# Patient Record
Sex: FEMALE | Race: BLACK OR AFRICAN AMERICAN | NOT HISPANIC OR LATINO | Employment: OTHER | URBAN - METROPOLITAN AREA
[De-identification: names, ages, dates, MRNs, and addresses within clinical notes are randomized per-mention and may not be internally consistent; named-entity substitution may affect disease eponyms.]

---

## 2017-01-01 ENCOUNTER — COMMUNICATION - HEALTHEAST (OUTPATIENT)
Dept: FAMILY MEDICINE | Facility: CLINIC | Age: 62
End: 2017-01-01

## 2017-01-03 ENCOUNTER — AMBULATORY - HEALTHEAST (OUTPATIENT)
Dept: FAMILY MEDICINE | Facility: CLINIC | Age: 62
End: 2017-01-03

## 2017-01-03 DIAGNOSIS — M25.569 KNEE PAIN: ICD-10-CM

## 2017-01-11 ENCOUNTER — COMMUNICATION - HEALTHEAST (OUTPATIENT)
Dept: FAMILY MEDICINE | Facility: CLINIC | Age: 62
End: 2017-01-11

## 2017-01-11 DIAGNOSIS — E78.5 HYPERLIPIDEMIA: ICD-10-CM

## 2017-01-22 ENCOUNTER — COMMUNICATION - HEALTHEAST (OUTPATIENT)
Dept: FAMILY MEDICINE | Facility: CLINIC | Age: 62
End: 2017-01-22

## 2017-01-22 DIAGNOSIS — E11.9 TYPE II DIABETES MELLITUS (H): ICD-10-CM

## 2017-01-22 DIAGNOSIS — E11.9 DIABETES (H): ICD-10-CM

## 2017-01-27 ENCOUNTER — AMBULATORY - HEALTHEAST (OUTPATIENT)
Dept: FAMILY MEDICINE | Facility: CLINIC | Age: 62
End: 2017-01-27

## 2017-01-27 ENCOUNTER — COMMUNICATION - HEALTHEAST (OUTPATIENT)
Dept: FAMILY MEDICINE | Facility: CLINIC | Age: 62
End: 2017-01-27

## 2017-01-27 DIAGNOSIS — G89.29 CHRONIC PAIN: ICD-10-CM

## 2017-02-02 ENCOUNTER — COMMUNICATION - HEALTHEAST (OUTPATIENT)
Dept: FAMILY MEDICINE | Facility: CLINIC | Age: 62
End: 2017-02-02

## 2017-02-02 DIAGNOSIS — M10.9 GOUT: ICD-10-CM

## 2017-02-27 ENCOUNTER — COMMUNICATION - HEALTHEAST (OUTPATIENT)
Dept: FAMILY MEDICINE | Facility: CLINIC | Age: 62
End: 2017-02-27

## 2017-02-27 DIAGNOSIS — G89.29 CHRONIC PAIN: ICD-10-CM

## 2017-03-01 ENCOUNTER — COMMUNICATION - HEALTHEAST (OUTPATIENT)
Dept: NURSING | Facility: CLINIC | Age: 62
End: 2017-03-01

## 2017-03-02 ENCOUNTER — COMMUNICATION - HEALTHEAST (OUTPATIENT)
Dept: FAMILY MEDICINE | Facility: CLINIC | Age: 62
End: 2017-03-02

## 2017-03-26 ENCOUNTER — COMMUNICATION - HEALTHEAST (OUTPATIENT)
Dept: FAMILY MEDICINE | Facility: CLINIC | Age: 62
End: 2017-03-26

## 2017-03-28 ENCOUNTER — COMMUNICATION - HEALTHEAST (OUTPATIENT)
Dept: SCHEDULING | Facility: CLINIC | Age: 62
End: 2017-03-28

## 2017-04-03 ENCOUNTER — COMMUNICATION - HEALTHEAST (OUTPATIENT)
Dept: NURSING | Facility: CLINIC | Age: 62
End: 2017-04-03

## 2017-04-10 ENCOUNTER — COMMUNICATION - HEALTHEAST (OUTPATIENT)
Dept: FAMILY MEDICINE | Facility: CLINIC | Age: 62
End: 2017-04-10

## 2017-04-10 DIAGNOSIS — G89.29 CHRONIC PAIN: ICD-10-CM

## 2017-04-14 ENCOUNTER — COMMUNICATION - HEALTHEAST (OUTPATIENT)
Dept: FAMILY MEDICINE | Facility: CLINIC | Age: 62
End: 2017-04-14

## 2017-04-14 DIAGNOSIS — E11.9 DM (DIABETES MELLITUS) (H): ICD-10-CM

## 2017-04-14 DIAGNOSIS — E11.9 DIABETES (H): ICD-10-CM

## 2017-04-25 ENCOUNTER — COMMUNICATION - HEALTHEAST (OUTPATIENT)
Dept: FAMILY MEDICINE | Facility: CLINIC | Age: 62
End: 2017-04-25

## 2017-04-26 ENCOUNTER — COMMUNICATION - HEALTHEAST (OUTPATIENT)
Dept: SCHEDULING | Facility: CLINIC | Age: 62
End: 2017-04-26

## 2017-04-28 ENCOUNTER — COMMUNICATION - HEALTHEAST (OUTPATIENT)
Dept: FAMILY MEDICINE | Facility: CLINIC | Age: 62
End: 2017-04-28

## 2017-04-28 DIAGNOSIS — M10.9 GOUT: ICD-10-CM

## 2017-05-01 ENCOUNTER — COMMUNICATION - HEALTHEAST (OUTPATIENT)
Dept: FAMILY MEDICINE | Facility: CLINIC | Age: 62
End: 2017-05-01

## 2017-05-02 ENCOUNTER — COMMUNICATION - HEALTHEAST (OUTPATIENT)
Dept: FAMILY MEDICINE | Facility: CLINIC | Age: 62
End: 2017-05-02

## 2017-05-02 DIAGNOSIS — M10.9 GOUT: ICD-10-CM

## 2017-05-04 ENCOUNTER — COMMUNICATION - HEALTHEAST (OUTPATIENT)
Dept: FAMILY MEDICINE | Facility: CLINIC | Age: 62
End: 2017-05-04

## 2017-05-04 DIAGNOSIS — M10.9 GOUT: ICD-10-CM

## 2017-05-05 ENCOUNTER — COMMUNICATION - HEALTHEAST (OUTPATIENT)
Dept: FAMILY MEDICINE | Facility: CLINIC | Age: 62
End: 2017-05-05

## 2017-05-05 DIAGNOSIS — M10.9 GOUT: ICD-10-CM

## 2017-05-10 ENCOUNTER — RECORDS - HEALTHEAST (OUTPATIENT)
Dept: ADMINISTRATIVE | Facility: OTHER | Age: 62
End: 2017-05-10

## 2017-05-10 ENCOUNTER — OFFICE VISIT - HEALTHEAST (OUTPATIENT)
Dept: FAMILY MEDICINE | Facility: CLINIC | Age: 62
End: 2017-05-10

## 2017-05-10 DIAGNOSIS — N18.4 CHRONIC KIDNEY DISEASE, STAGE IV (SEVERE) (H): ICD-10-CM

## 2017-05-10 DIAGNOSIS — E11.9 TYPE 2 DIABETES MELLITUS (H): ICD-10-CM

## 2017-05-10 DIAGNOSIS — E78.00 PURE HYPERCHOLESTEROLEMIA: ICD-10-CM

## 2017-05-10 DIAGNOSIS — G89.29 CHRONIC PAIN: ICD-10-CM

## 2017-05-10 DIAGNOSIS — E11.49 OTHER DIABETIC NEUROLOGICAL COMPLICATION ASSOCIATED WITH TYPE 2 DIABETES MELLITUS (H): ICD-10-CM

## 2017-05-10 DIAGNOSIS — Z12.31 VISIT FOR SCREENING MAMMOGRAM: ICD-10-CM

## 2017-05-10 DIAGNOSIS — I10 ESSENTIAL HYPERTENSION, BENIGN: ICD-10-CM

## 2017-05-10 DIAGNOSIS — D64.9 ANEMIA, UNSPECIFIED: ICD-10-CM

## 2017-05-10 DIAGNOSIS — R79.9 ABNORMAL BLOOD CHEMISTRY: ICD-10-CM

## 2017-05-10 LAB
HBA1C MFR BLD: 6 % (ref 3.5–6)
LDLC SERPL CALC-MCNC: 53 MG/DL

## 2017-05-11 ENCOUNTER — COMMUNICATION - HEALTHEAST (OUTPATIENT)
Dept: FAMILY MEDICINE | Facility: CLINIC | Age: 62
End: 2017-05-11

## 2017-05-12 ENCOUNTER — AMBULATORY - HEALTHEAST (OUTPATIENT)
Dept: FAMILY MEDICINE | Facility: CLINIC | Age: 62
End: 2017-05-12

## 2017-05-15 ENCOUNTER — COMMUNICATION - HEALTHEAST (OUTPATIENT)
Dept: FAMILY MEDICINE | Facility: CLINIC | Age: 62
End: 2017-05-15

## 2017-05-17 ENCOUNTER — AMBULATORY - HEALTHEAST (OUTPATIENT)
Dept: FAMILY MEDICINE | Facility: CLINIC | Age: 62
End: 2017-05-17

## 2017-05-17 DIAGNOSIS — N18.9 CKD (CHRONIC KIDNEY DISEASE): ICD-10-CM

## 2017-05-17 DIAGNOSIS — R79.9 ABNORMAL BLOOD CHEMISTRY: ICD-10-CM

## 2017-05-17 DIAGNOSIS — D64.9 ANEMIA: ICD-10-CM

## 2017-05-18 ENCOUNTER — COMMUNICATION - HEALTHEAST (OUTPATIENT)
Dept: FAMILY MEDICINE | Facility: CLINIC | Age: 62
End: 2017-05-18

## 2017-05-19 ENCOUNTER — AMBULATORY - HEALTHEAST (OUTPATIENT)
Dept: FAMILY MEDICINE | Facility: CLINIC | Age: 62
End: 2017-05-19

## 2017-05-19 ENCOUNTER — AMBULATORY - HEALTHEAST (OUTPATIENT)
Dept: SURGERY | Facility: CLINIC | Age: 62
End: 2017-05-19

## 2017-05-19 ENCOUNTER — COMMUNICATION - HEALTHEAST (OUTPATIENT)
Dept: SCHEDULING | Facility: CLINIC | Age: 62
End: 2017-05-19

## 2017-05-19 DIAGNOSIS — G62.9 NEUROPATHY: ICD-10-CM

## 2017-05-19 DIAGNOSIS — G89.4 CHRONIC PAIN SYNDROME: ICD-10-CM

## 2017-05-20 ENCOUNTER — RECORDS - HEALTHEAST (OUTPATIENT)
Dept: ADMINISTRATIVE | Facility: OTHER | Age: 62
End: 2017-05-20

## 2017-05-30 ENCOUNTER — COMMUNICATION - HEALTHEAST (OUTPATIENT)
Dept: FAMILY MEDICINE | Facility: CLINIC | Age: 62
End: 2017-05-30

## 2017-05-31 ENCOUNTER — COMMUNICATION - HEALTHEAST (OUTPATIENT)
Dept: FAMILY MEDICINE | Facility: CLINIC | Age: 62
End: 2017-05-31

## 2017-05-31 DIAGNOSIS — M10.9 GOUT: ICD-10-CM

## 2017-06-02 ENCOUNTER — COMMUNICATION - HEALTHEAST (OUTPATIENT)
Dept: FAMILY MEDICINE | Facility: CLINIC | Age: 62
End: 2017-06-02

## 2017-06-02 DIAGNOSIS — L30.4 INTERTRIGO: ICD-10-CM

## 2017-06-02 DIAGNOSIS — G89.29 CHRONIC PAIN: ICD-10-CM

## 2017-06-05 ENCOUNTER — COMMUNICATION - HEALTHEAST (OUTPATIENT)
Dept: FAMILY MEDICINE | Facility: CLINIC | Age: 62
End: 2017-06-05

## 2017-06-05 DIAGNOSIS — E11.9 DM (DIABETES MELLITUS) (H): ICD-10-CM

## 2017-06-05 DIAGNOSIS — G89.29 CHRONIC PAIN: ICD-10-CM

## 2017-06-07 ENCOUNTER — COMMUNICATION - HEALTHEAST (OUTPATIENT)
Dept: SCHEDULING | Facility: CLINIC | Age: 62
End: 2017-06-07

## 2017-06-12 ENCOUNTER — COMMUNICATION - HEALTHEAST (OUTPATIENT)
Dept: FAMILY MEDICINE | Facility: CLINIC | Age: 62
End: 2017-06-12

## 2017-06-12 ENCOUNTER — OFFICE VISIT - HEALTHEAST (OUTPATIENT)
Dept: GERIATRICS | Facility: CLINIC | Age: 62
End: 2017-06-12

## 2017-06-12 DIAGNOSIS — D50.9 MICROCYTIC ANEMIA: ICD-10-CM

## 2017-06-12 DIAGNOSIS — M79.3 CANDIDA-INDUCED PANNICULITIS: ICD-10-CM

## 2017-06-12 DIAGNOSIS — N17.9 ACUTE KIDNEY INJURY SUPERIMPOSED ON CKD (H): ICD-10-CM

## 2017-06-12 DIAGNOSIS — E11.42 DIABETIC POLYNEUROPATHY ASSOCIATED WITH TYPE 2 DIABETES MELLITUS (H): ICD-10-CM

## 2017-06-12 DIAGNOSIS — Z79.4 LONG-TERM INSULIN USE IN TYPE 2 DIABETES (H): ICD-10-CM

## 2017-06-12 DIAGNOSIS — B37.2 CANDIDIASIS, INTERTRIGO: ICD-10-CM

## 2017-06-12 DIAGNOSIS — B37.9 CANDIDA-INDUCED PANNICULITIS: ICD-10-CM

## 2017-06-12 DIAGNOSIS — M10.9 GOUT, UNSPECIFIED: ICD-10-CM

## 2017-06-12 DIAGNOSIS — N18.9 ACUTE KIDNEY INJURY SUPERIMPOSED ON CKD (H): ICD-10-CM

## 2017-06-12 DIAGNOSIS — I50.32 CHRONIC DIASTOLIC CHF (CONGESTIVE HEART FAILURE) (H): ICD-10-CM

## 2017-06-12 DIAGNOSIS — G47.33 OBSTRUCTIVE SLEEP APNEA (ADULT) (PEDIATRIC): ICD-10-CM

## 2017-06-12 DIAGNOSIS — M17.10 LOCALIZED OSTEOARTHROSIS, LOWER LEG: ICD-10-CM

## 2017-06-12 DIAGNOSIS — E11.9 LONG-TERM INSULIN USE IN TYPE 2 DIABETES (H): ICD-10-CM

## 2017-06-12 DIAGNOSIS — I27.9 CHRONIC PULMONARY HEART DISEASE (H): ICD-10-CM

## 2017-06-12 DIAGNOSIS — E66.2 MORBID OBESITY WITH ALVEOLAR HYPOVENTILATION (H): ICD-10-CM

## 2017-06-14 ENCOUNTER — OFFICE VISIT - HEALTHEAST (OUTPATIENT)
Dept: GERIATRICS | Facility: CLINIC | Age: 62
End: 2017-06-14

## 2017-06-14 ENCOUNTER — COMMUNICATION - HEALTHEAST (OUTPATIENT)
Dept: FAMILY MEDICINE | Facility: CLINIC | Age: 62
End: 2017-06-14

## 2017-06-14 DIAGNOSIS — I50.9 CONGESTIVE HEART FAILURE, UNSPECIFIED CONGESTIVE HEART FAILURE CHRONICITY, UNSPECIFIED CONGESTIVE HEART FAILURE TYPE: ICD-10-CM

## 2017-06-14 DIAGNOSIS — E66.2 MORBID OBESITY WITH ALVEOLAR HYPOVENTILATION (H): ICD-10-CM

## 2017-06-14 DIAGNOSIS — I27.9 CHRONIC PULMONARY HEART DISEASE (H): ICD-10-CM

## 2017-06-14 DIAGNOSIS — N17.9 ACUTE KIDNEY INJURY SUPERIMPOSED ON CKD (H): ICD-10-CM

## 2017-06-14 DIAGNOSIS — L29.9 PRURITIC DISORDER: ICD-10-CM

## 2017-06-14 DIAGNOSIS — I50.32 CHRONIC DIASTOLIC CHF (CONGESTIVE HEART FAILURE) (H): ICD-10-CM

## 2017-06-14 DIAGNOSIS — J45.909 ASTHMA: ICD-10-CM

## 2017-06-14 DIAGNOSIS — N18.9 ACUTE KIDNEY INJURY SUPERIMPOSED ON CKD (H): ICD-10-CM

## 2017-06-14 DIAGNOSIS — I10 ESSENTIAL HYPERTENSION, BENIGN: ICD-10-CM

## 2017-06-14 DIAGNOSIS — G47.33 OBSTRUCTIVE SLEEP APNEA (ADULT) (PEDIATRIC): ICD-10-CM

## 2017-06-14 DIAGNOSIS — M79.3 CANDIDA-INDUCED PANNICULITIS: ICD-10-CM

## 2017-06-14 DIAGNOSIS — E78.5 HYPERLIPIDEMIA: ICD-10-CM

## 2017-06-14 DIAGNOSIS — B37.9 CANDIDA-INDUCED PANNICULITIS: ICD-10-CM

## 2017-06-19 ENCOUNTER — OFFICE VISIT - HEALTHEAST (OUTPATIENT)
Dept: GERIATRICS | Facility: CLINIC | Age: 62
End: 2017-06-19

## 2017-06-19 DIAGNOSIS — Z79.4 LONG-TERM INSULIN USE IN TYPE 2 DIABETES (H): ICD-10-CM

## 2017-06-19 DIAGNOSIS — E66.2 MORBID OBESITY WITH ALVEOLAR HYPOVENTILATION (H): ICD-10-CM

## 2017-06-19 DIAGNOSIS — I50.32 CHRONIC DIASTOLIC CHF (CONGESTIVE HEART FAILURE) (H): ICD-10-CM

## 2017-06-19 DIAGNOSIS — N17.9 ACUTE KIDNEY INJURY SUPERIMPOSED ON CKD (H): ICD-10-CM

## 2017-06-19 DIAGNOSIS — E11.9 LONG-TERM INSULIN USE IN TYPE 2 DIABETES (H): ICD-10-CM

## 2017-06-19 DIAGNOSIS — G47.33 OBSTRUCTIVE SLEEP APNEA (ADULT) (PEDIATRIC): ICD-10-CM

## 2017-06-19 DIAGNOSIS — N18.9 ACUTE KIDNEY INJURY SUPERIMPOSED ON CKD (H): ICD-10-CM

## 2017-06-19 DIAGNOSIS — I27.9 CHRONIC PULMONARY HEART DISEASE (H): ICD-10-CM

## 2017-06-22 ENCOUNTER — RECORDS - HEALTHEAST (OUTPATIENT)
Dept: ADMINISTRATIVE | Facility: OTHER | Age: 62
End: 2017-06-22

## 2017-06-26 ENCOUNTER — OFFICE VISIT - HEALTHEAST (OUTPATIENT)
Dept: GERIATRICS | Facility: CLINIC | Age: 62
End: 2017-06-26

## 2017-06-26 DIAGNOSIS — N17.9 ACUTE KIDNEY INJURY SUPERIMPOSED ON CKD (H): ICD-10-CM

## 2017-06-26 DIAGNOSIS — I50.32 CHRONIC DIASTOLIC CHF (CONGESTIVE HEART FAILURE) (H): ICD-10-CM

## 2017-06-26 DIAGNOSIS — E66.2 MORBID OBESITY WITH ALVEOLAR HYPOVENTILATION (H): ICD-10-CM

## 2017-06-26 DIAGNOSIS — N18.9 ACUTE KIDNEY INJURY SUPERIMPOSED ON CKD (H): ICD-10-CM

## 2017-06-26 DIAGNOSIS — I27.9 CHRONIC PULMONARY HEART DISEASE (H): ICD-10-CM

## 2017-06-26 DIAGNOSIS — E11.42 DIABETIC POLYNEUROPATHY ASSOCIATED WITH TYPE 2 DIABETES MELLITUS (H): ICD-10-CM

## 2017-06-26 DIAGNOSIS — G47.33 OBSTRUCTIVE SLEEP APNEA (ADULT) (PEDIATRIC): ICD-10-CM

## 2017-06-26 DIAGNOSIS — M17.10 LOCALIZED OSTEOARTHROSIS, LOWER LEG: ICD-10-CM

## 2017-06-27 ENCOUNTER — AMBULATORY - HEALTHEAST (OUTPATIENT)
Dept: GERIATRICS | Facility: CLINIC | Age: 62
End: 2017-06-27

## 2017-06-28 ENCOUNTER — COMMUNICATION - HEALTHEAST (OUTPATIENT)
Dept: FAMILY MEDICINE | Facility: CLINIC | Age: 62
End: 2017-06-28

## 2017-06-28 DIAGNOSIS — M10.9 GOUT, UNSPECIFIED: ICD-10-CM

## 2017-07-03 ENCOUNTER — AMBULATORY - HEALTHEAST (OUTPATIENT)
Dept: CARDIOLOGY | Facility: CLINIC | Age: 62
End: 2017-07-03

## 2017-07-05 ENCOUNTER — COMMUNICATION - HEALTHEAST (OUTPATIENT)
Dept: NURSING | Facility: CLINIC | Age: 62
End: 2017-07-05

## 2017-07-07 ENCOUNTER — COMMUNICATION - HEALTHEAST (OUTPATIENT)
Dept: NURSING | Facility: CLINIC | Age: 62
End: 2017-07-07

## 2017-07-09 ENCOUNTER — COMMUNICATION - HEALTHEAST (OUTPATIENT)
Dept: FAMILY MEDICINE | Facility: CLINIC | Age: 62
End: 2017-07-09

## 2017-07-09 DIAGNOSIS — I10 BENIGN ESSENTIAL HYPERTENSION: ICD-10-CM

## 2017-07-10 ENCOUNTER — AMBULATORY - HEALTHEAST (OUTPATIENT)
Dept: FAMILY MEDICINE | Facility: CLINIC | Age: 62
End: 2017-07-10

## 2017-07-10 DIAGNOSIS — G89.29 CHRONIC BACK PAIN: ICD-10-CM

## 2017-07-10 DIAGNOSIS — M54.9 CHRONIC BACK PAIN: ICD-10-CM

## 2017-07-12 ENCOUNTER — COMMUNICATION - HEALTHEAST (OUTPATIENT)
Dept: FAMILY MEDICINE | Facility: CLINIC | Age: 62
End: 2017-07-12

## 2017-07-12 ENCOUNTER — OFFICE VISIT - HEALTHEAST (OUTPATIENT)
Dept: FAMILY MEDICINE | Facility: CLINIC | Age: 62
End: 2017-07-12

## 2017-07-12 DIAGNOSIS — I10 ESSENTIAL HYPERTENSION, BENIGN: ICD-10-CM

## 2017-07-12 DIAGNOSIS — I50.9 CONGESTIVE HEART FAILURE, UNSPECIFIED CONGESTIVE HEART FAILURE CHRONICITY, UNSPECIFIED CONGESTIVE HEART FAILURE TYPE: ICD-10-CM

## 2017-07-12 DIAGNOSIS — M10.9 GOUT, UNSPECIFIED: ICD-10-CM

## 2017-07-12 DIAGNOSIS — E78.5 HYPERLIPIDEMIA: ICD-10-CM

## 2017-07-12 DIAGNOSIS — E11.9 DM (DIABETES MELLITUS) (H): ICD-10-CM

## 2017-07-12 DIAGNOSIS — L29.9 PRURITIC DISORDER: ICD-10-CM

## 2017-07-12 DIAGNOSIS — J45.909 ASTHMA: ICD-10-CM

## 2017-07-12 DIAGNOSIS — N18.9 CKD (CHRONIC KIDNEY DISEASE): ICD-10-CM

## 2017-07-12 DIAGNOSIS — R79.9 ABNORMAL BLOOD CHEMISTRY: ICD-10-CM

## 2017-07-12 DIAGNOSIS — I50.31 ACUTE DIASTOLIC HEART FAILURE (H): ICD-10-CM

## 2017-07-13 ENCOUNTER — COMMUNICATION - HEALTHEAST (OUTPATIENT)
Dept: FAMILY MEDICINE | Facility: CLINIC | Age: 62
End: 2017-07-13

## 2017-07-13 DIAGNOSIS — M10.9 GOUT, UNSPECIFIED: ICD-10-CM

## 2017-07-13 DIAGNOSIS — E87.6 HYPOKALEMIA: ICD-10-CM

## 2017-07-13 DIAGNOSIS — E11.9 DM (DIABETES MELLITUS) (H): ICD-10-CM

## 2017-07-14 ENCOUNTER — COMMUNICATION - HEALTHEAST (OUTPATIENT)
Dept: FAMILY MEDICINE | Facility: CLINIC | Age: 62
End: 2017-07-14

## 2017-07-14 DIAGNOSIS — G89.4 CHRONIC PAIN SYNDROME: ICD-10-CM

## 2017-07-18 ENCOUNTER — COMMUNICATION - HEALTHEAST (OUTPATIENT)
Dept: FAMILY MEDICINE | Facility: CLINIC | Age: 62
End: 2017-07-18

## 2017-07-20 ENCOUNTER — COMMUNICATION - HEALTHEAST (OUTPATIENT)
Dept: FAMILY MEDICINE | Facility: CLINIC | Age: 62
End: 2017-07-20

## 2017-07-20 DIAGNOSIS — I50.9 CONGESTIVE HEART FAILURE, UNSPECIFIED CONGESTIVE HEART FAILURE CHRONICITY, UNSPECIFIED CONGESTIVE HEART FAILURE TYPE: ICD-10-CM

## 2017-07-21 ENCOUNTER — OFFICE VISIT - HEALTHEAST (OUTPATIENT)
Dept: FAMILY MEDICINE | Facility: CLINIC | Age: 62
End: 2017-07-21

## 2017-07-21 DIAGNOSIS — I50.32 CHRONIC DIASTOLIC CHF (CONGESTIVE HEART FAILURE) (H): ICD-10-CM

## 2017-07-21 DIAGNOSIS — N17.9 ACUTE KIDNEY INJURY SUPERIMPOSED ON CKD (H): ICD-10-CM

## 2017-07-21 DIAGNOSIS — H40.9 UNSPECIFIED GLAUCOMA(365.9): ICD-10-CM

## 2017-07-21 DIAGNOSIS — E11.42 DIABETIC POLYNEUROPATHY ASSOCIATED WITH TYPE 2 DIABETES MELLITUS (H): ICD-10-CM

## 2017-07-21 DIAGNOSIS — M70.70 HIP BURSITIS, UNSPECIFIED LATERALITY: ICD-10-CM

## 2017-07-21 DIAGNOSIS — N18.9 ACUTE KIDNEY INJURY SUPERIMPOSED ON CKD (H): ICD-10-CM

## 2017-07-24 ENCOUNTER — OFFICE VISIT - HEALTHEAST (OUTPATIENT)
Dept: FAMILY MEDICINE | Facility: CLINIC | Age: 62
End: 2017-07-24

## 2017-07-24 DIAGNOSIS — E11.42 DIABETIC POLYNEUROPATHY ASSOCIATED WITH TYPE 2 DIABETES MELLITUS (H): ICD-10-CM

## 2017-07-24 DIAGNOSIS — N18.9 CKD (CHRONIC KIDNEY DISEASE): ICD-10-CM

## 2017-07-24 DIAGNOSIS — I50.9 CHF (CONGESTIVE HEART FAILURE) (H): ICD-10-CM

## 2017-07-24 DIAGNOSIS — E83.42 HYPOMAGNESEMIA: ICD-10-CM

## 2017-07-26 ENCOUNTER — AMBULATORY - HEALTHEAST (OUTPATIENT)
Dept: FAMILY MEDICINE | Facility: CLINIC | Age: 62
End: 2017-07-26

## 2017-07-27 ENCOUNTER — COMMUNICATION - HEALTHEAST (OUTPATIENT)
Dept: FAMILY MEDICINE | Facility: CLINIC | Age: 62
End: 2017-07-27

## 2017-07-27 ENCOUNTER — AMBULATORY - HEALTHEAST (OUTPATIENT)
Dept: FAMILY MEDICINE | Facility: CLINIC | Age: 62
End: 2017-07-27

## 2017-07-27 DIAGNOSIS — I25.10 CAD (CORONARY ARTERY DISEASE): ICD-10-CM

## 2017-07-27 DIAGNOSIS — N18.9 CKD (CHRONIC KIDNEY DISEASE): ICD-10-CM

## 2017-07-27 DIAGNOSIS — I10 ESSENTIAL HYPERTENSION, BENIGN: ICD-10-CM

## 2017-07-28 ENCOUNTER — AMBULATORY - HEALTHEAST (OUTPATIENT)
Dept: LAB | Facility: CLINIC | Age: 62
End: 2017-07-28

## 2017-07-28 ENCOUNTER — RECORDS - HEALTHEAST (OUTPATIENT)
Dept: ADMINISTRATIVE | Facility: OTHER | Age: 62
End: 2017-07-28

## 2017-07-28 ENCOUNTER — COMMUNICATION - HEALTHEAST (OUTPATIENT)
Dept: LAB | Facility: CLINIC | Age: 62
End: 2017-07-28

## 2017-07-28 ENCOUNTER — AMBULATORY - HEALTHEAST (OUTPATIENT)
Dept: FAMILY MEDICINE | Facility: CLINIC | Age: 62
End: 2017-07-28

## 2017-07-28 DIAGNOSIS — N18.9 CKD (CHRONIC KIDNEY DISEASE): ICD-10-CM

## 2017-07-28 DIAGNOSIS — R79.9 ABNORMAL BLOOD CHEMISTRY: ICD-10-CM

## 2017-07-28 DIAGNOSIS — D64.9 ANEMIA: ICD-10-CM

## 2017-08-01 ENCOUNTER — COMMUNICATION - HEALTHEAST (OUTPATIENT)
Dept: FAMILY MEDICINE | Facility: CLINIC | Age: 62
End: 2017-08-01

## 2017-08-01 ENCOUNTER — RECORDS - HEALTHEAST (OUTPATIENT)
Dept: ADMINISTRATIVE | Facility: OTHER | Age: 62
End: 2017-08-01

## 2017-08-02 ENCOUNTER — AMBULATORY - HEALTHEAST (OUTPATIENT)
Dept: FAMILY MEDICINE | Facility: CLINIC | Age: 62
End: 2017-08-02

## 2017-08-02 ENCOUNTER — COMMUNICATION - HEALTHEAST (OUTPATIENT)
Dept: SCHEDULING | Facility: CLINIC | Age: 62
End: 2017-08-02

## 2017-08-02 DIAGNOSIS — N18.9 CKD (CHRONIC KIDNEY DISEASE): ICD-10-CM

## 2017-08-04 ENCOUNTER — COMMUNICATION - HEALTHEAST (OUTPATIENT)
Dept: FAMILY MEDICINE | Facility: CLINIC | Age: 62
End: 2017-08-04

## 2017-08-04 ENCOUNTER — AMBULATORY - HEALTHEAST (OUTPATIENT)
Dept: LAB | Facility: CLINIC | Age: 62
End: 2017-08-04

## 2017-08-04 DIAGNOSIS — N18.9 CKD (CHRONIC KIDNEY DISEASE): ICD-10-CM

## 2017-08-04 DIAGNOSIS — M10.9 GOUT, UNSPECIFIED: ICD-10-CM

## 2017-08-04 DIAGNOSIS — I10 BENIGN ESSENTIAL HYPERTENSION: ICD-10-CM

## 2017-08-07 ENCOUNTER — COMMUNICATION - HEALTHEAST (OUTPATIENT)
Dept: NURSING | Facility: CLINIC | Age: 62
End: 2017-08-07

## 2017-08-12 ENCOUNTER — COMMUNICATION - HEALTHEAST (OUTPATIENT)
Dept: FAMILY MEDICINE | Facility: CLINIC | Age: 62
End: 2017-08-12

## 2017-08-12 DIAGNOSIS — I10 BENIGN ESSENTIAL HYPERTENSION: ICD-10-CM

## 2017-08-14 ENCOUNTER — COMMUNICATION - HEALTHEAST (OUTPATIENT)
Dept: FAMILY MEDICINE | Facility: CLINIC | Age: 62
End: 2017-08-14

## 2017-08-14 DIAGNOSIS — L29.9 PRURITIC DISORDER: ICD-10-CM

## 2017-08-15 ENCOUNTER — COMMUNICATION - HEALTHEAST (OUTPATIENT)
Dept: FAMILY MEDICINE | Facility: CLINIC | Age: 62
End: 2017-08-15

## 2017-08-15 DIAGNOSIS — R19.7 DIARRHEA: ICD-10-CM

## 2017-08-22 ENCOUNTER — COMMUNICATION - HEALTHEAST (OUTPATIENT)
Dept: FAMILY MEDICINE | Facility: CLINIC | Age: 62
End: 2017-08-22

## 2017-08-23 ENCOUNTER — COMMUNICATION - HEALTHEAST (OUTPATIENT)
Dept: NURSING | Facility: CLINIC | Age: 62
End: 2017-08-23

## 2017-08-31 ENCOUNTER — COMMUNICATION - HEALTHEAST (OUTPATIENT)
Dept: FAMILY MEDICINE | Facility: CLINIC | Age: 62
End: 2017-08-31

## 2017-08-31 DIAGNOSIS — E11.9 DIABETES (H): ICD-10-CM

## 2017-09-05 ENCOUNTER — COMMUNICATION - HEALTHEAST (OUTPATIENT)
Dept: FAMILY MEDICINE | Facility: CLINIC | Age: 62
End: 2017-09-05

## 2017-09-05 DIAGNOSIS — E11.9 DIABETES (H): ICD-10-CM

## 2017-09-06 ENCOUNTER — COMMUNICATION - HEALTHEAST (OUTPATIENT)
Dept: FAMILY MEDICINE | Facility: CLINIC | Age: 62
End: 2017-09-06

## 2017-09-06 ENCOUNTER — COMMUNICATION - HEALTHEAST (OUTPATIENT)
Dept: NURSING | Facility: CLINIC | Age: 62
End: 2017-09-06

## 2017-09-06 DIAGNOSIS — M10.9 GOUT, UNSPECIFIED: ICD-10-CM

## 2017-09-06 DIAGNOSIS — I10 BENIGN ESSENTIAL HYPERTENSION: ICD-10-CM

## 2017-09-09 ENCOUNTER — COMMUNICATION - HEALTHEAST (OUTPATIENT)
Dept: FAMILY MEDICINE | Facility: CLINIC | Age: 62
End: 2017-09-09

## 2017-09-09 DIAGNOSIS — E11.9 DIABETES (H): ICD-10-CM

## 2017-09-14 ENCOUNTER — COMMUNICATION - HEALTHEAST (OUTPATIENT)
Dept: FAMILY MEDICINE | Facility: CLINIC | Age: 62
End: 2017-09-14

## 2017-09-14 DIAGNOSIS — I10 BENIGN ESSENTIAL HYPERTENSION: ICD-10-CM

## 2017-09-14 DIAGNOSIS — M10.9 GOUT, UNSPECIFIED: ICD-10-CM

## 2017-10-06 ENCOUNTER — COMMUNICATION - HEALTHEAST (OUTPATIENT)
Dept: FAMILY MEDICINE | Facility: CLINIC | Age: 62
End: 2017-10-06

## 2017-10-06 DIAGNOSIS — R52 PAIN: ICD-10-CM

## 2017-10-09 ENCOUNTER — COMMUNICATION - HEALTHEAST (OUTPATIENT)
Dept: NURSING | Facility: CLINIC | Age: 62
End: 2017-10-09

## 2017-10-12 ENCOUNTER — COMMUNICATION - HEALTHEAST (OUTPATIENT)
Dept: NURSING | Facility: CLINIC | Age: 62
End: 2017-10-12

## 2017-10-18 ENCOUNTER — AMBULATORY - HEALTHEAST (OUTPATIENT)
Dept: CARE COORDINATION | Facility: CLINIC | Age: 62
End: 2017-10-18

## 2017-10-20 ENCOUNTER — RECORDS - HEALTHEAST (OUTPATIENT)
Dept: ADMINISTRATIVE | Facility: OTHER | Age: 62
End: 2017-10-20

## 2017-10-25 ENCOUNTER — COMMUNICATION - HEALTHEAST (OUTPATIENT)
Dept: FAMILY MEDICINE | Facility: CLINIC | Age: 62
End: 2017-10-25

## 2017-10-25 DIAGNOSIS — H40.9 UNSPECIFIED GLAUCOMA: ICD-10-CM

## 2017-10-25 DIAGNOSIS — I10 BENIGN ESSENTIAL HYPERTENSION: ICD-10-CM

## 2017-10-25 DIAGNOSIS — M10.9 GOUT: ICD-10-CM

## 2017-10-26 ENCOUNTER — COMMUNICATION - HEALTHEAST (OUTPATIENT)
Dept: FAMILY MEDICINE | Facility: CLINIC | Age: 62
End: 2017-10-26

## 2017-10-26 ENCOUNTER — RECORDS - HEALTHEAST (OUTPATIENT)
Dept: ADMINISTRATIVE | Facility: OTHER | Age: 62
End: 2017-10-26

## 2017-10-26 DIAGNOSIS — M10.9 GOUT: ICD-10-CM

## 2017-10-26 DIAGNOSIS — H40.9 GLAUCOMA: ICD-10-CM

## 2017-10-30 ENCOUNTER — COMMUNICATION - HEALTHEAST (OUTPATIENT)
Dept: FAMILY MEDICINE | Facility: CLINIC | Age: 62
End: 2017-10-30

## 2017-10-30 DIAGNOSIS — L29.9 PRURITIC DISORDER: ICD-10-CM

## 2017-11-01 ENCOUNTER — COMMUNICATION - HEALTHEAST (OUTPATIENT)
Dept: NURSING | Facility: CLINIC | Age: 62
End: 2017-11-01

## 2017-11-01 ENCOUNTER — OFFICE VISIT - HEALTHEAST (OUTPATIENT)
Dept: FAMILY MEDICINE | Facility: CLINIC | Age: 62
End: 2017-11-01

## 2017-11-01 DIAGNOSIS — N17.9 ACUTE KIDNEY INJURY SUPERIMPOSED ON CKD (H): ICD-10-CM

## 2017-11-01 DIAGNOSIS — R09.81 SINUS CONGESTION: ICD-10-CM

## 2017-11-01 DIAGNOSIS — E11.42 DIABETIC POLYNEUROPATHY ASSOCIATED WITH TYPE 2 DIABETES MELLITUS (H): ICD-10-CM

## 2017-11-01 DIAGNOSIS — E11.9 DM (DIABETES MELLITUS) (H): ICD-10-CM

## 2017-11-01 DIAGNOSIS — I10 ESSENTIAL HYPERTENSION, BENIGN: ICD-10-CM

## 2017-11-01 DIAGNOSIS — G89.4 CHRONIC PAIN SYNDROME: ICD-10-CM

## 2017-11-01 DIAGNOSIS — N18.9 ACUTE KIDNEY INJURY SUPERIMPOSED ON CKD (H): ICD-10-CM

## 2017-11-01 LAB
HBA1C MFR BLD: 7 % (ref 3.5–6)
LDLC SERPL CALC-MCNC: 94 MG/DL

## 2017-11-01 ASSESSMENT — MIFFLIN-ST. JEOR: SCORE: 1805.95

## 2017-11-02 ENCOUNTER — RECORDS - HEALTHEAST (OUTPATIENT)
Dept: ADMINISTRATIVE | Facility: OTHER | Age: 62
End: 2017-11-02

## 2017-11-03 ENCOUNTER — RECORDS - HEALTHEAST (OUTPATIENT)
Dept: ADMINISTRATIVE | Facility: OTHER | Age: 62
End: 2017-11-03

## 2017-11-06 ENCOUNTER — COMMUNICATION - HEALTHEAST (OUTPATIENT)
Dept: FAMILY MEDICINE | Facility: CLINIC | Age: 62
End: 2017-11-06

## 2017-11-06 ENCOUNTER — AMBULATORY - HEALTHEAST (OUTPATIENT)
Dept: CARE COORDINATION | Facility: CLINIC | Age: 62
End: 2017-11-06

## 2017-11-06 DIAGNOSIS — R52 PAIN: ICD-10-CM

## 2017-11-06 DIAGNOSIS — I10 BENIGN ESSENTIAL HYPERTENSION: ICD-10-CM

## 2017-11-08 ENCOUNTER — RECORDS - HEALTHEAST (OUTPATIENT)
Dept: ADMINISTRATIVE | Facility: OTHER | Age: 62
End: 2017-11-08

## 2017-11-20 ENCOUNTER — COMMUNICATION - HEALTHEAST (OUTPATIENT)
Dept: FAMILY MEDICINE | Facility: CLINIC | Age: 62
End: 2017-11-20

## 2017-11-20 ENCOUNTER — COMMUNICATION - HEALTHEAST (OUTPATIENT)
Dept: SCHEDULING | Facility: CLINIC | Age: 62
End: 2017-11-20

## 2017-11-21 ENCOUNTER — COMMUNICATION - HEALTHEAST (OUTPATIENT)
Dept: FAMILY MEDICINE | Facility: CLINIC | Age: 62
End: 2017-11-21

## 2017-11-24 ENCOUNTER — COMMUNICATION - HEALTHEAST (OUTPATIENT)
Dept: FAMILY MEDICINE | Facility: CLINIC | Age: 62
End: 2017-11-24

## 2017-11-24 DIAGNOSIS — J45.909 ASTHMA: ICD-10-CM

## 2017-11-24 DIAGNOSIS — I10 ESSENTIAL HYPERTENSION, BENIGN: ICD-10-CM

## 2017-11-28 ENCOUNTER — COMMUNICATION - HEALTHEAST (OUTPATIENT)
Dept: FAMILY MEDICINE | Facility: CLINIC | Age: 62
End: 2017-11-28

## 2017-11-28 DIAGNOSIS — M10.9 GOUT: ICD-10-CM

## 2017-11-29 ENCOUNTER — COMMUNICATION - HEALTHEAST (OUTPATIENT)
Dept: FAMILY MEDICINE | Facility: CLINIC | Age: 62
End: 2017-11-29

## 2017-11-29 DIAGNOSIS — E87.6 HYPOKALEMIA: ICD-10-CM

## 2017-12-03 ENCOUNTER — COMMUNICATION - HEALTHEAST (OUTPATIENT)
Dept: FAMILY MEDICINE | Facility: CLINIC | Age: 62
End: 2017-12-03

## 2017-12-03 DIAGNOSIS — I10 BENIGN ESSENTIAL HYPERTENSION: ICD-10-CM

## 2017-12-10 ENCOUNTER — COMMUNICATION - HEALTHEAST (OUTPATIENT)
Dept: FAMILY MEDICINE | Facility: CLINIC | Age: 62
End: 2017-12-10

## 2017-12-10 DIAGNOSIS — H40.9 GLAUCOMA: ICD-10-CM

## 2017-12-11 ENCOUNTER — COMMUNICATION - HEALTHEAST (OUTPATIENT)
Dept: FAMILY MEDICINE | Facility: CLINIC | Age: 62
End: 2017-12-11

## 2017-12-11 DIAGNOSIS — E78.5 HYPERLIPIDEMIA: ICD-10-CM

## 2017-12-11 DIAGNOSIS — L30.4 INTERTRIGO: ICD-10-CM

## 2017-12-12 ENCOUNTER — COMMUNICATION - HEALTHEAST (OUTPATIENT)
Dept: SCHEDULING | Facility: CLINIC | Age: 62
End: 2017-12-12

## 2017-12-13 ENCOUNTER — AMBULATORY - HEALTHEAST (OUTPATIENT)
Dept: FAMILY MEDICINE | Facility: CLINIC | Age: 62
End: 2017-12-13

## 2017-12-13 ENCOUNTER — HOSPITAL ENCOUNTER (OUTPATIENT)
Dept: ULTRASOUND IMAGING | Facility: CLINIC | Age: 62
Discharge: HOME | End: 2017-12-13
Attending: INTERNAL MEDICINE

## 2017-12-13 DIAGNOSIS — N18.4 STAGE 4 CHRONIC KIDNEY DISEASE (H): ICD-10-CM

## 2017-12-18 ENCOUNTER — COMMUNICATION - HEALTHEAST (OUTPATIENT)
Dept: FAMILY MEDICINE | Facility: CLINIC | Age: 62
End: 2017-12-18

## 2017-12-19 ENCOUNTER — AMBULATORY - HEALTHEAST (OUTPATIENT)
Dept: FAMILY MEDICINE | Facility: CLINIC | Age: 62
End: 2017-12-19

## 2017-12-19 DIAGNOSIS — R52 PAIN: ICD-10-CM

## 2017-12-21 ENCOUNTER — COMMUNICATION - HEALTHEAST (OUTPATIENT)
Dept: FAMILY MEDICINE | Facility: CLINIC | Age: 62
End: 2017-12-21

## 2017-12-21 DIAGNOSIS — M10.9 GOUT: ICD-10-CM

## 2018-01-09 ENCOUNTER — COMMUNICATION - HEALTHEAST (OUTPATIENT)
Dept: FAMILY MEDICINE | Facility: CLINIC | Age: 63
End: 2018-01-09

## 2018-01-09 DIAGNOSIS — L29.9 ITCHING: ICD-10-CM

## 2018-01-09 DIAGNOSIS — I10 ESSENTIAL HYPERTENSION, BENIGN: ICD-10-CM

## 2018-01-15 ENCOUNTER — HOSPITAL ENCOUNTER (OUTPATIENT)
Dept: PALLIATIVE MEDICINE | Facility: OTHER | Age: 63
Discharge: HOME | End: 2018-01-15
Attending: FAMILY MEDICINE

## 2018-01-15 DIAGNOSIS — M47.816 LUMBAR FACET ARTHROPATHY: ICD-10-CM

## 2018-01-15 ASSESSMENT — MIFFLIN-ST. JEOR: SCORE: 1821.82

## 2018-01-16 ENCOUNTER — COMMUNICATION - HEALTHEAST (OUTPATIENT)
Dept: FAMILY MEDICINE | Facility: CLINIC | Age: 63
End: 2018-01-16

## 2018-01-17 ENCOUNTER — AMBULATORY - HEALTHEAST (OUTPATIENT)
Dept: ONCOLOGY | Facility: HOSPITAL | Age: 63
End: 2018-01-17

## 2018-01-17 ENCOUNTER — AMBULATORY - HEALTHEAST (OUTPATIENT)
Dept: FAMILY MEDICINE | Facility: CLINIC | Age: 63
End: 2018-01-17

## 2018-01-21 ENCOUNTER — COMMUNICATION - HEALTHEAST (OUTPATIENT)
Dept: FAMILY MEDICINE | Facility: CLINIC | Age: 63
End: 2018-01-21

## 2018-01-21 DIAGNOSIS — M10.9 GOUT: ICD-10-CM

## 2018-01-26 ENCOUNTER — COMMUNICATION - HEALTHEAST (OUTPATIENT)
Dept: FAMILY MEDICINE | Facility: CLINIC | Age: 63
End: 2018-01-26

## 2018-01-26 DIAGNOSIS — E11.9 DIABETES (H): ICD-10-CM

## 2018-01-29 ENCOUNTER — COMMUNICATION - HEALTHEAST (OUTPATIENT)
Dept: FAMILY MEDICINE | Facility: CLINIC | Age: 63
End: 2018-01-29

## 2018-02-01 ENCOUNTER — COMMUNICATION - HEALTHEAST (OUTPATIENT)
Dept: PALLIATIVE MEDICINE | Facility: OTHER | Age: 63
End: 2018-02-01

## 2018-02-01 ENCOUNTER — COMMUNICATION - HEALTHEAST (OUTPATIENT)
Dept: ADMINISTRATIVE | Facility: HOSPITAL | Age: 63
End: 2018-02-01

## 2018-02-02 ENCOUNTER — COMMUNICATION - HEALTHEAST (OUTPATIENT)
Dept: NURSING | Facility: CLINIC | Age: 63
End: 2018-02-02

## 2018-02-06 ENCOUNTER — COMMUNICATION - HEALTHEAST (OUTPATIENT)
Dept: NURSING | Facility: CLINIC | Age: 63
End: 2018-02-06

## 2018-02-15 ENCOUNTER — COMMUNICATION - HEALTHEAST (OUTPATIENT)
Dept: FAMILY MEDICINE | Facility: CLINIC | Age: 63
End: 2018-02-15

## 2018-02-15 DIAGNOSIS — E11.9 TYPE II DIABETES MELLITUS (H): ICD-10-CM

## 2018-02-19 ENCOUNTER — COMMUNICATION - HEALTHEAST (OUTPATIENT)
Dept: FAMILY MEDICINE | Facility: CLINIC | Age: 63
End: 2018-02-19

## 2018-02-19 DIAGNOSIS — M10.9 GOUT: ICD-10-CM

## 2018-02-21 ENCOUNTER — COMMUNICATION - HEALTHEAST (OUTPATIENT)
Dept: NURSING | Facility: CLINIC | Age: 63
End: 2018-02-21

## 2018-02-21 ENCOUNTER — AMBULATORY - HEALTHEAST (OUTPATIENT)
Dept: CARE COORDINATION | Facility: CLINIC | Age: 63
End: 2018-02-21

## 2018-03-09 ENCOUNTER — COMMUNICATION - HEALTHEAST (OUTPATIENT)
Dept: SCHEDULING | Facility: CLINIC | Age: 63
End: 2018-03-09

## 2018-03-13 ENCOUNTER — COMMUNICATION - HEALTHEAST (OUTPATIENT)
Dept: FAMILY MEDICINE | Facility: CLINIC | Age: 63
End: 2018-03-13

## 2018-03-13 DIAGNOSIS — M10.9 GOUT: ICD-10-CM

## 2018-03-19 ENCOUNTER — COMMUNICATION - HEALTHEAST (OUTPATIENT)
Dept: FAMILY MEDICINE | Facility: CLINIC | Age: 63
End: 2018-03-19

## 2018-04-03 ENCOUNTER — COMMUNICATION - HEALTHEAST (OUTPATIENT)
Dept: FAMILY MEDICINE | Facility: CLINIC | Age: 63
End: 2018-04-03

## 2018-04-03 DIAGNOSIS — I10 BENIGN ESSENTIAL HYPERTENSION: ICD-10-CM

## 2018-04-05 ENCOUNTER — COMMUNICATION - HEALTHEAST (OUTPATIENT)
Dept: FAMILY MEDICINE | Facility: CLINIC | Age: 63
End: 2018-04-05

## 2018-04-05 DIAGNOSIS — L30.9 ECZEMA: ICD-10-CM

## 2018-04-12 ENCOUNTER — COMMUNICATION - HEALTHEAST (OUTPATIENT)
Dept: FAMILY MEDICINE | Facility: CLINIC | Age: 63
End: 2018-04-12

## 2018-04-12 DIAGNOSIS — M10.9 GOUT: ICD-10-CM

## 2018-04-18 ENCOUNTER — COMMUNICATION - HEALTHEAST (OUTPATIENT)
Dept: FAMILY MEDICINE | Facility: CLINIC | Age: 63
End: 2018-04-18

## 2018-05-09 ENCOUNTER — COMMUNICATION - HEALTHEAST (OUTPATIENT)
Dept: FAMILY MEDICINE | Facility: CLINIC | Age: 63
End: 2018-05-09

## 2018-05-09 DIAGNOSIS — M10.9 GOUT: ICD-10-CM

## 2018-05-17 ENCOUNTER — COMMUNICATION - HEALTHEAST (OUTPATIENT)
Dept: FAMILY MEDICINE | Facility: CLINIC | Age: 63
End: 2018-05-17

## 2018-05-18 ENCOUNTER — COMMUNICATION - HEALTHEAST (OUTPATIENT)
Dept: FAMILY MEDICINE | Facility: CLINIC | Age: 63
End: 2018-05-18

## 2018-05-18 DIAGNOSIS — E11.9 DM (DIABETES MELLITUS) (H): ICD-10-CM

## 2018-05-18 DIAGNOSIS — I10 ESSENTIAL HYPERTENSION, BENIGN: ICD-10-CM

## 2018-05-23 ENCOUNTER — AMBULATORY - HEALTHEAST (OUTPATIENT)
Dept: PHARMACY | Facility: CLINIC | Age: 63
End: 2018-05-23

## 2018-05-23 ENCOUNTER — AMBULATORY - HEALTHEAST (OUTPATIENT)
Dept: FAMILY MEDICINE | Facility: CLINIC | Age: 63
End: 2018-05-23

## 2018-05-23 ENCOUNTER — OFFICE VISIT - HEALTHEAST (OUTPATIENT)
Dept: FAMILY MEDICINE | Facility: CLINIC | Age: 63
End: 2018-05-23

## 2018-05-23 DIAGNOSIS — I50.32 CHRONIC DIASTOLIC CHF (CONGESTIVE HEART FAILURE) (H): ICD-10-CM

## 2018-05-23 DIAGNOSIS — N18.4 CKD STAGE 4 DUE TO TYPE 2 DIABETES MELLITUS (H): ICD-10-CM

## 2018-05-23 DIAGNOSIS — E78.5 HYPERLIPIDEMIA: ICD-10-CM

## 2018-05-23 DIAGNOSIS — D63.1 ANEMIA IN CKD (CHRONIC KIDNEY DISEASE): ICD-10-CM

## 2018-05-23 DIAGNOSIS — E78.00 PURE HYPERCHOLESTEROLEMIA: ICD-10-CM

## 2018-05-23 DIAGNOSIS — Z12.31 VISIT FOR SCREENING MAMMOGRAM: ICD-10-CM

## 2018-05-23 DIAGNOSIS — N18.9 ANEMIA IN CKD (CHRONIC KIDNEY DISEASE): ICD-10-CM

## 2018-05-23 DIAGNOSIS — E66.9 OBESITY: ICD-10-CM

## 2018-05-23 DIAGNOSIS — I10 ESSENTIAL HYPERTENSION, BENIGN: ICD-10-CM

## 2018-05-23 DIAGNOSIS — E11.42 DIABETIC POLYNEUROPATHY ASSOCIATED WITH TYPE 2 DIABETES MELLITUS (H): ICD-10-CM

## 2018-05-23 DIAGNOSIS — E11.9 LONG-TERM INSULIN USE IN TYPE 2 DIABETES (H): ICD-10-CM

## 2018-05-23 DIAGNOSIS — E11.22 CKD STAGE 4 DUE TO TYPE 2 DIABETES MELLITUS (H): ICD-10-CM

## 2018-05-23 DIAGNOSIS — I27.20 PULMONARY HTN (H): ICD-10-CM

## 2018-05-23 DIAGNOSIS — Z79.4 LONG-TERM INSULIN USE IN TYPE 2 DIABETES (H): ICD-10-CM

## 2018-05-23 LAB
ALBUMIN SERPL-MCNC: 3.1 G/DL (ref 3.5–5)
ALBUMIN SERPL-MCNC: 3.1 G/DL (ref 3.5–5)
ALP SERPL-CCNC: 143 U/L (ref 45–120)
ALT SERPL W P-5'-P-CCNC: <9 U/L (ref 0–45)
ANION GAP SERPL CALCULATED.3IONS-SCNC: 10 MMOL/L (ref 5–18)
ANION GAP SERPL CALCULATED.3IONS-SCNC: 10 MMOL/L (ref 5–18)
AST SERPL W P-5'-P-CCNC: 11 U/L (ref 0–40)
BASOPHILS # BLD AUTO: 0 THOU/UL (ref 0–0.2)
BASOPHILS NFR BLD AUTO: 0 % (ref 0–2)
BILIRUB SERPL-MCNC: 0.5 MG/DL (ref 0–1)
BNP SERPL-MCNC: 330 PG/ML (ref 0–103)
BUN SERPL-MCNC: 46 MG/DL (ref 8–22)
BUN SERPL-MCNC: 46 MG/DL (ref 8–22)
CALCIUM SERPL-MCNC: 9 MG/DL (ref 8.5–10.5)
CALCIUM SERPL-MCNC: 9 MG/DL (ref 8.5–10.5)
CHLORIDE BLD-SCNC: 107 MMOL/L (ref 98–107)
CHLORIDE BLD-SCNC: 107 MMOL/L (ref 98–107)
CHOLEST SERPL-MCNC: 157 MG/DL
CO2 SERPL-SCNC: 22 MMOL/L (ref 22–31)
CO2 SERPL-SCNC: 22 MMOL/L (ref 22–31)
CREAT SERPL-MCNC: 1.72 MG/DL (ref 0.6–1.1)
CREAT SERPL-MCNC: 1.72 MG/DL (ref 0.6–1.1)
CRP SERPL HS-MCNC: 10.6 MG/L (ref 0–3)
EOSINOPHIL # BLD AUTO: 0.2 THOU/UL (ref 0–0.4)
EOSINOPHIL NFR BLD AUTO: 3 % (ref 0–6)
ERYTHROCYTE [DISTWIDTH] IN BLOOD BY AUTOMATED COUNT: 17.3 % (ref 11–14.5)
FASTING STATUS PATIENT QL REPORTED: ABNORMAL
FERRITIN SERPL-MCNC: 100 NG/ML (ref 10–130)
GFR SERPL CREATININE-BSD FRML MDRD: 30 ML/MIN/1.73M2
GFR SERPL CREATININE-BSD FRML MDRD: 30 ML/MIN/1.73M2
GLUCOSE BLD-MCNC: 247 MG/DL (ref 70–125)
GLUCOSE BLD-MCNC: 247 MG/DL (ref 70–125)
HBA1C MFR BLD: 8 % (ref 3.5–6)
HCT VFR BLD AUTO: 31.6 % (ref 35–47)
HDLC SERPL-MCNC: 41 MG/DL
HGB BLD-MCNC: 10.3 G/DL (ref 12–16)
IRON SATN MFR SERPL: 25 % (ref 20–50)
IRON SERPL-MCNC: 57 UG/DL (ref 42–175)
LDLC SERPL CALC-MCNC: 98 MG/DL
LYMPHOCYTES # BLD AUTO: 1.3 THOU/UL (ref 0.8–4.4)
LYMPHOCYTES NFR BLD AUTO: 18 % (ref 20–40)
MAGNESIUM SERPL-MCNC: 1.7 MG/DL (ref 1.8–2.6)
MCH RBC QN AUTO: 25.8 PG (ref 27–34)
MCHC RBC AUTO-ENTMCNC: 32.5 G/DL (ref 32–36)
MCV RBC AUTO: 79 FL (ref 80–100)
MONOCYTES # BLD AUTO: 0.6 THOU/UL (ref 0–0.9)
MONOCYTES NFR BLD AUTO: 8 % (ref 2–10)
NEUTROPHILS # BLD AUTO: 5.2 THOU/UL (ref 2–7.7)
NEUTROPHILS NFR BLD AUTO: 71 % (ref 50–70)
PHOSPHATE SERPL-MCNC: 3.1 MG/DL (ref 2.5–4.5)
PLATELET # BLD AUTO: 282 THOU/UL (ref 140–440)
PMV BLD AUTO: 7.7 FL (ref 7–10)
POTASSIUM BLD-SCNC: 4.7 MMOL/L (ref 3.5–5)
POTASSIUM BLD-SCNC: 4.7 MMOL/L (ref 3.5–5)
PROT SERPL-MCNC: 7.2 G/DL (ref 6–8)
RBC # BLD AUTO: 3.98 MILL/UL (ref 3.8–5.4)
SODIUM SERPL-SCNC: 139 MMOL/L (ref 136–145)
SODIUM SERPL-SCNC: 139 MMOL/L (ref 136–145)
TIBC SERPL-MCNC: 231 UG/DL (ref 313–563)
TRANSFERRIN SERPL-MCNC: 185 MG/DL (ref 212–360)
TRIGL SERPL-MCNC: 92 MG/DL
TSH SERPL DL<=0.005 MIU/L-ACNC: 0.87 UIU/ML (ref 0.3–5)
URATE SERPL-MCNC: 6 MG/DL (ref 2–7.5)
VIT B12 SERPL-MCNC: 457 PG/ML (ref 213–816)
WBC: 7.4 THOU/UL (ref 4–11)

## 2018-05-23 ASSESSMENT — MIFFLIN-ST. JEOR: SCORE: 1858.11

## 2018-05-24 ENCOUNTER — COMMUNICATION - HEALTHEAST (OUTPATIENT)
Dept: FAMILY MEDICINE | Facility: CLINIC | Age: 63
End: 2018-05-24

## 2018-05-24 LAB
25(OH)D3 SERPL-MCNC: 14.3 NG/ML (ref 30–80)
25(OH)D3 SERPL-MCNC: 14.3 NG/ML (ref 30–80)

## 2018-06-05 ENCOUNTER — COMMUNICATION - HEALTHEAST (OUTPATIENT)
Dept: FAMILY MEDICINE | Facility: CLINIC | Age: 63
End: 2018-06-05

## 2018-06-05 DIAGNOSIS — M10.9 GOUT: ICD-10-CM

## 2018-06-11 ENCOUNTER — COMMUNICATION - HEALTHEAST (OUTPATIENT)
Dept: FAMILY MEDICINE | Facility: CLINIC | Age: 63
End: 2018-06-11

## 2018-06-11 ENCOUNTER — COMMUNICATION - HEALTHEAST (OUTPATIENT)
Dept: SCHEDULING | Facility: CLINIC | Age: 63
End: 2018-06-11

## 2018-06-15 ENCOUNTER — COMMUNICATION - HEALTHEAST (OUTPATIENT)
Dept: SCHEDULING | Facility: CLINIC | Age: 63
End: 2018-06-15

## 2018-06-16 ENCOUNTER — COMMUNICATION - HEALTHEAST (OUTPATIENT)
Dept: FAMILY MEDICINE | Facility: CLINIC | Age: 63
End: 2018-06-16

## 2018-06-16 DIAGNOSIS — K21.9 GERD (GASTROESOPHAGEAL REFLUX DISEASE): ICD-10-CM

## 2018-06-18 ENCOUNTER — COMMUNICATION - HEALTHEAST (OUTPATIENT)
Dept: FAMILY MEDICINE | Facility: CLINIC | Age: 63
End: 2018-06-18

## 2018-06-29 ENCOUNTER — COMMUNICATION - HEALTHEAST (OUTPATIENT)
Dept: FAMILY MEDICINE | Facility: CLINIC | Age: 63
End: 2018-06-29

## 2018-06-29 DIAGNOSIS — I10 ESSENTIAL HYPERTENSION, BENIGN: ICD-10-CM

## 2018-07-03 ENCOUNTER — COMMUNICATION - HEALTHEAST (OUTPATIENT)
Dept: FAMILY MEDICINE | Facility: CLINIC | Age: 63
End: 2018-07-03

## 2018-07-03 DIAGNOSIS — M10.9 GOUT: ICD-10-CM

## 2018-07-13 ENCOUNTER — COMMUNICATION - HEALTHEAST (OUTPATIENT)
Dept: PHARMACY | Facility: CLINIC | Age: 63
End: 2018-07-13

## 2018-07-13 ENCOUNTER — COMMUNICATION - HEALTHEAST (OUTPATIENT)
Dept: FAMILY MEDICINE | Facility: CLINIC | Age: 63
End: 2018-07-13

## 2018-07-16 ENCOUNTER — OFFICE VISIT - HEALTHEAST (OUTPATIENT)
Dept: FAMILY MEDICINE | Facility: CLINIC | Age: 63
End: 2018-07-16

## 2018-07-16 DIAGNOSIS — I89.0 LYMPHEDEMA OF LOWER EXTREMITY: ICD-10-CM

## 2018-07-16 DIAGNOSIS — N18.9 ACUTE KIDNEY INJURY SUPERIMPOSED ON CKD (H): ICD-10-CM

## 2018-07-16 DIAGNOSIS — M54.50 LUMBAGO: ICD-10-CM

## 2018-07-16 DIAGNOSIS — I25.10 CAD (CORONARY ARTERY DISEASE): ICD-10-CM

## 2018-07-16 DIAGNOSIS — N17.9 ACUTE KIDNEY INJURY SUPERIMPOSED ON CKD (H): ICD-10-CM

## 2018-07-16 DIAGNOSIS — I50.32 CHRONIC DIASTOLIC CHF (CONGESTIVE HEART FAILURE) (H): ICD-10-CM

## 2018-07-16 DIAGNOSIS — B37.2 SKIN YEAST INFECTION: ICD-10-CM

## 2018-07-16 LAB
ALBUMIN SERPL-MCNC: 3.1 G/DL (ref 3.5–5)
ALP SERPL-CCNC: 113 U/L (ref 45–120)
ALT SERPL W P-5'-P-CCNC: <9 U/L (ref 0–45)
ANION GAP SERPL CALCULATED.3IONS-SCNC: 12 MMOL/L (ref 5–18)
AST SERPL W P-5'-P-CCNC: 12 U/L (ref 0–40)
BILIRUB SERPL-MCNC: 0.3 MG/DL (ref 0–1)
BNP SERPL-MCNC: 189 PG/ML (ref 0–103)
BUN SERPL-MCNC: 78 MG/DL (ref 8–22)
CALCIUM SERPL-MCNC: 9.5 MG/DL (ref 8.5–10.5)
CHLORIDE BLD-SCNC: 98 MMOL/L (ref 98–107)
CO2 SERPL-SCNC: 26 MMOL/L (ref 22–31)
CREAT SERPL-MCNC: 2.74 MG/DL (ref 0.6–1.1)
GFR SERPL CREATININE-BSD FRML MDRD: 17 ML/MIN/1.73M2
GLUCOSE BLD-MCNC: 323 MG/DL (ref 70–125)
POTASSIUM BLD-SCNC: 5 MMOL/L (ref 3.5–5)
PROT SERPL-MCNC: 7.4 G/DL (ref 6–8)
SODIUM SERPL-SCNC: 136 MMOL/L (ref 136–145)

## 2018-07-16 ASSESSMENT — MIFFLIN-ST. JEOR: SCORE: 1781

## 2018-07-19 ENCOUNTER — COMMUNICATION - HEALTHEAST (OUTPATIENT)
Dept: FAMILY MEDICINE | Facility: CLINIC | Age: 63
End: 2018-07-19

## 2018-07-19 DIAGNOSIS — I50.9 CONGESTIVE HEART FAILURE, UNSPECIFIED CONGESTIVE HEART FAILURE CHRONICITY, UNSPECIFIED CONGESTIVE HEART FAILURE TYPE: ICD-10-CM

## 2018-07-19 DIAGNOSIS — E87.6 HYPOKALEMIA: ICD-10-CM

## 2018-07-19 DIAGNOSIS — E11.42 DIABETIC POLYNEUROPATHY ASSOCIATED WITH TYPE 2 DIABETES MELLITUS (H): ICD-10-CM

## 2018-07-20 ENCOUNTER — COMMUNICATION - HEALTHEAST (OUTPATIENT)
Dept: FAMILY MEDICINE | Facility: CLINIC | Age: 63
End: 2018-07-20

## 2018-07-23 ENCOUNTER — COMMUNICATION - HEALTHEAST (OUTPATIENT)
Dept: CARE COORDINATION | Facility: CLINIC | Age: 63
End: 2018-07-23

## 2018-07-30 ENCOUNTER — COMMUNICATION - HEALTHEAST (OUTPATIENT)
Dept: FAMILY MEDICINE | Facility: CLINIC | Age: 63
End: 2018-07-30

## 2018-07-30 DIAGNOSIS — M10.9 GOUT: ICD-10-CM

## 2018-08-02 ENCOUNTER — COMMUNICATION - HEALTHEAST (OUTPATIENT)
Dept: FAMILY MEDICINE | Facility: CLINIC | Age: 63
End: 2018-08-02

## 2018-08-02 DIAGNOSIS — I10 ESSENTIAL HYPERTENSION, BENIGN: ICD-10-CM

## 2018-08-10 ENCOUNTER — COMMUNICATION - HEALTHEAST (OUTPATIENT)
Dept: FAMILY MEDICINE | Facility: CLINIC | Age: 63
End: 2018-08-10

## 2018-08-10 DIAGNOSIS — R06.02 SHORTNESS OF BREATH: ICD-10-CM

## 2018-08-10 DIAGNOSIS — E11.9 TYPE II DIABETES MELLITUS (H): ICD-10-CM

## 2018-08-14 ENCOUNTER — COMMUNICATION - HEALTHEAST (OUTPATIENT)
Dept: FAMILY MEDICINE | Facility: CLINIC | Age: 63
End: 2018-08-14

## 2018-08-14 DIAGNOSIS — I50.9 CONGESTIVE HEART FAILURE, UNSPECIFIED CONGESTIVE HEART FAILURE CHRONICITY, UNSPECIFIED CONGESTIVE HEART FAILURE TYPE: ICD-10-CM

## 2018-08-16 ENCOUNTER — COMMUNICATION - HEALTHEAST (OUTPATIENT)
Dept: FAMILY MEDICINE | Facility: CLINIC | Age: 63
End: 2018-08-16

## 2018-08-16 DIAGNOSIS — L30.4 INTERTRIGO: ICD-10-CM

## 2018-08-16 DIAGNOSIS — H40.9 GLAUCOMA: ICD-10-CM

## 2018-08-17 ENCOUNTER — COMMUNICATION - HEALTHEAST (OUTPATIENT)
Dept: FAMILY MEDICINE | Facility: CLINIC | Age: 63
End: 2018-08-17

## 2018-08-17 DIAGNOSIS — J45.909 ASTHMA: ICD-10-CM

## 2018-08-27 ENCOUNTER — COMMUNICATION - HEALTHEAST (OUTPATIENT)
Dept: FAMILY MEDICINE | Facility: CLINIC | Age: 63
End: 2018-08-27

## 2018-08-27 DIAGNOSIS — M10.9 GOUT: ICD-10-CM

## 2018-08-31 ENCOUNTER — OFFICE VISIT - HEALTHEAST (OUTPATIENT)
Dept: CARDIOLOGY | Facility: CLINIC | Age: 63
End: 2018-08-31

## 2018-08-31 DIAGNOSIS — I50.32 CHRONIC DIASTOLIC HEART FAILURE (H): ICD-10-CM

## 2018-08-31 DIAGNOSIS — I10 ESSENTIAL HYPERTENSION: ICD-10-CM

## 2018-08-31 DIAGNOSIS — G47.33 OSA (OBSTRUCTIVE SLEEP APNEA): ICD-10-CM

## 2018-08-31 DIAGNOSIS — R60.0 BILATERAL EDEMA OF LOWER EXTREMITY: ICD-10-CM

## 2018-08-31 ASSESSMENT — MIFFLIN-ST. JEOR: SCORE: 1821.82

## 2018-09-02 ENCOUNTER — COMMUNICATION - HEALTHEAST (OUTPATIENT)
Dept: FAMILY MEDICINE | Facility: CLINIC | Age: 63
End: 2018-09-02

## 2018-09-02 DIAGNOSIS — L29.9 ITCHING: ICD-10-CM

## 2018-09-06 ENCOUNTER — AMBULATORY - HEALTHEAST (OUTPATIENT)
Dept: FAMILY MEDICINE | Facility: CLINIC | Age: 63
End: 2018-09-06

## 2018-09-06 ENCOUNTER — COMMUNICATION - HEALTHEAST (OUTPATIENT)
Dept: FAMILY MEDICINE | Facility: CLINIC | Age: 63
End: 2018-09-06

## 2018-09-06 DIAGNOSIS — Z79.4 LONG-TERM INSULIN USE IN TYPE 2 DIABETES (H): ICD-10-CM

## 2018-09-06 DIAGNOSIS — E11.9 LONG-TERM INSULIN USE IN TYPE 2 DIABETES (H): ICD-10-CM

## 2018-09-07 ENCOUNTER — COMMUNICATION - HEALTHEAST (OUTPATIENT)
Dept: FAMILY MEDICINE | Facility: CLINIC | Age: 63
End: 2018-09-07

## 2018-09-14 ENCOUNTER — COMMUNICATION - HEALTHEAST (OUTPATIENT)
Dept: VASCULAR SURGERY | Facility: CLINIC | Age: 63
End: 2018-09-14

## 2018-09-14 ENCOUNTER — OFFICE VISIT - HEALTHEAST (OUTPATIENT)
Dept: VASCULAR SURGERY | Facility: CLINIC | Age: 63
End: 2018-09-14

## 2018-09-14 DIAGNOSIS — I50.32 CHRONIC DIASTOLIC CHF (CONGESTIVE HEART FAILURE) (H): ICD-10-CM

## 2018-09-14 DIAGNOSIS — E66.01 MORBID OBESITY WITH BMI OF 45.0-49.9, ADULT (H): ICD-10-CM

## 2018-09-14 DIAGNOSIS — I89.0 SECONDARY LYMPHEDEMA: ICD-10-CM

## 2018-09-14 DIAGNOSIS — R68.89 ACTIVITY INTOLERANCE: ICD-10-CM

## 2018-09-14 DIAGNOSIS — N18.4 CKD STAGE 4 DUE TO TYPE 2 DIABETES MELLITUS (H): ICD-10-CM

## 2018-09-14 DIAGNOSIS — E11.22 CKD STAGE 4 DUE TO TYPE 2 DIABETES MELLITUS (H): ICD-10-CM

## 2018-09-14 DIAGNOSIS — E11.9 DIABETES MELLITUS, TYPE 2 (H): ICD-10-CM

## 2018-09-14 DIAGNOSIS — I87.303 VENOUS HYPERTENSION OF BOTH LOWER EXTREMITIES: ICD-10-CM

## 2018-09-14 DIAGNOSIS — R60.9 DEPENDENT EDEMA: ICD-10-CM

## 2018-09-14 ASSESSMENT — MIFFLIN-ST. JEOR: SCORE: 1788.49

## 2018-09-17 ENCOUNTER — COMMUNICATION - HEALTHEAST (OUTPATIENT)
Dept: FAMILY MEDICINE | Facility: CLINIC | Age: 63
End: 2018-09-17

## 2018-09-18 ENCOUNTER — COMMUNICATION - HEALTHEAST (OUTPATIENT)
Dept: VASCULAR SURGERY | Facility: CLINIC | Age: 63
End: 2018-09-18

## 2018-09-24 ENCOUNTER — COMMUNICATION - HEALTHEAST (OUTPATIENT)
Dept: FAMILY MEDICINE | Facility: CLINIC | Age: 63
End: 2018-09-24

## 2018-09-24 DIAGNOSIS — M10.9 GOUT: ICD-10-CM

## 2018-09-26 ENCOUNTER — COMMUNICATION - HEALTHEAST (OUTPATIENT)
Dept: FAMILY MEDICINE | Facility: CLINIC | Age: 63
End: 2018-09-26

## 2018-09-27 ENCOUNTER — COMMUNICATION - HEALTHEAST (OUTPATIENT)
Dept: FAMILY MEDICINE | Facility: CLINIC | Age: 63
End: 2018-09-27

## 2018-10-01 ENCOUNTER — COMMUNICATION - HEALTHEAST (OUTPATIENT)
Dept: FAMILY MEDICINE | Facility: CLINIC | Age: 63
End: 2018-10-01

## 2018-10-01 DIAGNOSIS — H40.9 GLAUCOMA: ICD-10-CM

## 2018-10-01 DIAGNOSIS — E11.9 DIABETES (H): ICD-10-CM

## 2018-10-03 ENCOUNTER — COMMUNICATION - HEALTHEAST (OUTPATIENT)
Dept: FAMILY MEDICINE | Facility: CLINIC | Age: 63
End: 2018-10-03

## 2018-10-03 DIAGNOSIS — G47.33 OSA (OBSTRUCTIVE SLEEP APNEA): ICD-10-CM

## 2018-10-11 ENCOUNTER — COMMUNICATION - HEALTHEAST (OUTPATIENT)
Dept: FAMILY MEDICINE | Facility: CLINIC | Age: 63
End: 2018-10-11

## 2018-10-11 DIAGNOSIS — J45.909 ASTHMA: ICD-10-CM

## 2018-10-11 DIAGNOSIS — K21.9 GERD (GASTROESOPHAGEAL REFLUX DISEASE): ICD-10-CM

## 2018-10-22 ENCOUNTER — COMMUNICATION - HEALTHEAST (OUTPATIENT)
Dept: SCHEDULING | Facility: CLINIC | Age: 63
End: 2018-10-22

## 2018-10-22 ENCOUNTER — COMMUNICATION - HEALTHEAST (OUTPATIENT)
Dept: FAMILY MEDICINE | Facility: CLINIC | Age: 63
End: 2018-10-22

## 2018-10-23 ENCOUNTER — COMMUNICATION - HEALTHEAST (OUTPATIENT)
Dept: FAMILY MEDICINE | Facility: CLINIC | Age: 63
End: 2018-10-23

## 2018-10-23 ENCOUNTER — OFFICE VISIT - HEALTHEAST (OUTPATIENT)
Dept: FAMILY MEDICINE | Facility: CLINIC | Age: 63
End: 2018-10-23

## 2018-10-23 DIAGNOSIS — E11.42 DIABETIC POLYNEUROPATHY ASSOCIATED WITH TYPE 2 DIABETES MELLITUS (H): ICD-10-CM

## 2018-10-23 DIAGNOSIS — L30.4 INTERTRIGO: ICD-10-CM

## 2018-10-23 DIAGNOSIS — G47.33 OBSTRUCTIVE SLEEP APNEA (ADULT) (PEDIATRIC): ICD-10-CM

## 2018-10-23 DIAGNOSIS — M79.3 CANDIDA-INDUCED PANNICULITIS: ICD-10-CM

## 2018-10-23 DIAGNOSIS — M21.619 BUNION: ICD-10-CM

## 2018-10-23 DIAGNOSIS — I50.32 CHRONIC DIASTOLIC CHF (CONGESTIVE HEART FAILURE) (H): ICD-10-CM

## 2018-10-23 DIAGNOSIS — M10.9 GOUT: ICD-10-CM

## 2018-10-23 DIAGNOSIS — D50.9 MICROCYTIC ANEMIA: ICD-10-CM

## 2018-10-23 DIAGNOSIS — B37.9 CANDIDA-INDUCED PANNICULITIS: ICD-10-CM

## 2018-10-23 LAB
ALBUMIN SERPL-MCNC: 3 G/DL (ref 3.5–5)
ALBUMIN SERPL-MCNC: 3.1 G/DL (ref 3.5–5)
ALP SERPL-CCNC: 137 U/L (ref 45–120)
ALT SERPL W P-5'-P-CCNC: <9 U/L (ref 0–45)
ANION GAP SERPL CALCULATED.3IONS-SCNC: 10 MMOL/L (ref 5–18)
AST SERPL W P-5'-P-CCNC: 11 U/L (ref 0–40)
BASOPHILS # BLD AUTO: 0.1 THOU/UL (ref 0–0.2)
BASOPHILS NFR BLD AUTO: 1 % (ref 0–2)
BILIRUB DIRECT SERPL-MCNC: 0.1 MG/DL
BILIRUB SERPL-MCNC: 0.3 MG/DL (ref 0–1)
BNP SERPL-MCNC: 268 PG/ML (ref 0–103)
BUN SERPL-MCNC: 66 MG/DL (ref 8–22)
CALCIUM SERPL-MCNC: 9.5 MG/DL (ref 8.5–10.5)
CHLORIDE BLD-SCNC: 106 MMOL/L (ref 98–107)
CO2 SERPL-SCNC: 22 MMOL/L (ref 22–31)
CREAT SERPL-MCNC: 2.43 MG/DL (ref 0.6–1.1)
EOSINOPHIL # BLD AUTO: 0.2 THOU/UL (ref 0–0.4)
EOSINOPHIL NFR BLD AUTO: 2 % (ref 0–6)
ERYTHROCYTE [DISTWIDTH] IN BLOOD BY AUTOMATED COUNT: 17.1 % (ref 11–14.5)
GFR SERPL CREATININE-BSD FRML MDRD: 20 ML/MIN/1.73M2
GLUCOSE BLD-MCNC: 244 MG/DL (ref 70–125)
HBA1C MFR BLD: 9.7 % (ref 3.5–6)
HCT VFR BLD AUTO: 35.5 % (ref 35–47)
HGB BLD-MCNC: 11.3 G/DL (ref 12–16)
LYMPHOCYTES # BLD AUTO: 1.5 THOU/UL (ref 0.8–4.4)
LYMPHOCYTES NFR BLD AUTO: 15 % (ref 20–40)
MCH RBC QN AUTO: 25.3 PG (ref 27–34)
MCHC RBC AUTO-ENTMCNC: 31.8 G/DL (ref 32–36)
MCV RBC AUTO: 80 FL (ref 80–100)
MONOCYTES # BLD AUTO: 0.7 THOU/UL (ref 0–0.9)
MONOCYTES NFR BLD AUTO: 7 % (ref 2–10)
NEUTROPHILS # BLD AUTO: 7.6 THOU/UL (ref 2–7.7)
NEUTROPHILS NFR BLD AUTO: 75 % (ref 50–70)
PHOSPHATE SERPL-MCNC: 3.2 MG/DL (ref 2.5–4.5)
PLATELET # BLD AUTO: 334 THOU/UL (ref 140–440)
PMV BLD AUTO: 7.6 FL (ref 7–10)
POTASSIUM BLD-SCNC: 5.3 MMOL/L (ref 3.5–5)
PROT SERPL-MCNC: 7.5 G/DL (ref 6–8)
RBC # BLD AUTO: 4.45 MILL/UL (ref 3.8–5.4)
SODIUM SERPL-SCNC: 138 MMOL/L (ref 136–145)
URATE SERPL-MCNC: 6 MG/DL (ref 2–7.5)
WBC: 10 THOU/UL (ref 4–11)

## 2018-10-23 ASSESSMENT — MIFFLIN-ST. JEOR: SCORE: 1847.91

## 2018-10-26 ENCOUNTER — COMMUNICATION - HEALTHEAST (OUTPATIENT)
Dept: FAMILY MEDICINE | Facility: CLINIC | Age: 63
End: 2018-10-26

## 2018-10-26 ENCOUNTER — COMMUNICATION - HEALTHEAST (OUTPATIENT)
Dept: SCHEDULING | Facility: CLINIC | Age: 63
End: 2018-10-26

## 2018-10-26 DIAGNOSIS — L29.9 PRURITIC DISORDER: ICD-10-CM

## 2018-10-29 ENCOUNTER — COMMUNICATION - HEALTHEAST (OUTPATIENT)
Dept: FAMILY MEDICINE | Facility: CLINIC | Age: 63
End: 2018-10-29

## 2018-10-29 DIAGNOSIS — I10 BENIGN ESSENTIAL HYPERTENSION: ICD-10-CM

## 2018-10-30 ENCOUNTER — COMMUNICATION - HEALTHEAST (OUTPATIENT)
Dept: FAMILY MEDICINE | Facility: CLINIC | Age: 63
End: 2018-10-30

## 2018-10-30 DIAGNOSIS — I10 BENIGN ESSENTIAL HYPERTENSION: ICD-10-CM

## 2018-11-01 ENCOUNTER — COMMUNICATION - HEALTHEAST (OUTPATIENT)
Dept: FAMILY MEDICINE | Facility: CLINIC | Age: 63
End: 2018-11-01

## 2018-11-14 ENCOUNTER — COMMUNICATION - HEALTHEAST (OUTPATIENT)
Dept: FAMILY MEDICINE | Facility: CLINIC | Age: 63
End: 2018-11-14

## 2018-11-14 DIAGNOSIS — E78.5 HYPERLIPIDEMIA: ICD-10-CM

## 2018-11-20 ENCOUNTER — COMMUNICATION - HEALTHEAST (OUTPATIENT)
Dept: FAMILY MEDICINE | Facility: CLINIC | Age: 63
End: 2018-11-20

## 2018-11-20 DIAGNOSIS — I10 BENIGN ESSENTIAL HYPERTENSION: ICD-10-CM

## 2018-11-20 DIAGNOSIS — M10.9 GOUT: ICD-10-CM

## 2018-11-30 ENCOUNTER — COMMUNICATION - HEALTHEAST (OUTPATIENT)
Dept: FAMILY MEDICINE | Facility: CLINIC | Age: 63
End: 2018-11-30

## 2018-12-01 ENCOUNTER — COMMUNICATION - HEALTHEAST (OUTPATIENT)
Dept: FAMILY MEDICINE | Facility: CLINIC | Age: 63
End: 2018-12-01

## 2018-12-01 DIAGNOSIS — E11.42 DIABETIC POLYNEUROPATHY ASSOCIATED WITH TYPE 2 DIABETES MELLITUS (H): ICD-10-CM

## 2018-12-11 ENCOUNTER — COMMUNICATION - HEALTHEAST (OUTPATIENT)
Dept: FAMILY MEDICINE | Facility: CLINIC | Age: 63
End: 2018-12-11

## 2018-12-17 ENCOUNTER — RECORDS - HEALTHEAST (OUTPATIENT)
Dept: ADMINISTRATIVE | Facility: OTHER | Age: 63
End: 2018-12-17

## 2018-12-17 ENCOUNTER — COMMUNICATION - HEALTHEAST (OUTPATIENT)
Dept: FAMILY MEDICINE | Facility: CLINIC | Age: 63
End: 2018-12-17

## 2018-12-28 ENCOUNTER — HOME CARE/HOSPICE - HEALTHEAST (OUTPATIENT)
Dept: HOME HEALTH SERVICES | Facility: HOME HEALTH | Age: 63
End: 2018-12-28

## 2018-12-30 ENCOUNTER — COMMUNICATION - HEALTHEAST (OUTPATIENT)
Dept: FAMILY MEDICINE | Facility: CLINIC | Age: 63
End: 2018-12-30

## 2019-01-02 ENCOUNTER — COMMUNICATION - HEALTHEAST (OUTPATIENT)
Dept: FAMILY MEDICINE | Facility: CLINIC | Age: 64
End: 2019-01-02

## 2019-01-02 DIAGNOSIS — M10.9 GOUT: ICD-10-CM

## 2019-01-03 ENCOUNTER — COMMUNICATION - HEALTHEAST (OUTPATIENT)
Dept: PHARMACY | Facility: CLINIC | Age: 64
End: 2019-01-03

## 2019-01-03 DIAGNOSIS — J45.909 UNCOMPLICATED ASTHMA, UNSPECIFIED ASTHMA SEVERITY, UNSPECIFIED WHETHER PERSISTENT: ICD-10-CM

## 2019-01-03 DIAGNOSIS — I10 ESSENTIAL HYPERTENSION, BENIGN: ICD-10-CM

## 2019-01-03 DIAGNOSIS — E83.42 HYPOMAGNESEMIA: ICD-10-CM

## 2019-01-03 DIAGNOSIS — M25.562 ARTHRALGIA OF BOTH KNEES: ICD-10-CM

## 2019-01-03 DIAGNOSIS — I50.31 ACUTE DIASTOLIC HEART FAILURE (H): ICD-10-CM

## 2019-01-03 DIAGNOSIS — M25.561 ARTHRALGIA OF BOTH KNEES: ICD-10-CM

## 2019-01-03 DIAGNOSIS — Z79.4 TYPE 2 DIABETES MELLITUS WITH COMPLICATION, WITH LONG-TERM CURRENT USE OF INSULIN (H): ICD-10-CM

## 2019-01-03 DIAGNOSIS — E55.9 VITAMIN D DEFICIENCY: ICD-10-CM

## 2019-01-03 DIAGNOSIS — M10.9 GOUT, UNSPECIFIED CAUSE, UNSPECIFIED CHRONICITY, UNSPECIFIED SITE: ICD-10-CM

## 2019-01-03 DIAGNOSIS — E11.8 TYPE 2 DIABETES MELLITUS WITH COMPLICATION, WITH LONG-TERM CURRENT USE OF INSULIN (H): ICD-10-CM

## 2019-01-03 DIAGNOSIS — N12 PYELONEPHRITIS: ICD-10-CM

## 2019-01-15 ENCOUNTER — AMBULATORY - HEALTHEAST (OUTPATIENT)
Dept: FAMILY MEDICINE | Facility: CLINIC | Age: 64
End: 2019-01-15

## 2019-01-15 ENCOUNTER — AMBULATORY - HEALTHEAST (OUTPATIENT)
Dept: LAB | Facility: CLINIC | Age: 64
End: 2019-01-15

## 2019-01-15 ENCOUNTER — COMMUNICATION - HEALTHEAST (OUTPATIENT)
Dept: FAMILY MEDICINE | Facility: CLINIC | Age: 64
End: 2019-01-15

## 2019-01-15 DIAGNOSIS — Z79.4 TYPE 2 DIABETES MELLITUS WITH COMPLICATION, WITH LONG-TERM CURRENT USE OF INSULIN (H): ICD-10-CM

## 2019-01-15 DIAGNOSIS — M25.562 ARTHRALGIA OF BOTH KNEES: ICD-10-CM

## 2019-01-15 DIAGNOSIS — M25.561 ARTHRALGIA OF BOTH KNEES: ICD-10-CM

## 2019-01-15 DIAGNOSIS — I10 BENIGN ESSENTIAL HYPERTENSION: ICD-10-CM

## 2019-01-15 DIAGNOSIS — M47.816 SPONDYLOSIS OF LUMBAR REGION WITHOUT MYELOPATHY OR RADICULOPATHY: ICD-10-CM

## 2019-01-15 DIAGNOSIS — N12 PYELONEPHRITIS: ICD-10-CM

## 2019-01-15 DIAGNOSIS — E11.8 TYPE 2 DIABETES MELLITUS WITH COMPLICATION, WITH LONG-TERM CURRENT USE OF INSULIN (H): ICD-10-CM

## 2019-01-15 DIAGNOSIS — M17.10 LOCALIZED OSTEOARTHROSIS, LOWER LEG: ICD-10-CM

## 2019-01-15 LAB
ANION GAP SERPL CALCULATED.3IONS-SCNC: 11 MMOL/L (ref 5–18)
BUN SERPL-MCNC: 79 MG/DL (ref 8–22)
CALCIUM SERPL-MCNC: 9.2 MG/DL (ref 8.5–10.5)
CHLORIDE BLD-SCNC: 101 MMOL/L (ref 98–107)
CO2 SERPL-SCNC: 25 MMOL/L (ref 22–31)
CREAT SERPL-MCNC: 2.78 MG/DL (ref 0.6–1.1)
GFR SERPL CREATININE-BSD FRML MDRD: 17 ML/MIN/1.73M2
GLUCOSE BLD-MCNC: 141 MG/DL (ref 70–125)
MAGNESIUM SERPL-MCNC: 2 MG/DL (ref 1.8–2.6)
POTASSIUM BLD-SCNC: 4.7 MMOL/L (ref 3.5–5)
SODIUM SERPL-SCNC: 137 MMOL/L (ref 136–145)

## 2019-01-19 LAB
6-MONOACETYLMORPHINE: NOT DETECTED NG/ML
AMPHETAMINES: NEGATIVE NG/ML
BARBITURATES: NEGATIVE NG/ML
BENZODIAZEPINES: NEGATIVE NG/ML
BUPRENORPHINE: NOT DETECTED NG/ML
COCAINE: NEGATIVE NG/ML
CODEINE-6-BETA-GLUCURONIDE: NOT DETECTED NG/ML
CODEINE: NOT DETECTED NG/ML
COMMENT:: NORMAL
CREATININE URINE: 164.4 MG/DL
DIHYDROCODEINE: NOT DETECTED NG/ML
EDDP: NOT DETECTED NG/ML
FENTANYL: NOT DETECTED NG/ML
HYDROCODONE: NOT DETECTED NG/ML
HYDROMORPHONE-3-BETA-GLUCURONIDE: NOT DETECTED NG/ML
HYDROMORPHONE: NOT DETECTED NG/ML
MEPERIDINE: NOT DETECTED NG/ML
METHADONE: NOT DETECTED NG/ML
MORPHINE-6-BETA-GLUCURONIDE: NOT DETECTED NG/ML
MORPHINE: NOT DETECTED NG/ML
N-DESMETHYLTAPENTADOL: NOT DETECTED NG/ML
NALOXONE-3-BETA-GLUCURONIDE: NOT DETECTED NG/ML
NALOXONE: NOT DETECTED NG/ML
NORBUPRENORPHINE GLUCURONIDE: NOT DETECTED NG/ML
NORBUPRENORPHINE: NOT DETECTED NG/ML
NORFENTANYL: NOT DETECTED NG/ML
NORHYDROCODONE: NOT DETECTED NG/ML
NORMEPERIDINE: NOT DETECTED NG/ML
NOROXYCODONE: PRESENT NG/ML
NOROXYMORPHONE: NOT DETECTED NG/ML
NORPROPOXYPHENE: NOT DETECTED NG/ML
O-DESMETHYLTRAMADOL: PRESENT NG/ML
OPIOID INTERPRETATION: ABNORMAL
OXIDANTS URINE: NEGATIVE
OXYCODONE: PRESENT NG/ML
OXYMORPHONE-3-BETA-GLUCURONIDE: NOT DETECTED NG/ML
OXYMORPHONE: NOT DETECTED NG/ML
PH UR STRIP: 5.1 [PH] (ref 5–7)
PHENCYCLIDINE: NEGATIVE NG/ML
PROPOXYPHENE: NOT DETECTED NG/ML
SP GR UR STRIP: 1.01
TAPENTADOL-BETA-GLUCURONIDE: NOT DETECTED NG/ML
TAPENTADOL: NOT DETECTED NG/ML
THC METABOLITE, UR.: NEGATIVE NG/ML
TRAMADOL: PRESENT NG/ML

## 2019-01-23 ENCOUNTER — COMMUNICATION - HEALTHEAST (OUTPATIENT)
Dept: SCHEDULING | Facility: CLINIC | Age: 64
End: 2019-01-23

## 2019-01-23 DIAGNOSIS — M47.816 SPONDYLOSIS OF LUMBAR REGION WITHOUT MYELOPATHY OR RADICULOPATHY: ICD-10-CM

## 2019-01-23 DIAGNOSIS — M15.0 PRIMARY OSTEOARTHRITIS INVOLVING MULTIPLE JOINTS: ICD-10-CM

## 2019-01-23 DIAGNOSIS — G89.4 CHRONIC PAIN SYNDROME: ICD-10-CM

## 2019-01-31 ENCOUNTER — COMMUNICATION - HEALTHEAST (OUTPATIENT)
Dept: FAMILY MEDICINE | Facility: CLINIC | Age: 64
End: 2019-01-31

## 2019-01-31 DIAGNOSIS — R06.02 SHORTNESS OF BREATH: ICD-10-CM

## 2019-01-31 DIAGNOSIS — M10.9 GOUT: ICD-10-CM

## 2019-02-05 ENCOUNTER — AMBULATORY - HEALTHEAST (OUTPATIENT)
Dept: PHARMACY | Facility: CLINIC | Age: 64
End: 2019-02-05

## 2019-02-05 DIAGNOSIS — E11.8 TYPE 2 DIABETES MELLITUS WITH COMPLICATION, WITH LONG-TERM CURRENT USE OF INSULIN (H): ICD-10-CM

## 2019-02-05 DIAGNOSIS — M10.9 GOUT, UNSPECIFIED CAUSE, UNSPECIFIED CHRONICITY, UNSPECIFIED SITE: ICD-10-CM

## 2019-02-05 DIAGNOSIS — Z79.4 TYPE 2 DIABETES MELLITUS WITH COMPLICATION, WITH LONG-TERM CURRENT USE OF INSULIN (H): ICD-10-CM

## 2019-02-05 DIAGNOSIS — E55.9 VITAMIN D DEFICIENCY: ICD-10-CM

## 2019-02-05 DIAGNOSIS — I50.31 ACUTE DIASTOLIC HEART FAILURE (H): ICD-10-CM

## 2019-02-14 ENCOUNTER — COMMUNICATION - HEALTHEAST (OUTPATIENT)
Dept: FAMILY MEDICINE | Facility: CLINIC | Age: 64
End: 2019-02-14

## 2019-02-14 DIAGNOSIS — H40.9 GLAUCOMA: ICD-10-CM

## 2019-02-25 ENCOUNTER — OFFICE VISIT - HEALTHEAST (OUTPATIENT)
Dept: FAMILY MEDICINE | Facility: CLINIC | Age: 64
End: 2019-02-25

## 2019-02-25 DIAGNOSIS — E55.9 VITAMIN D DEFICIENCY: ICD-10-CM

## 2019-02-25 DIAGNOSIS — E11.42 DIABETIC POLYNEUROPATHY ASSOCIATED WITH TYPE 2 DIABETES MELLITUS (H): ICD-10-CM

## 2019-02-25 DIAGNOSIS — E11.22 CKD STAGE 4 DUE TO TYPE 2 DIABETES MELLITUS (H): ICD-10-CM

## 2019-02-25 DIAGNOSIS — M10.9 GOUT: ICD-10-CM

## 2019-02-25 DIAGNOSIS — E11.8 TYPE 2 DIABETES MELLITUS WITH COMPLICATION, WITH LONG-TERM CURRENT USE OF INSULIN (H): ICD-10-CM

## 2019-02-25 DIAGNOSIS — E66.01 MORBID OBESITY WITH BMI OF 45.0-49.9, ADULT (H): ICD-10-CM

## 2019-02-25 DIAGNOSIS — J96.21 ACUTE ON CHRONIC RESPIRATORY FAILURE WITH HYPOXIA AND HYPERCAPNIA (H): ICD-10-CM

## 2019-02-25 DIAGNOSIS — I72.0 ANEURYSM OF ARTERY OF NECK (H): ICD-10-CM

## 2019-02-25 DIAGNOSIS — M10.9 GOUT, UNSPECIFIED CAUSE, UNSPECIFIED CHRONICITY, UNSPECIFIED SITE: ICD-10-CM

## 2019-02-25 DIAGNOSIS — J96.22 ACUTE ON CHRONIC RESPIRATORY FAILURE WITH HYPOXIA AND HYPERCAPNIA (H): ICD-10-CM

## 2019-02-25 DIAGNOSIS — M25.552 HIP PAIN, LEFT: ICD-10-CM

## 2019-02-25 DIAGNOSIS — N18.4 CKD STAGE 4 DUE TO TYPE 2 DIABETES MELLITUS (H): ICD-10-CM

## 2019-02-25 DIAGNOSIS — Z79.4 TYPE 2 DIABETES MELLITUS WITH COMPLICATION, WITH LONG-TERM CURRENT USE OF INSULIN (H): ICD-10-CM

## 2019-02-25 DIAGNOSIS — I50.31 ACUTE DIASTOLIC HEART FAILURE (H): ICD-10-CM

## 2019-02-25 DIAGNOSIS — I50.32 CHRONIC DIASTOLIC CHF (CONGESTIVE HEART FAILURE) (H): ICD-10-CM

## 2019-02-25 LAB
ALBUMIN SERPL-MCNC: 3.1 G/DL (ref 3.5–5)
ALP SERPL-CCNC: 97 U/L (ref 45–120)
ALT SERPL W P-5'-P-CCNC: <9 U/L (ref 0–45)
ANION GAP SERPL CALCULATED.3IONS-SCNC: 9 MMOL/L (ref 5–18)
AST SERPL W P-5'-P-CCNC: 14 U/L (ref 0–40)
BILIRUB SERPL-MCNC: 0.5 MG/DL (ref 0–1)
BNP SERPL-MCNC: 266 PG/ML (ref 0–106)
BUN SERPL-MCNC: 56 MG/DL (ref 8–22)
CALCIUM SERPL-MCNC: 9.5 MG/DL (ref 8.5–10.5)
CHLORIDE BLD-SCNC: 103 MMOL/L (ref 98–107)
CO2 SERPL-SCNC: 26 MMOL/L (ref 22–31)
CREAT SERPL-MCNC: 2.4 MG/DL (ref 0.6–1.1)
GFR SERPL CREATININE-BSD FRML MDRD: 20 ML/MIN/1.73M2
GLUCOSE BLD-MCNC: 131 MG/DL (ref 70–125)
HBA1C MFR BLD: 8.5 % (ref 3.5–6)
MAGNESIUM SERPL-MCNC: 1.7 MG/DL (ref 1.8–2.6)
POTASSIUM BLD-SCNC: 4.5 MMOL/L (ref 3.5–5)
PROT SERPL-MCNC: 7.6 G/DL (ref 6–8)
SODIUM SERPL-SCNC: 138 MMOL/L (ref 136–145)
URATE SERPL-MCNC: 7.5 MG/DL (ref 2–7.5)

## 2019-02-26 LAB
25(OH)D3 SERPL-MCNC: 29.9 NG/ML (ref 30–80)
25(OH)D3 SERPL-MCNC: 29.9 NG/ML (ref 30–80)

## 2019-02-28 ENCOUNTER — COMMUNICATION - HEALTHEAST (OUTPATIENT)
Dept: FAMILY MEDICINE | Facility: CLINIC | Age: 64
End: 2019-02-28

## 2019-02-28 DIAGNOSIS — M10.9 GOUT: ICD-10-CM

## 2019-03-01 ENCOUNTER — AMBULATORY - HEALTHEAST (OUTPATIENT)
Dept: FAMILY MEDICINE | Facility: CLINIC | Age: 64
End: 2019-03-01

## 2019-03-01 ENCOUNTER — COMMUNICATION - HEALTHEAST (OUTPATIENT)
Dept: FAMILY MEDICINE | Facility: CLINIC | Age: 64
End: 2019-03-01

## 2019-03-01 ENCOUNTER — COMMUNICATION - HEALTHEAST (OUTPATIENT)
Dept: SCHEDULING | Facility: CLINIC | Age: 64
End: 2019-03-01

## 2019-03-01 DIAGNOSIS — E11.9 TYPE II DIABETES MELLITUS (H): ICD-10-CM

## 2019-03-01 DIAGNOSIS — E11.9 LONG-TERM INSULIN USE IN TYPE 2 DIABETES (H): ICD-10-CM

## 2019-03-01 DIAGNOSIS — Z79.4 LONG-TERM INSULIN USE IN TYPE 2 DIABETES (H): ICD-10-CM

## 2019-03-01 DIAGNOSIS — J45.20 MILD INTERMITTENT ASTHMA WITHOUT COMPLICATION: ICD-10-CM

## 2019-03-12 ENCOUNTER — HOME CARE/HOSPICE - HEALTHEAST (OUTPATIENT)
Dept: HOME HEALTH SERVICES | Facility: HOME HEALTH | Age: 64
End: 2019-03-12

## 2019-03-19 ENCOUNTER — COMMUNICATION - HEALTHEAST (OUTPATIENT)
Dept: HOME HEALTH SERVICES | Facility: HOME HEALTH | Age: 64
End: 2019-03-19

## 2019-03-19 ENCOUNTER — COMMUNICATION - HEALTHEAST (OUTPATIENT)
Dept: CARE COORDINATION | Facility: CLINIC | Age: 64
End: 2019-03-19

## 2019-03-20 ENCOUNTER — AMBULATORY - HEALTHEAST (OUTPATIENT)
Dept: CARE COORDINATION | Facility: CLINIC | Age: 64
End: 2019-03-20

## 2019-03-20 ENCOUNTER — OFFICE VISIT - HEALTHEAST (OUTPATIENT)
Dept: FAMILY MEDICINE | Facility: CLINIC | Age: 64
End: 2019-03-20

## 2019-03-20 ENCOUNTER — COMMUNICATION - HEALTHEAST (OUTPATIENT)
Dept: NURSING | Facility: CLINIC | Age: 64
End: 2019-03-20

## 2019-03-20 DIAGNOSIS — I50.32 CHRONIC DIASTOLIC CHF (CONGESTIVE HEART FAILURE) (H): ICD-10-CM

## 2019-03-20 DIAGNOSIS — M25.562 ARTHRALGIA OF BOTH KNEES: ICD-10-CM

## 2019-03-20 DIAGNOSIS — N18.4 CKD STAGE 4 DUE TO TYPE 2 DIABETES MELLITUS (H): ICD-10-CM

## 2019-03-20 DIAGNOSIS — J18.9 COMMUNITY ACQUIRED PNEUMONIA, UNSPECIFIED LATERALITY: ICD-10-CM

## 2019-03-20 DIAGNOSIS — E11.22 CKD STAGE 4 DUE TO TYPE 2 DIABETES MELLITUS (H): ICD-10-CM

## 2019-03-20 DIAGNOSIS — I50.9 ACUTE DECOMPENSATED HEART FAILURE (H): ICD-10-CM

## 2019-03-20 DIAGNOSIS — I50.31 ACUTE DIASTOLIC HEART FAILURE (H): ICD-10-CM

## 2019-03-20 DIAGNOSIS — Z09 HOSPITAL DISCHARGE FOLLOW-UP: ICD-10-CM

## 2019-03-20 DIAGNOSIS — Z79.4 LONG-TERM INSULIN USE IN TYPE 2 DIABETES (H): ICD-10-CM

## 2019-03-20 DIAGNOSIS — M25.561 ARTHRALGIA OF BOTH KNEES: ICD-10-CM

## 2019-03-20 DIAGNOSIS — M51.379 DEGENERATION OF LUMBAR OR LUMBOSACRAL INTERVERTEBRAL DISC: ICD-10-CM

## 2019-03-20 DIAGNOSIS — E11.9 LONG-TERM INSULIN USE IN TYPE 2 DIABETES (H): ICD-10-CM

## 2019-03-20 LAB
ALBUMIN SERPL-MCNC: 3.3 G/DL (ref 3.5–5)
ALP SERPL-CCNC: 91 U/L (ref 45–120)
ALT SERPL W P-5'-P-CCNC: 13 U/L (ref 0–45)
ANION GAP SERPL CALCULATED.3IONS-SCNC: 13 MMOL/L (ref 5–18)
AST SERPL W P-5'-P-CCNC: 20 U/L (ref 0–40)
BILIRUB SERPL-MCNC: 0.4 MG/DL (ref 0–1)
BUN SERPL-MCNC: 87 MG/DL (ref 8–22)
CALCIUM SERPL-MCNC: 9.5 MG/DL (ref 8.5–10.5)
CHLORIDE BLD-SCNC: 93 MMOL/L (ref 98–107)
CO2 SERPL-SCNC: 31 MMOL/L (ref 22–31)
CREAT SERPL-MCNC: 3.86 MG/DL (ref 0.6–1.1)
GFR SERPL CREATININE-BSD FRML MDRD: 12 ML/MIN/1.73M2
GLUCOSE BLD-MCNC: 168 MG/DL (ref 70–125)
POTASSIUM BLD-SCNC: 4.3 MMOL/L (ref 3.5–5)
PROT SERPL-MCNC: 7.7 G/DL (ref 6–8)
SODIUM SERPL-SCNC: 137 MMOL/L (ref 136–145)

## 2019-03-21 ENCOUNTER — COMMUNICATION - HEALTHEAST (OUTPATIENT)
Dept: FAMILY MEDICINE | Facility: CLINIC | Age: 64
End: 2019-03-21

## 2019-03-21 LAB — BNP SERPL-MCNC: 189 PG/ML (ref 0–106)

## 2019-03-25 ENCOUNTER — AMBULATORY - HEALTHEAST (OUTPATIENT)
Dept: CARE COORDINATION | Facility: CLINIC | Age: 64
End: 2019-03-25

## 2019-03-25 ENCOUNTER — COMMUNICATION - HEALTHEAST (OUTPATIENT)
Dept: FAMILY MEDICINE | Facility: CLINIC | Age: 64
End: 2019-03-25

## 2019-03-26 ENCOUNTER — COMMUNICATION - HEALTHEAST (OUTPATIENT)
Dept: SCHEDULING | Facility: CLINIC | Age: 64
End: 2019-03-26

## 2019-03-26 ENCOUNTER — COMMUNICATION - HEALTHEAST (OUTPATIENT)
Dept: NURSING | Facility: CLINIC | Age: 64
End: 2019-03-26

## 2019-03-26 DIAGNOSIS — B37.31 YEAST INFECTION OF THE VAGINA: ICD-10-CM

## 2019-03-27 ENCOUNTER — COMMUNICATION - HEALTHEAST (OUTPATIENT)
Dept: NURSING | Facility: CLINIC | Age: 64
End: 2019-03-27

## 2019-04-02 ENCOUNTER — COMMUNICATION - HEALTHEAST (OUTPATIENT)
Dept: SCHEDULING | Facility: CLINIC | Age: 64
End: 2019-04-02

## 2019-04-02 DIAGNOSIS — K59.01 SLOW TRANSIT CONSTIPATION: ICD-10-CM

## 2019-04-03 ENCOUNTER — COMMUNICATION - HEALTHEAST (OUTPATIENT)
Dept: NURSING | Facility: CLINIC | Age: 64
End: 2019-04-03

## 2019-04-11 ENCOUNTER — COMMUNICATION - HEALTHEAST (OUTPATIENT)
Dept: FAMILY MEDICINE | Facility: CLINIC | Age: 64
End: 2019-04-11

## 2019-04-11 DIAGNOSIS — N18.4 CKD STAGE 4 DUE TO TYPE 2 DIABETES MELLITUS (H): ICD-10-CM

## 2019-04-11 DIAGNOSIS — M25.561 ARTHRALGIA OF BOTH KNEES: ICD-10-CM

## 2019-04-11 DIAGNOSIS — I10 ESSENTIAL HYPERTENSION, BENIGN: ICD-10-CM

## 2019-04-11 DIAGNOSIS — M51.379 DEGENERATION OF LUMBAR OR LUMBOSACRAL INTERVERTEBRAL DISC: ICD-10-CM

## 2019-04-11 DIAGNOSIS — I50.9 ACUTE DECOMPENSATED HEART FAILURE (H): ICD-10-CM

## 2019-04-11 DIAGNOSIS — E11.22 CKD STAGE 4 DUE TO TYPE 2 DIABETES MELLITUS (H): ICD-10-CM

## 2019-04-11 DIAGNOSIS — M25.562 ARTHRALGIA OF BOTH KNEES: ICD-10-CM

## 2019-04-11 DIAGNOSIS — F06.30 MOOD DISORDER DUE TO MEDICAL CONDITION: ICD-10-CM

## 2019-04-22 ENCOUNTER — COMMUNICATION - HEALTHEAST (OUTPATIENT)
Dept: FAMILY MEDICINE | Facility: CLINIC | Age: 64
End: 2019-04-22

## 2019-04-22 DIAGNOSIS — M51.379 DEGENERATION OF LUMBAR OR LUMBOSACRAL INTERVERTEBRAL DISC: ICD-10-CM

## 2019-04-22 DIAGNOSIS — M25.562 ARTHRALGIA OF BOTH KNEES: ICD-10-CM

## 2019-04-22 DIAGNOSIS — M25.561 ARTHRALGIA OF BOTH KNEES: ICD-10-CM

## 2019-04-30 ENCOUNTER — COMMUNICATION - HEALTHEAST (OUTPATIENT)
Dept: FAMILY MEDICINE | Facility: CLINIC | Age: 64
End: 2019-04-30

## 2019-04-30 DIAGNOSIS — M51.379 DEGENERATION OF LUMBAR OR LUMBOSACRAL INTERVERTEBRAL DISC: ICD-10-CM

## 2019-05-01 ENCOUNTER — RECORDS - HEALTHEAST (OUTPATIENT)
Dept: ADMINISTRATIVE | Facility: OTHER | Age: 64
End: 2019-05-01

## 2019-05-02 ENCOUNTER — AMBULATORY - HEALTHEAST (OUTPATIENT)
Dept: FAMILY MEDICINE | Facility: CLINIC | Age: 64
End: 2019-05-02

## 2019-05-02 DIAGNOSIS — M51.379 DEGENERATION OF LUMBAR OR LUMBOSACRAL INTERVERTEBRAL DISC: ICD-10-CM

## 2019-05-16 ENCOUNTER — COMMUNICATION - HEALTHEAST (OUTPATIENT)
Dept: FAMILY MEDICINE | Facility: CLINIC | Age: 64
End: 2019-05-16

## 2019-05-16 DIAGNOSIS — E11.9 DM (DIABETES MELLITUS) (H): ICD-10-CM

## 2019-05-21 ENCOUNTER — COMMUNICATION - HEALTHEAST (OUTPATIENT)
Dept: FAMILY MEDICINE | Facility: CLINIC | Age: 64
End: 2019-05-21

## 2019-05-21 DIAGNOSIS — M25.562 ARTHRALGIA OF BOTH KNEES: ICD-10-CM

## 2019-05-21 DIAGNOSIS — M51.379 DEGENERATION OF LUMBAR OR LUMBOSACRAL INTERVERTEBRAL DISC: ICD-10-CM

## 2019-05-21 DIAGNOSIS — M25.561 ARTHRALGIA OF BOTH KNEES: ICD-10-CM

## 2019-05-22 ENCOUNTER — COMMUNICATION - HEALTHEAST (OUTPATIENT)
Dept: FAMILY MEDICINE | Facility: CLINIC | Age: 64
End: 2019-05-22

## 2019-05-22 ENCOUNTER — COMMUNICATION - HEALTHEAST (OUTPATIENT)
Dept: SCHEDULING | Facility: CLINIC | Age: 64
End: 2019-05-22

## 2019-05-22 DIAGNOSIS — M51.379 DEGENERATION OF LUMBAR OR LUMBOSACRAL INTERVERTEBRAL DISC: ICD-10-CM

## 2019-05-22 DIAGNOSIS — E11.8 TYPE 2 DIABETES MELLITUS WITH COMPLICATION, WITH LONG-TERM CURRENT USE OF INSULIN (H): ICD-10-CM

## 2019-05-22 DIAGNOSIS — Z79.4 TYPE 2 DIABETES MELLITUS WITH COMPLICATION, WITH LONG-TERM CURRENT USE OF INSULIN (H): ICD-10-CM

## 2019-05-22 DIAGNOSIS — M25.561 ARTHRALGIA OF BOTH KNEES: ICD-10-CM

## 2019-05-22 DIAGNOSIS — M25.562 ARTHRALGIA OF BOTH KNEES: ICD-10-CM

## 2019-05-23 ENCOUNTER — COMMUNICATION - HEALTHEAST (OUTPATIENT)
Dept: FAMILY MEDICINE | Facility: CLINIC | Age: 64
End: 2019-05-23

## 2019-05-23 ENCOUNTER — RECORDS - HEALTHEAST (OUTPATIENT)
Dept: ADMINISTRATIVE | Facility: OTHER | Age: 64
End: 2019-05-23

## 2019-05-23 ENCOUNTER — AMBULATORY - HEALTHEAST (OUTPATIENT)
Dept: FAMILY MEDICINE | Facility: CLINIC | Age: 64
End: 2019-05-23

## 2019-05-23 DIAGNOSIS — E11.22 CKD STAGE 4 DUE TO TYPE 2 DIABETES MELLITUS (H): ICD-10-CM

## 2019-05-23 DIAGNOSIS — J45.909 ASTHMA: ICD-10-CM

## 2019-05-23 DIAGNOSIS — N18.4 CKD STAGE 4 DUE TO TYPE 2 DIABETES MELLITUS (H): ICD-10-CM

## 2019-05-23 DIAGNOSIS — M51.379 DEGENERATION OF LUMBAR OR LUMBOSACRAL INTERVERTEBRAL DISC: ICD-10-CM

## 2019-05-23 DIAGNOSIS — M25.561 ARTHRALGIA OF BOTH KNEES: ICD-10-CM

## 2019-05-23 DIAGNOSIS — M25.562 ARTHRALGIA OF BOTH KNEES: ICD-10-CM

## 2019-05-29 ENCOUNTER — AMBULATORY - HEALTHEAST (OUTPATIENT)
Dept: CARE COORDINATION | Facility: CLINIC | Age: 64
End: 2019-05-29

## 2019-05-29 DIAGNOSIS — Z79.4 TYPE 2 DIABETES MELLITUS WITH COMPLICATION, WITH LONG-TERM CURRENT USE OF INSULIN (H): ICD-10-CM

## 2019-05-29 DIAGNOSIS — I50.32 CHRONIC DIASTOLIC CHF (CONGESTIVE HEART FAILURE) (H): ICD-10-CM

## 2019-05-29 DIAGNOSIS — E11.8 TYPE 2 DIABETES MELLITUS WITH COMPLICATION, WITH LONG-TERM CURRENT USE OF INSULIN (H): ICD-10-CM

## 2019-05-29 DIAGNOSIS — M51.379 DEGENERATION OF LUMBAR OR LUMBOSACRAL INTERVERTEBRAL DISC: ICD-10-CM

## 2019-05-30 ENCOUNTER — COMMUNICATION - HEALTHEAST (OUTPATIENT)
Dept: NURSING | Facility: CLINIC | Age: 64
End: 2019-05-30

## 2019-06-05 ENCOUNTER — RECORDS - HEALTHEAST (OUTPATIENT)
Dept: FAMILY MEDICINE | Facility: CLINIC | Age: 64
End: 2019-06-05

## 2019-06-05 ENCOUNTER — COMMUNICATION - HEALTHEAST (OUTPATIENT)
Dept: FAMILY MEDICINE | Facility: CLINIC | Age: 64
End: 2019-06-05

## 2019-06-05 DIAGNOSIS — M51.379 DEGENERATION OF LUMBAR OR LUMBOSACRAL INTERVERTEBRAL DISC: ICD-10-CM

## 2019-06-07 ENCOUNTER — AMBULATORY - HEALTHEAST (OUTPATIENT)
Dept: NURSING | Facility: CLINIC | Age: 64
End: 2019-06-07

## 2019-06-10 ENCOUNTER — COMMUNICATION - HEALTHEAST (OUTPATIENT)
Dept: FAMILY MEDICINE | Facility: CLINIC | Age: 64
End: 2019-06-10

## 2019-06-10 DIAGNOSIS — I50.9 ACUTE DECOMPENSATED HEART FAILURE (H): ICD-10-CM

## 2019-06-26 ENCOUNTER — COMMUNICATION - HEALTHEAST (OUTPATIENT)
Dept: FAMILY MEDICINE | Facility: CLINIC | Age: 64
End: 2019-06-26

## 2019-06-26 DIAGNOSIS — I10 ESSENTIAL HYPERTENSION, BENIGN: ICD-10-CM

## 2019-06-26 DIAGNOSIS — R06.2 WHEEZING: ICD-10-CM

## 2019-06-30 ENCOUNTER — COMMUNICATION - HEALTHEAST (OUTPATIENT)
Dept: FAMILY MEDICINE | Facility: CLINIC | Age: 64
End: 2019-06-30

## 2019-06-30 DIAGNOSIS — E11.22 CKD STAGE 4 DUE TO TYPE 2 DIABETES MELLITUS (H): ICD-10-CM

## 2019-06-30 DIAGNOSIS — H40.9 GLAUCOMA: ICD-10-CM

## 2019-06-30 DIAGNOSIS — N18.4 CKD STAGE 4 DUE TO TYPE 2 DIABETES MELLITUS (H): ICD-10-CM

## 2019-07-10 ENCOUNTER — COMMUNICATION - HEALTHEAST (OUTPATIENT)
Dept: FAMILY MEDICINE | Facility: CLINIC | Age: 64
End: 2019-07-10

## 2019-07-11 ENCOUNTER — COMMUNICATION - HEALTHEAST (OUTPATIENT)
Dept: FAMILY MEDICINE | Facility: CLINIC | Age: 64
End: 2019-07-11

## 2019-07-11 DIAGNOSIS — M51.379 DEGENERATION OF LUMBAR OR LUMBOSACRAL INTERVERTEBRAL DISC: ICD-10-CM

## 2019-07-16 ENCOUNTER — COMMUNICATION - HEALTHEAST (OUTPATIENT)
Dept: FAMILY MEDICINE | Facility: CLINIC | Age: 64
End: 2019-07-16

## 2019-07-16 DIAGNOSIS — E11.9 DIABETES (H): ICD-10-CM

## 2019-07-19 ENCOUNTER — COMMUNICATION - HEALTHEAST (OUTPATIENT)
Dept: FAMILY MEDICINE | Facility: CLINIC | Age: 64
End: 2019-07-19

## 2019-07-19 DIAGNOSIS — M25.561 ARTHRALGIA OF BOTH KNEES: ICD-10-CM

## 2019-07-19 DIAGNOSIS — M25.562 ARTHRALGIA OF BOTH KNEES: ICD-10-CM

## 2019-07-19 DIAGNOSIS — M51.379 DEGENERATION OF LUMBAR OR LUMBOSACRAL INTERVERTEBRAL DISC: ICD-10-CM

## 2019-07-20 ENCOUNTER — COMMUNICATION - HEALTHEAST (OUTPATIENT)
Dept: SCHEDULING | Facility: CLINIC | Age: 64
End: 2019-07-20

## 2019-07-22 ENCOUNTER — COMMUNICATION - HEALTHEAST (OUTPATIENT)
Dept: FAMILY MEDICINE | Facility: CLINIC | Age: 64
End: 2019-07-22

## 2019-07-22 DIAGNOSIS — E87.6 HYPOKALEMIA: ICD-10-CM

## 2019-08-01 ENCOUNTER — COMMUNICATION - HEALTHEAST (OUTPATIENT)
Dept: FAMILY MEDICINE | Facility: CLINIC | Age: 64
End: 2019-08-01

## 2019-08-12 ENCOUNTER — COMMUNICATION - HEALTHEAST (OUTPATIENT)
Dept: FAMILY MEDICINE | Facility: CLINIC | Age: 64
End: 2019-08-12

## 2019-08-12 DIAGNOSIS — I50.9 CONGESTIVE HEART FAILURE (H): ICD-10-CM

## 2019-08-18 ENCOUNTER — COMMUNICATION - HEALTHEAST (OUTPATIENT)
Dept: FAMILY MEDICINE | Facility: CLINIC | Age: 64
End: 2019-08-18

## 2019-08-18 DIAGNOSIS — M51.379 DEGENERATION OF LUMBAR OR LUMBOSACRAL INTERVERTEBRAL DISC: ICD-10-CM

## 2019-08-19 ENCOUNTER — COMMUNICATION - HEALTHEAST (OUTPATIENT)
Dept: FAMILY MEDICINE | Facility: CLINIC | Age: 64
End: 2019-08-19

## 2019-08-19 DIAGNOSIS — I10 BENIGN ESSENTIAL HYPERTENSION: ICD-10-CM

## 2019-08-21 ENCOUNTER — COMMUNICATION - HEALTHEAST (OUTPATIENT)
Dept: FAMILY MEDICINE | Facility: CLINIC | Age: 64
End: 2019-08-21

## 2019-08-21 DIAGNOSIS — M25.562 ARTHRALGIA OF BOTH KNEES: ICD-10-CM

## 2019-08-21 DIAGNOSIS — M51.379 DEGENERATION OF LUMBAR OR LUMBOSACRAL INTERVERTEBRAL DISC: ICD-10-CM

## 2019-08-21 DIAGNOSIS — M25.561 ARTHRALGIA OF BOTH KNEES: ICD-10-CM

## 2019-09-09 ENCOUNTER — COMMUNICATION - HEALTHEAST (OUTPATIENT)
Dept: FAMILY MEDICINE | Facility: CLINIC | Age: 64
End: 2019-09-09

## 2019-09-16 ENCOUNTER — AMBULATORY - HEALTHEAST (OUTPATIENT)
Dept: FAMILY MEDICINE | Facility: CLINIC | Age: 64
End: 2019-09-16

## 2019-09-16 ENCOUNTER — RECORDS - HEALTHEAST (OUTPATIENT)
Dept: ADMINISTRATIVE | Facility: OTHER | Age: 64
End: 2019-09-16

## 2019-09-16 DIAGNOSIS — Z79.4 TYPE 2 DIABETES MELLITUS WITH COMPLICATION, WITH LONG-TERM CURRENT USE OF INSULIN (H): ICD-10-CM

## 2019-09-16 DIAGNOSIS — E11.22 CKD STAGE 4 DUE TO TYPE 2 DIABETES MELLITUS (H): ICD-10-CM

## 2019-09-16 DIAGNOSIS — Z11.4 SCREENING FOR HIV WITHOUT PRESENCE OF RISK FACTORS: ICD-10-CM

## 2019-09-16 DIAGNOSIS — I50.32 CHRONIC DIASTOLIC CHF (CONGESTIVE HEART FAILURE) (H): ICD-10-CM

## 2019-09-16 DIAGNOSIS — E11.8 TYPE 2 DIABETES MELLITUS WITH COMPLICATION, WITH LONG-TERM CURRENT USE OF INSULIN (H): ICD-10-CM

## 2019-09-16 DIAGNOSIS — E66.2 MORBID OBESITY WITH ALVEOLAR HYPOVENTILATION (H): ICD-10-CM

## 2019-09-16 DIAGNOSIS — Z13.820 SCREENING FOR OSTEOPOROSIS: ICD-10-CM

## 2019-09-16 DIAGNOSIS — E78.00 HYPERCHOLESTEREMIA: ICD-10-CM

## 2019-09-16 DIAGNOSIS — N18.4 CKD STAGE 4 DUE TO TYPE 2 DIABETES MELLITUS (H): ICD-10-CM

## 2019-09-16 DIAGNOSIS — M51.379 DEGENERATION OF LUMBAR OR LUMBOSACRAL INTERVERTEBRAL DISC: ICD-10-CM

## 2019-09-16 DIAGNOSIS — G89.4 CHRONIC PAIN SYNDROME: ICD-10-CM

## 2019-09-17 ENCOUNTER — COMMUNICATION - HEALTHEAST (OUTPATIENT)
Dept: FAMILY MEDICINE | Facility: CLINIC | Age: 64
End: 2019-09-17

## 2019-09-17 ENCOUNTER — OFFICE VISIT - HEALTHEAST (OUTPATIENT)
Dept: FAMILY MEDICINE | Facility: CLINIC | Age: 64
End: 2019-09-17

## 2019-09-17 DIAGNOSIS — E11.8 TYPE 2 DIABETES MELLITUS WITH COMPLICATION, WITH LONG-TERM CURRENT USE OF INSULIN (H): ICD-10-CM

## 2019-09-17 DIAGNOSIS — Z11.4 SCREENING FOR HIV WITHOUT PRESENCE OF RISK FACTORS: ICD-10-CM

## 2019-09-17 DIAGNOSIS — E66.2 MORBID OBESITY WITH ALVEOLAR HYPOVENTILATION (H): ICD-10-CM

## 2019-09-17 DIAGNOSIS — I50.32 CHRONIC DIASTOLIC CHF (CONGESTIVE HEART FAILURE) (H): ICD-10-CM

## 2019-09-17 DIAGNOSIS — E11.9 DIABETES (H): ICD-10-CM

## 2019-09-17 DIAGNOSIS — Z79.4 TYPE 2 DIABETES MELLITUS WITH COMPLICATION, WITH LONG-TERM CURRENT USE OF INSULIN (H): ICD-10-CM

## 2019-09-17 DIAGNOSIS — L97.519 DIABETIC ULCER OF TOE OF RIGHT FOOT ASSOCIATED WITH TYPE 2 DIABETES MELLITUS, UNSPECIFIED ULCER STAGE (H): ICD-10-CM

## 2019-09-17 DIAGNOSIS — E11.621 DIABETIC ULCER OF TOE OF RIGHT FOOT ASSOCIATED WITH TYPE 2 DIABETES MELLITUS, UNSPECIFIED ULCER STAGE (H): ICD-10-CM

## 2019-09-17 DIAGNOSIS — B37.2 CANDIDIASIS, INTERTRIGO: ICD-10-CM

## 2019-09-17 DIAGNOSIS — Z12.31 VISIT FOR SCREENING MAMMOGRAM: ICD-10-CM

## 2019-09-17 DIAGNOSIS — E11.22 CKD STAGE 4 DUE TO TYPE 2 DIABETES MELLITUS (H): ICD-10-CM

## 2019-09-17 DIAGNOSIS — N18.4 CKD STAGE 4 DUE TO TYPE 2 DIABETES MELLITUS (H): ICD-10-CM

## 2019-09-17 DIAGNOSIS — M51.379 DEGENERATION OF LUMBAR OR LUMBOSACRAL INTERVERTEBRAL DISC: ICD-10-CM

## 2019-09-17 DIAGNOSIS — E78.00 HYPERCHOLESTEREMIA: ICD-10-CM

## 2019-09-17 DIAGNOSIS — Z13.820 SCREENING FOR OSTEOPOROSIS: ICD-10-CM

## 2019-09-17 LAB
ALBUMIN SERPL-MCNC: 3 G/DL (ref 3.5–5)
ALBUMIN SERPL-MCNC: 3 G/DL (ref 3.5–5)
ALP SERPL-CCNC: 110 U/L (ref 45–120)
ALT SERPL W P-5'-P-CCNC: <9 U/L (ref 0–45)
ANION GAP SERPL CALCULATED.3IONS-SCNC: 11 MMOL/L (ref 5–18)
AST SERPL W P-5'-P-CCNC: 13 U/L (ref 0–40)
BILIRUB DIRECT SERPL-MCNC: 0.1 MG/DL
BILIRUB SERPL-MCNC: 0.3 MG/DL (ref 0–1)
BUN SERPL-MCNC: 99 MG/DL (ref 8–22)
CALCIUM SERPL-MCNC: 9 MG/DL (ref 8.5–10.5)
CHLORIDE BLD-SCNC: 103 MMOL/L (ref 98–107)
CHOLEST SERPL-MCNC: 178 MG/DL
CO2 SERPL-SCNC: 23 MMOL/L (ref 22–31)
CREAT SERPL-MCNC: 2.85 MG/DL (ref 0.6–1.1)
CREAT UR-MCNC: 124.9 MG/DL
FASTING STATUS PATIENT QL REPORTED: NO
GFR SERPL CREATININE-BSD FRML MDRD: 17 ML/MIN/1.73M2
GLUCOSE BLD-MCNC: 275 MG/DL (ref 70–125)
HBA1C MFR BLD: 7.8 % (ref 3.5–6)
HDLC SERPL-MCNC: 35 MG/DL
HIV 1+2 AB+HIV1 P24 AG SERPL QL IA: NEGATIVE
LDLC SERPL CALC-MCNC: 117 MG/DL
MICROALBUMIN UR-MCNC: 74.26 MG/DL (ref 0–1.99)
MICROALBUMIN/CREAT UR: 594.6 MG/G
PHOSPHATE SERPL-MCNC: 4 MG/DL (ref 2.5–4.5)
POTASSIUM BLD-SCNC: 4.7 MMOL/L (ref 3.5–5)
PROT SERPL-MCNC: 7.3 G/DL (ref 6–8)
SODIUM SERPL-SCNC: 137 MMOL/L (ref 136–145)
TRIGL SERPL-MCNC: 130 MG/DL
TSH SERPL DL<=0.005 MIU/L-ACNC: 1.14 UIU/ML (ref 0.3–5)

## 2019-09-17 ASSESSMENT — ANXIETY QUESTIONNAIRES
6. BECOMING EASILY ANNOYED OR IRRITABLE: NEARLY EVERY DAY
2. NOT BEING ABLE TO STOP OR CONTROL WORRYING: NEARLY EVERY DAY
7. FEELING AFRAID AS IF SOMETHING AWFUL MIGHT HAPPEN: NOT AT ALL
IF YOU CHECKED OFF ANY PROBLEMS ON THIS QUESTIONNAIRE, HOW DIFFICULT HAVE THESE PROBLEMS MADE IT FOR YOU TO DO YOUR WORK, TAKE CARE OF THINGS AT HOME, OR GET ALONG WITH OTHER PEOPLE: SOMEWHAT DIFFICULT
5. BEING SO RESTLESS THAT IT IS HARD TO SIT STILL: MORE THAN HALF THE DAYS
4. TROUBLE RELAXING: NEARLY EVERY DAY
3. WORRYING TOO MUCH ABOUT DIFFERENT THINGS: NEARLY EVERY DAY
1. FEELING NERVOUS, ANXIOUS, OR ON EDGE: MORE THAN HALF THE DAYS
GAD7 TOTAL SCORE: 16

## 2019-09-17 ASSESSMENT — PATIENT HEALTH QUESTIONNAIRE - PHQ9: SUM OF ALL RESPONSES TO PHQ QUESTIONS 1-9: 12

## 2019-09-18 LAB — NT-PROBNP SERPL-MCNC: 1582 PG/ML (ref 0–125)

## 2019-09-19 ENCOUNTER — COMMUNICATION - HEALTHEAST (OUTPATIENT)
Dept: FAMILY MEDICINE | Facility: CLINIC | Age: 64
End: 2019-09-19

## 2019-09-19 DIAGNOSIS — M51.379 DEGENERATION OF LUMBAR OR LUMBOSACRAL INTERVERTEBRAL DISC: ICD-10-CM

## 2019-09-19 DIAGNOSIS — L29.9 ITCHING: ICD-10-CM

## 2019-09-19 DIAGNOSIS — M25.561 ARTHRALGIA OF BOTH KNEES: ICD-10-CM

## 2019-09-19 DIAGNOSIS — M25.562 ARTHRALGIA OF BOTH KNEES: ICD-10-CM

## 2019-09-19 LAB — COLLAGEN CTX SERPL-MCNC: 1238 PG/ML

## 2019-09-23 ENCOUNTER — COMMUNICATION - HEALTHEAST (OUTPATIENT)
Dept: FAMILY MEDICINE | Facility: CLINIC | Age: 64
End: 2019-09-23

## 2019-09-23 DIAGNOSIS — M51.379 DEGENERATION OF LUMBAR OR LUMBOSACRAL INTERVERTEBRAL DISC: ICD-10-CM

## 2019-09-23 DIAGNOSIS — L29.9 ITCHING: ICD-10-CM

## 2019-09-25 ENCOUNTER — COMMUNICATION - HEALTHEAST (OUTPATIENT)
Dept: SCHEDULING | Facility: CLINIC | Age: 64
End: 2019-09-25

## 2019-09-25 DIAGNOSIS — E11.42 DIABETIC POLYNEUROPATHY ASSOCIATED WITH TYPE 2 DIABETES MELLITUS (H): ICD-10-CM

## 2019-10-18 ENCOUNTER — COMMUNICATION - HEALTHEAST (OUTPATIENT)
Dept: FAMILY MEDICINE | Facility: CLINIC | Age: 64
End: 2019-10-18

## 2019-10-18 DIAGNOSIS — K21.9 GERD (GASTROESOPHAGEAL REFLUX DISEASE): ICD-10-CM

## 2019-10-21 ENCOUNTER — COMMUNICATION - HEALTHEAST (OUTPATIENT)
Dept: FAMILY MEDICINE | Facility: CLINIC | Age: 64
End: 2019-10-21

## 2019-10-21 DIAGNOSIS — B37.2 CANDIDIASIS, INTERTRIGO: ICD-10-CM

## 2019-10-21 DIAGNOSIS — M25.562 ARTHRALGIA OF BOTH KNEES: ICD-10-CM

## 2019-10-21 DIAGNOSIS — H40.9 GLAUCOMA: ICD-10-CM

## 2019-10-21 DIAGNOSIS — M51.379 DEGENERATION OF LUMBAR OR LUMBOSACRAL INTERVERTEBRAL DISC: ICD-10-CM

## 2019-10-21 DIAGNOSIS — M25.561 ARTHRALGIA OF BOTH KNEES: ICD-10-CM

## 2019-10-22 ENCOUNTER — COMMUNICATION - HEALTHEAST (OUTPATIENT)
Dept: FAMILY MEDICINE | Facility: CLINIC | Age: 64
End: 2019-10-22

## 2019-10-22 DIAGNOSIS — L30.9 ECZEMA, UNSPECIFIED TYPE: ICD-10-CM

## 2019-10-24 ENCOUNTER — COMMUNICATION - HEALTHEAST (OUTPATIENT)
Dept: FAMILY MEDICINE | Facility: CLINIC | Age: 64
End: 2019-10-24

## 2019-10-24 DIAGNOSIS — L29.9 PRURITIC DISORDER: ICD-10-CM

## 2019-11-02 ASSESSMENT — MIFFLIN-ST. JEOR
SCORE: 1658.98
SCORE: 1658.98

## 2019-11-05 ENCOUNTER — HOME CARE/HOSPICE - HEALTHEAST (OUTPATIENT)
Dept: HOME HEALTH SERVICES | Facility: HOME HEALTH | Age: 64
End: 2019-11-05

## 2019-11-07 ASSESSMENT — MIFFLIN-ST. JEOR
SCORE: 1722.03

## 2019-11-08 ENCOUNTER — SURGERY - HEALTHEAST (OUTPATIENT)
Dept: SURGERY | Facility: CLINIC | Age: 64
End: 2019-11-08

## 2019-11-08 ENCOUNTER — ANESTHESIA - HEALTHEAST (OUTPATIENT)
Dept: SURGERY | Facility: CLINIC | Age: 64
End: 2019-11-08

## 2019-11-09 ENCOUNTER — ANESTHESIA - HEALTHEAST (OUTPATIENT)
Dept: SURGERY | Facility: CLINIC | Age: 64
End: 2019-11-09

## 2019-11-09 ENCOUNTER — SURGERY - HEALTHEAST (OUTPATIENT)
Dept: SURGERY | Facility: CLINIC | Age: 64
End: 2019-11-09

## 2019-11-10 ASSESSMENT — MIFFLIN-ST. JEOR
SCORE: 1731.1
SCORE: 1731.1

## 2019-11-11 ASSESSMENT — MIFFLIN-ST. JEOR
SCORE: 1742.9
SCORE: 1742.9

## 2019-11-12 ASSESSMENT — MIFFLIN-ST. JEOR
SCORE: 1702.53
SCORE: 1702.53

## 2019-11-13 ASSESSMENT — MIFFLIN-ST. JEOR
SCORE: 1662.61
SCORE: 1662.61

## 2019-11-15 ASSESSMENT — MIFFLIN-ST. JEOR
SCORE: 1664.43
SCORE: 1664.43

## 2019-11-16 ENCOUNTER — COMMUNICATION - HEALTHEAST (OUTPATIENT)
Dept: FAMILY MEDICINE | Facility: CLINIC | Age: 64
End: 2019-11-16

## 2019-11-30 ENCOUNTER — COMMUNICATION - HEALTHEAST (OUTPATIENT)
Dept: FAMILY MEDICINE | Facility: CLINIC | Age: 64
End: 2019-11-30

## 2019-11-30 DIAGNOSIS — E11.42 DIABETIC POLYNEUROPATHY ASSOCIATED WITH TYPE 2 DIABETES MELLITUS (H): ICD-10-CM

## 2019-11-30 DIAGNOSIS — E78.5 HYPERLIPIDEMIA: ICD-10-CM

## 2019-12-12 ENCOUNTER — RECORDS - HEALTHEAST (OUTPATIENT)
Dept: ADMINISTRATIVE | Facility: OTHER | Age: 64
End: 2019-12-12

## 2019-12-24 ENCOUNTER — COMMUNICATION - HEALTHEAST (OUTPATIENT)
Dept: FAMILY MEDICINE | Facility: CLINIC | Age: 64
End: 2019-12-24

## 2019-12-24 DIAGNOSIS — E78.2 MIXED HYPERLIPIDEMIA: ICD-10-CM

## 2020-01-01 ENCOUNTER — AMBULATORY - HEALTHEAST (OUTPATIENT)
Dept: CARDIOLOGY | Facility: CLINIC | Age: 65
End: 2020-01-01

## 2020-01-01 ENCOUNTER — OFFICE VISIT - HEALTHEAST (OUTPATIENT)
Dept: OBGYN | Facility: CLINIC | Age: 65
End: 2020-01-01

## 2020-01-01 ENCOUNTER — COMMUNICATION - HEALTHEAST (OUTPATIENT)
Dept: SCHEDULING | Facility: CLINIC | Age: 65
End: 2020-01-01

## 2020-01-01 ENCOUNTER — OFFICE VISIT - HEALTHEAST (OUTPATIENT)
Dept: FAMILY MEDICINE | Facility: CLINIC | Age: 65
End: 2020-01-01

## 2020-01-01 ENCOUNTER — AMBULATORY - HEALTHEAST (OUTPATIENT)
Dept: CARE COORDINATION | Facility: CLINIC | Age: 65
End: 2020-01-01

## 2020-01-01 ENCOUNTER — COMMUNICATION - HEALTHEAST (OUTPATIENT)
Dept: FAMILY MEDICINE | Facility: CLINIC | Age: 65
End: 2020-01-01

## 2020-01-01 ENCOUNTER — COMMUNICATION - HEALTHEAST (OUTPATIENT)
Dept: CARDIOLOGY | Facility: CLINIC | Age: 65
End: 2020-01-01

## 2020-01-01 ENCOUNTER — SURGERY - HEALTHEAST (OUTPATIENT)
Dept: CARDIOLOGY | Facility: CLINIC | Age: 65
End: 2020-01-01

## 2020-01-01 ENCOUNTER — AMBULATORY - HEALTHEAST (OUTPATIENT)
Dept: FAMILY MEDICINE | Facility: CLINIC | Age: 65
End: 2020-01-01

## 2020-01-01 ENCOUNTER — RECORDS - HEALTHEAST (OUTPATIENT)
Dept: LAB | Facility: CLINIC | Age: 65
End: 2020-01-01

## 2020-01-01 ENCOUNTER — COMMUNICATION - HEALTHEAST (OUTPATIENT)
Dept: NURSING | Facility: CLINIC | Age: 65
End: 2020-01-01

## 2020-01-01 ENCOUNTER — RECORDS - HEALTHEAST (OUTPATIENT)
Dept: CARDIOLOGY | Facility: CLINIC | Age: 65
End: 2020-01-01

## 2020-01-01 ENCOUNTER — HOSPITAL ENCOUNTER (OUTPATIENT)
Dept: ULTRASOUND IMAGING | Facility: HOSPITAL | Age: 65
Discharge: HOME | End: 2020-12-23
Attending: FAMILY MEDICINE

## 2020-01-01 DIAGNOSIS — N39.0 URINARY TRACT INFECTION WITHOUT HEMATURIA, SITE UNSPECIFIED: ICD-10-CM

## 2020-01-01 DIAGNOSIS — R30.0 DYSURIA: ICD-10-CM

## 2020-01-01 DIAGNOSIS — N30.20 CHRONIC CYSTITIS: ICD-10-CM

## 2020-01-01 DIAGNOSIS — I50.32 CHRONIC HEART FAILURE WITH PRESERVED EJECTION FRACTION (H): ICD-10-CM

## 2020-01-01 DIAGNOSIS — M17.10 LOCALIZED OSTEOARTHROSIS, LOWER LEG: ICD-10-CM

## 2020-01-01 DIAGNOSIS — Z71.89 OTHER SPECIFIED COUNSELING: Chronic | ICD-10-CM

## 2020-01-01 DIAGNOSIS — E11.22 TYPE 2 DIABETES MELLITUS WITH CHRONIC KIDNEY DISEASE, WITH LONG-TERM CURRENT USE OF INSULIN, UNSPECIFIED CKD STAGE (H): ICD-10-CM

## 2020-01-01 DIAGNOSIS — L29.9 PRURITUS: ICD-10-CM

## 2020-01-01 DIAGNOSIS — R10.2 PAIN IN VULVA: ICD-10-CM

## 2020-01-01 DIAGNOSIS — E11.621 DIABETIC ULCER OF TOE OF RIGHT FOOT ASSOCIATED WITH TYPE 2 DIABETES MELLITUS, UNSPECIFIED ULCER STAGE (H): ICD-10-CM

## 2020-01-01 DIAGNOSIS — M47.816 SPONDYLOSIS OF LUMBAR REGION WITHOUT MYELOPATHY OR RADICULOPATHY: ICD-10-CM

## 2020-01-01 DIAGNOSIS — N39.0 URINARY TRACT INFECTION: ICD-10-CM

## 2020-01-01 DIAGNOSIS — I72.0 ANEURYSM OF ARTERY OF NECK (H): ICD-10-CM

## 2020-01-01 DIAGNOSIS — B37.31 YEAST INFECTION OF THE VAGINA: ICD-10-CM

## 2020-01-01 DIAGNOSIS — M72.6 NECROTIZING FASCIITIS (H): ICD-10-CM

## 2020-01-01 DIAGNOSIS — N76.1 CHRONIC VAGINITIS: ICD-10-CM

## 2020-01-01 DIAGNOSIS — R00.1 SEVERE SINUS BRADYCARDIA: ICD-10-CM

## 2020-01-01 DIAGNOSIS — N89.8 VAGINAL ITCHING: ICD-10-CM

## 2020-01-01 DIAGNOSIS — Z79.4 TYPE 2 DIABETES MELLITUS WITH CHRONIC KIDNEY DISEASE, WITH LONG-TERM CURRENT USE OF INSULIN, UNSPECIFIED CKD STAGE (H): ICD-10-CM

## 2020-01-01 DIAGNOSIS — I49.5 SICK SINUS SYNDROME (H): ICD-10-CM

## 2020-01-01 DIAGNOSIS — N93.9 VAGINAL BLEEDING: ICD-10-CM

## 2020-01-01 DIAGNOSIS — E66.2 MORBID (SEVERE) OBESITY WITH ALVEOLAR HYPOVENTILATION (H): ICD-10-CM

## 2020-01-01 DIAGNOSIS — J44.9 CHRONIC OBSTRUCTIVE PULMONARY DISEASE, UNSPECIFIED COPD TYPE (H): ICD-10-CM

## 2020-01-01 DIAGNOSIS — E43 UNSPECIFIED SEVERE PROTEIN-CALORIE MALNUTRITION (H): ICD-10-CM

## 2020-01-01 DIAGNOSIS — N76.1 SUBACUTE VAGINITIS: ICD-10-CM

## 2020-01-01 DIAGNOSIS — R31.0 GROSS HEMATURIA: ICD-10-CM

## 2020-01-01 DIAGNOSIS — R93.429 ABNORMAL CT SCAN, KIDNEY: ICD-10-CM

## 2020-01-01 DIAGNOSIS — Z12.11 SCREEN FOR COLON CANCER: ICD-10-CM

## 2020-01-01 DIAGNOSIS — Z95.0 CARDIAC PACEMAKER IN SITU: ICD-10-CM

## 2020-01-01 DIAGNOSIS — L97.519 DIABETIC ULCER OF TOE OF RIGHT FOOT ASSOCIATED WITH TYPE 2 DIABETES MELLITUS, UNSPECIFIED ULCER STAGE (H): ICD-10-CM

## 2020-01-01 DIAGNOSIS — N76.3 CHRONIC VULVITIS: ICD-10-CM

## 2020-01-01 DIAGNOSIS — Z99.2 DEPENDENCE ON RENAL DIALYSIS (H): ICD-10-CM

## 2020-01-01 DIAGNOSIS — B37.49 YEAST UTI: ICD-10-CM

## 2020-01-01 LAB
ALBUMIN SERPL-MCNC: 2.8 G/DL (ref 3.5–5)
ALP SERPL-CCNC: 202 U/L (ref 45–120)
ALT SERPL W P-5'-P-CCNC: 31 U/L (ref 0–45)
ANION GAP SERPL CALCULATED.3IONS-SCNC: 5 MMOL/L (ref 5–18)
AST SERPL W P-5'-P-CCNC: 47 U/L (ref 0–40)
BACTERIA SPEC CULT: NO GROWTH
BILIRUB SERPL-MCNC: 0.5 MG/DL (ref 0–1)
BUN SERPL-MCNC: 38 MG/DL (ref 8–22)
CALCIUM SERPL-MCNC: 9.4 MG/DL (ref 8.5–10.5)
CHLORIDE BLD-SCNC: 109 MMOL/L (ref 98–107)
CLUE CELLS: NORMAL
CO2 SERPL-SCNC: 26 MMOL/L (ref 22–31)
CREAT SERPL-MCNC: 4.47 MG/DL (ref 0.6–1.1)
GFR SERPL CREATININE-BSD FRML MDRD: 10 ML/MIN/1.73M2
GLUCOSE BLD-MCNC: 97 MG/DL (ref 70–125)
HBA1C MFR BLD: 5.5 %
HCC DEVICE COMMENTS: NORMAL
HCC DEVICE COMMENTS: NORMAL
HCC DEVICE IMPLANTING PROVIDER: NORMAL
HCC DEVICE IMPLANTING PROVIDER: NORMAL
HCC DEVICE MANUFACTURE: NORMAL
HCC DEVICE MANUFACTURE: NORMAL
HCC DEVICE MODEL: NORMAL
HCC DEVICE MODEL: NORMAL
HCC DEVICE SERIAL NUMBER: NORMAL
HCC DEVICE SERIAL NUMBER: NORMAL
HCC DEVICE TYPE: NORMAL
HCC DEVICE TYPE: NORMAL
MAGNESIUM SERPL-MCNC: 2.1 MG/DL (ref 1.8–2.6)
POTASSIUM BLD-SCNC: 6.5 MMOL/L (ref 3.5–5)
POTASSIUM BLD-SCNC: 7.6 MMOL/L (ref 3.5–5)
PROT SERPL-MCNC: 7.5 G/DL (ref 6–8)
SODIUM SERPL-SCNC: 140 MMOL/L (ref 136–145)
TRICHOMONAS, WET PREP: NORMAL
YEAST, WET PREP: NORMAL

## 2020-01-01 ASSESSMENT — MIFFLIN-ST. JEOR
SCORE: 1386.37
SCORE: 1391.36
SCORE: 1336.93
SCORE: 1358.7
SCORE: 1358.7
SCORE: 1366.36
SCORE: 1352.35
SCORE: 1353.71
SCORE: 1391.36
SCORE: 1352
SCORE: 1391.36
SCORE: 1386.3
SCORE: 1352
SCORE: 1336.93
SCORE: 1386.3
SCORE: 1354.62
SCORE: 1391.36
SCORE: 1366.36
SCORE: 1352.35
SCORE: 1381.83
SCORE: 1381.83
SCORE: 1386.37
SCORE: 1353.71
SCORE: 1354.62

## 2020-01-07 ENCOUNTER — COMMUNICATION - HEALTHEAST (OUTPATIENT)
Dept: FAMILY MEDICINE | Facility: CLINIC | Age: 65
End: 2020-01-07

## 2020-01-07 DIAGNOSIS — E11.42 DIABETIC POLYNEUROPATHY ASSOCIATED WITH TYPE 2 DIABETES MELLITUS (H): ICD-10-CM

## 2020-01-09 ENCOUNTER — RECORDS - HEALTHEAST (OUTPATIENT)
Dept: LAB | Facility: CLINIC | Age: 65
End: 2020-01-09

## 2020-01-09 LAB
ALBUMIN UR-MCNC: ABNORMAL MG/DL
APPEARANCE UR: CLEAR
BACTERIA #/AREA URNS HPF: ABNORMAL HPF
BILIRUB UR QL STRIP: NEGATIVE
COLOR UR AUTO: YELLOW
GLUCOSE UR STRIP-MCNC: NEGATIVE MG/DL
HGB UR QL STRIP: NEGATIVE
KETONES UR STRIP-MCNC: NEGATIVE MG/DL
LEUKOCYTE ESTERASE UR QL STRIP: ABNORMAL
NITRATE UR QL: NEGATIVE
PH UR STRIP: 6 [PH] (ref 4.5–8)
RBC #/AREA URNS AUTO: ABNORMAL HPF
SP GR UR STRIP: 1.01 (ref 1–1.03)
SQUAMOUS #/AREA URNS AUTO: ABNORMAL LPF
UROBILINOGEN UR STRIP-ACNC: ABNORMAL
WBC #/AREA URNS AUTO: ABNORMAL HPF
WBC CLUMPS #/AREA URNS HPF: PRESENT /[HPF]

## 2020-01-10 ENCOUNTER — RECORDS - HEALTHEAST (OUTPATIENT)
Dept: LAB | Facility: CLINIC | Age: 65
End: 2020-01-10

## 2020-01-10 LAB
ALBUMIN SERPL-MCNC: 2.5 G/DL (ref 3.5–5)
ALP SERPL-CCNC: 120 U/L (ref 45–120)
ALT SERPL W P-5'-P-CCNC: 11 U/L (ref 0–45)
ANION GAP SERPL CALCULATED.3IONS-SCNC: 11 MMOL/L (ref 5–18)
AST SERPL W P-5'-P-CCNC: 16 U/L (ref 0–40)
BILIRUB SERPL-MCNC: 0.3 MG/DL (ref 0–1)
BUN SERPL-MCNC: 25 MG/DL (ref 8–22)
CALCIUM SERPL-MCNC: 9 MG/DL (ref 8.5–10.5)
CHLORIDE BLD-SCNC: 100 MMOL/L (ref 98–107)
CO2 SERPL-SCNC: 27 MMOL/L (ref 22–31)
CREAT SERPL-MCNC: 2.74 MG/DL (ref 0.6–1.1)
ERYTHROCYTE [DISTWIDTH] IN BLOOD BY AUTOMATED COUNT: 18.4 % (ref 11–14.5)
GFR SERPL CREATININE-BSD FRML MDRD: 17 ML/MIN/1.73M2
GLUCOSE BLD-MCNC: 83 MG/DL (ref 70–125)
HBA1C MFR BLD: 4.5 % (ref 4.2–6.1)
HCT VFR BLD AUTO: 37.1 % (ref 35–47)
HGB BLD-MCNC: 10.8 G/DL (ref 12–16)
MCH RBC QN AUTO: 28.2 PG (ref 27–34)
MCHC RBC AUTO-ENTMCNC: 29.1 G/DL (ref 32–36)
MCV RBC AUTO: 97 FL (ref 80–100)
PLATELET # BLD AUTO: 277 THOU/UL (ref 140–440)
PMV BLD AUTO: 10.5 FL (ref 8.5–12.5)
POTASSIUM BLD-SCNC: 4.2 MMOL/L (ref 3.5–5)
PROT SERPL-MCNC: 7.2 G/DL (ref 6–8)
RBC # BLD AUTO: 3.83 MILL/UL (ref 3.8–5.4)
SODIUM SERPL-SCNC: 138 MMOL/L (ref 136–145)
VIT B12 SERPL-MCNC: 374 PG/ML (ref 213–816)
WBC: 5.2 THOU/UL (ref 4–11)

## 2020-01-12 LAB — BACTERIA SPEC CULT: ABNORMAL

## 2020-01-24 ENCOUNTER — RECORDS - HEALTHEAST (OUTPATIENT)
Dept: LAB | Facility: CLINIC | Age: 65
End: 2020-01-24

## 2020-01-25 LAB
ALBUMIN UR-MCNC: ABNORMAL MG/DL
APPEARANCE UR: ABNORMAL
BACTERIA #/AREA URNS HPF: ABNORMAL HPF
BILIRUB UR QL STRIP: NEGATIVE
COLOR UR AUTO: ABNORMAL
GLUCOSE UR STRIP-MCNC: NEGATIVE MG/DL
HGB UR QL STRIP: ABNORMAL
KETONES UR STRIP-MCNC: NEGATIVE MG/DL
LEUKOCYTE ESTERASE UR QL STRIP: ABNORMAL
NITRATE UR QL: NEGATIVE
PH UR STRIP: 5.5 [PH] (ref 4.5–8)
RBC #/AREA URNS AUTO: ABNORMAL HPF
SP GR UR STRIP: 1.02 (ref 1–1.03)
SQUAMOUS #/AREA URNS AUTO: >100 LPF
UROBILINOGEN UR STRIP-ACNC: ABNORMAL
WBC #/AREA URNS AUTO: >100 HPF
WBC CLUMPS #/AREA URNS HPF: PRESENT /[HPF]

## 2020-01-27 ENCOUNTER — RECORDS - HEALTHEAST (OUTPATIENT)
Dept: LAB | Facility: CLINIC | Age: 65
End: 2020-01-27

## 2020-01-27 ENCOUNTER — OFFICE VISIT - HEALTHEAST (OUTPATIENT)
Dept: SURGERY | Facility: CLINIC | Age: 65
End: 2020-01-27

## 2020-01-27 DIAGNOSIS — K61.1 ABSCESS, PERIRECTAL: ICD-10-CM

## 2020-01-27 LAB
BACTERIA SPEC CULT: ABNORMAL
BACTERIA SPEC CULT: ABNORMAL
HGB BLD-MCNC: 11.9 G/DL (ref 12–16)

## 2020-02-19 ENCOUNTER — RECORDS - HEALTHEAST (OUTPATIENT)
Dept: LAB | Facility: CLINIC | Age: 65
End: 2020-02-19

## 2020-02-19 LAB
ALBUMIN SERPL-MCNC: 2.9 G/DL (ref 3.5–5)
ALP SERPL-CCNC: 127 U/L (ref 45–120)
ALT SERPL W P-5'-P-CCNC: 13 U/L (ref 0–45)
ANION GAP SERPL CALCULATED.3IONS-SCNC: 8 MMOL/L (ref 5–18)
AST SERPL W P-5'-P-CCNC: 18 U/L (ref 0–40)
BASOPHILS # BLD AUTO: 0.1 THOU/UL (ref 0–0.2)
BASOPHILS NFR BLD AUTO: 2 % (ref 0–2)
BILIRUB SERPL-MCNC: 0.3 MG/DL (ref 0–1)
BUN SERPL-MCNC: 53 MG/DL (ref 8–22)
CALCIUM SERPL-MCNC: 9.5 MG/DL (ref 8.5–10.5)
CHLORIDE BLD-SCNC: 106 MMOL/L (ref 98–107)
CO2 SERPL-SCNC: 25 MMOL/L (ref 22–31)
CREAT SERPL-MCNC: 3.53 MG/DL (ref 0.6–1.1)
EOSINOPHIL # BLD AUTO: 0.4 THOU/UL (ref 0–0.4)
EOSINOPHIL NFR BLD AUTO: 6 % (ref 0–6)
ERYTHROCYTE [DISTWIDTH] IN BLOOD BY AUTOMATED COUNT: 15.9 % (ref 11–14.5)
GFR SERPL CREATININE-BSD FRML MDRD: 13 ML/MIN/1.73M2
GLUCOSE BLD-MCNC: 111 MG/DL (ref 70–125)
HCT VFR BLD AUTO: 36.7 % (ref 35–47)
HGB BLD-MCNC: 11.1 G/DL (ref 12–16)
LYMPHOCYTES # BLD AUTO: 1.9 THOU/UL (ref 0.8–4.4)
LYMPHOCYTES NFR BLD AUTO: 31 % (ref 20–40)
MCH RBC QN AUTO: 28.2 PG (ref 27–34)
MCHC RBC AUTO-ENTMCNC: 30.2 G/DL (ref 32–36)
MCV RBC AUTO: 93 FL (ref 80–100)
MONOCYTES # BLD AUTO: 0.7 THOU/UL (ref 0–0.9)
MONOCYTES NFR BLD AUTO: 11 % (ref 2–10)
NEUTROPHILS # BLD AUTO: 3.1 THOU/UL (ref 2–7.7)
NEUTROPHILS NFR BLD AUTO: 51 % (ref 50–70)
PLATELET # BLD AUTO: 316 THOU/UL (ref 140–440)
PMV BLD AUTO: 10.3 FL (ref 8.5–12.5)
POTASSIUM BLD-SCNC: 4.9 MMOL/L (ref 3.5–5)
PROT SERPL-MCNC: 8 G/DL (ref 6–8)
RBC # BLD AUTO: 3.93 MILL/UL (ref 3.8–5.4)
SODIUM SERPL-SCNC: 139 MMOL/L (ref 136–145)
WBC: 6.1 THOU/UL (ref 4–11)

## 2020-02-20 ENCOUNTER — RECORDS - HEALTHEAST (OUTPATIENT)
Dept: LAB | Facility: CLINIC | Age: 65
End: 2020-02-20

## 2020-02-20 LAB
ALBUMIN UR-MCNC: ABNORMAL MG/DL
AMORPH CRY #/AREA URNS HPF: ABNORMAL /[HPF]
APPEARANCE UR: ABNORMAL
BACTERIA #/AREA URNS HPF: ABNORMAL HPF
BILIRUB UR QL STRIP: NEGATIVE
COLOR UR AUTO: YELLOW
GLUCOSE UR STRIP-MCNC: NEGATIVE MG/DL
HGB UR QL STRIP: ABNORMAL
KETONES UR STRIP-MCNC: NEGATIVE MG/DL
LEUKOCYTE ESTERASE UR QL STRIP: ABNORMAL
NITRATE UR QL: NEGATIVE
PH UR STRIP: 5.5 [PH] (ref 4.5–8)
RBC #/AREA URNS AUTO: ABNORMAL HPF
SP GR UR STRIP: 1.03 (ref 1–1.03)
SQUAMOUS #/AREA URNS AUTO: ABNORMAL LPF
UROBILINOGEN UR STRIP-ACNC: ABNORMAL
WBC #/AREA URNS AUTO: >100 HPF
WBC CLUMPS #/AREA URNS HPF: PRESENT /[HPF]
YEAST #/AREA URNS HPF: ABNORMAL HPF

## 2020-02-21 LAB — BACTERIA SPEC CULT: NORMAL

## 2020-02-24 ENCOUNTER — RECORDS - HEALTHEAST (OUTPATIENT)
Dept: LAB | Facility: CLINIC | Age: 65
End: 2020-02-24

## 2020-02-24 LAB
ANION GAP SERPL CALCULATED.3IONS-SCNC: 10 MMOL/L (ref 5–18)
BUN SERPL-MCNC: 32 MG/DL (ref 8–22)
CALCIUM SERPL-MCNC: 9.2 MG/DL (ref 8.5–10.5)
CHLORIDE BLD-SCNC: 104 MMOL/L (ref 98–107)
CO2 SERPL-SCNC: 26 MMOL/L (ref 22–31)
CREAT SERPL-MCNC: 2.68 MG/DL (ref 0.6–1.1)
GFR SERPL CREATININE-BSD FRML MDRD: 18 ML/MIN/1.73M2
GLUCOSE BLD-MCNC: 112 MG/DL (ref 70–125)
POTASSIUM BLD-SCNC: 4 MMOL/L (ref 3.5–5)
SODIUM SERPL-SCNC: 140 MMOL/L (ref 136–145)

## 2020-02-28 ENCOUNTER — RECORDS - HEALTHEAST (OUTPATIENT)
Dept: LAB | Facility: CLINIC | Age: 65
End: 2020-02-28

## 2020-03-02 LAB — HGB BLD-MCNC: 10.6 G/DL (ref 12–16)

## 2020-03-06 ENCOUNTER — RECORDS - HEALTHEAST (OUTPATIENT)
Dept: LAB | Facility: CLINIC | Age: 65
End: 2020-03-06

## 2020-03-09 ENCOUNTER — COMMUNICATION - HEALTHEAST (OUTPATIENT)
Dept: SCHEDULING | Facility: CLINIC | Age: 65
End: 2020-03-09

## 2020-03-09 LAB
ANION GAP SERPL CALCULATED.3IONS-SCNC: 12 MMOL/L (ref 5–18)
BUN SERPL-MCNC: 47 MG/DL (ref 8–22)
CALCIUM SERPL-MCNC: 9.1 MG/DL (ref 8.5–10.5)
CHLORIDE BLD-SCNC: 103 MMOL/L (ref 98–107)
CO2 SERPL-SCNC: 24 MMOL/L (ref 22–31)
CREAT SERPL-MCNC: 3.36 MG/DL (ref 0.6–1.1)
GFR SERPL CREATININE-BSD FRML MDRD: 14 ML/MIN/1.73M2
GLUCOSE BLD-MCNC: 138 MG/DL (ref 70–125)
HGB BLD-MCNC: 9.7 G/DL (ref 12–16)
IRON SATN MFR SERPL: 38 % (ref 20–50)
IRON SERPL-MCNC: 75 UG/DL (ref 42–175)
POTASSIUM BLD-SCNC: 3.9 MMOL/L (ref 3.5–5)
SODIUM SERPL-SCNC: 139 MMOL/L (ref 136–145)
TIBC SERPL-MCNC: 196 UG/DL (ref 313–563)
TRANSFERRIN SERPL-MCNC: 156 MG/DL (ref 212–360)

## 2020-03-18 ENCOUNTER — RECORDS - HEALTHEAST (OUTPATIENT)
Dept: LAB | Facility: CLINIC | Age: 65
End: 2020-03-18

## 2020-03-19 LAB
ANION GAP SERPL CALCULATED.3IONS-SCNC: 12 MMOL/L (ref 5–18)
BUN SERPL-MCNC: 86 MG/DL (ref 8–22)
CALCIUM SERPL-MCNC: 9.2 MG/DL (ref 8.5–10.5)
CHLORIDE BLD-SCNC: 102 MMOL/L (ref 98–107)
CO2 SERPL-SCNC: 23 MMOL/L (ref 22–31)
CREAT SERPL-MCNC: 4.05 MG/DL (ref 0.6–1.1)
GFR SERPL CREATININE-BSD FRML MDRD: 11 ML/MIN/1.73M2
GLUCOSE BLD-MCNC: 141 MG/DL (ref 70–125)
HGB BLD-MCNC: 10.3 G/DL (ref 12–16)
POTASSIUM BLD-SCNC: 4.7 MMOL/L (ref 3.5–5)
SODIUM SERPL-SCNC: 137 MMOL/L (ref 136–145)

## 2020-03-28 ENCOUNTER — RECORDS - HEALTHEAST (OUTPATIENT)
Dept: LAB | Facility: CLINIC | Age: 65
End: 2020-03-28

## 2020-03-28 LAB
ALBUMIN UR-MCNC: ABNORMAL MG/DL
APPEARANCE UR: ABNORMAL
BACTERIA #/AREA URNS HPF: ABNORMAL HPF
BILIRUB UR QL STRIP: NEGATIVE
COLOR UR AUTO: YELLOW
GLUCOSE UR STRIP-MCNC: NEGATIVE MG/DL
HGB UR QL STRIP: ABNORMAL
KETONES UR STRIP-MCNC: NEGATIVE MG/DL
LEUKOCYTE ESTERASE UR QL STRIP: ABNORMAL
NITRATE UR QL: NEGATIVE
PH UR STRIP: 5.5 [PH] (ref 4.5–8)
RBC #/AREA URNS AUTO: ABNORMAL HPF
SP GR UR STRIP: 1.02 (ref 1–1.03)
SQUAMOUS #/AREA URNS AUTO: ABNORMAL LPF
UROBILINOGEN UR STRIP-ACNC: ABNORMAL
WBC #/AREA URNS AUTO: >100 HPF
WBC CLUMPS #/AREA URNS HPF: PRESENT /[HPF]

## 2020-03-29 LAB — BACTERIA SPEC CULT: NORMAL

## 2020-04-03 ENCOUNTER — RECORDS - HEALTHEAST (OUTPATIENT)
Dept: LAB | Facility: CLINIC | Age: 65
End: 2020-04-03

## 2020-04-06 LAB
ALBUMIN SERPL-MCNC: 3.1 G/DL (ref 3.5–5)
ALP SERPL-CCNC: 106 U/L (ref 45–120)
ALT SERPL W P-5'-P-CCNC: 22 U/L (ref 0–45)
ANION GAP SERPL CALCULATED.3IONS-SCNC: 14 MMOL/L (ref 5–18)
AST SERPL W P-5'-P-CCNC: 22 U/L (ref 0–40)
BILIRUB SERPL-MCNC: 0.4 MG/DL (ref 0–1)
BUN SERPL-MCNC: 39 MG/DL (ref 8–22)
CALCIUM SERPL-MCNC: 8.9 MG/DL (ref 8.5–10.5)
CHLORIDE BLD-SCNC: 104 MMOL/L (ref 98–107)
CO2 SERPL-SCNC: 22 MMOL/L (ref 22–31)
CREAT SERPL-MCNC: 3.67 MG/DL (ref 0.6–1.1)
GFR SERPL CREATININE-BSD FRML MDRD: 12 ML/MIN/1.73M2
GLUCOSE BLD-MCNC: 151 MG/DL (ref 70–125)
HGB BLD-MCNC: 11.4 G/DL (ref 12–16)
POTASSIUM BLD-SCNC: 4 MMOL/L (ref 3.5–5)
PROT SERPL-MCNC: 7.5 G/DL (ref 6–8)
SODIUM SERPL-SCNC: 140 MMOL/L (ref 136–145)

## 2020-04-13 ENCOUNTER — RECORDS - HEALTHEAST (OUTPATIENT)
Dept: LAB | Facility: CLINIC | Age: 65
End: 2020-04-13

## 2020-04-13 LAB — HBA1C MFR BLD: 6.7 %

## 2020-04-23 ENCOUNTER — RECORDS - HEALTHEAST (OUTPATIENT)
Dept: LAB | Facility: CLINIC | Age: 65
End: 2020-04-23

## 2020-04-25 LAB — BACTERIA SPEC CULT: ABNORMAL

## 2020-05-20 ENCOUNTER — RECORDS - HEALTHEAST (OUTPATIENT)
Dept: LAB | Facility: CLINIC | Age: 65
End: 2020-05-20

## 2020-05-20 LAB
ALBUMIN SERPL-MCNC: 2.9 G/DL (ref 3.5–5)
ALP SERPL-CCNC: 96 U/L (ref 45–120)
ALT SERPL W P-5'-P-CCNC: 36 U/L (ref 0–45)
ANION GAP SERPL CALCULATED.3IONS-SCNC: 11 MMOL/L (ref 5–18)
AST SERPL W P-5'-P-CCNC: 39 U/L (ref 0–40)
BILIRUB SERPL-MCNC: 0.4 MG/DL (ref 0–1)
BUN SERPL-MCNC: 34 MG/DL (ref 8–22)
CALCIUM SERPL-MCNC: 8.7 MG/DL (ref 8.5–10.5)
CHLORIDE BLD-SCNC: 102 MMOL/L (ref 98–107)
CO2 SERPL-SCNC: 25 MMOL/L (ref 22–31)
CREAT SERPL-MCNC: 4.7 MG/DL (ref 0.6–1.1)
GFR SERPL CREATININE-BSD FRML MDRD: 9 ML/MIN/1.73M2
GLUCOSE BLD-MCNC: 81 MG/DL (ref 70–125)
HGB BLD-MCNC: 11.8 G/DL (ref 12–16)
MAGNESIUM SERPL-MCNC: 1.7 MG/DL (ref 1.8–2.6)
POTASSIUM BLD-SCNC: 4.3 MMOL/L (ref 3.5–5)
PROT SERPL-MCNC: 7.2 G/DL (ref 6–8)
SODIUM SERPL-SCNC: 138 MMOL/L (ref 136–145)
TSH SERPL DL<=0.005 MIU/L-ACNC: 0.88 UIU/ML (ref 0.3–5)

## 2020-06-01 ENCOUNTER — RECORDS - HEALTHEAST (OUTPATIENT)
Dept: LAB | Facility: CLINIC | Age: 65
End: 2020-06-01

## 2020-06-01 LAB — MAGNESIUM SERPL-MCNC: 1.9 MG/DL (ref 1.8–2.6)

## 2020-06-20 ENCOUNTER — RECORDS - HEALTHEAST (OUTPATIENT)
Dept: LAB | Facility: CLINIC | Age: 65
End: 2020-06-20

## 2020-06-20 LAB
ALBUMIN UR-MCNC: ABNORMAL MG/DL
APPEARANCE UR: ABNORMAL
BACTERIA #/AREA URNS HPF: ABNORMAL HPF
BILIRUB UR QL STRIP: NEGATIVE
COLOR UR AUTO: YELLOW
GLUCOSE UR STRIP-MCNC: NEGATIVE MG/DL
HGB UR QL STRIP: ABNORMAL
KETONES UR STRIP-MCNC: NEGATIVE MG/DL
LEUKOCYTE ESTERASE UR QL STRIP: ABNORMAL
NITRATE UR QL: NEGATIVE
PH UR STRIP: 5.5 [PH] (ref 4.5–8)
RBC #/AREA URNS AUTO: ABNORMAL HPF
SP GR UR STRIP: 1.03 (ref 1–1.03)
SQUAMOUS #/AREA URNS AUTO: >100 LPF
UROBILINOGEN UR STRIP-ACNC: ABNORMAL
WBC #/AREA URNS AUTO: >100 HPF
WBC CLUMPS #/AREA URNS HPF: PRESENT /[HPF]

## 2020-06-21 LAB — BACTERIA SPEC CULT: NO GROWTH

## 2020-07-03 ENCOUNTER — RECORDS - HEALTHEAST (OUTPATIENT)
Dept: LAB | Facility: CLINIC | Age: 65
End: 2020-07-03

## 2020-07-06 LAB
ALBUMIN SERPL-MCNC: 3.1 G/DL (ref 3.5–5)
ALP SERPL-CCNC: 166 U/L (ref 45–120)
ALT SERPL W P-5'-P-CCNC: 82 U/L (ref 0–45)
ANION GAP SERPL CALCULATED.3IONS-SCNC: 14 MMOL/L (ref 5–18)
AST SERPL W P-5'-P-CCNC: 72 U/L (ref 0–40)
BILIRUB SERPL-MCNC: 0.4 MG/DL (ref 0–1)
BUN SERPL-MCNC: 41 MG/DL (ref 8–22)
CALCIUM SERPL-MCNC: 9.2 MG/DL (ref 8.5–10.5)
CHLORIDE BLD-SCNC: 96 MMOL/L (ref 98–107)
CO2 SERPL-SCNC: 23 MMOL/L (ref 22–31)
CREAT SERPL-MCNC: 6.49 MG/DL (ref 0.6–1.1)
GFR SERPL CREATININE-BSD FRML MDRD: 6 ML/MIN/1.73M2
GLUCOSE BLD-MCNC: 91 MG/DL (ref 70–125)
HBA1C MFR BLD: 6.2 %
HGB BLD-MCNC: 12.2 G/DL (ref 12–16)
MAGNESIUM SERPL-MCNC: 1.8 MG/DL (ref 1.8–2.6)
POTASSIUM BLD-SCNC: 4.2 MMOL/L (ref 3.5–5)
PROT SERPL-MCNC: 7.8 G/DL (ref 6–8)
SODIUM SERPL-SCNC: 133 MMOL/L (ref 136–145)

## 2020-07-09 ENCOUNTER — AMBULATORY - HEALTHEAST (OUTPATIENT)
Dept: CARE COORDINATION | Facility: CLINIC | Age: 65
End: 2020-07-09

## 2020-07-09 DIAGNOSIS — E11.22 CKD STAGE 4 DUE TO TYPE 2 DIABETES MELLITUS (H): ICD-10-CM

## 2020-07-09 DIAGNOSIS — N18.4 CKD STAGE 4 DUE TO TYPE 2 DIABETES MELLITUS (H): ICD-10-CM

## 2020-07-09 DIAGNOSIS — I50.32 CHRONIC HEART FAILURE WITH PRESERVED EJECTION FRACTION (H): ICD-10-CM

## 2020-07-09 DIAGNOSIS — J96.22 ACUTE ON CHRONIC RESPIRATORY FAILURE WITH HYPOXIA AND HYPERCAPNIA (H): ICD-10-CM

## 2020-07-09 DIAGNOSIS — J96.21 ACUTE ON CHRONIC RESPIRATORY FAILURE WITH HYPOXIA AND HYPERCAPNIA (H): ICD-10-CM

## 2020-07-12 ENCOUNTER — RECORDS - HEALTHEAST (OUTPATIENT)
Dept: LAB | Facility: CLINIC | Age: 65
End: 2020-07-12

## 2020-07-12 LAB
ALBUMIN UR-MCNC: ABNORMAL MG/DL
APPEARANCE UR: ABNORMAL
BACTERIA #/AREA URNS HPF: ABNORMAL HPF
BILIRUB UR QL STRIP: NEGATIVE
COLOR UR AUTO: YELLOW
GLUCOSE UR STRIP-MCNC: NEGATIVE MG/DL
HGB UR QL STRIP: ABNORMAL
KETONES UR STRIP-MCNC: NEGATIVE MG/DL
LEUKOCYTE ESTERASE UR QL STRIP: ABNORMAL
NITRATE UR QL: NEGATIVE
PH UR STRIP: 6 [PH] (ref 4.5–8)
RBC #/AREA URNS AUTO: ABNORMAL HPF
SP GR UR STRIP: 1.02 (ref 1–1.03)
SQUAMOUS #/AREA URNS AUTO: ABNORMAL LPF
TRANS CELLS #/AREA URNS HPF: ABNORMAL LPF
UROBILINOGEN UR STRIP-ACNC: ABNORMAL
WBC #/AREA URNS AUTO: >100 HPF
WBC CLUMPS #/AREA URNS HPF: PRESENT /[HPF]

## 2020-07-13 LAB — BACTERIA SPEC CULT: NORMAL

## 2020-07-14 ENCOUNTER — COMMUNICATION - HEALTHEAST (OUTPATIENT)
Dept: CARE COORDINATION | Facility: CLINIC | Age: 65
End: 2020-07-14

## 2020-07-16 ENCOUNTER — RECORDS - HEALTHEAST (OUTPATIENT)
Dept: LAB | Facility: CLINIC | Age: 65
End: 2020-07-16

## 2020-07-17 LAB
ALBUMIN SERPL-MCNC: 2.8 G/DL (ref 3.5–5)
ALP SERPL-CCNC: 176 U/L (ref 45–120)
ALT SERPL W P-5'-P-CCNC: 51 U/L (ref 0–45)
AST SERPL W P-5'-P-CCNC: 73 U/L (ref 0–40)
BILIRUB DIRECT SERPL-MCNC: 0.1 MG/DL
BILIRUB SERPL-MCNC: 0.3 MG/DL (ref 0–1)
PROT SERPL-MCNC: 6.7 G/DL (ref 6–8)

## 2020-07-31 ENCOUNTER — RECORDS - HEALTHEAST (OUTPATIENT)
Dept: LAB | Facility: CLINIC | Age: 65
End: 2020-07-31

## 2020-08-03 LAB
ALBUMIN SERPL-MCNC: 3.4 G/DL (ref 3.5–5)
ALP SERPL-CCNC: 213 U/L (ref 45–120)
ALT SERPL W P-5'-P-CCNC: 98 U/L (ref 0–45)
ANION GAP SERPL CALCULATED.3IONS-SCNC: 13 MMOL/L (ref 5–18)
AST SERPL W P-5'-P-CCNC: 98 U/L (ref 0–40)
BILIRUB SERPL-MCNC: 0.7 MG/DL (ref 0–1)
BUN SERPL-MCNC: 27 MG/DL (ref 8–22)
CALCIUM SERPL-MCNC: 9.4 MG/DL (ref 8.5–10.5)
CHLORIDE BLD-SCNC: 96 MMOL/L (ref 98–107)
CO2 SERPL-SCNC: 26 MMOL/L (ref 22–31)
CREAT SERPL-MCNC: 5.08 MG/DL (ref 0.6–1.1)
GFR SERPL CREATININE-BSD FRML MDRD: 9 ML/MIN/1.73M2
GLUCOSE BLD-MCNC: 79 MG/DL (ref 70–125)
HGB BLD-MCNC: 11.9 G/DL (ref 12–16)
MAGNESIUM SERPL-MCNC: 1.9 MG/DL (ref 1.8–2.6)
POTASSIUM BLD-SCNC: 3.9 MMOL/L (ref 3.5–5)
PROT SERPL-MCNC: 8.2 G/DL (ref 6–8)
SODIUM SERPL-SCNC: 135 MMOL/L (ref 136–145)

## 2020-08-14 ENCOUNTER — RECORDS - HEALTHEAST (OUTPATIENT)
Dept: ADMINISTRATIVE | Facility: OTHER | Age: 65
End: 2020-08-14

## 2020-08-19 ENCOUNTER — AMBULATORY - HEALTHEAST (OUTPATIENT)
Dept: FAMILY MEDICINE | Facility: CLINIC | Age: 65
End: 2020-08-19

## 2020-08-19 ENCOUNTER — COMMUNICATION - HEALTHEAST (OUTPATIENT)
Dept: SCHEDULING | Facility: CLINIC | Age: 65
End: 2020-08-19

## 2020-08-19 DIAGNOSIS — N39.0 URINARY TRACT INFECTION WITHOUT HEMATURIA, SITE UNSPECIFIED: ICD-10-CM

## 2020-08-20 ENCOUNTER — COMMUNICATION - HEALTHEAST (OUTPATIENT)
Dept: FAMILY MEDICINE | Facility: CLINIC | Age: 65
End: 2020-08-20

## 2020-08-25 ENCOUNTER — COMMUNICATION - HEALTHEAST (OUTPATIENT)
Dept: FAMILY MEDICINE | Facility: CLINIC | Age: 65
End: 2020-08-25

## 2020-08-26 ENCOUNTER — HOSPITAL ENCOUNTER (OUTPATIENT)
Dept: CT IMAGING | Facility: CLINIC | Age: 65
Discharge: HOME | End: 2020-08-26

## 2020-08-26 DIAGNOSIS — R93.5 ABNORMAL ABDOMINAL ULTRASOUND: ICD-10-CM

## 2020-08-30 ENCOUNTER — RECORDS - HEALTHEAST (OUTPATIENT)
Dept: LAB | Facility: CLINIC | Age: 65
End: 2020-08-30

## 2020-08-31 LAB
ALBUMIN SERPL-MCNC: 2.7 G/DL (ref 3.5–5)
ALP SERPL-CCNC: 231 U/L (ref 45–120)
ALT SERPL W P-5'-P-CCNC: 62 U/L (ref 0–45)
ANION GAP SERPL CALCULATED.3IONS-SCNC: 10 MMOL/L (ref 5–18)
AST SERPL W P-5'-P-CCNC: 68 U/L (ref 0–40)
BILIRUB SERPL-MCNC: 0.5 MG/DL (ref 0–1)
BNP SERPL-MCNC: 1866 PG/ML (ref 0–109)
BUN SERPL-MCNC: 29 MG/DL (ref 8–22)
CALCIUM SERPL-MCNC: 8.4 MG/DL (ref 8.5–10.5)
CHLORIDE BLD-SCNC: 99 MMOL/L (ref 98–107)
CO2 SERPL-SCNC: 26 MMOL/L (ref 22–31)
CREAT SERPL-MCNC: 4.29 MG/DL (ref 0.6–1.1)
GFR SERPL CREATININE-BSD FRML MDRD: 10 ML/MIN/1.73M2
GLUCOSE BLD-MCNC: 143 MG/DL (ref 70–125)
POTASSIUM BLD-SCNC: 3.7 MMOL/L (ref 3.5–5)
PROT SERPL-MCNC: 7.4 G/DL (ref 6–8)
SODIUM SERPL-SCNC: 135 MMOL/L (ref 136–145)

## 2020-09-16 ENCOUNTER — RECORDS - HEALTHEAST (OUTPATIENT)
Dept: ADMINISTRATIVE | Facility: OTHER | Age: 65
End: 2020-09-16

## 2020-09-17 ENCOUNTER — COMMUNICATION - HEALTHEAST (OUTPATIENT)
Dept: FAMILY MEDICINE | Facility: CLINIC | Age: 65
End: 2020-09-17

## 2020-09-17 ENCOUNTER — RECORDS - HEALTHEAST (OUTPATIENT)
Dept: LAB | Facility: CLINIC | Age: 65
End: 2020-09-17

## 2020-09-18 ENCOUNTER — COMMUNICATION - HEALTHEAST (OUTPATIENT)
Dept: SCHEDULING | Facility: CLINIC | Age: 65
End: 2020-09-18

## 2020-09-18 LAB
ALBUMIN SERPL-MCNC: 2.6 G/DL (ref 3.5–5)
ALP SERPL-CCNC: 180 U/L (ref 45–120)
ALT SERPL W P-5'-P-CCNC: 29 U/L (ref 0–45)
ANION GAP SERPL CALCULATED.3IONS-SCNC: 9 MMOL/L (ref 5–18)
AST SERPL W P-5'-P-CCNC: 35 U/L (ref 0–40)
BILIRUB SERPL-MCNC: 0.4 MG/DL (ref 0–1)
BNP SERPL-MCNC: 1177 PG/ML (ref 0–109)
BUN SERPL-MCNC: 19 MG/DL (ref 8–22)
CALCIUM SERPL-MCNC: 8.4 MG/DL (ref 8.5–10.5)
CHLORIDE BLD-SCNC: 101 MMOL/L (ref 98–107)
CO2 SERPL-SCNC: 26 MMOL/L (ref 22–31)
CREAT SERPL-MCNC: 3.19 MG/DL (ref 0.6–1.1)
GFR SERPL CREATININE-BSD FRML MDRD: 15 ML/MIN/1.73M2
GLUCOSE BLD-MCNC: 90 MG/DL (ref 70–125)
HGB BLD-MCNC: 11.1 G/DL (ref 12–16)
MAGNESIUM SERPL-MCNC: 1.7 MG/DL (ref 1.8–2.6)
POTASSIUM BLD-SCNC: 3.1 MMOL/L (ref 3.5–5)
PROT SERPL-MCNC: 7 G/DL (ref 6–8)
SODIUM SERPL-SCNC: 136 MMOL/L (ref 136–145)

## 2020-09-21 ENCOUNTER — RECORDS - HEALTHEAST (OUTPATIENT)
Dept: LAB | Facility: CLINIC | Age: 65
End: 2020-09-21

## 2020-09-21 ENCOUNTER — COMMUNICATION - HEALTHEAST (OUTPATIENT)
Dept: EMERGENCY MEDICINE | Facility: HOSPITAL | Age: 65
End: 2020-09-21

## 2020-09-21 ENCOUNTER — RECORDS - HEALTHEAST (OUTPATIENT)
Dept: ADMINISTRATIVE | Facility: OTHER | Age: 65
End: 2020-09-21

## 2020-09-21 DIAGNOSIS — R31.9 URINARY TRACT INFECTION WITH HEMATURIA, SITE UNSPECIFIED: ICD-10-CM

## 2020-09-21 DIAGNOSIS — N39.0 URINARY TRACT INFECTION WITH HEMATURIA, SITE UNSPECIFIED: ICD-10-CM

## 2020-09-23 ENCOUNTER — RECORDS - HEALTHEAST (OUTPATIENT)
Dept: ADMINISTRATIVE | Facility: OTHER | Age: 65
End: 2020-09-23

## 2020-09-23 ENCOUNTER — OFFICE VISIT - HEALTHEAST (OUTPATIENT)
Dept: FAMILY MEDICINE | Facility: CLINIC | Age: 65
End: 2020-09-23

## 2020-09-23 DIAGNOSIS — R11.0 NAUSEA: ICD-10-CM

## 2020-09-23 DIAGNOSIS — E11.42 DIABETIC POLYNEUROPATHY ASSOCIATED WITH TYPE 2 DIABETES MELLITUS (H): ICD-10-CM

## 2020-09-23 DIAGNOSIS — Z12.31 VISIT FOR SCREENING MAMMOGRAM: ICD-10-CM

## 2020-09-23 DIAGNOSIS — E43 UNSPECIFIED SEVERE PROTEIN-CALORIE MALNUTRITION (H): ICD-10-CM

## 2020-09-23 DIAGNOSIS — E66.2 MORBID (SEVERE) OBESITY WITH ALVEOLAR HYPOVENTILATION (H): ICD-10-CM

## 2020-09-23 DIAGNOSIS — K21.9 GASTROESOPHAGEAL REFLUX DISEASE WITHOUT ESOPHAGITIS: ICD-10-CM

## 2020-09-23 DIAGNOSIS — J44.9 CHRONIC OBSTRUCTIVE PULMONARY DISEASE, UNSPECIFIED COPD TYPE (H): ICD-10-CM

## 2020-09-23 DIAGNOSIS — I50.32 CHRONIC DIASTOLIC (CONGESTIVE) HEART FAILURE (H): ICD-10-CM

## 2020-09-24 ENCOUNTER — COMMUNICATION - HEALTHEAST (OUTPATIENT)
Dept: FAMILY MEDICINE | Facility: CLINIC | Age: 65
End: 2020-09-24

## 2020-09-24 ASSESSMENT — ANXIETY QUESTIONNAIRES
GAD7 TOTAL SCORE: 18
7. FEELING AFRAID AS IF SOMETHING AWFUL MIGHT HAPPEN: NEARLY EVERY DAY
4. TROUBLE RELAXING: NEARLY EVERY DAY
6. BECOMING EASILY ANNOYED OR IRRITABLE: MORE THAN HALF THE DAYS
1. FEELING NERVOUS, ANXIOUS, OR ON EDGE: MORE THAN HALF THE DAYS
5. BEING SO RESTLESS THAT IT IS HARD TO SIT STILL: MORE THAN HALF THE DAYS
2. NOT BEING ABLE TO STOP OR CONTROL WORRYING: NEARLY EVERY DAY
3. WORRYING TOO MUCH ABOUT DIFFERENT THINGS: NEARLY EVERY DAY
IF YOU CHECKED OFF ANY PROBLEMS ON THIS QUESTIONNAIRE, HOW DIFFICULT HAVE THESE PROBLEMS MADE IT FOR YOU TO DO YOUR WORK, TAKE CARE OF THINGS AT HOME, OR GET ALONG WITH OTHER PEOPLE: VERY DIFFICULT

## 2020-09-24 ASSESSMENT — PATIENT HEALTH QUESTIONNAIRE - PHQ9: SUM OF ALL RESPONSES TO PHQ QUESTIONS 1-9: 14

## 2020-09-25 ENCOUNTER — RECORDS - HEALTHEAST (OUTPATIENT)
Dept: LAB | Facility: CLINIC | Age: 65
End: 2020-09-25

## 2020-09-25 ENCOUNTER — HOSPITAL ENCOUNTER (OUTPATIENT)
Dept: CARDIOLOGY | Facility: CLINIC | Age: 65
Discharge: HOME | End: 2020-09-25

## 2020-09-25 ENCOUNTER — OFFICE VISIT - HEALTHEAST (OUTPATIENT)
Dept: CARDIOLOGY | Facility: CLINIC | Age: 65
End: 2020-09-25

## 2020-09-25 DIAGNOSIS — I50.32 CHRONIC HEART FAILURE WITH PRESERVED EJECTION FRACTION (H): ICD-10-CM

## 2020-09-25 DIAGNOSIS — I48.92 ATRIAL FLUTTER, UNSPECIFIED TYPE (H): ICD-10-CM

## 2020-09-25 DIAGNOSIS — I48.0 PAROXYSMAL ATRIAL FIBRILLATION (H): ICD-10-CM

## 2020-09-25 DIAGNOSIS — I50.9 HEART FAILURE (H): ICD-10-CM

## 2020-09-25 DIAGNOSIS — N18.6 END STAGE RENAL DISEASE (H): ICD-10-CM

## 2020-09-25 DIAGNOSIS — I10 ESSENTIAL HYPERTENSION, BENIGN: ICD-10-CM

## 2020-09-25 LAB
AORTIC ROOT: 2.6 CM
AORTIC VALVE MEAN VELOCITY: 99.5 CM/S
ASCENDING AORTA: 2.8 CM
AV DIMENSIONLESS INDEX VTI: 0.9
AV MEAN GRADIENT: 5 MMHG
AV PEAK GRADIENT: 9.9 MMHG
AV VALVE AREA: 3.6 CM2
AV VELOCITY RATIO: 0.7
BSA FOR ECHO PROCEDURE: 2.02 M2
CV BLOOD PRESSURE: ABNORMAL MMHG
CV ECHO HEIGHT: 64 IN
CV ECHO WEIGHT: 200 LBS
DOP CALC AO PEAK VEL: 157 CM/S
DOP CALC AO VTI: 26.5 CM
DOP CALC LVOT AREA: 4.15 CM2
DOP CALC LVOT DIAMETER: 2.3 CM
DOP CALC LVOT PEAK VEL: 108 CM/S
DOP CALC LVOT STROKE VOLUME: 94.7 CM3
DOP CALC MV VTI: 29.2 CM
DOP CALCLVOT PEAK VEL VTI: 22.8 CM
ECHO EJECTION FRACTION ESTIMATED: 75 %
EJECTION FRACTION: 72 % (ref 55–75)
FRACTIONAL SHORTENING: 32.6 % (ref 28–44)
INTERVENTRICULAR SEPTUM IN END DIASTOLE: 1.4 CM (ref 0.6–0.9)
IVS/PW RATIO: 1
LA AREA 1: 20.1 CM2
LA AREA 2: 25.1 CM2
LEFT ATRIUM LENGTH: 5.92 CM
LEFT ATRIUM SIZE: 3.6 CM
LEFT ATRIUM TO AORTIC ROOT RATIO: 1.38 NO UNITS
LEFT ATRIUM VOLUME INDEX: 35.9 ML/M2
LEFT ATRIUM VOLUME: 72.4 ML
LEFT VENTRICLE CARDIAC INDEX: 3.2 L/MIN/M2
LEFT VENTRICLE CARDIAC OUTPUT: 6.4 L/MIN
LEFT VENTRICLE DIASTOLIC VOLUME INDEX: 17.8 CM3/M2 (ref 29–61)
LEFT VENTRICLE DIASTOLIC VOLUME: 36 CM3 (ref 46–106)
LEFT VENTRICLE HEART RATE: 68 BPM
LEFT VENTRICLE MASS INDEX: 115 G/M2
LEFT VENTRICLE SYSTOLIC VOLUME INDEX: 5 CM3/M2 (ref 8–24)
LEFT VENTRICLE SYSTOLIC VOLUME: 10 CM3 (ref 14–42)
LEFT VENTRICULAR INTERNAL DIMENSION IN DIASTOLE: 4.3 CM (ref 3.8–5.2)
LEFT VENTRICULAR INTERNAL DIMENSION IN SYSTOLE: 2.9 CM (ref 2.2–3.5)
LEFT VENTRICULAR MASS: 232.2 G
LEFT VENTRICULAR OUTFLOW TRACT MEAN GRADIENT: 3 MMHG
LEFT VENTRICULAR OUTFLOW TRACT MEAN VELOCITY: 75.9 CM/S
LEFT VENTRICULAR OUTFLOW TRACT PEAK GRADIENT: 5 MMHG
LEFT VENTRICULAR POSTERIOR WALL IN END DIASTOLE: 1.4 CM (ref 0.6–0.9)
LV STROKE VOLUME INDEX: 46.9 ML/M2
MITRAL VALVE E/A RATIO: 0.7
MITRAL VALVE MEAN INFLOW VELOCITY: 68.8 CM/S
MITRAL VALVE PEAK VELOCITY: 127 CM/S
MV AREA VTI: 3.24 CM2
MV AVERAGE E/E' RATIO: 13.1 CM/S
MV DECELERATION TIME: 243 MS
MV E'TISSUE VEL-LAT: 6.82 CM/S
MV E'TISSUE VEL-MED: 4.97 CM/S
MV LATERAL E/E' RATIO: 11.3
MV MEAN GRADIENT: 2 MMHG
MV MEDIAL E/E' RATIO: 15.5
MV PEAK A VELOCITY: 105 CM/S
MV PEAK E VELOCITY: 77.1 CM/S
MV PEAK GRADIENT: 6.5 MMHG
MV VALVE AREA BY CONTINUITY EQUATION: 3.2 CM2
NUC REST DIASTOLIC VOLUME INDEX: 3200 LBS
NUC REST SYSTOLIC VOLUME INDEX: 64 IN
POTASSIUM BLD-SCNC: 3.1 MMOL/L (ref 3.5–5)
POTASSIUM BLD-SCNC: 3.3 MMOL/L (ref 3.5–5)
TRICUSPID REGURGITATION PEAK PRESSURE GRADIENT: 67.6 MMHG
TRICUSPID VALVE ANULAR PLANE SYSTOLIC EXCURSION: 1.3 CM
TRICUSPID VALVE PEAK REGURGITANT VELOCITY: 411 CM/S

## 2020-09-25 ASSESSMENT — MIFFLIN-ST. JEOR: SCORE: 1427.19

## 2020-10-01 ENCOUNTER — RECORDS - HEALTHEAST (OUTPATIENT)
Dept: LAB | Facility: CLINIC | Age: 65
End: 2020-10-01

## 2020-10-02 LAB
ALBUMIN SERPL-MCNC: 2.5 G/DL (ref 3.5–5)
ALP SERPL-CCNC: 166 U/L (ref 45–120)
ALT SERPL W P-5'-P-CCNC: 28 U/L (ref 0–45)
ANION GAP SERPL CALCULATED.3IONS-SCNC: 8 MMOL/L (ref 5–18)
AST SERPL W P-5'-P-CCNC: 40 U/L (ref 0–40)
BILIRUB SERPL-MCNC: 0.4 MG/DL (ref 0–1)
BNP SERPL-MCNC: 824 PG/ML (ref 0–109)
BUN SERPL-MCNC: 18 MG/DL (ref 8–22)
CALCIUM SERPL-MCNC: 8.9 MG/DL (ref 8.5–10.5)
CHLORIDE BLD-SCNC: 100 MMOL/L (ref 98–107)
CO2 SERPL-SCNC: 26 MMOL/L (ref 22–31)
CREAT SERPL-MCNC: 3.77 MG/DL (ref 0.6–1.1)
GFR SERPL CREATININE-BSD FRML MDRD: 12 ML/MIN/1.73M2
GLUCOSE BLD-MCNC: 90 MG/DL (ref 70–125)
HBA1C MFR BLD: 5.6 %
POTASSIUM BLD-SCNC: 4.9 MMOL/L (ref 3.5–5)
PROT SERPL-MCNC: 6.9 G/DL (ref 6–8)
SODIUM SERPL-SCNC: 134 MMOL/L (ref 136–145)

## 2021-01-01 ENCOUNTER — RECORDS - HEALTHEAST (OUTPATIENT)
Dept: ADMINISTRATIVE | Facility: OTHER | Age: 66
End: 2021-01-01

## 2021-01-01 ENCOUNTER — ANESTHESIA - HEALTHEAST (OUTPATIENT)
Dept: SURGERY | Facility: CLINIC | Age: 66
End: 2021-01-01

## 2021-01-01 ENCOUNTER — COMMUNICATION - HEALTHEAST (OUTPATIENT)
Dept: SCHEDULING | Facility: CLINIC | Age: 66
End: 2021-01-01

## 2021-01-01 ENCOUNTER — RECORDS - HEALTHEAST (OUTPATIENT)
Dept: ADMINISTRATIVE | Facility: CLINIC | Age: 66
End: 2021-01-01

## 2021-01-01 ENCOUNTER — TELEPHONE (OUTPATIENT)
Dept: CARE COORDINATION | Facility: CLINIC | Age: 66
End: 2021-01-01

## 2021-01-01 ENCOUNTER — HOSPITAL ENCOUNTER (EMERGENCY)
Facility: CLINIC | Age: 66
Discharge: HOME | End: 2021-04-08
Attending: EMERGENCY MEDICINE | Admitting: EMERGENCY MEDICINE
Payer: MEDICARE

## 2021-01-01 ENCOUNTER — AMBULATORY - HEALTHEAST (OUTPATIENT)
Dept: FAMILY MEDICINE | Facility: CLINIC | Age: 66
End: 2021-01-01

## 2021-01-01 ENCOUNTER — RECORDS - HEALTHEAST (OUTPATIENT)
Dept: LAB | Facility: CLINIC | Age: 66
End: 2021-01-01

## 2021-01-01 ENCOUNTER — COMMUNICATION - HEALTHEAST (OUTPATIENT)
Dept: FAMILY MEDICINE | Facility: CLINIC | Age: 66
End: 2021-01-01

## 2021-01-01 ENCOUNTER — APPOINTMENT (OUTPATIENT)
Dept: PHYSICAL THERAPY | Facility: HOSPITAL | Age: 66
DRG: 689 | End: 2021-01-01
Attending: INTERNAL MEDICINE
Payer: MEDICARE

## 2021-01-01 ENCOUNTER — AMBULATORY - HEALTHEAST (OUTPATIENT)
Dept: OTHER | Facility: CLINIC | Age: 66
End: 2021-01-01

## 2021-01-01 ENCOUNTER — TRANSFERRED RECORDS (OUTPATIENT)
Dept: HEALTH INFORMATION MANAGEMENT | Facility: CLINIC | Age: 66
End: 2021-01-01

## 2021-01-01 ENCOUNTER — PATIENT OUTREACH (OUTPATIENT)
Dept: ONCOLOGY | Facility: CLINIC | Age: 66
End: 2021-01-01

## 2021-01-01 ENCOUNTER — APPOINTMENT (OUTPATIENT)
Dept: ULTRASOUND IMAGING | Facility: CLINIC | Age: 66
DRG: 695 | End: 2021-01-01
Attending: PHYSICIAN ASSISTANT
Payer: MEDICARE

## 2021-01-01 ENCOUNTER — TELEPHONE (OUTPATIENT)
Dept: FAMILY MEDICINE | Facility: CLINIC | Age: 66
End: 2021-01-01

## 2021-01-01 ENCOUNTER — APPOINTMENT (OUTPATIENT)
Dept: MRI IMAGING | Facility: CLINIC | Age: 66
DRG: 695 | End: 2021-01-01
Attending: OBSTETRICS & GYNECOLOGY
Payer: MEDICARE

## 2021-01-01 ENCOUNTER — COMMUNICATION - HEALTHEAST (OUTPATIENT)
Dept: NURSING | Facility: CLINIC | Age: 66
End: 2021-01-01

## 2021-01-01 ENCOUNTER — LAB REQUISITION (OUTPATIENT)
Dept: LAB | Facility: CLINIC | Age: 66
End: 2021-01-01
Payer: MEDICARE

## 2021-01-01 ENCOUNTER — APPOINTMENT (OUTPATIENT)
Dept: GENERAL RADIOLOGY | Facility: CLINIC | Age: 66
DRG: 291 | End: 2021-01-01
Attending: EMERGENCY MEDICINE
Payer: MEDICARE

## 2021-01-01 ENCOUNTER — APPOINTMENT (OUTPATIENT)
Dept: ULTRASOUND IMAGING | Facility: CLINIC | Age: 66
DRG: 689 | End: 2021-01-01
Payer: MEDICARE

## 2021-01-01 ENCOUNTER — COMMUNICATION - HEALTHEAST (OUTPATIENT)
Dept: CARE COORDINATION | Facility: CLINIC | Age: 66
End: 2021-01-01

## 2021-01-01 ENCOUNTER — TELEPHONE (OUTPATIENT)
Dept: PULMONOLOGY | Facility: CLINIC | Age: 66
End: 2021-01-01

## 2021-01-01 ENCOUNTER — SURGERY - HEALTHEAST (OUTPATIENT)
Dept: SURGERY | Facility: CLINIC | Age: 66
End: 2021-01-01

## 2021-01-01 ENCOUNTER — TELEPHONE (OUTPATIENT)
Dept: NURSING | Facility: CLINIC | Age: 66
End: 2021-01-01

## 2021-01-01 ENCOUNTER — MEDICAL CORRESPONDENCE (OUTPATIENT)
Dept: HEALTH INFORMATION MANAGEMENT | Facility: CLINIC | Age: 66
End: 2021-01-01

## 2021-01-01 ENCOUNTER — PRE VISIT (OUTPATIENT)
Dept: PULMONOLOGY | Facility: CLINIC | Age: 66
End: 2021-01-01

## 2021-01-01 ENCOUNTER — HOSPITAL ENCOUNTER (EMERGENCY)
Facility: CLINIC | Age: 66
Discharge: HOME | End: 2021-06-01
Attending: EMERGENCY MEDICINE | Admitting: EMERGENCY MEDICINE
Payer: MEDICARE

## 2021-01-01 ENCOUNTER — AMBULATORY - HEALTHEAST (OUTPATIENT)
Dept: CARE COORDINATION | Facility: CLINIC | Age: 66
End: 2021-01-01

## 2021-01-01 ENCOUNTER — HOSPITAL ENCOUNTER (INPATIENT)
Facility: CLINIC | Age: 66
LOS: 4 days | Discharge: NURSING, REHAB OR LONG-TERM CARE | DRG: 695 | End: 2021-03-20
Attending: INTERNAL MEDICINE | Admitting: STUDENT IN AN ORGANIZED HEALTH CARE EDUCATION/TRAINING PROGRAM
Payer: MEDICARE

## 2021-01-01 ENCOUNTER — OFFICE VISIT - HEALTHEAST (OUTPATIENT)
Dept: FAMILY MEDICINE | Facility: CLINIC | Age: 66
End: 2021-01-01

## 2021-01-01 ENCOUNTER — TELEPHONE (OUTPATIENT)
Dept: CARDIOLOGY | Facility: CLINIC | Age: 66
End: 2021-01-01

## 2021-01-01 ENCOUNTER — AMBULATORY - HEALTHEAST (OUTPATIENT)
Dept: VASCULAR SURGERY | Facility: CLINIC | Age: 66
End: 2021-01-01

## 2021-01-01 ENCOUNTER — APPOINTMENT (OUTPATIENT)
Dept: CT IMAGING | Facility: HOSPITAL | Age: 66
DRG: 689 | End: 2021-01-01
Attending: EMERGENCY MEDICINE
Payer: MEDICARE

## 2021-01-01 ENCOUNTER — HEALTH MAINTENANCE LETTER (OUTPATIENT)
Age: 66
End: 2021-01-01

## 2021-01-01 ENCOUNTER — DOCUMENTATION ONLY (OUTPATIENT)
Dept: OTHER | Facility: CLINIC | Age: 66
End: 2021-01-01

## 2021-01-01 ENCOUNTER — PRE VISIT (OUTPATIENT)
Dept: SURGERY | Facility: CLINIC | Age: 66
End: 2021-01-01

## 2021-01-01 ENCOUNTER — HOSPITAL ENCOUNTER (INPATIENT)
Facility: HOSPITAL | Age: 66
LOS: 5 days | Discharge: HOSPICE | DRG: 689 | End: 2021-10-01
Attending: EMERGENCY MEDICINE | Admitting: HOSPITALIST
Payer: MEDICARE

## 2021-01-01 ENCOUNTER — APPOINTMENT (OUTPATIENT)
Dept: RADIOLOGY | Facility: HOSPITAL | Age: 66
DRG: 689 | End: 2021-01-01
Attending: EMERGENCY MEDICINE
Payer: MEDICARE

## 2021-01-01 ENCOUNTER — HOSPITAL ENCOUNTER (INPATIENT)
Facility: CLINIC | Age: 66
LOS: 2 days | Discharge: NURSING, REHAB OR LONG-TERM CARE | DRG: 689 | End: 2021-04-12
Attending: INTERNAL MEDICINE | Admitting: INTERNAL MEDICINE
Payer: MEDICARE

## 2021-01-01 ENCOUNTER — ONCOLOGY VISIT (OUTPATIENT)
Dept: ONCOLOGY | Facility: CLINIC | Age: 66
End: 2021-01-01
Attending: OBSTETRICS & GYNECOLOGY
Payer: MEDICARE

## 2021-01-01 ENCOUNTER — NURSE TRIAGE (OUTPATIENT)
Dept: NURSING | Facility: CLINIC | Age: 66
End: 2021-01-01

## 2021-01-01 ENCOUNTER — PATIENT OUTREACH (OUTPATIENT)
Dept: CARE COORDINATION | Facility: CLINIC | Age: 66
End: 2021-01-01

## 2021-01-01 ENCOUNTER — HOSPITAL ENCOUNTER (EMERGENCY)
Dept: EMERGENCY MEDICINE | Facility: CLINIC | Age: 66
Discharge: HOME | End: 2021-06-05
Attending: EMERGENCY MEDICINE
Payer: MEDICARE

## 2021-01-01 ENCOUNTER — RECORDS - HEALTHEAST (OUTPATIENT)
Dept: FAMILY MEDICINE | Facility: CLINIC | Age: 66
End: 2021-01-01

## 2021-01-01 ENCOUNTER — RECORDS - HEALTHEAST (OUTPATIENT)
Dept: VASCULAR SURGERY | Facility: CLINIC | Age: 66
End: 2021-01-01

## 2021-01-01 ENCOUNTER — PRE VISIT (OUTPATIENT)
Dept: CARDIOLOGY | Facility: CLINIC | Age: 66
End: 2021-01-01

## 2021-01-01 ENCOUNTER — APPOINTMENT (OUTPATIENT)
Dept: LAB | Facility: CLINIC | Age: 66
End: 2021-01-01
Payer: MEDICARE

## 2021-01-01 ENCOUNTER — APPOINTMENT (OUTPATIENT)
Dept: CT IMAGING | Facility: HOSPITAL | Age: 66
DRG: 689 | End: 2021-01-01
Attending: HOSPITALIST
Payer: MEDICARE

## 2021-01-01 ENCOUNTER — HOSPITAL ENCOUNTER (EMERGENCY)
Facility: CLINIC | Age: 66
Discharge: HOME | End: 2021-06-24
Payer: MEDICARE

## 2021-01-01 ENCOUNTER — APPOINTMENT (OUTPATIENT)
Dept: GENERAL RADIOLOGY | Facility: CLINIC | Age: 66
DRG: 689 | End: 2021-01-01
Attending: INTERNAL MEDICINE
Payer: MEDICARE

## 2021-01-01 ENCOUNTER — TRANSCRIBE ORDERS (OUTPATIENT)
Facility: CLINIC | Age: 66
End: 2021-01-01

## 2021-01-01 ENCOUNTER — APPOINTMENT (OUTPATIENT)
Dept: ULTRASOUND IMAGING | Facility: CLINIC | Age: 66
DRG: 689 | End: 2021-01-01
Attending: INTERNAL MEDICINE
Payer: MEDICARE

## 2021-01-01 ENCOUNTER — VIRTUAL VISIT (OUTPATIENT)
Dept: CARDIOLOGY | Facility: CLINIC | Age: 66
End: 2021-01-01
Attending: INTERNAL MEDICINE
Payer: MEDICARE

## 2021-01-01 ENCOUNTER — OFFICE VISIT (OUTPATIENT)
Dept: SURGERY | Facility: CLINIC | Age: 66
End: 2021-01-01
Payer: MEDICARE

## 2021-01-01 ENCOUNTER — ANESTHESIA EVENT (OUTPATIENT)
Dept: SURGERY | Facility: CLINIC | Age: 66
End: 2021-01-01

## 2021-01-01 ENCOUNTER — APPOINTMENT (OUTPATIENT)
Dept: CARDIOLOGY | Facility: CLINIC | Age: 66
DRG: 291 | End: 2021-01-01
Attending: PHYSICIAN ASSISTANT
Payer: MEDICARE

## 2021-01-01 ENCOUNTER — APPOINTMENT (OUTPATIENT)
Dept: GENERAL RADIOLOGY | Facility: CLINIC | Age: 66
End: 2021-01-01
Attending: EMERGENCY MEDICINE
Payer: MEDICARE

## 2021-01-01 ENCOUNTER — HOSPITAL ENCOUNTER (INPATIENT)
Facility: CLINIC | Age: 66
LOS: 4 days | Discharge: NURSING, REHAB OR LONG-TERM CARE | DRG: 291 | End: 2021-05-14
Attending: EMERGENCY MEDICINE | Admitting: INTERNAL MEDICINE
Payer: MEDICARE

## 2021-01-01 ENCOUNTER — DOCUMENTATION ONLY (OUTPATIENT)
Dept: FAMILY MEDICINE | Facility: CLINIC | Age: 66
End: 2021-01-01

## 2021-01-01 ENCOUNTER — APPOINTMENT (OUTPATIENT)
Dept: GENERAL RADIOLOGY | Facility: CLINIC | Age: 66
DRG: 291 | End: 2021-01-01
Attending: INTERNAL MEDICINE
Payer: MEDICARE

## 2021-01-01 ENCOUNTER — VIRTUAL VISIT (OUTPATIENT)
Dept: PULMONOLOGY | Facility: CLINIC | Age: 66
End: 2021-01-01
Attending: PHYSICIAN ASSISTANT
Payer: MEDICARE

## 2021-01-01 ENCOUNTER — APPOINTMENT (OUTPATIENT)
Dept: OCCUPATIONAL THERAPY | Facility: HOSPITAL | Age: 66
DRG: 689 | End: 2021-01-01
Payer: MEDICARE

## 2021-01-01 ENCOUNTER — RECORDS - HEALTHEAST (OUTPATIENT)
Dept: SCHEDULING | Facility: CLINIC | Age: 66
End: 2021-01-01

## 2021-01-01 VITALS — WEIGHT: 291 LBS | HEIGHT: 65 IN | BODY MASS INDEX: 48.48 KG/M2

## 2021-01-01 VITALS
SYSTOLIC BLOOD PRESSURE: 102 MMHG | HEIGHT: 64 IN | DIASTOLIC BLOOD PRESSURE: 56 MMHG | OXYGEN SATURATION: 97 % | HEART RATE: 78 BPM | BODY MASS INDEX: 30.73 KG/M2

## 2021-01-01 VITALS
DIASTOLIC BLOOD PRESSURE: 62 MMHG | BODY MASS INDEX: 30.9 KG/M2 | WEIGHT: 180 LBS | SYSTOLIC BLOOD PRESSURE: 130 MMHG | OXYGEN SATURATION: 100 % | TEMPERATURE: 98.8 F | RESPIRATION RATE: 14 BRPM | HEART RATE: 78 BPM

## 2021-01-01 VITALS
RESPIRATION RATE: 19 BRPM | HEART RATE: 79 BPM | SYSTOLIC BLOOD PRESSURE: 143 MMHG | OXYGEN SATURATION: 94 % | DIASTOLIC BLOOD PRESSURE: 65 MMHG | TEMPERATURE: 98 F

## 2021-01-01 VITALS
TEMPERATURE: 98.2 F | SYSTOLIC BLOOD PRESSURE: 137 MMHG | OXYGEN SATURATION: 97 % | HEART RATE: 89 BPM | DIASTOLIC BLOOD PRESSURE: 68 MMHG | RESPIRATION RATE: 20 BRPM

## 2021-01-01 VITALS
HEART RATE: 56 BPM | BODY MASS INDEX: 40.94 KG/M2 | DIASTOLIC BLOOD PRESSURE: 62 MMHG | SYSTOLIC BLOOD PRESSURE: 120 MMHG | OXYGEN SATURATION: 94 % | WEIGHT: 246 LBS

## 2021-01-01 VITALS — SYSTOLIC BLOOD PRESSURE: 127 MMHG | DIASTOLIC BLOOD PRESSURE: 67 MMHG | HEART RATE: 71 BPM

## 2021-01-01 VITALS
RESPIRATION RATE: 14 BRPM | SYSTOLIC BLOOD PRESSURE: 114 MMHG | WEIGHT: 193 LBS | OXYGEN SATURATION: 98 % | OXYGEN SATURATION: 93 % | SYSTOLIC BLOOD PRESSURE: 85 MMHG | DIASTOLIC BLOOD PRESSURE: 54 MMHG | HEART RATE: 67 BPM | BODY MASS INDEX: 33.13 KG/M2 | HEART RATE: 79 BPM | TEMPERATURE: 98.9 F | DIASTOLIC BLOOD PRESSURE: 45 MMHG

## 2021-01-01 VITALS — BODY MASS INDEX: 31.86 KG/M2 | WEIGHT: 186.59 LBS | HEIGHT: 64 IN

## 2021-01-01 VITALS — HEIGHT: 64 IN | WEIGHT: 200 LBS | BODY MASS INDEX: 34.15 KG/M2

## 2021-01-01 VITALS
WEIGHT: 252.3 LBS | HEIGHT: 64 IN | WEIGHT: 252.3 LBS | BODY MASS INDEX: 43.07 KG/M2 | HEIGHT: 64 IN | BODY MASS INDEX: 43.07 KG/M2

## 2021-01-01 VITALS
OXYGEN SATURATION: 97 % | TEMPERATURE: 98 F | DIASTOLIC BLOOD PRESSURE: 63 MMHG | SYSTOLIC BLOOD PRESSURE: 100 MMHG | HEART RATE: 66 BPM | RESPIRATION RATE: 16 BRPM

## 2021-01-01 VITALS
BODY MASS INDEX: 36.52 KG/M2 | HEIGHT: 64 IN | DIASTOLIC BLOOD PRESSURE: 49 MMHG | RESPIRATION RATE: 19 BRPM | SYSTOLIC BLOOD PRESSURE: 144 MMHG | TEMPERATURE: 98.2 F | OXYGEN SATURATION: 91 % | HEART RATE: 85 BPM | WEIGHT: 213.9 LBS

## 2021-01-01 VITALS
HEIGHT: 65 IN | DIASTOLIC BLOOD PRESSURE: 76 MMHG | TEMPERATURE: 98.7 F | BODY MASS INDEX: 37.3 KG/M2 | OXYGEN SATURATION: 91 % | RESPIRATION RATE: 16 BRPM | SYSTOLIC BLOOD PRESSURE: 122 MMHG | HEART RATE: 84 BPM

## 2021-01-01 VITALS
WEIGHT: 193 LBS | DIASTOLIC BLOOD PRESSURE: 67 MMHG | BODY MASS INDEX: 32.95 KG/M2 | RESPIRATION RATE: 19 BRPM | HEIGHT: 64 IN | TEMPERATURE: 97.3 F | HEART RATE: 69 BPM | SYSTOLIC BLOOD PRESSURE: 109 MMHG

## 2021-01-01 VITALS — SYSTOLIC BLOOD PRESSURE: 134 MMHG | HEART RATE: 82 BPM | TEMPERATURE: 98.1 F | DIASTOLIC BLOOD PRESSURE: 68 MMHG

## 2021-01-01 VITALS — WEIGHT: 225 LBS | HEIGHT: 64 IN | BODY MASS INDEX: 38.41 KG/M2

## 2021-01-01 VITALS — HEIGHT: 63 IN | BODY MASS INDEX: 50.85 KG/M2 | WEIGHT: 287 LBS

## 2021-01-01 VITALS
SYSTOLIC BLOOD PRESSURE: 161 MMHG | DIASTOLIC BLOOD PRESSURE: 76 MMHG | TEMPERATURE: 97.4 F | WEIGHT: 220.7 LBS | HEART RATE: 81 BPM | OXYGEN SATURATION: 92 % | BODY MASS INDEX: 36.77 KG/M2 | RESPIRATION RATE: 18 BRPM | HEIGHT: 65 IN

## 2021-01-01 VITALS
HEART RATE: 84 BPM | TEMPERATURE: 98.7 F | RESPIRATION RATE: 16 BRPM | SYSTOLIC BLOOD PRESSURE: 122 MMHG | OXYGEN SATURATION: 91 % | DIASTOLIC BLOOD PRESSURE: 76 MMHG

## 2021-01-01 VITALS
RESPIRATION RATE: 18 BRPM | TEMPERATURE: 97.7 F | SYSTOLIC BLOOD PRESSURE: 93 MMHG | HEART RATE: 73 BPM | DIASTOLIC BLOOD PRESSURE: 59 MMHG | BODY MASS INDEX: 50.02 KG/M2 | WEIGHT: 293 LBS | HEIGHT: 64 IN | OXYGEN SATURATION: 93 %

## 2021-01-01 VITALS — WEIGHT: 293 LBS | BODY MASS INDEX: 50.02 KG/M2 | HEIGHT: 64 IN

## 2021-01-01 VITALS
BODY MASS INDEX: 28.72 KG/M2 | BODY MASS INDEX: 28.72 KG/M2 | WEIGHT: 172.4 LBS | HEIGHT: 65 IN | BODY MASS INDEX: 28.72 KG/M2 | WEIGHT: 172.4 LBS | HEIGHT: 65 IN | WEIGHT: 172.4 LBS | HEIGHT: 65 IN

## 2021-01-01 VITALS
TEMPERATURE: 98.2 F | SYSTOLIC BLOOD PRESSURE: 119 MMHG | RESPIRATION RATE: 16 BRPM | HEART RATE: 78 BPM | OXYGEN SATURATION: 95 % | WEIGHT: 225.2 LBS | BODY MASS INDEX: 38.06 KG/M2 | DIASTOLIC BLOOD PRESSURE: 42 MMHG

## 2021-01-01 VITALS — SYSTOLIC BLOOD PRESSURE: 135 MMHG | HEART RATE: 89 BPM | DIASTOLIC BLOOD PRESSURE: 58 MMHG | OXYGEN SATURATION: 83 %

## 2021-01-01 VITALS — WEIGHT: 293 LBS | BODY MASS INDEX: 51.9 KG/M2

## 2021-01-01 VITALS — WEIGHT: 287 LBS | HEIGHT: 64 IN | BODY MASS INDEX: 49 KG/M2

## 2021-01-01 VITALS — BODY MASS INDEX: 50.13 KG/M2 | WEIGHT: 283 LBS

## 2021-01-01 VITALS — HEIGHT: 65 IN | WEIGHT: 277.9 LBS | BODY MASS INDEX: 46.3 KG/M2

## 2021-01-01 VITALS — HEIGHT: 64 IN | BODY MASS INDEX: 47.46 KG/M2 | WEIGHT: 278 LBS

## 2021-01-01 VITALS — WEIGHT: 293 LBS | BODY MASS INDEX: 61.47 KG/M2

## 2021-01-01 VITALS — BODY MASS INDEX: 50.66 KG/M2 | WEIGHT: 286 LBS

## 2021-01-01 VITALS — BODY MASS INDEX: 44.1 KG/M2 | WEIGHT: 265 LBS

## 2021-01-01 VITALS — HEIGHT: 64 IN | BODY MASS INDEX: 31.86 KG/M2 | WEIGHT: 186.59 LBS

## 2021-01-01 VITALS — BODY MASS INDEX: 59.2 KG/M2 | WEIGHT: 293 LBS

## 2021-01-01 DIAGNOSIS — Z99.2 ESRD (END STAGE RENAL DISEASE) ON DIALYSIS (H): ICD-10-CM

## 2021-01-01 DIAGNOSIS — R05.9 COUGH: ICD-10-CM

## 2021-01-01 DIAGNOSIS — I50.32 CHRONIC DIASTOLIC (CONGESTIVE) HEART FAILURE (H): ICD-10-CM

## 2021-01-01 DIAGNOSIS — N18.6 ESRD (END STAGE RENAL DISEASE) ON DIALYSIS (H): ICD-10-CM

## 2021-01-01 DIAGNOSIS — Z11.52 ENCOUNTER FOR SCREENING LABORATORY TESTING FOR SEVERE ACUTE RESPIRATORY SYNDROME CORONAVIRUS 2 (SARS-COV-2): ICD-10-CM

## 2021-01-01 DIAGNOSIS — E11.621 DIABETIC ULCER OF TOE OF RIGHT FOOT ASSOCIATED WITH TYPE 2 DIABETES MELLITUS, UNSPECIFIED ULCER STAGE (H): ICD-10-CM

## 2021-01-01 DIAGNOSIS — I10 ESSENTIAL HYPERTENSION: ICD-10-CM

## 2021-01-01 DIAGNOSIS — I50.9 CONGESTIVE HEART FAILURE, UNSPECIFIED HF CHRONICITY, UNSPECIFIED HEART FAILURE TYPE (H): ICD-10-CM

## 2021-01-01 DIAGNOSIS — N18.6 ESRD (END STAGE RENAL DISEASE) (H): ICD-10-CM

## 2021-01-01 DIAGNOSIS — J45.901 MILD ASTHMA WITH EXACERBATION, UNSPECIFIED WHETHER PERSISTENT: ICD-10-CM

## 2021-01-01 DIAGNOSIS — R19.00 PELVIC MASS: Primary | ICD-10-CM

## 2021-01-01 DIAGNOSIS — Z78.9 HEALTH CARE HOME, ACTIVE CARE COORDINATION: ICD-10-CM

## 2021-01-01 DIAGNOSIS — I27.20 PULMONARY HYPERTENSION (H): ICD-10-CM

## 2021-01-01 DIAGNOSIS — R00.1 SINUS BRADYCARDIA: Primary | ICD-10-CM

## 2021-01-01 DIAGNOSIS — J43.2 CENTRILOBULAR EMPHYSEMA (H): Primary | ICD-10-CM

## 2021-01-01 DIAGNOSIS — E87.70 HYPERVOLEMIA, UNSPECIFIED HYPERVOLEMIA TYPE: ICD-10-CM

## 2021-01-01 DIAGNOSIS — Z79.4 TYPE 2 DIABETES MELLITUS WITH CHRONIC KIDNEY DISEASE, WITH LONG-TERM CURRENT USE OF INSULIN, UNSPECIFIED CKD STAGE (H): ICD-10-CM

## 2021-01-01 DIAGNOSIS — R30.0 DYSURIA: ICD-10-CM

## 2021-01-01 DIAGNOSIS — D63.1 ANEMIA IN CHRONIC KIDNEY DISEASE (CODE): ICD-10-CM

## 2021-01-01 DIAGNOSIS — J96.11 CHRONIC RESPIRATORY FAILURE WITH HYPOXIA (H): ICD-10-CM

## 2021-01-01 DIAGNOSIS — N30.00 ACUTE CYSTITIS WITHOUT HEMATURIA: ICD-10-CM

## 2021-01-01 DIAGNOSIS — Z51.5 HOSPICE CARE PATIENT: Primary | ICD-10-CM

## 2021-01-01 DIAGNOSIS — B37.31 YEAST INFECTION OF THE VAGINA: ICD-10-CM

## 2021-01-01 DIAGNOSIS — Z53.9 DIAGNOSIS NOT YET DEFINED: Primary | ICD-10-CM

## 2021-01-01 DIAGNOSIS — J96.01 ACUTE RESPIRATORY FAILURE WITH HYPOXIA (H): ICD-10-CM

## 2021-01-01 DIAGNOSIS — R31.0 GROSS HEMATURIA: ICD-10-CM

## 2021-01-01 DIAGNOSIS — G93.40 ACUTE ENCEPHALOPATHY: ICD-10-CM

## 2021-01-01 DIAGNOSIS — N39.0 COMPLICATED UTI (URINARY TRACT INFECTION): ICD-10-CM

## 2021-01-01 DIAGNOSIS — G47.33 OSA (OBSTRUCTIVE SLEEP APNEA): ICD-10-CM

## 2021-01-01 DIAGNOSIS — E11.21 TYPE 2 DIABETES MELLITUS WITH DIABETIC NEPHROPATHY, WITH LONG-TERM CURRENT USE OF INSULIN (H): ICD-10-CM

## 2021-01-01 DIAGNOSIS — D21.9 FIBROID: Primary | ICD-10-CM

## 2021-01-01 DIAGNOSIS — Z60.9: ICD-10-CM

## 2021-01-01 DIAGNOSIS — J44.9 CHRONIC OBSTRUCTIVE PULMONARY DISEASE, UNSPECIFIED COPD TYPE (H): Primary | ICD-10-CM

## 2021-01-01 DIAGNOSIS — R06.02 SOB (SHORTNESS OF BREATH): Primary | ICD-10-CM

## 2021-01-01 DIAGNOSIS — H04.123 DRY EYES: ICD-10-CM

## 2021-01-01 DIAGNOSIS — I69.992 FACIAL WEAKNESS FOLLOWING UNSPECIFIED CEREBROVASCULAR DISEASE: ICD-10-CM

## 2021-01-01 DIAGNOSIS — N89.8 VAGINAL ITCHING: ICD-10-CM

## 2021-01-01 DIAGNOSIS — E66.2 MORBID (SEVERE) OBESITY WITH ALVEOLAR HYPOVENTILATION (H): ICD-10-CM

## 2021-01-01 DIAGNOSIS — Z71.89 OTHER SPECIFIED COUNSELING: Chronic | ICD-10-CM

## 2021-01-01 DIAGNOSIS — R19.00 PELVIC MASS: ICD-10-CM

## 2021-01-01 DIAGNOSIS — I63.9 CEREBRAL INFARCTION, UNSPECIFIED (H): ICD-10-CM

## 2021-01-01 DIAGNOSIS — D64.9 ANEMIA, UNSPECIFIED TYPE: ICD-10-CM

## 2021-01-01 DIAGNOSIS — Z01.818 PREOP EXAMINATION: ICD-10-CM

## 2021-01-01 DIAGNOSIS — I48.0 PAF (PAROXYSMAL ATRIAL FIBRILLATION) (H): ICD-10-CM

## 2021-01-01 DIAGNOSIS — N10 PYELONEPHRITIS, ACUTE: ICD-10-CM

## 2021-01-01 DIAGNOSIS — E11.22 TYPE 2 DIABETES MELLITUS WITH CHRONIC KIDNEY DISEASE, WITH LONG-TERM CURRENT USE OF INSULIN, UNSPECIFIED CKD STAGE (H): ICD-10-CM

## 2021-01-01 DIAGNOSIS — Z99.2 DEPENDENCE ON RENAL DIALYSIS (H): ICD-10-CM

## 2021-01-01 DIAGNOSIS — J45.909 UNCOMPLICATED ASTHMA, UNSPECIFIED ASTHMA SEVERITY, UNSPECIFIED WHETHER PERSISTENT: ICD-10-CM

## 2021-01-01 DIAGNOSIS — R06.01 ORTHOPNEA: ICD-10-CM

## 2021-01-01 DIAGNOSIS — Z95.0 CARDIAC PACEMAKER IN SITU: ICD-10-CM

## 2021-01-01 DIAGNOSIS — J44.9 CHRONIC OBSTRUCTIVE PULMONARY DISEASE, UNSPECIFIED COPD TYPE (H): ICD-10-CM

## 2021-01-01 DIAGNOSIS — Z79.4 TYPE 2 DIABETES MELLITUS WITH DIABETIC NEPHROPATHY, WITH LONG-TERM CURRENT USE OF INSULIN (H): ICD-10-CM

## 2021-01-01 DIAGNOSIS — I50.43 CHF (CONGESTIVE HEART FAILURE), NYHA CLASS I, ACUTE ON CHRONIC, COMBINED (H): ICD-10-CM

## 2021-01-01 DIAGNOSIS — R10.2 PAIN IN VULVA: ICD-10-CM

## 2021-01-01 DIAGNOSIS — L97.519 DIABETIC ULCER OF TOE OF RIGHT FOOT ASSOCIATED WITH TYPE 2 DIABETES MELLITUS, UNSPECIFIED ULCER STAGE (H): ICD-10-CM

## 2021-01-01 DIAGNOSIS — F32.0 MILD MAJOR DEPRESSION (H): ICD-10-CM

## 2021-01-01 DIAGNOSIS — I50.32 CHRONIC HEART FAILURE WITH PRESERVED EJECTION FRACTION (H): ICD-10-CM

## 2021-01-01 DIAGNOSIS — R94.2 DIFFUSION CAPACITY OF LUNG (DL), DECREASED: ICD-10-CM

## 2021-01-01 DIAGNOSIS — Z87.891 PERSONAL HISTORY OF TOBACCO USE, PRESENTING HAZARDS TO HEALTH: ICD-10-CM

## 2021-01-01 DIAGNOSIS — M79.89 SWELLING OF LIMB: ICD-10-CM

## 2021-01-01 DIAGNOSIS — R79.89 OTHER SPECIFIED ABNORMAL FINDINGS OF BLOOD CHEMISTRY: ICD-10-CM

## 2021-01-01 DIAGNOSIS — E87.79 OTHER HYPERVOLEMIA: ICD-10-CM

## 2021-01-01 DIAGNOSIS — I49.5 SICK SINUS SYNDROME (H): ICD-10-CM

## 2021-01-01 DIAGNOSIS — I48.20 CHRONIC ATRIAL FIBRILLATION (H): ICD-10-CM

## 2021-01-01 DIAGNOSIS — R82.5 ELEVATED URINE LEVELS OF DRUGS, MEDICAMENTS AND BIOLOGICAL SUBSTANCES: ICD-10-CM

## 2021-01-01 DIAGNOSIS — Z12.11 SCREEN FOR COLON CANCER: ICD-10-CM

## 2021-01-01 LAB
ALBUMIN SERPL-MCNC: 2.5 G/DL (ref 3.5–5)
ALBUMIN SERPL-MCNC: 2.9 G/DL (ref 3.5–5)
ALBUMIN SERPL-MCNC: 3 G/DL (ref 3.4–5)
ALBUMIN SERPL-MCNC: 3.2 G/DL (ref 3.4–5)
ALBUMIN SERPL-MCNC: 3.6 G/DL (ref 3.5–5)
ALBUMIN UR-MCNC: 100 MG/DL
ALBUMIN UR-MCNC: 100 MG/DL
ALBUMIN UR-MCNC: 200 MG/DL
ALBUMIN UR-MCNC: 200 MG/DL
ALBUMIN UR-MCNC: 300 MG/DL
ALBUMIN UR-MCNC: 70 MG/DL
ALP SERPL-CCNC: 126 U/L (ref 40–150)
ALP SERPL-CCNC: 136 U/L (ref 40–150)
ALP SERPL-CCNC: 139 U/L (ref 40–150)
ALP SERPL-CCNC: 142 U/L (ref 40–150)
ALP SERPL-CCNC: 149 U/L (ref 45–120)
ALP SERPL-CCNC: 229 U/L (ref 45–120)
ALP SERPL-CCNC: 272 U/L (ref 45–120)
ALT SERPL W P-5'-P-CCNC: 10 U/L (ref 0–50)
ALT SERPL W P-5'-P-CCNC: 12 U/L (ref 0–50)
ALT SERPL W P-5'-P-CCNC: 12 U/L (ref 0–50)
ALT SERPL W P-5'-P-CCNC: 13 U/L (ref 0–45)
ALT SERPL W P-5'-P-CCNC: 13 U/L (ref 0–50)
ALT SERPL W P-5'-P-CCNC: 16 U/L (ref 0–45)
ALT SERPL W P-5'-P-CCNC: <9 U/L (ref 0–45)
ALT SERPL-CCNC: <9 U/L (ref 0–45)
AMMONIA PLAS-SCNC: 28 UMOL/L (ref 11–35)
ANION GAP SERPL CALCULATED.3IONS-SCNC: 10 MMOL/L (ref 3–14)
ANION GAP SERPL CALCULATED.3IONS-SCNC: 10 MMOL/L (ref 3–14)
ANION GAP SERPL CALCULATED.3IONS-SCNC: 11 MMOL/L (ref 3–14)
ANION GAP SERPL CALCULATED.3IONS-SCNC: 12 MMOL/L (ref 3–14)
ANION GAP SERPL CALCULATED.3IONS-SCNC: 13 MMOL/L (ref 5–18)
ANION GAP SERPL CALCULATED.3IONS-SCNC: 15 MMOL/L (ref 5–18)
ANION GAP SERPL CALCULATED.3IONS-SCNC: 16 MMOL/L (ref 5–18)
ANION GAP SERPL CALCULATED.3IONS-SCNC: 16 MMOL/L (ref 5–18)
ANION GAP SERPL CALCULATED.3IONS-SCNC: 17 MMOL/L (ref 5–18)
ANION GAP SERPL CALCULATED.3IONS-SCNC: 4 MMOL/L (ref 3–14)
ANION GAP SERPL CALCULATED.3IONS-SCNC: 6 MMOL/L (ref 3–14)
ANION GAP SERPL CALCULATED.3IONS-SCNC: 7 MMOL/L (ref 3–14)
ANION GAP SERPL CALCULATED.3IONS-SCNC: 7 MMOL/L (ref 3–14)
ANION GAP SERPL CALCULATED.3IONS-SCNC: 8 MMOL/L (ref 3–14)
ANION GAP SERPL CALCULATED.3IONS-SCNC: 9 MMOL/L (ref 3–14)
APPEARANCE UR: ABNORMAL
AST SERPL W P-5'-P-CCNC: 11 U/L (ref 0–45)
AST SERPL W P-5'-P-CCNC: 12 U/L (ref 0–45)
AST SERPL W P-5'-P-CCNC: 14 U/L (ref 0–40)
AST SERPL W P-5'-P-CCNC: 14 U/L (ref 0–45)
AST SERPL W P-5'-P-CCNC: 17 U/L (ref 0–40)
AST SERPL W P-5'-P-CCNC: 18 U/L (ref 0–40)
AST SERPL W P-5'-P-CCNC: 23 U/L (ref 0–45)
AST SERPL-CCNC: 14 U/L (ref 0–40)
ATRIAL RATE - MUSE: 64 BPM
ATRIAL RATE - MUSE: 79 BPM
BACTERIA #/AREA URNS HPF: ABNORMAL /HPF
BACTERIA BLD CULT: NO GROWTH
BACTERIA SPEC CULT: ABNORMAL
BACTERIA SPEC CULT: NO GROWTH
BACTERIA SPEC CULT: NORMAL
BACTERIA UR CULT: ABNORMAL
BACTERIA UR CULT: ABNORMAL
BACTERIA UR CULT: NORMAL
BASE EXCESS BLDV CALC-SCNC: -3.2 MMOL/L
BASE EXCESS BLDV CALC-SCNC: 2.4 MMOL/L
BASOPHILS # BLD AUTO: 0 10E9/L (ref 0–0.2)
BASOPHILS # BLD AUTO: 0 THOU/UL (ref 0–0.2)
BASOPHILS # BLD AUTO: 0.1 10E9/L (ref 0–0.2)
BASOPHILS # BLD AUTO: 0.1 THOU/UL (ref 0–0.2)
BASOPHILS # BLD MANUAL: 0 10E3/UL (ref 0–0.2)
BASOPHILS # BLD MANUAL: 0 10E3/UL (ref 0–0.2)
BASOPHILS NFR BLD AUTO: 0.6 %
BASOPHILS NFR BLD AUTO: 0.7 %
BASOPHILS NFR BLD AUTO: 0.8 %
BASOPHILS NFR BLD AUTO: 0.8 %
BASOPHILS NFR BLD AUTO: 0.9 %
BASOPHILS NFR BLD AUTO: 1 % (ref 0–2)
BASOPHILS NFR BLD AUTO: 1 % (ref 0–2)
BASOPHILS NFR BLD MANUAL: 0 %
BASOPHILS NFR BLD MANUAL: 0 %
BILIRUB SERPL-MCNC: 0.3 MG/DL (ref 0.2–1.3)
BILIRUB SERPL-MCNC: 0.3 MG/DL (ref 0.2–1.3)
BILIRUB SERPL-MCNC: 0.4 MG/DL (ref 0.2–1.3)
BILIRUB SERPL-MCNC: 0.4 MG/DL (ref 0.2–1.3)
BILIRUB SERPL-MCNC: 0.5 MG/DL (ref 0–1)
BILIRUB SERPL-MCNC: 0.5 MG/DL (ref 0–1)
BILIRUB SERPL-MCNC: 0.6 MG/DL (ref 0–1)
BILIRUB UR QL STRIP: NEGATIVE
BNP SERPL-MCNC: 1057 PG/ML (ref 0–112)
BNP SERPL-MCNC: 1229 PG/ML (ref 0–112)
BNP SERPL-MCNC: 1343 PG/ML (ref 0–112)
BNP SERPL-MCNC: 1758 PG/ML (ref 0–112)
BNP SERPL-MCNC: 3026 PG/ML (ref 0–112)
BNP SERPL-MCNC: 420 PG/ML (ref 0–112)
BUN SERPL-MCNC: 29 MG/DL (ref 7–30)
BUN SERPL-MCNC: 29 MG/DL (ref 8–22)
BUN SERPL-MCNC: 31 MG/DL (ref 7–30)
BUN SERPL-MCNC: 34 MG/DL (ref 8–22)
BUN SERPL-MCNC: 39 MG/DL (ref 7–30)
BUN SERPL-MCNC: 40 MG/DL (ref 7–30)
BUN SERPL-MCNC: 40 MG/DL (ref 8–22)
BUN SERPL-MCNC: 45 MG/DL (ref 8–22)
BUN SERPL-MCNC: 50 MG/DL (ref 8–22)
BUN SERPL-MCNC: 53 MG/DL (ref 7–30)
BUN SERPL-MCNC: 55 MG/DL (ref 8–22)
BUN SERPL-MCNC: 56 MG/DL (ref 8–22)
BUN SERPL-MCNC: 60 MG/DL (ref 7–30)
BUN SERPL-MCNC: 61 MG/DL (ref 8–22)
BUN SERPL-MCNC: 62 MG/DL (ref 7–30)
BUN SERPL-MCNC: 64 MG/DL (ref 7–30)
BUN SERPL-MCNC: 66 MG/DL (ref 7–30)
BUN SERPL-MCNC: 68 MG/DL (ref 7–30)
BUN SERPL-MCNC: 74 MG/DL (ref 8–22)
BUN SERPL-MCNC: 93 MG/DL (ref 8–22)
BURR CELLS BLD QL SMEAR: SLIGHT
C REACTIVE PROTEIN LHE: 10.8 MG/DL (ref 0–0.8)
C REACTIVE PROTEIN LHE: 11.4 MG/DL (ref 0–0.8)
C REACTIVE PROTEIN LHE: 11.4 MG/DL (ref 0–0.8)
C REACTIVE PROTEIN LHE: 11.7 MG/DL (ref 0–0.8)
CALCIUM SERPL-MCNC: 8.2 MG/DL (ref 8.5–10.1)
CALCIUM SERPL-MCNC: 8.2 MG/DL (ref 8.5–10.5)
CALCIUM SERPL-MCNC: 8.2 MG/DL (ref 8.5–10.5)
CALCIUM SERPL-MCNC: 8.3 MG/DL (ref 8.5–10.5)
CALCIUM SERPL-MCNC: 8.5 MG/DL (ref 8.5–10.1)
CALCIUM SERPL-MCNC: 8.5 MG/DL (ref 8.5–10.5)
CALCIUM SERPL-MCNC: 8.6 MG/DL (ref 8.5–10.1)
CALCIUM SERPL-MCNC: 8.8 MG/DL (ref 8.5–10.1)
CALCIUM SERPL-MCNC: 8.8 MG/DL (ref 8.5–10.5)
CALCIUM SERPL-MCNC: 8.9 MG/DL (ref 8.5–10.5)
CALCIUM SERPL-MCNC: 8.9 MG/DL (ref 8.5–10.5)
CALCIUM SERPL-MCNC: 9 MG/DL (ref 8.5–10.1)
CALCIUM SERPL-MCNC: 9.1 MG/DL (ref 8.5–10.1)
CALCIUM SERPL-MCNC: 9.1 MG/DL (ref 8.5–10.5)
CALCIUM SERPL-MCNC: 9.1 MG/DL (ref 8.5–10.5)
CALCIUM SERPL-MCNC: 9.2 MG/DL (ref 8.5–10.1)
CALCIUM SERPL-MCNC: 9.3 MG/DL (ref 8.5–10.1)
CALCIUM SERPL-MCNC: 9.4 MG/DL (ref 8.5–10.1)
CALCIUM SERPL-MCNC: 9.6 MG/DL (ref 8.5–10.1)
CALCIUM SERPL-MCNC: 9.7 MG/DL (ref 8.5–10.5)
CANCER AG125 SERPL-ACNC: 12 U/ML (ref 0–30)
CANCER AG19-9 SERPL-ACNC: <1 U/ML (ref 0–37)
CEA SERPL-MCNC: 1.8 UG/L (ref 0–2.5)
CHLORIDE BLD-SCNC: 100 MMOL/L (ref 98–107)
CHLORIDE BLD-SCNC: 100 MMOL/L (ref 98–107)
CHLORIDE BLD-SCNC: 101 MMOL/L (ref 98–107)
CHLORIDE BLD-SCNC: 102 MMOL/L (ref 98–107)
CHLORIDE BLD-SCNC: 103 MMOL/L (ref 98–107)
CHLORIDE BLD-SCNC: 98 MMOL/L (ref 98–107)
CHLORIDE BLD-SCNC: 98 MMOL/L (ref 98–107)
CHLORIDE BLD-SCNC: 99 MMOL/L (ref 98–107)
CHLORIDE SERPL-SCNC: 101 MMOL/L (ref 94–109)
CHLORIDE SERPL-SCNC: 102 MMOL/L (ref 94–109)
CHLORIDE SERPL-SCNC: 103 MMOL/L (ref 94–109)
CHLORIDE SERPL-SCNC: 103 MMOL/L (ref 94–109)
CHLORIDE SERPL-SCNC: 104 MMOL/L (ref 94–109)
CHLORIDE SERPL-SCNC: 105 MMOL/L (ref 94–109)
CHLORIDE SERPL-SCNC: 105 MMOL/L (ref 94–109)
CHLORIDE SERPL-SCNC: 106 MMOL/L (ref 94–109)
CLUE CELLS: NORMAL
CO2 SERPL-SCNC: 20 MMOL/L (ref 22–31)
CO2 SERPL-SCNC: 20 MMOL/L (ref 22–31)
CO2 SERPL-SCNC: 21 MMOL/L (ref 20–32)
CO2 SERPL-SCNC: 21 MMOL/L (ref 22–31)
CO2 SERPL-SCNC: 22 MMOL/L (ref 22–31)
CO2 SERPL-SCNC: 22 MMOL/L (ref 22–31)
CO2 SERPL-SCNC: 23 MMOL/L (ref 22–31)
CO2 SERPL-SCNC: 23 MMOL/L (ref 22–31)
CO2 SERPL-SCNC: 24 MMOL/L (ref 20–32)
CO2 SERPL-SCNC: 24 MMOL/L (ref 20–32)
CO2 SERPL-SCNC: 24 MMOL/L (ref 22–31)
CO2 SERPL-SCNC: 24 MMOL/L (ref 22–31)
CO2 SERPL-SCNC: 25 MMOL/L (ref 20–32)
CO2 SERPL-SCNC: 25 MMOL/L (ref 20–32)
CO2 SERPL-SCNC: 25 MMOL/L (ref 22–31)
CO2 SERPL-SCNC: 26 MMOL/L (ref 20–32)
CO2 SERPL-SCNC: 28 MMOL/L (ref 20–32)
CO2 SERPL-SCNC: 28 MMOL/L (ref 20–32)
COLOR UR AUTO: ABNORMAL
COLOR UR AUTO: YELLOW
COPATH REPORT: NORMAL
CREAT SERPL-MCNC: 10.9 MG/DL (ref 0.6–1.1)
CREAT SERPL-MCNC: 12.3 MG/DL (ref 0.6–1.1)
CREAT SERPL-MCNC: 3.84 MG/DL (ref 0.52–1.04)
CREAT SERPL-MCNC: 4.03 MG/DL (ref 0.52–1.04)
CREAT SERPL-MCNC: 4.13 MG/DL (ref 0.52–1.04)
CREAT SERPL-MCNC: 4.72 MG/DL (ref 0.52–1.04)
CREAT SERPL-MCNC: 5.02 MG/DL (ref 0.52–1.04)
CREAT SERPL-MCNC: 5.28 MG/DL (ref 0.6–1.1)
CREAT SERPL-MCNC: 5.6 MG/DL (ref 0.52–1.04)
CREAT SERPL-MCNC: 5.9 MG/DL (ref 0.52–1.04)
CREAT SERPL-MCNC: 5.94 MG/DL (ref 0.52–1.04)
CREAT SERPL-MCNC: 6.05 MG/DL (ref 0.6–1.1)
CREAT SERPL-MCNC: 6.39 MG/DL (ref 0.6–1.1)
CREAT SERPL-MCNC: 6.61 MG/DL (ref 0.6–1.1)
CREAT SERPL-MCNC: 6.7 MG/DL (ref 0.52–1.04)
CREAT SERPL-MCNC: 6.76 MG/DL (ref 0.6–1.1)
CREAT SERPL-MCNC: 7.29 MG/DL (ref 0.52–1.04)
CREAT SERPL-MCNC: 7.59 MG/DL (ref 0.6–1.1)
CREAT SERPL-MCNC: 8.34 MG/DL (ref 0.6–1.1)
CREAT SERPL-MCNC: 8.71 MG/DL (ref 0.6–1.1)
CREAT SERPL-MCNC: 9.73 MG/DL (ref 0.6–1.1)
CRP SERPL-MCNC: 16 MG/L (ref 0–8)
DIASTOLIC BLOOD PRESSURE - MUSE: 52 MMHG
DIASTOLIC BLOOD PRESSURE - MUSE: 98 MMHG
DIFFERENTIAL METHOD BLD: ABNORMAL
DIFFERENTIAL METHOD BLD: NORMAL
DLCOUNC-%PRED-PRE: 38 %
DLCOUNC-PRE: 7.67 ML/MIN/MMHG
DLCOUNC-PRED: 20.02 ML/MIN/MMHG
ELLIPTOCYTES BLD QL SMEAR: SLIGHT
EOSINOPHIL # BLD AUTO: 0 THOU/UL (ref 0–0.4)
EOSINOPHIL # BLD AUTO: 0.2 10E9/L (ref 0–0.7)
EOSINOPHIL # BLD AUTO: 0.2 10E9/L (ref 0–0.7)
EOSINOPHIL # BLD AUTO: 0.3 10E9/L (ref 0–0.7)
EOSINOPHIL # BLD AUTO: 0.3 10E9/L (ref 0–0.7)
EOSINOPHIL # BLD AUTO: 0.4 10E9/L (ref 0–0.7)
EOSINOPHIL # BLD AUTO: 0.4 THOU/UL (ref 0–0.4)
EOSINOPHIL # BLD MANUAL: 0.1 10E3/UL (ref 0–0.7)
EOSINOPHIL # BLD MANUAL: 0.3 10E3/UL (ref 0–0.7)
EOSINOPHIL NFR BLD AUTO: 0 % (ref 0–6)
EOSINOPHIL NFR BLD AUTO: 2.9 %
EOSINOPHIL NFR BLD AUTO: 3.2 %
EOSINOPHIL NFR BLD AUTO: 4 %
EOSINOPHIL NFR BLD AUTO: 4.7 %
EOSINOPHIL NFR BLD AUTO: 5 % (ref 0–6)
EOSINOPHIL NFR BLD AUTO: 6 %
EOSINOPHIL NFR BLD MANUAL: 1 %
EOSINOPHIL NFR BLD MANUAL: 6 %
ERV-%PRED-PRE: 72 %
ERV-PRE: 0.21 L
ERV-PRED: 0.29 L
ERYTHROCYTE [DISTWIDTH] IN BLOOD BY AUTOMATED COUNT: 15.9 % (ref 11–14.5)
ERYTHROCYTE [DISTWIDTH] IN BLOOD BY AUTOMATED COUNT: 16 % (ref 10–15)
ERYTHROCYTE [DISTWIDTH] IN BLOOD BY AUTOMATED COUNT: 16 % (ref 11–14.5)
ERYTHROCYTE [DISTWIDTH] IN BLOOD BY AUTOMATED COUNT: 16.1 % (ref 10–15)
ERYTHROCYTE [DISTWIDTH] IN BLOOD BY AUTOMATED COUNT: 16.3 % (ref 10–15)
ERYTHROCYTE [DISTWIDTH] IN BLOOD BY AUTOMATED COUNT: 16.4 % (ref 10–15)
ERYTHROCYTE [DISTWIDTH] IN BLOOD BY AUTOMATED COUNT: 16.4 % (ref 10–15)
ERYTHROCYTE [DISTWIDTH] IN BLOOD BY AUTOMATED COUNT: 16.5 % (ref 11–14.5)
ERYTHROCYTE [DISTWIDTH] IN BLOOD BY AUTOMATED COUNT: 16.6 % (ref 10–15)
ERYTHROCYTE [DISTWIDTH] IN BLOOD BY AUTOMATED COUNT: 16.8 % (ref 10–15)
ERYTHROCYTE [DISTWIDTH] IN BLOOD BY AUTOMATED COUNT: 17.6 % (ref 10–15)
ERYTHROCYTE [DISTWIDTH] IN BLOOD BY AUTOMATED COUNT: 17.9 % (ref 10–15)
ERYTHROCYTE [DISTWIDTH] IN BLOOD BY AUTOMATED COUNT: 18.7 % (ref 10–15)
ERYTHROCYTE [DISTWIDTH] IN BLOOD BY AUTOMATED COUNT: 18.8 % (ref 10–15)
ERYTHROCYTE [DISTWIDTH] IN BLOOD BY AUTOMATED COUNT: 19 % (ref 10–15)
ERYTHROCYTE [DISTWIDTH] IN BLOOD BY AUTOMATED COUNT: NORMAL % (ref 10–15)
EXPTIME-PRE: 6.18 SEC
FEF2575-%PRED-PRE: 57 %
FEF2575-PRE: 1.09 L/SEC
FEF2575-PRED: 1.9 L/SEC
FEFMAX-%PRED-PRE: 91 %
FEFMAX-PRE: 5.45 L/SEC
FEFMAX-PRED: 5.94 L/SEC
FEV1-%PRED-PRE: 57 %
FEV1-PRE: 1.25 L
FEV1FEV6-PRE: 78 %
FEV1FEV6-PRED: 80 %
FEV1FVC-PRE: 78 %
FEV1FVC-PRED: 79 %
FEV1SVC-PRE: 80 %
FEV1SVC-PRED: 68 %
FIFMAX-PRE: 3.22 L/SEC
FINAL DIAGNOSIS: NORMAL
FLUAV RNA RESP QL NAA+PROBE: NEGATIVE
FLUAV RNA RESP QL NAA+PROBE: NEGATIVE
FLUBV RNA RESP QL NAA+PROBE: NEGATIVE
FLUBV RNA RESP QL NAA+PROBE: NEGATIVE
FRAGMENTS BLD QL SMEAR: SLIGHT
FVC-%PRED-PRE: 57 %
FVC-PRE: 1.6 L
FVC-PRED: 2.76 L
GFR SERPL CREATININE-BSD FRML MDRD: 10 ML/MIN/{1.73_M2}
GFR SERPL CREATININE-BSD FRML MDRD: 11 ML/MIN/{1.73_M2}
GFR SERPL CREATININE-BSD FRML MDRD: 11 ML/MIN/{1.73_M2}
GFR SERPL CREATININE-BSD FRML MDRD: 3 ML/MIN/1.73M2
GFR SERPL CREATININE-BSD FRML MDRD: 3 ML/MIN/1.73M2
GFR SERPL CREATININE-BSD FRML MDRD: 4 ML/MIN/1.73M2
GFR SERPL CREATININE-BSD FRML MDRD: 4 ML/MIN/1.73M2
GFR SERPL CREATININE-BSD FRML MDRD: 5 ML/MIN/1.73M2
GFR SERPL CREATININE-BSD FRML MDRD: 5 ML/MIN/1.73M2
GFR SERPL CREATININE-BSD FRML MDRD: 5 ML/MIN/{1.73_M2}
GFR SERPL CREATININE-BSD FRML MDRD: 6 ML/MIN/1.73M2
GFR SERPL CREATININE-BSD FRML MDRD: 6 ML/MIN/1.73M2
GFR SERPL CREATININE-BSD FRML MDRD: 6 ML/MIN/{1.73_M2}
GFR SERPL CREATININE-BSD FRML MDRD: 7 ML/MIN/1.73M2
GFR SERPL CREATININE-BSD FRML MDRD: 7 ML/MIN/1.73M2
GFR SERPL CREATININE-BSD FRML MDRD: 7 ML/MIN/{1.73_M2}
GFR SERPL CREATININE-BSD FRML MDRD: 8 ML/MIN/1.73M2
GFR SERPL CREATININE-BSD FRML MDRD: 8 ML/MIN/{1.73_M2}
GFR SERPL CREATININE-BSD FRML MDRD: 9 ML/MIN/{1.73_M2}
GLUCOSE BLD-MCNC: 100 MG/DL (ref 70–125)
GLUCOSE BLD-MCNC: 119 MG/DL (ref 70–125)
GLUCOSE BLD-MCNC: 122 MG/DL (ref 70–125)
GLUCOSE BLD-MCNC: 122 MG/DL (ref 70–125)
GLUCOSE BLD-MCNC: 127 MG/DL (ref 70–125)
GLUCOSE BLD-MCNC: 211 MG/DL (ref 70–125)
GLUCOSE BLD-MCNC: 77 MG/DL (ref 70–125)
GLUCOSE BLD-MCNC: 79 MG/DL (ref 70–125)
GLUCOSE BLD-MCNC: 85 MG/DL (ref 70–125)
GLUCOSE BLD-MCNC: 91 MG/DL (ref 70–125)
GLUCOSE BLDC GLUCOMTR-MCNC: 101 MG/DL (ref 70–99)
GLUCOSE BLDC GLUCOMTR-MCNC: 105 MG/DL (ref 70–99)
GLUCOSE BLDC GLUCOMTR-MCNC: 106 MG/DL (ref 70–99)
GLUCOSE BLDC GLUCOMTR-MCNC: 107 MG/DL (ref 70–99)
GLUCOSE BLDC GLUCOMTR-MCNC: 112 MG/DL (ref 70–99)
GLUCOSE BLDC GLUCOMTR-MCNC: 118 MG/DL (ref 70–99)
GLUCOSE BLDC GLUCOMTR-MCNC: 118 MG/DL (ref 70–99)
GLUCOSE BLDC GLUCOMTR-MCNC: 125 MG/DL (ref 70–99)
GLUCOSE BLDC GLUCOMTR-MCNC: 129 MG/DL (ref 70–99)
GLUCOSE BLDC GLUCOMTR-MCNC: 138 MG/DL (ref 70–139)
GLUCOSE BLDC GLUCOMTR-MCNC: 144 MG/DL (ref 70–99)
GLUCOSE BLDC GLUCOMTR-MCNC: 146 MG/DL (ref 70–99)
GLUCOSE BLDC GLUCOMTR-MCNC: 151 MG/DL (ref 70–99)
GLUCOSE BLDC GLUCOMTR-MCNC: 151 MG/DL (ref 70–99)
GLUCOSE BLDC GLUCOMTR-MCNC: 153 MG/DL (ref 70–99)
GLUCOSE BLDC GLUCOMTR-MCNC: 205 MG/DL (ref 70–99)
GLUCOSE BLDC GLUCOMTR-MCNC: 60 MG/DL (ref 70–99)
GLUCOSE BLDC GLUCOMTR-MCNC: 76 MG/DL (ref 70–99)
GLUCOSE BLDC GLUCOMTR-MCNC: 77 MG/DL (ref 70–99)
GLUCOSE BLDC GLUCOMTR-MCNC: 78 MG/DL (ref 70–99)
GLUCOSE BLDC GLUCOMTR-MCNC: 79 MG/DL (ref 70–99)
GLUCOSE BLDC GLUCOMTR-MCNC: 83 MG/DL (ref 70–99)
GLUCOSE BLDC GLUCOMTR-MCNC: 84 MG/DL (ref 70–99)
GLUCOSE BLDC GLUCOMTR-MCNC: 84 MG/DL (ref 70–99)
GLUCOSE BLDC GLUCOMTR-MCNC: 86 MG/DL (ref 70–99)
GLUCOSE BLDC GLUCOMTR-MCNC: 87 MG/DL (ref 70–99)
GLUCOSE BLDC GLUCOMTR-MCNC: 87 MG/DL (ref 70–99)
GLUCOSE BLDC GLUCOMTR-MCNC: 89 MG/DL (ref 70–99)
GLUCOSE BLDC GLUCOMTR-MCNC: 93 MG/DL (ref 70–99)
GLUCOSE BLDC GLUCOMTR-MCNC: 94 MG/DL (ref 70–99)
GLUCOSE BLDC GLUCOMTR-MCNC: 94 MG/DL (ref 70–99)
GLUCOSE SERPL-MCNC: 103 MG/DL (ref 70–99)
GLUCOSE SERPL-MCNC: 104 MG/DL (ref 70–99)
GLUCOSE SERPL-MCNC: 107 MG/DL (ref 70–99)
GLUCOSE SERPL-MCNC: 109 MG/DL (ref 70–99)
GLUCOSE SERPL-MCNC: 110 MG/DL (ref 70–99)
GLUCOSE SERPL-MCNC: 123 MG/DL (ref 70–99)
GLUCOSE SERPL-MCNC: 133 MG/DL (ref 70–125)
GLUCOSE SERPL-MCNC: 133 MG/DL (ref 70–99)
GLUCOSE SERPL-MCNC: 234 MG/DL (ref 70–99)
GLUCOSE SERPL-MCNC: 95 MG/DL (ref 70–99)
GLUCOSE SERPL-MCNC: 96 MG/DL (ref 70–99)
GLUCOSE UR STRIP-MCNC: NEGATIVE MG/DL
HBA1C MFR BLD: 4.9 % (ref 0–5.6)
HBA1C MFR BLD: 5.6 %
HBA1C MFR BLD: 6.8 %
HBV SURFACE AB SERPL IA-ACNC: 0 M[IU]/ML
HBV SURFACE AB SERPL IA-ACNC: 0.01 M[IU]/ML
HBV SURFACE AB SERPL IA-ACNC: 0.69 M[IU]/ML
HBV SURFACE AB SERPL IA-ACNC: 0.93 M[IU]/ML
HBV SURFACE AG SERPL QL IA: NONREACTIVE
HCO3 BLDV-SCNC: 21 MMOL/L (ref 24–30)
HCO3, VENOUS, CALC - HISTORICAL: 25.7 MMOL/L (ref 24–30)
HCT VFR BLD AUTO: 27 % (ref 35–47)
HCT VFR BLD AUTO: 27 % (ref 35–47)
HCT VFR BLD AUTO: 27.5 % (ref 35–47)
HCT VFR BLD AUTO: 27.9 % (ref 35–47)
HCT VFR BLD AUTO: 28 % (ref 35–47)
HCT VFR BLD AUTO: 28.2 % (ref 35–47)
HCT VFR BLD AUTO: 28.6 % (ref 35–47)
HCT VFR BLD AUTO: 29.4 % (ref 35–47)
HCT VFR BLD AUTO: 29.8 % (ref 35–47)
HCT VFR BLD AUTO: 29.9 % (ref 35–47)
HCT VFR BLD AUTO: 30.1 % (ref 35–47)
HCT VFR BLD AUTO: 30.1 % (ref 35–47)
HCT VFR BLD AUTO: 30.9 % (ref 35–47)
HCT VFR BLD AUTO: 31.4 % (ref 35–47)
HCT VFR BLD AUTO: 41.2 % (ref 35–47)
HCT VFR BLD AUTO: NORMAL % (ref 35–47)
HGB BLD-MCNC: 13 G/DL (ref 12–16)
HGB BLD-MCNC: 8.1 G/DL (ref 11.7–15.7)
HGB BLD-MCNC: 8.1 G/DL (ref 11.7–15.7)
HGB BLD-MCNC: 8.4 G/DL (ref 11.7–15.7)
HGB BLD-MCNC: 8.5 G/DL (ref 11.7–15.7)
HGB BLD-MCNC: 8.5 G/DL (ref 11.7–15.7)
HGB BLD-MCNC: 8.6 G/DL (ref 12–16)
HGB BLD-MCNC: 8.8 G/DL (ref 12–16)
HGB BLD-MCNC: 8.8 G/DL (ref 12–16)
HGB BLD-MCNC: 9.1 G/DL (ref 11.7–15.7)
HGB BLD-MCNC: 9.2 G/DL (ref 11.7–15.7)
HGB BLD-MCNC: 9.2 G/DL (ref 11.7–15.7)
HGB BLD-MCNC: 9.3 G/DL (ref 11.7–15.7)
HGB BLD-MCNC: 9.3 G/DL (ref 11.7–15.7)
HGB BLD-MCNC: 9.3 G/DL (ref 12–16)
HGB BLD-MCNC: 9.4 G/DL (ref 11.7–15.7)
HGB BLD-MCNC: 9.6 G/DL (ref 11.7–15.7)
HGB BLD-MCNC: NORMAL G/DL (ref 11.7–15.7)
HGB UR QL STRIP: ABNORMAL
HOLD SPECIMEN: NORMAL
HPV HR 12 DNA CVX QL NAA+PROBE: NEGATIVE
HPV16 DNA SPEC QL NAA+PROBE: NEGATIVE
HPV18 DNA SPEC QL NAA+PROBE: NEGATIVE
IC-%PRED-PRE: 46 %
IC-PRE: 1.35 L
IC-PRED: 2.9 L
IMM GRANULOCYTES # BLD: 0 10E9/L (ref 0–0.4)
IMM GRANULOCYTES # BLD: 0 THOU/UL
IMM GRANULOCYTES # BLD: 0 THOU/UL
IMM GRANULOCYTES # BLD: 0.1 10E9/L (ref 0–0.4)
IMM GRANULOCYTES NFR BLD: 0.3 %
IMM GRANULOCYTES NFR BLD: 0.5 %
IMM GRANULOCYTES NFR BLD: 0.6 %
IMM GRANULOCYTES NFR BLD: 0.6 %
IMM GRANULOCYTES NFR BLD: 0.7 %
IMM GRANULOCYTES NFR BLD: 1 %
IMM GRANULOCYTES NFR BLD: 1 %
INR PPP: 1.15 (ref 0.86–1.14)
INR PPP: 1.51 (ref 0.85–1.15)
INR PPP: 2.49 (ref 0.85–1.15)
INR PPP: 3.46 (ref 0.9–1.15)
INR PPP: 4.06 (ref 0.85–1.15)
INR PPP: 6.31 (ref 0.9–1.15)
INR PPP: 6.39 (ref 0.9–1.15)
INR PPP: 8.52 (ref 0.9–1.15)
INTERPRETATION ECG - MUSE: NORMAL
KETONES UR STRIP-MCNC: NEGATIVE MG/DL
LABORATORY COMMENT REPORT: NORMAL
LACTATE SERPL-SCNC: 2.1 MMOL/L (ref 0.7–2)
LACTATE SERPL-SCNC: 3 MMOL/L (ref 0.7–2)
LEUKOCYTE ESTERASE UR QL STRIP: ABNORMAL
LYMPHOCYTES # BLD AUTO: 0.6 THOU/UL (ref 0.8–4.4)
LYMPHOCYTES # BLD AUTO: 1.1 10E9/L (ref 0.8–5.3)
LYMPHOCYTES # BLD AUTO: 1.1 10E9/L (ref 0.8–5.3)
LYMPHOCYTES # BLD AUTO: 1.2 10E9/L (ref 0.8–5.3)
LYMPHOCYTES # BLD AUTO: 1.2 10E9/L (ref 0.8–5.3)
LYMPHOCYTES # BLD AUTO: 1.2 THOU/UL (ref 0.8–4.4)
LYMPHOCYTES # BLD AUTO: 1.3 10E9/L (ref 0.8–5.3)
LYMPHOCYTES # BLD MANUAL: 1 10E3/UL (ref 0.8–5.3)
LYMPHOCYTES # BLD MANUAL: 1.2 10E3/UL (ref 0.8–5.3)
LYMPHOCYTES NFR BLD AUTO: 10 % (ref 20–40)
LYMPHOCYTES NFR BLD AUTO: 16.1 %
LYMPHOCYTES NFR BLD AUTO: 16.7 %
LYMPHOCYTES NFR BLD AUTO: 17 % (ref 20–40)
LYMPHOCYTES NFR BLD AUTO: 17.1 %
LYMPHOCYTES NFR BLD AUTO: 17.5 %
LYMPHOCYTES NFR BLD AUTO: 18.7 %
LYMPHOCYTES NFR BLD MANUAL: 18 %
LYMPHOCYTES NFR BLD MANUAL: 26 %
Lab: ABNORMAL
Lab: NORMAL
MAGNESIUM SERPL-MCNC: 1.7 MG/DL (ref 1.8–2.6)
MAGNESIUM SERPL-MCNC: 1.9 MG/DL (ref 1.8–2.6)
MAGNESIUM SERPL-MCNC: 2 MG/DL (ref 1.6–2.3)
MAGNESIUM SERPL-MCNC: 2.1 MG/DL (ref 1.8–2.6)
MAGNESIUM SERPL-MCNC: 2.1 MG/DL (ref 1.8–2.6)
MAGNESIUM SERPL-MCNC: 3.2 MG/DL (ref 1.8–2.6)
MCH RBC QN AUTO: 28.2 PG (ref 26.5–33)
MCH RBC QN AUTO: 28.2 PG (ref 26.5–33)
MCH RBC QN AUTO: 28.6 PG (ref 27–34)
MCH RBC QN AUTO: 29 PG (ref 26.5–33)
MCH RBC QN AUTO: 29 PG (ref 26.5–33)
MCH RBC QN AUTO: 29.1 PG (ref 26.5–33)
MCH RBC QN AUTO: 29.3 PG (ref 26.5–33)
MCH RBC QN AUTO: 29.6 PG (ref 26.5–33)
MCH RBC QN AUTO: 29.7 PG (ref 27–34)
MCH RBC QN AUTO: 29.8 PG (ref 26.5–33)
MCH RBC QN AUTO: 30 PG (ref 26.5–33)
MCH RBC QN AUTO: 30.3 PG (ref 26.5–33)
MCH RBC QN AUTO: 30.3 PG (ref 26.5–33)
MCH RBC QN AUTO: 30.4 PG (ref 26.5–33)
MCH RBC QN AUTO: 31 PG (ref 27–34)
MCH RBC QN AUTO: NORMAL PG (ref 26.5–33)
MCHC RBC AUTO-ENTMCNC: 29.7 G/DL (ref 31.5–36.5)
MCHC RBC AUTO-ENTMCNC: 30 G/DL (ref 31.5–36.5)
MCHC RBC AUTO-ENTMCNC: 30 G/DL (ref 31.5–36.5)
MCHC RBC AUTO-ENTMCNC: 30.1 G/DL (ref 31.5–36.5)
MCHC RBC AUTO-ENTMCNC: 30.4 G/DL (ref 31.5–36.5)
MCHC RBC AUTO-ENTMCNC: 30.5 G/DL (ref 31.5–36.5)
MCHC RBC AUTO-ENTMCNC: 30.6 G/DL (ref 31.5–36.5)
MCHC RBC AUTO-ENTMCNC: 30.8 G/DL (ref 31.5–36.5)
MCHC RBC AUTO-ENTMCNC: 30.9 G/DL (ref 31.5–36.5)
MCHC RBC AUTO-ENTMCNC: 30.9 G/DL (ref 31.5–36.5)
MCHC RBC AUTO-ENTMCNC: 31 G/DL (ref 31.5–36.5)
MCHC RBC AUTO-ENTMCNC: 31.2 G/DL (ref 31.5–36.5)
MCHC RBC AUTO-ENTMCNC: 31.2 G/DL (ref 32–36)
MCHC RBC AUTO-ENTMCNC: 31.5 G/DL (ref 32–36)
MCHC RBC AUTO-ENTMCNC: 31.6 G/DL (ref 32–36)
MCHC RBC AUTO-ENTMCNC: NORMAL G/DL (ref 31.5–36.5)
MCV RBC AUTO: 101 FL (ref 78–100)
MCV RBC AUTO: 92 FL (ref 80–100)
MCV RBC AUTO: 94 FL (ref 78–100)
MCV RBC AUTO: 94 FL (ref 78–100)
MCV RBC AUTO: 94 FL (ref 80–100)
MCV RBC AUTO: 95 FL (ref 78–100)
MCV RBC AUTO: 96 FL (ref 78–100)
MCV RBC AUTO: 96 FL (ref 78–100)
MCV RBC AUTO: 97 FL (ref 78–100)
MCV RBC AUTO: 98 FL (ref 78–100)
MCV RBC AUTO: 98 FL (ref 80–100)
MCV RBC AUTO: 99 FL (ref 78–100)
MCV RBC AUTO: 99 FL (ref 78–100)
MCV RBC AUTO: NORMAL FL (ref 78–100)
MONOCYTES # BLD AUTO: 0.2 THOU/UL (ref 0–0.9)
MONOCYTES # BLD AUTO: 0.8 10E9/L (ref 0–1.3)
MONOCYTES # BLD AUTO: 0.8 10E9/L (ref 0–1.3)
MONOCYTES # BLD AUTO: 0.9 10E9/L (ref 0–1.3)
MONOCYTES # BLD AUTO: 1 10E9/L (ref 0–1.3)
MONOCYTES # BLD AUTO: 1 THOU/UL (ref 0–0.9)
MONOCYTES # BLD AUTO: 1.1 10E9/L (ref 0–1.3)
MONOCYTES # BLD MANUAL: 0.7 10E3/UL (ref 0–1.3)
MONOCYTES # BLD MANUAL: 0.9 10E3/UL (ref 0–1.3)
MONOCYTES NFR BLD AUTO: 10.9 %
MONOCYTES NFR BLD AUTO: 12.3 %
MONOCYTES NFR BLD AUTO: 12.9 %
MONOCYTES NFR BLD AUTO: 14 % (ref 2–10)
MONOCYTES NFR BLD AUTO: 14.4 %
MONOCYTES NFR BLD AUTO: 15.4 %
MONOCYTES NFR BLD AUTO: 4 % (ref 2–10)
MONOCYTES NFR BLD MANUAL: 15 %
MONOCYTES NFR BLD MANUAL: 16 %
MUCOUS THREADS #/AREA URNS LPF: PRESENT /LPF
NEUTROPHILS # BLD AUTO: 3.8 10E9/L (ref 1.6–8.3)
NEUTROPHILS # BLD AUTO: 4.3 10E9/L (ref 1.6–8.3)
NEUTROPHILS # BLD AUTO: 4.4 THOU/UL (ref 2–7.7)
NEUTROPHILS # BLD AUTO: 4.5 10E9/L (ref 1.6–8.3)
NEUTROPHILS # BLD AUTO: 4.6 10E9/L (ref 1.6–8.3)
NEUTROPHILS # BLD AUTO: 4.7 10E9/L (ref 1.6–8.3)
NEUTROPHILS # BLD AUTO: 5 THOU/UL (ref 2–7.7)
NEUTROPHILS # BLD MANUAL: 2.5 10E3/UL (ref 1.6–8.3)
NEUTROPHILS # BLD MANUAL: 3.5 10E3/UL (ref 1.6–8.3)
NEUTROPHILS NFR BLD AUTO: 60.8 %
NEUTROPHILS NFR BLD AUTO: 61.7 %
NEUTROPHILS NFR BLD AUTO: 62 % (ref 50–70)
NEUTROPHILS NFR BLD AUTO: 62.7 %
NEUTROPHILS NFR BLD AUTO: 66.9 %
NEUTROPHILS NFR BLD AUTO: 68.6 %
NEUTROPHILS NFR BLD AUTO: 85 % (ref 50–70)
NEUTROPHILS NFR BLD MANUAL: 53 %
NEUTROPHILS NFR BLD MANUAL: 65 %
NITRATE UR QL: NEGATIVE
NRBC # BLD AUTO: 0 10*3/UL
NRBC BLD AUTO-RTO: 0 /100
NT-PROBNP SERPL-MCNC: 3895 PG/ML (ref 0–900)
NT-PROBNP SERPL-MCNC: ABNORMAL PG/ML (ref 0–900)
OXYHEMOGLOBIN (VBG) - HISTORICAL: 60.8 % (ref 70–75)
OXYHGB MFR BLDV: 44.8 % (ref 70–75)
OXYHGB MFR BLDV: 62.3 % (ref 70–75)
P AXIS - MUSE: 53 DEGREES
P AXIS - MUSE: 60 DEGREES
PAP: NORMAL
PCO2 BLDV: 40 MM HG (ref 35–50)
PCO2 BLDV: 46 MM HG (ref 35–50)
PH BLDV: 7.36 [PH] (ref 7.35–7.45)
PH BLDV: 7.39 [PH] (ref 7.35–7.45)
PH UR STRIP: 5.5 PH (ref 5–7)
PH UR STRIP: 6 [PH] (ref 5–7)
PH UR STRIP: 7 [PH] (ref 5–7)
PHOSPHATE SERPL-MCNC: 6.1 MG/DL (ref 2.5–4.5)
PHOSPHATE SERPL-MCNC: 6.1 MG/DL (ref 2.5–4.5)
PLAT MORPH BLD: ABNORMAL
PLAT MORPH BLD: NORMAL
PLATELET # BLD AUTO: 191 10E3/UL (ref 150–450)
PLATELET # BLD AUTO: 196 10E9/L (ref 150–450)
PLATELET # BLD AUTO: 203 10E3/UL (ref 150–450)
PLATELET # BLD AUTO: 204 10E9/L (ref 150–450)
PLATELET # BLD AUTO: 212 10E3/UL (ref 150–450)
PLATELET # BLD AUTO: 212 10E9/L (ref 150–450)
PLATELET # BLD AUTO: 213 THOU/UL (ref 140–440)
PLATELET # BLD AUTO: 215 10E9/L (ref 150–450)
PLATELET # BLD AUTO: 225 10E9/L (ref 150–450)
PLATELET # BLD AUTO: 227 10E3/UL (ref 150–450)
PLATELET # BLD AUTO: 234 10E9/L (ref 150–450)
PLATELET # BLD AUTO: 237 THOU/UL (ref 140–440)
PLATELET # BLD AUTO: 239 10E9/L (ref 150–450)
PLATELET # BLD AUTO: 246 10E9/L (ref 150–450)
PLATELET # BLD AUTO: 249 THOU/UL (ref 140–440)
PLATELET # BLD AUTO: NORMAL 10E9/L (ref 150–450)
PMV BLD AUTO: 10.7 FL (ref 8.5–12.5)
PMV BLD AUTO: 11.2 FL (ref 8.5–12.5)
PMV BLD AUTO: 11.5 FL (ref 8.5–12.5)
PO2 BLDV: 30 MM HG (ref 25–47)
PO2 BLDV: 35 MM HG (ref 25–47)
POLYCHROMASIA BLD QL SMEAR: SLIGHT
POTASSIUM BLD-SCNC: 4.1 MMOL/L (ref 3.5–5)
POTASSIUM BLD-SCNC: 4.1 MMOL/L (ref 3.5–5)
POTASSIUM BLD-SCNC: 4.6 MMOL/L (ref 3.5–5)
POTASSIUM BLD-SCNC: 4.7 MMOL/L (ref 3.5–5)
POTASSIUM BLD-SCNC: 4.8 MMOL/L (ref 3.5–5)
POTASSIUM BLD-SCNC: 4.9 MMOL/L (ref 3.5–5)
POTASSIUM BLD-SCNC: 5 MMOL/L (ref 3.5–5)
POTASSIUM BLD-SCNC: 5.3 MMOL/L (ref 3.5–5)
POTASSIUM BLD-SCNC: 5.6 MMOL/L (ref 3.5–5)
POTASSIUM BLD-SCNC: 6.2 MMOL/L (ref 3.5–5)
POTASSIUM SERPL-SCNC: 3.6 MMOL/L (ref 3.4–5.3)
POTASSIUM SERPL-SCNC: 3.7 MMOL/L (ref 3.4–5.3)
POTASSIUM SERPL-SCNC: 3.9 MMOL/L (ref 3.4–5.3)
POTASSIUM SERPL-SCNC: 4 MMOL/L (ref 3.4–5.3)
POTASSIUM SERPL-SCNC: 4.1 MMOL/L (ref 3.4–5.3)
POTASSIUM SERPL-SCNC: 4.2 MMOL/L (ref 3.4–5.3)
POTASSIUM SERPL-SCNC: 4.3 MMOL/L (ref 3.4–5.3)
POTASSIUM SERPL-SCNC: 4.5 MMOL/L (ref 3.5–5)
POTASSIUM SERPL-SCNC: 4.6 MMOL/L (ref 3.4–5.3)
POTASSIUM SERPL-SCNC: 4.8 MMOL/L (ref 3.4–5.3)
POTASSIUM SERPL-SCNC: 5 MMOL/L (ref 3.4–5.3)
PR INTERVAL - MUSE: 138 MS
PR INTERVAL - MUSE: 190 MS
PROCALCITONIN SERPL-MCNC: 0.19 NG/ML
PROCALCITONIN SERPL-MCNC: 0.28 NG/ML
PROCALCITONIN SERPL-MCNC: 0.44 NG/ML (ref 0–0.49)
PROT SERPL-MCNC: 7.2 G/DL (ref 6–8)
PROT SERPL-MCNC: 7.7 G/DL (ref 6–8)
PROT SERPL-MCNC: 7.8 G/DL (ref 6.8–8.8)
PROT SERPL-MCNC: 8.1 G/DL (ref 6.8–8.8)
PROT SERPL-MCNC: 8.3 G/DL (ref 6.8–8.8)
PROT SERPL-MCNC: 8.3 G/DL (ref 6.8–8.8)
PROT SERPL-MCNC: 9.2 G/DL (ref 6–8)
QRS DURATION - MUSE: 72 MS
QRS DURATION - MUSE: 80 MS
QT - MUSE: 404 MS
QT - MUSE: 440 MS
QTC - MUSE: 453 MS
QTC - MUSE: 463 MS
R AXIS - MUSE: 56 DEGREES
R AXIS - MUSE: 98 DEGREES
RADIOLOGIST FLAGS: ABNORMAL
RBC # BLD AUTO: 2.67 10E12/L (ref 3.8–5.2)
RBC # BLD AUTO: 2.84 MILL/UL (ref 3.8–5.4)
RBC # BLD AUTO: 2.87 10E6/UL (ref 3.8–5.2)
RBC # BLD AUTO: 2.9 10E6/UL (ref 3.8–5.2)
RBC # BLD AUTO: 2.93 10E6/UL (ref 3.8–5.2)
RBC # BLD AUTO: 3.01 10E6/UL (ref 3.8–5.2)
RBC # BLD AUTO: 3.03 10E12/L (ref 3.8–5.2)
RBC # BLD AUTO: 3.03 10E12/L (ref 3.8–5.2)
RBC # BLD AUTO: 3.04 10E12/L (ref 3.8–5.2)
RBC # BLD AUTO: 3.08 MILL/UL (ref 3.8–5.4)
RBC # BLD AUTO: 3.12 10E12/L (ref 3.8–5.2)
RBC # BLD AUTO: 3.14 10E12/L (ref 3.8–5.2)
RBC # BLD AUTO: 3.21 10E12/L (ref 3.8–5.2)
RBC # BLD AUTO: 3.3 10E12/L (ref 3.8–5.2)
RBC # BLD AUTO: 4.38 MILL/UL (ref 3.8–5.4)
RBC # BLD AUTO: NORMAL 10E12/L (ref 3.8–5.2)
RBC #/AREA URNS AUTO: 15 /HPF (ref 0–2)
RBC #/AREA URNS AUTO: 153 /HPF (ref 0–2)
RBC #/AREA URNS AUTO: >182 /HPF (ref 0–2)
RBC #/AREA URNS AUTO: >182 /HPF (ref 0–2)
RBC MORPH BLD: ABNORMAL
RBC MORPH BLD: NORMAL
RBC URINE: >182 /HPF
RBC URINE: >182 /HPF
RSV RNA SPEC QL NAA+PROBE: NEGATIVE
RSV RNA SPEC QL NAA+PROBE: NEGATIVE
SAO2 % BLDV: 46 % (ref 70–75)
SARS-COV-2 RNA RESP QL NAA+PROBE: NEGATIVE
SODIUM SERPL-SCNC: 134 MMOL/L (ref 133–144)
SODIUM SERPL-SCNC: 134 MMOL/L (ref 133–144)
SODIUM SERPL-SCNC: 135 MMOL/L (ref 133–144)
SODIUM SERPL-SCNC: 135 MMOL/L (ref 133–144)
SODIUM SERPL-SCNC: 135 MMOL/L (ref 136–145)
SODIUM SERPL-SCNC: 137 MMOL/L (ref 136–145)
SODIUM SERPL-SCNC: 137 MMOL/L (ref 136–145)
SODIUM SERPL-SCNC: 138 MMOL/L (ref 133–144)
SODIUM SERPL-SCNC: 138 MMOL/L (ref 136–145)
SODIUM SERPL-SCNC: 139 MMOL/L (ref 133–144)
SODIUM SERPL-SCNC: 139 MMOL/L (ref 136–145)
SODIUM SERPL-SCNC: 140 MMOL/L (ref 133–144)
SODIUM SERPL-SCNC: 140 MMOL/L (ref 136–145)
SODIUM SERPL-SCNC: 140 MMOL/L (ref 136–145)
SODIUM SERPL-SCNC: 141 MMOL/L (ref 136–145)
SOURCE: ABNORMAL
SP GR UR STRIP: 1.01 (ref 1–1.03)
SP GR UR STRIP: 1.02 (ref 1–1.03)
SPECIMEN DESCRIPTION: NORMAL
SPECIMEN SOURCE CVX/VAG CYTO: NORMAL
SPECIMEN SOURCE: ABNORMAL
SPECIMEN SOURCE: NORMAL
SQUAMOUS #/AREA URNS AUTO: 24 /HPF (ref 0–1)
SQUAMOUS #/AREA URNS AUTO: 25 /HPF (ref 0–1)
SQUAMOUS #/AREA URNS AUTO: 3 /HPF (ref 0–1)
SQUAMOUS #/AREA URNS AUTO: 8 /HPF (ref 0–1)
SQUAMOUS EPITHELIAL: 60 /HPF
SYSTOLIC BLOOD PRESSURE - MUSE: 102 MMHG
SYSTOLIC BLOOD PRESSURE - MUSE: 139 MMHG
T AXIS - MUSE: 178 DEGREES
T AXIS - MUSE: 74 DEGREES
TRANS CELLS #/AREA URNS HPF: <1 /HPF (ref 0–1)
TRICHOMONAS, WET PREP: NORMAL
TROPONIN I SERPL-MCNC: 0.02 UG/L (ref 0–0.04)
TROPONIN I SERPL-MCNC: 0.03 UG/L (ref 0–0.04)
TROPONIN I SERPL-MCNC: 0.04 NG/ML (ref 0–0.29)
TROPONIN I SERPL-MCNC: 0.04 NG/ML (ref 0–0.29)
TSH SERPL DL<=0.005 MIU/L-ACNC: 1.67 UIU/ML (ref 0.3–5)
UROBILINOGEN UR STRIP-MCNC: <2 MG/DL
UROBILINOGEN UR STRIP-MCNC: <2 MG/DL
UROBILINOGEN UR STRIP-MCNC: NORMAL MG/DL (ref 0–2)
VA-%PRED-PRE: 61 %
VA-PRE: 2.95 L
VC-%PRED-PRE: 48 %
VC-PRE: 1.56 L
VC-PRED: 3.19 L
VENTRICULAR RATE- MUSE: 64 BPM
VENTRICULAR RATE- MUSE: 79 BPM
WBC # BLD AUTO: 4.8 10E3/UL (ref 4–11)
WBC # BLD AUTO: 5.4 10E3/UL (ref 4–11)
WBC # BLD AUTO: 5.5 10E3/UL (ref 4–11)
WBC # BLD AUTO: 5.9 10E3/UL (ref 4–11)
WBC # BLD AUTO: 5.9 10E9/L (ref 4–11)
WBC # BLD AUTO: 5.9 10E9/L (ref 4–11)
WBC # BLD AUTO: 6.1 10E9/L (ref 4–11)
WBC # BLD AUTO: 6.3 10E9/L (ref 4–11)
WBC # BLD AUTO: 6.7 10E9/L (ref 4–11)
WBC # BLD AUTO: 6.9 10E9/L (ref 4–11)
WBC # BLD AUTO: 6.9 10E9/L (ref 4–11)
WBC # BLD AUTO: 7.4 10E9/L (ref 4–11)
WBC # BLD AUTO: NORMAL 10E9/L (ref 4–11)
WBC #/AREA URNS AUTO: 108 /HPF (ref 0–5)
WBC #/AREA URNS AUTO: 146 /HPF (ref 0–5)
WBC #/AREA URNS AUTO: 222 /HPF (ref 0–5)
WBC #/AREA URNS AUTO: >182 /HPF (ref 0–5)
WBC CLUMPS #/AREA URNS HPF: PRESENT /HPF
WBC CLUMPS #/AREA URNS HPF: PRESENT /HPF
WBC URINE: >182 /HPF
WBC URINE: >182 /HPF
WBC: 5.9 THOU/UL (ref 4–11)
WBC: 7 THOU/UL (ref 4–11)
WBC: 8.3 THOU/UL (ref 4–11)
YEAST #/AREA URNS HPF: ABNORMAL /HPF
YEAST, WET PREP: NORMAL

## 2021-01-01 PROCEDURE — 250N000013 HC RX MED GY IP 250 OP 250 PS 637: Performed by: STUDENT IN AN ORGANIZED HEALTH CARE EDUCATION/TRAINING PROGRAM

## 2021-01-01 PROCEDURE — P9604 ONE-WAY ALLOW PRORATED TRIP: HCPCS | Mod: ORL | Performed by: INTERNAL MEDICINE

## 2021-01-01 PROCEDURE — 250N000013 HC RX MED GY IP 250 OP 250 PS 637: Performed by: INTERNAL MEDICINE

## 2021-01-01 PROCEDURE — 94640 AIRWAY INHALATION TREATMENT: CPT

## 2021-01-01 PROCEDURE — 85025 COMPLETE CBC W/AUTO DIFF WBC: CPT | Performed by: INTERNAL MEDICINE

## 2021-01-01 PROCEDURE — 70450 CT HEAD/BRAIN W/O DYE: CPT

## 2021-01-01 PROCEDURE — 83605 ASSAY OF LACTIC ACID: CPT | Performed by: HOSPITALIST

## 2021-01-01 PROCEDURE — 85610 PROTHROMBIN TIME: CPT | Mod: ORL | Performed by: INTERNAL MEDICINE

## 2021-01-01 PROCEDURE — 250N000009 HC RX 250: Performed by: PHYSICIAN ASSISTANT

## 2021-01-01 PROCEDURE — 71045 X-RAY EXAM CHEST 1 VIEW: CPT

## 2021-01-01 PROCEDURE — 99223 1ST HOSP IP/OBS HIGH 75: CPT | Mod: AI | Performed by: PHYSICIAN ASSISTANT

## 2021-01-01 PROCEDURE — 250N000013 HC RX MED GY IP 250 OP 250 PS 637: Performed by: PHYSICIAN ASSISTANT

## 2021-01-01 PROCEDURE — 999N000157 HC STATISTIC RCP TIME EA 10 MIN

## 2021-01-01 PROCEDURE — 93970 EXTREMITY STUDY: CPT

## 2021-01-01 PROCEDURE — 85610 PROTHROMBIN TIME: CPT | Performed by: INTERNAL MEDICINE

## 2021-01-01 PROCEDURE — 99285 EMERGENCY DEPT VISIT HI MDM: CPT | Performed by: INTERNAL MEDICINE

## 2021-01-01 PROCEDURE — 84484 ASSAY OF TROPONIN QUANT: CPT | Performed by: EMERGENCY MEDICINE

## 2021-01-01 PROCEDURE — 85025 COMPLETE CBC W/AUTO DIFF WBC: CPT | Performed by: EMERGENCY MEDICINE

## 2021-01-01 PROCEDURE — 120N000002 HC R&B MED SURG/OB UMMC

## 2021-01-01 PROCEDURE — 250N000011 HC RX IP 250 OP 636: Performed by: PHYSICIAN ASSISTANT

## 2021-01-01 PROCEDURE — 80053 COMPREHEN METABOLIC PANEL: CPT | Performed by: INTERNAL MEDICINE

## 2021-01-01 PROCEDURE — 99233 SBSQ HOSP IP/OBS HIGH 50: CPT | Performed by: HOSPITALIST

## 2021-01-01 PROCEDURE — 250N000013 HC RX MED GY IP 250 OP 250 PS 637: Performed by: OBSTETRICS & GYNECOLOGY

## 2021-01-01 PROCEDURE — 96374 THER/PROPH/DIAG INJ IV PUSH: CPT

## 2021-01-01 PROCEDURE — 86301 IMMUNOASSAY TUMOR CA 19-9: CPT | Performed by: INTERNAL MEDICINE

## 2021-01-01 PROCEDURE — 83735 ASSAY OF MAGNESIUM: CPT | Mod: ORL,AY | Performed by: INTERNAL MEDICINE

## 2021-01-01 PROCEDURE — 85014 HEMATOCRIT: CPT | Performed by: INTERNAL MEDICINE

## 2021-01-01 PROCEDURE — 83735 ASSAY OF MAGNESIUM: CPT | Performed by: INTERNAL MEDICINE

## 2021-01-01 PROCEDURE — 99233 SBSQ HOSP IP/OBS HIGH 50: CPT | Performed by: INTERNAL MEDICINE

## 2021-01-01 PROCEDURE — 87106 FUNGI IDENTIFICATION YEAST: CPT | Performed by: EMERGENCY MEDICINE

## 2021-01-01 PROCEDURE — 36415 COLL VENOUS BLD VENIPUNCTURE: CPT | Performed by: INTERNAL MEDICINE

## 2021-01-01 PROCEDURE — 250N000013 HC RX MED GY IP 250 OP 250 PS 637: Performed by: EMERGENCY MEDICINE

## 2021-01-01 PROCEDURE — 94640 AIRWAY INHALATION TREATMENT: CPT | Mod: 76

## 2021-01-01 PROCEDURE — 71045 X-RAY EXAM CHEST 1 VIEW: CPT | Mod: 26 | Performed by: RADIOLOGY

## 2021-01-01 PROCEDURE — 258N000003 HC RX IP 258 OP 636: Performed by: INTERNAL MEDICINE

## 2021-01-01 PROCEDURE — 36415 COLL VENOUS BLD VENIPUNCTURE: CPT | Performed by: STUDENT IN AN ORGANIZED HEALTH CARE EDUCATION/TRAINING PROGRAM

## 2021-01-01 PROCEDURE — 634N000001 HC RX 634: Performed by: INTERNAL MEDICINE

## 2021-01-01 PROCEDURE — 87340 HEPATITIS B SURFACE AG IA: CPT | Performed by: INTERNAL MEDICINE

## 2021-01-01 PROCEDURE — 99204 OFFICE O/P NEW MOD 45 MIN: CPT | Performed by: PHYSICIAN ASSISTANT

## 2021-01-01 PROCEDURE — P9047 ALBUMIN (HUMAN), 25%, 50ML: HCPCS | Performed by: INTERNAL MEDICINE

## 2021-01-01 PROCEDURE — 250N000013 HC RX MED GY IP 250 OP 250 PS 637: Performed by: HOSPITALIST

## 2021-01-01 PROCEDURE — 120N000001 HC R&B MED SURG/OB

## 2021-01-01 PROCEDURE — 250N000009 HC RX 250: Performed by: HOSPITALIST

## 2021-01-01 PROCEDURE — 634N000001 HC RX 634: Performed by: CLINICAL NURSE SPECIALIST

## 2021-01-01 PROCEDURE — 80053 COMPREHEN METABOLIC PANEL: CPT | Performed by: PHYSICIAN ASSISTANT

## 2021-01-01 PROCEDURE — 87624 HPV HI-RISK TYP POOLED RSLT: CPT | Performed by: OBSTETRICS & GYNECOLOGY

## 2021-01-01 PROCEDURE — 258N000003 HC RX IP 258 OP 636: Performed by: STUDENT IN AN ORGANIZED HEALTH CARE EDUCATION/TRAINING PROGRAM

## 2021-01-01 PROCEDURE — 87088 URINE BACTERIA CULTURE: CPT | Performed by: INTERNAL MEDICINE

## 2021-01-01 PROCEDURE — U0003 INFECTIOUS AGENT DETECTION BY NUCLEIC ACID (DNA OR RNA); SEVERE ACUTE RESPIRATORY SYNDROME CORONAVIRUS 2 (SARS-COV-2) (CORONAVIRUS DISEASE [COVID-19]), AMPLIFIED PROBE TECHNIQUE, MAKING USE OF HIGH THROUGHPUT TECHNOLOGIES AS DESCRIBED BY CMS-2020-01-R: HCPCS | Performed by: EMERGENCY MEDICINE

## 2021-01-01 PROCEDURE — 90937 HEMODIALYSIS REPEATED EVAL: CPT

## 2021-01-01 PROCEDURE — 86141 C-REACTIVE PROTEIN HS: CPT | Performed by: EMERGENCY MEDICINE

## 2021-01-01 PROCEDURE — 96376 TX/PRO/DX INJ SAME DRUG ADON: CPT

## 2021-01-01 PROCEDURE — 80048 BASIC METABOLIC PNL TOTAL CA: CPT | Performed by: INTERNAL MEDICINE

## 2021-01-01 PROCEDURE — 93005 ELECTROCARDIOGRAM TRACING: CPT | Performed by: EMERGENCY MEDICINE

## 2021-01-01 PROCEDURE — 87086 URINE CULTURE/COLONY COUNT: CPT | Performed by: EMERGENCY MEDICINE

## 2021-01-01 PROCEDURE — 250N000011 HC RX IP 250 OP 636: Performed by: CLINICAL NURSE SPECIALIST

## 2021-01-01 PROCEDURE — 86301 IMMUNOASSAY TUMOR CA 19-9: CPT | Performed by: STUDENT IN AN ORGANIZED HEALTH CARE EDUCATION/TRAINING PROGRAM

## 2021-01-01 PROCEDURE — 250N000011 HC RX IP 250 OP 636: Performed by: INTERNAL MEDICINE

## 2021-01-01 PROCEDURE — 97129 THER IVNTJ 1ST 15 MIN: CPT | Mod: GO

## 2021-01-01 PROCEDURE — 250N000011 HC RX IP 250 OP 636: Performed by: EMERGENCY MEDICINE

## 2021-01-01 PROCEDURE — 94729 DIFFUSING CAPACITY: CPT | Performed by: INTERNAL MEDICINE

## 2021-01-01 PROCEDURE — 99283 EMERGENCY DEPT VISIT LOW MDM: CPT | Performed by: EMERGENCY MEDICINE

## 2021-01-01 PROCEDURE — 99233 SBSQ HOSP IP/OBS HIGH 50: CPT | Performed by: STUDENT IN AN ORGANIZED HEALTH CARE EDUCATION/TRAINING PROGRAM

## 2021-01-01 PROCEDURE — 86706 HEP B SURFACE ANTIBODY: CPT | Performed by: CLINICAL NURSE SPECIALIST

## 2021-01-01 PROCEDURE — 86706 HEP B SURFACE ANTIBODY: CPT | Performed by: INTERNAL MEDICINE

## 2021-01-01 PROCEDURE — 93306 TTE W/DOPPLER COMPLETE: CPT | Mod: 26 | Performed by: INTERNAL MEDICINE

## 2021-01-01 PROCEDURE — 999N000127 HC STATISTIC PERIPHERAL IV START W US GUIDANCE

## 2021-01-01 PROCEDURE — 81001 URINALYSIS AUTO W/SCOPE: CPT | Performed by: INTERNAL MEDICINE

## 2021-01-01 PROCEDURE — 99285 EMERGENCY DEPT VISIT HI MDM: CPT | Mod: 25

## 2021-01-01 PROCEDURE — 96366 THER/PROPH/DIAG IV INF ADDON: CPT

## 2021-01-01 PROCEDURE — 81001 URINALYSIS AUTO W/SCOPE: CPT | Mod: ORL | Performed by: INTERNAL MEDICINE

## 2021-01-01 PROCEDURE — 76856 US EXAM PELVIC COMPLETE: CPT

## 2021-01-01 PROCEDURE — 999N001017 HC STATISTIC GLUCOSE BY METER IP

## 2021-01-01 PROCEDURE — 250N000011 HC RX IP 250 OP 636: Performed by: PEDIATRICS

## 2021-01-01 PROCEDURE — 74176 CT ABD & PELVIS W/O CONTRAST: CPT

## 2021-01-01 PROCEDURE — 93005 ELECTROCARDIOGRAM TRACING: CPT

## 2021-01-01 PROCEDURE — 81003 URINALYSIS AUTO W/O SCOPE: CPT | Performed by: EMERGENCY MEDICINE

## 2021-01-01 PROCEDURE — C9803 HOPD COVID-19 SPEC COLLECT: HCPCS

## 2021-01-01 PROCEDURE — 99223 1ST HOSP IP/OBS HIGH 75: CPT | Performed by: NURSE PRACTITIONER

## 2021-01-01 PROCEDURE — 96368 THER/DIAG CONCURRENT INF: CPT

## 2021-01-01 PROCEDURE — 5A1D70Z PERFORMANCE OF URINARY FILTRATION, INTERMITTENT, LESS THAN 6 HOURS PER DAY: ICD-10-PCS | Performed by: CLINICAL NURSE SPECIALIST

## 2021-01-01 PROCEDURE — 258N000003 HC RX IP 258 OP 636: Performed by: CLINICAL NURSE SPECIALIST

## 2021-01-01 PROCEDURE — 84100 ASSAY OF PHOSPHORUS: CPT | Performed by: INTERNAL MEDICINE

## 2021-01-01 PROCEDURE — 83880 ASSAY OF NATRIURETIC PEPTIDE: CPT | Performed by: EMERGENCY MEDICINE

## 2021-01-01 PROCEDURE — 81001 URINALYSIS AUTO W/SCOPE: CPT | Performed by: EMERGENCY MEDICINE

## 2021-01-01 PROCEDURE — G0463 HOSPITAL OUTPT CLINIC VISIT: HCPCS

## 2021-01-01 PROCEDURE — 97535 SELF CARE MNGMENT TRAINING: CPT | Mod: GO

## 2021-01-01 PROCEDURE — 85027 COMPLETE CBC AUTOMATED: CPT | Performed by: INTERNAL MEDICINE

## 2021-01-01 PROCEDURE — 120N000011 HC R&B TRANSPLANT UMMC

## 2021-01-01 PROCEDURE — 81015 MICROSCOPIC EXAM OF URINE: CPT | Performed by: EMERGENCY MEDICINE

## 2021-01-01 PROCEDURE — 36415 COLL VENOUS BLD VENIPUNCTURE: CPT | Performed by: HOSPITALIST

## 2021-01-01 PROCEDURE — 99223 1ST HOSP IP/OBS HIGH 75: CPT | Performed by: INTERNAL MEDICINE

## 2021-01-01 PROCEDURE — 250N000009 HC RX 250: Performed by: STUDENT IN AN ORGANIZED HEALTH CARE EDUCATION/TRAINING PROGRAM

## 2021-01-01 PROCEDURE — 87636 SARSCOV2 & INF A&B AMP PRB: CPT | Performed by: INTERNAL MEDICINE

## 2021-01-01 PROCEDURE — 87086 URINE CULTURE/COLONY COUNT: CPT | Performed by: PHYSICIAN ASSISTANT

## 2021-01-01 PROCEDURE — 85027 COMPLETE CBC AUTOMATED: CPT | Performed by: PHYSICIAN ASSISTANT

## 2021-01-01 PROCEDURE — 36415 COLL VENOUS BLD VENIPUNCTURE: CPT | Mod: ORL | Performed by: INTERNAL MEDICINE

## 2021-01-01 PROCEDURE — 999N000147 HC STATISTIC PT IP EVAL DEFER

## 2021-01-01 PROCEDURE — 250N000011 HC RX IP 250 OP 636: Performed by: HOSPITALIST

## 2021-01-01 PROCEDURE — 97166 OT EVAL MOD COMPLEX 45 MIN: CPT | Mod: GO

## 2021-01-01 PROCEDURE — 93010 ELECTROCARDIOGRAM REPORT: CPT | Performed by: EMERGENCY MEDICINE

## 2021-01-01 PROCEDURE — 85610 PROTHROMBIN TIME: CPT | Performed by: HOSPITALIST

## 2021-01-01 PROCEDURE — 99215 OFFICE O/P EST HI 40 MIN: CPT | Performed by: OBSTETRICS & GYNECOLOGY

## 2021-01-01 PROCEDURE — 83605 ASSAY OF LACTIC ACID: CPT | Performed by: EMERGENCY MEDICINE

## 2021-01-01 PROCEDURE — 82805 BLOOD GASES W/O2 SATURATION: CPT | Performed by: EMERGENCY MEDICINE

## 2021-01-01 PROCEDURE — 36415 COLL VENOUS BLD VENIPUNCTURE: CPT | Performed by: PHYSICIAN ASSISTANT

## 2021-01-01 PROCEDURE — 99233 SBSQ HOSP IP/OBS HIGH 50: CPT | Performed by: NURSE PRACTITIONER

## 2021-01-01 PROCEDURE — 80053 COMPREHEN METABOLIC PANEL: CPT | Performed by: EMERGENCY MEDICINE

## 2021-01-01 PROCEDURE — 87086 URINE CULTURE/COLONY COUNT: CPT | Performed by: INTERNAL MEDICINE

## 2021-01-01 PROCEDURE — 99239 HOSP IP/OBS DSCHRG MGMT >30: CPT | Performed by: INTERNAL MEDICINE

## 2021-01-01 PROCEDURE — 99231 SBSQ HOSP IP/OBS SF/LOW 25: CPT | Performed by: INTERNAL MEDICINE

## 2021-01-01 PROCEDURE — 83735 ASSAY OF MAGNESIUM: CPT | Performed by: EMERGENCY MEDICINE

## 2021-01-01 PROCEDURE — 86141 C-REACTIVE PROTEIN HS: CPT | Performed by: HOSPITALIST

## 2021-01-01 PROCEDURE — 93005 ELECTROCARDIOGRAM TRACING: CPT | Performed by: STUDENT IN AN ORGANIZED HEALTH CARE EDUCATION/TRAINING PROGRAM

## 2021-01-01 PROCEDURE — 36415 COLL VENOUS BLD VENIPUNCTURE: CPT | Performed by: EMERGENCY MEDICINE

## 2021-01-01 PROCEDURE — 96365 THER/PROPH/DIAG IV INF INIT: CPT

## 2021-01-01 PROCEDURE — 83880 ASSAY OF NATRIURETIC PEPTIDE: CPT | Mod: ORL | Performed by: INTERNAL MEDICINE

## 2021-01-01 PROCEDURE — 99239 HOSP IP/OBS DSCHRG MGMT >30: CPT | Performed by: STUDENT IN AN ORGANIZED HEALTH CARE EDUCATION/TRAINING PROGRAM

## 2021-01-01 PROCEDURE — 250N000012 HC RX MED GY IP 250 OP 636 PS 637: Performed by: STUDENT IN AN ORGANIZED HEALTH CARE EDUCATION/TRAINING PROGRAM

## 2021-01-01 PROCEDURE — 80053 COMPREHEN METABOLIC PANEL: CPT | Mod: ORL | Performed by: INTERNAL MEDICINE

## 2021-01-01 PROCEDURE — 90935 HEMODIALYSIS ONE EVALUATION: CPT

## 2021-01-01 PROCEDURE — 99223 1ST HOSP IP/OBS HIGH 75: CPT | Performed by: HOSPITALIST

## 2021-01-01 PROCEDURE — 250N000013 HC RX MED GY IP 250 OP 250 PS 637: Performed by: NURSE PRACTITIONER

## 2021-01-01 PROCEDURE — 99207 PR APP CREDIT; MD BILLING SHARED VISIT: CPT | Performed by: PHYSICIAN ASSISTANT

## 2021-01-01 PROCEDURE — 85027 COMPLETE CBC AUTOMATED: CPT | Mod: ORL,AY | Performed by: INTERNAL MEDICINE

## 2021-01-01 PROCEDURE — 250N000013 HC RX MED GY IP 250 OP 250 PS 637: Performed by: PEDIATRICS

## 2021-01-01 PROCEDURE — 99356 PR PROLONGED SERV,INPATIENT,1ST HR: CPT | Performed by: NURSE PRACTITIONER

## 2021-01-01 PROCEDURE — 250N000011 HC RX IP 250 OP 636: Performed by: STUDENT IN AN ORGANIZED HEALTH CARE EDUCATION/TRAINING PROGRAM

## 2021-01-01 PROCEDURE — 87635 SARS-COV-2 COVID-19 AMP PRB: CPT | Performed by: EMERGENCY MEDICINE

## 2021-01-01 PROCEDURE — 99285 EMERGENCY DEPT VISIT HI MDM: CPT | Mod: 25 | Performed by: EMERGENCY MEDICINE

## 2021-01-01 PROCEDURE — 99443 PR PHYSICIAN TELEPHONE EVALUATION 21-30 MIN: CPT | Mod: 95 | Performed by: INTERNAL MEDICINE

## 2021-01-01 PROCEDURE — 99223 1ST HOSP IP/OBS HIGH 75: CPT | Mod: AI | Performed by: INTERNAL MEDICINE

## 2021-01-01 PROCEDURE — 99233 SBSQ HOSP IP/OBS HIGH 50: CPT | Performed by: PEDIATRICS

## 2021-01-01 PROCEDURE — 82378 CARCINOEMBRYONIC ANTIGEN: CPT | Performed by: STUDENT IN AN ORGANIZED HEALTH CARE EDUCATION/TRAINING PROGRAM

## 2021-01-01 PROCEDURE — 84145 PROCALCITONIN (PCT): CPT | Performed by: EMERGENCY MEDICINE

## 2021-01-01 PROCEDURE — 87186 SC STD MICRODIL/AGAR DIL: CPT | Performed by: INTERNAL MEDICINE

## 2021-01-01 PROCEDURE — 87086 URINE CULTURE/COLONY COUNT: CPT | Mod: ORL | Performed by: INTERNAL MEDICINE

## 2021-01-01 PROCEDURE — G0180 MD CERTIFICATION HHA PATIENT: HCPCS | Performed by: FAMILY MEDICINE

## 2021-01-01 PROCEDURE — 99284 EMERGENCY DEPT VISIT MOD MDM: CPT | Performed by: EMERGENCY MEDICINE

## 2021-01-01 PROCEDURE — 99239 HOSP IP/OBS DSCHRG MGMT >30: CPT | Performed by: PEDIATRICS

## 2021-01-01 PROCEDURE — 94375 RESPIRATORY FLOW VOLUME LOOP: CPT | Performed by: INTERNAL MEDICINE

## 2021-01-01 PROCEDURE — 84443 ASSAY THYROID STIM HORMONE: CPT | Performed by: EMERGENCY MEDICINE

## 2021-01-01 PROCEDURE — 96365 THER/PROPH/DIAG IV INF INIT: CPT | Mod: 59

## 2021-01-01 PROCEDURE — 87040 BLOOD CULTURE FOR BACTERIA: CPT | Performed by: INTERNAL MEDICINE

## 2021-01-01 PROCEDURE — 83036 HEMOGLOBIN GLYCOSYLATED A1C: CPT | Performed by: INTERNAL MEDICINE

## 2021-01-01 PROCEDURE — 99222 1ST HOSP IP/OBS MODERATE 55: CPT | Performed by: INTERNAL MEDICINE

## 2021-01-01 PROCEDURE — 97162 PT EVAL MOD COMPLEX 30 MIN: CPT | Mod: GP

## 2021-01-01 PROCEDURE — 99232 SBSQ HOSP IP/OBS MODERATE 35: CPT | Performed by: INTERNAL MEDICINE

## 2021-01-01 PROCEDURE — 93970 EXTREMITY STUDY: CPT | Mod: 26 | Performed by: RADIOLOGY

## 2021-01-01 PROCEDURE — 85014 HEMATOCRIT: CPT | Performed by: EMERGENCY MEDICINE

## 2021-01-01 PROCEDURE — 90935 HEMODIALYSIS ONE EVALUATION: CPT | Performed by: INTERNAL MEDICINE

## 2021-01-01 PROCEDURE — 84145 PROCALCITONIN (PCT): CPT | Performed by: INTERNAL MEDICINE

## 2021-01-01 PROCEDURE — 82565 ASSAY OF CREATININE: CPT | Performed by: HOSPITALIST

## 2021-01-01 PROCEDURE — 76770 US EXAM ABDO BACK WALL COMP: CPT | Mod: 26 | Performed by: RADIOLOGY

## 2021-01-01 PROCEDURE — 88175 CYTOPATH C/V AUTO FLUID REDO: CPT | Performed by: OBSTETRICS & GYNECOLOGY

## 2021-01-01 PROCEDURE — 74181 MRI ABDOMEN W/O CONTRAST: CPT | Mod: ME

## 2021-01-01 PROCEDURE — 73701 CT LOWER EXTREMITY W/DYE: CPT | Mod: LT

## 2021-01-01 PROCEDURE — 258N000003 HC RX IP 258 OP 636: Performed by: HOSPITALIST

## 2021-01-01 PROCEDURE — 86140 C-REACTIVE PROTEIN: CPT | Performed by: INTERNAL MEDICINE

## 2021-01-01 PROCEDURE — 87040 BLOOD CULTURE FOR BACTERIA: CPT | Performed by: PHYSICIAN ASSISTANT

## 2021-01-01 PROCEDURE — 82040 ASSAY OF SERUM ALBUMIN: CPT | Performed by: EMERGENCY MEDICINE

## 2021-01-01 PROCEDURE — 76770 US EXAM ABDO BACK WALL COMP: CPT

## 2021-01-01 PROCEDURE — U0005 INFEC AGEN DETEC AMPLI PROBE: HCPCS | Performed by: EMERGENCY MEDICINE

## 2021-01-01 PROCEDURE — 99204 OFFICE O/P NEW MOD 45 MIN: CPT | Performed by: OBSTETRICS & GYNECOLOGY

## 2021-01-01 PROCEDURE — 87636 SARSCOV2 & INF A&B AMP PRB: CPT | Performed by: EMERGENCY MEDICINE

## 2021-01-01 PROCEDURE — 99207 PR CDG-MDM COMPONENT: MEETS HIGH - UP CODED: CPT | Performed by: PEDIATRICS

## 2021-01-01 PROCEDURE — 87040 BLOOD CULTURE FOR BACTERIA: CPT | Performed by: EMERGENCY MEDICINE

## 2021-01-01 PROCEDURE — 93306 TTE W/DOPPLER COMPLETE: CPT

## 2021-01-01 PROCEDURE — 87340 HEPATITIS B SURFACE AG IA: CPT | Performed by: CLINICAL NURSE SPECIALIST

## 2021-01-01 PROCEDURE — 5A1D70Z PERFORMANCE OF URINARY FILTRATION, INTERMITTENT, LESS THAN 6 HOURS PER DAY: ICD-10-PCS | Performed by: INTERNAL MEDICINE

## 2021-01-01 PROCEDURE — 84100 ASSAY OF PHOSPHORUS: CPT | Performed by: HOSPITALIST

## 2021-01-01 PROCEDURE — 81001 URINALYSIS AUTO W/SCOPE: CPT | Performed by: PHYSICIAN ASSISTANT

## 2021-01-01 PROCEDURE — 80048 BASIC METABOLIC PNL TOTAL CA: CPT | Performed by: PHYSICIAN ASSISTANT

## 2021-01-01 PROCEDURE — 94664 DEMO&/EVAL PT USE INHALER: CPT

## 2021-01-01 PROCEDURE — 82140 ASSAY OF AMMONIA: CPT | Performed by: EMERGENCY MEDICINE

## 2021-01-01 PROCEDURE — 86304 IMMUNOASSAY TUMOR CA 125: CPT | Performed by: INTERNAL MEDICINE

## 2021-01-01 RX ORDER — VITAMIN B COMPLEX
500 TABLET ORAL 2 TIMES DAILY
COMMUNITY
End: 2021-01-01

## 2021-01-01 RX ORDER — GUAIFENESIN 600 MG/1
600 TABLET, EXTENDED RELEASE ORAL 2 TIMES DAILY
Status: DISCONTINUED | OUTPATIENT
Start: 2021-01-01 | End: 2021-01-01

## 2021-01-01 RX ORDER — ACETAMINOPHEN 650 MG/1
650 SUPPOSITORY RECTAL EVERY 4 HOURS PRN
Qty: 4 SUPPOSITORY | Refills: 0 | Status: SHIPPED | OUTPATIENT
Start: 2021-01-01

## 2021-01-01 RX ORDER — ONDANSETRON 2 MG/ML
4 INJECTION INTRAMUSCULAR; INTRAVENOUS EVERY 6 HOURS PRN
Status: DISCONTINUED | OUTPATIENT
Start: 2021-01-01 | End: 2021-01-01 | Stop reason: HOSPADM

## 2021-01-01 RX ORDER — LIDOCAINE 4 G/G
1 PATCH TOPICAL DAILY PRN
Status: DISCONTINUED | OUTPATIENT
Start: 2021-01-01 | End: 2021-01-01 | Stop reason: HOSPADM

## 2021-01-01 RX ORDER — ACETAMINOPHEN 500 MG
1000 TABLET ORAL EVERY 6 HOURS PRN
Status: ON HOLD | COMMUNITY
End: 2021-01-01

## 2021-01-01 RX ORDER — FUROSEMIDE 40 MG
40 TABLET ORAL DAILY
Qty: 7 TABLET | Refills: 0 | Status: SHIPPED | OUTPATIENT
Start: 2021-01-01 | End: 2021-01-01

## 2021-01-01 RX ORDER — COLCHICINE 0.6 MG
0.3 TABLET ORAL WEEKLY
Status: DISCONTINUED | OUTPATIENT
Start: 2021-01-01 | End: 2021-01-01 | Stop reason: HOSPADM

## 2021-01-01 RX ORDER — IPRATROPIUM BROMIDE AND ALBUTEROL SULFATE 2.5; .5 MG/3ML; MG/3ML
1 SOLUTION RESPIRATORY (INHALATION) EVERY 6 HOURS PRN
Status: DISCONTINUED | OUTPATIENT
Start: 2021-01-01 | End: 2021-01-01 | Stop reason: HOSPADM

## 2021-01-01 RX ORDER — LANOLIN ALCOHOL/MO/W.PET/CERES
400 CREAM (GRAM) TOPICAL 2 TIMES DAILY
COMMUNITY
End: 2021-01-01

## 2021-01-01 RX ORDER — LORAZEPAM 0.5 MG/1
0.5 TABLET ORAL EVERY 4 HOURS PRN
Status: DISCONTINUED | OUTPATIENT
Start: 2021-01-01 | End: 2021-01-01 | Stop reason: HOSPADM

## 2021-01-01 RX ORDER — SERTRALINE HYDROCHLORIDE 25 MG/1
25 TABLET, FILM COATED ORAL DAILY
Status: ON HOLD | COMMUNITY
End: 2021-01-01

## 2021-01-01 RX ORDER — ACETAMINOPHEN 325 MG/1
650 TABLET ORAL 4 TIMES DAILY
COMMUNITY
End: 2021-01-01

## 2021-01-01 RX ORDER — TORSEMIDE 20 MG/1
20 TABLET ORAL 2 TIMES DAILY
Status: ON HOLD | COMMUNITY
End: 2021-01-01

## 2021-01-01 RX ORDER — LIDOCAINE 40 MG/G
CREAM TOPICAL
Status: DISCONTINUED | OUTPATIENT
Start: 2021-01-01 | End: 2021-01-01 | Stop reason: HOSPADM

## 2021-01-01 RX ORDER — IPRATROPIUM BROMIDE AND ALBUTEROL SULFATE 2.5; .5 MG/3ML; MG/3ML
1 SOLUTION RESPIRATORY (INHALATION) 4 TIMES DAILY
Status: DISCONTINUED | OUTPATIENT
Start: 2021-01-01 | End: 2021-01-01 | Stop reason: HOSPADM

## 2021-01-01 RX ORDER — ATROPINE SULFATE 10 MG/ML
2 SOLUTION/ DROPS OPHTHALMIC EVERY 4 HOURS PRN
Status: DISCONTINUED | OUTPATIENT
Start: 2021-01-01 | End: 2021-01-01 | Stop reason: HOSPADM

## 2021-01-01 RX ORDER — DIPHENHYDRAMINE HCL 25 MG
25 CAPSULE ORAL 2 TIMES DAILY PRN
Status: DISCONTINUED | OUTPATIENT
Start: 2021-01-01 | End: 2021-01-01 | Stop reason: HOSPADM

## 2021-01-01 RX ORDER — LOSARTAN POTASSIUM 50 MG/1
50 TABLET ORAL DAILY
Status: DISCONTINUED | OUTPATIENT
Start: 2021-01-01 | End: 2021-01-01

## 2021-01-01 RX ORDER — OXYCODONE HYDROCHLORIDE 5 MG/1
5 TABLET ORAL EVERY 4 HOURS PRN
Status: DISCONTINUED | OUTPATIENT
Start: 2021-01-01 | End: 2021-01-01

## 2021-01-01 RX ORDER — NALOXONE HYDROCHLORIDE 0.4 MG/ML
0.2 INJECTION, SOLUTION INTRAMUSCULAR; INTRAVENOUS; SUBCUTANEOUS
Status: DISCONTINUED | OUTPATIENT
Start: 2021-01-01 | End: 2021-01-01

## 2021-01-01 RX ORDER — BISACODYL 10 MG
10 SUPPOSITORY, RECTAL RECTAL DAILY PRN
Status: DISCONTINUED | OUTPATIENT
Start: 2021-01-01 | End: 2021-01-01 | Stop reason: HOSPADM

## 2021-01-01 RX ORDER — HYDROMORPHONE HYDROCHLORIDE 1 MG/ML
.3-.5 INJECTION, SOLUTION INTRAMUSCULAR; INTRAVENOUS; SUBCUTANEOUS
Status: DISCONTINUED | OUTPATIENT
Start: 2021-01-01 | End: 2021-01-01 | Stop reason: HOSPADM

## 2021-01-01 RX ORDER — ONDANSETRON 4 MG/1
4 TABLET, ORALLY DISINTEGRATING ORAL EVERY 6 HOURS PRN
Status: DISCONTINUED | OUTPATIENT
Start: 2021-01-01 | End: 2021-01-01 | Stop reason: HOSPADM

## 2021-01-01 RX ORDER — ACETAMINOPHEN 325 MG/1
650 TABLET ORAL EVERY 4 HOURS PRN
Status: DISCONTINUED | OUTPATIENT
Start: 2021-01-01 | End: 2021-01-01 | Stop reason: HOSPADM

## 2021-01-01 RX ORDER — SODIUM POLYSTYRENE SULFONATE 15 G/60ML
15 SUSPENSION ORAL; RECTAL ONCE
Status: COMPLETED | OUTPATIENT
Start: 2021-01-01 | End: 2021-01-01

## 2021-01-01 RX ORDER — ROSUVASTATIN CALCIUM 10 MG/1
10 TABLET, COATED ORAL AT BEDTIME
Status: DISCONTINUED | OUTPATIENT
Start: 2021-01-01 | End: 2021-01-01 | Stop reason: HOSPADM

## 2021-01-01 RX ORDER — HEPARIN SODIUM 1000 [USP'U]/ML
500 INJECTION, SOLUTION INTRAVENOUS; SUBCUTANEOUS
Status: COMPLETED | OUTPATIENT
Start: 2021-01-01 | End: 2021-01-01

## 2021-01-01 RX ORDER — LOSARTAN POTASSIUM 50 MG/1
50 TABLET ORAL DAILY
COMMUNITY
End: 2021-01-01

## 2021-01-01 RX ORDER — FUROSEMIDE 10 MG/ML
20 INJECTION INTRAMUSCULAR; INTRAVENOUS ONCE
Status: COMPLETED | OUTPATIENT
Start: 2021-01-01 | End: 2021-01-01

## 2021-01-01 RX ORDER — HEPARIN SODIUM 5000 [USP'U]/.5ML
5000 INJECTION, SOLUTION INTRAVENOUS; SUBCUTANEOUS EVERY 12 HOURS
Status: DISCONTINUED | OUTPATIENT
Start: 2021-01-01 | End: 2021-01-01 | Stop reason: HOSPADM

## 2021-01-01 RX ORDER — HEPARIN SODIUM 1000 [USP'U]/ML
500 INJECTION, SOLUTION INTRAVENOUS; SUBCUTANEOUS CONTINUOUS
Status: DISCONTINUED | OUTPATIENT
Start: 2021-01-01 | End: 2021-01-01

## 2021-01-01 RX ORDER — IPRATROPIUM BROMIDE AND ALBUTEROL SULFATE 2.5; .5 MG/3ML; MG/3ML
1 SOLUTION RESPIRATORY (INHALATION) 4 TIMES DAILY
Qty: 300 ML | Refills: 3 | Status: ON HOLD | OUTPATIENT
Start: 2021-01-01 | End: 2021-01-01

## 2021-01-01 RX ORDER — MICONAZOLE NITRATE 2 %
1 CREAM WITH APPLICATOR VAGINAL AT BEDTIME
Qty: 45 G | Refills: 0 | Status: CANCELLED | OUTPATIENT
Start: 2021-01-01

## 2021-01-01 RX ORDER — GABAPENTIN 100 MG/1
200 CAPSULE ORAL
Status: DISCONTINUED | OUTPATIENT
Start: 2021-01-01 | End: 2021-01-01

## 2021-01-01 RX ORDER — CEFDINIR 300 MG/1
300 CAPSULE ORAL DAILY
Qty: 7 CAPSULE | Refills: 0 | Status: SHIPPED | OUTPATIENT
Start: 2021-01-01 | End: 2021-01-01

## 2021-01-01 RX ORDER — POLYETHYLENE GLYCOL 3350 17 G/17G
17 POWDER, FOR SOLUTION ORAL DAILY PRN
Status: DISCONTINUED | OUTPATIENT
Start: 2021-01-01 | End: 2021-01-01

## 2021-01-01 RX ORDER — ALBUTEROL SULFATE 90 UG/1
2 AEROSOL, METERED RESPIRATORY (INHALATION) EVERY 6 HOURS PRN
Status: DISCONTINUED | OUTPATIENT
Start: 2021-01-01 | End: 2021-01-01 | Stop reason: HOSPADM

## 2021-01-01 RX ORDER — BISACODYL 10 MG
10 SUPPOSITORY, RECTAL RECTAL DAILY PRN
COMMUNITY
End: 2021-01-01

## 2021-01-01 RX ORDER — LORAZEPAM 2 MG/ML
1 INJECTION INTRAMUSCULAR
Status: DISCONTINUED | OUTPATIENT
Start: 2021-01-01 | End: 2021-01-01

## 2021-01-01 RX ORDER — ACETAMINOPHEN 325 MG/1
975 TABLET ORAL 3 TIMES DAILY
Status: DISCONTINUED | OUTPATIENT
Start: 2021-01-01 | End: 2021-01-01

## 2021-01-01 RX ORDER — CALCIUM ACETATE 667 MG/1
667 CAPSULE ORAL
Status: DISCONTINUED | OUTPATIENT
Start: 2021-01-01 | End: 2021-01-01

## 2021-01-01 RX ORDER — IPRATROPIUM BROMIDE AND ALBUTEROL SULFATE 2.5; .5 MG/3ML; MG/3ML
1 SOLUTION RESPIRATORY (INHALATION) 4 TIMES DAILY
COMMUNITY
End: 2021-01-01

## 2021-01-01 RX ORDER — OXYCODONE HCL 20 MG/ML
5-15 CONCENTRATE, ORAL ORAL
Status: DISCONTINUED | OUTPATIENT
Start: 2021-01-01 | End: 2021-01-01 | Stop reason: HOSPADM

## 2021-01-01 RX ORDER — AMOXICILLIN 250 MG
2 CAPSULE ORAL 2 TIMES DAILY PRN
Status: ON HOLD | COMMUNITY
End: 2021-01-01

## 2021-01-01 RX ORDER — NIFEDIPINE 30 MG
30 TABLET, EXTENDED RELEASE ORAL DAILY
COMMUNITY
End: 2021-01-01

## 2021-01-01 RX ORDER — GABAPENTIN 100 MG/1
200 CAPSULE ORAL
COMMUNITY
End: 2021-01-01

## 2021-01-01 RX ORDER — SODIUM CHLORIDE 9 MG/ML
INJECTION, SOLUTION INTRAVENOUS CONTINUOUS
Status: DISCONTINUED | OUTPATIENT
Start: 2021-01-01 | End: 2021-01-01 | Stop reason: HOSPADM

## 2021-01-01 RX ORDER — LINEZOLID 2 MG/ML
600 INJECTION, SOLUTION INTRAVENOUS EVERY 12 HOURS
Status: DISCONTINUED | OUTPATIENT
Start: 2021-01-01 | End: 2021-01-01

## 2021-01-01 RX ORDER — DEXTROSE MONOHYDRATE 25 G/50ML
25-50 INJECTION, SOLUTION INTRAVENOUS
Status: DISCONTINUED | OUTPATIENT
Start: 2021-01-01 | End: 2021-01-01 | Stop reason: HOSPADM

## 2021-01-01 RX ORDER — BENZONATATE 200 MG/1
200 CAPSULE ORAL 3 TIMES DAILY
COMMUNITY
End: 2021-01-01

## 2021-01-01 RX ORDER — AMOXICILLIN 250 MG
2 CAPSULE ORAL 2 TIMES DAILY
Status: DISCONTINUED | OUTPATIENT
Start: 2021-01-01 | End: 2021-01-01 | Stop reason: HOSPADM

## 2021-01-01 RX ORDER — ALLOPURINOL 100 MG/1
50 TABLET ORAL EVERY EVENING
Status: DISCONTINUED | OUTPATIENT
Start: 2021-01-01 | End: 2021-01-01

## 2021-01-01 RX ORDER — NICOTINE POLACRILEX 4 MG
15-30 LOZENGE BUCCAL
Status: DISCONTINUED | OUTPATIENT
Start: 2021-01-01 | End: 2021-01-01

## 2021-01-01 RX ORDER — SENNOSIDES 8.6 MG
1 TABLET ORAL 2 TIMES DAILY
Status: DISCONTINUED | OUTPATIENT
Start: 2021-01-01 | End: 2021-01-01

## 2021-01-01 RX ORDER — FLUTICASONE PROPIONATE 50 MCG
2 SPRAY, SUSPENSION (ML) NASAL DAILY
Status: DISCONTINUED | OUTPATIENT
Start: 2021-01-01 | End: 2021-01-01 | Stop reason: HOSPADM

## 2021-01-01 RX ORDER — SERTRALINE HYDROCHLORIDE 25 MG/1
25 TABLET, FILM COATED ORAL DAILY
Status: DISCONTINUED | OUTPATIENT
Start: 2021-01-01 | End: 2021-01-01

## 2021-01-01 RX ORDER — MONTELUKAST SODIUM 10 MG/1
10 TABLET ORAL AT BEDTIME
Status: DISCONTINUED | OUTPATIENT
Start: 2021-01-01 | End: 2021-01-01

## 2021-01-01 RX ORDER — FUROSEMIDE 10 MG/ML
80 INJECTION INTRAMUSCULAR; INTRAVENOUS ONCE
Status: COMPLETED | OUTPATIENT
Start: 2021-01-01 | End: 2021-01-01

## 2021-01-01 RX ORDER — ASPIRIN 81 MG
100 TABLET, DELAYED RELEASE (ENTERIC COATED) ORAL DAILY
COMMUNITY
End: 2021-01-01

## 2021-01-01 RX ORDER — HALOPERIDOL 2 MG/ML
1 SOLUTION ORAL EVERY 6 HOURS PRN
Qty: 15 ML | Refills: 0 | Status: SHIPPED | OUTPATIENT
Start: 2021-01-01

## 2021-01-01 RX ORDER — WARFARIN SODIUM 5 MG/1
5 TABLET ORAL DAILY
Status: ON HOLD | COMMUNITY
End: 2021-01-01

## 2021-01-01 RX ORDER — MONTELUKAST SODIUM 10 MG/1
10 TABLET ORAL AT BEDTIME
Status: DISCONTINUED | OUTPATIENT
Start: 2021-01-01 | End: 2021-01-01 | Stop reason: HOSPADM

## 2021-01-01 RX ORDER — CODEINE PHOSPHATE AND GUAIFENESIN 10; 100 MG/5ML; MG/5ML
5 SOLUTION ORAL ONCE
Status: COMPLETED | OUTPATIENT
Start: 2021-01-01 | End: 2021-01-01

## 2021-01-01 RX ORDER — ACETAMINOPHEN 325 MG/1
650 TABLET ORAL ONCE
Status: COMPLETED | OUTPATIENT
Start: 2021-01-01 | End: 2021-01-01

## 2021-01-01 RX ORDER — ACETAMINOPHEN 325 MG/1
650 TABLET ORAL 4 TIMES DAILY
Status: DISCONTINUED | OUTPATIENT
Start: 2021-01-01 | End: 2021-01-01 | Stop reason: HOSPADM

## 2021-01-01 RX ORDER — ASPIRIN 81 MG/1
81 TABLET ORAL DAILY
Status: DISCONTINUED | OUTPATIENT
Start: 2021-01-01 | End: 2021-01-01 | Stop reason: HOSPADM

## 2021-01-01 RX ORDER — CEFTRIAXONE 1 G/1
1 INJECTION, POWDER, FOR SOLUTION INTRAMUSCULAR; INTRAVENOUS ONCE
Status: COMPLETED | OUTPATIENT
Start: 2021-01-01 | End: 2021-01-01

## 2021-01-01 RX ORDER — NICOTINE POLACRILEX 4 MG
15-30 LOZENGE BUCCAL
Status: DISCONTINUED | OUTPATIENT
Start: 2021-01-01 | End: 2021-01-01 | Stop reason: HOSPADM

## 2021-01-01 RX ORDER — FLUCONAZOLE 100 MG/1
100 TABLET ORAL DAILY
Status: COMPLETED | OUTPATIENT
Start: 2021-01-01 | End: 2021-01-01

## 2021-01-01 RX ORDER — LORAZEPAM 2 MG/ML
1-2 INJECTION INTRAMUSCULAR
Status: DISCONTINUED | OUTPATIENT
Start: 2021-01-01 | End: 2021-01-01 | Stop reason: HOSPADM

## 2021-01-01 RX ORDER — AMOXICILLIN 250 MG
1 CAPSULE ORAL 2 TIMES DAILY PRN
Status: DISCONTINUED | OUTPATIENT
Start: 2021-01-01 | End: 2021-01-01 | Stop reason: HOSPADM

## 2021-01-01 RX ORDER — OXYBUTYNIN CHLORIDE 5 MG/1
5 TABLET, EXTENDED RELEASE ORAL EVERY EVENING
COMMUNITY
End: 2021-01-01

## 2021-01-01 RX ORDER — MIDODRINE HYDROCHLORIDE 5 MG/1
5 TABLET ORAL
Status: DISCONTINUED | OUTPATIENT
Start: 2021-01-01 | End: 2021-01-01

## 2021-01-01 RX ORDER — FUROSEMIDE 10 MG/ML
40 INJECTION INTRAMUSCULAR; INTRAVENOUS ONCE
Status: COMPLETED | OUTPATIENT
Start: 2021-01-01 | End: 2021-01-01

## 2021-01-01 RX ORDER — COLCHICINE 0.6 MG/1
0.3 TABLET ORAL WEEKLY
COMMUNITY
End: 2021-01-01

## 2021-01-01 RX ORDER — TIMOLOL MALEATE 5 MG/ML
1 SOLUTION/ DROPS OPHTHALMIC 2 TIMES DAILY
Status: DISCONTINUED | OUTPATIENT
Start: 2021-01-01 | End: 2021-01-01 | Stop reason: HOSPADM

## 2021-01-01 RX ORDER — MAGNESIUM OXIDE 400 MG/1
400 TABLET ORAL 2 TIMES DAILY
Status: DISCONTINUED | OUTPATIENT
Start: 2021-01-01 | End: 2021-01-01 | Stop reason: HOSPADM

## 2021-01-01 RX ORDER — HALOPERIDOL 5 MG/ML
1 INJECTION INTRAMUSCULAR
Status: DISCONTINUED | OUTPATIENT
Start: 2021-01-01 | End: 2021-01-01 | Stop reason: HOSPADM

## 2021-01-01 RX ORDER — NIFEDIPINE 30 MG/1
30 TABLET, EXTENDED RELEASE ORAL DAILY
Status: DISCONTINUED | OUTPATIENT
Start: 2021-01-01 | End: 2021-01-01 | Stop reason: HOSPADM

## 2021-01-01 RX ORDER — LOSARTAN POTASSIUM 25 MG/1
50 TABLET ORAL DAILY
Status: DISCONTINUED | OUTPATIENT
Start: 2021-01-01 | End: 2021-01-01 | Stop reason: HOSPADM

## 2021-01-01 RX ORDER — ACETAMINOPHEN 650 MG/1
650 SUPPOSITORY RECTAL EVERY 6 HOURS PRN
Status: DISCONTINUED | OUTPATIENT
Start: 2021-01-01 | End: 2021-01-01 | Stop reason: HOSPADM

## 2021-01-01 RX ORDER — FLUTICASONE PROPIONATE 50 MCG
2 SPRAY, SUSPENSION (ML) NASAL DAILY PRN
Status: DISCONTINUED | OUTPATIENT
Start: 2021-01-01 | End: 2021-01-01 | Stop reason: HOSPADM

## 2021-01-01 RX ORDER — GABAPENTIN 100 MG/1
200 CAPSULE ORAL
Status: DISCONTINUED | OUTPATIENT
Start: 2021-01-01 | End: 2021-01-01 | Stop reason: HOSPADM

## 2021-01-01 RX ORDER — ROSUVASTATIN CALCIUM 10 MG/1
10 TABLET, COATED ORAL DAILY
Status: DISCONTINUED | OUTPATIENT
Start: 2021-01-01 | End: 2021-01-01 | Stop reason: HOSPADM

## 2021-01-01 RX ORDER — NITROFURANTOIN MACROCRYSTAL 100 MG
100 CAPSULE ORAL 4 TIMES DAILY
DISCHARGE
Start: 2021-01-01 | End: 2024-08-08

## 2021-01-01 RX ORDER — NALOXONE HYDROCHLORIDE 0.4 MG/ML
0.4 INJECTION, SOLUTION INTRAMUSCULAR; INTRAVENOUS; SUBCUTANEOUS
Status: DISCONTINUED | OUTPATIENT
Start: 2021-01-01 | End: 2021-01-01

## 2021-01-01 RX ORDER — IPRATROPIUM BROMIDE AND ALBUTEROL SULFATE 2.5; .5 MG/3ML; MG/3ML
3 SOLUTION RESPIRATORY (INHALATION) EVERY 4 HOURS PRN
Status: DISCONTINUED | OUTPATIENT
Start: 2021-01-01 | End: 2021-01-01 | Stop reason: HOSPADM

## 2021-01-01 RX ORDER — BISACODYL 10 MG
10 SUPPOSITORY, RECTAL RECTAL DAILY PRN
Qty: 5 SUPPOSITORY | Refills: 0 | COMMUNITY
Start: 2021-01-01

## 2021-01-01 RX ORDER — CALCIUM ACETATE 667 MG/1
667 CAPSULE ORAL
Status: ON HOLD | COMMUNITY
End: 2021-01-01

## 2021-01-01 RX ORDER — LORAZEPAM 0.5 MG/1
0.5 TABLET ORAL
Status: COMPLETED | OUTPATIENT
Start: 2021-01-01 | End: 2021-01-01

## 2021-01-01 RX ORDER — IOPAMIDOL 755 MG/ML
100 INJECTION, SOLUTION INTRAVASCULAR ONCE
Status: COMPLETED | OUTPATIENT
Start: 2021-01-01 | End: 2021-01-01

## 2021-01-01 RX ORDER — POLYETHYLENE GLYCOL 3350 17 G/17G
1 POWDER, FOR SOLUTION ORAL DAILY PRN
Status: ON HOLD | COMMUNITY
End: 2021-01-01

## 2021-01-01 RX ORDER — UBIDECARENONE 200 MG
200 CAPSULE ORAL DAILY
COMMUNITY
End: 2021-01-01

## 2021-01-01 RX ORDER — ROSUVASTATIN CALCIUM 10 MG/1
10 TABLET, COATED ORAL DAILY
Status: DISCONTINUED | OUTPATIENT
Start: 2021-01-01 | End: 2021-01-01

## 2021-01-01 RX ORDER — ALLOPURINOL 100 MG/1
100 TABLET ORAL DAILY
Status: DISCONTINUED | OUTPATIENT
Start: 2021-01-01 | End: 2021-01-01 | Stop reason: HOSPADM

## 2021-01-01 RX ORDER — AMOXICILLIN 250 MG
1 CAPSULE ORAL DAILY PRN
Status: DISCONTINUED | OUTPATIENT
Start: 2021-01-01 | End: 2021-01-01 | Stop reason: HOSPADM

## 2021-01-01 RX ORDER — DOCUSATE SODIUM 100 MG/1
100 CAPSULE, LIQUID FILLED ORAL DAILY
Status: DISCONTINUED | OUTPATIENT
Start: 2021-01-01 | End: 2021-01-01 | Stop reason: HOSPADM

## 2021-01-01 RX ORDER — TORSEMIDE 20 MG/1
20 TABLET ORAL 2 TIMES DAILY
Status: DISCONTINUED | OUTPATIENT
Start: 2021-01-01 | End: 2021-01-01

## 2021-01-01 RX ORDER — SENNOSIDES 8.6 MG
8.6 TABLET ORAL 2 TIMES DAILY
Status: DISCONTINUED | OUTPATIENT
Start: 2021-01-01 | End: 2021-01-01

## 2021-01-01 RX ORDER — PANTOPRAZOLE SODIUM 20 MG/1
40 TABLET, DELAYED RELEASE ORAL DAILY
Status: DISCONTINUED | OUTPATIENT
Start: 2021-01-01 | End: 2021-01-01

## 2021-01-01 RX ORDER — ACETAMINOPHEN 325 MG/1
650 TABLET ORAL 4 TIMES DAILY
Status: DISCONTINUED | OUTPATIENT
Start: 2021-01-01 | End: 2021-01-01

## 2021-01-01 RX ORDER — ALBUTEROL SULFATE 90 UG/1
2 AEROSOL, METERED RESPIRATORY (INHALATION) EVERY 6 HOURS PRN
Status: DISCONTINUED | OUTPATIENT
Start: 2021-01-01 | End: 2021-01-01

## 2021-01-01 RX ORDER — FUROSEMIDE 40 MG
120 TABLET ORAL DAILY
Status: DISCONTINUED | OUTPATIENT
Start: 2021-01-01 | End: 2021-01-01

## 2021-01-01 RX ORDER — CEFDINIR 300 MG/1
300 CAPSULE ORAL DAILY
Status: DISCONTINUED | OUTPATIENT
Start: 2021-01-01 | End: 2021-01-01

## 2021-01-01 RX ORDER — CEFTRIAXONE 1 G/1
1 INJECTION, POWDER, FOR SOLUTION INTRAMUSCULAR; INTRAVENOUS EVERY 24 HOURS
Status: DISCONTINUED | OUTPATIENT
Start: 2021-01-01 | End: 2021-01-01

## 2021-01-01 RX ORDER — TIMOLOL MALEATE 5 MG/ML
1 SOLUTION/ DROPS OPHTHALMIC 2 TIMES DAILY
Status: DISCONTINUED | OUTPATIENT
Start: 2021-01-01 | End: 2021-01-01

## 2021-01-01 RX ORDER — OXYBUTYNIN CHLORIDE 5 MG/1
5 TABLET, EXTENDED RELEASE ORAL EVERY EVENING
Status: DISCONTINUED | OUTPATIENT
Start: 2021-01-01 | End: 2021-01-01 | Stop reason: HOSPADM

## 2021-01-01 RX ORDER — CEFDINIR 300 MG/1
300 CAPSULE ORAL ONCE
Status: COMPLETED | OUTPATIENT
Start: 2021-01-01 | End: 2021-01-01

## 2021-01-01 RX ORDER — LABETALOL HYDROCHLORIDE 5 MG/ML
10 INJECTION, SOLUTION INTRAVENOUS
Status: DISCONTINUED | OUTPATIENT
Start: 2021-01-01 | End: 2021-01-01 | Stop reason: HOSPADM

## 2021-01-01 RX ORDER — AMOXICILLIN 250 MG
1 CAPSULE ORAL DAILY PRN
Status: DISCONTINUED | OUTPATIENT
Start: 2021-01-01 | End: 2021-01-01

## 2021-01-01 RX ORDER — MORPHINE SULFATE 100 MG/5ML
5 SOLUTION ORAL
Qty: 30 ML | Refills: 0 | Status: SHIPPED | OUTPATIENT
Start: 2021-01-01

## 2021-01-01 RX ORDER — ASPIRIN 81 MG/1
81 TABLET, CHEWABLE ORAL DAILY
Status: DISCONTINUED | OUTPATIENT
Start: 2021-01-01 | End: 2021-01-01 | Stop reason: HOSPADM

## 2021-01-01 RX ORDER — BENZONATATE 100 MG/1
100 CAPSULE ORAL 3 TIMES DAILY PRN
Status: DISCONTINUED | OUTPATIENT
Start: 2021-01-01 | End: 2021-01-01 | Stop reason: HOSPADM

## 2021-01-01 RX ORDER — VITAMIN B COMPLEX
125 TABLET ORAL DAILY
Status: DISCONTINUED | OUTPATIENT
Start: 2021-01-01 | End: 2021-01-01 | Stop reason: HOSPADM

## 2021-01-01 RX ORDER — CEFTRIAXONE 1 G/1
1 INJECTION, POWDER, FOR SOLUTION INTRAMUSCULAR; INTRAVENOUS EVERY 24 HOURS
Status: DISCONTINUED | OUTPATIENT
Start: 2021-01-01 | End: 2021-01-01 | Stop reason: HOSPADM

## 2021-01-01 RX ORDER — LIDOCAINE 4 G/G
1 PATCH TOPICAL
Status: DISCONTINUED | OUTPATIENT
Start: 2021-01-01 | End: 2021-01-01 | Stop reason: HOSPADM

## 2021-01-01 RX ORDER — LORAZEPAM 1 MG/1
1-2 TABLET ORAL
Status: DISCONTINUED | OUTPATIENT
Start: 2021-01-01 | End: 2021-01-01 | Stop reason: HOSPADM

## 2021-01-01 RX ORDER — ACETAMINOPHEN 325 MG/1
650 TABLET ORAL ONCE
Status: DISCONTINUED | OUTPATIENT
Start: 2021-01-01 | End: 2021-01-01 | Stop reason: HOSPADM

## 2021-01-01 RX ORDER — OXYBUTYNIN CHLORIDE 5 MG/1
5 TABLET, EXTENDED RELEASE ORAL EVERY EVENING
Status: DISCONTINUED | OUTPATIENT
Start: 2021-01-01 | End: 2021-01-01

## 2021-01-01 RX ORDER — ATROPINE SULFATE 10 MG/ML
1 SOLUTION/ DROPS OPHTHALMIC EVERY 4 HOURS PRN
Qty: 5 ML | Refills: 0 | Status: SHIPPED | OUTPATIENT
Start: 2021-01-01

## 2021-01-01 RX ORDER — BENZONATATE 100 MG/1
200 CAPSULE ORAL 3 TIMES DAILY
Status: DISCONTINUED | OUTPATIENT
Start: 2021-01-01 | End: 2021-01-01 | Stop reason: HOSPADM

## 2021-01-01 RX ORDER — NYSTATIN 100000 U/G
CREAM TOPICAL 2 TIMES DAILY
Status: DISCONTINUED | OUTPATIENT
Start: 2021-01-01 | End: 2021-01-01 | Stop reason: HOSPADM

## 2021-01-01 RX ORDER — SEVELAMER CARBONATE 800 MG/1
800 TABLET, FILM COATED ORAL
COMMUNITY
End: 2021-01-01

## 2021-01-01 RX ORDER — FLUORIDE TOOTHPASTE
TOOTHPASTE DENTAL 4 TIMES DAILY
COMMUNITY
End: 2021-01-01

## 2021-01-01 RX ORDER — LIDOCAINE 4 G/G
1 PATCH TOPICAL DAILY PRN
Status: ON HOLD | COMMUNITY
End: 2021-01-01

## 2021-01-01 RX ORDER — ALLOPURINOL 100 MG/1
50 TABLET ORAL EVERY EVENING
Status: ON HOLD | COMMUNITY
End: 2021-01-01

## 2021-01-01 RX ORDER — POLYETHYLENE GLYCOL 3350 17 G/17G
17 POWDER, FOR SOLUTION ORAL DAILY
Status: DISCONTINUED | OUTPATIENT
Start: 2021-01-01 | End: 2021-01-01

## 2021-01-01 RX ORDER — POLYETHYLENE GLYCOL 3350 17 G/17G
17 POWDER, FOR SOLUTION ORAL DAILY PRN
Status: DISCONTINUED | OUTPATIENT
Start: 2021-01-01 | End: 2021-01-01 | Stop reason: HOSPADM

## 2021-01-01 RX ORDER — SENNOSIDES 8.6 MG
8.6 TABLET ORAL 2 TIMES DAILY PRN
Status: DISCONTINUED | OUTPATIENT
Start: 2021-01-01 | End: 2021-01-01

## 2021-01-01 RX ORDER — ALBUMIN (HUMAN) 12.5 G/50ML
50 SOLUTION INTRAVENOUS
Status: DISCONTINUED | OUTPATIENT
Start: 2021-01-01 | End: 2021-01-01

## 2021-01-01 RX ORDER — AMOXICILLIN 250 MG
2 CAPSULE ORAL 2 TIMES DAILY PRN
Status: DISCONTINUED | OUTPATIENT
Start: 2021-01-01 | End: 2021-01-01 | Stop reason: HOSPADM

## 2021-01-01 RX ORDER — SEVELAMER CARBONATE 800 MG/1
800 TABLET, FILM COATED ORAL
Status: DISCONTINUED | OUTPATIENT
Start: 2021-01-01 | End: 2021-01-01 | Stop reason: HOSPADM

## 2021-01-01 RX ORDER — FUROSEMIDE 40 MG
120 TABLET ORAL DAILY
DISCHARGE
Start: 2021-01-01 | End: 2021-01-01

## 2021-01-01 RX ORDER — OXYCODONE HCL 20 MG/ML
5 CONCENTRATE, ORAL ORAL EVERY 6 HOURS
Status: DISCONTINUED | OUTPATIENT
Start: 2021-01-01 | End: 2021-01-01 | Stop reason: HOSPADM

## 2021-01-01 RX ORDER — ACETAMINOPHEN 325 MG/1
325 TABLET ORAL 2 TIMES DAILY PRN
Status: DISCONTINUED | OUTPATIENT
Start: 2021-01-01 | End: 2021-01-01

## 2021-01-01 RX ORDER — CEFDINIR 300 MG/1
300 CAPSULE ORAL DAILY
Status: DISCONTINUED | OUTPATIENT
Start: 2021-01-01 | End: 2021-01-01 | Stop reason: HOSPADM

## 2021-01-01 RX ORDER — NIFEDIPINE 30 MG
30 TABLET, EXTENDED RELEASE ORAL DAILY
Status: DISCONTINUED | OUTPATIENT
Start: 2021-01-01 | End: 2021-01-01

## 2021-01-01 RX ORDER — TIMOLOL MALEATE 5 MG/ML
1 SOLUTION/ DROPS OPHTHALMIC 2 TIMES DAILY
Status: ON HOLD | COMMUNITY
End: 2021-01-01

## 2021-01-01 RX ORDER — CEPHALEXIN 500 MG/1
500 CAPSULE ORAL 2 TIMES DAILY
Qty: 4 CAPSULE | Refills: 0 | Status: SHIPPED | OUTPATIENT
Start: 2021-01-01 | End: 2021-01-01

## 2021-01-01 RX ORDER — LORAZEPAM 2 MG/ML
0.5 CONCENTRATE ORAL EVERY 4 HOURS PRN
Qty: 30 ML | Refills: 0 | Status: SHIPPED | OUTPATIENT
Start: 2021-01-01

## 2021-01-01 RX ORDER — ROSUVASTATIN CALCIUM 10 MG/1
10 TABLET, COATED ORAL DAILY
Status: ON HOLD | COMMUNITY
End: 2021-01-01

## 2021-01-01 RX ORDER — HYDROCORTISONE 25 MG/G
1 CREAM TOPICAL 2 TIMES DAILY PRN
Status: ON HOLD | COMMUNITY
End: 2021-01-01

## 2021-01-01 RX ORDER — FLUTICASONE PROPIONATE 50 MCG
2 SPRAY, SUSPENSION (ML) NASAL DAILY
COMMUNITY
End: 2021-01-01

## 2021-01-01 RX ORDER — DEXTROSE MONOHYDRATE 25 G/50ML
25-50 INJECTION, SOLUTION INTRAVENOUS
Status: DISCONTINUED | OUTPATIENT
Start: 2021-01-01 | End: 2021-01-01

## 2021-01-01 RX ORDER — MICONAZOLE NITRATE 2 %
1 CREAM WITH APPLICATOR VAGINAL AT BEDTIME
Status: DISCONTINUED | OUTPATIENT
Start: 2021-01-01 | End: 2021-01-01 | Stop reason: HOSPADM

## 2021-01-01 RX ORDER — OXYCODONE HCL 20 MG/ML
5-15 CONCENTRATE, ORAL ORAL
Status: DISCONTINUED | OUTPATIENT
Start: 2021-01-01 | End: 2021-01-01

## 2021-01-01 RX ORDER — DIPHENHYDRAMINE HCL 25 MG
25 TABLET ORAL 2 TIMES DAILY PRN
COMMUNITY
End: 2021-01-01

## 2021-01-01 RX ORDER — IPRATROPIUM BROMIDE AND ALBUTEROL SULFATE 2.5; .5 MG/3ML; MG/3ML
1 SOLUTION RESPIRATORY (INHALATION) 4 TIMES DAILY
Status: DISCONTINUED | OUTPATIENT
Start: 2021-01-01 | End: 2021-01-01

## 2021-01-01 RX ORDER — LORAZEPAM 1 MG/1
1 TABLET ORAL
Status: DISCONTINUED | OUTPATIENT
Start: 2021-01-01 | End: 2021-01-01

## 2021-01-01 RX ORDER — MONTELUKAST SODIUM 10 MG/1
10 TABLET ORAL AT BEDTIME
Status: ON HOLD | COMMUNITY
End: 2021-01-01

## 2021-01-01 RX ORDER — ALBUTEROL SULFATE 90 UG/1
2 AEROSOL, METERED RESPIRATORY (INHALATION) EVERY 6 HOURS PRN
Status: ON HOLD | COMMUNITY
End: 2021-01-01

## 2021-01-01 RX ORDER — AMOXICILLIN 250 MG
1 CAPSULE ORAL 2 TIMES DAILY
Status: DISCONTINUED | OUTPATIENT
Start: 2021-01-01 | End: 2021-01-01 | Stop reason: HOSPADM

## 2021-01-01 RX ORDER — ASPIRIN 81 MG/1
81 TABLET ORAL DAILY
COMMUNITY
End: 2021-01-01

## 2021-01-01 RX ADMIN — IPRATROPIUM BROMIDE AND ALBUTEROL SULFATE 3 ML: .5; 3 SOLUTION RESPIRATORY (INHALATION) at 16:20

## 2021-01-01 RX ADMIN — ACETAMINOPHEN 650 MG: 325 TABLET ORAL at 21:07

## 2021-01-01 RX ADMIN — NIFEDIPINE 30 MG: 30 TABLET, FILM COATED, EXTENDED RELEASE ORAL at 08:50

## 2021-01-01 RX ADMIN — GUAIFENESIN 10 ML: 200 SOLUTION ORAL at 09:38

## 2021-01-01 RX ADMIN — ALLOPURINOL 100 MG: 100 TABLET ORAL at 09:03

## 2021-01-01 RX ADMIN — SODIUM CHLORIDE 250 ML: 9 INJECTION, SOLUTION INTRAVENOUS at 11:05

## 2021-01-01 RX ADMIN — Medication 125 MCG: at 13:10

## 2021-01-01 RX ADMIN — NYSTATIN: 100000 CREAM TOPICAL at 06:04

## 2021-01-01 RX ADMIN — FUROSEMIDE 120 MG: 40 TABLET ORAL at 11:33

## 2021-01-01 RX ADMIN — IPRATROPIUM BROMIDE AND ALBUTEROL SULFATE 3 ML: .5; 3 SOLUTION RESPIRATORY (INHALATION) at 22:37

## 2021-01-01 RX ADMIN — BENZONATATE 200 MG: 100 CAPSULE ORAL at 18:07

## 2021-01-01 RX ADMIN — ACETAMINOPHEN 650 MG: 325 TABLET, FILM COATED ORAL at 13:18

## 2021-01-01 RX ADMIN — EPOETIN ALFA-EPBX 2000 UNITS: 2000 INJECTION, SOLUTION INTRAVENOUS; SUBCUTANEOUS at 12:53

## 2021-01-01 RX ADMIN — Medication 125 MCG: at 08:16

## 2021-01-01 RX ADMIN — MONTELUKAST 10 MG: 10 TABLET, FILM COATED ORAL at 22:40

## 2021-01-01 RX ADMIN — TIMOLOL MALEATE 1 DROP: 5 SOLUTION/ DROPS OPHTHALMIC at 07:49

## 2021-01-01 RX ADMIN — OXYCODONE HYDROCHLORIDE 5 MG: 100 SOLUTION ORAL at 16:05

## 2021-01-01 RX ADMIN — IPRATROPIUM BROMIDE AND ALBUTEROL SULFATE 3 ML: .5; 3 SOLUTION RESPIRATORY (INHALATION) at 11:50

## 2021-01-01 RX ADMIN — SEVELAMER CARBONATE 800 MG: 800 TABLET, FILM COATED ORAL at 07:47

## 2021-01-01 RX ADMIN — BENZONATATE 200 MG: 100 CAPSULE ORAL at 14:42

## 2021-01-01 RX ADMIN — FUROSEMIDE 40 MG: 10 INJECTION, SOLUTION INTRAVENOUS at 02:33

## 2021-01-01 RX ADMIN — SODIUM CHLORIDE 300 ML: 9 INJECTION, SOLUTION INTRAVENOUS at 12:53

## 2021-01-01 RX ADMIN — ALBUTEROL SULFATE 2 PUFF: 90 AEROSOL, METERED RESPIRATORY (INHALATION) at 23:08

## 2021-01-01 RX ADMIN — IPRATROPIUM BROMIDE AND ALBUTEROL SULFATE 3 ML: .5; 3 SOLUTION RESPIRATORY (INHALATION) at 03:48

## 2021-01-01 RX ADMIN — GUAIFENESIN AND CODEINE PHOSPHATE 5 ML: 10; 100 LIQUID ORAL at 04:29

## 2021-01-01 RX ADMIN — IPRATROPIUM BROMIDE AND ALBUTEROL SULFATE 3 ML: .5; 3 SOLUTION RESPIRATORY (INHALATION) at 07:58

## 2021-01-01 RX ADMIN — SEVELAMER CARBONATE 800 MG: 800 TABLET, FILM COATED ORAL at 12:43

## 2021-01-01 RX ADMIN — BENZONATATE 100 MG: 100 CAPSULE ORAL at 03:21

## 2021-01-01 RX ADMIN — OXYBUTYNIN CHLORIDE 5 MG: 5 TABLET, EXTENDED RELEASE ORAL at 20:12

## 2021-01-01 RX ADMIN — OMEPRAZOLE 20 MG: 20 CAPSULE, DELAYED RELEASE ORAL at 08:35

## 2021-01-01 RX ADMIN — OXYCODONE HYDROCHLORIDE 5 MG: 100 SOLUTION ORAL at 09:22

## 2021-01-01 RX ADMIN — IPRATROPIUM BROMIDE AND ALBUTEROL SULFATE 3 ML: .5; 2.5 SOLUTION RESPIRATORY (INHALATION) at 08:18

## 2021-01-01 RX ADMIN — ALLOPURINOL 50 MG: 100 TABLET ORAL at 21:54

## 2021-01-01 RX ADMIN — NIFEDIPINE 30 MG: 30 TABLET, FILM COATED, EXTENDED RELEASE ORAL at 09:03

## 2021-01-01 RX ADMIN — SEVELAMER CARBONATE 800 MG: 800 TABLET, FILM COATED ORAL at 18:08

## 2021-01-01 RX ADMIN — EPOETIN ALFA-EPBX 4000 UNITS: 4000 INJECTION, SOLUTION INTRAVENOUS; SUBCUTANEOUS at 10:46

## 2021-01-01 RX ADMIN — INSULIN ASPART 1 UNITS: 100 INJECTION, SOLUTION INTRAVENOUS; SUBCUTANEOUS at 22:06

## 2021-01-01 RX ADMIN — DICLOFENAC SODIUM 1 G: 10 GEL TOPICAL at 13:16

## 2021-01-01 RX ADMIN — ACETAMINOPHEN 650 MG: 325 TABLET, FILM COATED ORAL at 19:03

## 2021-01-01 RX ADMIN — ALBUTEROL SULFATE 2 PUFF: 90 AEROSOL, METERED RESPIRATORY (INHALATION) at 13:00

## 2021-01-01 RX ADMIN — MAGNESIUM OXIDE 400 MG: 400 TABLET ORAL at 09:03

## 2021-01-01 RX ADMIN — ASPIRIN 81 MG: 81 TABLET, DELAYED RELEASE ORAL at 09:03

## 2021-01-01 RX ADMIN — HEPARIN SODIUM 500 UNITS/HR: 1000 INJECTION INTRAVENOUS; SUBCUTANEOUS at 10:40

## 2021-01-01 RX ADMIN — ROSUVASTATIN CALCIUM 10 MG: 10 TABLET, FILM COATED ORAL at 08:50

## 2021-01-01 RX ADMIN — Medication 125 MCG: at 08:37

## 2021-01-01 RX ADMIN — CALCIUM ACETATE 667 MG: 667 CAPSULE ORAL at 18:12

## 2021-01-01 RX ADMIN — SEVELAMER CARBONATE 800 MG: 800 TABLET, FILM COATED ORAL at 11:08

## 2021-01-01 RX ADMIN — GABAPENTIN 200 MG: 100 CAPSULE ORAL at 20:18

## 2021-01-01 RX ADMIN — IPRATROPIUM BROMIDE AND ALBUTEROL SULFATE 3 ML: .5; 3 SOLUTION RESPIRATORY (INHALATION) at 11:29

## 2021-01-01 RX ADMIN — EPOETIN ALFA-EPBX 3000 UNITS: 10000 INJECTION, SOLUTION INTRAVENOUS; SUBCUTANEOUS at 08:45

## 2021-01-01 RX ADMIN — ALLOPURINOL 100 MG: 100 TABLET ORAL at 07:48

## 2021-01-01 RX ADMIN — MONTELUKAST 10 MG: 10 TABLET, FILM COATED ORAL at 21:14

## 2021-01-01 RX ADMIN — GUAIFENESIN 600 MG: 600 TABLET ORAL at 14:39

## 2021-01-01 RX ADMIN — TIMOLOL MALEATE 1 DROP: 5 SOLUTION/ DROPS OPHTHALMIC at 19:19

## 2021-01-01 RX ADMIN — ALLOPURINOL 50 MG: 100 TABLET ORAL at 21:20

## 2021-01-01 RX ADMIN — HEPARIN SODIUM 1900 UNITS: 1000 INJECTION, SOLUTION INTRAVENOUS; SUBCUTANEOUS at 12:42

## 2021-01-01 RX ADMIN — TIMOLOL MALEATE 1 DROP: 5 SOLUTION OPHTHALMIC at 21:08

## 2021-01-01 RX ADMIN — OXYCODONE HYDROCHLORIDE 5 MG: 100 SOLUTION ORAL at 21:43

## 2021-01-01 RX ADMIN — LIDOCAINE 1 PATCH: 560 PATCH PERCUTANEOUS; TOPICAL; TRANSDERMAL at 05:59

## 2021-01-01 RX ADMIN — CALCIUM ACETATE 667 MG: 667 CAPSULE ORAL at 13:31

## 2021-01-01 RX ADMIN — BENZONATATE 200 MG: 100 CAPSULE ORAL at 19:31

## 2021-01-01 RX ADMIN — SODIUM CHLORIDE 300 ML: 9 INJECTION, SOLUTION INTRAVENOUS at 11:05

## 2021-01-01 RX ADMIN — ACETAMINOPHEN 650 MG: 325 TABLET ORAL at 11:31

## 2021-01-01 RX ADMIN — TIMOLOL MALEATE 1 DROP: 5 SOLUTION/ DROPS OPHTHALMIC at 08:17

## 2021-01-01 RX ADMIN — TIMOLOL MALEATE 1 DROP: 5 SOLUTION OPHTHALMIC at 08:27

## 2021-01-01 RX ADMIN — OXYCODONE HYDROCHLORIDE 5 MG: 5 TABLET ORAL at 10:18

## 2021-01-01 RX ADMIN — OXYBUTYNIN CHLORIDE 5 MG: 5 TABLET, EXTENDED RELEASE ORAL at 02:01

## 2021-01-01 RX ADMIN — CALCIUM ACETATE 667 MG: 667 CAPSULE ORAL at 18:44

## 2021-01-01 RX ADMIN — ROSUVASTATIN CALCIUM 10 MG: 10 TABLET, FILM COATED ORAL at 22:52

## 2021-01-01 RX ADMIN — OXYBUTYNIN CHLORIDE 5 MG: 5 TABLET, EXTENDED RELEASE ORAL at 21:04

## 2021-01-01 RX ADMIN — BENZONATATE 200 MG: 100 CAPSULE ORAL at 07:44

## 2021-01-01 RX ADMIN — LOSARTAN POTASSIUM 50 MG: 50 TABLET, FILM COATED ORAL at 08:50

## 2021-01-01 RX ADMIN — ACETAMINOPHEN 650 MG: 325 TABLET, FILM COATED ORAL at 08:45

## 2021-01-01 RX ADMIN — MAGNESIUM OXIDE 400 MG: 400 TABLET ORAL at 07:48

## 2021-01-01 RX ADMIN — IPRATROPIUM BROMIDE AND ALBUTEROL SULFATE 3 ML: .5; 3 SOLUTION RESPIRATORY (INHALATION) at 08:21

## 2021-01-01 RX ADMIN — ROSUVASTATIN CALCIUM 10 MG: 10 TABLET, FILM COATED ORAL at 08:57

## 2021-01-01 RX ADMIN — LINEZOLID 600 MG: 600 INJECTION, SOLUTION INTRAVENOUS at 12:36

## 2021-01-01 RX ADMIN — OMEPRAZOLE 20 MG: 20 CAPSULE, DELAYED RELEASE ORAL at 07:48

## 2021-01-01 RX ADMIN — OMEPRAZOLE 20 MG: 20 CAPSULE, DELAYED RELEASE ORAL at 09:05

## 2021-01-01 RX ADMIN — TIMOLOL MALEATE 1 DROP: 5 SOLUTION/ DROPS OPHTHALMIC at 08:07

## 2021-01-01 RX ADMIN — LOSARTAN POTASSIUM 50 MG: 25 TABLET, FILM COATED ORAL at 07:47

## 2021-01-01 RX ADMIN — TIMOLOL MALEATE 1 DROP: 5 SOLUTION OPHTHALMIC at 21:54

## 2021-01-01 RX ADMIN — IPRATROPIUM BROMIDE AND ALBUTEROL SULFATE 3 ML: .5; 2.5 SOLUTION RESPIRATORY (INHALATION) at 16:34

## 2021-01-01 RX ADMIN — LINEZOLID 600 MG: 600 INJECTION, SOLUTION INTRAVENOUS at 21:55

## 2021-01-01 RX ADMIN — EPOETIN ALFA-EPBX 4000 UNITS: 4000 INJECTION, SOLUTION INTRAVENOUS; SUBCUTANEOUS at 10:45

## 2021-01-01 RX ADMIN — INSULIN ASPART 1 UNITS: 100 INJECTION, SOLUTION INTRAVENOUS; SUBCUTANEOUS at 22:18

## 2021-01-01 RX ADMIN — ACETAMINOPHEN 650 MG: 325 TABLET, FILM COATED ORAL at 21:04

## 2021-01-01 RX ADMIN — OXYCODONE HYDROCHLORIDE 5 MG: 5 TABLET ORAL at 21:21

## 2021-01-01 RX ADMIN — FLUTICASONE PROPIONATE 2 SPRAY: 50 SPRAY, METERED NASAL at 08:51

## 2021-01-01 RX ADMIN — OMEPRAZOLE 20 MG: 20 CAPSULE, DELAYED RELEASE ORAL at 11:36

## 2021-01-01 RX ADMIN — OMEPRAZOLE 20 MG: 20 CAPSULE, DELAYED RELEASE ORAL at 08:57

## 2021-01-01 RX ADMIN — BENZONATATE 100 MG: 100 CAPSULE ORAL at 21:19

## 2021-01-01 RX ADMIN — MIDODRINE HYDROCHLORIDE 5 MG: 5 TABLET ORAL at 13:29

## 2021-01-01 RX ADMIN — Medication 125 MCG: at 08:05

## 2021-01-01 RX ADMIN — TIMOLOL MALEATE 1 DROP: 5 SOLUTION/ DROPS OPHTHALMIC at 08:38

## 2021-01-01 RX ADMIN — Medication 125 MCG: at 08:49

## 2021-01-01 RX ADMIN — BENZONATATE 200 MG: 100 CAPSULE ORAL at 08:35

## 2021-01-01 RX ADMIN — Medication 1 LOZENGE: at 03:21

## 2021-01-01 RX ADMIN — MONTELUKAST 10 MG: 10 TABLET, FILM COATED ORAL at 02:00

## 2021-01-01 RX ADMIN — ALBUTEROL SULFATE 2 PUFF: 90 AEROSOL, METERED RESPIRATORY (INHALATION) at 20:12

## 2021-01-01 RX ADMIN — Medication 125 MCG: at 11:36

## 2021-01-01 RX ADMIN — OXYBUTYNIN CHLORIDE 5 MG: 5 TABLET, EXTENDED RELEASE ORAL at 20:17

## 2021-01-01 RX ADMIN — BENZONATATE 100 MG: 100 CAPSULE ORAL at 05:55

## 2021-01-01 RX ADMIN — EPOETIN ALFA-EPBX 4000 UNITS: 10000 INJECTION, SOLUTION INTRAVENOUS; SUBCUTANEOUS at 15:53

## 2021-01-01 RX ADMIN — SODIUM CHLORIDE 250 ML: 9 INJECTION, SOLUTION INTRAVENOUS at 10:46

## 2021-01-01 RX ADMIN — TIMOLOL MALEATE 1 DROP: 5 SOLUTION/ DROPS OPHTHALMIC at 20:18

## 2021-01-01 RX ADMIN — Medication 5 MG: at 12:35

## 2021-01-01 RX ADMIN — SODIUM CHLORIDE 300 ML: 9 INJECTION, SOLUTION INTRAVENOUS at 10:47

## 2021-01-01 RX ADMIN — ACETAMINOPHEN 650 MG: 325 TABLET, FILM COATED ORAL at 15:18

## 2021-01-01 RX ADMIN — NYSTATIN: 100000 CREAM TOPICAL at 20:44

## 2021-01-01 RX ADMIN — IPRATROPIUM BROMIDE AND ALBUTEROL SULFATE 3 ML: .5; 2.5 SOLUTION RESPIRATORY (INHALATION) at 20:27

## 2021-01-01 RX ADMIN — OMEPRAZOLE 20 MG: 20 CAPSULE, DELAYED RELEASE ORAL at 06:16

## 2021-01-01 RX ADMIN — NIFEDIPINE 30 MG: 30 TABLET, FILM COATED, EXTENDED RELEASE ORAL at 08:36

## 2021-01-01 RX ADMIN — OMEPRAZOLE 20 MG: 20 CAPSULE, DELAYED RELEASE ORAL at 07:05

## 2021-01-01 RX ADMIN — MICONAZOLE NITRATE 1 APPLICATOR: 20 CREAM VAGINAL at 21:09

## 2021-01-01 RX ADMIN — GUAIFENESIN 10 ML: 200 SOLUTION ORAL at 00:24

## 2021-01-01 RX ADMIN — BENZONATATE 100 MG: 100 CAPSULE ORAL at 21:15

## 2021-01-01 RX ADMIN — OXYCODONE HYDROCHLORIDE 5 MG: 5 TABLET ORAL at 14:57

## 2021-01-01 RX ADMIN — ALBUTEROL SULFATE 2 PUFF: 90 AEROSOL, METERED RESPIRATORY (INHALATION) at 07:34

## 2021-01-01 RX ADMIN — TIMOLOL MALEATE 1 DROP: 5 SOLUTION/ DROPS OPHTHALMIC at 09:06

## 2021-01-01 RX ADMIN — INSULIN ASPART 1 UNITS: 100 INJECTION, SOLUTION INTRAVENOUS; SUBCUTANEOUS at 19:29

## 2021-01-01 RX ADMIN — CEFTRIAXONE 1 G: 1 INJECTION, POWDER, FOR SOLUTION INTRAMUSCULAR; INTRAVENOUS at 11:09

## 2021-01-01 RX ADMIN — MIDODRINE HYDROCHLORIDE 5 MG: 5 TABLET ORAL at 09:16

## 2021-01-01 RX ADMIN — ROSUVASTATIN CALCIUM 10 MG: 10 TABLET, FILM COATED ORAL at 09:16

## 2021-01-01 RX ADMIN — MONTELUKAST 10 MG: 10 TABLET, FILM COATED ORAL at 22:52

## 2021-01-01 RX ADMIN — NIFEDIPINE 30 MG: 30 TABLET, FILM COATED, EXTENDED RELEASE ORAL at 08:17

## 2021-01-01 RX ADMIN — ASPIRIN 81 MG: 81 TABLET, DELAYED RELEASE ORAL at 18:59

## 2021-01-01 RX ADMIN — ROSUVASTATIN CALCIUM 10 MG: 10 TABLET, FILM COATED ORAL at 22:15

## 2021-01-01 RX ADMIN — ACETAMINOPHEN 650 MG: 325 TABLET, FILM COATED ORAL at 21:26

## 2021-01-01 RX ADMIN — HEPARIN SODIUM 500 UNITS/HR: 1000 INJECTION INTRAVENOUS; SUBCUTANEOUS at 11:11

## 2021-01-01 RX ADMIN — MAGNESIUM OXIDE 400 MG: 400 TABLET ORAL at 20:37

## 2021-01-01 RX ADMIN — Medication 2.5 MG: at 14:45

## 2021-01-01 RX ADMIN — LINEZOLID 600 MG: 600 INJECTION, SOLUTION INTRAVENOUS at 12:47

## 2021-01-01 RX ADMIN — ACETAMINOPHEN 650 MG: 325 TABLET, FILM COATED ORAL at 16:48

## 2021-01-01 RX ADMIN — MONTELUKAST 10 MG: 10 TABLET, FILM COATED ORAL at 21:55

## 2021-01-01 RX ADMIN — OXYCODONE HYDROCHLORIDE 5 MG: 5 TABLET ORAL at 12:45

## 2021-01-01 RX ADMIN — IPRATROPIUM BROMIDE AND ALBUTEROL SULFATE 3 ML: .5; 3 SOLUTION RESPIRATORY (INHALATION) at 14:28

## 2021-01-01 RX ADMIN — ACETAMINOPHEN 650 MG: 325 TABLET ORAL at 08:26

## 2021-01-01 RX ADMIN — SODIUM CHLORIDE 300 ML: 9 INJECTION, SOLUTION INTRAVENOUS at 13:29

## 2021-01-01 RX ADMIN — ROSUVASTATIN CALCIUM 10 MG: 10 TABLET, FILM COATED ORAL at 18:44

## 2021-01-01 RX ADMIN — SODIUM CHLORIDE 500 ML: 9 INJECTION, SOLUTION INTRAVENOUS at 00:00

## 2021-01-01 RX ADMIN — Medication 125 MCG: at 08:57

## 2021-01-01 RX ADMIN — NIFEDIPINE 30 MG: 30 TABLET, EXTENDED RELEASE ORAL at 11:34

## 2021-01-01 RX ADMIN — ACETAMINOPHEN 650 MG: 325 TABLET ORAL at 17:55

## 2021-01-01 RX ADMIN — OXYBUTYNIN CHLORIDE 5 MG: 5 TABLET, EXTENDED RELEASE ORAL at 21:14

## 2021-01-01 RX ADMIN — ACETAMINOPHEN 650 MG: 325 TABLET, FILM COATED ORAL at 13:09

## 2021-01-01 RX ADMIN — LOSARTAN POTASSIUM 50 MG: 25 TABLET, FILM COATED ORAL at 09:03

## 2021-01-01 RX ADMIN — BENZONATATE 200 MG: 100 CAPSULE ORAL at 02:41

## 2021-01-01 RX ADMIN — BENZONATATE 200 MG: 100 CAPSULE ORAL at 08:17

## 2021-01-01 RX ADMIN — TIMOLOL MALEATE 1 DROP: 5 SOLUTION/ DROPS OPHTHALMIC at 09:08

## 2021-01-01 RX ADMIN — OXYBUTYNIN CHLORIDE 5 MG: 5 TABLET, EXTENDED RELEASE ORAL at 20:44

## 2021-01-01 RX ADMIN — OXYBUTYNIN CHLORIDE 5 MG: 5 TABLET, EXTENDED RELEASE ORAL at 19:15

## 2021-01-01 RX ADMIN — ALLOPURINOL 100 MG: 100 TABLET ORAL at 08:16

## 2021-01-01 RX ADMIN — IRON SUCROSE 50 MG: 20 INJECTION, SOLUTION INTRAVENOUS at 15:20

## 2021-01-01 RX ADMIN — ALBUTEROL SULFATE 2 PUFF: 90 AEROSOL, METERED RESPIRATORY (INHALATION) at 00:17

## 2021-01-01 RX ADMIN — ALBUTEROL SULFATE 2 PUFF: 90 AEROSOL, METERED RESPIRATORY (INHALATION) at 21:09

## 2021-01-01 RX ADMIN — IPRATROPIUM BROMIDE AND ALBUTEROL SULFATE 3 ML: .5; 3 SOLUTION RESPIRATORY (INHALATION) at 20:27

## 2021-01-01 RX ADMIN — LOSARTAN POTASSIUM 50 MG: 25 TABLET, FILM COATED ORAL at 08:17

## 2021-01-01 RX ADMIN — MAGNESIUM OXIDE 400 MG: 400 TABLET ORAL at 08:05

## 2021-01-01 RX ADMIN — PANTOPRAZOLE SODIUM 40 MG: 20 TABLET, DELAYED RELEASE ORAL at 08:26

## 2021-01-01 RX ADMIN — SEVELAMER CARBONATE 800 MG: 800 TABLET, FILM COATED ORAL at 08:17

## 2021-01-01 RX ADMIN — ALLOPURINOL 100 MG: 100 TABLET ORAL at 13:10

## 2021-01-01 RX ADMIN — MICONAZOLE NITRATE: 20 POWDER TOPICAL at 08:51

## 2021-01-01 RX ADMIN — IPRATROPIUM BROMIDE AND ALBUTEROL SULFATE 3 ML: .5; 3 SOLUTION RESPIRATORY (INHALATION) at 20:28

## 2021-01-01 RX ADMIN — MONTELUKAST 10 MG: 10 TABLET, FILM COATED ORAL at 21:20

## 2021-01-01 RX ADMIN — IPRATROPIUM BROMIDE AND ALBUTEROL SULFATE 3 ML: .5; 2.5 SOLUTION RESPIRATORY (INHALATION) at 13:13

## 2021-01-01 RX ADMIN — ALLOPURINOL 100 MG: 100 TABLET ORAL at 18:59

## 2021-01-01 RX ADMIN — NIFEDIPINE 30 MG: 30 TABLET, FILM COATED, EXTENDED RELEASE ORAL at 19:29

## 2021-01-01 RX ADMIN — DOCUSATE SODIUM 100 MG: 100 CAPSULE, LIQUID FILLED ORAL at 08:57

## 2021-01-01 RX ADMIN — SODIUM CHLORIDE 250 ML: 9 INJECTION, SOLUTION INTRAVENOUS at 13:36

## 2021-01-01 RX ADMIN — Medication 1 MG: at 22:37

## 2021-01-01 RX ADMIN — SEVELAMER CARBONATE 800 MG: 800 TABLET, FILM COATED ORAL at 19:31

## 2021-01-01 RX ADMIN — ALLOPURINOL 100 MG: 100 TABLET ORAL at 11:36

## 2021-01-01 RX ADMIN — ACETAMINOPHEN 650 MG: 325 TABLET ORAL at 17:46

## 2021-01-01 RX ADMIN — ALBUMIN HUMAN 50 ML: 0.25 SOLUTION INTRAVENOUS at 08:58

## 2021-01-01 RX ADMIN — TIMOLOL MALEATE 1 DROP: 5 SOLUTION OPHTHALMIC at 11:40

## 2021-01-01 RX ADMIN — PANTOPRAZOLE SODIUM 40 MG: 20 TABLET, DELAYED RELEASE ORAL at 09:17

## 2021-01-01 RX ADMIN — SODIUM CHLORIDE 300 ML: 9 INJECTION, SOLUTION INTRAVENOUS at 13:36

## 2021-01-01 RX ADMIN — OXYBUTYNIN CHLORIDE 5 MG: 5 TABLET, EXTENDED RELEASE ORAL at 20:37

## 2021-01-01 RX ADMIN — TIMOLOL MALEATE 1 DROP: 5 SOLUTION/ DROPS OPHTHALMIC at 20:43

## 2021-01-01 RX ADMIN — BENZONATATE 200 MG: 100 CAPSULE ORAL at 20:17

## 2021-01-01 RX ADMIN — DOCUSATE SODIUM 100 MG: 100 CAPSULE, LIQUID FILLED ORAL at 08:06

## 2021-01-01 RX ADMIN — CALCIUM ACETATE 667 MG: 667 CAPSULE ORAL at 08:26

## 2021-01-01 RX ADMIN — LOSARTAN POTASSIUM 50 MG: 50 TABLET, FILM COATED ORAL at 08:57

## 2021-01-01 RX ADMIN — ACETAMINOPHEN 650 MG: 325 TABLET ORAL at 21:21

## 2021-01-01 RX ADMIN — MAGNESIUM OXIDE 400 MG: 400 TABLET ORAL at 08:50

## 2021-01-01 RX ADMIN — MAGNESIUM OXIDE 400 MG: 400 TABLET ORAL at 20:17

## 2021-01-01 RX ADMIN — GUAIFENESIN AND CODEINE PHOSPHATE 5 ML: 10; 100 LIQUID ORAL at 22:19

## 2021-01-01 RX ADMIN — TIMOLOL MALEATE 1 DROP: 5 SOLUTION OPHTHALMIC at 09:15

## 2021-01-01 RX ADMIN — TIMOLOL MALEATE 1 DROP: 5 SOLUTION OPHTHALMIC at 08:53

## 2021-01-01 RX ADMIN — ALLOPURINOL 100 MG: 100 TABLET ORAL at 08:06

## 2021-01-01 RX ADMIN — BENZONATATE 100 MG: 100 CAPSULE ORAL at 09:50

## 2021-01-01 RX ADMIN — ACETAMINOPHEN 650 MG: 325 TABLET, FILM COATED ORAL at 09:50

## 2021-01-01 RX ADMIN — OMEPRAZOLE 20 MG: 20 CAPSULE, DELAYED RELEASE ORAL at 06:54

## 2021-01-01 RX ADMIN — MONTELUKAST 10 MG: 10 TABLET, FILM COATED ORAL at 22:15

## 2021-01-01 RX ADMIN — ACETAMINOPHEN 975 MG: 325 TABLET ORAL at 20:06

## 2021-01-01 RX ADMIN — IRON SUCROSE 50 MG: 20 INJECTION, SOLUTION INTRAVENOUS at 10:45

## 2021-01-01 RX ADMIN — GABAPENTIN 200 MG: 100 CAPSULE ORAL at 20:16

## 2021-01-01 RX ADMIN — GUAIFENESIN 600 MG: 600 TABLET ORAL at 08:18

## 2021-01-01 RX ADMIN — LIDOCAINE 1 PATCH: 246 PATCH TOPICAL at 09:22

## 2021-01-01 RX ADMIN — MIDODRINE HYDROCHLORIDE 5 MG: 5 TABLET ORAL at 18:15

## 2021-01-01 RX ADMIN — ALLOPURINOL 50 MG: 100 TABLET ORAL at 21:08

## 2021-01-01 RX ADMIN — ASPIRIN 81 MG: 81 TABLET, DELAYED RELEASE ORAL at 08:36

## 2021-01-01 RX ADMIN — Medication 12.5 MG: at 22:37

## 2021-01-01 RX ADMIN — OXYCODONE HYDROCHLORIDE 5 MG: 5 TABLET ORAL at 04:00

## 2021-01-01 RX ADMIN — SEVELAMER CARBONATE 800 MG: 800 TABLET, FILM COATED ORAL at 08:50

## 2021-01-01 RX ADMIN — IPRATROPIUM BROMIDE AND ALBUTEROL SULFATE 3 ML: .5; 2.5 SOLUTION RESPIRATORY (INHALATION) at 10:56

## 2021-01-01 RX ADMIN — LINEZOLID 600 MG: 600 INJECTION, SOLUTION INTRAVENOUS at 05:10

## 2021-01-01 RX ADMIN — ACETAMINOPHEN 650 MG: 325 TABLET ORAL at 21:52

## 2021-01-01 RX ADMIN — IPRATROPIUM BROMIDE AND ALBUTEROL SULFATE 3 ML: .5; 3 SOLUTION RESPIRATORY (INHALATION) at 03:36

## 2021-01-01 RX ADMIN — ACETAMINOPHEN 650 MG: 325 TABLET ORAL at 08:45

## 2021-01-01 RX ADMIN — LINEZOLID 600 MG: 600 INJECTION, SOLUTION INTRAVENOUS at 16:42

## 2021-01-01 RX ADMIN — IPRATROPIUM BROMIDE AND ALBUTEROL SULFATE 3 ML: .5; 3 SOLUTION RESPIRATORY (INHALATION) at 08:16

## 2021-01-01 RX ADMIN — TIMOLOL MALEATE 1 DROP: 5 SOLUTION/ DROPS OPHTHALMIC at 20:37

## 2021-01-01 RX ADMIN — ACETAMINOPHEN 650 MG: 325 TABLET, FILM COATED ORAL at 15:02

## 2021-01-01 RX ADMIN — DICLOFENAC SODIUM 1 G: 10 GEL TOPICAL at 15:20

## 2021-01-01 RX ADMIN — IPRATROPIUM BROMIDE AND ALBUTEROL SULFATE 3 ML: .5; 2.5 SOLUTION RESPIRATORY (INHALATION) at 16:40

## 2021-01-01 RX ADMIN — SEVELAMER CARBONATE 800 MG: 800 TABLET, FILM COATED ORAL at 14:40

## 2021-01-01 RX ADMIN — SERTRALINE HYDROCHLORIDE 25 MG: 25 TABLET ORAL at 18:44

## 2021-01-01 RX ADMIN — DICLOFENAC SODIUM 1 G: 10 GEL TOPICAL at 18:53

## 2021-01-01 RX ADMIN — ACETAMINOPHEN 650 MG: 325 TABLET, FILM COATED ORAL at 18:07

## 2021-01-01 RX ADMIN — IPRATROPIUM BROMIDE AND ALBUTEROL SULFATE 3 ML: .5; 2.5 SOLUTION RESPIRATORY (INHALATION) at 08:15

## 2021-01-01 RX ADMIN — ACETAMINOPHEN 650 MG: 325 TABLET ORAL at 16:51

## 2021-01-01 RX ADMIN — DOCUSATE SODIUM 100 MG: 100 CAPSULE, LIQUID FILLED ORAL at 07:45

## 2021-01-01 RX ADMIN — Medication 125 MCG: at 07:45

## 2021-01-01 RX ADMIN — MONTELUKAST 10 MG: 10 TABLET, FILM COATED ORAL at 22:09

## 2021-01-01 RX ADMIN — OMEPRAZOLE 20 MG: 20 CAPSULE, DELAYED RELEASE ORAL at 08:50

## 2021-01-01 RX ADMIN — MAGNESIUM OXIDE 400 MG: 400 TABLET ORAL at 19:15

## 2021-01-01 RX ADMIN — EPOETIN ALFA-EPBX 4000 UNITS: 10000 INJECTION, SOLUTION INTRAVENOUS; SUBCUTANEOUS at 11:57

## 2021-01-01 RX ADMIN — ASPIRIN 81 MG CHEWABLE TABLET 81 MG: 81 TABLET CHEWABLE at 08:57

## 2021-01-01 RX ADMIN — CALCIUM ACETATE 667 MG: 667 CAPSULE ORAL at 19:46

## 2021-01-01 RX ADMIN — ALLOPURINOL 100 MG: 100 TABLET ORAL at 08:50

## 2021-01-01 RX ADMIN — TIMOLOL MALEATE 1 DROP: 5 SOLUTION/ DROPS OPHTHALMIC at 21:04

## 2021-01-01 RX ADMIN — BENZONATATE 200 MG: 100 CAPSULE ORAL at 16:48

## 2021-01-01 RX ADMIN — ROSUVASTATIN CALCIUM 10 MG: 10 TABLET, FILM COATED ORAL at 21:26

## 2021-01-01 RX ADMIN — GABAPENTIN 200 MG: 100 CAPSULE ORAL at 22:15

## 2021-01-01 RX ADMIN — MIDODRINE HYDROCHLORIDE 5 MG: 5 TABLET ORAL at 08:45

## 2021-01-01 RX ADMIN — IPRATROPIUM BROMIDE AND ALBUTEROL SULFATE 3 ML: .5; 2.5 SOLUTION RESPIRATORY (INHALATION) at 07:57

## 2021-01-01 RX ADMIN — ASPIRIN 81 MG: 81 TABLET, DELAYED RELEASE ORAL at 11:36

## 2021-01-01 RX ADMIN — ACETAMINOPHEN 650 MG: 325 TABLET, FILM COATED ORAL at 21:15

## 2021-01-01 RX ADMIN — ASPIRIN 81 MG: 81 TABLET, DELAYED RELEASE ORAL at 08:18

## 2021-01-01 RX ADMIN — LINEZOLID 600 MG: 600 INJECTION, SOLUTION INTRAVENOUS at 09:11

## 2021-01-01 RX ADMIN — ACETAMINOPHEN 650 MG: 325 TABLET, FILM COATED ORAL at 01:14

## 2021-01-01 RX ADMIN — NIFEDIPINE 30 MG: 30 TABLET, FILM COATED, EXTENDED RELEASE ORAL at 07:45

## 2021-01-01 RX ADMIN — ALBUTEROL SULFATE 2 PUFF: 90 AEROSOL, METERED RESPIRATORY (INHALATION) at 21:14

## 2021-01-01 RX ADMIN — SODIUM CHLORIDE 250 ML: 9 INJECTION, SOLUTION INTRAVENOUS at 12:54

## 2021-01-01 RX ADMIN — GUAIFENESIN 600 MG: 600 TABLET ORAL at 19:14

## 2021-01-01 RX ADMIN — ALLOPURINOL 100 MG: 100 TABLET ORAL at 08:36

## 2021-01-01 RX ADMIN — Medication 125 MCG: at 08:45

## 2021-01-01 RX ADMIN — IPRATROPIUM BROMIDE AND ALBUTEROL SULFATE 3 ML: .5; 3 SOLUTION RESPIRATORY (INHALATION) at 22:20

## 2021-01-01 RX ADMIN — SEVELAMER CARBONATE 800 MG: 800 TABLET, FILM COATED ORAL at 08:36

## 2021-01-01 RX ADMIN — MIDODRINE HYDROCHLORIDE 5 MG: 5 TABLET ORAL at 18:14

## 2021-01-01 RX ADMIN — ACETAMINOPHEN 650 MG: 325 TABLET ORAL at 20:56

## 2021-01-01 RX ADMIN — ROSUVASTATIN CALCIUM 10 MG: 10 TABLET, FILM COATED ORAL at 22:09

## 2021-01-01 RX ADMIN — OXYCODONE HYDROCHLORIDE 5 MG: 100 SOLUTION ORAL at 04:34

## 2021-01-01 RX ADMIN — LORAZEPAM 0.5 MG: 0.5 TABLET ORAL at 13:09

## 2021-01-01 RX ADMIN — TORSEMIDE 20 MG: 20 TABLET ORAL at 08:26

## 2021-01-01 RX ADMIN — SODIUM CHLORIDE, PRESERVATIVE FREE 250 ML: 5 INJECTION INTRAVENOUS at 12:38

## 2021-01-01 RX ADMIN — LINEZOLID 600 MG: 600 INJECTION, SOLUTION INTRAVENOUS at 16:36

## 2021-01-01 RX ADMIN — CALCIUM ACETATE 667 MG: 667 CAPSULE ORAL at 12:37

## 2021-01-01 RX ADMIN — BENZONATATE 100 MG: 100 CAPSULE ORAL at 20:20

## 2021-01-01 RX ADMIN — ASPIRIN 81 MG: 81 TABLET, DELAYED RELEASE ORAL at 08:17

## 2021-01-01 RX ADMIN — LOSARTAN POTASSIUM 50 MG: 50 TABLET, FILM COATED ORAL at 19:31

## 2021-01-01 RX ADMIN — TIMOLOL MALEATE 1 DROP: 5 SOLUTION/ DROPS OPHTHALMIC at 20:28

## 2021-01-01 RX ADMIN — ACETAMINOPHEN 650 MG: 325 TABLET ORAL at 08:50

## 2021-01-01 RX ADMIN — LINEZOLID 600 MG: 600 INJECTION, SOLUTION INTRAVENOUS at 16:50

## 2021-01-01 RX ADMIN — ACETAMINOPHEN 650 MG: 325 TABLET, FILM COATED ORAL at 18:59

## 2021-01-01 RX ADMIN — FUROSEMIDE 80 MG: 10 INJECTION, SOLUTION INTRAVENOUS at 22:38

## 2021-01-01 RX ADMIN — LINEZOLID 600 MG: 600 INJECTION, SOLUTION INTRAVENOUS at 05:22

## 2021-01-01 RX ADMIN — ALBUTEROL SULFATE 2 PUFF: 90 AEROSOL, METERED RESPIRATORY (INHALATION) at 11:43

## 2021-01-01 RX ADMIN — OXYCODONE HYDROCHLORIDE 5 MG: 5 TABLET ORAL at 19:44

## 2021-01-01 RX ADMIN — CEFDINIR 300 MG: 300 CAPSULE ORAL at 00:51

## 2021-01-01 RX ADMIN — ROSUVASTATIN CALCIUM 10 MG: 10 TABLET, FILM COATED ORAL at 08:26

## 2021-01-01 RX ADMIN — SEVELAMER CARBONATE 800 MG: 800 TABLET, FILM COATED ORAL at 17:29

## 2021-01-01 RX ADMIN — ACETAMINOPHEN 650 MG: 325 TABLET, FILM COATED ORAL at 23:03

## 2021-01-01 RX ADMIN — FUROSEMIDE 40 MG: 10 INJECTION, SOLUTION INTRAVENOUS at 17:58

## 2021-01-01 RX ADMIN — FUROSEMIDE 20 MG: 10 INJECTION, SOLUTION INTRAMUSCULAR; INTRAVENOUS at 19:16

## 2021-01-01 RX ADMIN — Medication 125 MCG: at 09:03

## 2021-01-01 RX ADMIN — CALCIUM ACETATE 667 MG: 667 CAPSULE ORAL at 12:35

## 2021-01-01 RX ADMIN — Medication: at 16:51

## 2021-01-01 RX ADMIN — OXYBUTYNIN CHLORIDE 5 MG: 5 TABLET, EXTENDED RELEASE ORAL at 19:41

## 2021-01-01 RX ADMIN — TIMOLOL MALEATE 1 DROP: 5 SOLUTION/ DROPS OPHTHALMIC at 08:16

## 2021-01-01 RX ADMIN — FLUCONAZOLE 100 MG: 100 TABLET ORAL at 18:07

## 2021-01-01 RX ADMIN — Medication 0.3 MG: at 19:32

## 2021-01-01 RX ADMIN — TIMOLOL MALEATE 1 DROP: 5 SOLUTION/ DROPS OPHTHALMIC at 09:07

## 2021-01-01 RX ADMIN — VANCOMYCIN HYDROCHLORIDE 1500 MG: 5 INJECTION, POWDER, LYOPHILIZED, FOR SOLUTION INTRAVENOUS at 18:35

## 2021-01-01 RX ADMIN — DOCUSATE SODIUM 100 MG: 100 CAPSULE, LIQUID FILLED ORAL at 08:50

## 2021-01-01 RX ADMIN — IPRATROPIUM BROMIDE AND ALBUTEROL SULFATE 3 ML: .5; 3 SOLUTION RESPIRATORY (INHALATION) at 07:55

## 2021-01-01 RX ADMIN — Medication: at 13:36

## 2021-01-01 RX ADMIN — DOCUSATE SODIUM 50 MG AND SENNOSIDES 8.6 MG 1 TABLET: 8.6; 5 TABLET, FILM COATED ORAL at 02:00

## 2021-01-01 RX ADMIN — MONTELUKAST 10 MG: 10 TABLET, FILM COATED ORAL at 22:05

## 2021-01-01 RX ADMIN — EPOETIN ALFA-EPBX 3000 UNITS: 10000 INJECTION, SOLUTION INTRAVENOUS; SUBCUTANEOUS at 18:07

## 2021-01-01 RX ADMIN — NIFEDIPINE 30 MG: 30 TABLET, FILM COATED, EXTENDED RELEASE ORAL at 08:57

## 2021-01-01 RX ADMIN — IPRATROPIUM BROMIDE AND ALBUTEROL SULFATE 3 ML: .5; 2.5 SOLUTION RESPIRATORY (INHALATION) at 11:56

## 2021-01-01 RX ADMIN — LOSARTAN POTASSIUM 50 MG: 25 TABLET, FILM COATED ORAL at 08:36

## 2021-01-01 RX ADMIN — TIMOLOL MALEATE 1 DROP: 5 SOLUTION/ DROPS OPHTHALMIC at 08:51

## 2021-01-01 RX ADMIN — MICONAZOLE NITRATE: 20 POWDER TOPICAL at 20:28

## 2021-01-01 RX ADMIN — TIMOLOL MALEATE 1 DROP: 5 SOLUTION/ DROPS OPHTHALMIC at 02:02

## 2021-01-01 RX ADMIN — ASPIRIN 81 MG: 81 TABLET, DELAYED RELEASE ORAL at 08:45

## 2021-01-01 RX ADMIN — TIMOLOL MALEATE 1 DROP: 5 SOLUTION/ DROPS OPHTHALMIC at 20:17

## 2021-01-01 RX ADMIN — MONTELUKAST 10 MG: 10 TABLET, FILM COATED ORAL at 21:56

## 2021-01-01 RX ADMIN — ROSUVASTATIN CALCIUM 10 MG: 10 TABLET, FILM COATED ORAL at 08:06

## 2021-01-01 RX ADMIN — ALLOPURINOL 100 MG: 100 TABLET ORAL at 08:45

## 2021-01-01 RX ADMIN — TIMOLOL MALEATE 1 DROP: 5 SOLUTION/ DROPS OPHTHALMIC at 21:15

## 2021-01-01 RX ADMIN — IPRATROPIUM BROMIDE AND ALBUTEROL SULFATE 3 ML: .5; 3 SOLUTION RESPIRATORY (INHALATION) at 20:49

## 2021-01-01 RX ADMIN — BENZONATATE 200 MG: 100 CAPSULE ORAL at 10:19

## 2021-01-01 RX ADMIN — PANTOPRAZOLE SODIUM 40 MG: 20 TABLET, DELAYED RELEASE ORAL at 08:50

## 2021-01-01 RX ADMIN — CALCIUM ACETATE 667 MG: 667 CAPSULE ORAL at 08:53

## 2021-01-01 RX ADMIN — BENZONATATE 200 MG: 100 CAPSULE ORAL at 09:03

## 2021-01-01 RX ADMIN — TIMOLOL MALEATE 1 DROP: 5 SOLUTION/ DROPS OPHTHALMIC at 11:37

## 2021-01-01 RX ADMIN — BENZONATATE 200 MG: 100 CAPSULE ORAL at 14:39

## 2021-01-01 RX ADMIN — ASPIRIN 81 MG: 81 TABLET, DELAYED RELEASE ORAL at 13:13

## 2021-01-01 RX ADMIN — LINEZOLID 600 MG: 600 INJECTION, SOLUTION INTRAVENOUS at 04:38

## 2021-01-01 RX ADMIN — BENZONATATE 100 MG: 100 CAPSULE ORAL at 16:36

## 2021-01-01 RX ADMIN — ROSUVASTATIN CALCIUM 10 MG: 10 TABLET, FILM COATED ORAL at 21:14

## 2021-01-01 RX ADMIN — ASPIRIN 81 MG: 81 TABLET, DELAYED RELEASE ORAL at 07:47

## 2021-01-01 RX ADMIN — FLUTICASONE PROPIONATE 2 SPRAY: 50 SPRAY, METERED NASAL at 08:57

## 2021-01-01 RX ADMIN — BENZONATATE 200 MG: 100 CAPSULE ORAL at 01:14

## 2021-01-01 RX ADMIN — PANTOPRAZOLE SODIUM 40 MG: 20 TABLET, DELAYED RELEASE ORAL at 17:46

## 2021-01-01 RX ADMIN — LINEZOLID 600 MG: 600 INJECTION, SOLUTION INTRAVENOUS at 21:53

## 2021-01-01 RX ADMIN — CEFTRIAXONE SODIUM 1 G: 1 INJECTION, POWDER, FOR SOLUTION INTRAMUSCULAR; INTRAVENOUS at 22:53

## 2021-01-01 RX ADMIN — SERTRALINE HYDROCHLORIDE 25 MG: 25 TABLET ORAL at 08:26

## 2021-01-01 RX ADMIN — ACETAMINOPHEN 650 MG: 325 TABLET ORAL at 12:37

## 2021-01-01 RX ADMIN — CEFTRIAXONE 1 G: 1 INJECTION, POWDER, FOR SOLUTION INTRAMUSCULAR; INTRAVENOUS at 02:44

## 2021-01-01 RX ADMIN — LOSARTAN POTASSIUM 50 MG: 50 TABLET, FILM COATED ORAL at 11:33

## 2021-01-01 RX ADMIN — SODIUM CHLORIDE 300 ML: 9 INJECTION, SOLUTION INTRAVENOUS at 16:50

## 2021-01-01 RX ADMIN — ACETAMINOPHEN 325 MG: 325 TABLET ORAL at 16:47

## 2021-01-01 RX ADMIN — FLUCONAZOLE 100 MG: 100 TABLET ORAL at 08:57

## 2021-01-01 RX ADMIN — MONTELUKAST 10 MG: 10 TABLET, FILM COATED ORAL at 21:04

## 2021-01-01 RX ADMIN — HEPARIN SODIUM 500 UNITS: 1000 INJECTION INTRAVENOUS; SUBCUTANEOUS at 11:06

## 2021-01-01 RX ADMIN — MAGNESIUM OXIDE 400 MG: 400 TABLET ORAL at 19:32

## 2021-01-01 RX ADMIN — IPRATROPIUM BROMIDE AND ALBUTEROL SULFATE 3 ML: .5; 3 SOLUTION RESPIRATORY (INHALATION) at 02:51

## 2021-01-01 RX ADMIN — FLUTICASONE PROPIONATE 2 SPRAY: 50 SPRAY, METERED NASAL at 08:20

## 2021-01-01 RX ADMIN — TIMOLOL MALEATE 1 DROP: 5 SOLUTION OPHTHALMIC at 21:22

## 2021-01-01 RX ADMIN — CEFTRIAXONE 1 G: 1 INJECTION, POWDER, FOR SOLUTION INTRAMUSCULAR; INTRAVENOUS at 10:19

## 2021-01-01 RX ADMIN — PANTOPRAZOLE SODIUM 40 MG: 20 TABLET, DELAYED RELEASE ORAL at 08:46

## 2021-01-01 RX ADMIN — MONTELUKAST 10 MG: 10 TABLET, FILM COATED ORAL at 22:19

## 2021-01-01 RX ADMIN — SEVELAMER CARBONATE 800 MG: 800 TABLET, FILM COATED ORAL at 09:03

## 2021-01-01 RX ADMIN — ASPIRIN 81 MG CHEWABLE TABLET 81 MG: 81 TABLET CHEWABLE at 08:50

## 2021-01-01 RX ADMIN — IPRATROPIUM BROMIDE AND ALBUTEROL SULFATE 3 ML: .5; 3 SOLUTION RESPIRATORY (INHALATION) at 20:31

## 2021-01-01 RX ADMIN — BENZONATATE 200 MG: 100 CAPSULE ORAL at 19:14

## 2021-01-01 RX ADMIN — ALLOPURINOL 100 MG: 100 TABLET ORAL at 08:57

## 2021-01-01 RX ADMIN — BENZONATATE 200 MG: 100 CAPSULE ORAL at 20:37

## 2021-01-01 RX ADMIN — DOCUSATE SODIUM 100 MG: 100 CAPSULE, LIQUID FILLED ORAL at 08:36

## 2021-01-01 RX ADMIN — MAGNESIUM OXIDE 400 MG: 400 TABLET ORAL at 20:44

## 2021-01-01 RX ADMIN — SEVELAMER CARBONATE 800 MG: 800 TABLET, FILM COATED ORAL at 19:15

## 2021-01-01 RX ADMIN — CEFTRIAXONE SODIUM 1 G: 1 INJECTION, POWDER, FOR SOLUTION INTRAMUSCULAR; INTRAVENOUS at 22:49

## 2021-01-01 RX ADMIN — SEVELAMER CARBONATE 800 MG: 800 TABLET, FILM COATED ORAL at 18:53

## 2021-01-01 RX ADMIN — ACETAMINOPHEN 650 MG: 325 TABLET, FILM COATED ORAL at 08:17

## 2021-01-01 RX ADMIN — MICONAZOLE NITRATE 1 APPLICATOR: 20 CREAM VAGINAL at 22:46

## 2021-01-01 RX ADMIN — Medication 12.5 MG: at 16:51

## 2021-01-01 RX ADMIN — ASPIRIN 81 MG CHEWABLE TABLET 81 MG: 81 TABLET CHEWABLE at 08:06

## 2021-01-01 RX ADMIN — MICONAZOLE NITRATE: 20 POWDER TOPICAL at 20:44

## 2021-01-01 RX ADMIN — Medication 1 LOZENGE: at 13:10

## 2021-01-01 RX ADMIN — MICONAZOLE NITRATE 1 APPLICATOR: 20 CREAM VAGINAL at 17:06

## 2021-01-01 RX ADMIN — IOPAMIDOL 100 ML: 755 INJECTION, SOLUTION INTRAVENOUS at 03:22

## 2021-01-01 RX ADMIN — SODIUM CHLORIDE 250 ML: 9 INJECTION, SOLUTION INTRAVENOUS at 13:31

## 2021-01-01 RX ADMIN — MAGNESIUM OXIDE 400 MG: 400 TABLET ORAL at 08:18

## 2021-01-01 RX ADMIN — ROSUVASTATIN CALCIUM 10 MG: 10 TABLET, FILM COATED ORAL at 22:19

## 2021-01-01 RX ADMIN — CEFTRIAXONE SODIUM 1 G: 1 INJECTION, POWDER, FOR SOLUTION INTRAMUSCULAR; INTRAVENOUS at 00:00

## 2021-01-01 RX ADMIN — SEVELAMER CARBONATE 800 MG: 800 TABLET, FILM COATED ORAL at 08:56

## 2021-01-01 RX ADMIN — ROSUVASTATIN CALCIUM 10 MG: 10 TABLET, FILM COATED ORAL at 08:46

## 2021-01-01 RX ADMIN — ROSUVASTATIN CALCIUM 10 MG: 10 TABLET, FILM COATED ORAL at 02:01

## 2021-01-01 RX ADMIN — ACETAMINOPHEN 975 MG: 325 TABLET ORAL at 13:29

## 2021-01-01 RX ADMIN — Medication: at 13:31

## 2021-01-01 RX ADMIN — ALLOPURINOL 100 MG: 100 TABLET ORAL at 08:18

## 2021-01-01 RX ADMIN — MAGNESIUM OXIDE 400 MG: 400 TABLET ORAL at 08:36

## 2021-01-01 RX ADMIN — ACETAMINOPHEN 650 MG: 325 TABLET, FILM COATED ORAL at 02:01

## 2021-01-01 RX ADMIN — OMEPRAZOLE 20 MG: 20 CAPSULE, DELAYED RELEASE ORAL at 08:18

## 2021-01-01 RX ADMIN — TIMOLOL MALEATE 1 DROP: 5 SOLUTION OPHTHALMIC at 12:02

## 2021-01-01 RX ADMIN — Medication 125 MCG: at 08:18

## 2021-01-01 RX ADMIN — IPRATROPIUM BROMIDE AND ALBUTEROL SULFATE 3 ML: .5; 2.5 SOLUTION RESPIRATORY (INHALATION) at 19:59

## 2021-01-01 RX ADMIN — OMEPRAZOLE 20 MG: 20 CAPSULE, DELAYED RELEASE ORAL at 08:06

## 2021-01-01 RX ADMIN — TIMOLOL MALEATE 1 DROP: 5 SOLUTION/ DROPS OPHTHALMIC at 22:15

## 2021-01-01 RX ADMIN — MICONAZOLE NITRATE: 20 POWDER TOPICAL at 09:41

## 2021-01-01 RX ADMIN — IPRATROPIUM BROMIDE AND ALBUTEROL SULFATE 3 ML: .5; 2.5 SOLUTION RESPIRATORY (INHALATION) at 07:42

## 2021-01-01 RX ADMIN — MONTELUKAST 10 MG: 10 TABLET, FILM COATED ORAL at 22:17

## 2021-01-01 RX ADMIN — SODIUM POLYSTYRENE SULFONATE 15 G: 15 SUSPENSION ORAL; RECTAL at 21:12

## 2021-01-01 RX ADMIN — HEPARIN SODIUM 2000 UNITS: 1000 INJECTION, SOLUTION INTRAVENOUS; SUBCUTANEOUS at 12:43

## 2021-01-01 RX ADMIN — EPOETIN ALFA-EPBX 4000 UNITS: 10000 INJECTION, SOLUTION INTRAVENOUS; SUBCUTANEOUS at 13:43

## 2021-01-01 RX ADMIN — ROSUVASTATIN CALCIUM 10 MG: 10 TABLET, FILM COATED ORAL at 21:04

## 2021-01-01 RX ADMIN — OXYBUTYNIN CHLORIDE 5 MG: 5 TABLET, EXTENDED RELEASE ORAL at 22:15

## 2021-01-01 RX ADMIN — MONTELUKAST 10 MG: 10 TABLET, FILM COATED ORAL at 21:26

## 2021-01-01 RX ADMIN — SEVELAMER CARBONATE 800 MG: 800 TABLET, FILM COATED ORAL at 12:37

## 2021-01-01 RX ADMIN — OXYCODONE HYDROCHLORIDE 5 MG: 5 TABLET ORAL at 19:46

## 2021-01-01 RX ADMIN — IPRATROPIUM BROMIDE AND ALBUTEROL SULFATE 3 ML: .5; 2.5 SOLUTION RESPIRATORY (INHALATION) at 08:04

## 2021-01-01 RX ADMIN — MAGNESIUM OXIDE 400 MG: 400 TABLET ORAL at 08:57

## 2021-01-01 SDOH — SOCIAL STABILITY - SOCIAL INSECURITY: PROBLEM RELATED TO SOCIAL ENVIRONMENT, UNSPECIFIED: Z60.9

## 2021-01-01 ASSESSMENT — ACTIVITIES OF DAILY LIVING (ADL)
CONCENTRATING,_REMEMBERING_OR_MAKING_DECISIONS_DIFFICULTY: NO
DEPENDENT_IADLS:: CLEANING;COOKING;LAUNDRY;SHOPPING;MEAL PREPARATION;MEDICATION MANAGEMENT;TRANSPORTATION
ADLS_ACUITY_SCORE: 21
ADLS_ACUITY_SCORE: 19
ADLS_ACUITY_SCORE: 21
ADLS_ACUITY_SCORE: 22
ADLS_ACUITY_SCORE: 19
ADLS_ACUITY_SCORE: 23
ADLS_ACUITY_SCORE: 23
ADLS_ACUITY_SCORE: 21
ADLS_ACUITY_SCORE: 21
ADLS_ACUITY_SCORE: 23
ADLS_ACUITY_SCORE: 17
DIFFICULTY_COMMUNICATING: NO
DRESSING/BATHING_DIFFICULTY: YES
ADLS_ACUITY_SCORE: 21
ADLS_ACUITY_SCORE: 22
ADLS_ACUITY_SCORE: 19
ADLS_ACUITY_SCORE: 21
ADLS_ACUITY_SCORE: 21
ADLS_ACUITY_SCORE: 19
ADLS_ACUITY_SCORE: 19
ADLS_ACUITY_SCORE: 22
TOILETING_ISSUES: YES
ADLS_ACUITY_SCORE: 20
ADLS_ACUITY_SCORE: 22
PREVIOUS_RESPONSIBILITIES: MEAL PREP;FINANCES
ADLS_ACUITY_SCORE: 19
ADLS_ACUITY_SCORE: 21
ADLS_ACUITY_SCORE: 21
DRESSING/BATHING_MANAGEMENT: SHOWER CHAIR
ADLS_ACUITY_SCORE: 19
ADLS_ACUITY_SCORE: 22
ADLS_ACUITY_SCORE: 19
EQUIPMENT_CURRENTLY_USED_AT_HOME: WHEELCHAIR, MANUAL
ADLS_ACUITY_SCORE: 19
ADLS_ACUITY_SCORE: 24
ADLS_ACUITY_SCORE: 19
ADLS_ACUITY_SCORE: 21
ADLS_ACUITY_SCORE: 22
ADLS_ACUITY_SCORE: 21
ADLS_ACUITY_SCORE: 19
ADLS_ACUITY_SCORE: 22
ADLS_ACUITY_SCORE: 19
ADLS_ACUITY_SCORE: 21
ADLS_ACUITY_SCORE: 19
ADLS_ACUITY_SCORE: 28
WALKING_OR_CLIMBING_STAIRS_DIFFICULTY: YES
ADLS_ACUITY_SCORE: 21
ADLS_ACUITY_SCORE: 19
DOING_ERRANDS_INDEPENDENTLY_DIFFICULTY: YES
ADLS_ACUITY_SCORE: 22
ADLS_ACUITY_SCORE: 21
DIFFICULTY_EATING/SWALLOWING: NO
WALKING_OR_CLIMBING_STAIRS: AMBULATION DIFFICULTY, ASSISTANCE 1 PERSON
ADLS_ACUITY_SCORE: 21
WEAR_GLASSES_OR_BLIND: YES
ADLS_ACUITY_SCORE: 21
ADLS_ACUITY_SCORE: 21
ADLS_ACUITY_SCORE: 24
ADLS_ACUITY_SCORE: 18
ADLS_ACUITY_SCORE: 21
ADLS_ACUITY_SCORE: 23
ADLS_ACUITY_SCORE: 22
ADLS_ACUITY_SCORE: 19
ADLS_ACUITY_SCORE: 19
ADLS_ACUITY_SCORE: 22
DRESSING/BATHING: BATHING DIFFICULTY, REQUIRES EQUIPMENT
ADLS_ACUITY_SCORE: 21
ADLS_ACUITY_SCORE: 24
FALL_HISTORY_WITHIN_LAST_SIX_MONTHS: NO

## 2021-01-01 ASSESSMENT — MIFFLIN-ST. JEOR
SCORE: 1390.88
SCORE: 1502
SCORE: 1317.88
SCORE: 1342.38
SCORE: 1411.32
SCORE: 1453.96
SCORE: 1347.82
SCORE: 1490.24
SCORE: 1343.74
SCORE: 1411.32
SCORE: 1453.96
SCORE: 1390.88
SCORE: 1343.74
SCORE: 1540.59
SCORE: 1423.88
SCORE: 1425.84
SCORE: 1366.87
SCORE: 1347.82
SCORE: 1391.82
SCORE: 1385.92
SCORE: 1392.72
SCORE: 1342.38
SCORE: 1937
SCORE: 1317.88
SCORE: 1441.26
SCORE: 1385.92
SCORE: 1453.96
SCORE: 1453.88
SCORE: 1365.96
SCORE: 1367.78
SCORE: 1453.88
SCORE: 1348.27
SCORE: 1348.27
SCORE: 1336.47
SCORE: 1362.79
SCORE: 1425.84
SCORE: 1392.72
SCORE: 1367.78
SCORE: 1359.16
SCORE: 1392.72
SCORE: 1345.55
SCORE: 1354.62
SCORE: 1441.26
SCORE: 1345.55
SCORE: 1937.49
SCORE: 1391.82
SCORE: 1336.47
SCORE: 1365.96
SCORE: 1336.47
SCORE: 1362.79
SCORE: 1359.16
SCORE: 1345.55
SCORE: 1981.94
SCORE: 1425.84
SCORE: 1385.92
SCORE: 1362.79
SCORE: 1441.26
SCORE: 1423.88
SCORE: 1343.74
SCORE: 1371.41
SCORE: 1359.16
SCORE: 1343.74
SCORE: 1343.74
SCORE: 1390.88
SCORE: 1371.41
SCORE: 1385.92
SCORE: 1335.88
SCORE: 1385.92
SCORE: 1371.41
SCORE: 1385.92
SCORE: 1366.87
SCORE: 1423.88
SCORE: 1335.88
SCORE: 1529.03
SCORE: 1509.94
SCORE: 1365.96
SCORE: 1411.32
SCORE: 1343.74
SCORE: 1354.62
SCORE: 1367.78
SCORE: 1495.23
SCORE: 1335.88
SCORE: 1453.88
SCORE: 1347.82
SCORE: 1391.47
SCORE: 1391.82
SCORE: 1502.49
SCORE: 1317.88
SCORE: 1354.62
SCORE: 1366.87
SCORE: 1534.93
SCORE: 1540.59
SCORE: 1348.27
SCORE: 1342.38

## 2021-01-01 ASSESSMENT — ANXIETY QUESTIONNAIRES
GAD7 TOTAL SCORE: 18
GAD7 TOTAL SCORE: 16

## 2021-01-01 ASSESSMENT — ENCOUNTER SYMPTOMS
ABDOMINAL PAIN: 0
HEADACHES: 0
NECK PAIN: 0
HEMATURIA: 1
CHILLS: 1
DYSRHYTHMIAS: 1
NAUSEA: 0
VOMITING: 0
COUGH: 1
TROUBLE SWALLOWING: 0
FEVER: 0
BACK PAIN: 0
SLEEP DISTURBANCE: 1
ABDOMINAL PAIN: 0
WEAKNESS: 0
SHORTNESS OF BREATH: 0
ACTIVITY CHANGE: 1
WHEEZING: 0
FEVER: 0
COUGH: 0
CONFUSION: 0
PALPITATIONS: 0
FATIGUE: 1
ADENOPATHY: 0
APPETITE CHANGE: 1
SHORTNESS OF BREATH: 1
LIGHT-HEADEDNESS: 0
DYSURIA: 1
NUMBNESS: 0

## 2021-01-01 ASSESSMENT — COPD QUESTIONNAIRES: COPD: 1

## 2021-01-01 ASSESSMENT — LIFESTYLE VARIABLES: TOBACCO_USE: 1

## 2021-01-01 ASSESSMENT — PATIENT HEALTH QUESTIONNAIRE - PHQ9
SUM OF ALL RESPONSES TO PHQ QUESTIONS 1-9: 12
SUM OF ALL RESPONSES TO PHQ QUESTIONS 1-9: 9
SUM OF ALL RESPONSES TO PHQ QUESTIONS 1-9: 14

## 2021-01-01 ASSESSMENT — PAIN SCALES - GENERAL
PAINLEVEL: SEVERE PAIN (6)
PAINLEVEL: NO PAIN (0)
PAINLEVEL: SEVERE PAIN (7)

## 2021-01-01 ASSESSMENT — ASTHMA QUESTIONNAIRES: ACT_TOTALSCORE: 23

## 2021-03-16 PROBLEM — N30.90 CYSTITIS: Status: ACTIVE | Noted: 2021-01-01

## 2021-03-16 NOTE — CONSULTS
[facility]    Urology Consultation     Date of Admission:  3/16/2021    Assessment & Plan   Sun Mireles is a 65 year old female who was admitted on 3/16/2021. PMH of ESRD on dialysis. I was asked to see the patient for Recurrent cystitis. Recent hemorrhagic cystitis with stent placement, and later stent removal with Dr. Simmons and Dr. Vega in [place]. Followed up for torres removal on 3/3 with Dr. Simmons and subsequently transferred here for 3 days of dysuria. CT shows mild bladder wall thickening and no hydronephrosis.    Plan:     Culture specific ABx per IM    Check PVR's to ensure adequate emptying. Patient adamantly opposed to torres catheter so will encourage double voiding and straight cathing if need be    Following      Konstantin Palomo PA-C 3/16/2021 4:10 PM  [facility]  Mon-Fri, 7am - 4pm  Office: [phone]      Code Status    Full Code    Reason for Consult   Reason for consult: Recurrent cystitis     Primary Care Physician   Kelli Vanessa    Chief Complaint   Dysuria    History is obtained from the patient    History of Present Illness   Sun Mireles is a 65 year old female who presents with dysuria for the last few days. She had a recent hemorrhagic UTI with hydronephrosis and was seen by [facility] in [place]. She had stents a torres placed and then the stents were removed before she left the hospital. She was discharged with a torres and followed up with Dr. Simmons on 3/3 and had the catheter removed as well as reviewed the pathology report from her bladder biopsies which were negative for malignancy. He planned to have her follow up in one month for repeat PVR and renal US to ensure improvement of her hydronephrosis.     She was doing well after the catheter was removed until a few days ago when she noticed more burning with urination. She voids 3-4 times per day. Her urine has been clear without any blood. There were no beds  available at [facility] so the patient was transferred here where she underwent her usual dialysis run this morning.     Per H&P: Sun Mireles is a 65 year old female with PMHx of ESRD (dialysis Tues, Thurs, Sat), chronic anemia, fibroid uterus, chronic diastolic CHF, SSS s/p PPM placement (10/2020), chronic atrial fibrillation not on anticoagulation, dyslipidemia, T2DM, non-oxygen dependent COPD who had a prolonged hospitalization at [facility] from 1/20/21-2/11/21 for sepsis secondary to emphysematous cystitis with bilateral hydronephrosis with admitted on 3/16/2021 after presented with recurrent dysuria with abnormal UTI, probable recurrent cystitis. Unfortunately no beds were available at [facility], thus patient was transferred to [facility] for further evaluation and treatment.      Admitted to [facility] 1/20/21-2/11/21 with sepsis secondary emphysematous cystitis and bilateral hydronephrosis. Initial CT on admission with new, mild bilateral hydronephrosis and proximal hydroureter, likely secondary to mass effect from enlarged uterus. Note slow interval enlargement of either single large uterine mass or multiple uterine masses, while these may represent benign uterine fibroids, an underlying malignancy should be considered. Phan catheter placed on admission at that time. CT Urogram on 1/21/21 with diffuse bladder wall thickening with extensive surrounding edema and adjacent extraluminal air, extensive hemorrhagic debris within the bladder lumen, urothelial thickening and hyperenhancement of the bilateral renal pelves and ureters suggesting upper urinary tract infection.     On 1/22/21, pt underwent cystoscopy with retrograde pyelogram and bilateral ureteral stent placement with clot evacuation in the bladder. On 2/3/21, pt underwent repeat cystoscopy with retrograde pyelogram and fulguration and bilateral ureteral stent replacement. Also had bladder bx at that time which was negative for malignancy. On  2/10/21, pt underwent cystoscopy with bilateral ureteral stent removal, retrogrades, and torres placement.     She reports follow-up with Urology in clinic last week with removal of Torres thereafter.      Note urine culture from 1/29/21 with >100,000 colonies of Enterococcus faecium resistant to Levaquin and Ampicillin. Pt was followed by Infectious Disease during hospitalization , treated with IV Linezolid X10 days, fosfomycin X1, Cefepime X5 days, Fluconazole for 14 days for candiduria. Ultimately discharged on Cefdinir (10 additional days), Fluconazole (14 additional days), and Linezolid (7 additional days).     Of note , pt was also seen by Ophthalmology at [facility] on 1/30/21 for blurred vision, no acute issues seen on exam.      On day of admission, pt presented to [facility] ED for evaluation of ~5 days of recurrent dysuria. Denied hematuria (chief complaint last admission). UA from 3/15/21 noted to be excessively turbid, negative nitrite, 500 leukocytes, moderate bacteria, , 0.2 blood. Does not appear that urine cultures were obtained. CBC unremarkable aside from baseline Hgb. CMP with creatinine 6.05, alk phos 133, hepatic panel unremarkable. D-dimer 1.4. CT PE negative for PE, dissection, or aneurysm, pulmonary hypertension, moderate emphysema, mildly enlarged heart with CAD and atherosclerotic vascular disease. CT A/P with bladder wall thickening, correlated with urinalysis to exclude cystitis. Small amount of air seen within the bladder, correlate for recent instrumentation. Fibroid uterus. Atherosclerotic vascular disease. EKG reported as NSR. Pt was ultimately transferred to [facility] as there were no beds available at [facility].          Past Medical History   I have reviewed this patient's medical history and updated it with pertinent information if needed.   Past Medical History:   Diagnosis Date     Aneurysm of vertebral artery (H)      Asthma      Asthma      CHF (congestive heart failure) (H)       CHF (congestive heart failure) (H)     diastolic      Chronic kidney disease      Chronic kidney disease, stage 4, severely decreased GFR (H)      Chronic pulmonary heart disease, unspecified      Gout, unspecified      Hypercholesterolemia      Hypertension      Localized osteoarthrosis not specified whether primary or secondary, lower leg     Knee     Morbid obesity (H)     BMI>60     Obstructive sleep apnea (adult) (pediatric)      Type II or unspecified type diabetes mellitus with unspecified complication, not stated as uncontrolled      Unspecified glaucoma(365.9)      Unspecified urinary incontinence        Past Surgical History   I have reviewed this patient's surgical history and updated it with pertinent information if needed.  Past Surgical History:   Procedure Laterality Date     C/SECTION, LOW TRANSVERSE  89     TUBAL LIGATION  80, 89       Prior to Admission Medications   Prior to Admission Medications   Prescriptions Last Dose Informant Patient Reported? Taking?   Ascorbic Acid (VITAMIN C-MILKA HIPS) 500 MG CHEW  Nursing Home Yes Yes   Sig: Take 500 mg by mouth 2 times daily   Dimethicone-Zinc Oxide 5-5 % CREA  Nursing Home Yes Yes   Sig: Externally apply topically 3 times daily To vulva each shift with each diaper change   Lidocaine (LIDOCARE) 4 % Patch  Nursing Home Yes Yes   Sig: Place 1 patch onto the skin every 24 hours To prevent lidocaine toxicity, patient should be patch free for 12 hrs daily.   Vitamin D3 (CHOLECALCIFEROL) 125 MCG (5000 UT) tablet  Nursing Home Yes Yes   Sig: Take 1 tablet by mouth daily   acetaminophen (TYLENOL) 325 MG tablet  Nursing Home Yes Yes   Sig: Take 650 mg by mouth 4 times daily   albuterol (PROAIR HFA/PROVENTIL HFA/VENTOLIN HFA) 108 (90 Base) MCG/ACT inhaler  group home Yes Yes   Sig: Inhale 2 puffs into the lungs every 6 hours as needed for shortness of breath / dyspnea or wheezing   allopurinol (ZYLOPRIM) 100 MG tablet  Nursing Home Yes Yes   Sig: Take 100 mg by  mouth daily   artificial saliva (BIOTENE DRY MOUTHWASH) LIQD liquid  Nursing Home Yes Yes   Sig: Swish and spit in mouth 4 times daily   aspirin 81 MG EC tablet  Nursing Home Yes Yes   Sig: Take 81 mg by mouth daily   bisacodyl (DULCOLAX) 10 MG suppository  skilled nursing Yes Yes   Sig: Place 10 mg rectally daily as needed for constipation   coenzyme Q-10 200 MG CAPS  Nursing Home Yes Yes   Sig: Take 200 mg by mouth daily   colchicine (COLCYRS) 0.6 MG tablet  Nursing Home Yes Yes   Sig: Take 0.3 mg by mouth once a week Every Saturday evening   diclofenac (VOLTAREN) 1 % topical gel  Nursing Home Yes Yes   Sig: Apply 1 g topically 3 times daily as needed for moderate pain (to bilateral toes)   diphenhydrAMINE (BENADRYL) 25 MG tablet  Nursing Home Yes Yes   Sig: Take 25 mg by mouth 2 times daily as needed for itching   docusate sodium (COLACE) 100 MG tablet  Nursing Home Yes Yes   Sig: Take 100 mg by mouth daily   fluticasone (FLONASE) 50 MCG/ACT nasal spray  Nursing Home Yes Yes   Sig: Spray 2 sprays into both nostrils daily   gabapentin (NEURONTIN) 100 MG capsule  Nursing Home Yes Yes   Sig: Take 200 mg by mouth three times a week Every Mon, Wed, Friday at bedtime on non-dialysis days   ipratropium - albuterol 0.5 mg/2.5 mg/3 mL (DUONEB) 0.5-2.5 (3) MG/3ML neb solution  skilled nursing Yes Yes   Sig: Take 1 vial by nebulization every 6 hours as needed for shortness of breath / dyspnea or wheezing   magnesium oxide (MAG-OX) 400 (240 Mg) MG tablet  Nursing Home Yes Yes   Sig: Take 400 mg by mouth 2 times daily   montelukast (SINGULAIR) 10 MG tablet  Nursing Home Yes Yes   Sig: Take 10 mg by mouth At Bedtime   omeprazole (PRILOSEC) 20 MG DR capsule  Nursing Home Yes Yes   Sig: Take 20 mg by mouth daily   oxybutynin ER (DITROPAN-XL) 5 MG 24 hr tablet  Nursing Home Yes Yes   Sig: Take 5 mg by mouth every evening   polyethylene glycol (MIRALAX) 17 g packet  Nursing Home Yes Yes   Sig: Take 1 packet by mouth daily as needed for  constipation   rosuvastatin (CRESTOR) 10 MG tablet  Nursing Home Yes Yes   Sig: Take 10 mg by mouth daily   senna-docusate (SENOKOT-S/PERICOLACE) 8.6-50 MG tablet  Nursing Home Yes Yes   Sig: Take 1 tablet by mouth daily as needed for constipation   timolol maleate (TIMOPTIC) 0.5 % ophthalmic solution  custodial Yes Yes   Sig: Place 1 drop into both eyes 2 times daily      Facility-Administered Medications: None     Allergies   No Known Allergies    Social History   I have reviewed this patient's social history and updated it with pertinent information if needed. Sun Mireles  reports that she has been smoking. She has a 45.00 pack-year smoking history. She has never used smokeless tobacco. She reports that she does not drink alcohol or use drugs.    Family History   I have reviewed this patient's family history and updated it with pertinent information if needed.   Family History   Problem Relation Age of Onset     Hypertension Unknown      Diabetes Unknown      Cancer Maternal Grandfather         stomach cancer       Review of Systems   The 10 point Review of Systems is negative other than noted in the HPI or here.     Physical Exam   Temp: 97.1  F (36.2  C) Temp src: Axillary BP: 131/62 Pulse: 77   Resp: 20 SpO2: 91 % O2 Device: None (Room air)    Vital Signs with Ranges  Temp:  [95.7  F (35.4  C)-98.2  F (36.8  C)] 97.1  F (36.2  C)  Pulse:  [72-86] 77  Resp:  [18-20] 20  BP: (116-148)/(36-74) 131/62  SpO2:  [91 %-99 %] 91 %  226 lbs 12.8 oz    Temp (24hrs), Av.1  F (36.2  C), Min:95.7  F (35.4  C), Max:98.2  F (36.8  C)    GENERAL: Awake, alert, NAD. Lying in bed  NEURO: No facial asymmetry.  EYES: No icterus  HEAD, EARS, NOSE, MOUTH, AND THROAT: Atraumatic, normocephalic  NECK: Symmetric  CARDIAC: Skin well perfused  RESPIRATORY: Breathing unlabored  ABDOMEN: Soft, non tender, non distended. No SP tenderness.  BACK/FLANKS: No CVAT  SKIN/HAIR/NAILS: No visible rashes  PSYCHIATRIC: Speech: normal  Mood: normal Affect: normal        Data   Results for orders placed or performed during the hospital encounter of 03/16/21 (from the past 24 hour(s))   Nephrology IP Consult: Patient to be seen: Routine - within 24 hours; ESRD on dialysis, due today.; Consultant may enter orders: Yes; Requesting provider? Hospitalist (if different from attending physician); Name: CHANTAL Hester G Thomas, MD     3/16/2021 10:02 AM  RENAL CONSULTATION NOTE      REFERRING MD:  Rachel    REASON FOR CONSULTATION:  ESRD Management      A/P:     1.  ESRD   -[facility] dialysis [facility]   -ran as scheduled 03/13/21   -Dr. Katie Bailey   -IJ access   -target weight 94 kg  2.  Access   -failed AVF appointment (x4) by review  3.  Anemia   -SHEFALI   -weekly Fe  4.  Mineral Bone Disease  5.  History hematuria   -recent cystoscopy  6.  Recurrent cystitis  7.  HTN  8.  DM    [facility] dialysis schedule  Dialysis today        HPI:     Transferred from [facility]  [facility]  Ran as scheduled 03/13/21 (off at 96.8 kg)    Appears dialysis dependent dating to August 2020  Dry weight has been increasing    IJ access  Multiple failed access appointments    Presents with complicated urologic picture  Recurrent cystitis  Recent cystoscopy    No CP or SOB  Follows  [facility]  Does not like dialysis diet        ROS:      A complete 10 point review of systems was performed and is   negative except as noted above.    PMH:    Past Medical History:   Diagnosis Date     Aneurysm of vertebral artery (H)      Asthma      Asthma      CHF (congestive heart failure) (H)      CHF (congestive heart failure) (H)     diastolic      Chronic kidney disease      Chronic kidney disease, stage 4, severely decreased GFR (H)      Chronic pulmonary heart disease, unspecified      Gout, unspecified      Hypercholesterolemia      Hypertension      Localized osteoarthrosis not  specified whether primary or   secondary, lower leg     Knee     Morbid obesity (H)     BMI>60     Obstructive sleep apnea (adult) (pediatric)      Type II or unspecified type diabetes mellitus with unspecified   complication, not stated as uncontrolled      Unspecified glaucoma(365.9)      Unspecified urinary incontinence        PSH:    Past Surgical History:   Procedure Laterality Date     C/SECTION, LOW TRANSVERSE  89     TUBAL LIGATION  80, 89       MEDICATIONS:    No current outpatient medications on file.       ALLERGIES:    Allergies as of 03/16/2021     (No Known Allergies)       FH:    Family History   Problem Relation Age of Onset     Hypertension Unknown      Diabetes Unknown      Cancer Maternal Grandfather         stomach cancer       SH:    Social History     Socioeconomic History     Marital status: Single     Spouse name: Not on file     Number of children: Not on file     Years of education: Not on file     Highest education level: Not on file   Occupational History     Not on file   Social Needs     Financial resource strain: Not on file     Food insecurity     Worry: Not on file     Inability: Not on file     Transportation needs     Medical: Not on file     Non-medical: Not on file   Tobacco Use     Smoking status: Current Every Day Smoker     Packs/day: 1.00     Years: 45.00     Pack years: 45.00     Smokeless tobacco: Never Used   Substance and Sexual Activity     Alcohol use: No     Drug use: No     Sexual activity: Not on file   Lifestyle     Physical activity     Days per week: Not on file     Minutes per session: Not on file     Stress: Not on file   Relationships     Social connections     Talks on phone: Not on file     Gets together: Not on file     Attends Anabaptism service: Not on file     Active member of club or organization: Not on file     Attends meetings of clubs or organizations: Not on file     Relationship status: Not on file     Intimate partner violence     Fear of current  or ex partner: Not on file     Emotionally abused: Not on file     Physically abused: Not on file     Forced sexual activity: Not on file   Other Topics Concern     Not on file   Social History Narrative     Not on file       PHYSICAL EXAM:      Vitals were reviewed  Patient Vitals for the past 8 hrs:   BP Temp Temp src Pulse Resp SpO2 Weight   03/16/21 0832 123/54 95.7  F (35.4  C) Oral 73 18 93 % --   03/16/21 0620 (!) 126/36 96.4  F (35.8  C) Oral 72 18 99 % 102.9   kg (226 lb 12.8 oz)     No intake/output data recorded.      Vitals:    03/16/21 0620   Weight: 102.9 kg (226 lb 12.8 oz)       GENERAL: awake, alert, follows  HEENT: NC/AT, PERRLA, EOMI, non icteric, pharynx moist without   lesion  RESP:  clear anteriorly  CV: RRR, normal S1 S2  ABDOMEN: soft, nontender, no HSM or masses and bowel sounds   normal  MS: no clubbing, cyanosis   SKIN: clear without significant rashes or lesions  NEURO: speech normal and cranial nerves 2-12 intact  PSYCH: affect normal/bright  EXT: 2 plus edema       LABS:        Recent Labs   Lab 03/16/21  0819      POTASSIUM 5.0   CHLORIDE 106   CO2 21   ANIONGAP 12   *   BUN 66*   CR 5.94*   GFRESTIMATED 7*   GFRESTBLACK 8*   JOANNA 8.2*     Recent Labs   Lab 03/16/21  0819   HGB Canceled, Test credited       DIAGNOSTICS:  Reviewed      CHANO Siegel    [facility]  [phone]   Infectious Diseases IP Consult: Patient to be seen: Routine - within 24 hours; With recurrent UTI. Urine cx pending. Recent hospitalization at [facility] for sepsis secondary to emphysematous cystitis with ampicillin resistant Enterococcus faecium....    Narrative    Sade Arreaga MD     3/16/2021 11:55 AM  [facility]    Infectious Disease Consultation     Date of Admission:  3/16/2021  Date of Consult (When I saw the patient): 03/16/21    Assessment & Plan   Sun Mireles is a 65 year old female who was admitted on   3/16/2021.     Impression:  1. 65 y.o  female with PMHx of ESRD (dialysis Tues, Thurs, Sat).   2. Chronic anemia  3. Chronic diastolic CHF  4. SSS s/p PPM placement (10/2020)  5. T2DM  6. COPD   7. Recent history of prolonged hospitalization at [facility] from   1/20/21-2/11/21 for sepsis secondary to emphysematous cystitis   with bilateral hydronephrosis.   8. Presented this occasion with recurrent dysuria with abnormal   UTI, probable recurrent cystitis.  9. UA grossly abnormal, but in dialysis patient.   10. No fever, no chills.   11. 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   12. Cultures have been ordered but not collected yet.     Recommendations:   Get a set of blood cultures   Get urine for cultures, unclear what to make of a grossly   abnormal UA in a dialysis patient with reduced urinary output.   For now continue on linezolid     Sade Arreaga MD    Reason for Consult   Reason for consult: I was asked to evaluate this patient for   dysuria.    Primary Care Physician   Kelli Vanessa    Chief Complaint   Dysuria     History is obtained from the patient and medical records    History of Present Illness   Sun Mireles is a 65 year old female with PMHx of ESRD   (dialysis Tues, Thurs, Sat), chronic anemia, fibroid uterus,   chronic diastolic CHF, SSS s/p PPM placement (10/2020), chronic   atrial fibrillation not on anticoagulation, dyslipidemia, T2DM,   non-oxygen dependent COPD who had a prolonged hospitalization at   [facility] from 1/20/21-2/11/21 for sepsis secondary to   emphysematous cystitis with bilateral hydronephrosis with   admitted on 3/16/2021 after presented with recurrent dysuria with   abnormal UTI, probable recurrent cystitis. Unfortunately no beds   were available at [facility], thus patient was transferred to   [facility] for further evaluation and treatment.     Past Medical History   I have reviewed this patient's medical history and updated it   with pertinent information if needed.   Past Medical History:    Diagnosis Date     Aneurysm of vertebral artery (H)      Asthma      Asthma      CHF (congestive heart failure) (H)      CHF (congestive heart failure) (H)     diastolic      Chronic kidney disease      Chronic kidney disease, stage 4, severely decreased GFR (H)      Chronic pulmonary heart disease, unspecified      Gout, unspecified      Hypercholesterolemia      Hypertension      Localized osteoarthrosis not specified whether primary or   secondary, lower leg     Knee     Morbid obesity (H)     BMI>60     Obstructive sleep apnea (adult) (pediatric)      Type II or unspecified type diabetes mellitus with unspecified   complication, not stated as uncontrolled      Unspecified glaucoma(365.9)      Unspecified urinary incontinence        Past Surgical History   I have reviewed this patient's surgical history and updated it   with pertinent information if needed.  Past Surgical History:   Procedure Laterality Date     C/SECTION, LOW TRANSVERSE  89     TUBAL LIGATION  80, 89       Prior to Admission Medications   Prior to Admission Medications   Prescriptions Last Dose Informant Patient Reported? Taking?   Multiple Vitamins-Minerals (CENTRUM PO)   Yes No   amLODIPine (NORVASC) 10 MG tablet   Yes No   Sig: Take 10 mg by mouth daily   aspirin  MG tablet   Yes No   Sig: Take 325 mg by mouth every 6 hours as needed   esomeprazole (NEXIUM) 40 MG capsule   Yes No   Sig: Take by mouth every morning (before breakfast) Take 30-60   minutes before eating.   febuxostat (ULORIC) 40 MG TABS   Yes No   Sig: Take 40 mg by mouth daily   furosemide (LASIX) 40 MG tablet   Yes No   Sig: Take 40 mg by mouth daily   gabapentin (NEURONTIN) 400 MG capsule   Yes No   Sig: Take by mouth 3 times daily   hydrALAZINE (APRESOLINE) 100 MG TABS   Yes No   Sig: Take 100 mg by mouth 2 times daily   losartan (COZAAR) 100 MG tablet   Yes No   Sig: Take 100 mg by mouth daily   lovastatin (MEVACOR) 20 MG tablet   Yes No   Sig: Take 20 mg by mouth  At Bedtime   metoprolol (LOPRESSOR) 100 MG tablet   Yes No   Sig: Take 100 mg by mouth 2 times daily   timolol hemihydrate (BETIMOL) 0.5 % SOLN ophthalmic solution     Yes No   Si drop 2 times daily   traMADol (ULTRAM) 50 MG tablet   Yes No   Sig: Take by mouth every 6 hours as needed   trimethoprim-polymyxin b (POLYTRIM) ophthalmic solution   Yes No   Si drop 4 times daily      Facility-Administered Medications: None     Allergies   No Known Allergies    Immunization History     There is no immunization history on file for this patient.    Social History   I have reviewed this patient's social history and updated it with   pertinent information if needed. Sun Mireles  reports that   she has been smoking. She has a 45.00 pack-year smoking history.   She has never used smokeless tobacco. She reports that she does   not drink alcohol or use drugs.    Family History   I have reviewed this patient's family history and updated it with   pertinent information if needed.   Family History   Problem Relation Age of Onset     Hypertension Unknown      Diabetes Unknown      Cancer Maternal Grandfather         stomach cancer       Review of Systems   The 10 point Review of Systems is negative other than noted in   the HPI or here.     Physical Exam   Temp: 95.7  F (35.4  C) Temp src: Oral BP: 123/54 Pulse: 73     Resp: 18 SpO2: 93 % O2 Device: None (Room air)    Vital Signs with Ranges  Temp:  [95.7  F (35.4  C)-96.4  F (35.8  C)] 95.7  F (35.4  C)  Pulse:  [72-73] 73  Resp:  [18] 18  BP: (123-126)/(36-54) 123/54  SpO2:  [93 %-99 %] 93 %  226 lbs 12.8 oz  Body mass index is 38.93 kg/m .    GENERAL APPEARANCE:  awake  EYES: Eyes grossly normal to inspection, PERRL and conjunctivae   and sclerae normal  HENT: ear canals and TM's normal and nose and mouth without   ulcers or lesions  NECK: no adenopathy, no asymmetry, masses, or scars and thyroid   normal to palpation  RESP: lungs clear to auscultation - no rales,  rhonchi or wheezes  CV: regular rates and rhythm, normal S1 S2, no S3 or S4 and no   murmur, click or rub  LYMPHATICS: normal ant/post cervical and supraclavicular nodes  ABDOMEN: soft, nontender, without hepatosplenomegaly or masses   and bowel sounds normal  MS: extremities normal- no gross deformities noted  SKIN: no suspicious lesions or rashes      Data   Lab Results   Component Value Date    WBC Canceled, Test credited 03/16/2021    HGB Canceled, Test credited 03/16/2021    HCT Canceled, Test credited 03/16/2021    PLT Canceled, Test credited 03/16/2021     03/16/2021    POTASSIUM 5.0 03/16/2021    CHLORIDE 106 03/16/2021    CO2 21 03/16/2021    BUN 66 (H) 03/16/2021    CR 5.94 (H) 03/16/2021     (H) 03/16/2021     No results for input(s): CULT in the last 168 hours.  No lab results found.    Invalid input(s):          CBC with platelets differential   Result Value Ref Range    WBC Canceled, Test credited 4.0 - 11.0 10e9/L    RBC Count Canceled, Test credited 3.8 - 5.2 10e12/L    Hemoglobin Canceled, Test credited 11.7 - 15.7 g/dL    Hematocrit Canceled, Test credited 35.0 - 47.0 %    MCV Canceled, Test credited 78 - 100 fl    MCH Canceled, Test credited 26.5 - 33.0 pg    MCHC Canceled, Test credited 31.5 - 36.5 g/dL    RDW Canceled, Test credited 10.0 - 15.0 %    Platelet Count Canceled, Test credited 150 - 450 10e9/L    Diff Method Canceled, Test credited    Basic metabolic panel   Result Value Ref Range    Sodium 139 133 - 144 mmol/L    Potassium 5.0 3.4 - 5.3 mmol/L    Chloride 106 94 - 109 mmol/L    Carbon Dioxide 21 20 - 32 mmol/L    Anion Gap 12 3 - 14 mmol/L    Glucose 109 (H) 70 - 99 mg/dL    Urea Nitrogen 66 (H) 7 - 30 mg/dL    Creatinine 5.94 (H) 0.52 - 1.04 mg/dL    GFR Estimate 7 (L) >60 mL/min/[1.73_m2]    GFR Estimate If Black 8 (L) >60 mL/min/[1.73_m2]    Calcium 8.2 (L) 8.5 - 10.1 mg/dL   Blood culture    Specimen: Blood    Left Hand   Result Value Ref Range    Specimen  Description Blood Left Hand     Special Requests Received in aerobic bottle only     Culture Micro No growth after 5 hours    Blood culture    Specimen: Blood    Port   Result Value Ref Range    Specimen Description Blood Port     Culture Micro No growth after 3 hours    CBC with platelets differential   Result Value Ref Range    WBC 6.9 4.0 - 11.0 10e9/L    RBC Count 2.67 (L) 3.8 - 5.2 10e12/L    Hemoglobin 8.1 (L) 11.7 - 15.7 g/dL    Hematocrit 27.0 (L) 35.0 - 47.0 %     (H) 78 - 100 fl    MCH 30.3 26.5 - 33.0 pg    MCHC 30.0 (L) 31.5 - 36.5 g/dL    RDW 17.9 (H) 10.0 - 15.0 %    Platelet Count 239 150 - 450 10e9/L    Diff Method Automated Method     % Neutrophils 68.6 %    % Lymphocytes 16.1 %    % Monocytes 10.9 %    % Eosinophils 3.2 %    % Basophils 0.6 %    % Immature Granulocytes 0.6 %    Nucleated RBCs 0 0 /100    Absolute Neutrophil 4.7 1.6 - 8.3 10e9/L    Absolute Lymphocytes 1.1 0.8 - 5.3 10e9/L    Absolute Monocytes 0.8 0.0 - 1.3 10e9/L    Absolute Eosinophils 0.2 0.0 - 0.7 10e9/L    Absolute Basophils 0.0 0.0 - 0.2 10e9/L    Abs Immature Granulocytes 0.0 0 - 0.4 10e9/L    Absolute Nucleated RBC 0.0    Hemoglobin A1c   Result Value Ref Range    Hemoglobin A1C 4.9 0 - 5.6 %   Phosphorus   Result Value Ref Range    Phosphorus 6.1 (H) 2.5 - 4.5 mg/dL   Magnesium   Result Value Ref Range    Magnesium 2.0 1.6 - 2.3 mg/dL

## 2021-03-16 NOTE — PROGRESS NOTES
Potassium   Date Value Ref Range Status   03/16/2021 5.0 3.4 - 5.3 mmol/L Final     Hemoglobin   Date Value Ref Range Status   03/16/2021 Canceled, Test credited 11.7 - 15.7 g/dL Final     Comment:     Unsatisfactory specimen - clotted  LAB TO REORDER AND REDRAW       Creatinine   Date Value Ref Range Status   03/16/2021 5.94 (H) 0.52 - 1.04 mg/dL Final     Urea Nitrogen   Date Value Ref Range Status   03/16/2021 66 (H) 7 - 30 mg/dL Final     Sodium   Date Value Ref Range Status   03/16/2021 139 133 - 144 mmol/L Final     No results found for: INR    DIALYSIS PROCEDURE NOTE  Hepatitis status of previous patient on machine log was checked and verified ok to use with this patients hepatitis status.  Patient dialyzed for 3 hrs 15 min. on a K2 bath with a net fluid removal of  3.5L.  A BFR of 400 ml/min was obtained via a R CVC.  The treatment plan was discussed with Dr. Siegel during the treatment.    Total heparin received during the treatment: 0 units.     Line flushed, clamped and capped with heparin 1:1000 1.9 mL & 2.0 mL (1900 and 2000 units) per lumen    Meds given: epogen   Complications: none      Person educated: patient. Knowledge base minimal. Barriers to learning: none. Educated on procedural via verbal mode. Patient verbalized understanding. Pt prefers oral education style.     ICEBOAT? Timeout performed pre-treatment  I: Patient was identified using 2 identifiers  C:  Consent Signed Yes  E: Equipment preventative maintenance is current and dialysis delivery system OK to use  B: Hepatitis B Surface Antigen: negative; Draw Date: 03/09/2021      Hepatitis B Surface Antibody: susceptible ; Draw Date: 03/09/2021  O: Dialysis orders present and complete prior to treatment  A: Vascular access verified and assessed prior to treatment  T: Treatment was performed at a clinically appropriate time  ?: Patient was allowed to ask questions and address concerns prior to treatment  See flowsheet in EPIC for further  details and post assessment.  Machine water alarm in place and functioning. Transducer pods intact and checked every 15min.   Pt returned via bed.  Chlorine/Chloramine water system checked every 4 hours.  Outpatient Dialysis at [facility]

## 2021-03-16 NOTE — PHARMACY-ADMISSION MEDICATION HISTORY
Pharmacy Medication History  Admission medication history interview status for the 3/16/2021  admission is complete. See EPIC admission navigator for prior to admission medications     Location of Interview: Phone  Medication history sources: nursing home med list    Significant changes made to the medication list:  Pretty much the entire pre-existing med list in Epic was deleted and all the current meds entered.    In the past week, patient estimated taking medication this percent of the time: greater than 90%    Additional medication history information:   none    Medication reconciliation completed by provider prior to medication history? No    Time spent in this activity: 60 minutes    Prior to Admission medications    Medication Sig Last Dose Taking? Auth Provider   acetaminophen (TYLENOL) 325 MG tablet Take 650 mg by mouth 4 times daily  Yes Unknown, Entered By History   albuterol (PROAIR HFA/PROVENTIL HFA/VENTOLIN HFA) 108 (90 Base) MCG/ACT inhaler Inhale 2 puffs into the lungs every 6 hours as needed for shortness of breath / dyspnea or wheezing  Yes Unknown, Entered By History   allopurinol (ZYLOPRIM) 100 MG tablet Take 100 mg by mouth daily  Yes Unknown, Entered By History   artificial saliva (BIOTENE DRY MOUTHWASH) LIQD liquid Swish and spit in mouth 4 times daily  Yes Unknown, Entered By History   Ascorbic Acid (VITAMIN C-MILKA HIPS) 500 MG CHEW Take 500 mg by mouth 2 times daily  Yes Unknown, Entered By History   aspirin 81 MG EC tablet Take 81 mg by mouth daily  Yes Unknown, Entered By History   bisacodyl (DULCOLAX) 10 MG suppository Place 10 mg rectally daily as needed for constipation  Yes Unknown, Entered By History   coenzyme Q-10 200 MG CAPS Take 200 mg by mouth daily  Yes Unknown, Entered By History   colchicine (COLCYRS) 0.6 MG tablet Take 0.3 mg by mouth once a week Every Saturday evening  Yes Unknown, Entered By History   diclofenac (VOLTAREN) 1 % topical gel Apply 1 g topically 3 times daily  as needed for moderate pain (to bilateral toes)  Yes Unknown, Entered By History   Dimethicone-Zinc Oxide 5-5 % CREA Externally apply topically 3 times daily To vulva each shift with each diaper change  Yes Unknown, Entered By History   diphenhydrAMINE (BENADRYL) 25 MG tablet Take 25 mg by mouth 2 times daily as needed for itching  Yes Unknown, Entered By History   docusate sodium (COLACE) 100 MG tablet Take 100 mg by mouth daily  Yes Unknown, Entered By History   fluticasone (FLONASE) 50 MCG/ACT nasal spray Spray 2 sprays into both nostrils daily  Yes Unknown, Entered By History   gabapentin (NEURONTIN) 100 MG capsule Take 200 mg by mouth three times a week Every Mon, Wed, Friday at bedtime on non-dialysis days  Yes Unknown, Entered By History   ipratropium - albuterol 0.5 mg/2.5 mg/3 mL (DUONEB) 0.5-2.5 (3) MG/3ML neb solution Take 1 vial by nebulization every 6 hours as needed for shortness of breath / dyspnea or wheezing  Yes Unknown, Entered By History   Lidocaine (LIDOCARE) 4 % Patch Place 1 patch onto the skin every 24 hours To prevent lidocaine toxicity, patient should be patch free for 12 hrs daily.  Yes Unknown, Entered By History   magnesium oxide (MAG-OX) 400 (240 Mg) MG tablet Take 400 mg by mouth 2 times daily  Yes Unknown, Entered By History   montelukast (SINGULAIR) 10 MG tablet Take 10 mg by mouth At Bedtime  Yes Unknown, Entered By History   omeprazole (PRILOSEC) 20 MG DR capsule Take 20 mg by mouth daily  Yes Unknown, Entered By History   oxybutynin ER (DITROPAN-XL) 5 MG 24 hr tablet Take 5 mg by mouth every evening  Yes Unknown, Entered By History   polyethylene glycol (MIRALAX) 17 g packet Take 1 packet by mouth daily as needed for constipation  Yes Unknown, Entered By History   rosuvastatin (CRESTOR) 10 MG tablet Take 10 mg by mouth daily  Yes Unknown, Entered By History   senna-docusate (SENOKOT-S/PERICOLACE) 8.6-50 MG tablet Take 1 tablet by mouth daily as needed for constipation  Yes  Unknown, Entered By History   timolol maleate (TIMOPTIC) 0.5 % ophthalmic solution Place 1 drop into both eyes 2 times daily  Yes Unknown, Entered By History   Vitamin D3 (CHOLECALCIFEROL) 125 MCG (5000 UT) tablet Take 1 tablet by mouth daily  Yes Unknown, Entered By History       The information provided in this note is only as accurate as the sources available at the time of update(s)

## 2021-03-16 NOTE — H&P
[facility]    History and Physical - Hospitalist Service       Date of Admission:  3/16/2021    Assessment & Plan   Sun Mireles is a 65 year old female with PMHx of ESRD (dialysis Tues, Thurs, Sat), chronic anemia, fibroid uterus, chronic diastolic CHF, SSS s/p PPM placement (10/2020), chronic atrial fibrillation not on anticoagulation, dyslipidemia, T2DM, non-oxygen dependent COPD who had a prolonged hospitalization at [facility] from 1/20/21-2/11/21 for sepsis secondary to emphysematous cystitis with bilateral hydronephrosis with admitted on 3/16/2021 after presented with recurrent dysuria with abnormal UTI, probable recurrent cystitis. Unfortunately no beds were available at [facility], thus patient was transferred to [facility] for further evaluation and treatment.     Recurrent cystitis  Recent hospitalization for sepsis secondary to emphysematous cystitis with bilateral hydronephrosis (1/20/21-2/11/21): See details regarding recent hospitalization below. Presented to [facility] ED for evaluation of ~5 days of recurrent dysuria. Denied hematuria (chief complaint last admission). UA from 3/15/21 noted to be excessively turbid, negative nitrite, 500 leukocytes, moderate bacteria, , 0.2 blood. Does not appear that urine cultures were obtained. CBC unremarkable aside from baseline Hgb. CMP with creatinine 6.05, alk phos 133, hepatic panel unremarkable. CT A/P with bladder wall thickening, correlated with urinalysis to exclude cystitis. Small amount of air seen within the bladder, correlate for recent instrumentation. Fibroid uterus. Received Linezolid prior to transfer. Note urine culture from 1/29/21 with >100,000 colonies of Enterococcus faecium resistant to Levaquin and Ampicillin for which she was treated with IV Linezolid X10 days, fosfomycin X1, Cefepime X5 days, Fluconazole for 14 days for candiduria. Ultimately discharged on Cefdinir (10 additional days), Fluconazole (14  additional days), and Linezolid (7 additional days).  - Admit to inpatient status  - Obtain urine and blood cultures; cannot see that they were obtained in CareEverywhere   - Continue IV Linezolid  - Infectious disease consulted, appreciate recommendations   - Urology consulted given recurrent cystitis in the context of complicated Urologic history below   - CBC, BMP    Elevated d-dimer: D-dimer 1.4. CT PE negative for PE, dissection, or aneurysm, pulmonary hypertension, moderate emphysema, mildly enlarged heart with CAD and atherosclerotic vascular disease. EKG reported as NSR.    ESRD: Dialyzes Tues, Thurs, Sat. Last run of dialysis 3/13/21.   - Nephrology consulted, pt needs dialysis today   - Dialysis diet     Chronic anemia: Baseline Hgb 7-8. No active signs of bleeding.   - Monitor     Fibroid uterus: Noted on CT during recent admissions. There was comment that mass effect from fibroid uterus caused initial hydronephrosis. No active vaginal bleeding.   - Gyn consulted, appreciate assistance greatly    ADDENDUM 1021: Discussed case with Dr. Walters of Gyn. Recommends starting with Pelvic US. She will review. Nothing acute to do at this time from a surgical standpoint. Appreciate input greatly.     Chronic diastolic CHF:  Limited echo 10/24/20 with EF 65%, normal RVSF.  on admission. CT with evidence of pulmonary hypertension. Note Lasix was discontinued during most recent hospitalization and unclear if restarted, pt unsure.   - Due for dialysis today   - I&Os, daily weights     Chronic atrial fibrillation  SSS s/p PPM placement (10/2020):   - Resume BB once verified  - Does not appear to be on chronic anticoagulation    Hypertension  Dyslipidemia: Resume PTA antihypertensives and statin once verified.    T2DM, non-insulin dependent with peripheral nephropathy:   - Check A1C   - Does not appear to be on diabetic meds   - Resume gabapentin once verified      COPD, non-oxygen dependent: Resume meds once  verified.     Diabetic retinopathy, mild: Noted per Ophthalmology during recent consult 1/30/21.   - BP and BS control.     Glaucoma: Continue Timolol gtt as directed.     Covid status: Negative Covid and flu swab on 3/16/21.      Diet: Dialysis Diet    DVT Prophylaxis: Pneumatic Compression Devices and Ambulate every shift  Phan Catheter: not present  Code Status: Full Code         Disposition Plan   Expected discharge: 2 - 3 days, recommended to transitional care unit once above consults completed and antibiotic game plan established.  Entered: Carol Hickey PA-C 03/16/2021, 7:13 AM     The patient's care was discussed with the Attending Physician, Dr. Guzman, Bedside Nurse and Patient. Attempted to call pt's daughter listed in chart, but went straight to voicemail, will attempt later today.     Carol Hickey PA-C  [facility]  Contact information available via [facility] Paging/Directory  ______________________________________________________________________    Chief Complaint   Dysuria    History is obtained from the patient and extensive chart review.     History of Present Illness   Snu Mireles is a 65 year old female with PMHx of ESRD (dialysis Tues, Thurs, Sat), chronic anemia, fibroid uterus, chronic diastolic CHF, SSS s/p PPM placement (10/2020), chronic atrial fibrillation not on anticoagulation, dyslipidemia, T2DM, non-oxygen dependent COPD who had a prolonged hospitalization at [facility] from 1/20/21-2/11/21 for sepsis secondary to emphysematous cystitis with bilateral hydronephrosis with admitted on 3/16/2021 after presented with recurrent dysuria with abnormal UTI, probable recurrent cystitis. Unfortunately no beds were available at [facility], thus patient was transferred to [facility] for further evaluation and treatment.     Admitted to [facility] 1/20/21-2/11/21 with sepsis secondary emphysematous cystitis and bilateral hydronephrosis.  Initial CT on admission with new, mild bilateral hydronephrosis and proximal hydroureter, likely secondary to mass effect from enlarged uterus. Note slow interval enlargement of either single large uterine mass or multiple uterine masses, while these may represent benign uterine fibroids, an underlying malignancy should be considered. Torres catheter placed on admission at that time. CT Urogram on 1/21/21 with diffuse bladder wall thickening with extensive surrounding edema and adjacent extraluminal air, extensive hemorrhagic debris within the bladder lumen, urothelial thickening and hyperenhancement of the bilateral renal pelves and ureters suggesting upper urinary tract infection.    On 1/22/21, pt underwent cystoscopy with retrograde pyelogram and bilateral ureteral stent placement with clot evacuation in the bladder. On 2/3/21, pt underwent repeat cystoscopy with retrograde pyelogram and fulguration and bilateral ureteral stent replacement. Also had bladder bx at that time which was negative for malignancy. On 2/10/21, pt underwent cystoscopy with bilateral ureteral stent removal, retrogrades, and torres placement.    She reports follow-up with Urology in clinic last week with removal of Torres thereafter.     Note urine culture from 1/29/21 with >100,000 colonies of Enterococcus faecium resistant to Levaquin and Ampicillin. Pt was followed by Infectious Disease during hospitalization , treated with IV Linezolid X10 days, fosfomycin X1, Cefepime X5 days, Fluconazole for 14 days for candiduria. Ultimately discharged on Cefdinir (10 additional days), Fluconazole (14 additional days), and Linezolid (7 additional days).    Of note , pt was also seen by Ophthalmology at [facility] on 1/30/21 for blurred vision, no acute issues seen on exam.     On day of admission, pt presented to [facility] ED for evaluation of ~5 days of recurrent dysuria. Denied hematuria (chief complaint last admission). UA from 3/15/21 noted to be  excessively turbid, negative nitrite, 500 leukocytes, moderate bacteria, , 0.2 blood. Does not appear that urine cultures were obtained. CBC unremarkable aside from baseline Hgb. CMP with creatinine 6.05, alk phos 133, hepatic panel unremarkable. D-dimer 1.4. CT PE negative for PE, dissection, or aneurysm, pulmonary hypertension, moderate emphysema, mildly enlarged heart with CAD and atherosclerotic vascular disease. CT A/P with bladder wall thickening, correlated with urinalysis to exclude cystitis. Small amount of air seen within the bladder, correlate for recent instrumentation. Fibroid uterus. Atherosclerotic vascular disease. EKG reported as NSR. Pt was ultimately transferred to [facility] as there were no beds available at [facility].     Review of Systems    The 10 point Review of Systems is negative other than noted in the HPI.    Past Medical History    I have reviewed this patient's medical history and updated it with pertinent information if needed.   Past Medical History:   Diagnosis Date     Aneurysm of vertebral artery (H)      Asthma      Asthma      CHF (congestive heart failure) (H)      CHF (congestive heart failure) (H)     diastolic      Chronic kidney disease      Chronic kidney disease, stage 4, severely decreased GFR (H)      Chronic pulmonary heart disease, unspecified      Gout, unspecified      Hypercholesterolemia      Hypertension      Localized osteoarthrosis not specified whether primary or secondary, lower leg     Knee     Morbid obesity (H)     BMI>60     Obstructive sleep apnea (adult) (pediatric)      Type II or unspecified type diabetes mellitus with unspecified complication, not stated as uncontrolled      Unspecified glaucoma(365.9)      Unspecified urinary incontinence      Past Surgical History   I have reviewed this patient's surgical history and updated it with pertinent information if needed.  Past Surgical History:   Procedure Laterality Date     C/SECTION, LOW TRANSVERSE  89      TUBAL LIGATION  80, 87     Social History   I have reviewed this patient's social history and updated it with pertinent information if needed.  Social History     Tobacco Use     Smoking status: Current Every Day Smoker     Packs/day: 1.00     Years: 45.00     Pack years: 45.00     Smokeless tobacco: Never Used   Substance Use Topics     Alcohol use: No     Drug use: No       Family History   I have reviewed this patient's family history and updated it with pertinent information if needed.  Family History   Problem Relation Age of Onset     Hypertension Unknown      Diabetes Unknown      Cancer Maternal Grandfather         stomach cancer       Prior to Admission Medications   Prior to Admission Medications   Prescriptions Last Dose Informant Patient Reported? Taking?   Multiple Vitamins-Minerals (CENTRUM PO)   Yes No   amLODIPine (NORVASC) 10 MG tablet   Yes No   Sig: Take 10 mg by mouth daily   aspirin  MG tablet   Yes No   Sig: Take 325 mg by mouth every 6 hours as needed   esomeprazole (NEXIUM) 40 MG capsule   Yes No   Sig: Take by mouth every morning (before breakfast) Take 30-60 minutes before eating.   febuxostat (ULORIC) 40 MG TABS   Yes No   Sig: Take 40 mg by mouth daily   furosemide (LASIX) 40 MG tablet   Yes No   Sig: Take 40 mg by mouth daily   gabapentin (NEURONTIN) 400 MG capsule   Yes No   Sig: Take by mouth 3 times daily   hydrALAZINE (APRESOLINE) 100 MG TABS   Yes No   Sig: Take 100 mg by mouth 2 times daily   losartan (COZAAR) 100 MG tablet   Yes No   Sig: Take 100 mg by mouth daily   lovastatin (MEVACOR) 20 MG tablet   Yes No   Sig: Take 20 mg by mouth At Bedtime   metoprolol (LOPRESSOR) 100 MG tablet   Yes No   Sig: Take 100 mg by mouth 2 times daily   timolol hemihydrate (BETIMOL) 0.5 % SOLN ophthalmic solution   Yes No   Si drop 2 times daily   traMADol (ULTRAM) 50 MG tablet   Yes No   Sig: Take by mouth every 6 hours as needed   trimethoprim-polymyxin b (POLYTRIM) ophthalmic  solution   Yes No   Si drop 4 times daily      Facility-Administered Medications: None     Allergies   No Known Allergies    Physical Exam   Vital Signs: Temp: 96.4  F (35.8  C) Temp src: Oral BP: (!) 126/36 Pulse: 72   Resp: 18 SpO2: 99 % O2 Device: None (Room air)    Weight: 226 lbs 12.8 oz    CONSTITUTIONAL: Pt laying in bed, dressed in hospital garb. Appears comfortable. Cooperative with interview.   HEENT: Normocephalic, atraumatic.   CARDIOVASCULAR: RRR, no murmurs, rubs, or extra heart sounds appreciated. Pulses +2/4 and regular in upper and lower extremities, bilaterally.   RESPIRATORY: No increased work of breathing. CTA, bilat; no wheezes, rales, or rhonchi appreciated.  GASTROINTESTINAL:  Abdomen soft, non-distended. BS auscultated in all four quadrants. Negative for tenderness to palpation.  No masses or organomegaly noted.  MUSCULOSKELETAL: No gross deformities noted. Normal muscle tone.   HEMATOLOGIC/LYMPHATIC/IMMUNOLOGIC: Bilat lymphedema noted.   NEUROLOGIC: Alert and oriented to person, place, and time.  strength intact. No focal neuro deficits.   SKIN: Right internal jugular catheter in place.     Data   Data reviewed today: I reviewed all medications, new labs and imaging results over the last 24 hours. I personally reviewed all labs and imaging to date.     Recent Labs   Lab 21  0819   WBC Canceled, Test credited   HGB Canceled, Test credited   MCV Canceled, Test credited   PLT Canceled, Test credited      POTASSIUM 5.0   CHLORIDE 106   CO2 PENDING   BUN PENDING   CR PENDING   ANIONGAP PENDING   JOANNA PENDING   GLC PENDING     No results found for this or any previous visit (from the past 24 hour(s)).

## 2021-03-16 NOTE — UTILIZATION REVIEW
Admission Status; Secondary Review Determination         Under the authority of the Utilization Management Committee, the utilization review process indicated a secondary review on the above patient.  The review outcome is based on review of the medical records, discussions with staff, and applying clinical experience noted on the date of the review.        (x)      Inpatient Status Appropriate - This patient's medical care is consistent with medical management for inpatient care and reasonable inpatient medical practice.     RATIONALE FOR DETERMINATION   The patient is a 65-year-old female admitted on 3/16/2021, today.  She has a complex history including end-stage renal disease and recent admission to [facility] from 1/20/2021 to 2/11/2021 for sepsis secondary to emphysematous cystitis with bilateral hydronephrosis.  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  Infectious disease was consulted when she was admitted he had a set of blood cultures and urine analysis and urine culture tests are pending.  She does have type 2 diabetes mellitus.  Nephrology has been consulted regarding getting her back on her dialysis schedule. The patient presented to [facility] emergency room and was transferred from [facility] to [facility] to continue care due to lack of beds at [facility].  Based on ongoing need for further treatment and diagnostic testing, inpatient status is appropriate for this admission.    The severity of illness, intensity of service provided, expected LOS and risk for adverse outcome make the care complex, high risk and appropriate for hospital admission.        The information on this document is developed by the utilization review team in order for the business office to ensure compliance.  This only denotes the appropriateness of proper admission status and does not reflect the quality of care rendered.         The definitions of Inpatient Status and Observation  Status used in making the determination above are those provided in the CMS Coverage Manual, Chapter 1 and Chapter 6, section 70.4.      Sincerely,     Mirza Markham MD  Physician Advisor  Utilization Review/ Case Management  [facility].

## 2021-03-16 NOTE — PROGRESS NOTES
Paged by RN as pt requesting diet to be changed from dialysis diet. Went back to bedside to explain rationale. Pt is due for dialysis today. Note that during most recent hospitalization at [facility], she was solely maintained on diabetes diet. Pt requesting ham or goncalves, scrambled eggs, pancakes, hashbrown and pineapple. Discussed with nutrition services, all can be ordered aside from ham or goncalves. Order placed for patient. Will defer decision to change from dialysis diet to diabetes diet to Nephrology.     Also asked RN to have pharmacy med rec PTA meds as pt is requesting PTA gabapentin for neuropathic pain.

## 2021-03-16 NOTE — PROGRESS NOTES
"Verbally yelling at staff due to her diet order and refusing tylenol PRN says \"doesn't do anything for her\"-Carol PEDRO paged at this time.   "

## 2021-03-16 NOTE — PROGRESS NOTES
Sun Mireles is a 65 year old female with PMHx of ESRD (dialysis Tues, Thurs, Sat), chronic anemia, fibroid uterus, chronic diastolic CHF, SSS s/p PPM placement (10/2020), chronic atrial fibrillation not on anticoagulation, dyslipidemia, T2DM, non-oxygen dependent COPD who had a prolonged hospitalization at [facility] from 1/20/21-2/11/21 for sepsis secondary to emphysematous cystitis with bilateral hydronephrosis with admitted on 3/16/2021 after presented with recurrent dysuria withabnormal UTI, probable recurrent cystitis.See hsopitalist note for further medical history.     Patient is post-menopausal with no vaginal bleeding. CT scan at outside facility with large fibroid uterus  Measuring 13 cm. Concern for possible mass effect causing initial hydronephrosis.   Pelvic ultrasound ordered for today. US PELVIS COMPLETE WITHOUT TRANSVAGINAL 3/16/2021 4:42 PM     CLINICAL HISTORY: Uterine fibroids, further characterize  TECHNIQUE: Transabdominal scans were performed. Endovaginal ultrasound  was declined.     COMPARISON: CT dated 2/15/2012     FINDINGS:     UTERUS: 16 x 17 x 11 cm. Heterogeneous and enlarged. Otherwise the  uterus is not well assessed due to this large mass.      RIGHT OVARY: Not visualized, possibly due to positioning and the  central mass.     LEFT OVARY: Not visualized, possibly due to positioning and the  central mass.     No significant free fluid.                                                                      IMPRESSION:  1.  Large central mass in the pelvis. This could represent a large  uterus due to fibroids, malignancy, or some other process. Recommend  further evaluation MRI may be helpful.  2.  The ovaries are not identified due to the large mass.     LESTER J FAHRNER, MD    A/P 66 yo female with a large pelvic mass, possible fibroid uterus vs possible malignancy. Previous  CT scan with no mention of lymphadenopathy.   Unclear whether this mass is contributing to urologic issues.    Would recommend further evaluation with MRI and CA-125.  May need Gyn oncology consult if suspicious for malignancy.   Not a good surgical candidate with multiple other medical issues.

## 2021-03-16 NOTE — CONSULTS
RENAL CONSULTATION NOTE      REFERRING MD:  Rachel    REASON FOR CONSULTATION:  ESRD Management      A/P:     1.  ESRD   -[facility]   -ran as scheduled 03/13/21   -Dr. Katie Bailey   -IJ access   -target weight 94 kg  2.  Access   -failed AVF appointment (x4) by review  3.  Anemia   -SHEFALI   -weekly Fe  4.  Mineral Bone Disease  5.  History hematuria   -recent cystoscopy  6.  Recurrent cystitis  7.  HTN  8.  DM    [facility] dialysis schedule  Dialysis today        HPI:     Transferred from [facility]  Majority of previous care in [facility] dialysis  Ran as scheduled 03/13/21 (off at 96.8 kg)    Appears dialysis dependent dating to August 2020  Dry weight has been increasing    IJ access  Multiple failed access appointments    Presents with complicated urologic picture  Recurrent cystitis  Recent cystoscopy    No CP or SOB  Follows  [place]  Does not like dialysis diet        ROS:      A complete 10 point review of systems was performed and is negative except as noted above.    PMH:    Past Medical History:   Diagnosis Date     Aneurysm of vertebral artery (H)      Asthma      Asthma      CHF (congestive heart failure) (H)      CHF (congestive heart failure) (H)     diastolic      Chronic kidney disease      Chronic kidney disease, stage 4, severely decreased GFR (H)      Chronic pulmonary heart disease, unspecified      Gout, unspecified      Hypercholesterolemia      Hypertension      Localized osteoarthrosis not specified whether primary or secondary, lower leg     Knee     Morbid obesity (H)     BMI>60     Obstructive sleep apnea (adult) (pediatric)      Type II or unspecified type diabetes mellitus with unspecified complication, not stated as uncontrolled      Unspecified glaucoma(365.9)      Unspecified urinary incontinence        PSH:    Past Surgical History:   Procedure Laterality Date     C/SECTION, LOW TRANSVERSE  89     TUBAL LIGATION  80, 89        MEDICATIONS:    No current outpatient medications on file.       ALLERGIES:    Allergies as of 03/16/2021     (No Known Allergies)       FH:    Family History   Problem Relation Age of Onset     Hypertension Unknown      Diabetes Unknown      Cancer Maternal Grandfather         stomach cancer       SH:    Social History     Socioeconomic History     Marital status: Single     Spouse name: Not on file     Number of children: Not on file     Years of education: Not on file     Highest education level: Not on file   Occupational History     Not on file   Social Needs     Financial resource strain: Not on file     Food insecurity     Worry: Not on file     Inability: Not on file     Transportation needs     Medical: Not on file     Non-medical: Not on file   Tobacco Use     Smoking status: Current Every Day Smoker     Packs/day: 1.00     Years: 45.00     Pack years: 45.00     Smokeless tobacco: Never Used   Substance and Sexual Activity     Alcohol use: No     Drug use: No     Sexual activity: Not on file   Lifestyle     Physical activity     Days per week: Not on file     Minutes per session: Not on file     Stress: Not on file   Relationships     Social connections     Talks on phone: Not on file     Gets together: Not on file     Attends Gnosticism service: Not on file     Active member of club or organization: Not on file     Attends meetings of clubs or organizations: Not on file     Relationship status: Not on file     Intimate partner violence     Fear of current or ex partner: Not on file     Emotionally abused: Not on file     Physically abused: Not on file     Forced sexual activity: Not on file   Other Topics Concern     Not on file   Social History Narrative     Not on file       PHYSICAL EXAM:      Vitals were reviewed  Patient Vitals for the past 8 hrs:   BP Temp Temp src Pulse Resp SpO2 Weight   03/16/21 0832 123/54 95.7  F (35.4  C) Oral 73 18 93 % --   03/16/21 0620 (!) 126/36 96.4  F (35.8  C)  Oral 72 18 99 % 102.9 kg (226 lb 12.8 oz)     No intake/output data recorded.      Vitals:    03/16/21 0620   Weight: 102.9 kg (226 lb 12.8 oz)       GENERAL: awake, alert, follows  HEENT: NC/AT, PERRLA, EOMI, non icteric, pharynx moist without lesion  RESP:  clear anteriorly  CV: RRR, normal S1 S2  ABDOMEN: soft, nontender, no HSM or masses and bowel sounds normal  MS: no clubbing, cyanosis   SKIN: clear without significant rashes or lesions  NEURO: speech normal and cranial nerves 2-12 intact  PSYCH: affect normal/bright  EXT: 2 plus edema       LABS:        Recent Labs   Lab 03/16/21  0819      POTASSIUM 5.0   CHLORIDE 106   CO2 21   ANIONGAP 12   *   BUN 66*   CR 5.94*   GFRESTIMATED 7*   GFRESTBLACK 8*   JOANNA 8.2*     Recent Labs   Lab 03/16/21  0819   HGB Canceled, Test credited       DIAGNOSTICS:  Natalya Siegel    [facility]  [phone]

## 2021-03-16 NOTE — PROGRESS NOTES
Renal Medicine Inpatient Dialysis Note                                Sun Mireles MRN# [id number]   Age: 65 year old YOB: 1955   Date of Admission: 3/16/2021 Hospital LOS: 0          Assessment/Plan:     A/P:      1.  ESRD              -[facility] dialysis [facility]              -ran as scheduled 03/13/21              -Dr. Katie Bailey              -IJ access              -target weight 94 kg  2.  Access              -failed AVF appointment (x4) by review  3.  Anemia              -SHEFALI              -weekly Fe  4.  Mineral Bone Disease  5.  History hematuria              -recent cystoscopy  6.  Recurrent cystitis  7.  HTN  8.  DM         [facility] dialysis schedule        Interval History:     Dialysis run parameters reviewed with dialysis RN at patient bedside    3.25 hour run  3.5 liter goal  2K  SHEFALI    IJ access    Follows  Eating breakfast    ROS     GENERAL: NAD, No fever,chills  CV: NEGATIVE for chest pain, palpitations  GI: NEGATIVE for abdominal pain, heartburn, nausea, vomiting or change in bowel pattern  EXT: no change edema  ROS otherwise negative    Dialysis Parameters:     Vitals were reviewed  Patient Vitals for the past 8 hrs:   BP Temp Temp src Pulse Resp SpO2 Weight   03/16/21 1045 130/64 -- -- 76 -- -- --   03/16/21 0832 123/54 95.7  F (35.4  C) Oral 73 18 93 % --   03/16/21 0620 (!) 126/36 96.4  F (35.8  C) Oral 72 18 99 % 102.9 kg (226 lb 12.8 oz)     No intake/output data recorded.    Vitals:    03/16/21 0620   Weight: 102.9 kg (226 lb 12.8 oz)       Current Weight: 102.9?  Dry Weight: 95 range  Dialysis Temp: 36.5  C  Access Device: IJ  Access Site: right  Dialyzer: Revaclear  Dialysis Bath: 2  Sodium Profile: n  UF Goal: 3.5  Blood Flow Rate (mL/min): 400  Total Treatment Time (hrs): 3.25  Heparin: Low dose as required      EPO dose: y  Zemplar: n  IV Fe: n      Medications and Allergies:     Reviewed      Physical Exam:     Seen and examined during course of dialysis run    BP  130/64   Pulse 76   Temp 95.7  F (35.4  C) (Oral)   Resp 18   Wt 102.9 kg (226 lb 12.8 oz)   SpO2 93%   BMI 38.93 kg/m      GENERAL: awake, alert, follows  HEENT: NC/AT, PERRLA, EOMI, non icteric, pharynx moist without lesion  RESP: clear  CV: RRR, normal S1 S2  ABDOMEN: S/NT, BS present  MS: 2 plus edema  SKIN: catheter site clean without drainage  EXT: access canulated without issue  NEURO: speech normal and cranial nerves 2-12 intact  PSYCH: affect normal/bright    Data:       Recent Labs   Lab 03/16/21  0819      POTASSIUM 5.0   CHLORIDE 106   CO2 21   ANIONGAP 12   *   BUN 66*   CR 5.94*   GFRESTIMATED 7*   GFRESTBLACK 8*   JOANNA 8.2*     No lab results found in last 7 days.  Recent Labs   Lab 03/16/21  0819   HGB Canceled, Test credited         G Luis Miguel Siegel MD    [facility] - Nephrology  [phone]

## 2021-03-16 NOTE — PLAN OF CARE
Shift Note: 2984-0489:  A/O x 4, Forgetful, irritable.  Denies pain.  VSS.  Good appetite, tolerating 2 gm Na diet.  Some incontinence with coughing, did not void this shift.  Plan to measure PVRs after void.  Still need urine sample.  PIV x 2 in L. Forearm, intermittent abx.   R. Internal jugular in place for dialysis. Pelvic US done this shift - showing central mass, plan for MRI.  Pending improvement.

## 2021-03-16 NOTE — CONSULTS
[facility]    Infectious Disease Consultation     Date of Admission:  3/16/2021  Date of Consult (When I saw the patient): 03/16/21    Assessment & Plan   Sun Mireles is a 65 year old female who was admitted on 3/16/2021.     Impression:  1. 65 y.o female with PMHx of ESRD (dialysis Tues, Thurs, Sat).   2. Chronic anemia  3. Chronic diastolic CHF  4. SSS s/p PPM placement (10/2020)  5. T2DM  6. COPD   7. Recent history of prolonged hospitalization at [facility] from 1/20/21-2/11/21 for sepsis secondary to emphysematous cystitis with bilateral hydronephrosis.   8. Presented this occasion with recurrent dysuria with abnormal UTI, probable recurrent cystitis.  9. UA grossly abnormal, but in dialysis patient.   10. No fever, no chills.   11. 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   12. Cultures have been ordered but not collected yet.     Recommendations:   Get a set of blood cultures   Get urine for cultures, unclear what to make of a grossly abnormal UA in a dialysis patient with reduced urinary output.   For now continue on linezolid     Sade Arreaga MD    Reason for Consult   Reason for consult: I was asked to evaluate this patient for dysuria.    Primary Care Physician   Kelli Vanessa    Chief Complaint   Dysuria     History is obtained from the patient and medical records    History of Present Illness   Sun Mireles is a 65 year old female with PMHx of ESRD (dialysis Tues, Thurs, Sat), chronic anemia, fibroid uterus, chronic diastolic CHF, SSS s/p PPM placement (10/2020), chronic atrial fibrillation not on anticoagulation, dyslipidemia, T2DM, non-oxygen dependent COPD who had a prolonged hospitalization at [facility] from 1/20/21-2/11/21 for sepsis secondary to emphysematous cystitis with bilateral hydronephrosis with admitted on 3/16/2021 after presented with recurrent dysuria with abnormal UTI, probable recurrent cystitis. Unfortunately no beds were  available at [facility], thus patient was transferred to [facility] for further evaluation and treatment.     Past Medical History   I have reviewed this patient's medical history and updated it with pertinent information if needed.   Past Medical History:   Diagnosis Date     Aneurysm of vertebral artery (H)      Asthma      Asthma      CHF (congestive heart failure) (H)      CHF (congestive heart failure) (H)     diastolic      Chronic kidney disease      Chronic kidney disease, stage 4, severely decreased GFR (H)      Chronic pulmonary heart disease, unspecified      Gout, unspecified      Hypercholesterolemia      Hypertension      Localized osteoarthrosis not specified whether primary or secondary, lower leg     Knee     Morbid obesity (H)     BMI>60     Obstructive sleep apnea (adult) (pediatric)      Type II or unspecified type diabetes mellitus with unspecified complication, not stated as uncontrolled      Unspecified glaucoma(365.9)      Unspecified urinary incontinence        Past Surgical History   I have reviewed this patient's surgical history and updated it with pertinent information if needed.  Past Surgical History:   Procedure Laterality Date     C/SECTION, LOW TRANSVERSE  89     TUBAL LIGATION  80, 89       Prior to Admission Medications   Prior to Admission Medications   Prescriptions Last Dose Informant Patient Reported? Taking?   Multiple Vitamins-Minerals (CENTRUM PO)   Yes No   amLODIPine (NORVASC) 10 MG tablet   Yes No   Sig: Take 10 mg by mouth daily   aspirin  MG tablet   Yes No   Sig: Take 325 mg by mouth every 6 hours as needed   esomeprazole (NEXIUM) 40 MG capsule   Yes No   Sig: Take by mouth every morning (before breakfast) Take 30-60 minutes before eating.   febuxostat (ULORIC) 40 MG TABS   Yes No   Sig: Take 40 mg by mouth daily   furosemide (LASIX) 40 MG tablet   Yes No   Sig: Take 40 mg by mouth daily   gabapentin (NEURONTIN) 400 MG capsule   Yes No   Sig: Take by mouth 3 times  daily   hydrALAZINE (APRESOLINE) 100 MG TABS   Yes No   Sig: Take 100 mg by mouth 2 times daily   losartan (COZAAR) 100 MG tablet   Yes No   Sig: Take 100 mg by mouth daily   lovastatin (MEVACOR) 20 MG tablet   Yes No   Sig: Take 20 mg by mouth At Bedtime   metoprolol (LOPRESSOR) 100 MG tablet   Yes No   Sig: Take 100 mg by mouth 2 times daily   timolol hemihydrate (BETIMOL) 0.5 % SOLN ophthalmic solution   Yes No   Si drop 2 times daily   traMADol (ULTRAM) 50 MG tablet   Yes No   Sig: Take by mouth every 6 hours as needed   trimethoprim-polymyxin b (POLYTRIM) ophthalmic solution   Yes No   Si drop 4 times daily      Facility-Administered Medications: None     Allergies   No Known Allergies    Immunization History     There is no immunization history on file for this patient.    Social History   I have reviewed this patient's social history and updated it with pertinent information if needed. Sun Mireles  reports that she has been smoking. She has a 45.00 pack-year smoking history. She has never used smokeless tobacco. She reports that she does not drink alcohol or use drugs.    Family History   I have reviewed this patient's family history and updated it with pertinent information if needed.   Family History   Problem Relation Age of Onset     Hypertension Unknown      Diabetes Unknown      Cancer Maternal Grandfather         stomach cancer       Review of Systems   The 10 point Review of Systems is negative other than noted in the HPI or here.     Physical Exam   Temp: 95.7  F (35.4  C) Temp src: Oral BP: 123/54 Pulse: 73   Resp: 18 SpO2: 93 % O2 Device: None (Room air)    Vital Signs with Ranges  Temp:  [95.7  F (35.4  C)-96.4  F (35.8  C)] 95.7  F (35.4  C)  Pulse:  [72-73] 73  Resp:  [18] 18  BP: (123-126)/(36-54) 123/54  SpO2:  [93 %-99 %] 93 %  226 lbs 12.8 oz  Body mass index is 38.93 kg/m .    GENERAL APPEARANCE:  awake  EYES: Eyes grossly normal to inspection, PERRL and conjunctivae and  sclerae normal  HENT: ear canals and TM's normal and nose and mouth without ulcers or lesions  NECK: no adenopathy, no asymmetry, masses, or scars and thyroid normal to palpation  RESP: lungs clear to auscultation - no rales, rhonchi or wheezes  CV: regular rates and rhythm, normal S1 S2, no S3 or S4 and no murmur, click or rub  LYMPHATICS: normal ant/post cervical and supraclavicular nodes  ABDOMEN: soft, nontender, without hepatosplenomegaly or masses and bowel sounds normal  MS: extremities normal- no gross deformities noted  SKIN: no suspicious lesions or rashes      Data   Lab Results   Component Value Date    WBC Canceled, Test credited 03/16/2021    HGB Canceled, Test credited 03/16/2021    HCT Canceled, Test credited 03/16/2021    PLT Canceled, Test credited 03/16/2021     03/16/2021    POTASSIUM 5.0 03/16/2021    CHLORIDE 106 03/16/2021    CO2 21 03/16/2021    BUN 66 (H) 03/16/2021    CR 5.94 (H) 03/16/2021     (H) 03/16/2021     No results for input(s): CULT in the last 168 hours.  No lab results found.    Invalid input(s): UC

## 2021-03-17 NOTE — PROGRESS NOTES
[facility]    Medicine Progress Note - Hospitalist Service       Date of Admission:  3/16/2021  Assessment & Plan   Sun Mireles is a 65 year-old female with PMHx of ESRD (dialysis Tues, Thurs, Sat), chronic anemia, fibroid uterus, chronic diastolic CHF, SSS s/p PPM placement (10/2020), chronic atrial fibrillation not on anticoagulation, dyslipidemia, T2DM, non-oxygen dependent COPD who had a prolonged hospitalization at [facility] from 1/20/21-2/11/21 for sepsis secondary to emphysematous cystitis with bilateral hydronephrosis with admitted on 3/16/2021 after presented with recurrent dysuria with abnormal UTI, probable recurrent cystitis. Unfortunately no beds were available at [facility], thus patient was transferred to [facility] for further evaluation and treatment.      Recurrent cystitis  Recent hospitalization for sepsis secondary to emphysematous cystitis with bilateral hydronephrosis (1/20/21-2/11/21)  Presented to [facility] ED for evaluation of ~5 days of recurrent dysuria. UA from 3/15/21 noted to be excessively turbid, negative nitrite, 500 leukocytes, moderate bacteria, , 0.2 blood. Does not appear that urine cultures were obtained. CBC unremarkable aside from baseline Hgb. CMP with creatinine 6.05, alk phos 133, hepatic panel unremarkable. CT A/P with bladder wall thickening, possible cystitis, fibroid uterus. Received Linezolid prior to transfer. Note urine culture from 1/29/21 with >100,000 colonies of Enterococcus faecium resistant to Levaquin and Ampicillin for which she was treated with IV Linezolid X10 days, fosfomycin X1, Cefepime X5 days, Fluconazole for 14 days for candiduria. Ultimately discharged on Cefdinir (10 additional days), Fluconazole (14 additional days), and Linezolid (7 additional days).  - ID following, appreciate assistance  - Continues on linezolid pending additional culture results  - Urine and blood cultures NGTD  - Urology consulted given recurrent  cystitis in the context of complicated Urologic history below, recommending culture directed antibiotics and outpatient follow-up, no signed off  - WBC normal, afebrile      Elevated d-dimer  D-dimer 1.4. CT PE negative for PE, dissection, or aneurysm, pulmonary hypertension, moderate emphysema, mildly enlarged heart with CAD and atherosclerotic vascular disease. EKG reported as NSR.     ESRD  Dialyzes Tues, Thurs, Sat.   - Nephrology consulted, continue dialysis per usual schedule  - Discussed modified diet, which she is adamantly opposed to, reports she will refuse to eat if ordered. Ordered regular diet      Chronic anemia  Baseline Hgb 7-8. No active signs of bleeding.   - hemoglobin stable      Fibroid uterus  Noted on CT during recent admissions. There was comment that mass effect from fibroid uterus caused initial hydronephrosis. No active vaginal bleeding.   - Gyn consulted, appreciate assistance  - Pelvic US with large central mass in pelvis, unable to differentiate further  - Plan for pelvic MRI with lorazepam pre-treatment for anxiety/claustrophobia, needs to be coordinated with pacemaker to ensure compatibility (may not happen until 3/18/21)   - Per OB/GYN, not a surgical consult but may consider embolization      Chronic diastolic CHF  Limited echo 10/24/20 with EF 65%, normal RVSF.  on admission. CT with evidence of pulmonary hypertension. Note Lasix was discontinued during most recent hospitalization and unclear if restarted, pt unsure.   - Volume management per HD     Chronic atrial fibrillation  SSS s/p PPM placement (10/2020)  - Does not appear to be on chronic anticoagulation or rate control agents     Dyslipidemia  - continue prior to admission rosuvastatin    Diabetes mellitus type 2, diet controlled, with peripheral nephropathy and retinopathy  Not on medications prior to admission.  - Does not appear to be on diabetic meds   - HgbA1c 4.9%  - Continue prior to admission gabapentin       COPD, non-oxygen dependent, with emphysema  Resume meds once verified.   - No acute symptoms    Glaucoma  Continue Timolol gtt     COVID status  Negative Covid and flu swab on 3/16/21.        Diet: Regular Diet Adult    DVT Prophylaxis: Pneumatic Compression Devices  Phan Catheter: not present  Code Status: Full Code      Disposition Plan   Expected discharge:  Anticipate discharge in 1-2 days pending culture and MRI results  Entered: Evans Pack MD 03/17/2021, 2:45 PM       The patient's care was discussed with the Bedside Nurse and Patient. Attempted to call daughter, no answer and VM full    Evans Pack MD  Hospitalist Service  [facility]    ______________________________________________________________________    Interval History   No acute events overnight. Primary complaint is about diet, refusing modified diet, reports she will not eat if modified diet is ordered. Denies any chest pain or shortness of breath. Denies abdominal pain, n/v.     Data reviewed today: I reviewed all medications, new labs and imaging results over the last 24 hours. I personally reviewed no images or EKG's today.    Physical Exam   Vital Signs: Temp: 98.1  F (36.7  C) Temp src: Oral BP: (!) 148/68 Pulse: 74   Resp: 12 SpO2: 92 % O2 Device: None (Room air)    Weight: 222 lbs 0 oz    Constitutional: NAD  Respiratory: Fine inspiratory crackles at bases, no wheezes, normal effort at rest   Cardiovascular: RRR. No peripheral edema.  GI: Soft, non-tender, non-distended.   Skin/Integumen: Warm, dry  Other:      Data   Recent Labs   Lab 03/16/21  1105 03/16/21  0819   WBC 6.9 Canceled, Test credited   HGB 8.1* Canceled, Test credited   * Canceled, Test credited    Canceled, Test credited   NA  --  139   POTASSIUM  --  5.0   CHLORIDE  --  106   CO2  --  21   BUN  --  66*   CR  --  5.94*   ANIONGAP  --  12   JOANNA  --  8.2*   GLC  --  109*       Recent Results (from the past 24 hour(s))   US  Pelvis Complete without Transvaginal    Narrative    US PELVIS COMPLETE WITHOUT TRANSVAGINAL 3/16/2021 4:42 PM    CLINICAL HISTORY: Uterine fibroids, further characterize  TECHNIQUE: Transabdominal scans were performed. Endovaginal ultrasound  was declined.    COMPARISON: CT dated 2/15/2012    FINDINGS:    UTERUS: 16 x 17 x 11 cm. Heterogeneous and enlarged. Otherwise the  uterus is not well assessed due to this large mass.     RIGHT OVARY: Not visualized, possibly due to positioning and the  central mass.    LEFT OVARY: Not visualized, possibly due to positioning and the  central mass.    No significant free fluid.      Impression    IMPRESSION:  1.  Large central mass in the pelvis. This could represent a large  uterus due to fibroids, malignancy, or some other process. Recommend  further evaluation MRI may be helpful.  2.  The ovaries are not identified due to the large mass.    LESTER J FAHRNER, MD     Medications       - MEDICATION INSTRUCTIONS for Dialysis Patients -   Does not apply See Admin Instructions     acetaminophen  650 mg Oral 4x Daily     allopurinol  100 mg Oral Daily     aspirin  81 mg Oral Daily     [START ON 3/20/2021] colchicine  0.3 mg Oral Weekly     docusate sodium  100 mg Oral Daily     gabapentin  200 mg Oral Once per day on Mon Wed Fri     Lidocaine  1 patch Transdermal Q24H IVANIA     lidocaine   Transdermal Q8H     linezolid  600 mg Intravenous Q12H     miconazole  1 applicator Vaginal At Bedtime     montelukast  10 mg Oral At Bedtime     omeprazole  20 mg Oral QAM AC     oxybutynin ER  5 mg Oral QPM     rosuvastatin  10 mg Oral At Bedtime     timolol maleate  1 drop Both Eyes BID     Vitamin D3  125 mcg Oral Daily

## 2021-03-17 NOTE — PROVIDER NOTIFICATION
MD Notification    Notified Person: MD    Notified Person Name: Kowalski    Notification Date/Time: 03/17/21, 5:53 AM    Notification Interaction: text page    Purpose of Notification:    Pt requesting PTA lido patch. We have orders for Patch in Place, but not to place a new patch. Can she get that please?  Orders Received:    Comments:  PTA lido patch resched from 0900 to 0600

## 2021-03-17 NOTE — PROGRESS NOTES
[facility]    Urology Progress Note     Assessment & Plan  Sun Mireles is a 65 year old female with PMHx of ESRD on dialysis and fibroid uterus. Recent hemorrhagic cystitis requiring ureteral stents and catheter placement at [facility] by Dr. Simmons. Both stents and catheter have been subsequently removed. Admitted for dysuria which is also now improved. CT AP from 3/16 (report found in care everywhere)shows no residual hydronephrosis. Low PVR. Ucx pending.    Given low PVR and no hydronephrosis, do not suspect obstruction.    Plan:    Culture specific ABx per ID    Follow up with Urologist Dr. Simmons in 1-2 months for a PVR recheck and PRN if any further voiding symptoms.    Urology will sign off for now. Please call with questions    Konstantin Palomo PA-C 3/17/2021 9:49 AM  [facility]  Mon-Fri, 7am - 4pm  Office: [phone]      Interval History   Voiding OK with PVR of 0. No flank pain. Dysuria improved.     Physical Exam   Temp: 98.1  F (36.7  C) Temp src: Oral BP: (!) 148/68 Pulse: 74   Resp: 12 SpO2: 92 % O2 Device: None (Room air)    Vitals:    21 0620 21 0500   Weight: 102.9 kg (226 lb 12.8 oz) 100.7 kg (222 lb)     Vital Signs with Ranges  Temp:  [97.1  F (36.2  C)-98.4  F (36.9  C)] 98.1  F (36.7  C)  Pulse:  [70-89] 74  Resp:  [12-20] 12  BP: (116-185)/(47-77) 148/68  SpO2:  [91 %-95 %] 92 %  I/O last 3 completed shifts:  In: 0   Out: 3500 [Other:3500]    Temp (24hrs), Av  F (36.7  C), Min:97.1  F (36.2  C), Max:98.4  F (36.9  C)     GENERAL: Awake, alert, NAD. Sitting up in chair  NEURO: No facial asymmetry.  EYES: No icterus  HEAD, EARS, NOSE, MOUTH, AND THROAT: Atraumatic, normocephalic  NECK: Symmetric  CARDIAC: Skin well perfused  RESPIRATORY: Breathing unlabored  BACK/FLANKS: No CVAT  : urine in pure wick container dark coke-colored  SKIN/HAIR/NAILS:  No visible rashes  PSYCHIATRIC: Speech: normal Mood: normal Affect:  normal        Medications       - MEDICATION INSTRUCTIONS for Dialysis Patients -   Does not apply See Admin Instructions     acetaminophen  650 mg Oral 4x Daily     allopurinol  100 mg Oral Daily     aspirin  81 mg Oral Daily     [START ON 3/20/2021] colchicine  0.3 mg Oral Weekly     docusate sodium  100 mg Oral Daily     gabapentin  200 mg Oral Once per day on Mon Wed Fri     Lidocaine  1 patch Transdermal Q24H IVANIA     lidocaine   Transdermal Q8H     linezolid  600 mg Intravenous Q12H     miconazole  1 applicator Vaginal At Bedtime     montelukast  10 mg Oral At Bedtime     omeprazole  20 mg Oral QAM AC     oxybutynin ER  5 mg Oral QPM     rosuvastatin  10 mg Oral At Bedtime     timolol maleate  1 drop Both Eyes BID     Vitamin D3  125 mcg Oral Daily       Data   Results for orders placed or performed during the hospital encounter of 03/16/21 (from the past 24 hour(s))   Blood culture    Specimen: Blood    Port   Result Value Ref Range    Specimen Description Blood Port     Culture Micro No growth after 15 hours    CBC with platelets differential   Result Value Ref Range    WBC 6.9 4.0 - 11.0 10e9/L    RBC Count 2.67 (L) 3.8 - 5.2 10e12/L    Hemoglobin 8.1 (L) 11.7 - 15.7 g/dL    Hematocrit 27.0 (L) 35.0 - 47.0 %     (H) 78 - 100 fl    MCH 30.3 26.5 - 33.0 pg    MCHC 30.0 (L) 31.5 - 36.5 g/dL    RDW 17.9 (H) 10.0 - 15.0 %    Platelet Count 239 150 - 450 10e9/L    Diff Method Automated Method     % Neutrophils 68.6 %    % Lymphocytes 16.1 %    % Monocytes 10.9 %    % Eosinophils 3.2 %    % Basophils 0.6 %    % Immature Granulocytes 0.6 %    Nucleated RBCs 0 0 /100    Absolute Neutrophil 4.7 1.6 - 8.3 10e9/L    Absolute Lymphocytes 1.1 0.8 - 5.3 10e9/L    Absolute Monocytes 0.8 0.0 - 1.3 10e9/L    Absolute Eosinophils 0.2 0.0 - 0.7 10e9/L    Absolute Basophils 0.0 0.0 - 0.2 10e9/L    Abs Immature Granulocytes 0.0 0 - 0.4 10e9/L    Absolute Nucleated RBC 0.0    Hemoglobin A1c   Result Value Ref Range     Hemoglobin A1C 4.9 0 - 5.6 %   Phosphorus   Result Value Ref Range    Phosphorus 6.1 (H) 2.5 - 4.5 mg/dL   Magnesium   Result Value Ref Range    Magnesium 2.0 1.6 - 2.3 mg/dL      Result Value Ref Range     12 0 - 30 U/mL   US Pelvis Complete without Transvaginal    Narrative    US PELVIS COMPLETE WITHOUT TRANSVAGINAL 3/16/2021 4:42 PM    CLINICAL HISTORY: Uterine fibroids, further characterize  TECHNIQUE: Transabdominal scans were performed. Endovaginal ultrasound  was declined.    COMPARISON: CT dated 2/15/2012    FINDINGS:    UTERUS: 16 x 17 x 11 cm. Heterogeneous and enlarged. Otherwise the  uterus is not well assessed due to this large mass.     RIGHT OVARY: Not visualized, possibly due to positioning and the  central mass.    LEFT OVARY: Not visualized, possibly due to positioning and the  central mass.    No significant free fluid.      Impression    IMPRESSION:  1.  Large central mass in the pelvis. This could represent a large  uterus due to fibroids, malignancy, or some other process. Recommend  further evaluation MRI may be helpful.  2.  The ovaries are not identified due to the large mass.    LESTER J FAHRNER, MD

## 2021-03-17 NOTE — PLAN OF CARE
"A/O x4, forgetful at times. VSS ex slight HTN. Lidocaine patch to lower back for pain, scheduled tylenol. Denies N/V, 2g Na diet. Continent of B/B, bedpan, unable to collect urine sample d/t contamination from BM. On T/Th/Sat dialysis schedule. PRN Tessalon x2 for dry cough. Refused MRI, educated pt on available anxiety meds and necessity of MRI, pt stated, \"I don't go in there, they put me in and I start screaming.\" Plan pending. OB/Neph/Uro/ID following.  "

## 2021-03-17 NOTE — PROGRESS NOTES
Renal Medicine       Dialyzed yesterday without issue  UF 3.5 liter    Up at bedside  No CP or SOB  No O2 requirement    Continues to complain regarding diet   I changed from dialysis to 2 gram Na 03/16/21    Plan dialysis 03/18/21  UF to 4 liter    Would avoid gadolinium if possible  She will require dialysis post maria elena if she receives it         Recent Labs   Lab 03/16/21  0819      POTASSIUM 5.0   CHLORIDE 106   CO2 21   ANIONGAP 12   *   BUN 66*   CR 5.94*   GFRESTIMATED 7*   GFRESTBLACK 8*   JOANNA 8.2*           CHANO Siegel    [facility]  [phone]

## 2021-03-17 NOTE — PLAN OF CARE
Problem: Adult Inpatient Plan of Care  Goal: Plan of Care Review  Outcome: Improving     Problem: UTI (Urinary Tract Infection)  Goal: Improved Infection Symptoms  Outcome: No Change     Has pain with urination, getting hemodialysis done today.  Very angry about her Dialysis Diet-yelling at staff. MD notified-no change.  Getting a pelvic US done today as well. Still needs a UAUC and a bladder scan.

## 2021-03-17 NOTE — PROVIDER NOTIFICATION
Paged Dr. Pack [phone]    Pt VERY adamant about having regular diet- saying that she has never been on a dialysis or low sodium diet in previous hospitalizations.     Regular diet ordered.

## 2021-03-17 NOTE — PROGRESS NOTES
Obstetrics and Gynecology Note     Sun Mireles MRN# [id number]   YOB: 1955 Age: 65 year old   Date of Admission: 3/16/2021     Reason for consult: OGI was asked by the hospitalist team to evaulate this patient for uterine fibroids.              Chief Complaint:   Pt c/o vaginal irritation for at least a week now since she had had many procedures with ureteral stents. Denies VB. Pt had a CT resulting in hydronephrosis due to a large fibroid uterus. Pt had declined the MRI last night because she is anxious about claustrophobia. I told her that her CA-125 is negative. She is very upset about her diet changes and not being allowed to have bread with her cheeseburger so is requesting diet changes. Also c/o cough this am.         History of Present Illness:   This patient is a 65 year old female who presents with possible mass effect causing hydronephrosis.            Past Medical History:     Past Medical History:   Diagnosis Date     Aneurysm of vertebral artery (H)      Asthma      Asthma      CHF (congestive heart failure) (H)      CHF (congestive heart failure) (H)     diastolic      Chronic kidney disease      Chronic kidney disease, stage 4, severely decreased GFR (H)      Chronic pulmonary heart disease, unspecified      Gout, unspecified      Hypercholesterolemia      Hypertension      Localized osteoarthrosis not specified whether primary or secondary, lower leg     Knee     Morbid obesity (H)     BMI>60     Obstructive sleep apnea (adult) (pediatric)      Type II or unspecified type diabetes mellitus with unspecified complication, not stated as uncontrolled      Unspecified glaucoma(365.9)      Unspecified urinary incontinence              Past Surgical History:     Past Surgical History:   Procedure Laterality Date     C/SECTION, LOW TRANSVERSE  89     TUBAL LIGATION  80, 87              Gynecologic History:   Patient is post-menopausal          Obstetrical History:      P: 7          Social History:     Social History     Tobacco Use     Smoking status: Current Every Day Smoker     Packs/day: 1.00     Years: 45.00     Pack years: 45.00     Smokeless tobacco: Never Used   Substance Use Topics     Alcohol use: No             Family History:     Family History   Problem Relation Age of Onset     Hypertension Unknown      Diabetes Unknown      Cancer Maternal Grandfather         stomach cancer              Allergies:   No Known Allergies          Medications:   I have reviewed this patient's current medications          Physical Exam:   Vitals were reviewed  Temp: 98.1  F (36.7  C) Temp src: Oral BP: (!) 148/68 Pulse: 74   Resp: 12 SpO2: 92 % O2 Device: None (Room air)        EXAM  Constitutional: healthy, alert, no distress    Respiratory: negative findings: lungs clear to auscultation  Abdomen: Abdomen soft, non-tender  : enlarged fibroid uterus felt which measures approx 15 wk size.   JOINT/EXTREMITIES: extremities normal- no gross deformities noted.            Data:   CA-125:12    US PELVIS COMPLETE WITHOUT TRANSVAGINAL 3/16/2021 4:42 PM     CLINICAL HISTORY: Uterine fibroids, further characterize  TECHNIQUE: Transabdominal scans were performed. Endovaginal ultrasound  was declined.     COMPARISON: CT dated 2/15/2012     FINDINGS:     UTERUS: 16 x 17 x 11 cm. Heterogeneous and enlarged. Otherwise the  uterus is not well assessed due to this large mass.      RIGHT OVARY: Not visualized, possibly due to positioning and the  central mass.     LEFT OVARY: Not visualized, possibly due to positioning and the  central mass.     No significant free fluid.                                                                      IMPRESSION:  1.  Large central mass in the pelvis. This could represent a large  uterus due to fibroids, malignancy, or some other process. Recommend  further evaluation MRI may be helpful.  2.  The ovaries are not identified due to the large mass.     LESTER J FAHRNER, MD          Assessment and Plan:   This patient is a 65 year old female with fibroid uterus and yeast vaginitis-talked with pt and ordered ativan to help with her fear of an MRI. If it is determined that it is truly a mass affect of causing the hydronephrosis she is not a surgical candidate due to her PMHx but we could consult for a uterine artery embolization.    Doris Steele,

## 2021-03-17 NOTE — PLAN OF CARE
A&Ox4, forgetful at times. VSS on RA. C/o low back and abdominal pain this morning, lidocaine patch helpful, up in chair today. Up with A1 using gait belt and walker. UA sent. Pt on dialysis, oliguria. PVR 6cc. 2 formed stools today with bedside commode. C/o vaginal burning/pain, cream inserted this evening. LS with bibasilar crackles noted. Frequent dry cough, tessalon given x1, declined robitussin. PIV SL, intermittent zyvox. Plan for MRI tomorrow at noon (pacemaker rep will be here at noon, RN to stay with pt during imaging). Dialysis planned for [id number]. RN spoke with both areas to discuss plan and timing, also planning to premedicate with ativan. Discharge pending.

## 2021-03-17 NOTE — PROGRESS NOTES
[facility]    Infectious Disease Progress Note    Date of Service (when I saw the patient): 03/17/2021     Assessment & Plan   Sun Mireles is a 65 year old female who was admitted on 3/16/2021.      Impression:  1. 65 y.o female with PMHx of ESRD (dialysis Tues, Thurs, Sat).   2. Chronic anemia  3. Chronic diastolic CHF  4. SSS s/p PPM placement (10/2020)  5. T2DM  6. COPD   7. Recent history of prolonged hospitalization at [facility] from 1/20/21-2/11/21 for sepsis secondary to emphysematous cystitis with bilateral hydronephrosis.   8. Presented this occasion with recurrent dysuria with abnormal UTI, probable recurrent cystitis.  9. UA grossly abnormal, but in dialysis patient.   10. No fever, no chills.   11. 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   12. Cultures have been ordered   13. Is being evaluated for pelvic mass     Recommendations:   Pending cultures, unclear what to make of a grossly abnormal UA in a dialysis patient with reduced urinary output.   For now continue on linezolid if cultures negative will stop            Sade Arreaga MD    Interval History   Afebrile   No new acute complaints     Physical Exam   Temp: 98.1  F (36.7  C) Temp src: Oral BP: (!) 148/68 Pulse: 74   Resp: 12 SpO2: 92 % O2 Device: None (Room air)    Vitals:    03/16/21 0620 03/17/21 0500   Weight: 102.9 kg (226 lb 12.8 oz) 100.7 kg (222 lb)     Vital Signs with Ranges  Temp:  [97.1  F (36.2  C)-98.4  F (36.9  C)] 98.1  F (36.7  C)  Pulse:  [70-89] 74  Resp:  [12-20] 12  BP: (127-185)/(47-77) 148/68  SpO2:  [91 %-94 %] 92 %    Constitutional: Awake, alert, cooperative, no apparent distress  Lungs: Clear to auscultation bilaterally, no crackles or wheezing  Cardiovascular: Regular rate and rhythm, normal S1 and S2, and no murmur noted  Abdomen: Normal bowel sounds, soft, non-distended, non-tender  Skin: No rashes, no cyanosis, no edema  Other:    Medications       - MEDICATION  INSTRUCTIONS for Dialysis Patients -   Does not apply See Admin Instructions     acetaminophen  650 mg Oral 4x Daily     allopurinol  100 mg Oral Daily     aspirin  81 mg Oral Daily     [START ON 3/20/2021] colchicine  0.3 mg Oral Weekly     docusate sodium  100 mg Oral Daily     gabapentin  200 mg Oral Once per day on Mon Wed Fri     Lidocaine  1 patch Transdermal Q24H IVANIA     lidocaine   Transdermal Q8H     linezolid  600 mg Intravenous Q12H     miconazole  1 applicator Vaginal At Bedtime     montelukast  10 mg Oral At Bedtime     omeprazole  20 mg Oral QAM AC     oxybutynin ER  5 mg Oral QPM     rosuvastatin  10 mg Oral At Bedtime     timolol maleate  1 drop Both Eyes BID     Vitamin D3  125 mcg Oral Daily       Data   All microbiology laboratory data reviewed.  Recent Labs   Lab Test 03/16/21  1105 03/16/21  0819   WBC 6.9 Canceled, Test credited   HGB 8.1* Canceled, Test credited   HCT 27.0* Canceled, Test credited   * Canceled, Test credited    Canceled, Test credited     Recent Labs   Lab Test 03/16/21  0819   CR 5.94*     No lab results found.  Recent Labs   Lab Test 03/16/21  1105 03/16/21  0830   CULT No growth after 15 hours No growth after 15 hours       Attestation:  Total time on the floor involved in the patient's care: 35 minutes. Total time spent in counseling/care coordination: >50%

## 2021-03-18 NOTE — CONSULTS
Care Management Initial Consult    General Information  Assessment completed with: VM-chart review,    Type of CM/SW Visit: Initial Assessment    Primary Care Provider verified and updated as needed:     Readmission within the last 30 days:        Reason for Consult: discharge planning, facility placement  Advance Care Planning:            Communication Assessment  Patient's communication style:               Cognitive  Cognitive/Neuro/Behavioral: WDL  Level of Consciousness: alert  Arousal Level: opens eyes spontaneously  Orientation: oriented x 4  Mood/Behavior: calm, cooperative  Best Language: 0 - No aphasia  Speech: clear, logical    Living Environment:   People in home: facility resident     Current living Arrangements: extended care facility      Able to return to prior arrangements: yes       Family/Social Support:  Care provided by:    Provides care for: no one, unable/limited ability to care for self  Marital Status: Single  Children          Description of Support System: Involved, Supportive         Current Resources:   Patient receiving home care services: No     Community Resources: Transitional Care  Equipment currently used at home:    Supplies currently used at home:      Employment/Financial:  Employment Status: retired        Financial Concerns: No concerns identified           Lifestyle & Psychosocial Needs:        Socioeconomic History     Marital status: Single     Spouse name: Not on file     Number of children: Not on file     Years of education: Not on file     Highest education level: Not on file     Tobacco Use     Smoking status: Current Every Day Smoker     Packs/day: 1.00     Years: 45.00     Pack years: 45.00     Smokeless tobacco: Never Used   Substance and Sexual Activity     Alcohol use: No     Drug use: No       Functional Status:  Prior to admission patient needed assistance:              Mental Health Status:  Mental Health Status: No Current Concerns       Chemical Dependency  Status:  Chemical Dependency Status: No Current Concerns             Values/Beliefs:  Spiritual, Cultural Beliefs, Baptism Practices, Values that affect care: no               Additional Information:  Phone call from Kendra, [phone], Estela MCCULLOUGH Pt is from [facility]. Referral sent to check on bed hold status.     ELROY Wasserman, LGSW  [phone]  [facility]

## 2021-03-18 NOTE — PROGRESS NOTES
Gynecology Note:    We were consulted yesterday regarding this patient and possibility of fibroids.  It appears that she is having an MRI later today.  Once results of this is back, we can discuss options further depending on what these results indicate.    Please call with questions. [phone]    Suzie Nieves MD  Obstetrics, Gynecology and Infertility

## 2021-03-18 NOTE — PROGRESS NOTES
[facility]    Infectious Disease Progress Note    Date of Service (when I saw the patient): 03/18/2021     Assessment & Plan   Sun Mireles is a 65 year old female who was admitted on 3/16/2021.      Impression:  1. 65 y.o female with PMHx of ESRD (dialysis Tues, Thurs, Sat).   2. Chronic anemia  3. Chronic diastolic CHF  4. SSS s/p PPM placement (10/2020)  5. T2DM  6. COPD   7. Recent history of prolonged hospitalization at [facility] from 1/20/21-2/11/21 for sepsis secondary to emphysematous cystitis with bilateral hydronephrosis.   8. Presented this occasion with recurrent dysuria with abnormal UTI, probable recurrent cystitis.  9. UA grossly abnormal, but in dialysis patient.   10. No fever, no chills.   11. 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   12. Cultures have been ordered   13. Is being evaluated for pelvic mass     Recommendations:   Pending cultures, unclear what to make of a grossly abnormal UA in a dialysis patient with reduced urinary output.   For now continue on linezolid if cultures stay negative will stop            Sade Arreaga MD    Interval History   Afebrile   No new acute complaints     Physical Exam   Temp: 99  F (37.2  C) Temp src: Oral BP: (!) 161/95 Pulse: 72   Resp: 16 SpO2: 94 % O2 Device: None (Room air)    Vitals:    03/17/21 0500 03/18/21 0649 03/18/21 0800   Weight: 100.7 kg (222 lb) 98.2 kg (216 lb 9.6 oz) 98.2 kg (216 lb 7.9 oz)     Vital Signs with Ranges  Temp:  [96.9  F (36.1  C)-99  F (37.2  C)] 99  F (37.2  C)  Pulse:  [68-74] 72  Resp:  [16] 16  BP: (144-161)/(56-95) 161/95  SpO2:  [93 %-94 %] 94 %    Constitutional: Awake, alert, cooperative, no apparent distress  Lungs: Clear to auscultation bilaterally, no crackles or wheezing  Cardiovascular: Regular rate and rhythm, normal S1 and S2, and no murmur noted  Abdomen: Normal bowel sounds, soft, non-distended, non-tender  Skin: No rashes, no cyanosis, no  edema  Other:    Medications       - MEDICATION INSTRUCTIONS for Dialysis Patients -   Does not apply See Admin Instructions     sodium chloride 0.9%  250 mL Intravenous Once in dialysis     sodium chloride 0.9%  300 mL Hemodialysis Machine Once     acetaminophen  650 mg Oral 4x Daily     allopurinol  100 mg Oral Daily     aspirin  81 mg Oral Daily     [START ON 3/20/2021] colchicine  0.3 mg Oral Weekly     docusate sodium  100 mg Oral Daily     epoetin padmini-epbx (RETACRIT) inj ESRD  4,000 Units Intravenous Once in dialysis     gabapentin  200 mg Oral Once per day on Mon Wed Fri     iron sucrose  50 mg Intravenous Once in dialysis     Lidocaine  1 patch Transdermal Q24H IVANIA     lidocaine   Transdermal Q8H     linezolid  600 mg Intravenous Q12H     miconazole  1 applicator Vaginal At Bedtime     montelukast  10 mg Oral At Bedtime     - MEDICATION INSTRUCTIONS -   Does not apply Once     omeprazole  20 mg Oral QAM AC     oxybutynin ER  5 mg Oral QPM     rosuvastatin  10 mg Oral At Bedtime     timolol maleate  1 drop Both Eyes BID     Vitamin D3  125 mcg Oral Daily       Data   All microbiology laboratory data reviewed.  Recent Labs   Lab Test 03/16/21  1105 03/16/21  0819   WBC 6.9 Canceled, Test credited   HGB 8.1* Canceled, Test credited   HCT 27.0* Canceled, Test credited   * Canceled, Test credited    Canceled, Test credited     Recent Labs   Lab Test 03/18/21  0815 03/16/21  0819   CR 5.90* 5.94*     No lab results found.  Recent Labs   Lab Test 03/17/21  0902 03/16/21  1105 03/16/21  0830   CULT PENDING No growth after 2 days No growth after 2 days       Attestation:  Total time on the floor involved in the patient's care: 35 minutes. Total time spent in counseling/care coordination: >50%

## 2021-03-18 NOTE — PLAN OF CARE
A/O x4, forgetful. VSS, ex HTN. Pain controlled with scheduled Tylenol this shift. PRN albuterol inhaler x1 for C/o cough/dyspnea. Denies N/V, regular diet, refusing 2gm Na. PIV saline locked with int abx. Plan for Dialysis at 0730, followed by MRI at [facility] after pacemaker rep makes pacemaker okay for MRI, premedicate for anxiety.

## 2021-03-18 NOTE — PROGRESS NOTES
Renal Medicine Inpatient Dialysis Note                                Sun Mireles MRN# [id number]   Age: 65 year old YOB: 1955   Date of Admission: 3/16/2021 Hospital LOS: 2          Assessment/Plan:     A/P:      1.  ESRD              -[facility] dialysis Amanuel Parks              -ran as scheduled 03/13/21              -Dr. Katie Bailey              -IJ access              -target weight 94 kg  2.  Access              -failed AVF appointment (x4) by review  3.  Anemia              -SHEFALI              -weekly Fe  4.  Mineral Bone Disease  5.  History hematuria              -recent cystoscopy  6.  Recurrent cystitis  7.  HTN  8.  DM         [facility] dialysis schedule        Interval History:     Dialysis run parameters reviewed with dialysis RN at patient bedside    3.25 hour run  3.5 liter goal  2K  SHEFALI  IV Fe    IJ access    Follows  Eating breakfast    ROS     GENERAL: NAD, No fever,chills  CV: NEGATIVE for chest pain, palpitations  GI: NEGATIVE for abdominal pain, heartburn, nausea, vomiting or change in bowel pattern  EXT: no change edema  ROS otherwise negative    Dialysis Parameters:     Vitals were reviewed  Patient Vitals for the past 8 hrs:   BP Temp Temp src Pulse Resp SpO2 Weight   03/18/21 0830 (!) 161/95 -- -- 72 -- -- --   03/18/21 0815 (!) 156/62 -- -- 71 -- -- --   03/18/21 0810 (!) 155/60 -- -- 70 -- -- --   03/18/21 0800 (!) 144/56 99  F (37.2  C) Oral 71 16 94 % 98.2 kg (216 lb 7.9 oz)   03/18/21 0649 -- -- -- -- -- -- 98.2 kg (216 lb 9.6 oz)     I/O last 3 completed shifts:  In: 480 [P.O.:480]  Out: 60 [Urine:60]    Vitals:    03/16/21 0620 03/17/21 0500 03/18/21 0649 03/18/21 0800   Weight: 102.9 kg (226 lb 12.8 oz) 100.7 kg (222 lb) 98.2 kg (216 lb 9.6 oz) 98.2 kg (216 lb 7.9 oz)       Current Weight: 98.2  Dry Weight: 95 range  Dialysis Temp: 36.5  C  Access Device: IJ  Access Site: right  Dialyzer: Revaclear  Dialysis Bath: 2  Sodium Profile: n  UF Goal: 3.5  Blood Flow Rate  "(mL/min): 400  Total Treatment Time (hrs): 3.25  Heparin: Low dose as required      EPO dose: y  Zemplar: n  IV Fe: n      Medications and Allergies:     Reviewed      Physical Exam:     Seen and examined during course of dialysis run    BP (!) 161/95   Pulse 72   Temp 99  F (37.2  C) (Oral)   Resp 16   Ht 1.626 m (5' 4\")   Wt 98.2 kg (216 lb 7.9 oz)   SpO2 94%   BMI 37.16 kg/m      GENERAL: awake, alert, follows  HEENT: NC/AT, PERRLA, EOMI, non icteric, pharynx moist without lesion  RESP: clear  CV: RRR, normal S1 S2  ABDOMEN: S/NT, BS present  MS: 2 plus edema  SKIN: catheter site clean without drainage  EXT: access canulated without issue  NEURO: speech normal and cranial nerves 2-12 intact  PSYCH: affect normal/bright    Data:       Recent Labs   Lab 03/16/21  0819      POTASSIUM 5.0   CHLORIDE 106   CO2 21   ANIONGAP 12   *   BUN 66*   CR 5.94*   GFRESTIMATED 7*   GFRESTBLACK 8*   JOANNA 8.2*     No lab results found in last 7 days.  Recent Labs   Lab 03/16/21  1105 03/16/21  0819   PHOS 6.1*  --    HGB 8.1* Canceled, Test credited         G Luis Miguel Siegel MD    [facility] - Nephrology  [phone]          "

## 2021-03-18 NOTE — PROGRESS NOTES
[facility]    Medicine Progress Note - Hospitalist Service       Date of Admission:  3/16/2021  Assessment & Plan   Sun Mireles is a 65 year-old female with PMHx of ESRD (dialysis Tues, Thurs, Sat), chronic anemia, fibroid uterus, chronic diastolic CHF, SSS s/p PPM placement (10/2020), chronic atrial fibrillation not on anticoagulation, dyslipidemia, T2DM, non-oxygen dependent COPD who had a prolonged hospitalization at [facility] from 1/20/21-2/11/21 for sepsis secondary to emphysematous cystitis with bilateral hydronephrosis with admitted on 3/16/2021 after presented with recurrent dysuria with abnormal UTI, probable recurrent cystitis. Unfortunately no beds were available at [facility], thus patient was transferred to [facility] for further evaluation and treatment.      Recurrent cystitis  Recent hospitalization for sepsis secondary to emphysematous cystitis with bilateral hydronephrosis (1/20/21-2/11/21)  Presented to [facility] ED for evaluation of ~5 days of recurrent dysuria. UA from 3/15/21 noted to be excessively turbid, negative nitrite, 500 leukocytes, moderate bacteria, , 0.2 blood. Does not appear that urine cultures were obtained. CBC unremarkable aside from baseline Hgb. CMP with creatinine 6.05, alk phos 133, hepatic panel unremarkable. CT A/P with bladder wall thickening, possible cystitis, fibroid uterus. Received Linezolid prior to transfer. Note urine culture from 1/29/21 with >100,000 colonies of Enterococcus faecium resistant to Levaquin and Ampicillin for which she was treated with IV Linezolid X10 days, fosfomycin X1, Cefepime X5 days, Fluconazole for 14 days for candiduria. Ultimately discharged on Cefdinir (10 additional days), Fluconazole (14 additional days), and Linezolid (7 additional days).  - ID following, appreciate assistance  - Continues on linezolid pending additional culture results  - Urine and blood cultures NGTD  - Urology consulted given recurrent  cystitis in the context of complicated Urologic history below, recommending culture directed antibiotics and outpatient follow-up, no signed off  - WBC normal, afebrile      Elevated d-dimer  D-dimer 1.4. CT PE negative for PE, dissection, or aneurysm, pulmonary hypertension, moderate emphysema, mildly enlarged heart with CAD and atherosclerotic vascular disease. EKG reported as NSR.     ESRD  Dialyzes Tues, Thjonny, Sat.   - Nephrology consulted, continue dialysis per usual schedule  - Discussed modified diet, which she is adamantly opposed to, reports she will refuse to eat if ordered. Ordered regular diet      Chronic anemia  Baseline Hgb 7-8. No active signs of bleeding.   - hemoglobin stable      Fibroid uterus  Noted on CT during recent admissions. There was comment that mass effect from fibroid uterus caused initial hydronephrosis. No active vaginal bleeding.   - Gyn consulted, appreciate assistance  - Pelvic US with large central mass in pelvis, unable to differentiate further  - Pelvic MRI completed, but read pending as of PM 3/18  - Per OB/GYN, not a surgical consult but may consider embolization      Chronic diastolic CHF  Limited echo 10/24/20 with EF 65%, normal RVSF.  on admission. CT with evidence of pulmonary hypertension. Note Lasix was discontinued during most recent hospitalization and unclear if restarted, pt unsure.   - Volume management per HD     Chronic atrial fibrillation  SSS s/p PPM placement (10/2020)  - Does not appear to be on chronic anticoagulation or rate control agents     Dyslipidemia  - continue prior to admission rosuvastatin    Diabetes mellitus type 2, diet controlled, with peripheral nephropathy and retinopathy  Not on medications prior to admission.  - Does not appear to be on diabetic meds   - HgbA1c 4.9%  - Continue prior to admission gabapentin      COPD, non-oxygen dependent, with emphysema  Resume meds once verified.   - No acute symptoms    Glaucoma  Continue  Timolol gtt     COVID status  Negative Covid and flu swab on 3/16/21.        Diet: Regular Diet Adult    DVT Prophylaxis: Pneumatic Compression Devices  Phan Catheter: not present  Code Status: Full Code      Disposition Plan   Expected discharge:  Anticipate discharge in 1-2 days pending culture and MRI results  Entered: Waqas Lindsey MD 03/18/2021, 5:20 PM       The patient's care was discussed with the Bedside Nurse, Patient and Patient's Family.     Time Spent on this Encounter   I spent 35 minutes on the unit/floor managing the care of Sun Mireles. Over 50% of my time was spent on the following:   - Counseling the patient and/or family regarding: diagnostic results, prognosis, risks and benefits of treatment options, medical compliance and prevention of disease  - Coordination of care with the: consultant(s), care coordinator/ and nurse    Discussed need for more abx, need to await scan, possibilities going forward for pelvic management.     MD Waqas Arcos MD  Hospitalist Service  [facility]    ______________________________________________________________________    Interval History   No acute events overnight. Primary complaint is about diet, refusing modified diet, reports she will not eat if modified diet is ordered. Denies any chest pain or shortness of breath. Denies abdominal pain, n/v.     Data reviewed today: I reviewed all medications, new labs and imaging results over the last 24 hours. I personally reviewed no images or EKG's today.    Physical Exam   Vital Signs: Temp: 98.5  F (36.9  C) Temp src: Oral BP: (!) 153/51 Pulse: 75   Resp: 15 SpO2: 93 % O2 Device: None (Room air)    Weight: 216 lbs 7.87 oz    Constitutional: NAD  Respiratory: Fine inspiratory crackles at bases, no wheezes, normal effort at rest   Cardiovascular: RRR. No peripheral edema.  GI: Soft, non-tender, non-distended.   Skin/Integumen: Warm,  dry  Other:      Data   Recent Labs   Lab 03/18/21  0815 03/16/21  1105 03/16/21  0819   WBC  --  6.9 Canceled, Test credited   HGB  --  8.1* Canceled, Test credited   MCV  --  101* Canceled, Test credited   PLT  --  239 Canceled, Test credited     --  139   POTASSIUM 4.2  --  5.0   CHLORIDE 105  --  106   CO2 25  --  21   BUN 53*  --  66*   CR 5.90*  --  5.94*   ANIONGAP 8  --  12   JOANNA 8.5  --  8.2*   *  --  109*       No results found for this or any previous visit (from the past 24 hour(s)).  Medications       - MEDICATION INSTRUCTIONS for Dialysis Patients -   Does not apply See Admin Instructions     acetaminophen  650 mg Oral 4x Daily     allopurinol  100 mg Oral Daily     aspirin  81 mg Oral Daily     [START ON 3/20/2021] colchicine  0.3 mg Oral Weekly     docusate sodium  100 mg Oral Daily     gabapentin  200 mg Oral Once per day on Mon Wed Fri     Lidocaine  1 patch Transdermal Q24H IVANIA     lidocaine   Transdermal Q8H     linezolid  600 mg Intravenous Q12H     miconazole  1 applicator Vaginal At Bedtime     montelukast  10 mg Oral At Bedtime     omeprazole  20 mg Oral QAM AC     oxybutynin ER  5 mg Oral QPM     rosuvastatin  10 mg Oral At Bedtime     timolol maleate  1 drop Both Eyes BID     Vitamin D3  125 mcg Oral Daily

## 2021-03-18 NOTE — PROGRESS NOTES
Infection Prevention Note: patient has a history Enterococcus faecalis in the urine on 1/9/20 however, it does NOT report resistance to vancomycin.  I do not see a need to have patient in Contact Precautions at this time.  Please call IP with any questions *[phone] Shahida Branham RN, BSN

## 2021-03-18 NOTE — PROVIDER NOTIFICATION
Paged Dr. Lindsey 6:01 PM    MRI results marked urgent- again showing large uterine mass. Do I need to call gynecology on-call physician regarding results?     No need to page gyn, results known. May consult gyn/onc also.

## 2021-03-18 NOTE — PROGRESS NOTES
Potassium   Date Value Ref Range Status   03/18/2021 4.2 3.4 - 5.3 mmol/L Final     Hemoglobin   Date Value Ref Range Status   03/16/2021 8.1 (L) 11.7 - 15.7 g/dL Final     Creatinine   Date Value Ref Range Status   03/18/2021 5.90 (H) 0.52 - 1.04 mg/dL Final     Urea Nitrogen   Date Value Ref Range Status   03/18/2021 53 (H) 7 - 30 mg/dL Final     Sodium   Date Value Ref Range Status   03/18/2021 138 133 - 144 mmol/L Final     No results found for: INR    DIALYSIS PROCEDURE NOTE  Hepatitis status of previous patient on machine log was checked and verified ok to use with this patients hepatitis status.  Patient dialyzed for 3.25 hrs. on a 2 K bath with a net fluid removal of  3.5L.  A BFR of 400 ml/min was obtained via a RIJ      The treatment plan was discussed with Dr. JARROD Mcmanus  during the treatment.    Total heparin received during the treatment: 0 units.     Line flushed, clamped and capped with heparin 1:1000 2.1 mL (2100 units) per lumen    Meds  given: Epo and Venofer    Complications: None      Person educated: Pt. Knowledge base minimal . Barriers to learning: none . Educated on procedure and diet  via oral mode. Patient verbalized understanding. Pt prefers oral  education style.     ICEBOAT? Timeout performed pre-treatment  I: Patient was identified using 2 identifiers  C:  Consent Signed Yes  E: Equipment preventative maintenance is current and dialysis delivery system OK to use  B: Hepatitis B Surface Antigen: Neg ; Draw Date: 3.9.21      Hepatitis B Surface Antibody: Susceptible ; Draw Date: 3.9.21  O: Dialysis orders present and complete prior to treatment  A: Vascular access verified and assessed prior to treatment  T: Treatment was performed at a clinically appropriate time  ?: Patient was allowed to ask questions and address concerns prior to treatment  See flowsheet in EPIC for further details and post assessment.  Machine water alarm in place and functioning. Transducer pods intact and checked  every 15min.   Pt returned via Treatment room .  Chlorine/Chloramine water system checked every 4 hours.  Outpatient Dialysis at [facility]

## 2021-03-19 NOTE — PLAN OF CARE
A&Ox4, int forgetfulness. VSS. Sig BLE neuropathy pain. PIV SL w/ int antbx. C/o cough, Tessalon given x1. R internal jugular heparin locked. Up to BSC A1/W. Refused feng walk. Soft BM x2, pt refuses bowel meds.CMS intact ex baseline neuropathy. Pt upset with family members and some social issues with bill paying. SW and CC informed, but pt told to manage issue at this point. Large abd mass, likely ovarian, gyn/onc to see. ID signed off.  Cough, intermittent, tessalon and inhaler PRN. Plan pending.

## 2021-03-19 NOTE — PROVIDER NOTIFICATION
MD Notification    Notified Person: MD schwab    Notified Person Name:    Notification Date/Time:  3/19/2021 9:41 AM      Notification Interaction:    Purpose of Notification: MRI results    Orders Received:    Comments:

## 2021-03-19 NOTE — PROGRESS NOTES
Care Management Follow Up    Length of Stay (days): 3    Expected Discharge Date: 03/20/21(LT)     Concerns to be Addressed: discharge planning     Patient plan of care discussed at interdisciplinary rounds: Yes    Anticipated Discharge Disposition: LTC     Anticipated Discharge Services:    Anticipated Discharge DME:      Patient/family educated on Medicare website which has current facility and service quality ratings:    Education Provided on the Discharge Plan:    Patient/Family in Agreement with the Plan:      Referrals Placed by CM/SW: Post Acute Facilities  Private pay costs discussed: Not applicable    Additional Information:  Pt has a bed hold in place at [facility] and can return when ready. If ready this weekend call, [phone].    ELROY Wasserman, [facility]  [phone]  [facility]

## 2021-03-19 NOTE — PROGRESS NOTES
[facility]    Medicine Progress Note - Hospitalist Service       Date of Admission:  3/16/2021  Assessment & Plan   Sun Mireles is a 65 year-old female with PMHx of ESRD (dialysis Tues, Thurs, Sat), chronic anemia, fibroid uterus, chronic diastolic CHF, SSS s/p PPM placement (10/2020), chronic atrial fibrillation not on anticoagulation, dyslipidemia, T2DM, non-oxygen dependent COPD who had a prolonged hospitalization at [facility] from 1/20/21-2/11/21 for sepsis secondary to emphysematous cystitis with bilateral hydronephrosis with admitted on 3/16/2021 after presented with recurrent dysuria with abnormal UTI, probable recurrent cystitis. Unfortunately no beds were available at [facility], thus patient was transferred to [facility] for further evaluation and treatment.      Recurrent cystitis  Recent hospitalization for sepsis secondary to emphysematous cystitis with bilateral hydronephrosis (1/20/21-2/11/21)  Presented to [facility] ED for evaluation of ~5 days of recurrent dysuria. UA from 3/15/21 noted to be excessively turbid, negative nitrite, 500 leukocytes, moderate bacteria, , 0.2 blood. Does not appear that urine cultures were obtained. CBC unremarkable aside from baseline Hgb. CMP with creatinine 6.05, alk phos 133, hepatic panel unremarkable. CT A/P with bladder wall thickening, possible cystitis, fibroid uterus. Received Linezolid prior to transfer. Note urine culture from 1/29/21 with >100,000 colonies of Enterococcus faecium resistant to Levaquin and Ampicillin for which she was treated with IV Linezolid X10 days, fosfomycin X1, Cefepime X5 days, Fluconazole for 14 days for candiduria. Ultimately discharged on Cefdinir (10 additional days), Fluconazole (14 additional days), and Linezolid (7 additional days).  - ID following, appreciate assistance  - Continues on linezolid pending additional culture results  - Urine and blood cultures NGTD  - Urology consulted given recurrent  cystitis in the context of complicated Urologic history below, recommending culture directed antibiotics and outpatient follow-up, no signed off  - WBC normal, afebrile      Dysuria  Has complaints of dysuria for several months. Associated with vaginal burning. Treated with multiple courses of abx that resolve her symptoms (this is despite no positive culture, culture with urogenital gabby, or the antibiotic is not appropriate to treat the bacteria grown). Etiology unclear. Possible vaginal atrophy, vs other.  - plan to follow up with gynecology for outpatient management and workup    Elevated d-dimer  D-dimer 1.4. CT PE negative for PE, dissection, or aneurysm, pulmonary hypertension, moderate emphysema, mildly enlarged heart with CAD and atherosclerotic vascular disease. EKG reported as NSR.     ESRD  Dialyzes Tues, Thurs, Sat.   - Nephrology consulted, continue dialysis per usual schedule  - Discussed modified diet, which she is adamantly opposed to, reports she will refuse to eat if ordered. Ordered regular diet      Chronic anemia  Baseline Hgb 7-8. No active signs of bleeding.   - hemoglobin stable      Fibroid uterus  Noted on CT during recent admissions. There was comment that mass effect from fibroid uterus caused initial hydronephrosis. No active vaginal bleeding.   - Gyn consulted, appreciate assistance  - Pelvic US with large central mass in pelvis, unable to differentiate further  - Pelvic MRI completed, shows large mass arising from uterus  - consult to gyn-onc on discharge for management of mass  - Per OB/GYN, not a surgical consult but may consider embolization      Chronic diastolic CHF  Limited echo 10/24/20 with EF 65%, normal RVSF.  on admission. CT with evidence of pulmonary hypertension. Note Lasix was discontinued during most recent hospitalization and unclear if restarted, pt unsure.   - Volume management per HD     Chronic atrial fibrillation  SSS s/p PPM placement (10/2020)  - Does  not appear to be on chronic anticoagulation or rate control agents     Dyslipidemia  - continue prior to admission rosuvastatin    Diabetes mellitus type 2, diet controlled, with peripheral nephropathy and retinopathy  Not on medications prior to admission.  - Does not appear to be on diabetic meds   - HgbA1c 4.9%  - Continue prior to admission gabapentin      COPD, non-oxygen dependent, with emphysema  Resume meds once verified.   - No acute symptoms    Glaucoma  Continue Timolol gtt     COVID status  Negative Covid and flu swab on 3/16/21.        Diet: Regular Diet Adult    DVT Prophylaxis: Pneumatic Compression Devices  Phan Catheter: not present  Code Status: Full Code      Disposition Plan   Expected discharge:  Anticipate discharge tomorrow  Entered: Waqas Lindsey MD 03/19/2021, 5:25 PM       The patient's care was discussed with the Bedside Nurse, Patient and gyn and gyn onc Consultant.     Time Spent on this Encounter   I spent 35 minutes on the unit/floor managing the care of Sun Mireles. Over 50% of my time was spent on the following:   - Counseling the patient and/or family regarding: diagnostic results, prognosis, risks and benefits of treatment options, medical compliance and prevention of disease  - Coordination of care with the: consultant(s), care coordinator/ and nurse    Discussed results of scan, possibilities going forward for pelvic management, plan for f/u with gyn, gyn onc.      MD Waqas Arcos MD  Hospitalist Service  [facility]    ______________________________________________________________________    Interval History   No acute events overnight. Has ongoing burning with urination. MRI with concern for mass arising from the ovary, but later amended to be arising from uterus only. Discussed with her management plan for this.     Data reviewed today: I reviewed all medications, new labs and imaging results over  the last 24 hours. I personally reviewed no images or EKG's today.    Physical Exam   Vital Signs: Temp: 96.6  F (35.9  C) Temp src: Oral BP: (!) 176/67 Pulse: 74   Resp: 16 SpO2: 94 % O2 Device: None (Room air)    Weight: 215 lbs 0 oz    Constitutional: NAD  Respiratory: Fine inspiratory crackles at bases, no wheezes, normal effort at rest   Cardiovascular: RRR. No peripheral edema.  GI: Soft, non-tender, non-distended.   Skin/Integumen: Warm, dry  Other:      Data   Recent Labs   Lab 03/18/21  0815 03/16/21  1105 03/16/21  0819   WBC  --  6.9 Canceled, Test credited   HGB  --  8.1* Canceled, Test credited   MCV  --  101* Canceled, Test credited   PLT  --  239 Canceled, Test credited     --  139   POTASSIUM 4.2  --  5.0   CHLORIDE 105  --  106   CO2 25  --  21   BUN 53*  --  66*   CR 5.90*  --  5.94*   ANIONGAP 8  --  12   JOANNA 8.5  --  8.2*   *  --  109*       No results found for this or any previous visit (from the past 24 hour(s)).  Medications       - MEDICATION INSTRUCTIONS for Dialysis Patients -   Does not apply See Admin Instructions     acetaminophen  650 mg Oral 4x Daily     allopurinol  100 mg Oral Daily     aspirin  81 mg Oral Daily     [START ON 3/20/2021] colchicine  0.3 mg Oral Weekly     docusate sodium  100 mg Oral Daily     gabapentin  200 mg Oral Once per day on Mon Wed Fri     Lidocaine  1 patch Transdermal Q24H IVANIA     lidocaine   Transdermal Q8H     miconazole  1 applicator Vaginal At Bedtime     montelukast  10 mg Oral At Bedtime     omeprazole  20 mg Oral QAM AC     oxybutynin ER  5 mg Oral QPM     rosuvastatin  10 mg Oral At Bedtime     timolol maleate  1 drop Both Eyes BID     Vitamin D3  125 mcg Oral Daily

## 2021-03-19 NOTE — PROGRESS NOTES
MD Notification    Notified Person: MD schwab    Notified Person Name:    Notification Date/Time: 3/19/2021 8:31 AM    Notification Interaction:    Purpose of Notification:pt bp 175/80. please advise.     Orders Received:    Comments:

## 2021-03-19 NOTE — PROGRESS NOTES
Gynecology Progress Note     S: Patient feeling the same this morning. Reports feelings of urinary frequency and dysuria. No constipation. No abdominal bloating or pain.     O:  Vitals:    03/19/21 0000 03/19/21 0544 03/19/21 0807 03/19/21 0810   BP: 125/49  (!) 172/78 (!) 175/80   BP Location: Left arm  Left arm    Pulse: 73  74    Resp: 16  18    Temp: 98.1  F (36.7  C)  97.9  F (36.6  C)    TempSrc: Oral  Oral    SpO2: 91%  97%    Weight:  97.5 kg (215 lb)     Height:            Gen: comfortable, no acute distress  Abd: soft, nontender to plapation  Ext: warm and well perfused    Imaging:  FINDINGS:  There is a 16.7 x 10.1 x 8.8 cm mass in the abdomen and pelvis. This is generally hypointense on T1 and T2-weighted imaging with multiple T2 hyperintense regions within possibly representing fluid or fat. This mass exhibits diffusion restriction. Although difficult to ascertain, this appears to arise from the left ovary or right ovary and does not appear to contact the uterus.     Normal urinary bladder. Normal bowel in the field-of-view. A fat-containing right inguinal hernia is noted.                                                                      IMPRESSION:  1.  Large mass in the abdomen and pelvis. This is favored to arise  from one of the ovaries, though this is difficult to assess. Surgical  consultation recommended.  2.  No uterine fibroids. Normal appearance of the uterus.    Assessment/Recommendations: Sun Mireles is a 65 year old female with multiple medical comorbidities found to have a large 16 cm ovarian mass.     - Recommend consultation with Gynecologic Oncology given large, abnormal appearing mass in a postmenopausal patient. No evidence of uterine fibroids on MRI.   - Patient is not a good surgical candidate given h/o ESRD requiring dialysis.  - CA-125 negative, would recommend getting other tumor markers like CA-19-9 and CEA. GYN Onc would likely have additional recommendations on if any  additional tumor markers are needed.     Hortensia Lugo MD  OB/GYN & Infertility  Pager [phone]  03/19/21      Addendum: I spoke with the radiologist who re-read the images. He said it does not look like an ovarian mass or malignancy and instead appears to be a fibroid coming off the uterus that appears benign. GYN Oncology is going to see this patient as an outpatient given that she is currently stable and that this mass has been present for many months.  Please let us know if we can be of further assistance, but otherwise we will sign off at this time.     Hortensia Lugo MD  OB/GYN & Infertility  Pager [phone]  03/19/21

## 2021-03-19 NOTE — PLAN OF CARE
Pt is A&Ox4, VSS on RA ex. hypertension. Lung sounds diminished, PRN Duoneb given x1 for coughing with good result. Up w/1, gait belt, and walker, voiding in BSC. Regular diet. CMS intact ex. baseline neuropathy. Pain managed with scheduled Tylenol. IV SL, R internal jugular line heparin locked. Will continue to follow plan of care.

## 2021-03-19 NOTE — PROGRESS NOTES
[facility]    Infectious Disease Progress Note    Date of Service (when I saw the patient): 03/19/2021     Assessment & Plan   Sun Mireles is a 65 year old female who was admitted on 3/16/2021.      Impression:  1. 65 y.o female with PMHx of ESRD (dialysis Tues, Thurs, Sat).   2. Chronic anemia  3. Chronic diastolic CHF  4. SSS s/p PPM placement (10/2020)  5. T2DM  6. COPD   7. Recent history of prolonged hospitalization at [facility] from 1/20/21-2/11/21 for sepsis secondary to emphysematous cystitis with bilateral hydronephrosis.   8. Presented this occasion with recurrent dysuria with abnormal UTI, probable recurrent cystitis.  9. UA grossly abnormal, but in dialysis patient.   10. No fever, no chills.   11. 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   12. Cultures have been ordered   13. Is being evaluated for pelvic mass     Recommendations:   UC with urogenital contamination, unclear what to make of a grossly abnormal UA in a dialysis patient with reduced urinary output, no fevers, no chills, no dysuria currently, outside UC with E coli and she has been on linezolid which does not even cover E coli    With no fevers, no chills, grossly abnormal urine in a dialysis patient probably expected, E coli in the outside culture, which is not being treated with linezolid and the presenting symptoms of dysuria already resolved will recommend stopping Linezolid.      Will not recommend treating asymptomatic bacteruria.     Signing off please call if ques.       Sade Arreaga MD    Interval History   Afebrile   No new acute complaints     Physical Exam   Temp: 97.9  F (36.6  C) Temp src: Oral BP: (!) 175/80 Pulse: 74   Resp: 18 SpO2: 97 % O2 Device: None (Room air)    Vitals:    03/18/21 0649 03/18/21 0800 03/19/21 0544   Weight: 98.2 kg (216 lb 9.6 oz) 98.2 kg (216 lb 7.9 oz) 97.5 kg (215 lb)     Vital Signs with Ranges  Temp:  [97.9  F (36.6  C)-98.5  F (36.9  C)] 97.9  F (36.6   C)  Pulse:  [73-85] 74  Resp:  [15-18] 18  BP: (125-175)/(49-80) 175/80  SpO2:  [91 %-97 %] 97 %    Constitutional: Awake, alert, cooperative, no apparent distress  Lungs: Clear to auscultation bilaterally, no crackles or wheezing  Cardiovascular: Regular rate and rhythm, normal S1 and S2, and no murmur noted  Abdomen: Normal bowel sounds, soft, non-distended, non-tender  Skin: No rashes, no cyanosis, no edema  Other:    Medications       - MEDICATION INSTRUCTIONS for Dialysis Patients -   Does not apply See Admin Instructions     acetaminophen  650 mg Oral 4x Daily     allopurinol  100 mg Oral Daily     aspirin  81 mg Oral Daily     [START ON 3/20/2021] colchicine  0.3 mg Oral Weekly     docusate sodium  100 mg Oral Daily     gabapentin  200 mg Oral Once per day on Mon Wed Fri     Lidocaine  1 patch Transdermal Q24H IVANIA     lidocaine   Transdermal Q8H     linezolid  600 mg Intravenous Q12H     miconazole  1 applicator Vaginal At Bedtime     montelukast  10 mg Oral At Bedtime     omeprazole  20 mg Oral QAM AC     oxybutynin ER  5 mg Oral QPM     rosuvastatin  10 mg Oral At Bedtime     timolol maleate  1 drop Both Eyes BID     Vitamin D3  125 mcg Oral Daily       Data   All microbiology laboratory data reviewed.  Recent Labs   Lab Test 03/16/21  1105 03/16/21  0819   WBC 6.9 Canceled, Test credited   HGB 8.1* Canceled, Test credited   HCT 27.0* Canceled, Test credited   * Canceled, Test credited    Canceled, Test credited     Recent Labs   Lab Test 03/18/21  0815 03/16/21  0819   CR 5.90* 5.94*     No lab results found.  Recent Labs   Lab Test 03/17/21  0902 03/16/21  1105 03/16/21  0830   CULT >100,000 colonies/mL  mixed urogenital gabby  Multiple morphotypes present with no predominant organism.  Growth consistent with   probable contamination during collection.  Suggest repeat specimen if clinically   indicated.  Susceptibility testing not routinely done   No growth after 3 days No growth after 3  days       Attestation:  Total time on the floor involved in the patient's care: 35 minutes. Total time spent in counseling/care coordination: >50%

## 2021-03-19 NOTE — PROGRESS NOTES
Renal Medicine         Dialyzed yesterday without issue  UF 3.5 liter    Up at bedside  No CP or SOB  No O2 requirement    No additional dialysis indicated     Plan dialysis 03/20/21  UF to 3.5 liter        Recent Labs   Lab 03/18/21  0815      POTASSIUM 4.2   CHLORIDE 105   CO2 25   ANIONGAP 8   *   BUN 53*   CR 5.90*   GFRESTIMATED 7*   GFRESTBLACK 8*   JOANNA 8.5           CHANO Siegel    [facility]  [phone]

## 2021-03-19 NOTE — CONSULTS
Gynecologic Oncology Note    Name: Sun Mireles    MRN: [id number]   YOB: 1955         We were asked to see Sun Mireles at the request of Dr. Lindsey for evaluation and treatment of pelvic mass.     Impression:      Sun Mireles is a 65 year old  female with large pelvic mass, suspected to be originating from the ovary on MRI during this admission, with history of fibroid uterus on prior imaging (as early as 2010), as well as postmenopausal bleeding (incompletely evaluated in 2014), ESRD on dialysis, HTN, CHF, morbid obesity, Afib not on anticoagulation, COPD, HLD, and recurrent cystitis with recent admission (1/2021 at [facility]) for sepsis due to cystitis with bilateral hyeronephrosis, now admitted 3/16/21 with dysuria. Infectious disease, benign gynecology, nephrology, and urology consulted and following during this admission. Large pelvic mass noted on recent imaging (and imaging historically), however, no comment of hydroureter on MRI 3/16 or CT 3/16. There is conflicting radiology reads as to where the mass originates, with prior imaging at OSH suggesting fibroid uterus versus most recent MRI during this admission notes mass likely arises from the ovary with normal-appearing uterus.  After reviewing chart and Care Everywhere, patient was seen in Gyn Oncology clinic 2/2014 for postmenopausal bleeding in context of suspected fibroid uterus. CA-125 normal on this admission, additional tumor markers pending. No postmenopausal bleeding currently.       Recommendations:      Agree with additional tumor markers (CA 19-9, CEA, Inhibin A/B, AFP). After discussion with the hospitalist regarding the clinical status of the patient, the patient sounds stable and will possibly be discharged in the next 1-2 days. Given clinical stability and pending tumor markers, Gyn Oncology team recommends re-establishing care as an outpatient to further discuss evaluation and management of this pelvic  mass, especially in context of multiple medical co-morbidities and that patient is likely not a surgical candidate subsequently. Referral for Gyn Oncology placed in discharge navigator.    If patient clinically deteriorates and/or a longer admission is anticipated secondary to complications suspected to be due to pelvic mass, please re-consult Gyn Oncology team to assess patient during this admission.     Thank you for allowing us to be involved in the care of your patient. Please do not hesitate to give us a call with further questions.     Gyn Onc Team Pager: [phone] between 6am-6pm on weekdays;   Between 6pm-6am and weekends, page off-site ([facility]) cross-cover resident pager [phone]    Plan to defer consultation until patient is discharged as an outpatient discussed with Dr. Aryan Whitman, Gyn Oncology Fellow.     Josh Martínez MD, MPH   Gyn Onc, PGY3  3/19/2021 11:49 AM       OB/GYN History:     (Per chart review, as of 2014)  Patient is a , she has had one vaginal delivery and one  section.  She delivered via  in  and had a PPTL to follow. She became pregnant with an IUP subsequently and delivered via C/S with BTL. At the time of surgery patient reports that the surgeon noted that the left fallopian tube was intact.  She denies other significant gyn history.  She reports regular pap smears throughout her life; the pap collected in  was abnormal.  Patient is uncertain why it was abnormal and had no follow up (Records not available). Pap in 2014 NILM.     Patient reports that she has been menopausal since .  However, between 2186-1771 she has had intermittent vaginal bleeding on average every 6 months or so.  Two times the bleeding has been significantly heavy lasting 6 days.  Her last episode of bleeding was 10/2013.  She has not had bleeding since.  She had an endometrial biopsy performed in  that showed scant endometrium. Pelvic ultrasound and follow up in clinic was  recommended, but patient was lost to follow up.    Endometrial biopsy 2/2014  - Scant fragments of benign squamous epithelium, benign endocervical   glands and a few possible minute strips of benign endometrial surface   epithelium.   - Endometrial tissue is insufficient for further diagnosis.           Data     Results for orders placed or performed during the hospital encounter of 03/16/21 (from the past 24 hour(s))   MR Abdomen w/o Contrast   Result Value Ref Range    Radiologist flags Large abdominal and pelvic mass (Urgent)     Narrative    MRI ABDOMEN WITHOUT CONTRAST 3/18/2021 2:54 PM     HISTORY: Abdominal mass, intra-abdominal neoplasm suspected.    COMPARISON: Ultrasound dated 3/16/2021. CT dated 2/15/2012.    TECHNIQUE: Multiplanar multisequence imaging of the abdomen acquired  without IV contrast.    FINDINGS:  There is a 16.7 x 10.1 x 8.8 cm mass in the abdomen and pelvis. This  is generally hypointense on T1 and T2-weighted imaging with multiple  T2 hyperintense regions within possibly representing fluid or fat.  This mass exhibits diffusion restriction. Although difficult to  ascertain, this appears to arise from the left ovary or right ovary  and does not appear to contact the uterus.    Normal urinary bladder. Normal bowel in the field-of-view. A  fat-containing right inguinal hernia is noted.      Impression    IMPRESSION:  1.  Large mass in the abdomen and pelvis. This is favored to arise  from one of the ovaries, though this is difficult to assess. Surgical  consultation recommended.  2.  No uterine fibroids. Normal appearance of the uterus.      [Access Center: Large abdominal and pelvic mass]    This report will be copied to the [facility] to ensure a  provider acknowledges the finding. Access Center is available Monday  through Friday 8am-3:30 pm.        LESTER J FAHRNER, MD       US PELVIS COMPLETE WITHOUT TRANSVAGINAL 3/16/2021 4:42 PM     CLINICAL HISTORY: Uterine fibroids,  further characterize  TECHNIQUE: Transabdominal scans were performed. Endovaginal ultrasound  was declined.     COMPARISON: CT dated 2/15/2012     FINDINGS:     UTERUS: 16 x 17 x 11 cm. Heterogeneous and enlarged. Otherwise the  uterus is not well assessed due to this large mass.      RIGHT OVARY: Not visualized, possibly due to positioning and the  central mass.     LEFT OVARY: Not visualized, possibly due to positioning and the  central mass.     No significant free fluid.                                                                      IMPRESSION:  1.  Large central mass in the pelvis. This could represent a large  uterus due to fibroids, malignancy, or some other process. Recommend  further evaluation MRI may be helpful.  2.  The ovaries are not identified due to the large mass.     LESTER J FAHRNER, MD

## 2021-03-19 NOTE — PLAN OF CARE
A&Ox4, int forgetfulness. VSS. Denies pain. PIV SL w/ int antbx. C/o cough, Tessalon given x1. R internal jugular heparin locked. Up to BSC A1/W. Small, formed BM x1.CMS intact ex baseline neuropathy. Pt rested comfortably overnight. Plan pending.

## 2021-03-20 NOTE — PROGRESS NOTES
" Renal Medicine Progress Note            Assessment/Plan:     1.  ESRD              -[facility] [place]              -ran as scheduled 03/13/21              -Dr. Katie Bailey              -IJ access              -target weight 94 kg  2.  Access              -failed AVF appointment (x4) by review  3.  Anemia              -SHEFALI              -weekly Fe  4.  Mineral Bone Disease  5.  History hematuria              -recent cystoscopy  6.  Recurrent cystitis  7.  HTN  8.  DM    Plan:  # 3.25 hrs HD, UF ~ 3.5 liters net  # Epogen 4000 units          Interval History:     Patient is getting hemodialysis.  Procedure is wo issues.  She denies SOB, chest and abdominal pain. No N/V. She complains of cold breakfast.           Medications and Allergies:       - MEDICATION INSTRUCTIONS for Dialysis Patients -   Does not apply See Admin Instructions     sodium chloride 0.9%  250 mL Intravenous Once in dialysis     sodium chloride 0.9%  300 mL Hemodialysis Machine Once     acetaminophen  650 mg Oral 4x Daily     allopurinol  100 mg Oral Daily     aspirin  81 mg Oral Daily     colchicine  0.3 mg Oral Weekly     docusate sodium  100 mg Oral Daily     epoetin padmini-epbx (RETACRIT) inj ESRD  4,000 Units Intravenous Once in dialysis     gabapentin  200 mg Oral Once per day on Mon Wed Fri     Lidocaine  1 patch Transdermal Q24H IVANIA     lidocaine   Transdermal Q8H     miconazole  1 applicator Vaginal At Bedtime     montelukast  10 mg Oral At Bedtime     - MEDICATION INSTRUCTIONS -   Does not apply Once     omeprazole  20 mg Oral QAM AC     oxybutynin ER  5 mg Oral QPM     rosuvastatin  10 mg Oral At Bedtime     timolol maleate  1 drop Both Eyes BID     Vitamin D3  125 mcg Oral Daily      No Known Allergies         Physical Exam:   Vitals were reviewed   , Blood pressure (!) 156/86, pulse 76, temperature 98.1  F (36.7  C), temperature source Oral, resp. rate 20, height 1.626 m (5' 4\"), weight 97 kg (213 lb 14.4 oz), SpO2 92 " %.    Wt Readings from Last 3 Encounters:   03/20/21 97 kg (213 lb 14.4 oz)   02/19/14 (!) 163.8 kg (361 lb 1.6 oz)       Intake/Output Summary (Last 24 hours) at 3/20/2021 1027  Last data filed at 3/19/2021 2229  Gross per 24 hour   Intake --   Output 200 ml   Net -200 ml     GENERAL: awake, alert, follows  HEENT: NC/AT, PERRLA, EOMI, non icteric, pharynx moist without lesion  RESP: clear. No wheezes or crackles.   CV: RRR, normal S1 S2  ABDOMEN: S/NT, BS present  MSK: Mostly non-pitting edema.   SKIN: catheter site clean without drainage  NEURO: Awake/oriented x 3.   PSYCH: affect normal/bright           Data:     CBC RESULTS:     Recent Labs   Lab 03/16/21  1105 03/16/21  0819   WBC 6.9 Canceled, Test credited   RBC 2.67* Canceled, Test credited   HGB 8.1* Canceled, Test credited   HCT 27.0* Canceled, Test credited    Canceled, Test credited       Basic Metabolic Panel:  Recent Labs   Lab 03/18/21  0815 03/16/21  0819    139   POTASSIUM 4.2 5.0   CHLORIDE 105 106   CO2 25 21   BUN 53* 66*   CR 5.90* 5.94*   * 109*   JOANNA 8.5 8.2*       INRNo lab results found in last 7 days.   Attestation:   I have reviewed today's relevant vital signs, notes, medications, labs and imaging.    Zach Desai MD  [facility] - Nephrology  Office phone :[phone]  Pager: [phone]

## 2021-03-20 NOTE — PLAN OF CARE
"A&O x 4 with forgetfulness, VSS other than ongoing hypertension. R internal jugular hep lacked, L PIV. Up to BSC for small BM, small amt of urine, blood noted in urine. Pt continues to report burning with frustration, upset that she is \"supposed to go home today and im still suffering.\" Intermittent cough with coarse to right lung. Uterine fybroid present. Dialysis today.     "

## 2021-03-20 NOTE — PROGRESS NOTES
"Potassium   Date Value Ref Range Status   03/18/2021 4.2 3.4 - 5.3 mmol/L Final     Hemoglobin   Date Value Ref Range Status   03/16/2021 8.1 (L) 11.7 - 15.7 g/dL Final     Creatinine   Date Value Ref Range Status   03/18/2021 5.90 (H) 0.52 - 1.04 mg/dL Final     Urea Nitrogen   Date Value Ref Range Status   03/18/2021 53 (H) 7 - 30 mg/dL Final     Sodium   Date Value Ref Range Status   03/18/2021 138 133 - 144 mmol/L Final     No results found for: INR    DIALYSIS PROCEDURE NOTE  Hepatitis status of previous patient on machine log was checked and verified ok to use with this patients hepatitis status.  Patient dialyzed for 3.25 hrs. on a 3 K bath with a net fluid removal of  3.5 L.  A BFR of 400 ml/min was obtained via a RIJ.      The treatment plan was discussed with Dr. Desai during the treatment.    Total heparin received during the treatment: 0 units.     Line flushed, clamped and capped with heparin 1:1000 2 mL (2000 units) per lumen    Meds  given: epogen 4,000u   Complications: none; Patient was very frustrated throughout treatment due to long wait times for assistance to use the restroom and the inability of the floor to bring down her food after it was delivered there instead of dialysis as requested.  The nurse also had difficulty getting a hold of the floor to give report to the primary nurse and was told she was, \"On lunch break and would call back.\"  And that there was \"No one else to give report to.\"    Person educated: patient. Knowledge base minimal. Barriers to learning: none. Educated on procedure via verbal mode. patient/family verbalized understanding. Pt prefers verbal education style.     ICEBOAT? Timeout performed pre-treatment  I: Patient was identified using 2 identifiers  C:  Consent Signed Yes  E: Equipment preventative maintenance is current and dialysis delivery system OK to use  B: Hepatitis B Surface Antigen: Negative; Draw Date: 3/16/21      Hepatitis B Surface Antibody: Susceptible; " Draw Date: 3/16/21  O: Dialysis orders present and complete prior to treatment  A: Vascular access verified and assessed prior to treatment  T: Treatment was performed at a clinically appropriate time  ?: Patient was allowed to ask questions and address concerns prior to treatment  See flowsheet in EPIC for further details and post assessment.  Machine water alarm in place and functioning. Transducer pods intact and checked every 15min.   Pt returned via bed.  Chlorine/Chloramine water system checked every 4 hours.  Outpatient Dialysis on TThSat..

## 2021-03-20 NOTE — PROGRESS NOTES
Care Management Discharge Note    Discharge Date: 03/20/21     Discharge Disposition: [facility]    Discharge Services:      Discharge DME:      Discharge Transportation:  [facility] at 1815    Private pay costs discussed: Not applicable    PAS Confirmation Code:  n/a  Patient/family educated on Medicare website which has current facility and service quality ratings:      Education Provided on the Discharge Plan:    Persons Notified of Discharge Plans: facility, RN, MD  Patient/Family in Agreement with the Plan:      Handoff Referral Completed: No    Additional Information:  Orders faxed and facility updated.       ELROY Wasserman, SW  [phone]  [employer]

## 2021-03-20 NOTE — PLAN OF CARE
Pt is A and O X4, forgetful at times. VSS on Ra, Ex hypertensive. Denies pain and nausea. Dialysis completed today, tolerated well. R internal jugular hep locked. LPIV SL. A1+GB+W to bedside commode. Continues to report burning sensation with urination. Mds aware.Possible burning  secondary to fibroid per MD. UA clear, negative for infection. No blood noted in urine this shift.  Pt  in stable conditions and adequate to discharge per MD order, with order to follow up with Nephrology, and GYN/ONC as an outpatient. Pt was notified and expressed understanding.   Patient discharged at 1820 to [facility] TCU. IV was discontinued. Pain at time of discharge was 0/10. Belongings returned to patient.  Discharge instructions and medications list package given to to [facility]. All discharge question answered.  Patient verbalized understanding.  No Prescriptions given to patient as pt is discharging to TCU.  At time of discharge, patient condition was stable and left the unit escorted by [facility].

## 2021-03-20 NOTE — PLAN OF CARE
A&Ox4.  VSS, BP elevated but did not meet parameters for prn.  Up with 1, belt and walker to chair and BSC.  C/O burning/pain with urination, MD aware and not new for pt.  Tylenol scheduled.  Prn neb x1, tessalon x1 for cough.  LS diminished but clear.  Plan for dialysis tomorrow and then probable discharge.

## 2021-03-20 NOTE — DISCHARGE SUMMARY
[facility]  Hospitalist Discharge Summary      Date of Admission:  3/16/2021  Date of Discharge:  3/20/2021  Discharging Provider: Waqas Lindsey MD      Discharge Diagnoses   Dysuria without UTI  ESRD  Chronic anemia of ESRD  Fibroid uterus  Chronic diastolic CHF  Chronic atrial fibrillation  SSS s/p PPM placement (10/2020)  Dyslipidemia  Diabetes mellitus type 2, diet controlled, with peripheral nephropathy and retinopathy  COPD, non-oxygen dependent, with emphysema  Glaucoma    Follow-ups Needed After Discharge   Follow-up Appointments     Follow Up and recommended labs and tests      Follow up with primary care provider in 7-14 days.  No follow up labs or   test are needed.    Follow up with urologist you have been seeing in 2-4 weeks.    Referral has bene made to gynecologic oncologist to discuss the mass on   your uterus. They should call to set this up.    Referral has been made to gynecology to discuss further workup and   management of your burning with urination and vaginal discomfort.           Unresulted Labs Ordered in the Past 30 Days of this Admission     Date and Time Order Name Status Description    3/18/2021 0815 Cancer antigen 19-9 In process     3/16/2021 0734 Blood culture Preliminary     3/16/2021 0734 Blood culture Preliminary       These results will be followed up by Gyn oncology and Dr. Lindsey    Discharge Disposition   Discharged to long-term care facility  Condition at discharge: Stable    Hospital Course   Sun Mireles is a 65-year-old lady with history of ESRD.  Diabetes mellitus, SSS s/p PPM, uterine fibroids COPD, recent episode of sepsis due to emphysematous cystitis.  She was admitted to [facility] on 3/16/2021 after presenting with recurrent dysuria with an abnormal UTI.  She was initially started on antibiotics after her urine culture grew urogenital gabby, antibiotics were stopped by infectious disease.  Infectious disease recommended  against further antibiotics.  She noted ongoing dysuria and no clear etiology for this was identified.  Consulted with GYN and GYN oncology.  She was not to have an enlarged fibroid uterine mass.  She was recommended to follow-up with GYN for further management of her vaginal discomfort and dysuria, and follow-up with GYN oncology for management of the large fibroid mass on her uterus.  Also she was recommended to follow-up with her regular urologist to see if there is any interventions.    Consultations This Hospital Stay   NEPHROLOGY IP CONSULT  INFECTIOUS DISEASES IP CONSULT  UROLOGY IP CONSULT  GYNECOLOGY IP CONSULT  UROLOGY IP CONSULT  CARE MANAGEMENT / SOCIAL WORK IP CONSULT  GYNECOLOGIC ONCOLOGY IP CONSULT  GYNECOLOGIC ONCOLOGY IP CONSULT    Code Status   Full Code    Time Spent on this Encounter   I, Waqas Lindsey MD, personally saw the patient today and spent greater than 30 minutes discharging this patient.       Waqas Lindsey MD  [place] [address] [facility]  [address] [place] [address], [address]  [facility] [address]  Phone: [phone]  ______________________________________________________________________    Physical Exam   Vital Signs: Temp: 98.2  F (36.8  C) Temp src: Oral BP: (!) 119/92 Pulse: 78   Resp: 20 SpO2: 92 % O2 Device: None (Room air)    Weight: 213 lbs 14.4 oz  Constitutional: Awake, alert, cooperative, no apparent cardiopulmonary distress.  Eyes: Conjunctiva and pupils examined and normal.  HEENT: Moist mucous membranes, normal dentition.  Respiratory: Clear to auscultation bilaterally, no crackles or wheezing.  Cardiovascular: Regular rate and rhythm, normal S1 and S2, and no murmur noted. HD catheter in her right chest.  GI: Soft, non-distended, non-tender, normal bowel sounds.  Lymph/Hematologic: No anterior cervical or supraclavicular adenopathy.  Skin: No rashes, no cyanosis, no edema noted on exposed skin.  Musculoskeletal: No joint swelling, erythema or tenderness. No  gross bony abnormalities  Neurologic: Cranial nerves 2-12 grossly intact, normal strength and sensation.  Psychiatric: Alert, oriented to person, place and time, no obvious anxiety or depression.         Primary Care Physician   Kelli Vanessa    Discharge Orders      Oncology/Hematology Adult Referral      Ob/Gyn Referral      Reason for your hospital stay    You were in the hospital due to pain with urination. We are not entirely sure the cause of this. It is very important you follow up with a gynecologist for a full pelvic exam and workup.     We know that your burning is not from an infection. The mass in you abdomen is likely a uterine fibroid, not cancer.     General info for SNF    Length of Stay Estimate: Long Term Care  Condition at Discharge: Stable  Level of care:skilled   Rehabilitation Potential: Fair  Admission H&P remains valid and up-to-date: Yes  Recent Chemotherapy: N/A  Use Nursing Home Standing Orders: Yes     Mantoux instructions    Give two-step Mantoux (PPD) Per Facility Policy Yes     Follow Up and recommended labs and tests    Follow up with primary care provider in 7-14 days.  No follow up labs or test are needed.    Follow up with urologist you have been seeing in 2-4 weeks.    Referral has bene made to gynecologic oncologist to discuss the mass on your uterus. They should call to set this up.    Referral has been made to gynecology to discuss further workup and management of your burning with urination and vaginal discomfort.     Activity - Up ad mark     Advance Diet as Tolerated    Follow this diet upon discharge: Orders Placed This Encounter  Dialysis diet       Significant Results and Procedures   Most Recent 3 CBC's:  Recent Labs   Lab Test 03/16/21  1105 03/16/21  0819   WBC 6.9 Canceled, Test credited   HGB 8.1* Canceled, Test credited   * Canceled, Test credited    Canceled, Test credited     Most Recent 3 BMP's:  Recent Labs   Lab Test 03/18/21  0815  03/16/21  0819    139   POTASSIUM 4.2 5.0   CHLORIDE 105 106   CO2 25 21   BUN 53* 66*   CR 5.90* 5.94*   ANIONGAP 8 12   JOANNA 8.5 8.2*   * 109*     Most Recent 6 Bacteria Isolates From Any Culture (See EPIC Reports for Culture Details):  Recent Labs   Lab Test 03/17/21  0902 03/16/21  1105 03/16/21  0830   CULT >100,000 colonies/mL  mixed urogenital gabby  Multiple morphotypes present with no predominant organism.  Growth consistent with   probable contamination during collection.  Suggest repeat specimen if clinically   indicated.  Susceptibility testing not routinely done   No growth after 4 days No growth after 4 days   ,   Results for orders placed or performed during the hospital encounter of 03/16/21   US Pelvis Complete without Transvaginal    Narrative    US PELVIS COMPLETE WITHOUT TRANSVAGINAL 3/16/2021 4:42 PM    CLINICAL HISTORY: Uterine fibroids, further characterize  TECHNIQUE: Transabdominal scans were performed. Endovaginal ultrasound  was declined.    COMPARISON: CT dated 2/15/2012    FINDINGS:    UTERUS: 16 x 17 x 11 cm. Heterogeneous and enlarged. Otherwise the  uterus is not well assessed due to this large mass.     RIGHT OVARY: Not visualized, possibly due to positioning and the  central mass.    LEFT OVARY: Not visualized, possibly due to positioning and the  central mass.    No significant free fluid.      Impression    IMPRESSION:  1.  Large central mass in the pelvis. This could represent a large  uterus due to fibroids, malignancy, or some other process. Recommend  further evaluation MRI may be helpful.  2.  The ovaries are not identified due to the large mass.    LESTER J FAHRNER, MD   MR Abdomen w/o Contrast     Value    Radiologist flags Large abdominal and pelvic mass (Urgent)    Addendum: 3/19/2021    ADDENDUM: Upon further review and consultation, the large mass does  appear to arise from the uterus, making this most likely to represent  a large fibroid. No significant  diffusion restriction of the mass.  Discussed with Dr. Lugo by Dr. Fahrner over telephone at 3:48 PM  3/19/21. Discussed with Dr. Martínez by Dr. Fahrner over telephone at 3:52  PM. Discussed with Dr. Lindsey by Dr. Fahrner over telephone at 3:54  PM. END OF ADDENDUM.    LESTER J FAHRNER, MD      Narrative    MRI ABDOMEN WITHOUT CONTRAST 3/18/2021 2:54 PM     HISTORY: Abdominal mass, intra-abdominal neoplasm suspected.    COMPARISON: Ultrasound dated 3/16/2021. CT dated 2/15/2012.    TECHNIQUE: Multiplanar multisequence imaging of the abdomen acquired  without IV contrast.    FINDINGS:  There is a 16.7 x 10.1 x 8.8 cm mass in the abdomen and pelvis. This  is generally hypointense on T1 and T2-weighted imaging with multiple  T2 hyperintense regions within possibly representing fluid or fat.  This mass exhibits diffusion restriction. Although difficult to  ascertain, this appears to arise from the left ovary or right ovary  and does not appear to contact the uterus.    Normal urinary bladder. Normal bowel in the field-of-view. A  fat-containing right inguinal hernia is noted.      Impression    IMPRESSION:  1.  Large mass in the abdomen and pelvis. This is favored to arise  from one of the ovaries, though this is difficult to assess. Surgical  consultation recommended.  2.  No uterine fibroids. Normal appearance of the uterus.      [Access Center: Large abdominal and pelvic mass]    This report will be copied to the [facility] to ensure a  provider acknowledges the finding. Access Center is available Monday  through Friday 8am-3:30 pm.        LESTER J FAHRNER, MD       Discharge Medications   Current Discharge Medication List      CONTINUE these medications which have NOT CHANGED    Details   acetaminophen (TYLENOL) 325 MG tablet Take 650 mg by mouth 4 times daily      albuterol (PROAIR HFA/PROVENTIL HFA/VENTOLIN HFA) 108 (90 Base) MCG/ACT inhaler Inhale 2 puffs into the lungs every 6 hours as needed for  shortness of breath / dyspnea or wheezing    Comments: Pharmacy may dispense brand covered by insurance (Proair, or proventil or ventolin or generic albuterol inhaler)      allopurinol (ZYLOPRIM) 100 MG tablet Take 100 mg by mouth daily      artificial saliva (BIOTENE DRY MOUTHWASH) LIQD liquid Swish and spit in mouth 4 times daily      Ascorbic Acid (VITAMIN C-MILKA HIPS) 500 MG CHEW Take 500 mg by mouth 2 times daily      aspirin 81 MG EC tablet Take 81 mg by mouth daily      bisacodyl (DULCOLAX) 10 MG suppository Place 10 mg rectally daily as needed for constipation      coenzyme Q-10 200 MG CAPS Take 200 mg by mouth daily      colchicine (COLCYRS) 0.6 MG tablet Take 0.3 mg by mouth once a week Every Saturday evening      diclofenac (VOLTAREN) 1 % topical gel Apply 1 g topically 3 times daily as needed for moderate pain (to bilateral toes)      Dimethicone-Zinc Oxide 5-5 % CREA Externally apply topically 3 times daily To vulva each shift with each diaper change      diphenhydrAMINE (BENADRYL) 25 MG tablet Take 25 mg by mouth 2 times daily as needed for itching      docusate sodium (COLACE) 100 MG tablet Take 100 mg by mouth daily      fluticasone (FLONASE) 50 MCG/ACT nasal spray Spray 2 sprays into both nostrils daily      gabapentin (NEURONTIN) 100 MG capsule Take 200 mg by mouth three times a week Every Mon, Wed, Friday at bedtime on non-dialysis days      ipratropium - albuterol 0.5 mg/2.5 mg/3 mL (DUONEB) 0.5-2.5 (3) MG/3ML neb solution Take 1 vial by nebulization every 6 hours as needed for shortness of breath / dyspnea or wheezing      Lidocaine (LIDOCARE) 4 % Patch Place 1 patch onto the skin every 24 hours To prevent lidocaine toxicity, patient should be patch free for 12 hrs daily.      magnesium oxide (MAG-OX) 400 (240 Mg) MG tablet Take 400 mg by mouth 2 times daily      montelukast (SINGULAIR) 10 MG tablet Take 10 mg by mouth At Bedtime      omeprazole (PRILOSEC) 20 MG DR capsule Take 20 mg by mouth  daily      oxybutynin ER (DITROPAN-XL) 5 MG 24 hr tablet Take 5 mg by mouth every evening      polyethylene glycol (MIRALAX) 17 g packet Take 1 packet by mouth daily as needed for constipation      rosuvastatin (CRESTOR) 10 MG tablet Take 10 mg by mouth daily      senna-docusate (SENOKOT-S/PERICOLACE) 8.6-50 MG tablet Take 1 tablet by mouth daily as needed for constipation      timolol maleate (TIMOPTIC) 0.5 % ophthalmic solution Place 1 drop into both eyes 2 times daily      Vitamin D3 (CHOLECALCIFEROL) 125 MCG (5000 UT) tablet Take 1 tablet by mouth daily           Allergies   No Known Allergies

## 2021-04-08 NOTE — ED TRIAGE NOTES
Pt BIBA due to increased leg edema and SOB. Pt has dx of chf but recently had her lasix prescription discontinued. Pt is on dialysis

## 2021-04-08 NOTE — ED NOTES
Pt reports increased SOB and leg swelling since stopping her lasix. Pt is also dialysis pt with last dialysis on Thursday. Pt had lasix dc'd for unknown reason. Pt on 2l NC at this time respirations are 16

## 2021-04-08 NOTE — ED NOTES
9:27 AM this is a 65-year-old complex patient including congestive heart failure dialysis COPD and diabetes who was signed out this morning pending urinalysis.  I reviewed the chart she has had urinary symptoms with out growth of a true pathogen ID is recommended no more antibiotics.  She is complaining of edema there was no evidence for heart failure on her chest x-ray her lower extremities are not significantly edematous.  She says she felt better when she was on Lasix and today is her dialysis day as well.  I will give her 1 week of Lasix 40 mg and will try to get her to dialysis today and back to her facility.  She says she is not being cared for properly at this place certainly they can work on an alternate disposition but this will not happen in the emergency department this morning.     Po Awad MD  04/08/21 0994

## 2021-04-08 NOTE — ED NOTES
Pt coughing extensively with secretions, requiring 2L to keep O2>92%. Pt feels short of breath & doesn't want to discharge from hospital. MD evaluated pt, said pt is stable to discharge. Spoke with RN at facility, pt is normally on 2L NC. Lasix prescription faxed to pharmacy. Discharge instructions verbally gave to RN at facility, faxed over. Pt took w/c transport to dialysis.

## 2021-04-08 NOTE — ED PROVIDER NOTES
[place] EMERGENCY DEPARTMENT ([facility])  4/08/21 ED 15 12:49 AM    History     Chief Complaint   Patient presents with     Shortness of Breath     Leg Swelling     The history is provided by the patient and medical records.     Sun Mireles is a 65 year old female with history of type 2 diabetes, COPD, CHF (EF 65%), ESRD on dialysis ([facility]), sick sinus syndrome s/p pacemaker placement 10/2020, chronic A. fib, recurrent UTIs and newly diagnosed uterine mass who presents with increased bilateral lower extremity swelling for the past 4 days as well as increased shortness of breath and pain in her feet.  She has had multiple admissions over the past 3 months including recent admission from 3/26-4/1/2021 at [facility].  At that time she had presented to the emergency department with generalized weakness, shortness of breath, cough and fever in setting of ongoing dysuria.  She was noted to have elevated BNP of 1097.  This was felt to be fluid overload in setting of 3 missed dialysis appointments.  She was started on IV ceftriaxone x5-day course for UTI given positive urine cultures growing out 2 different species of E. coli with some mixed resistances. Was felt that she was also experiencing COPD exacerbation and treated with a 5-day burst of prednisone.  During her inpatient stay she was started on losartan 50 mg as well as nifedipine 50 mg daily.  She states that she was treated with Lasix throughout her hospitalization but was not discharged on this med.  She is not on any diuretics at all.  Over the past few days at home she has had increasing bilateral lower extremity swelling from her feet to her legs and has significant pain in her feet. She is hardly able to walk due to pain.  Patient does not know if she was on Lasix prior to her hospitalization, states that her meds are managed by long-term care facility staff.  Last dialyzed Tuesday (4/6/21) and is due for dialysis today.  Endorses cough and chest pain. She still has some dysuria, does not understand why this seems to be persisting.  No shortness of breath.     Past Medical History  Past Medical History:   Diagnosis Date     Aneurysm of vertebral artery (H)      Asthma      Asthma      CHF (congestive heart failure) (H)      CHF (congestive heart failure) (H)     diastolic      Chronic kidney disease      Chronic kidney disease, stage 4, severely decreased GFR (H)      Chronic pulmonary heart disease, unspecified      Gout, unspecified      Hypercholesterolemia      Hypertension      Localized osteoarthrosis not specified whether primary or secondary, lower leg     Knee     Morbid obesity (H)     BMI>60     Obstructive sleep apnea (adult) (pediatric)      Type II or unspecified type diabetes mellitus with unspecified complication, not stated as uncontrolled      Unspecified glaucoma(365.9)      Unspecified urinary incontinence      Past Surgical History:   Procedure Laterality Date     C/SECTION, LOW TRANSVERSE  89     TUBAL LIGATION  80, 89     acetaminophen (TYLENOL) 325 MG tablet  albuterol (PROAIR HFA/PROVENTIL HFA/VENTOLIN HFA) 108 (90 Base) MCG/ACT inhaler  allopurinol (ZYLOPRIM) 100 MG tablet  artificial saliva (BIOTENE DRY MOUTHWASH) LIQD liquid  Ascorbic Acid (VITAMIN C-MILKA HIPS) 500 MG CHEW  aspirin 81 MG EC tablet  bisacodyl (DULCOLAX) 10 MG suppository  coenzyme Q-10 200 MG CAPS  colchicine (COLCYRS) 0.6 MG tablet  diclofenac (VOLTAREN) 1 % topical gel  Dimethicone-Zinc Oxide 5-5 % CREA  diphenhydrAMINE (BENADRYL) 25 MG tablet  docusate sodium (COLACE) 100 MG tablet  fluticasone (FLONASE) 50 MCG/ACT nasal spray  gabapentin (NEURONTIN) 100 MG capsule  ipratropium - albuterol 0.5 mg/2.5 mg/3 mL (DUONEB) 0.5-2.5 (3) MG/3ML neb solution  Lidocaine (LIDOCARE) 4 % Patch  magnesium oxide (MAG-OX) 400 (240 Mg) MG tablet  montelukast (SINGULAIR) 10 MG tablet  omeprazole (PRILOSEC) 20 MG DR capsule  oxybutynin ER (DITROPAN-XL) 5 MG  24 hr tablet  polyethylene glycol (MIRALAX) 17 g packet  rosuvastatin (CRESTOR) 10 MG tablet  senna-docusate (SENOKOT-S/PERICOLACE) 8.6-50 MG tablet  timolol maleate (TIMOPTIC) 0.5 % ophthalmic solution  Vitamin D3 (CHOLECALCIFEROL) 125 MCG (5000 UT) tablet      No Known Allergies  Family History  Family History   Problem Relation Age of Onset     Hypertension Unknown      Diabetes Unknown      Cancer Maternal Grandfather         stomach cancer     Social History   Social History     Tobacco Use     Smoking status: Current Every Day Smoker     Packs/day: 1.00     Years: 45.00     Pack years: 45.00     Smokeless tobacco: Never Used   Substance Use Topics     Alcohol use: No     Drug use: No      Past medical history, past surgical history, medications, allergies, family history, and social history were reviewed with the patient. No additional pertinent items.       Review of Systems   Constitutional: Negative for fever.   Respiratory: Positive for shortness of breath.    Cardiovascular: Positive for chest pain and leg swelling.   Gastrointestinal: Negative for abdominal pain.   All other systems reviewed and are negative.    A complete review of systems was performed with pertinent positives and negatives noted in the HPI, and all other systems negative.    Physical Exam   BP: 132/54  Temp: 97.5  F (36.4  C)  SpO2: 100 %  Physical Exam  Constitutional:       General: She is not in acute distress.     Appearance: She is not diaphoretic.   HENT:      Head: Normocephalic.      Mouth/Throat:      Pharynx: No oropharyngeal exudate.   Eyes:      Extraocular Movements: Extraocular movements intact.   Neck:      Musculoskeletal: Neck supple.   Cardiovascular:      Rate and Rhythm: Normal rate and regular rhythm.      Heart sounds: Normal heart sounds.   Pulmonary:      Effort: No respiratory distress.      Comments: Coarse breath sounds  Abdominal:      General: There is no distension.      Palpations: Abdomen is soft.       Tenderness: There is no abdominal tenderness.   Musculoskeletal:         General: No deformity.      Right lower leg: Edema present.      Left lower leg: Edema present.      Comments: Nonpitting edema   Skin:     General: Skin is warm and dry.   Neurological:      Mental Status: She is alert.      Comments: alert   Psychiatric:         Behavior: Behavior normal.         ED Course      Procedures        Results for orders placed or performed during the hospital encounter of 04/08/21   XR Chest Port 1 View     Status: None    Narrative    EXAM: XR CHEST PORT 1 VW  LOCATION: [facility]  DATE/TIME: 4/8/2021 1:40 AM    INDICATION: Dyspnea  COMPARISON: 3/31/2021      Impression    IMPRESSION: No significant change. Right IJ dialysis catheter and dual-chamber pacemaker appearing good position. Heart size and pulmonary vessels normal. Faint interstitial prominence persists but lungs otherwise clear.   CBC with platelets differential     Status: Abnormal   Result Value Ref Range    WBC 6.9 4.0 - 11.0 10e9/L    RBC Count 3.12 (L) 3.8 - 5.2 10e12/L    Hemoglobin 9.3 (L) 11.7 - 15.7 g/dL    Hematocrit 30.9 (L) 35.0 - 47.0 %    MCV 99 78 - 100 fl    MCH 29.8 26.5 - 33.0 pg    MCHC 30.1 (L) 31.5 - 36.5 g/dL    RDW 16.6 (H) 10.0 - 15.0 %    Platelet Count 246 150 - 450 10e9/L    Diff Method Automated Method     % Neutrophils 61.7 %    % Lymphocytes 17.5 %    % Monocytes 15.4 %    % Eosinophils 4.0 %    % Basophils 0.7 %    % Immature Granulocytes 0.7 %    Nucleated RBCs 0 0 /100    Absolute Neutrophil 4.3 1.6 - 8.3 10e9/L    Absolute Lymphocytes 1.2 0.8 - 5.3 10e9/L    Absolute Monocytes 1.1 0.0 - 1.3 10e9/L    Absolute Eosinophils 0.3 0.0 - 0.7 10e9/L    Absolute Basophils 0.1 0.0 - 0.2 10e9/L    Abs Immature Granulocytes 0.1 0 - 0.4 10e9/L    Absolute Nucleated RBC 0.0    Comprehensive metabolic panel     Status: Abnormal   Result Value Ref Range    Sodium 138 133 - 144 mmol/L    Potassium 4.1 3.4 - 5.3 mmol/L     Chloride 104 94 - 109 mmol/L    Carbon Dioxide 24 20 - 32 mmol/L    Anion Gap 10 3 - 14 mmol/L    Glucose 133 (H) 70 - 99 mg/dL    Urea Nitrogen 64 (H) 7 - 30 mg/dL    Creatinine 5.60 (H) 0.52 - 1.04 mg/dL    GFR Estimate 7 (L) >60 mL/min/[1.73_m2]    GFR Estimate If Black 8 (L) >60 mL/min/[1.73_m2]    Calcium 8.6 8.5 - 10.1 mg/dL    Bilirubin Total 0.3 0.2 - 1.3 mg/dL    Albumin 3.0 (L) 3.4 - 5.0 g/dL    Protein Total 8.1 6.8 - 8.8 g/dL    Alkaline Phosphatase 142 40 - 150 U/L    ALT 12 0 - 50 U/L    AST 12 0 - 45 U/L   Troponin I     Status: None   Result Value Ref Range    Troponin I ES 0.027 0.000 - 0.045 ug/L   Nt probnp inpatient (BNP)     Status: Abnormal   Result Value Ref Range    N-Terminal Pro BNP Inpatient 3,895 (H) 0 - 900 pg/mL   Symptomatic Influenza A/B & SARS-CoV2 (COVID-19) Virus PCR Multiplex     Status: None    Specimen: Nasopharyngeal   Result Value Ref Range    Flu A/B & SARS-COV-2 PCR Source Nasopharyngeal     SARS-CoV-2 PCR Result NEGATIVE     Influenza A PCR Negative NEG^Negative    Influenza B PCR Negative NEG^Negative    Respiratory Syncytial Virus PCR Negative NEG^Negative    Flu A/B & SARS-CoV-2 PCR Comment (Note)    UA reflex to Microscopic and Culture     Status: Abnormal    Specimen: Urine clean catch; Midstream Urine   Result Value Ref Range    Color Urine Yellow     Appearance Urine Slightly Cloudy     Glucose Urine Negative NEG^Negative mg/dL    Bilirubin Urine Negative NEG^Negative    Ketones Urine Negative NEG^Negative mg/dL    Specific Gravity Urine 1.018 1.003 - 1.035    Blood Urine Moderate (A) NEG^Negative    pH Urine 5.5 5.0 - 7.0 pH    Protein Albumin Urine 100 (A) NEG^Negative mg/dL    Urobilinogen mg/dL Normal 0.0 - 2.0 mg/dL    Nitrite Urine Negative NEG^Negative    Leukocyte Esterase Urine Large (A) NEG^Negative    Source Midstream Urine     RBC Urine >182 (H) 0 - 2 /HPF    WBC Urine >182 (H) 0 - 5 /HPF    Yeast Urine Few (A) NEG^Negative /HPF    Squamous Epithelial  /HPF Urine 24 (H) 0 - 1 /HPF    Transitional Epi <1 0 - 1 /HPF    Mucous Urine Present (A) NEG^Negative /LPF   Troponin I     Status: None   Result Value Ref Range    Troponin I ES 0.021 0.000 - 0.045 ug/L   EKG 12-lead, tracing only     Status: None   Result Value Ref Range    Interpretation ECG Click View Image link to view waveform and result    Urine Culture Aerobic Bacterial     Status: None    Specimen: Midstream Urine   Result Value Ref Range    Specimen Description Midstream Urine     Special Requests Specimen received in preservative     Culture Micro       50,000 to 100,000 colonies/mL  mixed urogenital gabby  Susceptibility testing not routinely done            EKG Interpretation:      Interpreted by Jeffrey Zabala DO  Time reviewed:0100Symptoms at time of EKG: Dyspnea  Rhythm: normal sinus   Rate: normal  Axis: NORMAL  Ectopy: none  Conduction: normal  ST Segments/ T Waves: No ST-T wave elevation or depression, nonspecific wave changes Q Waves: none      Clinical Impression: abnormal EKG         Results for orders placed or performed during the hospital encounter of 04/08/21   XR Chest Port 1 View     Status: None    Narrative    EXAM: XR CHEST PORT 1 VW  LOCATION: [facility]  DATE/TIME: 4/8/2021 1:40 AM    INDICATION: Dyspnea  COMPARISON: 3/31/2021      Impression    IMPRESSION: No significant change. Right IJ dialysis catheter and dual-chamber pacemaker appearing good position. Heart size and pulmonary vessels normal. Faint interstitial prominence persists but lungs otherwise clear.   CBC with platelets differential     Status: Abnormal   Result Value Ref Range    WBC 6.9 4.0 - 11.0 10e9/L    RBC Count 3.12 (L) 3.8 - 5.2 10e12/L    Hemoglobin 9.3 (L) 11.7 - 15.7 g/dL    Hematocrit 30.9 (L) 35.0 - 47.0 %    MCV 99 78 - 100 fl    MCH 29.8 26.5 - 33.0 pg    MCHC 30.1 (L) 31.5 - 36.5 g/dL    RDW 16.6 (H) 10.0 - 15.0 %    Platelet Count 246 150 - 450 10e9/L    Diff Method Automated  Method     % Neutrophils 61.7 %    % Lymphocytes 17.5 %    % Monocytes 15.4 %    % Eosinophils 4.0 %    % Basophils 0.7 %    % Immature Granulocytes 0.7 %    Nucleated RBCs 0 0 /100    Absolute Neutrophil 4.3 1.6 - 8.3 10e9/L    Absolute Lymphocytes 1.2 0.8 - 5.3 10e9/L    Absolute Monocytes 1.1 0.0 - 1.3 10e9/L    Absolute Eosinophils 0.3 0.0 - 0.7 10e9/L    Absolute Basophils 0.1 0.0 - 0.2 10e9/L    Abs Immature Granulocytes 0.1 0 - 0.4 10e9/L    Absolute Nucleated RBC 0.0    Comprehensive metabolic panel     Status: Abnormal   Result Value Ref Range    Sodium 138 133 - 144 mmol/L    Potassium 4.1 3.4 - 5.3 mmol/L    Chloride 104 94 - 109 mmol/L    Carbon Dioxide 24 20 - 32 mmol/L    Anion Gap 10 3 - 14 mmol/L    Glucose 133 (H) 70 - 99 mg/dL    Urea Nitrogen 64 (H) 7 - 30 mg/dL    Creatinine 5.60 (H) 0.52 - 1.04 mg/dL    GFR Estimate 7 (L) >60 mL/min/[1.73_m2]    GFR Estimate If Black 8 (L) >60 mL/min/[1.73_m2]    Calcium 8.6 8.5 - 10.1 mg/dL    Bilirubin Total 0.3 0.2 - 1.3 mg/dL    Albumin 3.0 (L) 3.4 - 5.0 g/dL    Protein Total 8.1 6.8 - 8.8 g/dL    Alkaline Phosphatase 142 40 - 150 U/L    ALT 12 0 - 50 U/L    AST 12 0 - 45 U/L   Troponin I     Status: None   Result Value Ref Range    Troponin I ES 0.027 0.000 - 0.045 ug/L   Nt probnp inpatient (BNP)     Status: Abnormal   Result Value Ref Range    N-Terminal Pro BNP Inpatient 3,895 (H) 0 - 900 pg/mL   EKG 12-lead, tracing only     Status: None (Preliminary result)   Result Value Ref Range    Interpretation ECG Click View Image link to view waveform and result      Medications   0.9% sodium chloride BOLUS (has no administration in time range)     Followed by   sodium chloride 0.9% infusion (has no administration in time range)   furosemide (LASIX) injection 40 mg (has no administration in time range)        Assessments & Plan (with Medical Decision Making)   65-year-old female presents to us with a chief complaint of dyspnea and leg swelling.  Differential  includes but not limited to pneumonia, congestive heart failure, COVID-19, fluid overload.  Labs were reviewed EKG and troponin were unremarkable.  BNP was quite elevated.  Hemoglobin was stable and BMP was otherwise unremarkable.  Patient was given Lasix which helped somewhat with her symptoms.  No silke CHF on her chest x-ray.  However given the increase in fluids and her symptoms we will admit the patient for fluid overload and her heart failure.    I have reviewed the nursing notes. I have reviewed the findings, diagnosis, plan and need for follow up with the patient.    New Prescriptions    No medications on file       Final diagnoses:   Congestive heart failure, unspecified HF chronicity, unspecified heart failure type (H)     I, Audrey Ordaz, am serving as a trained medical scribe to document services personally performed by Jeffrey Zabala DO based on the provider's statements to me on April 8, 2021.  This document has been checked and approved by the attending provider.    I, Jeffrey Zabala DO, was physically present and have reviewed and verified the accuracy of this note documented by Audrey Ordaz, medical scribe.     --  Jeffrey Zabala DO  [facility] EMERGENCY DEPARTMENT  4/8/2021     Jeffrey Zabala DO  04/14/21 1811

## 2021-04-09 NOTE — RESULT ENCOUNTER NOTE
Final urine culture report is NEGATIVE  No change in treatment per [facility] ED Lab Result Urine Culture protocol.

## 2021-04-09 NOTE — ED TRIAGE NOTES
"Patient presents via EMS from [facility] for c/o dysuria. Patient reports that it \"burns\" when she urinates. Denies blood in urine. Patient also reports ongoing cough and chest discomfort since last hospitalization. Is on baseline 2L O2.  per EMS.   "

## 2021-04-10 PROBLEM — E11.21 TYPE 2 DIABETES MELLITUS WITH DIABETIC NEPHROPATHY, WITH LONG-TERM CURRENT USE OF INSULIN (H): Status: ACTIVE | Noted: 2021-01-01

## 2021-04-10 PROBLEM — Z99.2 ESRD (END STAGE RENAL DISEASE) ON DIALYSIS (H): Status: ACTIVE | Noted: 2021-01-01

## 2021-04-10 PROBLEM — N39.0 COMPLICATED UTI (URINARY TRACT INFECTION): Status: ACTIVE | Noted: 2021-01-01

## 2021-04-10 PROBLEM — N18.6 ESRD (END STAGE RENAL DISEASE) ON DIALYSIS (H): Status: ACTIVE | Noted: 2021-01-01

## 2021-04-10 PROBLEM — Z79.4 TYPE 2 DIABETES MELLITUS WITH DIABETIC NEPHROPATHY, WITH LONG-TERM CURRENT USE OF INSULIN (H): Status: ACTIVE | Noted: 2021-01-01

## 2021-04-10 NOTE — PROGRESS NOTES
[facility]    Medicine Progress Note - Hospitalist Service, Gold 9       Date of Admission:  4/9/2021  Assessment & Plan         65 year old female admitted on 4/9/2021 with history including complicated UTI and bilateral hydronephrosis s/p stent placement, uterine mass, ESRD on T/Th/Sat HD, sick sinus syndrome s/p pacemaker placement, and T2DM who presents with persistent dysuria.      #Dysuria  #Concern for recurrent UTI   #Hx of complicated emphysematous cystitis and bilateral hydronephrosis s/p stent placement during prolonged admission from 1/20-2/11  UA on admission with increased pyuria along with symptoms of dysuria and suprapubic pain and mildly elevated CRP. No fever or leukocytosis. During her prolonged hospitalization earlier this year for emphysematous cystitis, she grew ampicillin resistant enterococcus and candida. She completed a course of fluconazole, linezolid, and cefdinir per ID recs. In March, she ahd recurrent UTI and grew E coli. She was treated most recently with a 5 day course of CTX.   - urine and blood culture pending   - c/w CTX for now as she remains symptomatic; will consider ID consult   - renal ultrasound IMPRESSION:   1.  Normal grayscale appearance of both kidneys, without   hydronephrosis. Left ureteral stent is in place. The right ureteral   stent is not well-visualized on this exam.   2.  Bilateral renal cysts.      #Recent diagnosis of large uterine mass, suspected fibroid   It was thought that this mass was the cause of the ureteral obstruction that led to her episode of bilateral hydronephrosis and sepsis 2/2 to emphysematous cystitis back in January/February of this last year. It was recommended that she follow up with GYN onc for further management.   - consider gyn consult if no improvement of symptoms      #ESRD 2/2 diabetic nephropathy   Last dialyzed on 4/8 per patient.   - nephrology consulted, appreciate recs  - T, Th, S  dialysis   - continue PTA vitamin D   - EPO weekly with dialysis      #Chronic anemia, likely multifactorial 2/2 to CKD, chronic disease, malnutrition   Hgb 9.1 on admission, around baseline.   - CBC daily      #Hx of diastolic HF   #Bilateral LE swelling   Was recently treated for both CHF and COPD exacerbation at [facility]. Endorses ongoing shortness of breath and bilateral extremity swelling. Per chart review, does not seem to have been on baseline regimen of diuretics. Last echo in 10/2020 with EF of 65%. LE ultrasound without evidence of DVT.  - volume management via dialysis      #HTN   Recent admission from 3/26-4/1 at [facility]  :: c/w Losartan 50 mg daily and nefepidine 30 mg daily.  :: appreciate pharmacy assistance with med rec      #T2DM, appears to be diet controlled   #Peripheral neuropathy   - sliding scale insulin   - continue PTA gabapentin at bedtime on MWF (non-dialysis days)     #KHUSHBOO- unclear if she is on CPAP at home; can continue CPAP as an inpatient if settings are known     Chronic conditions:   #Gout - continue PTA allopurinol daily; colchicine every Saturday evening   #Hx of asthma/COPD - continue montelukast, flonase, prn duonebs    #HLD - continue PTA rosuvastatin   #Sick sinus syndrome s/p pacemaker placement (10/2020)   #GERD - continue PTA omeprazole   #Urinary incontinence - continue PTA oxybutynin       Diet: Dialysis Diet    DVT Prophylaxis: Heparin SQ  Phan Catheter: not present  Code Status: Full Code           Disposition Plan   Expected discharge: 2 - 3 days, recommended to prior living arrangement once adequate pain management/ tolerating PO medications.  Entered: Shane Ayers MD 04/10/2021, 8:26 AM       The patient's care was discussed with the Bedside Nurse and Patient.    Shane Ayers MD  Hospitalist Service, [address]  Contact information available via [facility] Paging/Directory  Please see sign in/sign out for up  to date coverage information  ______________________________________________________________________    Interval History       No events since admission  Still having painful urination, has some mild abdominal pain  No fevers chills or sweats, no chest pain, no shortness of breath, no nausea vomiting  P.o. going so-so not great  Patient says she does not want to be in the hospital  Eager to discharge, but amenable to staying for antibiotics and further monitoring    Data reviewed today: I reviewed all medications, new labs and imaging results over the last 24 hours. I personally reviewed...    Physical Exam   Vital Signs: Temp: 97.6  F (36.4  C) Temp src: Oral BP: 126/50 Pulse: 77   Resp: 20 SpO2: 93 % O2 Device: Nasal cannula Oxygen Delivery: 2 LPM  Weight: 222 lbs 0 oz  General: Tired appearing woman lying up in bed, watching TV  Head: NC, AT  Eye: symm gaze, anicteric sclerae  ENT: patent nares wo drainage/epistaxis, MMM nasal cannula in place;   Pulm: CTAB, comfortable WOB on room air  CV: normal rate, regular rhythm; tender bilateral 2+ pitting lower extremity edema   GI: soft, mild suprapubic pain tender to touch, remainder nontender, obese  Neuro: awake, alert, hearing speech and phonation, intact grossly  MSK: Bilateral lower extremity edema, generous soft tissue; sarcopenia    Data   Recent Labs   Lab 04/09/21  2149 04/08/21  0232 04/08/21  0052   WBC 7.4  --  6.9   HGB 9.1*  --  9.3*   MCV 97  --  99     --  246   INR 1.15*  --   --      --  138   POTASSIUM 3.6  --  4.1   CHLORIDE 103  --  104   CO2 24  --  24   BUN 60*  --  64*   CR 5.02*  --  5.60*   ANIONGAP 11  --  10   JOANNA 9.1  --  8.6   *  --  133*   ALBUMIN 3.2*  --  3.0*   PROTTOTAL 8.3  --  8.1   BILITOTAL 0.3  --  0.3   ALKPHOS 139  --  142   ALT 10  --  12   AST 11  --  12   TROPI  --  0.021 0.027     Recent Results (from the past 24 hour(s))   XR Chest Port 1 View    Narrative    EXAM: XR CHEST PORT 1 VW  LOCATION: [facility]  DATE/TIME: 4/9/2021 10:06 PM    INDICATION: Dyspnea.  COMPARISON: 4/8/2021.    FINDINGS: Right IJ infusion catheter. Left subclavian cardiac device. No pneumothorax. The heart is enlarged. There is no pulmonary edema. There is a mild left basilar infiltrate. Lungs otherwise clear.      Impression    IMPRESSION: Left basilar atelectasis or pneumonia.   US Lower Extremity Venous Duplex Bilateral    Narrative    EXAMINATION: US LOWER EXTREMITY VENOUS DUPLEX BILATERAL    COMPARISON: None.    HISTORY: Calf pain and swelling    TECHNIQUE:  Gray-scale evaluation with compression, spectral flow and  color Doppler assessment of the deep venous system of bilateral lower  extremity.    FINDINGS:  In the right lower extremity, the common femoral, femoral, popliteal  and posterior tibial veins demonstrate normal compressibility and  blood flow.    In the left lower extremity, the common femoral, femoral, popliteal  and posterior tibial veins demonstrate normal compressibility and  blood flow.      Impression    IMPRESSION:  1.  No evidence for deep venous thrombosis in either lower extremity.    I have personally reviewed the examination and initial interpretation  and I agree with the findings.    SMOOTH MAGANA MD   US Renal Complete    Narrative    EXAMINATION: US RENAL COMPLETE, 4/10/2021 7:53 AM     COMPARISON: CT abdomen pelvis 2/15/2012    HISTORY: 65-year-old with recurrent UTI; history of bilateral  hydronephrosis status post stent placement, readmitted for hypoxia    TECHNIQUE: The kidneys and bladder were scanned in the standard  fashion with specialized ultrasound transducer(s) using both gray  scale and limited color/spectral Doppler techniques.    FINDINGS:    Right kidney: Measures 10.9 cm in length. Parenchyma is of normal  thickness and echogenicity. No hydronephrosis. Cyst at the midpole of  the right kidney measuring 1.5 x 1.2 x 1.8 cm.    Left kidney: Measures 11.1 cm in length. Parenchyma is  of normal  thickness and echogenicity. No hydronephrosis. Simple cyst at the  lower pole left kidney measuring 2.8 x 2.5 x 2.2 cm. Ureteral stent is  visualized within the left kidney. No hydronephrosis.    Bladder: Unremarkable.      Impression    IMPRESSION:  1.  Normal grayscale appearance of both kidneys, without  hydronephrosis. Left ureteral stent is in place. The right ureteral  stent is not well-visualized on this exam.  2.  Bilateral renal cysts.    I have personally reviewed the examination and initial interpretation  and I agree with the findings.    SMOOTH MAGANA MD     Medications       acetaminophen  650 mg Oral Once     allopurinol  100 mg Oral Daily     aspirin  81 mg Oral Daily     colchicine  0.3 mg Oral Weekly     docusate sodium  100 mg Oral Daily     fluticasone  2 spray Both Nostrils Daily     [START ON 4/12/2021] gabapentin  200 mg Oral Q Mon Wed Fri AM     heparin ANTICOAGULANT  5,000 Units Subcutaneous Q12H     insulin aspart  1-4 Units Subcutaneous Q4H     magnesium oxide  400 mg Oral BID     miconazole   Topical BID     montelukast  10 mg Oral At Bedtime     omeprazole  20 mg Oral Daily     oxybutynin ER  5 mg Oral QPM     rosuvastatin  10 mg Oral Daily     sodium chloride (PF)  3 mL Intracatheter Q8H     timolol maleate  1 drop Both Eyes BID     Vitamin D3  125 mcg Oral Daily   **a portion of dr rowland's previous note is copied and pasted above, it has been edited as needed to reflect events for today**

## 2021-04-10 NOTE — PHARMACY-ADMISSION MEDICATION HISTORY
Admission Medication History Completed by Pharmacy    See [facility] Admission Navigator for allergy information, preferred outpatient pharmacy, prior to admission medications and immunization status.     Medication History Sources:     Medication List from [facility]     Changes made to PTA medication list (reason):    Added:   o Benzonatate 200 mg cap  o Losartan 50 mg tab  o Nifedipine 30 mg tab  o Sevelamer 800 mg tab    Deleted:   o Artifical saliva: swish and spit in mouth 4 times daily   o Bisacodyl 10 mg suppository: place 10 mg rectally daily as needed for constipation     Changed:   o Duoneb 0.5-2.5 mg/3mL neb solution: every 6 hours as needed --> every 6 hours   o Indications for medications were added if listed in medication list    Additional Information:    All information below was provided per the medication list from [facility].     Start date for furosemide was listed as 4/8/21 with a stop date of 4/15/21.     Medication list states no known allergies for the patient.     Prior to Admission medications    Medication Sig Last Dose Taking? Auth Provider   acetaminophen (TYLENOL) 325 MG tablet Take 650 mg by mouth 4 times daily For pain  Yes Unknown, Entered By History   albuterol (PROAIR HFA/PROVENTIL HFA/VENTOLIN HFA) 108 (90 Base) MCG/ACT inhaler Inhale 2 puffs into the lungs every 6 hours as needed for shortness of breath / dyspnea or wheezing  Yes Unknown, Entered By History   allopurinol (ZYLOPRIM) 100 MG tablet Take 100 mg by mouth daily For gout  Yes Unknown, Entered By History   Ascorbic Acid (VITAMIN C-MILKA HIPS) 500 MG CHEW Take 500 mg by mouth 2 times daily  Yes Unknown, Entered By History   aspirin 81 MG EC tablet Take 81 mg by mouth daily For prevention of heart attack  Yes Unknown, Entered By History   benzonatate (TESSALON) 200 MG capsule Take 200 mg by mouth 3 times daily For cough  Yes Unknown, Entered By History   coenzyme Q-10 200 MG CAPS Take  200 mg by mouth daily For heart disease  Yes Unknown, Entered By History   colchicine (COLCYRS) 0.6 MG tablet Take 0.3 mg by mouth once a week Every Saturday evening for gout  Yes Unknown, Entered By History   diclofenac (VOLTAREN) 1 % topical gel Apply 1 g topically 3 times daily as needed for moderate pain (to bilateral toes)  Yes Unknown, Entered By History   Dimethicone-Zinc Oxide 5-5 % CREA Apply topically to vulva each shift with each diaper change for pain  Yes Unknown, Entered By History   diphenhydrAMINE (BENADRYL) 25 MG tablet Take 25 mg by mouth 2 times daily as needed for itching  Yes Unknown, Entered By History   docusate sodium (COLACE) 100 MG tablet Take 100 mg by mouth daily For constipation  Yes Unknown, Entered By History   fluticasone (FLONASE) 50 MCG/ACT nasal spray Spray 2 sprays into both nostrils daily For allergies  Yes Unknown, Entered By History   furosemide (LASIX) 40 MG tablet Take 1 tablet (40 mg) by mouth daily for 7 days  Yes Po Awad MD   gabapentin (NEURONTIN) 100 MG capsule Take 200 mg by mouth three times a week Every Mon, Wed, Friday at bedtime on non-dialysis days for neuropathic pain  Yes Unknown, Entered By History   ipratropium - albuterol 0.5 mg/2.5 mg/3 mL (DUONEB) 0.5-2.5 (3) MG/3ML neb solution Take 1 vial by nebulization 4 times daily For shortness of breath  Yes Unknown, Entered By History   Lidocaine (LIDOCARE) 4 % Patch Place 1 patch onto the skin every 24 hours To prevent lidocaine toxicity, patient should be patch free for 12 hrs daily.  Yes Unknown, Entered By History   losartan (COZAAR) 50 MG tablet Take 50 mg by mouth daily For hypertension  Yes Unknown, Entered By History   magnesium oxide (MAG-OX) 400 (240 Mg) MG tablet Take 400 mg by mouth 2 times daily For hypomagnesium  Yes Unknown, Entered By History   montelukast (SINGULAIR) 10 MG tablet Take 10 mg by mouth At Bedtime For allergies  Yes Unknown, Entered By History   NIFEdipine ER (ADALAT  CC) 30 MG 24 hr tablet Take 30 mg by mouth daily For heart disease  Yes Unknown, Entered By History   omeprazole (PRILOSEC) 20 MG DR capsule Take 20 mg by mouth daily For GERD  Yes Unknown, Entered By History   oxybutynin ER (DITROPAN-XL) 5 MG 24 hr tablet Take 5 mg by mouth every evening For overactive bladder  Yes Unknown, Entered By History   polyethylene glycol (MIRALAX) 17 g packet Take 1 packet by mouth daily as needed for constipation  Yes Unknown, Entered By History   rosuvastatin (CRESTOR) 10 MG tablet Take 10 mg by mouth daily At bedtime for hyperlipidemia  Yes Unknown, Entered By History   senna-docusate (SENOKOT-S/PERICOLACE) 8.6-50 MG tablet Take 1 tablet by mouth daily as needed for constipation  Yes Unknown, Entered By History   sevelamer carbonate (RENVELA) 800 MG tablet Take 800 mg by mouth With meals for phosphorus supplement  Yes Unknown, Entered By History   timolol maleate (TIMOPTIC) 0.5 % ophthalmic solution Place 1 drop into both eyes 2 times daily  Yes Unknown, Entered By History   Vitamin D3 (CHOLECALCIFEROL) 125 MCG (5000 UT) tablet Take 1 tablet by mouth daily For osteoporosis  Yes Unknown, Entered By History       Date completed: 04/10/21    Medication history completed by: NANCY AdamsII Pharmacy Intern

## 2021-04-10 NOTE — ED PROVIDER NOTES
ED Provider Note  [facility]      History     Chief Complaint   Patient presents with     Dysuria     HPI  Sun Mireles is a 65 year old female who presents with ongoing dysuria. She has a history of morbid obesity, COPD, type 2 DM, ESRD on hemodialysis on T,Th,Sa schedule. She has large uerine mass felt to represent fibroid. She was admitted to [facility] with UTI in February of this year with urosepsis and bilateral hydronephrosis. She was felt to have ureteral obstruction from pelvic mass and had stent placed in both ureters. UC grew ampicillin resistant enterococcus. She was treated with Linezolid, fosfomycin and cefepime as well as fluconazole for candidemia. She had a prolonged hospitalization and was discharged to TCU on Linezolid, cefdinir and fluconazole.     She was again admitted to [facility] 3/16-3/20 for recurrent dysuria  She was initially treated with antibiotics, however UC grew mixed gabby and further antibiotics were not recommended by Infectious Diseases. She was seen by GYN oncology regarding the uterine mass with plan for outpatient follow up.    She was subsequently admitted to [facility] on 3/26 with dyspnea. She was felt to have volume overload and exacerbation of diastolic heart failure and COPD.         Past Medical History:   Diagnosis Date     Aneurysm of vertebral artery (H)      Asthma      Asthma      CHF (congestive heart failure) (H)      CHF (congestive heart failure) (H)     diastolic      Chronic kidney disease      Chronic kidney disease, stage 4, severely decreased GFR (H)      Chronic pulmonary heart disease, unspecified      Gout, unspecified      Hypercholesterolemia      Hypertension      Localized osteoarthrosis not specified whether primary or secondary, lower leg     Knee     Morbid obesity (H)     BMI>60     Obstructive sleep apnea (adult) (pediatric)      Type II or unspecified type diabetes mellitus with  "unspecified complication, not stated as uncontrolled      Unspecified glaucoma(365.9)      Unspecified urinary incontinence        Past Surgical History:   Procedure Laterality Date     C/SECTION, LOW TRANSVERSE  87     TUBAL LIGATION  80, 87       Family History   Problem Relation Age of Onset     Hypertension Unknown      Diabetes Unknown      Cancer Maternal Grandfather         stomach cancer       Social History     Tobacco Use     Smoking status: Current Every Day Smoker     Packs/day: 1.00     Years: 45.00     Pack years: 45.00     Smokeless tobacco: Never Used   Substance Use Topics     Alcohol use: No          Review of Systems   Constitutional: Positive for appetite change and chills. Negative for fever.   HENT: Negative for congestion and trouble swallowing.    Eyes: Negative for visual disturbance.   Respiratory: Negative for cough, shortness of breath and wheezing.    Cardiovascular: Positive for leg swelling. Negative for chest pain and palpitations.   Gastrointestinal: Negative for abdominal pain, nausea and vomiting.   Genitourinary: Positive for dysuria and pelvic pain.   Musculoskeletal: Negative for back pain and neck pain.   Skin: Negative for rash.   Neurological: Negative for weakness, light-headedness, numbness and headaches.   Hematological: Negative for adenopathy.   Psychiatric/Behavioral: Negative for confusion.         Physical Exam   BP: (!) 147/54  Pulse: 79  Temp: 97.6  F (36.4  C)  Resp: 20  Height: 163.8 cm (5' 4.5\")  Weight: 100.7 kg (222 lb)  SpO2: 97 %  Physical Exam  Vitals signs and nursing note reviewed.   Constitutional:       Appearance: She is obese.   HENT:      Head: Normocephalic and atraumatic.      Right Ear: External ear normal.      Left Ear: External ear normal.      Nose: Nose normal.      Mouth/Throat:      Mouth: Mucous membranes are moist.   Eyes:      General: No scleral icterus.     Extraocular Movements: Extraocular movements intact.      Pupils: Pupils are " equal, round, and reactive to light.   Neck:      Musculoskeletal: Normal range of motion and neck supple.   Cardiovascular:      Rate and Rhythm: Normal rate and regular rhythm.      Heart sounds: No murmur.   Pulmonary:      Effort: Pulmonary effort is normal.      Breath sounds: Normal breath sounds. No wheezing or rales.   Abdominal:      Tenderness: There is abdominal tenderness in the suprapubic area. There is no right CVA tenderness, left CVA tenderness or guarding.   Musculoskeletal:      Right lower leg: Edema present.      Left lower leg: Edema present.   Skin:     General: Skin is warm and dry.   Neurological:      General: No focal deficit present.      Mental Status: She is oriented to person, place, and time. She is lethargic.      Cranial Nerves: No cranial nerve deficit.      Motor: No weakness.   Psychiatric:         Mood and Affect: Mood normal.         Behavior: Behavior normal.         ED Course      Procedures          Labs/Imaging    Results for orders placed or performed during the hospital encounter of 04/09/21 (from the past 24 hour(s))   UA with Microscopic reflex to Culture    Specimen: Urine Midstream; Midstream Urine   Result Value Ref Range    Color Urine Yellow     Appearance Urine Slightly Cloudy     Glucose Urine Negative NEG^Negative mg/dL    Bilirubin Urine Negative NEG^Negative    Ketones Urine Negative NEG^Negative mg/dL    Specific Gravity Urine 1.018 1.003 - 1.035    Blood Urine Large (A) NEG^Negative    pH Urine 5.5 5.0 - 7.0 pH    Protein Albumin Urine 200 (A) NEG^Negative mg/dL    Urobilinogen mg/dL Normal 0.0 - 2.0 mg/dL    Nitrite Urine Negative NEG^Negative    Leukocyte Esterase Urine Moderate (A) NEG^Negative    Source Midstream Urine     WBC Urine 222 (H) 0 - 5 /HPF    RBC Urine >182 (H) 0 - 2 /HPF    Squamous Epithelial /HPF Urine 8 (H) 0 - 1 /HPF    Transitional Epi <1 0 - 1 /HPF   Urine Culture    Specimen: Urine Midstream; Unspecified Urine   Result Value Ref Range     Specimen Description Unspecified Urine     Special Requests Specimen received in preservative     Culture Micro PENDING    CBC with platelets differential   Result Value Ref Range    WBC 7.4 4.0 - 11.0 10e9/L    RBC Count 3.03 (L) 3.8 - 5.2 10e12/L    Hemoglobin 9.1 (L) 11.7 - 15.7 g/dL    Hematocrit 29.4 (L) 35.0 - 47.0 %    MCV 97 78 - 100 fl    MCH 30.0 26.5 - 33.0 pg    MCHC 31.0 (L) 31.5 - 36.5 g/dL    RDW 16.3 (H) 10.0 - 15.0 %    Platelet Count 234 150 - 450 10e9/L    Diff Method Automated Method     % Neutrophils 62.7 %    % Lymphocytes 17.1 %    % Monocytes 12.9 %    % Eosinophils 6.0 %    % Basophils 0.8 %    % Immature Granulocytes 0.5 %    Nucleated RBCs 0 0 /100    Absolute Neutrophil 4.6 1.6 - 8.3 10e9/L    Absolute Lymphocytes 1.3 0.8 - 5.3 10e9/L    Absolute Monocytes 1.0 0.0 - 1.3 10e9/L    Absolute Eosinophils 0.4 0.0 - 0.7 10e9/L    Absolute Basophils 0.1 0.0 - 0.2 10e9/L    Abs Immature Granulocytes 0.0 0 - 0.4 10e9/L    Absolute Nucleated RBC 0.0    INR   Result Value Ref Range    INR 1.15 (H) 0.86 - 1.14   Comprehensive metabolic panel   Result Value Ref Range    Sodium 138 133 - 144 mmol/L    Potassium 3.6 3.4 - 5.3 mmol/L    Chloride 103 94 - 109 mmol/L    Carbon Dioxide 24 20 - 32 mmol/L    Anion Gap 11 3 - 14 mmol/L    Glucose 234 (H) 70 - 99 mg/dL    Urea Nitrogen 60 (H) 7 - 30 mg/dL    Creatinine 5.02 (H) 0.52 - 1.04 mg/dL    GFR Estimate 8 (L) >60 mL/min/[1.73_m2]    GFR Estimate If Black 10 (L) >60 mL/min/[1.73_m2]    Calcium 9.1 8.5 - 10.1 mg/dL    Bilirubin Total 0.3 0.2 - 1.3 mg/dL    Albumin 3.2 (L) 3.4 - 5.0 g/dL    Protein Total 8.3 6.8 - 8.8 g/dL    Alkaline Phosphatase 139 40 - 150 U/L    ALT 10 0 - 50 U/L    AST 11 0 - 45 U/L   CRP inflammation   Result Value Ref Range    CRP Inflammation 16.0 (H) 0.0 - 8.0 mg/L   XR Chest Port 1 View    Narrative    EXAM: XR CHEST PORT 1 VW  LOCATION: [facility]  DATE/TIME: 4/9/2021 10:06 PM    INDICATION:  Dyspnea.  COMPARISON: 4/8/2021.    FINDINGS: Right IJ infusion catheter. Left subclavian cardiac device. No pneumothorax. The heart is enlarged. There is no pulmonary edema. There is a mild left basilar infiltrate. Lungs otherwise clear.      Impression    IMPRESSION: Left basilar atelectasis or pneumonia.   Glucose by meter   Result Value Ref Range    Glucose 205 (H) 70 - 99 mg/dL   US Lower Extremity Venous Duplex Bilateral    Impression    IMPRESSION:  1.  No evidence for deep venous thrombosis in either lower extremity.       Medications   acetaminophen (TYLENOL) tablet 650 mg (has no administration in time range)   cefTRIAXone (ROCEPHIN) 1 g vial to attach to  mL bag for ADULTS or NS 50 mL bag for PEDS (has no administration in time range)        Assessments & Plan (with Medical Decision Making)   Impression:  Elderly female with ESRD on hemodialysis, large uterine mass felt to be likely fibroid, bilateral distal ureteral compression with hydroureter felt to be related to extrinsic compression from the pelvic mass. She has had fairly persistent dysuria since prolonged hospitalization with urosepsis associated with acute cystitis in January of this year. She grew extensively resistant enterococcus faecium in January. Urine cultures have been growing mixed gabby. She did become septic in January a couple of days after a negative urine culture. She presents for the second day in a row for dysuria and chills from her nursing home. Urine does have worsening pyuria. Blood pressure has been soft in the ED. She has no fever or tachycardia. Urine culture 2 days ago had mixed gabby and was not speciated. She does appear lethargic in the ED. At this point I think she warrants observation for evolving sepsis. Past UTI was treated with a prolonged course of linezolid and cephalosporin. The urine sample tonight does not appear grossly contaminated. She will need dialysis tomorrow.     I have reviewed the nursing notes.  I have reviewed the findings, diagnosis, plan and need for follow up with the patient.    New Prescriptions    No medications on file       Final diagnoses:   Complicated UTI (urinary tract infection)   ESRD (end stage renal disease) on dialysis (H)   Type 2 diabetes mellitus with diabetic nephropathy, with long-term current use of insulin (H)       --  Kevin Lee  [facility] EMERGENCY DEPARTMENT  4/9/2021     Kevin Lee MD  04/10/21 0152

## 2021-04-10 NOTE — H&P
[facility]    History and Physical - [employer]        Date of Admission:  4/9/2021    Assessment & Plan   Sun Mireles is a 65 year old female admitted on 4/9/2021 with history including complicated UTI and bilateral hydronephrosis s/p stent placement, uterine mass, ESRD on T/Th/Sat HD, sick sinus syndrome s/p pacemaker placement, and T2DM who presents with persistent dysuria.     #Dysuria  #Concern for recurrent UTI   #Hx of complicated emphysematous cystitis and bilateral hydronephrosis s/p stent placement during prolonged admission from 1/20-2/11  UA on admission with increased pyuria along with symptoms of dysuria and suprapubic pain and mildly elevated CRP. No fever or leukocytosis. During her prolonged hospitalization earlier this year for emphysematous cystitis, she grew ampicillin resistant enterococcus and candida. She completed a course of fluconazole, linezolid, and cefdinir per ID recs. In March, she ahd recurrent UTI and grew E coli. She was treated most recently with a 5 day course of CTX.   - s/p CTX in ED  - urine and blood culture pending   - will hold off on additional antibiotics pending urine culture results unless she becomes febrile, develops signs of sepsis   - consider ID consult pending culture results   - renal ultrasound to assess for hydronephrosis and stent placements     #Recent diagnosis of large uterine mass, suspected fibroid   It was thought that this mass was the cause of the ureteral obstruction that led to her episode of bilateral hydronephrosis and sepsis 2/2 to emphysematous cystitis back in January/February of this last year. It was recommended that she follow up with GYN onc for further management.   - consider gyn consult if no improvement of symptoms     #ESRD 2/2 diabetic nephropathy   Last dialyzed on 4/8 per patient.   - nephrology consult, appreciate recs  - T, Th, S dialysis   - continue PTA vitamin D    - EPO weekly with dialysis     #Chronic anemia, likely multifactorial 2/2 to CKD, chronic disease, malnutrition   Hgb 9.1 on admission, around baseline.   - CBC daily     #Hx of diastolic HF   #Bilateral LE swelling   Was recently treated for both CHF and COPD exacerbation at [facility]. Endorses ongoing shortness of breath and bilateral extremity swelling. Per chart review, does not seem to have been on baseline regimen of diuretics. Last echo in 10/2020 with EF of 65%. LE ultrasound without evidence of DVT.  - volume management via dialysis     #HTN   Recent admission from 3/26-4/1 at [facility], was started on Losartan 50 mg daily and nefepidine 30 mg daily. Unclear if patient has been taking these.   - hold in setting of relatively soft blood pressures   - appreciate pharmacy assistance with med rec     #T2DM, appears to be diet controlled   #Peripheral neuropathy   - sliding scale insulin   - continue PTA gabapentin at bedtime on MWF (non-dialysis days)    #KHUSHBOO   - due to patient sleepiness during interview, unclear if she is on CPAP at home      Chronic conditions:   #Gout - continue PTA allopurinol daily; colchicine every Saturday evening   #Hx of asthma/COPD - continue montelukast, flonase, prn duonebs    #HLD - continue PTA rosuvastatin   #Sick sinus syndrome s/p pacemaker placement (10/2020)   #GERD - continue PTA omeprazole   #Urinary incontinence - continue PTA oxybutynin      Diet:  NPO pending renal ultrasound   Fluids: None, gentle bolus if blood pressures decrease   DVT Prophylaxis: Heparin SQ  Phan Catheter: not present  Code Status:  Presumed full - rediscuss when patient is more awake          Disposition Plan   Expected discharge: 2 - 3 days, recommended to prior living arrangement once adequate pain management/ tolerating PO medications and antibiotic plan established.  Entered: Naida Larson MD 04/10/2021, 2:37 AM       Patient will be formally staffed in the morning.     Naida  Erica Larson MD  [facility]  Contact information available via [facility] Paging/Directory  Please see sign in/sign out for up to date coverage information  ______________________________________________________________________    Chief Complaint   Burning urination     History is obtained from the patient and chart review. Somewhat limited as patient had to be woken up for interview and intermittently kept falling asleep.     History of Present Illness   Sun Mireles is a 65-year-old female with history of recurrent UTIs requiring several recent hospitalizations, uterine fibroid, sick sinus syndrome status post pacemaker placement, ESRD on hemodialysis, and type 2 diabetes who presents with dysuria.  She says at first that this has been going on for the last 2 days, but when asked further she says she has had these symptoms for the past year.  She has not had any fevers at home or blood in her urine.  She still makes urine and urinates every day.  She endorses shortness of breath and baseline cough that has been going on for a year.  She also notes bilateral lower extremity swelling which started over the last week.    Of note, she has had several hospital admissions in the last 3 months.  Most recently she was admitted from March 26 to April 1 at [facility] for shortness of breath cough fever and weakness and dysuria.  She was thought to have volume overload due to having missed 3 dialysis appointments and she was treated with Lasix but was not discharged on any diuretics.  She was given a 5-day course of prednisone and a course of doxycycline for a COPD exacerbation as well. Additionally she was treated with ceftriaxone for 5 days for UTI.     She was also admitted at [facility] from March 16 to March 20 for dysuria.  At the time her urine culture grew mixed gabby and so ID recommended discontinuing antibiotics.    Urine culture hx:    3/26/21 - E coli, resistant to ampicillin, ciprofloxacin, bactrim, tetracyclines, treated with 5 days of CTX  1/29.21 - Enterococcus faecium, amp and levo resistant, treated with Linezolid     ED course: Afebrile with relative hypotension with SPBs in 90s to low 100s. Initial oxygen saturations in low 90s and was placed on 2L NC. However, by time of my exam, was able to wean to RA without issues. Lab workup notable for CRP of 16.0. No leukocytosis on CBC. Anemic to 9.1. BMP notable for elevated creatinine. UA with moderate LE, large blood, 222 WBCs, >182 RBCs. Urine culture pending. LE U/S negative for DVT. COVID/flu/RSV negative. CXR with mild left basilar infiltrate with increased prominence from 4/8 CXR. She was given a dose of CTX.       Review of Systems    The 10 point Review of Systems is negative other than noted in the HPI or here.     Past Medical History    I have reviewed this patient's medical history and updated it with pertinent information if needed.   Past Medical History:   Diagnosis Date     Aneurysm of vertebral artery (H)      Asthma      Asthma      CHF (congestive heart failure) (H)      CHF (congestive heart failure) (H)     diastolic      Chronic kidney disease      Chronic kidney disease, stage 4, severely decreased GFR (H)      Chronic pulmonary heart disease, unspecified      Gout, unspecified      Hypercholesterolemia      Hypertension      Localized osteoarthrosis not specified whether primary or secondary, lower leg     Knee     Morbid obesity (H)     BMI>60     Obstructive sleep apnea (adult) (pediatric)      Type II or unspecified type diabetes mellitus with unspecified complication, not stated as uncontrolled      Unspecified glaucoma(365.9)      Unspecified urinary incontinence        Past Surgical History   I have reviewed this patient's surgical history and updated it with pertinent information if needed.  Past Surgical History:   Procedure Laterality Date     C/SECTION, LOW  TRANSVERSE  87     TUBAL LIGATION  78, 87        Social History   I have reviewed this patient's social history and updated it with pertinent information if needed. Sun Mireles  reports that she has been smoking. She has a 45.00 pack-year smoking history. She has never used smokeless tobacco. She reports that she does not drink alcohol or use drugs.    Family History   I have reviewed this patient's family history and updated it with pertinent information if needed.  Family History   Problem Relation Age of Onset     Hypertension Unknown      Diabetes Unknown      Cancer Maternal Grandfather         stomach cancer       Prior to Admission Medications   Prior to Admission Medications   Prescriptions Last Dose Informant Patient Reported? Taking?   Ascorbic Acid (VITAMIN C-MILKA HIPS) 500 MG CHEW  Nursing Home Yes No   Sig: Take 500 mg by mouth 2 times daily   Dimethicone-Zinc Oxide 5-5 % CREA  Nursing Home Yes No   Sig: Externally apply topically 3 times daily To vulva each shift with each diaper change   Lidocaine (LIDOCARE) 4 % Patch  Nursing Home Yes No   Sig: Place 1 patch onto the skin every 24 hours To prevent lidocaine toxicity, patient should be patch free for 12 hrs daily.   Vitamin D3 (CHOLECALCIFEROL) 125 MCG (5000 UT) tablet  Nursing Home Yes No   Sig: Take 1 tablet by mouth daily   acetaminophen (TYLENOL) 325 MG tablet  Nursing Home Yes No   Sig: Take 650 mg by mouth 4 times daily   albuterol (PROAIR HFA/PROVENTIL HFA/VENTOLIN HFA) 108 (90 Base) MCG/ACT inhaler  Nursing Home Yes No   Sig: Inhale 2 puffs into the lungs every 6 hours as needed for shortness of breath / dyspnea or wheezing   allopurinol (ZYLOPRIM) 100 MG tablet  Nursing Home Yes No   Sig: Take 100 mg by mouth daily   artificial saliva (BIOTENE DRY MOUTHWASH) LIQD liquid  Nursing Home Yes No   Sig: Swish and spit in mouth 4 times daily   aspirin 81 MG EC tablet  Nursing Home Yes No   Sig: Take 81 mg by mouth daily   bisacodyl (DULCOLAX)  10 MG suppository  Nursing Home Yes No   Sig: Place 10 mg rectally daily as needed for constipation   coenzyme Q-10 200 MG CAPS  Nursing Home Yes No   Sig: Take 200 mg by mouth daily   colchicine (COLCYRS) 0.6 MG tablet  Nursing Home Yes No   Sig: Take 0.3 mg by mouth once a week Every Saturday evening   diclofenac (VOLTAREN) 1 % topical gel  Nursing Home Yes No   Sig: Apply 1 g topically 3 times daily as needed for moderate pain (to bilateral toes)   diphenhydrAMINE (BENADRYL) 25 MG tablet  Nursing Home Yes No   Sig: Take 25 mg by mouth 2 times daily as needed for itching   docusate sodium (COLACE) 100 MG tablet  Nursing Home Yes No   Sig: Take 100 mg by mouth daily   fluticasone (FLONASE) 50 MCG/ACT nasal spray  Nursing Home Yes No   Sig: Spray 2 sprays into both nostrils daily   furosemide (LASIX) 40 MG tablet   No No   Sig: Take 1 tablet (40 mg) by mouth daily for 7 days   gabapentin (NEURONTIN) 100 MG capsule  Nursing Home Yes No   Sig: Take 200 mg by mouth three times a week Every Mon, Wed, Friday at bedtime on non-dialysis days   ipratropium - albuterol 0.5 mg/2.5 mg/3 mL (DUONEB) 0.5-2.5 (3) MG/3ML neb solution  Nursing Home Yes No   Sig: Take 1 vial by nebulization every 6 hours as needed for shortness of breath / dyspnea or wheezing   magnesium oxide (MAG-OX) 400 (240 Mg) MG tablet  Nursing Home Yes No   Sig: Take 400 mg by mouth 2 times daily   montelukast (SINGULAIR) 10 MG tablet  Nursing Home Yes No   Sig: Take 10 mg by mouth At Bedtime   omeprazole (PRILOSEC) 20 MG DR capsule  Nursing Home Yes No   Sig: Take 20 mg by mouth daily   oxybutynin ER (DITROPAN-XL) 5 MG 24 hr tablet  Nursing Home Yes No   Sig: Take 5 mg by mouth every evening   polyethylene glycol (MIRALAX) 17 g packet  assisted Yes No   Sig: Take 1 packet by mouth daily as needed for constipation   rosuvastatin (CRESTOR) 10 MG tablet  Nursing Home Yes No   Sig: Take 10 mg by mouth daily   senna-docusate (SENOKOT-S/PERICOLACE) 8.6-50 MG  tablet  Nursing Home Yes No   Sig: Take 1 tablet by mouth daily as needed for constipation   timolol maleate (TIMOPTIC) 0.5 % ophthalmic solution  Nursing Home Yes No   Sig: Place 1 drop into both eyes 2 times daily      Facility-Administered Medications: None     Allergies   No Known Allergies    Physical Exam   Vital Signs: Temp: 97.6  F (36.4  C) Temp src: Oral BP: 106/64 Pulse: 75   Resp: 20 SpO2: 99 % O2 Device: Nasal cannula Oxygen Delivery: 2 LPM  Weight: 222 lbs 0 oz    Constitutional: fatigued, sleeping, but arousable with voice and exam, in no acute distress  Eyes: Lids and lashes normal, pupils equal, round and reactive to light, extra ocular muscles intact, sclera clear, conjunctiva normal  ENT: normocepalic, without obvious abnormality, atraumatic   Hematologic / Lymphatic: no cervical lymphadenopathy  Respiratory: No increased work of breathing, good air exchange, clear to auscultation bilaterally, no crackles or wheezing  Cardiovascular: Regular rate and rhythm, no murmur noted, palpable pacemaker on left anterior chest, tunneled RIJ CVC present without surrounding erythema, no JVD appreciated   GI: Normal bowel sounds, soft, obese, +suprapubic tenderness present   Skin: no bruising or bleeding, no rashes and no lesions on exposed skin   Extremities: warm, well perfused. 1-2+ pitting edema present bilaterally up to knees.   Neurologic: A&Ox3.  No focal deficits. Moving all extremities spontaneously.     Data   Data reviewed today: I reviewed all medications, new labs and imaging results over the last 24 hours. I personally reviewed the chest x-ray image(s) showing mild left basilar infiltrate.    Recent Labs   Lab 04/09/21  2149 04/08/21  0232 04/08/21  0052   WBC 7.4  --  6.9   HGB 9.1*  --  9.3*   MCV 97  --  99     --  246   INR 1.15*  --   --      --  138   POTASSIUM 3.6  --  4.1   CHLORIDE 103  --  104   CO2 24  --  24   BUN 60*  --  64*   CR 5.02*  --  5.60*   ANIONGAP 11  --  10    JOANNA 9.1  --  8.6   *  --  133*   ALBUMIN 3.2*  --  3.0*   PROTTOTAL 8.3  --  8.1   BILITOTAL 0.3  --  0.3   ALKPHOS 139  --  142   ALT 10  --  12   AST 11  --  12   TROPI  --  0.021 0.027

## 2021-04-10 NOTE — CONSULTS
Nephrology Initial Consult  April 10, 2021      Sun Mireles MRN:[id number] YOB: 1955  Date of Admission:4/9/2021  Primary care provider: Kelli Vanessa  Requesting physician: Anna Dejesus MD    ASSESSMENT AND RECOMMENDATIONS:   Sun Mireles is a 65 year old female admitted on 4/9/2021 with history including complicated UTI and bilateral hydronephrosis s/p stent placement, uterine mass, ESRD on T/Th/Sat HD, sick sinus syndrome s/p pacemaker placement, and T2DM who presents with persistent dysuria.      # ESRD on HD TTS  # Type 2 DM   # HTN  # Anemia - EPO and iron with HD   # secondary hyperparathyroidism   # h/o hematuria   # h/o COPD  # Concern for recurrent UTI  # Hx of complicated emphysematous cystitis and bilateral hydronephrosis s/p stent placement during prolonged admission from 1/20-2/11    Patient dialyzes at [facility] under care of Dr Katie Bailey with internal jugular catheter. Her TW is 94 Kg. Currently blood pressures are at goal, supposed to be on Losartan 50 mg daily and Nifedipine 30 mg daily. Mildly hypervolemic with trace LE edema.   Will perform HD today per her regular schedule. Will attempt to get her to her TW. Next HD planned for Tuesday unless required otherwise.     Rosaura Moreno MD   [phone]      REASON FOR CONSULT: ESKD on HD     HISTORY OF PRESENT ILLNESS:  Admitting provider and nursing notes reviewed  Sun Mireles is a 65 year old female admitted on 4/9/2021 with history including complicated UTI and bilateral hydronephrosis s/p stent placement, uterine mass, ESRD on T/Th/Sat HD, sick sinus syndrome s/p pacemaker placement, and T2DM who presents with persistent dysuria.   She is currently being evaluated for UTI, cultures sent, antibiotic initiation is pending.   She has been on HD since August 2020. She denies any concerns in regards to her ongoing HD. Currently she reports that she is feeling depressed and that there is no  one there for her. She denies any shortness of breath, nausea, vomiting, diarrhea or dizziness.       PAST MEDICAL HISTORY:  Reviewed with patient on 04/10/2021     Past Medical History:   Diagnosis Date     Aneurysm of vertebral artery (H)      Asthma      Asthma      CHF (congestive heart failure) (H)      CHF (congestive heart failure) (H)     diastolic      Chronic kidney disease      Chronic kidney disease, stage 4, severely decreased GFR (H)      Chronic pulmonary heart disease, unspecified      Gout, unspecified      Hypercholesterolemia      Hypertension      Localized osteoarthrosis not specified whether primary or secondary, lower leg     Knee     Morbid obesity (H)     BMI>60     Obstructive sleep apnea (adult) (pediatric)      Type II or unspecified type diabetes mellitus with unspecified complication, not stated as uncontrolled      Unspecified glaucoma(365.9)      Unspecified urinary incontinence        Past Surgical History:   Procedure Laterality Date     C/SECTION, LOW TRANSVERSE  89     TUBAL LIGATION  80, 89        MEDICATIONS:  PTA Meds  Prior to Admission medications    Medication Sig Last Dose Taking? Auth Provider   acetaminophen (TYLENOL) 325 MG tablet Take 650 mg by mouth 4 times daily   Unknown, Entered By History   albuterol (PROAIR HFA/PROVENTIL HFA/VENTOLIN HFA) 108 (90 Base) MCG/ACT inhaler Inhale 2 puffs into the lungs every 6 hours as needed for shortness of breath / dyspnea or wheezing   Unknown, Entered By History   allopurinol (ZYLOPRIM) 100 MG tablet Take 100 mg by mouth daily   Unknown, Entered By History   artificial saliva (BIOTENE DRY MOUTHWASH) LIQD liquid Swish and spit in mouth 4 times daily   Unknown, Entered By History   Ascorbic Acid (VITAMIN C-MILKA HIPS) 500 MG CHEW Take 500 mg by mouth 2 times daily   Unknown, Entered By History   aspirin 81 MG EC tablet Take 81 mg by mouth daily   Unknown, Entered By History   bisacodyl (DULCOLAX) 10 MG suppository Place 10 mg  rectally daily as needed for constipation   Unknown, Entered By History   coenzyme Q-10 200 MG CAPS Take 200 mg by mouth daily   Unknown, Entered By History   colchicine (COLCYRS) 0.6 MG tablet Take 0.3 mg by mouth once a week Every Saturday evening   Unknown, Entered By History   diclofenac (VOLTAREN) 1 % topical gel Apply 1 g topically 3 times daily as needed for moderate pain (to bilateral toes)   Unknown, Entered By History   Dimethicone-Zinc Oxide 5-5 % CREA Externally apply topically 3 times daily To vulva each shift with each diaper change   Unknown, Entered By History   diphenhydrAMINE (BENADRYL) 25 MG tablet Take 25 mg by mouth 2 times daily as needed for itching   Unknown, Entered By History   docusate sodium (COLACE) 100 MG tablet Take 100 mg by mouth daily   Unknown, Entered By History   fluticasone (FLONASE) 50 MCG/ACT nasal spray Spray 2 sprays into both nostrils daily   Unknown, Entered By History   furosemide (LASIX) 40 MG tablet Take 1 tablet (40 mg) by mouth daily for 7 days   Po Awad MD   gabapentin (NEURONTIN) 100 MG capsule Take 200 mg by mouth three times a week Every Mon, Wed, Friday at bedtime on non-dialysis days   Unknown, Entered By History   ipratropium - albuterol 0.5 mg/2.5 mg/3 mL (DUONEB) 0.5-2.5 (3) MG/3ML neb solution Take 1 vial by nebulization every 6 hours as needed for shortness of breath / dyspnea or wheezing   Unknown, Entered By History   Lidocaine (LIDOCARE) 4 % Patch Place 1 patch onto the skin every 24 hours To prevent lidocaine toxicity, patient should be patch free for 12 hrs daily.   Unknown, Entered By History   magnesium oxide (MAG-OX) 400 (240 Mg) MG tablet Take 400 mg by mouth 2 times daily   Unknown, Entered By History   montelukast (SINGULAIR) 10 MG tablet Take 10 mg by mouth At Bedtime   Unknown, Entered By History   omeprazole (PRILOSEC) 20 MG DR capsule Take 20 mg by mouth daily   Unknown, Entered By History   oxybutynin ER (DITROPAN-XL) 5 MG  24 hr tablet Take 5 mg by mouth every evening   Unknown, Entered By History   polyethylene glycol (MIRALAX) 17 g packet Take 1 packet by mouth daily as needed for constipation   Unknown, Entered By History   rosuvastatin (CRESTOR) 10 MG tablet Take 10 mg by mouth daily   Unknown, Entered By History   senna-docusate (SENOKOT-S/PERICOLACE) 8.6-50 MG tablet Take 1 tablet by mouth daily as needed for constipation   Unknown, Entered By History   timolol maleate (TIMOPTIC) 0.5 % ophthalmic solution Place 1 drop into both eyes 2 times daily   Unknown, Entered By History   Vitamin D3 (CHOLECALCIFEROL) 125 MCG (5000 UT) tablet Take 1 tablet by mouth daily   Unknown, Entered By History      Current Meds    acetaminophen  650 mg Oral Once     allopurinol  100 mg Oral Daily     aspirin  81 mg Oral Daily     colchicine  0.3 mg Oral Weekly     docusate sodium  100 mg Oral Daily     fluticasone  2 spray Both Nostrils Daily     [START ON 4/12/2021] gabapentin  200 mg Oral Q Mon Wed Fri AM     heparin ANTICOAGULANT  5,000 Units Subcutaneous Q12H     insulin aspart  1-4 Units Subcutaneous Q4H     magnesium oxide  400 mg Oral BID     miconazole   Topical BID     montelukast  10 mg Oral At Bedtime     omeprazole  20 mg Oral Daily     oxybutynin ER  5 mg Oral QPM     rosuvastatin  10 mg Oral Daily     sodium chloride (PF)  3 mL Intracatheter Q8H     timolol maleate  1 drop Both Eyes BID     Vitamin D3  125 mcg Oral Daily     Infusion Meds      ALLERGIES:    No Known Allergies    REVIEW OF SYSTEMS:  A comprehensive of systems was negative except as noted above.    SOCIAL HISTORY:   Social History     Socioeconomic History     Marital status: Single     Spouse name: Not on file     Number of children: Not on file     Years of education: Not on file     Highest education level: Not on file   Occupational History     Not on file   Social Needs     Financial resource strain: Not on file     Food insecurity     Worry: Not on file      "Inability: Not on file     Transportation needs     Medical: Not on file     Non-medical: Not on file   Tobacco Use     Smoking status: Current Every Day Smoker     Packs/day: 1.00     Years: 45.00     Pack years: 45.00     Smokeless tobacco: Never Used   Substance and Sexual Activity     Alcohol use: No     Drug use: No     Sexual activity: Not on file   Lifestyle     Physical activity     Days per week: Not on file     Minutes per session: Not on file     Stress: Not on file   Relationships     Social connections     Talks on phone: Not on file     Gets together: Not on file     Attends Yazidi service: Not on file     Active member of club or organization: Not on file     Attends meetings of clubs or organizations: Not on file     Relationship status: Not on file     Intimate partner violence     Fear of current or ex partner: Not on file     Emotionally abused: Not on file     Physically abused: Not on file     Forced sexual activity: Not on file   Other Topics Concern     Not on file   Social History Narrative     Not on file     Reviewed with patient       FAMILY MEDICAL HISTORY:   Family History   Problem Relation Age of Onset     Hypertension Unknown      Diabetes Unknown      Cancer Maternal Grandfather         stomach cancer     Reviewed with patient     PHYSICAL EXAM:   Temp  Av.8  F (36.6  C)  Min: 97.5  F (36.4  C)  Max: 98.2  F (36.8  C)      Pulse  Av.4  Min: 73  Max: 198 Resp  Av  Min: 20  Max: 20  SpO2  Av.1 %  Min: 81 %  Max: 100 %       /54   Pulse 77   Temp 97.6  F (36.4  C) (Oral)   Resp 20   Ht 1.638 m (5' 4.5\")   Wt 100.7 kg (222 lb)   SpO2 92%   BMI 37.52 kg/m        Admit Weight: 100.7 kg (222 lb)     GENERAL APPEARANCE:  No distress, in tears because she feels depressed   Lymphatics: no cervical or supraclavicular LAD  Pulmonary: faint basilar crackles, with equal breath sounds bilaterally, no clubbing  CV: regular rhythm, normal rate, no rub   - JVD : " none    - Edema : trace   GI: soft, nontender, normal bowel sounds  MS: no evidence of inflammation in joints, no muscle tenderness  : no torres  SKIN: no rash, warm, dry, no cyanosis  NEURO: face symmetric, no asterixis     LABS:   CMP  Recent Labs   Lab 04/09/21 2149 04/08/21 0052    138   POTASSIUM 3.6 4.1   CHLORIDE 103 104   CO2 24 24   ANIONGAP 11 10   * 133*   BUN 60* 64*   CR 5.02* 5.60*   GFRESTIMATED 8* 7*   GFRESTBLACK 10* 8*   JOANNA 9.1 8.6   PROTTOTAL 8.3 8.1   ALBUMIN 3.2* 3.0*   BILITOTAL 0.3 0.3   ALKPHOS 139 142   AST 11 12   ALT 10 12     CBC  Recent Labs   Lab 04/09/21 2149 04/08/21 0052   HGB 9.1* 9.3*   WBC 7.4 6.9   RBC 3.03* 3.12*   HCT 29.4* 30.9*   MCV 97 99   MCH 30.0 29.8   MCHC 31.0* 30.1*   RDW 16.3* 16.6*    246     INR  Recent Labs   Lab 04/09/21 2149   INR 1.15*     ABGNo lab results found in last 7 days.   URINE STUDIES  Recent Labs   Lab Test 04/09/21 2130 04/08/21  0738   COLOR Yellow Yellow   APPEARANCE Slightly Cloudy Slightly Cloudy   URINEGLC Negative Negative   URINEBILI Negative Negative   URINEKETONE Negative Negative   SG 1.018 1.018   UBLD Large* Moderate*   URINEPH 5.5 5.5   PROTEIN 200* 100*   NITRITE Negative Negative   LEUKEST Moderate* Large*   RBCU >182* >182*   WBCU 222* >182*     No lab results found.  PTH  No lab results found.  IRON STUDIES  No lab results found.    IMAGING:  All imaging studies reviewed by me.     Rosaura Moreno MD

## 2021-04-10 NOTE — PROGRESS NOTES
HEMODIALYSIS TREATMENT NOTE    Date: 4/10/2021  Time: 5:25 PM    Data:  Pre Wt: 100.7 kg (222 lb 0.1 oz)   Desired Wt: 96 kg   Post Wt: 98.2 kg (216 lb 7.9 oz)  Weight change: 2.5 kg  Ultrafiltration - Post Run Net Total Removed (mL): 2500 mL  Vascular Access Status: CVC  patent  Dialyzer Rinse: Clear  Total Blood Volume Processed: 68.54 L   Total Dialysis (Treatment) Time: 3.5   Dialysate Bath: K 2, Ca 2.5  Heparin: None    Lab:   Yes;Hep B series    Interventions:  At about an hour to the end of HD, writer took over from Maria Isabel VIRK with a report that, Epogen/Iron was administered as ordered, and that Hep B series lab was drawn. Pt had a stable uncomplicated 3.5 hours of HD. 2.5L of fluid was pulled. Voided X 1 which was painful d/t ongoing UTI. Post BP was 140/76. Pt rinsed back post tx, lumen were saline locked, and hand off report given to MOOSE Parsons.    Assessment:  -Calm & Cooperative  -VSS  -A & O X 4     Plan:    -Bed assigned, and transferred to   -Per renal team

## 2021-04-10 NOTE — ED NOTES
[facility]   ED Nurse to Floor Handoff     Sun Mireles is a 65 year old female who speaks English and lives with others,  in a nursing home  They arrived in the ED by ambulance from home    ED Chief Complaint: Dysuria    ED Dx;   Final diagnoses:   Complicated UTI (urinary tract infection)   ESRD (end stage renal disease) on dialysis (H)   Type 2 diabetes mellitus with diabetic nephropathy, with long-term current use of insulin (H)         Needed?: No    Allergies: No Known Allergies.  Past Medical Hx:   Past Medical History:   Diagnosis Date     Aneurysm of vertebral artery (H)      Asthma      Asthma      CHF (congestive heart failure) (H)      CHF (congestive heart failure) (H)     diastolic      Chronic kidney disease      Chronic kidney disease, stage 4, severely decreased GFR (H)      Chronic pulmonary heart disease, unspecified      Gout, unspecified      Hypercholesterolemia      Hypertension      Localized osteoarthrosis not specified whether primary or secondary, lower leg     Knee     Morbid obesity (H)     BMI>60     Obstructive sleep apnea (adult) (pediatric)      Type II or unspecified type diabetes mellitus with unspecified complication, not stated as uncontrolled      Unspecified glaucoma(365.9)      Unspecified urinary incontinence       Baseline Mental status: WDL  Current Mental Status changes: at basesline    Infection present or suspected this encounter: yes urinary and cultures pending  Sepsis suspected: No  Isolation type: Special Precautions-COVID-19  Patient tested for COVID 19 prior to admission: YES     Activity level - Baseline/Home:  Stand with assist x2  Activity Level - Current:   Stand with assist x2    Bariatric equipment needed?: Yes    In the ED these meds were given:   Medications   acetaminophen (TYLENOL) tablet 650 mg (0 mg Oral Hold 4/10/21 0215)   cefTRIAXone (ROCEPHIN) 1 g vial to attach to  mL bag for ADULTS  or NS 50 mL bag for PEDS (1 g Intravenous New Bag 4/10/21 8714)       Drips running?  Yes    Home pump  No    Current LDAs  Peripheral IV 04/09/21 Left Upper forearm (Active)   Site Assessment WDL 04/09/21 2932   Number of days: 1       CVC Double Lumen Right Internal jugular Tunneled (Active)   Number of days:        Labs results:   Labs Ordered and Resulted from Time of ED Arrival Up to the Time of Departure from the ED   ROUTINE UA WITH MICROSCOPIC REFLEX TO CULTURE - Abnormal; Notable for the following components:       Result Value    Blood Urine Large (*)     Protein Albumin Urine 200 (*)     Leukocyte Esterase Urine Moderate (*)     WBC Urine 222 (*)     RBC Urine >182 (*)     Squamous Epithelial /HPF Urine 8 (*)     All other components within normal limits   CBC WITH PLATELETS DIFFERENTIAL - Abnormal; Notable for the following components:    RBC Count 3.03 (*)     Hemoglobin 9.1 (*)     Hematocrit 29.4 (*)     MCHC 31.0 (*)     RDW 16.3 (*)     All other components within normal limits   INR - Abnormal; Notable for the following components:    INR 1.15 (*)     All other components within normal limits   COMPREHENSIVE METABOLIC PANEL - Abnormal; Notable for the following components:    Glucose 234 (*)     Urea Nitrogen 60 (*)     Creatinine 5.02 (*)     GFR Estimate 8 (*)     GFR Estimate If Black 10 (*)     Albumin 3.2 (*)     All other components within normal limits   CRP INFLAMMATION - Abnormal; Notable for the following components:    CRP Inflammation 16.0 (*)     All other components within normal limits   GLUCOSE BY METER - Abnormal; Notable for the following components:    Glucose 205 (*)     All other components within normal limits   GLUCOSE MONITOR NURSING POCT   INFLUENZA A/B & SARS-COV2 PCR MULTIPLEX   URINE CULTURE AEROBIC BACTERIAL   BLOOD CULTURE       Imaging Studies:   Recent Results (from the past 24 hour(s))   XR Chest Port 1 View    Narrative    EXAM: XR CHEST PORT 1 VW  LOCATION:  "[facility]  DATE/TIME: 4/9/2021 10:06 PM    INDICATION: Dyspnea.  COMPARISON: 4/8/2021.    FINDINGS: Right IJ infusion catheter. Left subclavian cardiac device. No pneumothorax. The heart is enlarged. There is no pulmonary edema. There is a mild left basilar infiltrate. Lungs otherwise clear.      Impression    IMPRESSION: Left basilar atelectasis or pneumonia.   US Lower Extremity Venous Duplex Bilateral    Impression    IMPRESSION:  1.  No evidence for deep venous thrombosis in either lower extremity.       Recent vital signs:   /64   Pulse 75   Temp 97.6  F (36.4  C) (Oral)   Resp 20   Ht 1.638 m (5' 4.5\")   Wt 100.7 kg (222 lb)   SpO2 99%   BMI 37.52 kg/m      [place] Coma Scale Score: 15 (04/09/21 2157)       Cardiac Rhythm: Normal Sinus  Pt needs tele? No  Skin/wound Issues: None    Code Status: Full Code    Pain control: fair    Nausea control: good    Abnormal labs/tests/findings requiring intervention: see epic    Family present during ED course? No   Family Comments/Social Situation comments: n/a    Tasks needing completion: None    Leanne Baptiste, RN    [phone] [facility]      "

## 2021-04-11 NOTE — PLAN OF CARE
"/46 (BP Location: Left arm)   Pulse 71   Temp 98  F (36.7  C) (Oral)   Resp 18   Ht 1.638 m (5' 4.5\")   Wt 100.1 kg (220 lb 11.2 oz)   SpO2 90%   BMI 37.30 kg/m      1530 - 1930    Neuro: A&Ox4.   Cardiac: SR. VSS.   Respiratory: Sating 90% on RA.  GI/: Adequate urine output. BM X1  Diet/appetite: Tolerating current diet.  Activity:  Assist of two up to chair.  Pain: At acceptable level on current regimen.   Skin: No new deficits noted.  LDA's:PIV saline locked.  Plan: Continue with POC. Notify primary team with changes.  "

## 2021-04-11 NOTE — PLAN OF CARE
"Vital signs:  Temp: 98  F (36.7  C) Temp src: Oral BP: (!) 153/51 Pulse: 77   Resp: 22(Pt. is talking on the phone with family while taking VS.) SpO2: 94 % O2 Device: None (Room air) Oxygen Delivery: 2 LPM Height: 163.8 cm (5' 4.5\") Weight: 98.2 kg (216 lb 7.9 oz)  Activity: Heavy assist of 2 when OOB, rolls well in bed.   Neuros: A& O x4, calls appropriately. Intermittently anxious.  Cardiac: WDL ex HTN  Respiratory: Persistent dry cough. Tessalon, albuterol inhaler, nebs given with no relief. Team notfied. Ordered robitussin PRN, cough resolved before given.  GI/: BM x2 in bed pan. Incontinent of small amt of urine, on HD. Dysuria, itchiness   Diet: 3g Na, 3g K.  Skin: Perineal area irritated.  Barrier cream applied with no relief.Nystatin cream ordered, applied  Lines: PIV SL, R CVC for HD  Labs: , 118  Plan:   Continue POC  "

## 2021-04-11 NOTE — PROGRESS NOTES
[facility]    Medicine Progress Note - Hospitalist Service, Gold 9       Date of Admission:  4/9/2021  Assessment & Plan         65 year old female admitted on 4/9/2021 with history including complicated UTI and bilateral hydronephrosis s/p stent placement, uterine mass, ESRD on T/Th/Sat HD, sick sinus syndrome s/p pacemaker placement, and T2DM who presents with persistent dysuria.      #Dysuria  #Concern for recurrent UTI   #Hx of complicated emphysematous cystitis and bilateral hydronephrosis s/p stent placement during prolonged admission from 1/20-2/11  UA on admission with increased pyuria along with symptoms of dysuria and suprapubic pain and mildly elevated CRP. No fever or leukocytosis. During her prolonged hospitalization earlier this year for emphysematous cystitis, she grew ampicillin resistant enterococcus and candida. She completed a course of fluconazole, linezolid, and cefdinir per ID recs. In March, she ahd recurrent UTI and grew E coli. She was treated most recently with a 5 day course of CTX.   - urine CX 50-100K mixed gabby; with considerable symptom burden  - c/w CTX and add fluconazole x 2 doses  - blood culture NGTD       #Recent diagnosis of large uterine mass, suspected fibroid   It was thought that this mass was the cause of the ureteral obstruction that led to her episode of bilateral hydronephrosis and sepsis 2/2 to emphysematous cystitis back in January/February of this last year. It was recommended that she follow up with GYN onc for further management.   - consider gyn consult if no improvement of symptoms      #ESRD 2/2 diabetic nephropathy   Last dialyzed on 4/8 per patient.   - nephrology consulted, appreciate recs  - T, Th, S dialysis   - continue PTA vitamin D   - EPO weekly with dialysis      #Chronic anemia, likely multifactorial 2/2 to CKD, chronic disease, malnutrition   Hgb 9.1 on admission, around baseline.   - CBC daily      #Hx  of diastolic HF   #Bilateral LE swelling   Was recently treated for both CHF and COPD exacerbation at [facility]. Endorses ongoing shortness of breath and bilateral extremity swelling. Per chart review, does not seem to have been on baseline regimen of diuretics. Last echo in 10/2020 with EF of 65%. LE ultrasound without evidence of DVT.  - volume management via dialysis      #HTN   Recent admission from 3/26-4/1 at [facility]  :: c/w Losartan 50 mg daily and nefepidine 30 mg daily.  :: appreciate pharmacy assistance with med rec      #T2DM, appears to be diet controlled   #Peripheral neuropathy   - sliding scale insulin   - continue PTA gabapentin at bedtime on MWF (non-dialysis days)     #KHUSHBOO- unclear if she is on CPAP at home; can continue CPAP as an inpatient if settings are known     Chronic conditions:   #Gout - continue PTA allopurinol daily; colchicine every Saturday evening   #Hx of asthma/COPD - continue montelukast, flonase, prn duonebs    #HLD - continue PTA rosuvastatin   #Sick sinus syndrome s/p pacemaker placement (10/2020)   #GERD - continue PTA omeprazole   #Urinary incontinence - continue PTA oxybutynin       Diet: Advance Diet as Tolerated: Regular Diet Adult; 3 gm NA Diet; 3 gm K Diet    DVT Prophylaxis: Heparin SQ  Phan Catheter: not present  Code Status: Full Code           Disposition Plan   Expected discharge: Tomorrow, recommended to prior living arrangement once adequate pain management/ tolerating PO medications.  Entered: Shane Ayers MD 04/11/2021, 5:02 PM       The patient's care was discussed with the Bedside Nurse and Patient.    Shane Ayers MD  Hospitalist Service, [address]  Contact information available via [facility] Paging/Directory  Please see sign in/sign out for up to date coverage information  ______________________________________________________________________    Interval History       No events since  admission  Still having painful urination,  abd pain better  No fevers chills or sweats, no chest pain, no shortness of breath, no nausea vomiting  P.o. going well  Patient says she does not want to be in the hospital but is ok to stay  Eager to discharge, wants to make sure she gets to GYN appt Monday at noon    Data reviewed today: I reviewed all medications, new labs and imaging results over the last 24 hours. I personally reviewed...    Physical Exam   Vital Signs: Temp: 98  F (36.7  C) Temp src: Oral BP: 134/46 Pulse: 71   Resp: 18 SpO2: 90 % O2 Device: None (Room air) Oxygen Delivery: 2 LPM  Weight: 216 lbs 7.87 oz  General: well appearing woman sittin up in chair, watching TV  Head: NC, AT  Eye: symm gaze, anicteric sclerae  ENT: patent nares wo drainage/epistaxis, MMM    Pulm: CTAB, comfortable WOB on room air  CV: normal rate, regular rhythm; bilateral 2+ pitting lower extremity edema   GI: soft, nontender, obese  Neuro: awake, alert, hearing speech and phonation, intact grossly  MSK: Bilateral lower extremity edema, generous soft tissue; sarcopenia    Data   Recent Labs   Lab 04/11/21  0752 04/09/21  2149 04/08/21  0232 04/08/21  0052   WBC 6.3 7.4  --  6.9   HGB 9.2* 9.1*  --  9.3*   MCV 99 97  --  99    234  --  246   INR  --  1.15*  --   --     138  --  138   POTASSIUM 3.7 3.6  --  4.1   CHLORIDE 102 103  --  104   CO2 26 24  --  24   BUN 39* 60*  --  64*   CR 4.13* 5.02*  --  5.60*   ANIONGAP 7 11  --  10   JOANNA 9.2 9.1  --  8.6   * 234*  --  133*   ALBUMIN  --  3.2*  --  3.0*   PROTTOTAL  --  8.3  --  8.1   BILITOTAL  --  0.3  --  0.3   ALKPHOS  --  139  --  142   ALT  --  10  --  12   AST  --  11  --  12   TROPI  --   --  0.021 0.027     No results found for this or any previous visit (from the past 24 hour(s)).  Medications       acetaminophen  650 mg Oral Once     allopurinol  100 mg Oral Daily     aspirin  81 mg Oral Daily     benzonatate  200 mg Oral TID     cefTRIAXone  1 g  Intravenous Q24H     colchicine  0.3 mg Oral Weekly     docusate sodium  100 mg Oral Daily     fluticasone  2 spray Both Nostrils Daily     [START ON 4/12/2021] gabapentin  200 mg Oral Once per day on Mon Wed Fri     heparin ANTICOAGULANT  5,000 Units Subcutaneous Q12H     insulin aspart  1-4 Units Subcutaneous Q4H     losartan  50 mg Oral Daily     magnesium oxide  400 mg Oral BID     miconazole   Topical BID     montelukast  10 mg Oral At Bedtime     NIFEdipine ER  30 mg Oral Daily     nystatin   Topical BID     omeprazole  20 mg Oral Daily     oxybutynin ER  5 mg Oral QPM     rosuvastatin  10 mg Oral Daily     sevelamer carbonate  800 mg Oral TID w/meals     sodium chloride (PF)  3 mL Intracatheter Q8H     timolol maleate  1 drop Both Eyes BID     Vitamin D3  125 mcg Oral Daily   **a portion of m,y previous note is copied and pasted above, it has been edited as needed to reflect events for today**

## 2021-04-11 NOTE — PLAN OF CARE
"NEURO: A&O x4, anxious at times. Likes to be involved in her cares. Calls appropriately   RESPIRATORY: LS coarse in upper lobes, diminished in lower lobes. C/o ongoing SOB and VIZCAINO-present at baseline per pt. On RA, wears 2L NC at NOC. Frequent, non-productive cough. PRN inhaler given, PRN nebs available.  CARDIAC: WDL, denies cardiac chest pain.   GI/: Oliguric, on HD. Incontinent x1 this shift. +BS, LBM 4/11  DIET: 3 gram sodium diet   PAIN/NAUSEA: Denies pain and nausea this shift   SKIN/INCISION/DRAINS: +2-+3 BLE edema. C/o burning and \"irritated\" skin in doreen-area.   IV ACCESS: L PIV SL   ACTIVITY: Up with assist of 2, uses wheelchair at home.  LAB: Creatinine 4.13, GFR 12, hgb 9.2,  and 94  PLAN: Dilaysis T/Th/Sat. Monitor respiratory status, PRN inhaler/neb, continue with POC.     "

## 2021-04-11 NOTE — PROGRESS NOTES
Nephrology update:    No indications for HD, will continue with her TTS schedule with next HD scheduled for Tuesday unless needed otherwise.    Rosaura Moreno MD  [phone]

## 2021-04-11 NOTE — PLAN OF CARE
Admitted  From ED for Dysuria and and concern for recurrent UTI. Afebrile, slightly hypertensive but within parameters, OVSS on 2L NC.  Denies pain other than burning sensation when urinating.  Reddened in doreen area and healed wound below left buttock.  Unproductive cough.   and 153.  PIV SL.  CVC double lumen SL.  Tolerating regular diet. Appetite good.  Dialyzed today 2L removed.  Up with heavy assist of 2.  Continue plan of care.

## 2021-04-12 NOTE — PROVIDER NOTIFICATION
Stable. Pt ready to discharge. Waiting on Rx of keflex to be signed and sent to pharmacy or sent with pt. Plus SW is working in arranging a ride for pt.

## 2021-04-12 NOTE — CONSULTS
[facility]  Gynecology Oncology Consult Note    Sun Mireles: MRN# [id number]   Age: 65 year old YOB: 1955     Date of Admission:  2021    Primary care provider: Kelli Vanessa    Gyn Oncology evaluation requested by: Dr. Shane Ayers.             Chief Complaint:   Recurrent UTI, h/o hydronephrosis s/p stent placement and uterine mass          History of Present Illness:   Sun Mireles is a 65 year old female with a history of complicated UTI and bilateral hydronephrosis s/p stent placement during admission from - and uterine mass who is currently admitted to medicine service for persistent dysuria and concern for recurrent UTI. She is currently being treated with ceftriaxone and fluconazole after UCx with mixed gabby and symptom burden. Gyn Oncology initially consulted 3/19 and had recommended outpatient follow up for uterine mass. She was scheduled with Dr. Patino for today, , but is now inpatient and will miss this appointment.     Patient reports that her only symptom is dysuria. She denies any abdominal pain, nausea, vomiting, change in bowel function, or urinary retention. She has a history of uterine fibroids in the past that she has managed expectantly. She had vaginal bleeding in , but no recurrent vaginal bleeding or change in vaginal discharge. She does report a significant weight loss since the beginning of last year, noting that she was 290 lbs, lost weight down to 170 lbs and now has been stable at about 220 lbs (documented weight in Care Everywhere from 10/2018 was 290 lbs).           Ob/Gyn History:   Adapted from note by Dr. Moreno [facility] PGY3 3/19.    Patient is a , she has had one vaginal delivery and one  section.  She delivered via  in  and had a PPTL to follow. She became pregnant with an IUP subsequently and delivered via C/S with BTL. At the time of surgery patient reports that the surgeon noted  that the left fallopian tube was intact.  She denies other significant gyn history.  She reports regular pap smears throughout her life; the pap collected in 2012 was abnormal.  Patient is uncertain why it was abnormal and had no follow up (Records not available). Pap in 2014 NILM.     Patient reports that she has been menopausal since 2006.  However, between 4519-4341 she has had intermittent vaginal bleeding on average every 6 months or so.  Two times the bleeding has been significantly heavy lasting 6 days.  Her last episode of bleeding was 10/2013.  She has not had bleeding since.  She had an endometrial biopsy performed in 2014 that showed scant endometrium. Pelvic ultrasound and follow up in clinic was recommended, but patient was lost to follow up.     Endometrial biopsy 2/2014  - Scant fragments of benign squamous epithelium, benign endocervical   glands and a few possible minute strips of benign endometrial surface   epithelium.   - Endometrial tissue is insufficient for further diagnosis.     US Pelvis 3/16/21  IMPRESSION:  1.  Large central mass in the pelvis. This could represent a large  uterus due to fibroids, malignancy, or some other process. Recommend  further evaluation MRI may be helpful.  2.  The ovaries are not identified due to the large mass.    MRI Abdomen 3/18/21  IMPRESSION:  1.  Large mass in the abdomen and pelvis. This is favored to arise  from one of the ovaries, though this is difficult to assess. Surgical  consultation recommended.  2.  No uterine fibroids. Normal appearance of the uterus.    ADDENDUM: Upon further review and consultation, the large mass does  appear to arise from the uterus, making this most likely to represent  a large fibroid. No significant diffusion restriction of the mass.  Discussed with Dr. Lugo by Dr. Fahrner over telephone at 3:48 PM  3/19/21. Discussed with Dr. Martínez by Dr. Fahrner over telephone at 3:52  PM. Discussed with Dr. Lindsey by Dr. Fahrner  over telephone at 3:54  PM. END OF ADDENDUM.         Past Medical History:     Past Medical History:   Diagnosis Date     Aneurysm of vertebral artery (H)      Asthma      Asthma      CHF (congestive heart failure) (H)      CHF (congestive heart failure) (H)     diastolic      Chronic kidney disease      Chronic kidney disease, stage 4, severely decreased GFR (H)      Chronic pulmonary heart disease, unspecified      Gout, unspecified      Hypercholesterolemia      Hypertension      Localized osteoarthrosis not specified whether primary or secondary, lower leg     Knee     Morbid obesity (H)     BMI>60     Obstructive sleep apnea (adult) (pediatric)      Type II or unspecified type diabetes mellitus with unspecified complication, not stated as uncontrolled      Unspecified glaucoma(365.9)      Unspecified urinary incontinence             Past Surgical History:      Past Surgical History:   Procedure Laterality Date     C/SECTION, LOW TRANSVERSE  89     TUBAL LIGATION  80, 89            Social History:     Social History     Tobacco Use     Smoking status: Current Every Day Smoker     Packs/day: 1.00     Years: 45.00     Pack years: 45.00     Smokeless tobacco: Never Used   Substance Use Topics     Alcohol use: No            Family History:     Family History   Problem Relation Age of Onset     Hypertension Unknown      Diabetes Unknown      Cancer Maternal Grandfather         stomach cancer            Allergies:   No Known Allergies         Medications:     Current Facility-Administered Medications   Medication     acetaminophen (TYLENOL) tablet 650 mg     acetaminophen (TYLENOL) tablet 650 mg     albuterol (PROAIR HFA/PROVENTIL HFA/VENTOLIN HFA) 108 (90 Base) MCG/ACT inhaler 2 puff     allopurinol (ZYLOPRIM) tablet 100 mg     aspirin (ASA) chewable tablet 81 mg     benzonatate (TESSALON) capsule 200 mg     cefTRIAXone (ROCEPHIN) 1 g vial to attach to  mL bag for ADULTS or NS 50 mL bag for PEDS     [Held by  provider] colchicine half-tab 0.3 mg     glucose gel 15-30 g    Or     dextrose 50 % injection 25-50 mL    Or     glucagon injection 1 mg     diclofenac (VOLTAREN) 1 % topical gel 1 g     docusate sodium (COLACE) capsule 100 mg     fluticasone (FLONASE) 50 MCG/ACT spray 2 spray     gabapentin (NEURONTIN) capsule 200 mg     guaiFENesin (ROBITUSSIN) 20 mg/mL solution 10 mL     heparin ANTICOAGULANT injection 5,000 Units     insulin aspart (NovoLOG) injection (RAPID ACTING)     ipratropium - albuterol 0.5 mg/2.5 mg/3 mL (DUONEB) neb solution 3 mL     lidocaine (LMX4) cream     lidocaine 1 % 0.1-1 mL     losartan (COZAAR) tablet 50 mg     magnesium oxide (MAG-OX) tablet 400 mg     melatonin tablet 1 mg     miconazole (MICATIN) 2 % powder     montelukast (SINGULAIR) tablet 10 mg     NIFEdipine ER OSMOTIC (PROCARDIA XL) 24 hr tablet 30 mg     nystatin (MYCOSTATIN) cream     omeprazole (priLOSEC) CR capsule 20 mg     oxybutynin ER (DITROPAN-XL) 24 hr tablet 5 mg     polyethylene glycol (MIRALAX) Packet 17 g     rosuvastatin (CRESTOR) tablet 10 mg     senna-docusate (SENOKOT-S/PERICOLACE) 8.6-50 MG per tablet 1 tablet     sevelamer carbonate (RENVELA) tablet 800 mg     sodium chloride (PF) 0.9% PF flush 3 mL     sodium chloride (PF) 0.9% PF flush 3 mL     sodium chloride (PF) 0.9% PF flush 3 mL     timolol maleate (TIMOPTIC) 0.5 % ophthalmic solution 1 drop     Vitamin D3 (CHOLECALCIFEROL) tablet 125 mcg            Review of Systems:   CONSTITUTIONAL:  negative for fevers, chills and fatigue  RESPIRATORY: +dry cough, negative for chest pain  CARDIOVASCULAR:  negative for chest pain, dyspnea  GASTROINTESTINAL:  negative for nausea, vomiting, change in bowel habits and abdominal pain  GENITOURINARY: +dysuria, negative for vaginal bleeding, vaginal discharge   HEMATOLOGIC/LYMPHATIC: +lower extremity edema, negative for bleeding  ENDOCRINE: +weight changes (loss), negative for heat intolerance and cold intolerance          Physical Exam:     Vitals:    04/11/21 1815 04/11/21 2205 04/12/21 0257 04/12/21 0805   BP:  (!) 140/55  (!) 161/76   BP Location:  Left arm  Left arm   Pulse:  71 74 81   Resp:  18 18 18   Temp:  97.9  F (36.6  C)  97.4  F (36.3  C)   TempSrc:  Oral  Oral   SpO2:  92% 92% 92%   Weight: 100.1 kg (220 lb 11.2 oz)      Height:         General: NAD  CV: RRR, no murmurs, rubs, gallops  Resp: CTAB  Abdomen: soft, non-tender, non-distended, not able to appreciate any abdominal fullness, exam limited by patient position (sitting up in chair) and habitus   :deferred  Extremities: WWP, 2+ lower extremity edema, minimally tender to palpation          Data:     Results for orders placed or performed during the hospital encounter of 04/09/21 (from the past 24 hour(s))   Glucose by meter   Result Value Ref Range    Glucose 94 70 - 99 mg/dL   Glucose by meter   Result Value Ref Range    Glucose 105 (H) 70 - 99 mg/dL   Glucose by meter   Result Value Ref Range    Glucose 144 (H) 70 - 99 mg/dL     US Renal Complete 4/9/2021  FINDINGS:   Right kidney: Measures 10.9 cm in length. Parenchyma is of normal  thickness and echogenicity. No hydronephrosis. Cyst at the midpole of  the right kidney measuring 1.5 x 1.2 x 1.8 cm.     Left kidney: Measures 11.1 cm in length. Parenchyma is of normal  thickness and echogenicity. No hydronephrosis. Simple cyst at the  lower pole left kidney measuring 2.8 x 2.5 x 2.2 cm. Ureteral stent is  visualized within the left kidney. No hydronephrosis.     Bladder: Unremarkable.                                                                    IMPRESSION:  1.  Normal grayscale appearance of both kidneys, without  hydronephrosis. Left ureteral stent is in place. The right ureteral  stent is not well-visualized on this exam.  2.  Bilateral renal cysts.          Assessment and Plan:   Assessment: 65 year old female with history of complicated UTI and bilateral hydronephrosis s/p stent placement during  admission from 1/20-2/11 and uterine mass admitted for ongoing dysuria and concern for recurrent UTI. Patient has a complicated medical history including history of fibroid uterus on prior imaging (as early as 2010), as well as postmenopausal bleeding (incompletely evaluated in 2014), ESRD on dialysis, HTN, CHF, morbid obesity, Afib not on anticoagulation, COPD, HLD, and recurrent cystitis. It was initially suspected that mass could be causing obstruction leading to hydronephrosis, however no evidence of this on renal US from 4/9 and s/p bilateral stent placement. Gyn Onc consulted again today for additional recommendations given that patient will not be able to attend her outpatient visit with Dr. Patino today.     Plan:  # Pelvic mass, suspected uterine fibroid   Low suspicion that pelvic mass is contributing to urinary symptoms including dysuria, especially given no evidence of ongoing hydronephrosis on renal US today. Suspect that patient reported weight loss is related to chronic medical conditions (including ESRD with dialysis and recurrent UTIs) rather than occult malignancy from pelvic mass, however can consider repeat pelvic imaging in the next few weeks to evaluate for growth of this mass. For now, recommend follow up in the outpatient setting. No indication for urgent/emergent surgical management at this time.   - Follow up with Dr. Patino, Gyn Oncology, in outpatient setting  - Message to Gyn Onc clinic sent to help with rescheduling patient      Patient discussed with Dr. Ibarra, Gyn Oncology Fellow.       Kendra England MD PGY-1   [facility] Gynecology Oncology   Gyn Onc Pager: [phone]  04/12/21 11:29 AM    Physician Attestation   I agree with the resident's findings and plan of care as documented in the note.       Yvonne Ibarra MD  Gyn Onc Fellow  [facility]

## 2021-04-12 NOTE — PROGRESS NOTES
Stable - No change in condition.  Her valuables brought by security and given to pt.   Report given to receiving nurse at 1450.   Ride is scheduled for 1530.  Discharge to her prior living arrangements when ride is here.

## 2021-04-12 NOTE — PLAN OF CARE
Vitals:    04/11/21 1815 04/11/21 2205 04/12/21 0257 04/12/21 0805   BP:  (!) 140/55  (!) 161/76   BP Location:  Left arm  Left arm   Pulse:  71 74 81   Resp:  18 18 18   Temp:  97.9  F (36.6  C)  97.4  F (36.3  C)   TempSrc:  Oral  Oral   SpO2:  92% 92% 92%   Weight: 100.1 kg (220 lb 11.2 oz)      Height:       AVSS.    Neuro: Alert and oriented x4. Very emotional regarding family situations.     GI/: Anuric. On HD - next HD tomorrow. Had x 1 BM.    Diet: Low NA & potassium diet.     Incisions/Drains: None.     IV Access: PIV - removed for discharge.     Labs: None this shift.     Activity: OOB with walker and assist of one. Chair for meals.     Pain: c/o burning with voiding. Pt voids small amounts 30-40cc.     New changes this shift: Seen by nephrology and Gynecology teams this am.     Plan: Discharge to prior living arrangements this afternoon. Rx sent to discharge pharmacy.

## 2021-04-12 NOTE — PROGRESS NOTES
Care Management Discharge Note    Discharge Date: 04/12/21    Discharge Disposition:    [facility]  [address] [address]  Ph: [phone]  F: [phone]    Discharge Services:  None  Discharge DME: None    Discharge Transportation:  ride with [email or web address].     Private pay costs discussed: transportation costs    PAS Confirmation Code: NA, pt returning to LTC.   Patient/family educated on Medicare website which has current facility and service quality ratings: NA     Education Provided on the Discharge Plan: yes  Persons Notified of Discharge Plans: Pt, pt's LTC ([place]), provider, and nurse.   Patient/Family in Agreement with the Plan: yes    Handoff Referral Completed: Yes    Additional Information:  Per pt's med team, pt is med ready for discharge back to LTC. SW was notified by pt's provider and spoke with nurse at LTC who confirmed that pt is able to return. SW faxed over discharge orders and per facility nurse, pt is able to return anytime today (preferrably before 5pm).     SW spoke with pt on the phone to introduce self and to discuss dispo plan. Pt would like to contact her family before asking for SW to assist in providing transportation.     ADDENDUM: 13:45  SW was informed by bedside nurse that pt would like support in setting up transportation. SW set up transport for 3:30pm via  with [email or web address]. SW called pt's bedside phone to provide update on transportation and pt was agreeable. MAR discussed medicare rights with pt and faxed over IMM form. SW placed IMM in chart.     SW confirmed with LT that they are able to take pt back today.     ELROY Mayberry, LGSW  Acute Care Float   [facility]  Phone: [phone]  Pager: [phone]

## 2021-04-12 NOTE — DISCHARGE INSTRUCTIONS
You were admitted to the hospital for burning with urination, we did many tests believe it is possible you had a urinary tract infection related to bacteria in your urine possibly fungus in your urine.  We gave you antibiotics and antifungal medications to help improve your symptoms.  Is importantly follow-up with your gynecology doctors to consider further treatment options to prevent recurrence of urinary tract infections.  If you develop symptoms of fever, nausea, vomiting, abdominal pain, blood in your urine, or other symptoms like we discussed come to the ER for further evaluation    FOR YOUR UTI:  -- take your antibiotic twice daily until gone    FOR YOUR FIBROID:  --Follow up with Dr. Patino, Gyn Oncology, in clinic, someone will call to make an appointment

## 2021-04-12 NOTE — PLAN OF CARE
Vital signs:  Temp: 97.9  F (36.6  C) Temp src: Oral BP: (!) 140/55 Pulse: 74   Resp: 18 SpO2: 92 % O2 Device: None (Room air)     Time: 1930-0730  Neuros: A&Ox4, intermittently anxious.   Cardiac: HTN, denies chest pain.   Respiratory: LS diminished, c/o SOB and VIZCAINO prn albuterol inhaler x1 and prn duoneb x1 given, frequent dry cough tessalon capsule given with relief, pt declined robitussin.   Pain: c/o burning/itching dysuria nystatin applied doreen cares done, prn tylenol x1 with some relief.   Nausea: Denies nausea.   Diet: 3g Na, 3g K.   GI/: Oliguria, dysuria, on hemodialysis. +BS, +flatus, LBM 4/11.   Skin: Perineal area irritated, doreen cares done, nystatin cream and powder applied. Small skin tear on R abdominal fold.   Lines: L PIV SL.   Drains: none   Labs: , 135, 104.   Activity: Up to chair and bedside commode with A2, walker ordered.   New Changes this Shift: none.   Plan: Continue POC.

## 2021-04-13 NOTE — DISCHARGE SUMMARY
[facility]  Hospitalist Discharge Summary      Date of Admission:  4/9/2021  Date of Discharge:  4/12/2021  3:30 PM  Discharging Provider: Shane Ayers MD  Discharge Team: Hospitalist Service, Gold 9    Discharge Diagnoses    History of recurrent UTI, including VRE  Bilateral hydronephrosis status post stent placement  Uterine mass  End-stage renal disease on dialysis  Hypertension  Diabetes type 2  Hx of Morbid obesity BMI>60  Asthma  History of vertebral artery aneurysm  Gout  Hyperlipidemia  Sick sinus syndrome status post distant pacemaker placement      Follow-ups Needed After Discharge   Follow-up Appointments     Follow Up and recommended labs and tests      Follow up with Nursing home physician.  No follow up labs or test are   needed.           ITEMS FOR PCP FOLLOW UP  --Follow-up with gynecology for uterine mass, appreciate gynecology assistance in arranging follow-up  --Follow-up with PCP or SNF MD for recurrent UTI, monitoring for resolution of symptoms  --Routine hemodialysis follow-up as already in place  --Routine follow-up of compensated chronic diastolic heart failure by PCP  Unresulted Labs Ordered in the Past 30 Days of this Admission     No orders found from 3/10/2021 to 4/10/2021.      These results will be followed up by Dr Ayers    Discharge Disposition   Discharged to short-term care facility  Condition at discharge: Stable     Hospital Course     65-year-old female with history of recurrent UTIs requiring several recent hospitalizations, uterine fibroid, sick sinus syndrome status post pacemaker placement, ESRD on hemodialysis, and type 2 diabetes who presents with dysuria.  She says at first that this has been going on for the last 2 days, but when asked further she says she has had these symptoms for the past year.  She has not had any fevers at home or blood in her urine.  She still makes urine and urinates every day.  She endorses  shortness of breath and baseline cough that has been going on for a year.  She also notes bilateral lower extremity swelling which started over the last week.     Of note, she has had several hospital admissions in the last 3 months.  Most recently she was admitted from March 26 to April 1 at [facility] for shortness of breath cough fever and weakness and dysuria.  She was thought to have volume overload due to having missed 3 dialysis appointments and she was treated with Lasix but was not discharged on any diuretics.  She was given a 5-day course of prednisone and a course of doxycycline for a COPD exacerbation as well. Additionally she was treated with ceftriaxone for 5 days for UTI.      She was also admitted at [facility] from March 16 to March 20 for dysuria.  At the time her urine culture grew mixed gabby and so ID recommended discontinuing antibiotics.     Urine culture hx:   3/26/21 - E coli, resistant to ampicillin, ciprofloxacin, bactrim, tetracyclines, treated with 5 days of CTX  1/29.21 - Enterococcus faecium, amp and levo resistant, treated with Linezolid      ED course: Afebrile with relative hypotension with SPBs in 90s to low 100s. Initial oxygen saturations in low 90s and was placed on 2L NC. However, by time of my exam, was able to wean to RA without issues. Lab workup notable for CRP of 16.0. No leukocytosis on CBC. Anemic to 9.1. BMP notable for elevated creatinine. UA with moderate LE, large blood, 222 WBCs, >182 RBCs. Urine culture pending. LE U/S negative for DVT. COVID/flu/RSV negative. CXR with mild left basilar infiltrate with increased prominence from 4/8 CXR. She was given a dose of CTX.     The patient was admitted to hospitalist service for evaluation and treatment of complicated UTI.  She had considerable symptom burden of burning and dysuria and general malaise that was improved by hospital day 2, and continued to improve by time of discharge.  She completed routine dialysis  "as an inpatient, her blood cultures were no growth today at the time of discharge and her urine cultures had mixed gabby at the time of discharge.  The case was discussed with gynecology who confirmed that, absent worsened hydronephrosis and absence any other ureteral complications, no surgical intervention would be pursued during this admission or in the near further.They would follow-up with the patient in clinic and consider surgical options if further complications arise.     Addendum:  Patient's urine culture had final result of 10,000 to 50,000 unit colony-forming units of VRE, given the patient had clinical improvement on ceftriaxone and cephalosporin, and and culture was less than 100,000 colony-forming units, this VRE is thought to be contaminant or at the very least not a true pathogen.  Spoke to Dr Coburn at [facility], she stated will follow up, was in agreement of plan to not treat VRE unless patient symptomatic given downsides of inducing resistance if used linezolid as the last line agent for VRE     Consultations This Hospital Stay   NEPHROLOGY ESRD ADULT IP CONSULT  MEDICATION HISTORY IP PHARMACY CONSULT  VASCULAR ACCESS CARE ADULT IP CONSULT  PHYSICAL THERAPY ADULT IP CONSULT  GYNECOLOGIC ONCOLOGY ADULT IP CONSULT    Code Status   Prior    Time Spent on this Encounter   I, Shane Ayers MD, personally saw the patient today and spent greater than 30 minutes discharging this patient.       Shane Ayers MD  [facility] [address] [address]  Phone: [phone]  ______________________________________________________________________    Physical Exam   Vital Signs:                    BP (!) 161/76 (BP Location: Left arm)   Pulse 81   Temp 97.4  F (36.3  C) (Oral)   Resp 18   Ht 1.638 m (5' 4.5\")   Wt 100.1 kg (220 lb 11.2 oz)   SpO2 92%   BMI 37.30 kg/m      BP (!) 161/76 (BP Location: Left arm)   Pulse 81   Temp 97.4  F (36.3  C) (Oral)   Resp 18   " "Ht 1.638 m (5' 4.5\")   Wt 100.1 kg (220 lb 11.2 oz)   SpO2 92%   BMI 37.30 kg/m      Weight: 220 lbs 11.2 oz    General: well appearing woman sittin up in chair, watching TV  Head: NC, AT  Eye: symm gaze, anicteric sclerae  ENT: patent nares wo drainage/epistaxis, MMM    Pulm: CTAB, comfortable WOB on room air  CV: normal rate, regular rhythm; bilateral 2+ pitting lower extremity edema   GI: soft, nontender, obese  Neuro: awake, alert, hearing speech and phonation, intact grossly  MSK: Bilateral lower extremity edema, generous soft tissue; sarcopenia    Subjective:  Overnight no acute events, patient very tearful this morning she laments that her of her 7 children none of them call her she has 29 grandchildren and numerous great-grandchildren, she is very till for that none of her family talks to her and admits to feeling quite lonely, gave empathy and support, offered consultation with psychology which she declined, stating she \"will deal with this on her own\"     She states that she is eager to get back to her rehab facility as she does not like doing dialysis in the hospital dialysis room she is more comfortable doing dialysis at her facility.  Education anticipatory guidance were given for treatment of complicated UTI including but not limited to diarrhea associated with antibiotics, recurrence of UTI, and return precautions were reviewed including not limited to fever, abdominal pain, nausea vomiting, chest pain, shortness of breath, failure to improve.  Patient stated her understanding.         Primary Care Physician   Collin Doran    Discharge Orders      General info for SNF    Length of Stay Estimate: Short Term Care: Estimated # of Days 31-90  Condition at Discharge: Stable  Level of care:board and care  Rehabilitation Potential: Fair  Admission H&P remains valid and up-to-date: Yes  Recent Chemotherapy: N/A  Use Nursing Home Standing Orders: Yes     Reason for your hospital stay    Complicated UTI "     IV access    Hemodialysis.     Activity - Up with assistive device     Follow Up and recommended labs and tests    Follow up with Nursing home physician.  No follow up labs or test are needed.     Physical Therapy Adult Consult    Evaluate and treat as clinically indicated.    Reason:  Debility and frailty     Fall precautions     Advance Diet as Tolerated    Follow this diet upon discharge: Orders Placed This Encounter      Advance Diet as Tolerated: Regular Diet Adult; 3 gm NA Diet; 3 gm K Diet       Significant Results and Procedures   Most Recent 3 CBC's:  Recent Labs   Lab Test 04/11/21 0752 04/09/21 2149 04/08/21 0052   WBC 6.3 7.4 6.9   HGB 9.2* 9.1* 9.3*   MCV 99 97 99    234 246     Most Recent 3 BMP's:  Recent Labs   Lab Test 04/11/21 0752 04/09/21 2149 04/08/21 0052    138 138   POTASSIUM 3.7 3.6 4.1   CHLORIDE 102 103 104   CO2 26 24 24   BUN 39* 60* 64*   CR 4.13* 5.02* 5.60*   ANIONGAP 7 11 10   JOANNA 9.2 9.1 8.6   * 234* 133*     Most Recent 2 LFT's:  Recent Labs   Lab Test 04/09/21 2149 04/08/21 0052   AST 11 12   ALT 10 12   ALKPHOS 139 142   BILITOTAL 0.3 0.3     Most Recent 3 INR's:  Recent Labs   Lab Test 04/09/21 2149   INR 1.15*     Most Recent INR's and Anticoagulation Dosing History:  Anticoagulation Dose History     Recent Dosing and Labs Latest Ref Rng & Units 4/9/2021    INR 0.86 - 1.14 1.15(H)        Most Recent 3 Creatinines:  Recent Labs   Lab Test 04/11/21 0752 04/09/21 2149 04/08/21 0052   CR 4.13* 5.02* 5.60*     Most Recent 3 Hemoglobins:  Recent Labs   Lab Test 04/11/21 0752 04/09/21 2149 04/08/21  0052   HGB 9.2* 9.1* 9.3*     Most Recent 3 Troponin's:  Recent Labs   Lab Test 04/08/21  0232 04/08/21  0052   TROPI 0.021 0.027     Most Recent 3 BNP's:  Recent Labs   Lab Test 04/08/21  0052   NTBNPI 3,895*     Most Recent D-dimer:No lab results found.  Most Recent Cholesterol Panel:No lab results found.  Most Recent 6 Bacteria Isolates From Any  Culture (See EPIC Reports for Culture Details):  Recent Labs   Lab Test 04/10/21  0248 04/09/21  2130 04/08/21  0738 03/17/21  0902 03/16/21  1105 03/16/21  0830   CULT No growth 10,000 to 50,000 colonies/mL  Enterococcus faecium (VRE)  *  <10,000 colonies/mL  mixed urogenital gabby  Susceptibility testing not routinely done    Critical Value/Significant Value called to and read back by  Louis Ardon RN on 4/12/21 at 0558. LSA   50,000 to 100,000 colonies/mL  mixed urogenital gabby  Susceptibility testing not routinely done   >100,000 colonies/mL  mixed urogenital gabby  Multiple morphotypes present with no predominant organism.  Growth consistent with   probable contamination during collection.  Suggest repeat specimen if clinically   indicated.  Susceptibility testing not routinely done   No growth No growth     Most Recent TSH and T4:No lab results found.  Most Recent Hemoglobin A1c:  Recent Labs   Lab Test 03/16/21  1105   A1C 4.9     Most Recent 6 glucoses:  Recent Labs   Lab Test 04/11/21  0752 04/09/21 2149 04/08/21  0052 03/18/21  0815 03/16/21  0819 03/16/21   * 234* 133* 107* 109* 133*     Most Recent Urinalysis:  Recent Labs   Lab Test 04/09/21 2130   COLOR Yellow   APPEARANCE Slightly Cloudy   URINEGLC Negative   URINEBILI Negative   URINEKETONE Negative   SG 1.018   UBLD Large*   URINEPH 5.5   PROTEIN 200*   NITRITE Negative   LEUKEST Moderate*   RBCU >182*   WBCU 222*     Most Recent ABG:No lab results found.  Most Recent ESR & CRP:  Recent Labs   Lab Test 04/09/21 2149   CRP 16.0*     Most Recent Anemia Panel:  Recent Labs   Lab Test 04/11/21 0752   WBC 6.3   HGB 9.2*   HCT 29.9*   MCV 99        Most Recent CPK:No lab results found.,   Results for orders placed or performed during the hospital encounter of 04/09/21   US Lower Extremity Venous Duplex Bilateral    Narrative    EXAMINATION: US LOWER EXTREMITY VENOUS DUPLEX BILATERAL    COMPARISON: None.    HISTORY: Calf pain and  swelling    TECHNIQUE:  Gray-scale evaluation with compression, spectral flow and  color Doppler assessment of the deep venous system of bilateral lower  extremity.    FINDINGS:  In the right lower extremity, the common femoral, femoral, popliteal  and posterior tibial veins demonstrate normal compressibility and  blood flow.    In the left lower extremity, the common femoral, femoral, popliteal  and posterior tibial veins demonstrate normal compressibility and  blood flow.      Impression    IMPRESSION:  1.  No evidence for deep venous thrombosis in either lower extremity.    I have personally reviewed the examination and initial interpretation  and I agree with the findings.    SMOOTH MAGANA MD   XR Chest Port 1 View    Narrative    EXAM: XR CHEST PORT 1 VW  LOCATION: [facility]  DATE/TIME: 4/9/2021 10:06 PM    INDICATION: Dyspnea.  COMPARISON: 4/8/2021.    FINDINGS: Right IJ infusion catheter. Left subclavian cardiac device. No pneumothorax. The heart is enlarged. There is no pulmonary edema. There is a mild left basilar infiltrate. Lungs otherwise clear.      Impression    IMPRESSION: Left basilar atelectasis or pneumonia.   US Renal Complete    Narrative    EXAMINATION: US RENAL COMPLETE, 4/10/2021 7:53 AM     COMPARISON: CT abdomen pelvis 2/15/2012    HISTORY: 65-year-old with recurrent UTI; history of bilateral  hydronephrosis status post stent placement, readmitted for hypoxia    TECHNIQUE: The kidneys and bladder were scanned in the standard  fashion with specialized ultrasound transducer(s) using both gray  scale and limited color/spectral Doppler techniques.    FINDINGS:    Right kidney: Measures 10.9 cm in length. Parenchyma is of normal  thickness and echogenicity. No hydronephrosis. Cyst at the midpole of  the right kidney measuring 1.5 x 1.2 x 1.8 cm.    Left kidney: Measures 11.1 cm in length. Parenchyma is of normal  thickness and echogenicity. No hydronephrosis. Simple cyst at  the  lower pole left kidney measuring 2.8 x 2.5 x 2.2 cm. Ureteral stent is  visualized within the left kidney. No hydronephrosis.    Bladder: Unremarkable.      Impression    IMPRESSION:  1.  Normal grayscale appearance of both kidneys, without  hydronephrosis. Left ureteral stent is in place. The right ureteral  stent is not well-visualized on this exam.  2.  Bilateral renal cysts.    I have personally reviewed the examination and initial interpretation  and I agree with the findings.    SMOOTH MAGANA MD       Discharge Medications   Discharge Medication List as of 4/12/2021  2:45 PM      START taking these medications    Details   cephALEXin (KEFLEX) 500 MG capsule Take 1 capsule (500 mg) by mouth 2 times daily for 2 days, Disp-4 capsule, R-0, Local Print         CONTINUE these medications which have NOT CHANGED    Details   acetaminophen (TYLENOL) 325 MG tablet Take 650 mg by mouth 4 times daily For pain, Historical      albuterol (PROAIR HFA/PROVENTIL HFA/VENTOLIN HFA) 108 (90 Base) MCG/ACT inhaler Inhale 2 puffs into the lungs every 6 hours as needed for shortness of breath / dyspnea or wheezing, HistoricalPharmacy may dispense brand covered by insurance (Proair, or proventil or ventolin or generic albuterol inhaler)      allopurinol (ZYLOPRIM) 100 MG tablet Take 100 mg by mouth daily For gout, Historical      Ascorbic Acid (VITAMIN C-MILKA HIPS) 500 MG CHEW Take 500 mg by mouth 2 times daily, Historical      aspirin 81 MG EC tablet Take 81 mg by mouth daily For prevention of heart attack, Historical      benzonatate (TESSALON) 200 MG capsule Take 200 mg by mouth 3 times daily For cough, Historical      coenzyme Q-10 200 MG CAPS Take 200 mg by mouth daily For heart disease, Historical      colchicine (COLCYRS) 0.6 MG tablet Take 0.3 mg by mouth once a week Every Saturday evening for gout, Historical      diclofenac (VOLTAREN) 1 % topical gel Apply 1 g topically 3 times daily as needed for moderate pain (to  bilateral toes), Historical      Dimethicone-Zinc Oxide 5-5 % CREA Apply topically to vulva each shift with each diaper change for painHistorical      diphenhydrAMINE (BENADRYL) 25 MG tablet Take 25 mg by mouth 2 times daily as needed for itching, Historical      docusate sodium (COLACE) 100 MG tablet Take 100 mg by mouth daily For constipation, Historical      fluticasone (FLONASE) 50 MCG/ACT nasal spray Spray 2 sprays into both nostrils daily For allergies, Historical      furosemide (LASIX) 40 MG tablet Take 1 tablet (40 mg) by mouth daily for 7 days, Disp-7 tablet, R-0, Local Print      gabapentin (NEURONTIN) 100 MG capsule Take 200 mg by mouth three times a week Every Mon, Wed, Friday at bedtime on non-dialysis days for neuropathic pain, Historical      ipratropium - albuterol 0.5 mg/2.5 mg/3 mL (DUONEB) 0.5-2.5 (3) MG/3ML neb solution Take 1 vial by nebulization 4 times daily For shortness of breath, Historical      Lidocaine (LIDOCARE) 4 % Patch Place 1 patch onto the skin every 24 hours To prevent lidocaine toxicity, patient should be patch free for 12 hrs daily.Historical      losartan (COZAAR) 50 MG tablet Take 50 mg by mouth daily For hypertension, Historical      magnesium oxide (MAG-OX) 400 (240 Mg) MG tablet Take 400 mg by mouth 2 times daily For hypomagnesium, Historical      montelukast (SINGULAIR) 10 MG tablet Take 10 mg by mouth At Bedtime For allergies, Historical      NIFEdipine ER (ADALAT CC) 30 MG 24 hr tablet Take 30 mg by mouth daily For heart disease, Historical      omeprazole (PRILOSEC) 20 MG DR capsule Take 20 mg by mouth daily For GERD, Historical      oxybutynin ER (DITROPAN-XL) 5 MG 24 hr tablet Take 5 mg by mouth every evening For overactive bladder, Historical      polyethylene glycol (MIRALAX) 17 g packet Take 1 packet by mouth daily as needed for constipation, Historical      rosuvastatin (CRESTOR) 10 MG tablet Take 10 mg by mouth daily At bedtime for hyperlipidemia, Historical       senna-docusate (SENOKOT-S/PERICOLACE) 8.6-50 MG tablet Take 1 tablet by mouth daily as needed for constipation, Historical      sevelamer carbonate (RENVELA) 800 MG tablet Take 800 mg by mouth With meals for phosphorus supplement, Historical      timolol maleate (TIMOPTIC) 0.5 % ophthalmic solution Place 1 drop into both eyes 2 times daily, Historical      Vitamin D3 (CHOLECALCIFEROL) 125 MCG (5000 UT) tablet Take 1 tablet by mouth daily For osteoporosis, Historical           Allergies   No Known Allergies

## 2021-04-19 NOTE — PROGRESS NOTES
Consult Notes on Referred Patient    Date: 2021       Dr. Waqas Lindsey, MD  [address] [place] [address]       RE: Sun Mireles  : 1955  ALLA: 2021    Dear Dr. Waqas Lindsey:    I had the pleasure of seeing your patient Sun Mireles here at the Gynecologic Cancer Clinic at the [facility] on 2021.  As you know she is a very pleasant 65 year old woman with a recent diagnosis of uterine mass.       Hospitalized at [facility], since left burning with urination. Cold all the time. Eating and drinking well. Bowels working daily, burns with urination. No vaginal bleeding. On dialysis due to kidney failure from diabetes. Started dialysis on Tuesday, Thursday and Sat. Started last year.    HPI:  Sun Mireles is a 65 year old  female with large pelvic mass, suspected to be originating from the ovary on MRI during this admission, with history of fibroid uterus on prior imaging (as early as ), as well as postmenopausal bleeding (incompletely evaluated in ), ESRD on dialysis, HTN, CHF, morbid obesity, Afib not on anticoagulation, COPD, HLD, and recurrent cystitis with recent admission (2021 at [facility]) for sepsis due to cystitis with bilateral hyeronephrosis, now admitted 3/16/21 with dysuria. Infectious disease, benign gynecology, nephrology, and urology consulted and following during this admission. Large pelvic mass noted on recent imaging (and imaging historically), however, no comment of hydroureter on MRI 3/16 or CT 3/16. There is conflicting radiology reads as to where the mass originates, with prior imaging at OSH suggesting fibroid uterus versus most recent MRI during this admission notes mass likely arises from the ovary with normal-appearing uterus.  After reviewing chart and Care Everywhere, patient was seen in Gyn Oncology clinic 2014 for postmenopausal bleeding in context of suspected fibroid uterus.  CA-125 normal on this admission, additional tumor markers pending. No postmenopausal bleeding currently.    3/16/21: admitted to [facility]-abnormal UTI  3/16/21: Pelvic US  IMPRESSION:  1.  Large central mass in the pelvis. This could represent a large  uterus due to fibroids, malignancy, or some other process. Recommend  further evaluation MRI may be helpful.  2.  The ovaries are not identified due to the large mass.     3/16/21: CT AP    IMPRESSION:   1.  Bladder wall thickening, correlate with urinalysis to exclude cystitis. Small amount of air is seen within bladder, correlate for recent instrumentation.    2.  No bowel obstruction, colitis, or diverticulitis.    3.  No obstructing renal or ureteral stone.    4.  Status post cholecystectomy.    5.  Fibroid uterus.    6.  Atherosclerotic vascular disease.    CT chest:  Result Impression   1.  No pulmonary embolus, aortic dissection, or aneurysm.    2.  Pulmonary hypertension.    3.  Moderate emphysema.    4.  Mildly enlarged heart with coronary artery disease and atherosclerotic vascular disease.    5.  Nonspecific bilateral hilar lymphadenopathy, not significantly changed.         3/18/21 MRI Pelvis  FINDINGS:  There is a 16.7 x 10.1 x 8.8 cm mass in the abdomen and pelvis. This  is generally hypointense on T1 and T2-weighted imaging with multiple  T2 hyperintense regions within possibly representing fluid or fat.  This mass exhibits diffusion restriction. Although difficult to  ascertain, this appears to arise from the left ovary or right ovary  and does not appear to contact the uterus.     Normal urinary bladder. Normal bowel in the field-of-view. A  fat-containing right inguinal hernia is noted.                                                                      IMPRESSION:  1.  Large mass in the abdomen and pelvis. This is favored to arise  from one of the ovaries, though this is difficult to assess. Surgical  consultation recommended.  2.  No uterine  fibroids. Normal appearance of the uterus.    Upon further review and consultation, the large mass does  appear to arise from the uterus, making this most likely to represent  a large fibroid. No significant diffusion restriction of the mass.    4/40/21: Renal US  IMPRESSION:  1.  Normal grayscale appearance of both kidneys, without  hydronephrosis. Left ureteral stent is in place. The right ureteral  stent is not well-visualized on this exam.  2.  Bilateral renal cysts.    4/9/21: Admit to hospital 4/9/21-4/12/21  History of recurrent UTI, including VRE  Bilateral hydronephrosis status post stent placement  Uterine mass  End-stage renal disease on dialysis  Hypertension  Diabetes type 2  Hx of Morbid obesity BMI>60  Asthma  History of vertebral artery aneurysm  Gout  Hyperlipidemia  Sick sinus syndrome status post distant pacemaker placement    Urine culture hx:   3/26/21 - E coli, resistant to ampicillin, ciprofloxacin, bactrim, tetracyclines, treated with 5 days of CTX  1/29.21 - Enterococcus faecium, amp and levo resistant, treated with Linezolid     Review of Systems:    I have reviewed the pertinent positive findings in the review of symptoms with the patient.      Past Medical History:    Past Medical History:   Diagnosis Date     Aneurysm of vertebral artery (H)      Asthma      Asthma      CHF (congestive heart failure) (H)      CHF (congestive heart failure) (H)     diastolic      Chronic kidney disease      Chronic kidney disease, stage 4, severely decreased GFR (H)      Chronic pulmonary heart disease, unspecified      Gout, unspecified      Hypercholesterolemia      Hypertension      Localized osteoarthrosis not specified whether primary or secondary, lower leg     Knee     Morbid obesity (H)     BMI>60     Obstructive sleep apnea (adult) (pediatric)      Type II or unspecified type diabetes mellitus with unspecified complication, not stated as uncontrolled      Unspecified glaucoma(365.9)       Unspecified urinary incontinence      Dysuria without UTI  ESRD  Chronic anemia of ESRD  Fibroid uterus  Chronic diastolic CHF  Chronic atrial fibrillation  SSS s/p PPM placement (10/2020)  Dyslipidemia  Diabetes mellitus type 2, diet controlled, with peripheral nephropathy and retinopathy  COPD, non-oxygen dependent, with emphysema  Glaucoma    OB/GYN  history:  Patient is a , she has had one vaginal delivery and one  section.  She delivered via  in  and had a PPTL to follow. She became pregnant with an IUP subsequently and delivered via C/S with BTL. At the time of surgery patient reports that the surgeon noted that the left fallopian tube was intact.  She denies other significant gyn history.  She reports regular pap smears throughout her life; the pap collected in  was abnormal.  Patient is uncertain why it was abnormal and had no follow up (Records not available). Pap in  NILM.     Patient reports that she has been menopausal since .  However, between 6169-2363 she has had intermittent vaginal bleeding on average every 6 months or so.  Two times the bleeding has been significantly heavy lasting 6 days.  Her last episode of bleeding was 10/2013.  She has not had bleeding since.  She had an endometrial biopsy performed in  that showed scant endometrium. Pelvic ultrasound and follow up in clinic was recommended, but patient was lost to follow up.     Endometrial biopsy 2014  - Scant fragments of benign squamous epithelium, benign endocervical   glands and a few possible minute strips of benign endometrial surface   epithelium.   - Endometrial tissue is insufficient for further diagnosis.     Past Surgical History:    Past Surgical History:   Procedure Laterality Date     C/SECTION, LOW TRANSVERSE  89     TUBAL LIGATION  80, 89         Health Maintenance:  Health Maintenance Due   Topic Date Due     DEXA  Never done     HF ACTION PLAN  Never done     LIPID  Never done      MICROALBUMIN  Never done     TSH W/FREE T4 REFLEX  Never done     DIABETIC FOOT EXAM  Never done     EYE EXAM  Never done     MAMMO SCREENING  Never done     Pneumococcal Vaccine: Pediatrics (0 to 5 Years) and At-Risk Patients (6 to 64 Years) (1 of 4 - PCV13) Never done     Pneumococcal Vaccine: 65+ Years (1 of 4 - PCV13) Never done     COLORECTAL CANCER SCREENING  Never done     COVID-19 Vaccine (1) Never done     HEPATITIS C SCREENING  Never done     DTAP/TDAP/TD IMMUNIZATION (1 - Tdap) Never done     ZOSTER IMMUNIZATION (1 of 2) Never done     MEDICARE ANNUAL WELLNESS VISIT  Never done     FALL RISK ASSESSMENT  Never done     PHQ-2  Never done            Current Medications:     has a current medication list which includes the following prescription(s): acetaminophen, albuterol, allopurinol, vitamin c-hi hips, aspirin, benzonatate, coenzyme q-10, colchicine, diclofenac, dimethicone-zinc oxide, diphenhydramine, docusate sodium, fluticasone, furosemide, gabapentin, ipratropium - albuterol 0.5 mg/2.5 mg/3 ml, lidocaine, losartan, magnesium oxide, montelukast, nifedipine er, omeprazole, oxybutynin er, polyethylene glycol, rosuvastatin, senna-docusate, sevelamer carbonate, timolol maleate, and vitamin d3.       Allergies:     No Known Allergies         Social History:     Social History     Tobacco Use     Smoking status: Current Every Day Smoker     Packs/day: 1.00     Years: 45.00     Pack years: 45.00     Smokeless tobacco: Never Used   Substance Use Topics     Alcohol use: No       History   Drug Use No           Family History:     The patient's family history is notable for the following.    Family History   Problem Relation Age of Onset     Hypertension Unknown      Diabetes Unknown      Cancer Maternal Grandfather         stomach cancer     No colon breast or ovarian cancer    Physical Exam:     /68   Pulse 82   Temp 98.1  F (36.7  C) (Oral)   There is no height or weight on file to calculate  BMI.    General Appearance: In wheelchair, appears fatigue.     Gastrointestinal:       abdomen soft, minimally tender, non-distended, pendulous abdomen, large firm mass palpable midline.    Genitourinary: Patient refused exam today    Assessment:    Sun Mireles is a 65 year old woman with a new diagnosis of recurrent UTIs large uterine mass consistent with uterine fibroid.  -Right ureteral stent but both removed.  -On dialysis due to ESKD      Plan:     1.)     Large uterine mass, complex medical medical history, hx of UTIs. Suspect  Uterine fibroid that is causing patient abdominal discomfort and recurrent UTIs     -PAC visit next Friday to assess risk of surgery. Patient with multiple medical issues.  -RTC next Friday for pelvic exam as patient does not want to do today without her daughter present,[place] [phone]      Thank you for allowing us to participate in the care of your patient.         Sincerely,    Marquita Patino MD    Department of Ob/Gyn and Women's Health  Division of Gynecologic Oncology  [facility]  [phone]      CC  Patient Care Team:  Collin Doran MD as PCP - SHERRILL Shukla

## 2021-04-19 NOTE — LETTER
2021         RE: Sun Mireles  [address] [place] [address]        Dear Colleague,    Thank you for referring your patient, Sun Mireles, to the [facility]. Please see a copy of my visit note below.                            Consult Notes on Referred Patient    Date: 2021       Dr. Waqas Lindsey MD  [address] [place] [address]       RE: Sun Mireles  : 1955  ALLA: 2021    Dear Dr. Waqas Lindsey:    I had the pleasure of seeing your patient Sun Mireles here at the Gynecologic Cancer Clinic at the [facility] on 2021.  As you know she is a very pleasant 65 year old woman with a recent diagnosis of uterine mass.       Hospitalized at [facility], since left burning with urination. Cold all the time. Eating and drinking well. Bowels working daily, burns with urination. No vaginal bleeding. On dialysis due to kidney failure from diabetes. Started dialysis on Tuesday, Thursday and Sat. Started last year.    HPI:  Sun Mireles is a 65 year old  female with large pelvic mass, suspected to be originating from the ovary on MRI during this admission, with history of fibroid uterus on prior imaging (as early as ), as well as postmenopausal bleeding (incompletely evaluated in ), ESRD on dialysis, HTN, CHF, morbid obesity, Afib not on anticoagulation, COPD, HLD, and recurrent cystitis with recent admission (2021 at [facility]) for sepsis due to cystitis with bilateral hyeronephrosis, now admitted 3/16/21 with dysuria. Infectious disease, benign gynecology, nephrology, and urology consulted and following during this admission. Large pelvic mass noted on recent imaging (and imaging historically), however, no comment of hydroureter on MRI 3/16 or CT 3/16. There is conflicting radiology reads as to where the mass originates, with prior imaging at OSH suggesting fibroid uterus versus most  recent MRI during this admission notes mass likely arises from the ovary with normal-appearing uterus.  After reviewing chart and Care Everywhere, patient was seen in Gyn Oncology clinic 2/2014 for postmenopausal bleeding in context of suspected fibroid uterus. CA-125 normal on this admission, additional tumor markers pending. No postmenopausal bleeding currently.    3/16/21: admitted to [facility]-abnormal UTI  3/16/21: Pelvic US  IMPRESSION:  1.  Large central mass in the pelvis. This could represent a large  uterus due to fibroids, malignancy, or some other process. Recommend  further evaluation MRI may be helpful.  2.  The ovaries are not identified due to the large mass.     3/16/21: CT AP    IMPRESSION:   1.  Bladder wall thickening, correlate with urinalysis to exclude cystitis. Small amount of air is seen within bladder, correlate for recent instrumentation.    2.  No bowel obstruction, colitis, or diverticulitis.    3.  No obstructing renal or ureteral stone.    4.  Status post cholecystectomy.    5.  Fibroid uterus.    6.  Atherosclerotic vascular disease.    CT chest:  Result Impression   1.  No pulmonary embolus, aortic dissection, or aneurysm.    2.  Pulmonary hypertension.    3.  Moderate emphysema.    4.  Mildly enlarged heart with coronary artery disease and atherosclerotic vascular disease.    5.  Nonspecific bilateral hilar lymphadenopathy, not significantly changed.         3/18/21 MRI Pelvis  FINDINGS:  There is a 16.7 x 10.1 x 8.8 cm mass in the abdomen and pelvis. This  is generally hypointense on T1 and T2-weighted imaging with multiple  T2 hyperintense regions within possibly representing fluid or fat.  This mass exhibits diffusion restriction. Although difficult to  ascertain, this appears to arise from the left ovary or right ovary  and does not appear to contact the uterus.     Normal urinary bladder. Normal bowel in the field-of-view. A  fat-containing right inguinal hernia is noted.                                                                       IMPRESSION:  1.  Large mass in the abdomen and pelvis. This is favored to arise  from one of the ovaries, though this is difficult to assess. Surgical  consultation recommended.  2.  No uterine fibroids. Normal appearance of the uterus.    Upon further review and consultation, the large mass does  appear to arise from the uterus, making this most likely to represent  a large fibroid. No significant diffusion restriction of the mass.    4/40/21: Renal US  IMPRESSION:  1.  Normal grayscale appearance of both kidneys, without  hydronephrosis. Left ureteral stent is in place. The right ureteral  stent is not well-visualized on this exam.  2.  Bilateral renal cysts.    4/9/21: Admit to hospital 4/9/21-4/12/21  History of recurrent UTI, including VRE  Bilateral hydronephrosis status post stent placement  Uterine mass  End-stage renal disease on dialysis  Hypertension  Diabetes type 2  Hx of Morbid obesity BMI>60  Asthma  History of vertebral artery aneurysm  Gout  Hyperlipidemia  Sick sinus syndrome status post distant pacemaker placement    Urine culture hx:   3/26/21 - E coli, resistant to ampicillin, ciprofloxacin, bactrim, tetracyclines, treated with 5 days of CTX  1/29.21 - Enterococcus faecium, amp and levo resistant, treated with Linezolid     Review of Systems:    I have reviewed the pertinent positive findings in the review of symptoms with the patient.      Past Medical History:    Past Medical History:   Diagnosis Date     Aneurysm of vertebral artery (H)      Asthma      Asthma      CHF (congestive heart failure) (H)      CHF (congestive heart failure) (H)     diastolic      Chronic kidney disease      Chronic kidney disease, stage 4, severely decreased GFR (H)      Chronic pulmonary heart disease, unspecified      Gout, unspecified      Hypercholesterolemia      Hypertension      Localized osteoarthrosis not specified whether primary or secondary,  lower leg     Knee     Morbid obesity (H)     BMI>60     Obstructive sleep apnea (adult) (pediatric)      Type II or unspecified type diabetes mellitus with unspecified complication, not stated as uncontrolled      Unspecified glaucoma(365.9)      Unspecified urinary incontinence      Dysuria without UTI  ESRD  Chronic anemia of ESRD  Fibroid uterus  Chronic diastolic CHF  Chronic atrial fibrillation  SSS s/p PPM placement (10/2020)  Dyslipidemia  Diabetes mellitus type 2, diet controlled, with peripheral nephropathy and retinopathy  COPD, non-oxygen dependent, with emphysema  Glaucoma    OB/GYN  history:  Patient is a , she has had one vaginal delivery and one  section.  She delivered via  in  and had a PPTL to follow. She became pregnant with an IUP subsequently and delivered via C/S with BTL. At the time of surgery patient reports that the surgeon noted that the left fallopian tube was intact.  She denies other significant gyn history.  She reports regular pap smears throughout her life; the pap collected in  was abnormal.  Patient is uncertain why it was abnormal and had no follow up (Records not available). Pap in  NILM.     Patient reports that she has been menopausal since .  However, between 5483-1414 she has had intermittent vaginal bleeding on average every 6 months or so.  Two times the bleeding has been significantly heavy lasting 6 days.  Her last episode of bleeding was 10/2013.  She has not had bleeding since.  She had an endometrial biopsy performed in  that showed scant endometrium. Pelvic ultrasound and follow up in clinic was recommended, but patient was lost to follow up.     Endometrial biopsy 2014  - Scant fragments of benign squamous epithelium, benign endocervical   glands and a few possible minute strips of benign endometrial surface   epithelium.   - Endometrial tissue is insufficient for further diagnosis.     Past Surgical History:    Past Surgical  History:   Procedure Laterality Date     C/SECTION, LOW TRANSVERSE  89     TUBAL LIGATION  80, 89         Health Maintenance:  Health Maintenance Due   Topic Date Due     DEXA  Never done     HF ACTION PLAN  Never done     LIPID  Never done     MICROALBUMIN  Never done     TSH W/FREE T4 REFLEX  Never done     DIABETIC FOOT EXAM  Never done     EYE EXAM  Never done     MAMMO SCREENING  Never done     Pneumococcal Vaccine: Pediatrics (0 to 5 Years) and At-Risk Patients (6 to 64 Years) (1 of 4 - PCV13) Never done     Pneumococcal Vaccine: 65+ Years (1 of 4 - PCV13) Never done     COLORECTAL CANCER SCREENING  Never done     COVID-19 Vaccine (1) Never done     HEPATITIS C SCREENING  Never done     DTAP/TDAP/TD IMMUNIZATION (1 - Tdap) Never done     ZOSTER IMMUNIZATION (1 of 2) Never done     MEDICARE ANNUAL WELLNESS VISIT  Never done     FALL RISK ASSESSMENT  Never done     PHQ-2  Never done            Current Medications:     has a current medication list which includes the following prescription(s): acetaminophen, albuterol, allopurinol, vitamin c-hi hips, aspirin, benzonatate, coenzyme q-10, colchicine, diclofenac, dimethicone-zinc oxide, diphenhydramine, docusate sodium, fluticasone, furosemide, gabapentin, ipratropium - albuterol 0.5 mg/2.5 mg/3 ml, lidocaine, losartan, magnesium oxide, montelukast, nifedipine er, omeprazole, oxybutynin er, polyethylene glycol, rosuvastatin, senna-docusate, sevelamer carbonate, timolol maleate, and vitamin d3.       Allergies:     No Known Allergies         Social History:     Social History     Tobacco Use     Smoking status: Current Every Day Smoker     Packs/day: 1.00     Years: 45.00     Pack years: 45.00     Smokeless tobacco: Never Used   Substance Use Topics     Alcohol use: No       History   Drug Use No           Family History:     The patient's family history is notable for the following.    Family History   Problem Relation Age of Onset     Hypertension Unknown       Diabetes Unknown      Cancer Maternal Grandfather         stomach cancer     No colon breast or ovarian cancer    Physical Exam:     /68   Pulse 82   Temp 98.1  F (36.7  C) (Oral)   There is no height or weight on file to calculate BMI.    General Appearance: In wheelchair, appears fatigue.     Gastrointestinal:       abdomen soft, minimally tender, non-distended, pendulous abdomen, large firm mass palpable midline.    Genitourinary: Patient refused exam today    Assessment:    Sun Mireles is a 65 year old woman with a new diagnosis of recurrent UTIs large uterine mass consistent with uterine fibroid.  -Right ureteral stent but both removed.  -On dialysis due to ESKD      Plan:     1.)     Large uterine mass, complex medical medical history, hx of UTIs. Suspect  Uterine fibroid that is causing patient abdominal discomfort and recurrent UTIs     -PAC visit next Friday to assess risk of surgery. Patient with multiple medical issues.  -RTC next Friday for pelvic exam as patient does not want to do today without her daughter present,[place] [phone]      Thank you for allowing us to participate in the care of your patient.         Sincerely,    Marquita Patino MD    Department of Ob/Gyn and Women's Health  Division of Gynecologic Oncology  [facility]  [phone]      CC  Patient Care Team:  Collin Doran MD as PCP - SHERRILL Shukla

## 2021-04-19 NOTE — NURSING NOTE
"Oncology Rooming Note    April 19, 2021 1:17 PM   Sun Mireles is a 65 year old female who presents for:    Chief Complaint   Patient presents with     Oncology Clinic Visit     pelvic mass     Initial Vitals: /68   Pulse 82   Temp 98.1  F (36.7  C) (Oral)  Estimated body mass index is 37.3 kg/m  as calculated from the following:    Height as of 4/9/21: 1.638 m (5' 4.5\").    Weight as of 4/11/21: 100.1 kg (220 lb 11.2 oz). There is no height or weight on file to calculate BSA.  No Pain (0) Comment: Data Unavailable   No LMP recorded. Patient is postmenopausal.  Allergies reviewed: Yes  Medications reviewed: Yes    Medications: Medication refills not needed today.  Pharmacy name entered into [employer]: [id number] [address] AT [facility] [address]    Clinical concerns: none       Shannan Ortega CMA            "

## 2021-04-20 NOTE — PROGRESS NOTES
Reviewed concerns with Dr. Patino   Pt should see PCP for evaluation of dysuria as there are no other symptoms of UTI and pt has history of frequent UTI,stents and her dialysis issues.    md states this is not related to the gyn issue we are seeing her for      Pt informed on the above information  Pt reports understanding though frustrated with problem

## 2021-04-20 NOTE — PROGRESS NOTES
Burning when she voids,  Pt thinks she is not voiding more frequently than normal  Denies any hemoturia, abdominal pain, urine is not yellow and not cloudy  Had urine culture on 4/8 which was negative, was given antibiotic which she finished   Just does not want to continue with this issue, is there anythere that can be done  Has appt 4/30 for an exam but does not think she can wait that long  Will talk to md and get back to pt

## 2021-04-21 NOTE — TELEPHONE ENCOUNTER
FUTURE VISIT INFORMATION      SURGERY INFORMATION:    Date: TBD- Oncology    Consult: ov     RECORDS REQUESTED FROM:       Primary Care Provider: Collin Doran MD- [facility]    Most recent EKG+ Tracin21    [facility]    Most recent Cardiac Stress Test: 2002- [facility]    Most recent Sleep Study:  10/7/11- [facility]

## 2021-04-22 NOTE — PROGRESS NOTES
Oncology Distress Screening Follow-up  Clinical [facility]    Identified Concern and Score From Distress Screening:   3. How concerned are you about feeling depressed or very sad?   8Abnormal            4. How concerned are you about feeling anxious or very scared?   8Abnormal                  Date of Distress Screenin21      Data: Sun Mireles is a 65 year old female who presents oncology clinic for a plevic mass.      Intervention/Education Provided:: SW called patient and addressed concerns expressed in distress screen. Patient discussed anxiety was due to uncertainty of what was causing recent symptoms, if pelvic mass was cancerous. Patient states they feel less anxious having met with their provider and feels supported by their care team. Pateint stated they are nervous about their upcoming surgery and wondered what to expect, how the surgery would be conducted would they be awake during surgery. SW confirmed patient's pre op appointment and discussed how the questions they were asking was information that would be reviewed with them during this appointment. SW encouraged patient to make notes of their questions and concerns to help them prepare for their pre op appointment, where this information would be reviewed. SW encouraged patient to reach out to their care team as needed with questions as well; and offered to reach out to their care team as well letting them know they would appreciate a call from their care team. Patient stated they would plan to reach out to their care team as needed and would write down their questions as suggested to organize their thoughts, questions and concerns. SW provided validation and brief supportive counseling. Patient expressed gratitude and thanked SW for listening to their concerns. Patient stated they felt confident in their care team and felt confident that their care team would prepare them for what to expect. Patient agreed to reach out as needed  for on going supports.     Follow-up Required:None needed at this time. SW will remain available as needed.         Mary Jo Gonzales MSW, LGSW  - Oncology  Phone : [phone]  Pager: [phone]

## 2021-04-23 NOTE — TELEPHONE ENCOUNTER
FUTURE VISIT INFORMATION        SURGERY INFORMATION:    Date: TBD- Oncology    Consult: ov      RECORDS REQUESTED FROM:         Primary Care Provider: Collin Doran MD- [facility]     Most recent EKG+ Tracin21     [facility]     Most recent Cardiac Stress Test: 2002- [facility]     Most recent Sleep Study:  10/7/11- [facility]

## 2021-04-29 NOTE — PROGRESS NOTES
Consult Notes on Referred Patient    Date: 2021         Dr. Waqas Lindsey, MD  [address] [place] [address]       RE: Sun Mireles  : 1955  ALLA: 2021      Dear Dr. Waqas Lindsey:    I had the pleasure of seeing your patient Sun Mireles here at the Gynecologic Cancer Clinic at the [facility] on 2021.  As you know she is a very pleasant 65 year old woman with a recent diagnosis of large uterine mass.  Given these findings she was subsequently sent to the Gynecologic Cancer Clinic for follow up pelvic exam and was supposed to be here with her daughter but unfortunately her daughter could not make it.     Today: Saw PAC today     HPI:  Sun Mireles is a 65 year old  female with large pelvic mass, suspected to be originating from the ovary on MRI during this admission, with history of fibroid uterus on prior imaging (as early as ), as well as postmenopausal bleeding (incompletely evaluated in ), ESRD on dialysis, HTN, CHF, morbid obesity, Afib not on anticoagulation, COPD, HLD, and recurrent cystitis with recent admission (2021 at [facility]) for sepsis due to cystitis with bilateral hyeronephrosis, now admitted 3/16/21 with dysuria. Infectious disease, benign gynecology, nephrology, and urology consulted and following during this admission. Large pelvic mass noted on recent imaging (and imaging historically), however, no comment of hydroureter on MRI 3/16 or CT 3/16. There is conflicting radiology reads as to where the mass originates, with prior imaging at OSH suggesting fibroid uterus versus most recent MRI during this admission notes mass likely arises from the ovary with normal-appearing uterus.  After reviewing chart and Care Everywhere, patient was seen in Gyn Oncology clinic 2014 for postmenopausal bleeding in context of suspected fibroid uterus. CA-125 normal on this admission, additional  tumor markers pending. No postmenopausal bleeding currently.    3/16/21: admitted to [facility]-abnormal UTI  3/16/21: Pelvic US  IMPRESSION:  1.  Large central mass in the pelvis. This could represent a large  uterus due to fibroids, malignancy, or some other process. Recommend  further evaluation MRI may be helpful.  2.  The ovaries are not identified due to the large mass.     3/16/21: CT AP    IMPRESSION:   1.  Bladder wall thickening, correlate with urinalysis to exclude cystitis. Small amount of air is seen within bladder, correlate for recent instrumentation.    2.  No bowel obstruction, colitis, or diverticulitis.    3.  No obstructing renal or ureteral stone.    4.  Status post cholecystectomy.    5.  Fibroid uterus.    6.  Atherosclerotic vascular disease.    CT chest:  Result Impression   1.  No pulmonary embolus, aortic dissection, or aneurysm.    2.  Pulmonary hypertension.    3.  Moderate emphysema.    4.  Mildly enlarged heart with coronary artery disease and atherosclerotic vascular disease.    5.  Nonspecific bilateral hilar lymphadenopathy, not significantly changed.         3/18/21 MRI Pelvis  FINDINGS:  There is a 16.7 x 10.1 x 8.8 cm mass in the abdomen and pelvis. This  is generally hypointense on T1 and T2-weighted imaging with multiple  T2 hyperintense regions within possibly representing fluid or fat.  This mass exhibits diffusion restriction. Although difficult to  ascertain, this appears to arise from the left ovary or right ovary  and does not appear to contact the uterus.     Normal urinary bladder. Normal bowel in the field-of-view. A  fat-containing right inguinal hernia is noted.                                                                      IMPRESSION:  1.  Large mass in the abdomen and pelvis. This is favored to arise  from one of the ovaries, though this is difficult to assess. Surgical  consultation recommended.  2.  No uterine fibroids. Normal appearance of the  uterus.    Upon further review and consultation, the large mass does  appear to arise from the uterus, making this most likely to represent  a large fibroid. No significant diffusion restriction of the mass.    4/40/21: Renal US  IMPRESSION:  1.  Normal grayscale appearance of both kidneys, without  hydronephrosis. Left ureteral stent is in place. The right ureteral  stent is not well-visualized on this exam.  2.  Bilateral renal cysts.    4/9/21: Admit to hospital 4/9/21-4/12/21  History of recurrent UTI, including VRE  Bilateral hydronephrosis status post stent placement  Uterine mass  End-stage renal disease on dialysis  Hypertension  Diabetes type 2  Hx of Morbid obesity BMI>60  Asthma  History of vertebral artery aneurysm  Gout  Hyperlipidemia  Sick sinus syndrome status post distant pacemaker placement    Urine culture hx:   3/26/21 - E coli, resistant to ampicillin, ciprofloxacin, bactrim, tetracyclines, treated with 5 days of CTX  1/29.21 - Enterococcus faecium, amp and levo resistant, treated with Linezolid     Review of Systems:    I have reviewed the pertinent positive findings in the review of symptoms with the patient.      Past Medical History:    Past Medical History:   Diagnosis Date     Aneurysm of vertebral artery (H)      Asthma      Asthma      CHF (congestive heart failure) (H)      CHF (congestive heart failure) (H)     diastolic      Chronic kidney disease      Chronic kidney disease, stage 4, severely decreased GFR (H)      Chronic pulmonary heart disease, unspecified      Gout, unspecified      Hypercholesterolemia      Hypertension      Localized osteoarthrosis not specified whether primary or secondary, lower leg     Knee     Morbid obesity (H)     BMI>60     Obstructive sleep apnea (adult) (pediatric)      Type II or unspecified type diabetes mellitus with unspecified complication, not stated as uncontrolled      Unspecified glaucoma(365.9)      Unspecified urinary incontinence       Dysuria without UTI  ESRD  Chronic anemia of ESRD  Fibroid uterus  Chronic diastolic CHF  Chronic atrial fibrillation  SSS s/p PPM placement (10/2020)  Dyslipidemia  Diabetes mellitus type 2, diet controlled, with peripheral nephropathy and retinopathy  COPD, non-oxygen dependent, with emphysema  Glaucoma    OB/GYN  history:  Patient is a , she has had one vaginal delivery and one  section.  She delivered via  in  and had a PPTL to follow. She became pregnant with an IUP subsequently and delivered via C/S with BTL. At the time of surgery patient reports that the surgeon noted that the left fallopian tube was intact.  She denies other significant gyn history.  She reports regular pap smears throughout her life; the pap collected in  was abnormal.  Patient is uncertain why it was abnormal and had no follow up (Records not available). Pap in  NILM.     Patient reports that she has been menopausal since .  However, between 3683-0662 she has had intermittent vaginal bleeding on average every 6 months or so.  Two times the bleeding has been significantly heavy lasting 6 days.  Her last episode of bleeding was 10/2013.  She has not had bleeding since.  She had an endometrial biopsy performed in  that showed scant endometrium. Pelvic ultrasound and follow up in clinic was recommended, but patient was lost to follow up.     Endometrial biopsy 2014  - Scant fragments of benign squamous epithelium, benign endocervical   glands and a few possible minute strips of benign endometrial surface   epithelium.   - Endometrial tissue is insufficient for further diagnosis.     Past Surgical History:    Past Surgical History:   Procedure Laterality Date     C/SECTION, LOW TRANSVERSE  89     TUBAL LIGATION  80, 87         Health Maintenance:  Health Maintenance Due   Topic Date Due     DEXA  Never done     HF ACTION PLAN  Never done     LIPID  Never done     MICROALBUMIN  Never done     TSH  W/FREE T4 REFLEX  Never done     DIABETIC FOOT EXAM  Never done     SPIROMETRY  Never done     COPD ACTION PLAN  Never done     EYE EXAM  Never done     MAMMO SCREENING  Never done     Pneumococcal Vaccine: Pediatrics (0 to 5 Years) and At-Risk Patients (6 to 64 Years) (1 of 4 - PCV13) Never done     Pneumococcal Vaccine: 65+ Years (1 of 4 - PCV13) Never done     COLORECTAL CANCER SCREENING  Never done     COVID-19 Vaccine (1) Never done     HEPATITIS C SCREENING  Never done     DTAP/TDAP/TD IMMUNIZATION (1 - Tdap) Never done     ZOSTER IMMUNIZATION (1 of 2) Never done     MEDICARE ANNUAL WELLNESS VISIT  Never done     FALL RISK ASSESSMENT  Never done     PHQ-2  Never done              Current Medications:     has a current medication list which includes the following prescription(s): acetaminophen, albuterol, allopurinol, vitamin c-hi hips, aspirin, benzonatate, coenzyme q-10, colchicine, diclofenac, dimethicone-zinc oxide, diphenhydramine, docusate sodium, fluticasone, furosemide, gabapentin, ipratropium - albuterol 0.5 mg/2.5 mg/3 ml, lidocaine, losartan, magnesium oxide, montelukast, nifedipine er, omeprazole, oxybutynin er, polyethylene glycol, rosuvastatin, senna-docusate, sevelamer carbonate, timolol maleate, and vitamin d3.       Allergies:     No Known Allergies         Social History:     Social History     Tobacco Use     Smoking status: Current Every Day Smoker     Packs/day: 1.00     Years: 45.00     Pack years: 45.00     Smokeless tobacco: Never Used   Substance Use Topics     Alcohol use: No       History   Drug Use No           Family History:     The patient's family history is notable for the following.    Family History   Problem Relation Age of Onset     Hypertension Unknown      Diabetes Unknown      Cancer Maternal Grandfather         stomach cancer     No colon breast or ovarian cancer    Physical Exam:     /76   Pulse 84   Temp 98.7  F (37.1  C) (Oral)   Resp 16   Ht 1.638 m (5'  "4.5\")   SpO2 91%   BMI 37.30 kg/m    Body mass index is 37.3 kg/m .    General Appearance: In wheelchair, appears fatigue.     Gastrointestinal:       abdomen soft, minimally tender, non-distended, pendulous abdomen, large firm mass palpable midline.    Genitourinary: : Normal BUS, vagina without masses, atrophic, cervix nulliparous, no gross lesions, I can palpate enlarged, globular uterus, rectal exam confirms.    Assessment:    Sun Mireles is a 65 year old woman with a new diagnosis of recurrent UTIs large uterine mass consistent with uterine fibroid. Increasing pelvic pressure.  -Right ureteral stent but both removed.  -On dialysis due to ESKD      Plan:     1.)     Large uterine mass, complex medical medical history, hx of UTIs. Suspect  Uterine fibroid that is causing patient abdominal discomfort and recurrent UTIs     -PAC visit done recommend pulmonary and cardiology consult:    - Cardiac:  Functional status- METS 1.  Mostly wheelchair bound, can take a few steps with a walker.   Intermediate risk surgery with 11% (Cr. >2, intraperitoneal surgery, CHF) risk of major adverse cardiac event.    --HTN, dyslipidemia, HFpEF, Atrial fibrillation.  Denies h/o CAD  --has [employer] Pacemaker in place since 10/2020 for complete heart block, bradycardia.  Last device check was remote transmission 12/2/2020 (record in Care Everywhere).  --she has not seen cardiology since PM placement.  --she is not on anticoagulation for hx of afib due to hx of GI bleed. She could not tell me about this history.  --EKG 4/8/21: SR, non specific T wave abnormalities    Pulm:  Airway feasible.  KHUSHBOO risk: Pt has known KHUSHBOO, wears CPAP at night with O2.  She was not wearing supplemental O2 today.  O2 sats today are 91% on room air.  She reports that she sometimes uses O2 during the day.  --she has h/o smoking, denies current smoking  --h/o COPD/asthma.  She has albuterol inhaler prn and Duonebs that she may use once or twice per day.  " Singulair every day.    --she states that she is SOB often and coughs a lot. She is not followed by pulmonology.    -pulmonary and cardiac consult to see if safe to go to the OR for XL, AUTUMN, BSO           Thank you for allowing us to participate in the care of your patient.         Sincerely,    Marquita Patino MD    Department of Ob/Gyn and Women's Health  Division of Gynecologic Oncology  [facility]  [phone]    Of a 40 minute appointment, more than 50% was spent in counseling the patient.    CC  Patient Care Team:  Collin Doran MD as PCP - SHERRILL Shukla

## 2021-04-29 NOTE — PROGRESS NOTES
Preoperative Assessment Center Medication History Note    Medication history completed on April 29, 2021 by this writer. See Epic admission navigator for prior to admission medications. Operating room staff will still need to confirm medications and last dose information on day of surgery.     Medication history interview sources:  medication list from [facility]    Changes made to [facility] medication list (reason)  Added: venofer, epogen  Deleted: furosemide  Changed: none    Additional medication history information (including reliability of information, actions taken by pharmacist):    Prior to Admission medications    Medication Sig Last Dose Taking? Auth Provider   acetaminophen (TYLENOL) 325 MG tablet Take 650 mg by mouth 4 times daily For pain Taking Yes Unknown, Entered By History   albuterol (PROAIR HFA/PROVENTIL HFA/VENTOLIN HFA) 108 (90 Base) MCG/ACT inhaler Inhale 2 puffs into the lungs every 6 hours as needed for shortness of breath / dyspnea or wheezing Taking Yes Unknown, Entered By History   allopurinol (ZYLOPRIM) 100 MG tablet Take 100 mg by mouth daily For gout Taking Yes Unknown, Entered By History   Ascorbic Acid (VITAMIN C-MILKA HIPS) 500 MG CHEW Take 500 mg by mouth 2 times daily Taking Yes Unknown, Entered By History   aspirin 81 MG EC tablet Take 81 mg by mouth daily For prevention of heart attack Taking Yes Unknown, Entered By History   benzonatate (TESSALON) 200 MG capsule Take 200 mg by mouth 3 times daily For cough Taking Yes Unknown, Entered By History   coenzyme Q-10 200 MG CAPS Take 200 mg by mouth daily For heart disease Taking Yes Unknown, Entered By History   colchicine (COLCYRS) 0.6 MG tablet Take 0.3 mg by mouth once a week Every Saturday evening for gout Taking Yes Unknown, Entered By History   diclofenac (VOLTAREN) 1 % topical gel Apply 1 g topically 3 times daily as needed for moderate pain (to bilateral toes) Taking Yes Unknown, Entered By History   Dimethicone-Zinc  Oxide 5-5 % CREA Apply topically to vulva each shift with each diaper change for pain Taking Yes Unknown, Entered By History   diphenhydrAMINE (BENADRYL) 25 MG tablet Take 25 mg by mouth 2 times daily as needed for itching Taking Yes Unknown, Entered By History   docusate sodium (COLACE) 100 MG tablet Take 100 mg by mouth daily For constipation Taking Yes Unknown, Entered By History   Epoetin Johnnie (EPOGEN IJ) Inject 2,800 Units as directed three times a week At dialysis Tues/Thurs/Sat Taking Yes Unknown, Entered By History   fluticasone (FLONASE) 50 MCG/ACT nasal spray Spray 2 sprays into both nostrils daily For allergies Taking Yes Unknown, Entered By History   gabapentin (NEURONTIN) 100 MG capsule Take 200 mg by mouth three times a week Every Mon, Wed, Friday at bedtime on non-dialysis days for neuropathic pain Taking Yes Unknown, Entered By History   ipratropium - albuterol 0.5 mg/2.5 mg/3 mL (DUONEB) 0.5-2.5 (3) MG/3ML neb solution Take 1 vial by nebulization 4 times daily For shortness of breath Taking Yes Unknown, Entered By History   Iron Sucrose (VENOFER IV) Inject 50 mg into the vein once a week On Tuesday at dialysis.   [place] Taking Yes Unknown, Entered By History   Lidocaine (LIDOCARE) 4 % Patch Place 1 patch onto the skin every 24 hours To prevent lidocaine toxicity, patient should be patch free for 12 hrs daily. Taking Yes Unknown, Entered By History   losartan (COZAAR) 50 MG tablet Take 50 mg by mouth daily For hypertension Taking Yes Unknown, Entered By History   magnesium oxide (MAG-OX) 400 (240 Mg) MG tablet Take 400 mg by mouth 2 times daily For hypomagnesium Taking Yes Unknown, Entered By History   montelukast (SINGULAIR) 10 MG tablet Take 10 mg by mouth At Bedtime For allergies Taking Yes Unknown, Entered By History   NIFEdipine ER (ADALAT CC) 30 MG 24 hr tablet Take 30 mg by mouth daily For heart disease Taking Yes Unknown, Entered By History   omeprazole (PRILOSEC) 20 MG   capsule Take 20 mg by mouth daily For GERD Taking Yes Unknown, Entered By History   oxybutynin ER (DITROPAN-XL) 5 MG 24 hr tablet Take 5 mg by mouth every evening For overactive bladder Taking Yes Unknown, Entered By History   polyethylene glycol (MIRALAX) 17 g packet Take 1 packet by mouth daily as needed for constipation Taking Yes Unknown, Entered By History   rosuvastatin (CRESTOR) 10 MG tablet Take 10 mg by mouth daily At bedtime for hyperlipidemia Taking Yes Unknown, Entered By History   senna-docusate (SENOKOT-S/PERICOLACE) 8.6-50 MG tablet Take 1 tablet by mouth daily as needed for constipation Taking Yes Unknown, Entered By History   sevelamer carbonate (RENVELA) 800 MG tablet Take 800 mg by mouth With meals for phosphorus supplement Taking Yes Unknown, Entered By History   timolol maleate (TIMOPTIC) 0.5 % ophthalmic solution Place 1 drop into both eyes 2 times daily Taking Yes Unknown, Entered By History   Vitamin D3 (CHOLECALCIFEROL) 125 MCG (5000 UT) tablet Take 1 tablet by mouth daily For osteoporosis Taking Yes Unknown, Entered By History      Medication history completed by: Emil Ohara, [facility]

## 2021-04-30 NOTE — LETTER
2021         RE: Sun Mireles  [address] [place] [address]        Dear Colleague,    Thank you for referring your patient, Sun Mireles, to the [facility]. Please see a copy of my visit note below.                            Consult Notes on Referred Patient    Date: 2021         Dr. Waqas Lindsey MD  [address] [place] [address]       RE: Sun Mireles  : 1955  ALLA: 2021      Dear Dr. Waqas Lindsey:    I had the pleasure of seeing your patient Sun Mireles here at the Gynecologic Cancer Clinic at the [facility] on 2021.  As you know she is a very pleasant 65 year old woman with a recent diagnosis of large uterine mass.  Given these findings she was subsequently sent to the Gynecologic Cancer Clinic for follow up pelvic exam and was supposed to be here with her daughter but unfortunately her daughter could not make it.     Today: Saw PAC today     HPI:  Sun Mireles is a 65 year old  female with large pelvic mass, suspected to be originating from the ovary on MRI during this admission, with history of fibroid uterus on prior imaging (as early as ), as well as postmenopausal bleeding (incompletely evaluated in ), ESRD on dialysis, HTN, CHF, morbid obesity, Afib not on anticoagulation, COPD, HLD, and recurrent cystitis with recent admission (2021 at [facility]) for sepsis due to cystitis with bilateral hyeronephrosis, now admitted 3/16/21 with dysuria. Infectious disease, benign gynecology, nephrology, and urology consulted and following during this admission. Large pelvic mass noted on recent imaging (and imaging historically), however, no comment of hydroureter on MRI 3/16 or CT 3/16. There is conflicting radiology reads as to where the mass originates, with prior imaging at OSH suggesting fibroid uterus versus most recent MRI during this admission notes mass  likely arises from the ovary with normal-appearing uterus.  After reviewing chart and Care Everywhere, patient was seen in Gyn Oncology clinic 2/2014 for postmenopausal bleeding in context of suspected fibroid uterus. CA-125 normal on this admission, additional tumor markers pending. No postmenopausal bleeding currently.    3/16/21: admitted to [facility]-abnormal UTI  3/16/21: Pelvic US  IMPRESSION:  1.  Large central mass in the pelvis. This could represent a large  uterus due to fibroids, malignancy, or some other process. Recommend  further evaluation MRI may be helpful.  2.  The ovaries are not identified due to the large mass.     3/16/21: CT AP    IMPRESSION:   1.  Bladder wall thickening, correlate with urinalysis to exclude cystitis. Small amount of air is seen within bladder, correlate for recent instrumentation.    2.  No bowel obstruction, colitis, or diverticulitis.    3.  No obstructing renal or ureteral stone.    4.  Status post cholecystectomy.    5.  Fibroid uterus.    6.  Atherosclerotic vascular disease.    CT chest:  Result Impression   1.  No pulmonary embolus, aortic dissection, or aneurysm.    2.  Pulmonary hypertension.    3.  Moderate emphysema.    4.  Mildly enlarged heart with coronary artery disease and atherosclerotic vascular disease.    5.  Nonspecific bilateral hilar lymphadenopathy, not significantly changed.         3/18/21 MRI Pelvis  FINDINGS:  There is a 16.7 x 10.1 x 8.8 cm mass in the abdomen and pelvis. This  is generally hypointense on T1 and T2-weighted imaging with multiple  T2 hyperintense regions within possibly representing fluid or fat.  This mass exhibits diffusion restriction. Although difficult to  ascertain, this appears to arise from the left ovary or right ovary  and does not appear to contact the uterus.     Normal urinary bladder. Normal bowel in the field-of-view. A  fat-containing right inguinal hernia is noted.                                                                       IMPRESSION:  1.  Large mass in the abdomen and pelvis. This is favored to arise  from one of the ovaries, though this is difficult to assess. Surgical  consultation recommended.  2.  No uterine fibroids. Normal appearance of the uterus.    Upon further review and consultation, the large mass does  appear to arise from the uterus, making this most likely to represent  a large fibroid. No significant diffusion restriction of the mass.    4/40/21: Renal US  IMPRESSION:  1.  Normal grayscale appearance of both kidneys, without  hydronephrosis. Left ureteral stent is in place. The right ureteral  stent is not well-visualized on this exam.  2.  Bilateral renal cysts.    4/9/21: Admit to hospital 4/9/21-4/12/21  History of recurrent UTI, including VRE  Bilateral hydronephrosis status post stent placement  Uterine mass  End-stage renal disease on dialysis  Hypertension  Diabetes type 2  Hx of Morbid obesity BMI>60  Asthma  History of vertebral artery aneurysm  Gout  Hyperlipidemia  Sick sinus syndrome status post distant pacemaker placement    Urine culture hx:   3/26/21 - E coli, resistant to ampicillin, ciprofloxacin, bactrim, tetracyclines, treated with 5 days of CTX  1/29.21 - Enterococcus faecium, amp and levo resistant, treated with Linezolid     Review of Systems:    I have reviewed the pertinent positive findings in the review of symptoms with the patient.      Past Medical History:    Past Medical History:   Diagnosis Date     Aneurysm of vertebral artery (H)      Asthma      Asthma      CHF (congestive heart failure) (H)      CHF (congestive heart failure) (H)     diastolic      Chronic kidney disease      Chronic kidney disease, stage 4, severely decreased GFR (H)      Chronic pulmonary heart disease, unspecified      Gout, unspecified      Hypercholesterolemia      Hypertension      Localized osteoarthrosis not specified whether primary or secondary, lower leg     Knee     Morbid obesity (H)      BMI>60     Obstructive sleep apnea (adult) (pediatric)      Type II or unspecified type diabetes mellitus with unspecified complication, not stated as uncontrolled      Unspecified glaucoma(365.9)      Unspecified urinary incontinence      Dysuria without UTI  ESRD  Chronic anemia of ESRD  Fibroid uterus  Chronic diastolic CHF  Chronic atrial fibrillation  SSS s/p PPM placement (10/2020)  Dyslipidemia  Diabetes mellitus type 2, diet controlled, with peripheral nephropathy and retinopathy  COPD, non-oxygen dependent, with emphysema  Glaucoma    OB/GYN  history:  Patient is a , she has had one vaginal delivery and one  section.  She delivered via  in  and had a PPTL to follow. She became pregnant with an IUP subsequently and delivered via C/S with BTL. At the time of surgery patient reports that the surgeon noted that the left fallopian tube was intact.  She denies other significant gyn history.  She reports regular pap smears throughout her life; the pap collected in  was abnormal.  Patient is uncertain why it was abnormal and had no follow up (Records not available). Pap in  NILM.     Patient reports that she has been menopausal since .  However, between 1401-9546 she has had intermittent vaginal bleeding on average every 6 months or so.  Two times the bleeding has been significantly heavy lasting 6 days.  Her last episode of bleeding was 10/2013.  She has not had bleeding since.  She had an endometrial biopsy performed in  that showed scant endometrium. Pelvic ultrasound and follow up in clinic was recommended, but patient was lost to follow up.     Endometrial biopsy 2014  - Scant fragments of benign squamous epithelium, benign endocervical   glands and a few possible minute strips of benign endometrial surface   epithelium.   - Endometrial tissue is insufficient for further diagnosis.     Past Surgical History:    Past Surgical History:   Procedure Laterality Date      C/SECTION, LOW TRANSVERSE  87     TUBAL LIGATION  80, 87         Health Maintenance:  Health Maintenance Due   Topic Date Due     DEXA  Never done     HF ACTION PLAN  Never done     LIPID  Never done     MICROALBUMIN  Never done     TSH W/FREE T4 REFLEX  Never done     DIABETIC FOOT EXAM  Never done     SPIROMETRY  Never done     COPD ACTION PLAN  Never done     EYE EXAM  Never done     MAMMO SCREENING  Never done     Pneumococcal Vaccine: Pediatrics (0 to 5 Years) and At-Risk Patients (6 to 64 Years) (1 of 4 - PCV13) Never done     Pneumococcal Vaccine: 65+ Years (1 of 4 - PCV13) Never done     COLORECTAL CANCER SCREENING  Never done     COVID-19 Vaccine (1) Never done     HEPATITIS C SCREENING  Never done     DTAP/TDAP/TD IMMUNIZATION (1 - Tdap) Never done     ZOSTER IMMUNIZATION (1 of 2) Never done     MEDICARE ANNUAL WELLNESS VISIT  Never done     FALL RISK ASSESSMENT  Never done     PHQ-2  Never done              Current Medications:     has a current medication list which includes the following prescription(s): acetaminophen, albuterol, allopurinol, vitamin c-hi hips, aspirin, benzonatate, coenzyme q-10, colchicine, diclofenac, dimethicone-zinc oxide, diphenhydramine, docusate sodium, fluticasone, furosemide, gabapentin, ipratropium - albuterol 0.5 mg/2.5 mg/3 ml, lidocaine, losartan, magnesium oxide, montelukast, nifedipine er, omeprazole, oxybutynin er, polyethylene glycol, rosuvastatin, senna-docusate, sevelamer carbonate, timolol maleate, and vitamin d3.       Allergies:     No Known Allergies         Social History:     Social History     Tobacco Use     Smoking status: Current Every Day Smoker     Packs/day: 1.00     Years: 45.00     Pack years: 45.00     Smokeless tobacco: Never Used   Substance Use Topics     Alcohol use: No       History   Drug Use No           Family History:     The patient's family history is notable for the following.    Family History   Problem Relation Age of Onset      "Hypertension Unknown      Diabetes Unknown      Cancer Maternal Grandfather         stomach cancer     No colon breast or ovarian cancer    Physical Exam:     /76   Pulse 84   Temp 98.7  F (37.1  C) (Oral)   Resp 16   Ht 1.638 m (5' 4.5\")   SpO2 91%   BMI 37.30 kg/m    Body mass index is 37.3 kg/m .    General Appearance: In wheelchair, appears fatigue.     Gastrointestinal:       abdomen soft, minimally tender, non-distended, pendulous abdomen, large firm mass palpable midline.    Genitourinary: : Normal BUS, vagina without masses, atrophic, cervix nulliparous, no gross lesions, I can palpate enlarged, globular uterus, rectal exam confirms.    Assessment:    Sun Mireles is a 65 year old woman with a new diagnosis of recurrent UTIs large uterine mass consistent with uterine fibroid. Increasing pelvic pressure.  -Right ureteral stent but both removed.  -On dialysis due to ESKD      Plan:     1.)     Large uterine mass, complex medical medical history, hx of UTIs. Suspect  Uterine fibroid that is causing patient abdominal discomfort and recurrent UTIs     -PAC visit done recommend pulmonary and cardiology consult:    - Cardiac:  Functional status- METS 1.  Mostly wheelchair bound, can take a few steps with a walker.   Intermediate risk surgery with 11% (Cr. >2, intraperitoneal surgery, CHF) risk of major adverse cardiac event.    --HTN, dyslipidemia, HFpEF, Atrial fibrillation.  Denies h/o CAD  --has [employer] Pacemaker in place since 10/2020 for complete heart block, bradycardia.  Last device check was remote transmission 12/2/2020 (record in Care Everywhere).  --she has not seen cardiology since PM placement.  --she is not on anticoagulation for hx of afib due to hx of GI bleed. She could not tell me about this history.  --EKG 4/8/21: SR, non specific T wave abnormalities    Pulm:  Airway feasible.  KHUSHBOO risk: Pt has known KHUSHBOO, wears CPAP at night with O2.  She was not wearing supplemental O2 today. "  O2 sats today are 91% on room air.  She reports that she sometimes uses O2 during the day.  --she has h/o smoking, denies current smoking  --h/o COPD/asthma.  She has albuterol inhaler prn and Duonebs that she may use once or twice per day.  Singulair every day.    --she states that she is SOB often and coughs a lot. She is not followed by pulmonology.    -pulmonary and cardiac consult to see if safe to go to the OR for XL, AUTUMN, BSO      Thank you for allowing us to participate in the care of your patient.         Sincerely,    Marquita Patino MD    Department of Ob/Gyn and Women's Health  Division of Gynecologic Oncology  [facility]  [phone]    Of a 40 minute appointment, more than 50% was spent in counseling the patient.    CC  Patient Care Team:  Collin Doran MD as PCP - SHERRILL Shukla

## 2021-04-30 NOTE — NURSING NOTE
"Oncology Rooming Note    April 30, 2021 10:22 AM   Sun Mireles is a 65 year old female who presents for:    Chief Complaint   Patient presents with     Oncology Clinic Visit     PELVIC MASS      Initial Vitals: /76   Pulse 84   Temp 98.7  F (37.1  C) (Oral)   Resp 16   Ht 1.638 m (5' 4.5\")   SpO2 91%   BMI 37.30 kg/m   Estimated body mass index is 37.3 kg/m  as calculated from the following:    Height as of this encounter: 1.638 m (5' 4.5\").    Weight as of 4/11/21: 100.1 kg (220 lb 11.2 oz). Body surface area is 2.13 meters squared.  Severe Pain (6) Comment: Data Unavailable   No LMP recorded. Patient is postmenopausal.  Allergies reviewed: Yes  Medications reviewed: Yes    Medications: Medication refills not needed today.  Pharmacy name entered into [facility]: [id number] [address] AT [facility] [address]    Clinical concerns: No new concerns today  Dr Patino  was NOT notified.      Vivi Alvarenga              "

## 2021-04-30 NOTE — PROGRESS NOTES
Depression Response    Patient completed the PHQ-9 assessment for depression and scored >9? Yes - 9   Question 9 on the PHQ-9 was positive for suicidality? No  Does patient have current mental health provider? No    Is this a virtual visit? No    I personally notified the following: visit provider Allison Adhikari LPN

## 2021-04-30 NOTE — H&P
Pre-Operative H & P     CC:  Preoperative exam to assess for increased cardiopulmonary risk while undergoing surgery and anesthesia.    Date of Encounter: 4/30/2021  Primary Care Physician:  Collin Doran  Associated Diagnosis: pelvic mass    HPI  Sun Mireles is a 65 year old female who presents for pre-operative H & P in preparation for TBD with Dr. Patino on TBD at [facility].     This is a 65-year-old female with past medical history significant for hypertension, dyslipidemia, pacemaker, atrial fibrillation not on anticoagulation due to history of GI bleed, heart failure with preserved ejection fraction, asthma and COPD, KHUSHBOO, history of diabetes, obesity, GERD, end-stage renal disease on dialysis Tuesday Thursday Saturday, pelvic mass contributing to recurrent urinary tract infections, gout.  Patient has had multiple recent hospitalizations for cystitis and urosepsis.  Imaging has revealed a large pelvic mass which was thought to be a large fibroid.    History is obtained from the patient but mostly the medical record as pt was very somnolent during our visit today and did not have anyone with her.     Past Medical History  Past Medical History:   Diagnosis Date     Aneurysm of vertebral artery (H)      Asthma      Asthma      CHF (congestive heart failure) (H)      CHF (congestive heart failure) (H)     diastolic      Chronic kidney disease      Chronic kidney disease, stage 4, severely decreased GFR (H)      Chronic pulmonary heart disease, unspecified      Gout, unspecified      Hypercholesterolemia      Hypertension      Localized osteoarthrosis not specified whether primary or secondary, lower leg     Knee     Morbid obesity (H)     BMI>60     Obstructive sleep apnea (adult) (pediatric)      Type II or unspecified type diabetes mellitus with unspecified complication, not stated as uncontrolled      Unspecified glaucoma(365.9)      Unspecified urinary incontinence        Past Surgical History  Past Surgical  History:   Procedure Laterality Date     C/SECTION, LOW TRANSVERSE  89     TUBAL LIGATION  80, 89       Hx of Blood transfusions/reactions: yes, no reaction     Hx of abnormal bleeding or anti-platelet use: ASA 81mg    Menstrual history: No LMP recorded. Patient is postmenopausal.:     Steroid use in the last year: denies    Personal or FH with difficulty with Anesthesia:  denies    Prior to Admission Medications  Current Outpatient Medications   Medication Sig Dispense Refill     acetaminophen (TYLENOL) 325 MG tablet Take 650 mg by mouth 4 times daily For pain       albuterol (PROAIR HFA/PROVENTIL HFA/VENTOLIN HFA) 108 (90 Base) MCG/ACT inhaler Inhale 2 puffs into the lungs every 6 hours as needed for shortness of breath / dyspnea or wheezing       allopurinol (ZYLOPRIM) 100 MG tablet Take 100 mg by mouth daily For gout       Ascorbic Acid (VITAMIN C-MILKA HIPS) 500 MG CHEW Take 500 mg by mouth 2 times daily       aspirin 81 MG EC tablet Take 81 mg by mouth daily For prevention of heart attack       benzonatate (TESSALON) 200 MG capsule Take 200 mg by mouth 3 times daily For cough       coenzyme Q-10 200 MG CAPS Take 200 mg by mouth daily For heart disease       colchicine (COLCYRS) 0.6 MG tablet Take 0.3 mg by mouth once a week Every Saturday evening for gout       diclofenac (VOLTAREN) 1 % topical gel Apply 1 g topically 3 times daily as needed for moderate pain (to bilateral toes)       Dimethicone-Zinc Oxide 5-5 % CREA Apply topically to vulva each shift with each diaper change for pain       diphenhydrAMINE (BENADRYL) 25 MG tablet Take 25 mg by mouth 2 times daily as needed for itching       docusate sodium (COLACE) 100 MG tablet Take 100 mg by mouth daily For constipation       Epoetin Johnnie (EPOGEN IJ) Inject 2,800 Units as directed three times a week At dialysis Tues/Thurs/Sat       fluticasone (FLONASE) 50 MCG/ACT nasal spray Spray 2 sprays into both nostrils daily For allergies       gabapentin (NEURONTIN)  100 MG capsule Take 200 mg by mouth three times a week Every Mon, Wed, Friday at bedtime on non-dialysis days for neuropathic pain       ipratropium - albuterol 0.5 mg/2.5 mg/3 mL (DUONEB) 0.5-2.5 (3) MG/3ML neb solution Take 1 vial by nebulization 4 times daily For shortness of breath       Iron Sucrose (VENOFER IV) Inject 50 mg into the vein once a week On Tuesday at dialysis.   [facility]       Lidocaine (LIDOCARE) 4 % Patch Place 1 patch onto the skin every 24 hours To prevent lidocaine toxicity, patient should be patch free for 12 hrs daily.       losartan (COZAAR) 50 MG tablet Take 50 mg by mouth daily For hypertension       magnesium oxide (MAG-OX) 400 (240 Mg) MG tablet Take 400 mg by mouth 2 times daily For hypomagnesium       montelukast (SINGULAIR) 10 MG tablet Take 10 mg by mouth At Bedtime For allergies       NIFEdipine ER (ADALAT CC) 30 MG 24 hr tablet Take 30 mg by mouth daily For heart disease       omeprazole (PRILOSEC) 20 MG DR capsule Take 20 mg by mouth daily For GERD       oxybutynin ER (DITROPAN-XL) 5 MG 24 hr tablet Take 5 mg by mouth every evening For overactive bladder       polyethylene glycol (MIRALAX) 17 g packet Take 1 packet by mouth daily as needed for constipation       rosuvastatin (CRESTOR) 10 MG tablet Take 10 mg by mouth daily At bedtime for hyperlipidemia       senna-docusate (SENOKOT-S/PERICOLACE) 8.6-50 MG tablet Take 1 tablet by mouth daily as needed for constipation       sevelamer carbonate (RENVELA) 800 MG tablet Take 800 mg by mouth With meals for phosphorus supplement       timolol maleate (TIMOPTIC) 0.5 % ophthalmic solution Place 1 drop into both eyes 2 times daily       Vitamin D3 (CHOLECALCIFEROL) 125 MCG (5000 UT) tablet Take 1 tablet by mouth daily For osteoporosis         Allergies  No Known Allergies    Social History  Social History     Socioeconomic History     Marital status: Single     Spouse name: Not on file     Number of children: Not on file      Years of education: Not on file     Highest education level: Not on file   Occupational History     Not on file   Social Needs     Financial resource strain: Not on file     Food insecurity     Worry: Not on file     Inability: Not on file     Transportation needs     Medical: Not on file     Non-medical: Not on file   Tobacco Use     Smoking status: Former Smoker     Packs/day: 1.00     Years: 45.00     Pack years: 45.00     Smokeless tobacco: Never Used   Substance and Sexual Activity     Alcohol use: No     Drug use: No     Sexual activity: Not on file   Lifestyle     Physical activity     Days per week: Not on file     Minutes per session: Not on file     Stress: Not on file   Relationships     Social connections     Talks on phone: Not on file     Gets together: Not on file     Attends Synagogue service: Not on file     Active member of club or organization: Not on file     Attends meetings of clubs or organizations: Not on file     Relationship status: Not on file     Intimate partner violence     Fear of current or ex partner: Not on file     Emotionally abused: Not on file     Physically abused: Not on file     Forced sexual activity: Not on file   Other Topics Concern     Not on file   Social History Narrative     Not on file       Family History  Family History   Problem Relation Age of Onset     Hypertension Other      Diabetes Other      Cancer Maternal Grandfather         stomach cancer           Anesthesia Evaluation   Pt has had prior anesthetic.     No history of anesthetic complications       ROS/MED HX  ENT/Pulmonary: Comment: Reports occasional use of supplemental O2    (+) sleep apnea, uses CPAP, tobacco use, Past use, COPD,     Neurologic:  - neg neurologic ROS   (+) peripheral neuropathy, - bilateral feet.     Cardiovascular:     (+) Dyslipidemia hypertension-----CHF VIZCAINO. pacemaker, dysrhythmias, a-fib, Previous cardiac testing     METS/Exercise Tolerance: 1 - Eating, dressing    Hematologic:   - neg hematologic  ROS  (-) history of blood clots and history of blood transfusion   Musculoskeletal: Comment: Pain in shoulders and legs      GI/Hepatic: Comment: H/o GI bleed      Renal/Genitourinary: Comment: Recurrent UTI    (+) renal disease, type: ESRD, Pt requires dialysis, type: Hemodialysis,     Endo:     (+) type II DM, Last HgA1c: 5.6, date: 4/6/21, Not using insulin, - not using insulin pump. Obesity,     Psychiatric/Substance Use:  - neg psychiatric ROS     Infectious Disease:  - neg infectious disease ROS     Malignancy:  - neg malignancy ROS     Other:  - neg other ROS    (+) , H/O Chronic Pain,          The complete review of systems is negative other than noted in the HPI or here.   Temp: 98.7  F (37.1  C) Temp src: Oral BP: 122/76 Pulse: 84   Resp: 16 SpO2: 91 %         0 lbs 0 oz  Data Unavailable   There is no height or weight on file to calculate BMI.       Physical Exam  Constitutional: cooperative, no apparent distress, and appears older than stated age.  She is very somnolent, falling asleep mid question.   Eyes: Pupils equal, round and reactive to light, extra ocular muscles intact, sclera clear, conjunctiva normal.  HENT: Normocephalic, oral pharynx with moist mucus membranes, fair dentition. No goiter appreciated.   Respiratory: Clear to auscultation bilaterally, no crackles or wheezing.  Cardiovascular: Regular rate and rhythm, normal S1 and S2, and no murmur noted.  Carotids +2, no bruits. Significant bilateral LE dependent edema, not pitting. Palpable pulses to radial arteries.   GI: Normal bowel sounds  Lymph/Hematologic: No cervical lymphadenopathy and no supraclavicular lymphadenopathy.  Genitourinary:  deferred  Skin: Warm and dry.    Musculoskeletal: Full ROM of neck. There is no redness, warmth, or swelling of the joints. Gross motor strength is normal.    Neurologic: Awake, alert, oriented to name, place and time. Cranial nerves II-XII are grossly intact.  Neuropsychiatric:  Calm, cooperative. Normal affect.     PRIOR LABS/DIAGNOSTIC STUDIES:  All labs and imaging personally reviewed      Component      Latest Ref Rng & Units 4/9/2021   Sodium      133 - 144 mmol/L 138   Potassium      3.4 - 5.3 mmol/L 3.6   Chloride      94 - 109 mmol/L 103   Carbon Dioxide      20 - 32 mmol/L 24   Anion Gap      3 - 14 mmol/L 11   Glucose      70 - 99 mg/dL 234 (H)   Urea Nitrogen      7 - 30 mg/dL 60 (H)   Creatinine      0.52 - 1.04 mg/dL 5.02 (H)   GFR Estimate      >60 mL/min/1.73:m2 8 (L)   GFR Estimate If Black      >60 mL/min/1.73:m2 10 (L)   Calcium      8.5 - 10.1 mg/dL 9.1   Bilirubin Total      0.2 - 1.3 mg/dL 0.3   Albumin      3.4 - 5.0 g/dL 3.2 (L)   Protein Total      6.8 - 8.8 g/dL 8.3   Alkaline Phosphatase      40 - 150 U/L 139   ALT      0 - 50 U/L 10   AST      0 - 45 U/L 11     Component      Latest Ref Rng & Units 4/9/2021   WBC      4.0 - 11.0 10e9/L 7.4   RBC Count      3.8 - 5.2 10e12/L 3.03 (L)   Hemoglobin      11.7 - 15.7 g/dL 9.1 (L)   Hematocrit      35.0 - 47.0 % 29.4 (L)   MCV      78 - 100 fl 97   MCH      26.5 - 33.0 pg 30.0   MCHC      31.5 - 36.5 g/dL 31.0 (L)   RDW      10.0 - 15.0 % 16.3 (H)   Platelet Count      150 - 450 10e9/L 234   Diff Method       Automated Method   % Neutrophils      % 62.7   % Lymphocytes      % 17.1   % Monocytes      % 12.9   % Eosinophils      % 6.0   % Basophils      % 0.8   % Immature Granulocytes      % 0.5   Nucleated RBCs      0 /100 0   Absolute Neutrophil      1.6 - 8.3 10e9/L 4.6   Absolute Lymphocytes      0.8 - 5.3 10e9/L 1.3   Absolute Monocytes      0.0 - 1.3 10e9/L 1.0   Absolute Eosinophils      0.0 - 0.7 10e9/L 0.4   Absolute Basophils      0.0 - 0.2 10e9/L 0.1   Abs Immature Granulocytes      0 - 0.4 10e9/L 0.0   Absolute Nucleated RBC       0.0       Remote Device Check 12/2/20:  Type: routine remote pacemaker transmission.  Presenting rhythm: normal sinus rate 72 bpm.  Battery/lead status: stable  Arrhythmias: since  20, three mode switches, EGMs confirm AF, longest 17 minutes, burden <1%, V-rates >/=120 bpm <1%.  No ventricular high rate episodes detected.  Comments: normal magnet and pacemaker function. prd    EK21:  SR  Non-specific T wave abnormalities    Cardiac echo Complete: 20     Normal left ventricular size with mild concentric hypertrophy and   hyperdynamic systolic function. The estimated left ventricular ejection   fraction is 75%. Poorly defined outflow tract gradient. Normal septal   motion. Left ventricular diastolic function is abnormal.    The right ventricle is mildly dilated. The systolic function is mildly   reduced. TAPSE is abnormal, which is consistent with abnormal right   ventricular systolic function.    No hemodynamically significant valvular heart abnormalities.    Moderate pulmonary hypertension is present. The estimated systolic   pulmonary artery pressure is 73 mm of Hg. The prior study somewhat   overstated the tricuspid regurgitant velocity.    Biatrial volume is moderately increased.    When compared to the previous study dated 2019, no significant   Change.    Cardiac Echo limited: 10/24/20:    Limited echocardiogram performed.    Left ventricle ejection fraction is normal. The estimated left   ventricular ejection fraction is 65%.    Normal right ventricular systolic function.    No pericardial effusion      Outside records reviewed from: care everywhere    ASSESSMENT and PLAN  Sun Mireles is a 65 year old female scheduled for TBD on TBD by Dr. Patino in treatment of pelvic mass.  PAC referral for risk assessment and optimization for anesthesia:    Pt arrived by herself from NH.  She was very somnolent during history and exam. Difficult visit.  She fell asleep mid-sentence multiple times.  She could not participate in physical exam.    Pre-operative considerations:  1.  Cardiac:  Functional status- METS 1.  Mostly wheelchair bound, can take a few steps with a  walker.   Intermediate risk surgery with 11% (Cr. >2, intraperitoneal surgery, CHF) risk of major adverse cardiac event.    --HTN, dyslipidemia, HFpEF, Atrial fibrillation.  Denies h/o CAD  --has [employer] Pacemaker in place since 10/2020 for complete heart block, bradycardia.  Last device check was remote transmission 12/2/2020 (record in Care Everywhere).  confirm AF, longest 17 minutes, burden <1%, V-rates >/=120 bpm <1%.  No ventricular high rate episodes detected.  Comments: normal magnet and pacemaker function  --she has not seen cardiology since PM placement.  Recommend cardiology consult prior to surgery. Referral placed.  --she is not on anticoagulation for hx of afib due to hx of GI bleed. She could not tell me about this history.  --EKG 4/8/21: SR, non specific T wave abnormalities    2.  Pulm:  Airway feasible.  KHUSHBOO risk: Pt has known KHUSHBOO, wears CPAP at night with O2.  She was not wearing supplemental O2 today.  O2 sats today are 91% on room air.  She reports that she sometimes uses O2 during the day.  --she has h/o smoking, denies current smoking  --h/o COPD/asthma.  She has albuterol inhaler prn and Duonebs that she may use once or twice per day.  Singulair every day.    --she states that she is SOB often and coughs a lot. She is not followed by pulmonology.  --recommend pulmonology consult prior to surgery for optimization.  Referral placed.    3.  GI:  Risk of PONV score = 3.  If > 2, anti-emetic intervention recommended.  --hx of GI bleed.  Continue omeprazole    4.  Endo:  --hx of diabetes with most recent A1c on 4/6/21 of 5.6%.  --not currently on diabetic medications  --obese, BMI ~37    5.  Renal:  --ESRD on HD T/Th/Sat  --right upper chest access    6.  Heme:  --hgb of 9.1, 4/6/21  --h/o blood transfusion    7.  Neuro:  --very somnolent today.  She states she was woken up at 4am for her 7:15am appt today and that is why she is so tired.  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  I cannot find documentation or imaging to support this although it seems she followed a vascular surgery service [phone].  I have requested records.   --EEG 10/2020 at time of PM placement in setting of altered mental status: nonspecific generalized cerebral   dysfunction.  Such slowing can be seen in metabolic or toxic abnormalities, clinical correlation is recommended. No sharp discharges or spikes were recorded during this recording to suggest a seizure disorder.    8.  Pt resides at [facility].  I have called there twice and left voicemail with call back number to discuss getting her set up with appropriate consultations and optimized for surgery.      VTE risk: 0.5 - 3%    Patient is NOT optimized for surgery at this time.      Patient discussed with MERON McgeeC  Preoperative Assessment Center  [facility]  Phone: [phone]  Fax: [phone]

## 2021-04-30 NOTE — ANESTHESIA PREPROCEDURE EVALUATION
Anesthesia Pre-Procedure Evaluation    Patient: Sun Mireles   MRN: [id number] : 1955        Preoperative Diagnosis: * No surgery found *   Procedure :      Past Medical History:   Diagnosis Date     Aneurysm of vertebral artery (H)      Asthma      Asthma      CHF (congestive heart failure) (H)      CHF (congestive heart failure) (H)     diastolic      Chronic kidney disease      Chronic kidney disease, stage 4, severely decreased GFR (H)      Chronic pulmonary heart disease, unspecified      Gout, unspecified      Hypercholesterolemia      Hypertension      Localized osteoarthrosis not specified whether primary or secondary, lower leg     Knee     Morbid obesity (H)     BMI>60     Obstructive sleep apnea (adult) (pediatric)      Type II or unspecified type diabetes mellitus with unspecified complication, not stated as uncontrolled      Unspecified glaucoma(365.9)      Unspecified urinary incontinence       Past Surgical History:   Procedure Laterality Date     C/SECTION, LOW TRANSVERSE  89     TUBAL LIGATION  80, 89      No Known Allergies   Social History     Tobacco Use     Smoking status: Current Every Day Smoker     Packs/day: 1.00     Years: 45.00     Pack years: 45.00     Smokeless tobacco: Never Used   Substance Use Topics     Alcohol use: No      Wt Readings from Last 1 Encounters:   21 100.1 kg (220 lb 11.2 oz)        Anesthesia Evaluation   Pt has had prior anesthetic.     No history of anesthetic complications       ROS/MED HX  ENT/Pulmonary: Comment: Reports occasional use of supplemental O2    (+) sleep apnea, uses CPAP, tobacco use, Past use, COPD,     Neurologic:  - neg neurologic ROS   (+) peripheral neuropathy, - bilateral feet.     Cardiovascular:     (+) Dyslipidemia hypertension-----CHF VIZCAINO. pacemaker, dysrhythmias, a-fib, Previous cardiac testing     METS/Exercise Tolerance: 1 - Eating, dressing    Hematologic:  - neg hematologic  ROS  (-) history of blood clots and history  of blood transfusion   Musculoskeletal: Comment: Pain in shoulders and legs      GI/Hepatic: Comment: H/o GI bleed      Renal/Genitourinary: Comment: Recurrent UTI    (+) renal disease, type: ESRD, Pt requires dialysis, type: Hemodialysis,     Endo:     (+) type II DM, Last HgA1c: 5.6, date: 4/6/21, Not using insulin, - not using insulin pump. Obesity,     Psychiatric/Substance Use:  - neg psychiatric ROS     Infectious Disease:  - neg infectious disease ROS     Malignancy:  - neg malignancy ROS     Other:  - neg other ROS    (+) , H/O Chronic Pain,        Physical Exam    Airway        Mallampati: I   TM distance: > 3 FB   Neck ROM: full   Mouth opening: > 3 cm    Respiratory Devices and Support         Dental  no notable dental history         Cardiovascular          Rhythm and rate: regular and normal   (+) murmur       Pulmonary   pulmonary exam normal        breath sounds clear to auscultation           OUTSIDE LABS:  CBC:   Lab Results   Component Value Date    WBC 6.3 04/11/2021    WBC 7.4 04/09/2021    HGB 9.2 (L) 04/11/2021    HGB 9.1 (L) 04/09/2021    HCT 29.9 (L) 04/11/2021    HCT 29.4 (L) 04/09/2021     04/11/2021     04/09/2021     BMP:   Lab Results   Component Value Date     04/11/2021     04/09/2021    POTASSIUM 3.7 04/11/2021    POTASSIUM 3.6 04/09/2021    CHLORIDE 102 04/11/2021    CHLORIDE 103 04/09/2021    CO2 26 04/11/2021    CO2 24 04/09/2021    BUN 39 (H) 04/11/2021    BUN 60 (H) 04/09/2021    CR 4.13 (H) 04/11/2021    CR 5.02 (H) 04/09/2021     (H) 04/11/2021     (H) 04/09/2021     COAGS:   Lab Results   Component Value Date    INR 1.15 (H) 04/09/2021     POC:   Lab Results   Component Value Date     (H) 04/12/2021     HEPATIC:   Lab Results   Component Value Date    ALBUMIN 3.2 (L) 04/09/2021    PROTTOTAL 8.3 04/09/2021    ALT 10 04/09/2021    AST 11 04/09/2021    ALKPHOS 139 04/09/2021    BILITOTAL 0.3 04/09/2021     OTHER:   Lab Results    Component Value Date    A1C 4.9 03/16/2021    JOANNA 9.2 04/11/2021    PHOS 6.1 (H) 03/16/2021    MAG 2.0 03/16/2021    CRP 16.0 (H) 04/09/2021             PAC Discussion and Assessment    ASA Classification: 3      Invasive monitoring and risk discussed: No    Possibility and Risk of blood transfusion discussed: Yes            PAC Resident/NP Anesthesia Assessment: Sun Mireles is a 65 year old female scheduled for TBD on TBD by Dr. Patino in treatment of pelvic mass.  PAC referral for risk assessment and optimization for anesthesia:    Pt arrived by herself from NH.  She was very somnolent during history and exam. Difficult visit.  She fell asleep mid-sentence multiple times.  She could not participate in physical exam.    Pre-operative considerations:  1.  Cardiac:  Functional status- METS 1.  Mostly wheelchair bound, can take a few steps with a walker.   Intermediate risk surgery with 11% (Cr. >2, intraperitoneal surgery, CHF) risk of major adverse cardiac event.    --HTN, dyslipidemia, HFpEF, Atrial fibrillation.  Denies h/o CAD  --has [employer] Pacemaker in place since 10/2020 for complete heart block, bradycardia.  Last device check was remote transmission 12/2/2020 (record in Care Everywhere).  confirm AF, longest 17 minutes, burden <1%, V-rates >/=120 bpm <1%.  No ventricular high rate episodes detected.  Comments: normal magnet and pacemaker function  --she has not seen cardiology since PM placement.  Recommend cardiology consult prior to surgery. Referral placed.  --she is not on anticoagulation for hx of afib due to hx of GI bleed. She could not tell me about this history.  --EKG 4/8/21: SR, non specific T wave abnormalities    2.  Pulm:  Airway feasible.  KHUSHBOO risk: Pt has known KHUSHBOO, wears CPAP at night with O2.  She was not wearing supplemental O2 today.  O2 sats today are 91% on room air.  She reports that she sometimes uses O2 during the day.  --she has h/o smoking, denies current smoking  --h/o  COPD/asthma.  She has albuterol inhaler prn and Duonebs that she may use once or twice per day.  Singulair every day.    --she states that she is SOB often and coughs a lot. She is not followed by pulmonology.  --recommend pulmonology consult prior to surgery for optimization.  Referral placed.    3.  GI:  Risk of PONV score = 3.  If > 2, anti-emetic intervention recommended.  --hx of GI bleed.  Continue omeprazole    4.  Endo:  --hx of diabetes with most recent A1c on 4/6/21 of 5.6%.  --not currently on diabetic medications  --obese, BMI ~37    5.  Renal:  --ESRD on HD T/Th/Sat  --right upper chest access    6.  Heme:  --hgb of 9.1, 4/6/21  --h/o blood transfusion    7.  Neuro:  --very somnolent today.  She states she was woken up at 4am for her 7:15am appt today and that is why she is so tired.  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  I cannot find documentation or imaging to support this although it seems she followed a vascular surgery service [id number].  I have requested records.   --EEG 10/2020 at time of PM placement in setting of altered mental status: nonspecific generalized cerebral   dysfunction.  Such slowing can be seen in metabolic or toxic abnormalities, clinical correlation is recommended.  No sharp discharges or spikes were recorded during this recording to suggest a seizure disorder.    8.  Pt resides at [facility].  I have called there twice and left voicemail with call back number to discuss getting her set up with appropriate consultations and optimized for surgery.      VTE risk: 0.5 - 3%    Patient is NOT optimized for the proposed procedure.      Patient discussed with Dr. Reid    **For further details of assessment, testing, and physical exam please see H and P completed on same date.          Allison Gaytan PA-C, [facility]    Reviewed and Signed by PAC Mid-Level Provider/Resident  Mid-Level Provider/Resident: Allison CANALES  Glass  Date: 4/30/21                                 Allison Gaytan PA-C

## 2021-05-03 NOTE — PATIENT INSTRUCTIONS
referral to referral to cardiology and pulmonary per PAC clinic for surgery clearance, appts requested, scheduling will call you to help make an appointment  Pap smear done today, you will be notified via my chart/telephone with test results

## 2021-05-03 NOTE — NURSING NOTE
Pap smear done today, orders entered, specimen sent to cytology  Referral to cardiology and pulmonary per PAC clinic for surgery clearance, appts requested

## 2021-05-05 NOTE — TELEPHONE ENCOUNTER
"Pt needs to schedule an appointment with a general cardiologist per a referral.    CARDIOLOGY EVAL ADULT REFERRAL HAS BEEN CANCELLED; can be found in the \"finalized requests\" tab of the patient's appointment desk.          Please reinstate (Right click on referral in the  finalized requests  tab and select  reinstate ) referral request and then link to patient's appointment when scheduling.   "

## 2021-05-07 NOTE — TELEPHONE ENCOUNTER
Action    Action Taken 5-7: Requested from [facility]:    EKG Strips    CTA Chest    CT Chest    Cardiac cath    Echo   3-27-21, 3-16-21, 2-4-21     3-16-21    10-26-20    10-20-20    10-24-20, 9-25-20     5-10: sent EKG strips to scanning, sent second request for images  5-11: called [facility] and they are sending echos over. Other images are confirmed to be in PACS  5-25: confirmed echos are in PACS

## 2021-05-10 PROBLEM — E87.70 FLUID OVERLOAD: Status: ACTIVE | Noted: 2021-01-01

## 2021-05-10 NOTE — ED PROVIDER NOTES
ED Provider Note  [facility]      History     Chief Complaint   Patient presents with     Shortness of Breath     Cough     Rectal Bleeding     HPI  Sun Mireles is a 65 year old female with a history of recent diagnosis of uterine mass, HFpEF, sick sinus syndrome s/p pacemaker placement, atrial fibrillation, ESRD on dialysis (T/Th/Sat), type 2 diabetes mellitus, COPD, HTN, and HLD who presents to the Emergency Department via EMS with increased cough, rectal bleeding, shortness of breath, and edema.  Patient states that since her discharge she has had worsening shortness of breath and cough every day.  She states that she thinks it is from her fluid in her lungs.  She states that she did not get discharged with continuation of her Lasix and since then she has been feeling these symptoms.  She otherwise denies fevers and all other complaints at this time.  She states that tonight she had a small bit of blood on the toilet paper when she wiped.  She thinks it might be associated hemorrhoids.    Per chart review, patient has had multiple admissions in the past 3 months. Most recently, she was hospitalized from 4/9-4/12 for treatment of complicated UTI. Patient was given a dose of ceftriaxone in the ED. Urine culture with colony-forming units of VRE but this was thought to not be a true pathogen. She was discharged with 2 day course of Keflex.    Past Medical History  Past Medical History:   Diagnosis Date     Aneurysm of vertebral artery (H)      Asthma      Asthma      CHF (congestive heart failure) (H)      CHF (congestive heart failure) (H)     diastolic      Chronic kidney disease      Chronic kidney disease, stage 4, severely decreased GFR (H)      Chronic pulmonary heart disease, unspecified      Gout, unspecified      Hypercholesterolemia      Hypertension      Localized osteoarthrosis not specified whether primary or secondary, lower leg     Knee     Morbid obesity (H)     BMI>60      Obstructive sleep apnea (adult) (pediatric)      Type II or unspecified type diabetes mellitus with unspecified complication, not stated as uncontrolled      Unspecified glaucoma(365.9)      Unspecified urinary incontinence      Past Surgical History:   Procedure Laterality Date     C/SECTION, LOW TRANSVERSE  89     TUBAL LIGATION  80, 89     acetaminophen (TYLENOL) 325 MG tablet  albuterol (PROAIR HFA/PROVENTIL HFA/VENTOLIN HFA) 108 (90 Base) MCG/ACT inhaler  allopurinol (ZYLOPRIM) 100 MG tablet  Ascorbic Acid (VITAMIN C-MILKA HIPS) 500 MG CHEW  aspirin 81 MG EC tablet  benzonatate (TESSALON) 200 MG capsule  coenzyme Q-10 200 MG CAPS  colchicine (COLCYRS) 0.6 MG tablet  diclofenac (VOLTAREN) 1 % topical gel  Dimethicone-Zinc Oxide 5-5 % CREA  diphenhydrAMINE (BENADRYL) 25 MG tablet  docusate sodium (COLACE) 100 MG tablet  Epoetin Johnnie (EPOGEN IJ)  fluticasone (FLONASE) 50 MCG/ACT nasal spray  gabapentin (NEURONTIN) 100 MG capsule  ipratropium - albuterol 0.5 mg/2.5 mg/3 mL (DUONEB) 0.5-2.5 (3) MG/3ML neb solution  Iron Sucrose (VENOFER IV)  Lidocaine (LIDOCARE) 4 % Patch  losartan (COZAAR) 50 MG tablet  magnesium oxide (MAG-OX) 400 (240 Mg) MG tablet  montelukast (SINGULAIR) 10 MG tablet  NIFEdipine ER (ADALAT CC) 30 MG 24 hr tablet  omeprazole (PRILOSEC) 20 MG DR capsule  oxybutynin ER (DITROPAN-XL) 5 MG 24 hr tablet  polyethylene glycol (MIRALAX) 17 g packet  rosuvastatin (CRESTOR) 10 MG tablet  senna-docusate (SENOKOT-S/PERICOLACE) 8.6-50 MG tablet  sevelamer carbonate (RENVELA) 800 MG tablet  timolol maleate (TIMOPTIC) 0.5 % ophthalmic solution  Vitamin D3 (CHOLECALCIFEROL) 125 MCG (5000 UT) tablet      No Known Allergies  Family History  Family History   Problem Relation Age of Onset     Hypertension Other      Diabetes Other      Cancer Maternal Grandfather         stomach cancer     Social History   Social History     Tobacco Use     Smoking status: Former Smoker     Packs/day: 1.00     Years: 45.00      Pack years: 45.00     Smokeless tobacco: Never Used   Substance Use Topics     Alcohol use: No     Drug use: No      Past medical history, past surgical history, medications, allergies, family history, and social history were reviewed with the patient. No additional pertinent items.       Review of Systems  A complete review of systems was performed with pertinent positives and negatives noted in the HPI, and all other systems negative.    Physical Exam   BP: 139/69  Pulse: 71  Temp: 98.2  F (36.8  C)  SpO2: 99 %  Physical Exam  Vitals signs and nursing note reviewed.   Constitutional:       General: She is not in acute distress.     Appearance: She is well-developed. She is not ill-appearing, toxic-appearing or diaphoretic.      Comments: Comfortably resting, lying in bed, NAD, nondiaphoretic, lucid, fully conversant, no  respiratory distress, alert and oriented.  Nonproductive cough   HENT:      Head: Normocephalic and atraumatic.      Mouth/Throat:      Mouth: Mucous membranes are moist.      Pharynx: No oropharyngeal exudate.   Eyes:      General: No scleral icterus.     Pupils: Pupils are equal, round, and reactive to light.   Neck:      Musculoskeletal: Normal range of motion and neck supple.   Cardiovascular:      Rate and Rhythm: Normal rate.      Heart sounds: Normal heart sounds.   Pulmonary:      Effort: No respiratory distress.      Breath sounds: Examination of the right-lower field reveals rales. Examination of the left-lower field reveals rales. Rales present.      Comments: Nonproductive cough  Abdominal:      Palpations: Abdomen is soft.      Tenderness: There is no abdominal tenderness.   Genitourinary:     Comments: No active bleeding at this time. External hemorrhoids   Musculoskeletal:         General: No tenderness.      Right lower leg: Edema present.      Left lower leg: Edema present.   Skin:     General: Skin is warm and dry.      Coloration: Skin is not pale.      Findings: No erythema or  rash.   Neurological:      Mental Status: She is alert and oriented to person, place, and time.          ED Course      Procedures             EKG Interpretation:      Interpreted by Denys Khan MD  Time reviewed: 1730  Symptoms at time of EKG: cough   Rhythm: normal sinus   Rate: normal  Axis: normal  Ectopy: none  Conduction: normal  ST Segments/ T Waves: No ST-T wave changes  Q Waves: none  Comparison to prior: Unchanged    Clinical Impression: normal EKG             Results for orders placed or performed during the hospital encounter of 05/10/21   XR Chest Port 1 View     Status: None    Narrative    Exam:  Chest X-ray 5/10/2021 5:36 PM    History: cough    Comparison: X-ray 4/9/2021    Findings: Frontal radiograph of the chest. Left chest wall ICD. Right  IJ central venous catheter with the tip over the right atrium. Midline  trachea. Atherosclerotic calcifications seen at the aortic arch.  Enlarged cardiac silhouette. Diffuse bilateral interstitial and  airspace opacities throughout both lungs. No pneumothorax. The upper  abdomen is unremarkable. No acute bone findings.      Impression    Impression:   1. Bilateral mixed interstitial and airspace opacities which can be  seen with infection or pulmonary edema.  2. Cardiomegaly.    I have personally reviewed the examination and initial interpretation  and I agree with the findings.    LUCILLE GALE, DO   CBC with platelets differential     Status: Abnormal   Result Value Ref Range    WBC 6.7 4.0 - 11.0 10e9/L    RBC Count 3.14 (L) 3.8 - 5.2 10e12/L    Hemoglobin 9.3 (L) 11.7 - 15.7 g/dL    Hematocrit 30.1 (L) 35.0 - 47.0 %    MCV 96 78 - 100 fl    MCH 29.6 26.5 - 33.0 pg    MCHC 30.9 (L) 31.5 - 36.5 g/dL    RDW 16.8 (H) 10.0 - 15.0 %    Platelet Count 225 150 - 450 10e9/L    Diff Method Automated Method     % Neutrophils 66.9 %    % Lymphocytes 16.7 %    % Monocytes 12.3 %    % Eosinophils 2.9 %    % Basophils 0.9 %    % Immature Granulocytes 0.3 %     Nucleated RBCs 0 0 /100    Absolute Neutrophil 4.5 1.6 - 8.3 10e9/L    Absolute Lymphocytes 1.1 0.8 - 5.3 10e9/L    Absolute Monocytes 0.8 0.0 - 1.3 10e9/L    Absolute Eosinophils 0.2 0.0 - 0.7 10e9/L    Absolute Basophils 0.1 0.0 - 0.2 10e9/L    Abs Immature Granulocytes 0.0 0 - 0.4 10e9/L    Absolute Nucleated RBC 0.0    Comprehensive metabolic panel     Status: Abnormal   Result Value Ref Range    Sodium 138 133 - 144 mmol/L    Potassium 4.6 3.4 - 5.3 mmol/L    Chloride 103 94 - 109 mmol/L    Carbon Dioxide 25 20 - 32 mmol/L    Anion Gap 10 3 - 14 mmol/L    Glucose 95 70 - 99 mg/dL    Urea Nitrogen 62 (H) 7 - 30 mg/dL    Creatinine 6.70 (H) 0.52 - 1.04 mg/dL    GFR Estimate 6 (L) >60 mL/min/[1.73_m2]    GFR Estimate If Black 7 (L) >60 mL/min/[1.73_m2]    Calcium 8.8 8.5 - 10.1 mg/dL    Bilirubin Total 0.4 0.2 - 1.3 mg/dL    Albumin 3.2 (L) 3.4 - 5.0 g/dL    Protein Total 8.3 6.8 - 8.8 g/dL    Alkaline Phosphatase 136 40 - 150 U/L    ALT 12 0 - 50 U/L    AST 23 0 - 45 U/L   Symptomatic SARS-CoV-2 COVID-19 Virus (Coronavirus) by PCR     Status: None    Specimen: Nasopharyngeal   Result Value Ref Range    SARS-CoV-2 Virus Specimen Source Nasopharyngeal     SARS-CoV-2 PCR Result NEGATIVE     SARS-CoV-2 PCR Comment       Testing was performed using the Xpert Xpress SARS-CoV-2 Assay on the Cepheid Gene-Xpert   Instrument Systems. Additional information about this Emergency Use Authorization (EUA)   assay can be found via the Lab Guide.     Procalcitonin     Status: None   Result Value Ref Range    Procalcitonin 0.28 ng/ml   EKG 12-lead, tracing only     Status: None (Preliminary result)   Result Value Ref Range    Interpretation ECG Click View Image link to view waveform and result      Medications   phenylephrine (ALEX-SYNEPHRINE) 0.25 % spray 1 spray (1 spray Both Nostrils Not Given 5/10/21 1758)   furosemide (LASIX) injection 40 mg (40 mg Intravenous Given 5/10/21 1758)        Assessments & Plan (with Medical  Decision Making)   This is a 65-year-old female patient presenting to the emergency room with increasing cough.  Patient states that when she was recently discharged earlier in the month they did not discharge her with continuation of her Lasix.  Since then she is having increasing cough and shortness of breath.  She believes the edema in her legs is getting worse.  She otherwise denies fevers, chest pain and all other complaints.  She did have a small bout of blood when she wiped after having a bowel movement.  She thinks it might be associated with hemorrhoids.  She denies any abdominal pain at this time.  Her work-up is consistent with fluid overload as her chest x-ray shows bilateral infiltrates concerning for fluid overload.  Her infectious work-up is otherwise unremarkable.  She can be safely provided Lasix here in the emergency room.  Our plan is to admit to medicine for continued diuresis.  Patient does receive dialysis Tuesday Thursday and Saturday and has had no missed sessions.    I have reviewed the nursing notes. I have reviewed the findings, diagnosis, plan and need for follow up with the patient.    New Prescriptions    No medications on file       Final diagnoses:   Cough   Hypervolemia, unspecified hypervolemia type       --  Denys Khan MD  [facility] EMERGENCY DEPARTMENT  5/10/2021     Denys Khan MD  05/10/21 [id number]

## 2021-05-10 NOTE — ED TRIAGE NOTES
Pt presents to ED via EMS due to increase in coughing, rectal bleeding, SOB and edema. Pt is from a nursing home who reports that the pt regularly coughs, but pt states that her cough has worsened. Pt denies any sick contacts and does not leave her nursing home.

## 2021-05-11 NOTE — PLAN OF CARE
/56   Pulse 87   Temp 98.2  F (36.8  C) (Oral)   Resp 15   SpO2 99%     [id number]  Patient hypertensive on 2L via nasal cannula, afebrile. Patient complained of pain in left knee; provider notified and saw patient at bedside. Tolerating 3g Na diet with good appetite. HD cath in place. PIV saline locked. Voiding spontaneously w/o difficulty.  Small BM this morning.  Patient is up with assist of 2 and lift device.  Patient went to dialysis at 1000 returned to unit around 1500.  Report given to oncoming nurse and will continue with POC and notify MD with changes or concerns.

## 2021-05-11 NOTE — PROGRESS NOTES
Spoke to pt daughter at this time who was concerned because her mother called stating that she couldn't breathe and that no one has checked on her. I informed the daughter that I have been in her room multiple times every hour as well as other staff due to the patient calling for a multitude of reasons. After getting off the phone with the pt daughter I moved pt to a hospital bed and pt was upset that she was not yet being brought up stairs. Pt was offered nebulizer due to SOB, but pt denied. Pt did not have a drop in sp02 at any point. Pt states that her SOB was brought on by position.

## 2021-05-11 NOTE — TELEPHONE ENCOUNTER
RECORDS RECEIVED FROM: internal    DATE RECEIVED: 6.9.21    NOTES STATUS DETAILS   OFFICE NOTE from referring provider internal  Allison Gaytan PA-C   OFFICE NOTE from other specialist na    DISCHARGE SUMMARY from hospital internal  3.15.21 Robby    DISCHARGE REPORT from the ER internal  5.10.21 Georgina   4.8.21 Sagrario   3.26.21 Shoshana  10.20.20 Sofer/ Robbuns      MEDICATION LIST internal     IMAGING  (NEED IMAGES AND REPORTS)     CT SCAN na    CHEST XRAY (CXR) internal  5.10.21, 4.9.21, 4.8.21, 3.16.21,    TESTS     PULMONARY FUNCTION TESTING (PFT) internal  Scheduled 6.7.21

## 2021-05-11 NOTE — PHARMACY-ADMISSION MEDICATION HISTORY
Admission Medication History Completed by Pharmacy    See [facility] Admission Navigator for allergy information, preferred outpatient pharmacy, prior to admission medications and immunization status.     Medication History Sources:     MAR from care facility    Changes made to PTA medication list (reason):    Added: None    Deleted: None    Changed: None    Additional Information:    None    Prior to Admission medications    Medication Sig Last Dose Taking? Auth Provider   acetaminophen (TYLENOL) 325 MG tablet Take 650 mg by mouth 4 times daily For pain  Yes Unknown, Entered By History   albuterol (PROAIR HFA/PROVENTIL HFA/VENTOLIN HFA) 108 (90 Base) MCG/ACT inhaler Inhale 2 puffs into the lungs every 6 hours as needed for shortness of breath / dyspnea or wheezing  Yes Unknown, Entered By History   allopurinol (ZYLOPRIM) 100 MG tablet Take 100 mg by mouth daily For gout  Yes Unknown, Entered By History   Ascorbic Acid (VITAMIN C-MILKA HIPS) 500 MG CHEW Take 500 mg by mouth 2 times daily  Yes Unknown, Entered By History   aspirin 81 MG EC tablet Take 81 mg by mouth daily For prevention of heart attack  Yes Unknown, Entered By History   benzonatate (TESSALON) 200 MG capsule Take 200 mg by mouth 3 times daily For cough  Yes Unknown, Entered By History   coenzyme Q-10 200 MG CAPS Take 200 mg by mouth daily For heart disease  Yes Unknown, Entered By History   colchicine (COLCYRS) 0.6 MG tablet Take 0.3 mg by mouth once a week Every Saturday evening for gout  Yes Unknown, Entered By History   diclofenac (VOLTAREN) 1 % topical gel Apply 1 g topically 3 times daily as needed for moderate pain (to bilateral toes)  Yes Unknown, Entered By History   Dimethicone-Zinc Oxide 5-5 % CREA Apply topically to vulva each shift with each diaper change for pain  Yes Unknown, Entered By History   diphenhydrAMINE (BENADRYL) 25 MG tablet Take 25 mg by mouth 2 times daily as needed for itching  Yes Unknown, Entered By History   docusate sodium  (COLACE) 100 MG tablet Take 100 mg by mouth daily For constipation  Yes Unknown, Entered By History   fluticasone (FLONASE) 50 MCG/ACT nasal spray Spray 2 sprays into both nostrils daily For allergies  Yes Unknown, Entered By History   gabapentin (NEURONTIN) 100 MG capsule Take 200 mg by mouth three times a week Every Mon, Wed, Friday at bedtime on non-dialysis days for neuropathic pain  Yes Unknown, Entered By History   ipratropium - albuterol 0.5 mg/2.5 mg/3 mL (DUONEB) 0.5-2.5 (3) MG/3ML neb solution Take 1 vial by nebulization 4 times daily For shortness of breath  Yes Unknown, Entered By History   Lidocaine (LIDOCARE) 4 % Patch Place 1 patch onto the skin every 24 hours To prevent lidocaine toxicity, patient should be patch free for 12 hrs daily.  Yes Unknown, Entered By History   losartan (COZAAR) 50 MG tablet Take 50 mg by mouth daily For hypertension  Yes Unknown, Entered By History   magnesium oxide (MAG-OX) 400 (240 Mg) MG tablet Take 400 mg by mouth 2 times daily For hypomagnesium  Yes Unknown, Entered By History   montelukast (SINGULAIR) 10 MG tablet Take 10 mg by mouth At Bedtime For allergies  Yes Unknown, Entered By History   NIFEdipine ER (ADALAT CC) 30 MG 24 hr tablet Take 30 mg by mouth daily For heart disease  Yes Unknown, Entered By History   omeprazole (PRILOSEC) 20 MG DR capsule Take 20 mg by mouth daily For GERD  Yes Unknown, Entered By History   oxybutynin ER (DITROPAN-XL) 5 MG 24 hr tablet Take 5 mg by mouth every evening For overactive bladder  Yes Unknown, Entered By History   polyethylene glycol (MIRALAX) 17 g packet Take 1 packet by mouth daily as needed for constipation  Yes Unknown, Entered By History   rosuvastatin (CRESTOR) 10 MG tablet Take 10 mg by mouth daily At bedtime for hyperlipidemia  Yes Unknown, Entered By History   senna-docusate (SENOKOT-S/PERICOLACE) 8.6-50 MG tablet Take 1 tablet by mouth daily as needed for constipation  Yes Unknown, Entered By History   sevelamer  carbonate (RENVELA) 800 MG tablet Take 800 mg by mouth With meals for phosphorus supplement  Yes Unknown, Entered By History   timolol maleate (TIMOPTIC) 0.5 % ophthalmic solution Place 1 drop into both eyes 2 times daily  Yes Unknown, Entered By History   Vitamin D3 (CHOLECALCIFEROL) 125 MCG (5000 UT) tablet Take 1 tablet by mouth daily For osteoporosis  Yes Unknown, Entered By History   Epoetin Johnnie (EPOGEN IJ) Inject 3,400 Units as directed three times a week With dialysis Tues, Thur, Sat   Unknown, Entered By History   Iron Sucrose (VENOFER IV) Inject 50 mg into the vein once a week On Tuesday at dialysis.   [facility]   Unknown, Entered By History       Date completed: 05/10/21    Medication history completed by: Frieda Sen, PharmD

## 2021-05-11 NOTE — CONSULTS
Care Management Initial Consult    General Information  Assessment completed with: Patient,    Type of CM/SW Visit: Initial Assessment    Primary Care Provider verified and updated as needed: Yes   Readmission within the last 30 days: current reason for admission unrelated to previous admission   Return Category: New Diagnosis    Reason for Consult: discharge planning, Elevated Risk Score  Advance Care Planning: Did not discuss today. Her children would be her legal NOK     Communication Assessment  Patient's communication style: spoken language (English or Bilingual)        Cognitive  Cognitive/Neuro/Behavioral: .WDL except  Level of Consciousness: lethargic  Arousal Level: opens eyes spontaneously  Orientation: oriented x 4  Mood/Behavior: calm, cooperative  Best Language: 0 - No aphasia  Speech: slurred, slow, incoherent    Living Environment:   People in home: facility resident     Current living Arrangements: residential facility  Name of Facility: [facility]   Able to return to prior arrangements: yes  Living Arrangement Comments: Does not like where she lives and wants to live an apartment.    Family/Social Support:  [facility], self when able  Provides care for: no one, unable/limited ability to care for self  Marital Status: Single  Support: Children          Description of Support System: Supportive    Support Assessment: Adequate family and caregiver support    Current Resources:   Patient receiving home care services: No  Community Resources: Transitional Care  Equipment currently used at home: Uses Facility Equipment as needed  Supplies currently used at home: Uses Facility Supplies as needed    Employment/Financial:  Employment Status: Social Security Income  Financial Concerns: Has no money as it is all going to the facility     Lifestyle & Psychosocial Needs:  Socioeconomic History     Marital status: Single     Spouse name: Not on file     Number of children: Not  on file     Years of education: Not on file     Highest education level: Not on file     Tobacco Use     Smoking status: Former Smoker     Packs/day: 1.00     Years: 45.00     Pack years: 45.00     Smokeless tobacco: Never Used   Substance and Sexual Activity     Alcohol use: No     Drug use: No     Functional Status:  Prior to admission patient needed assistance:   Dependent ADLs: Lives at a LTC and receives help  Dependent IADLs: Lives at a LTC and receives help  Assesssment of Functional Status: At functional baseline    Values/Beliefs:  Spiritual, Cultural Beliefs, Roman Catholic Practices, Values that affect care: no             Additional Information:  Patient is a 65 year old female admitted on 5/10/2021. She has a history of uterine mass, HFpEF, sick sinus syndrome s/p pacemaker placement, atrial fibrillation, ESRD on dialysis (T/Th/Sat), DMII, COPD, HTN, HLD who presented to the ED with cough, shortness of breath and lower extremity edema.      SW attempted to call [facility] ([phone]) 3x but no one answered after a long time and there was no VM box. MAR called back in the afternoon and was able to leave a message for SW.     SW called patient's [facility] Care Coordinator Gautam Richter ([phone]) and left a message to chat about patient and get more information on patient's psychosocial situation.     SW spoke with patient while she was in dialysis. At the start of the conversation, patient was engaged and said she did not want to return to her skilled nursing facility. She reports her daughter is looking for an apartment for her. When SW asked if anyone would be staying with her in the apartment, patient said she did not know. Patient then got slow to respond and SW assumed she was falling asleep on the phone. SW asked if her daughter could be contacted for more information but SW could not get a clear answer out of her.    Patient's dialysis unit information is below:   [facility]  Address: [address], [place], [address]  Telephone: [phone]  Fax: [phone]  Schedule: [facility], Run Time is 11:15 AM-2:30 PM    SW awaiting return call from LTC facility SW and [facility] Care Coordinator for more information.     ELROY Oakley, [address]   Ph: [phone]  Pager: [phone]

## 2021-05-11 NOTE — H&P
[facility]    History and Physical - Hospitalist Service, Gold Night        Date of Admission:  5/10/2021    Assessment & Plan   Sun Mireles is a 65 year old female admitted on 5/10/2021. She has a history of uterine mass, HFpEF, sick sinus syndrome s/p pacemaker placement, atrial fibrillation, ESRD on dialysis (T/Th/Sat), DMII, COPD, HTN, HLD who presented to the ED with cough, shortness of breath and lower extremity edema.     # Cough   # SOB   Presented with 2 day history of increased SOB and cough. Baseline on 2L supplemental O2 at night due to COPD, KHUSHBOO. In the ED, COVID19 negative. Procalcitonin 0.28 in the setting of ESRD. WBC wnl. CXR with bilateral mixed interstitial and airspace opacities which can be seen in infection or pulmonary edema. Patient also notes edema of lower extremities. Patient was recently admitted March 26-April 1 at [facility] for SOB, cough, fever and weakness thought to be secondary to volume overload due to missed HD sessions treated with HD and lasix, however was not discharged on any diuretics. Received IV furosemide 40 mg in the ED. DDx likely multifactorial with volume overload, anxiety vs COPD exacerbation vs less likely infection.   - Echocardiogram   - Monitor strict I and O    - Daily weights, no weight documented in the ED   - Check NT proBNP    - Furosemide 80 mg IV now, reassess in AM for additional diuretics   - Consider cardiology consult     - Continue nebulizer, singulair for COPD    - Could consider prednisone burst if no improvement in furosemide and HD for volume management, however lower suspicion for COPD exacerbation at this time   - Supplemental O2 PRN    - Nephrology consult for HD for volume management      # HFpEF   Last echo in 10/2020 with EF of 65%. LE US 4/9/21 without evidence of DVT. CTA chest at OSH 3/16/21 with enlarged main pulmonary artery consistent with pulmonary hypertension.    - Daily weights    - I and O   - Low Na diet   - Echocardiogram as above     # ESRD on HD   Dialysis (T/Th/Sat) at [facility] under care of Dr. Katie Bailey with internal jugular catheter. Dry weight 94 kg. Per report, has not missed any HD recently.   - Nephrology consult   - Epogen and venofer per nephrology     - Continue PTA Renvela 800 mg TID     # Chronic anemia   Likely multifactorial 2/2 CKD, chronic disease and malnutrition. Hgb baseline 9.0. Hgb at baseline on admission.   - CBC daily     # Sick sinus syndrome s/p pacemaker   # Afib   PTA on nifedipine ER 30 mg daily. Not on anticoagulation due to hx of GI bleed. PTA on ASA. EKG in the ED with Normal sinus rhythm.   - Continue ASA 81 mg daily   - Continue PTA nifedipine     # DMII  Peripheral neuropathy   A1c 5.6% (4/6/21). Not currently on any diabetic medications.    - Glucose BID   - Hypoglycemic protocol    - Continue gabapentin 200 mg every Mon, Wed, Fri     # COPD   Baseline on 2L O2. PTA on Singulair daily, Duonebs BID PRN, albuterol PRN, fluticasone daily. Currently on 2L supplemental O2.   - Continue PTA Singulair, fluticasone    - Duonebs QID PRN for SOB, wheezing   - Supplemental O2 PRN     # Dysuria   # Hx of UTI with VRE on culture   Recently admitted 4/9-4/12/21 with recurrent UTI. Patient was treated with Ceftriaxone and cephalosporin. Culture grew 10,000-50,000 CFU VRE, given improvement in symptoms VRE was not treated.   - Check UA     # Uterine mass   Was seen by gynecology who suspects uterine fibroid which is causing patient abdominal discomfort and recurrent UTIs. Underwent pre op evaluation for XL, AUTUMN, BSO, however recommended to have cardiology and pulmonology consult prior to surgery which have not been completed.    - Continue with outpatient workup, could consider inpatient cardiology/pulmonology consult pending clinical course     # HTN  PTA on losartan 50 mg daily, nifedipine 30 mg daily.   - Continue PTA losartan and nifedipine     # Hx  BRBPR   Patient reported BRBPR this AM with BM. Notes hx of hemorrhoids. Denies digital rectal exam. Rectum visualized with no evidence of bleeding, brown/tan stool present. Hgb stable on admission at baseline.   - Continue to monitor     Chronic stable medical issues:   # HLD: Continue PTA rosuvastatin   # GERD: Continue PTA omeprazole   # KHUSHBOO: CPAP at night with O2  # Gout: Continue PTA allopurinol 100 mg daily, colchicine 0.3 mg every Saturday   # Overactive bladder: Continue PTA oxybutynin 5 mg every evening        Diet:   Low Na diet   DVT Prophylaxis: Heparin SQ  Phan Catheter: not present  Code Status:   FULL CODE          Disposition Plan   Expected discharge: 2 - 3 days, recommended to prior living arrangement once respiratory status improved, supplemental O2 at baseline.  Entered: WILLIS Anthony 05/10/2021, 8:40 PM     The patient's care was discussed with the Attending Physician, Dr. Oconnell, Bedside Nurse and Patient.    WILLIS Anthony  [facility]  Contact information available via [facility] Paging/Directory      ______________________________________________________________________    Chief Complaint   SOB, cough     History is obtained from the patient and review of medical record.     History of Present Illness   Sun Mireles is a 65 year old female who has a history of uterine mass, HFpEF, sick sinus syndrome s/p pacemaker placement, atrial fibrillation, ESRD on dialysis (T/Th/Sat), DMII, COPD, HTN, HLD who presented to the ED with cough, shortness of breath and lower extremity edema.     Patient reports two day history of increased SOB and cough. Has chronic cough but feels her current cough is worse than baseline. Notes cough is dry and non productive, however with minimal non bloody sputum production this AM. Reports associated edema of bilateral lower extremities. Denies change in diet, however patient was not willing to discuss diet.  Denies chest pain, fever or chills. Denies associated abdominal pain. Reports dysuria with burning with urination for the last 1-2 days. Patient reports she had bleeding from her rectum this AM when a nurse was changing her. Reports hx of hemorrhoids.     Of note, she has had several hospitalizations in the last 3 months. Most recently admitted 4/9-4/12/21 with recurrent UTI. Patient was treated with Ceftriaxone and cephalosporin. Culture grew 10,000-50,000 CFU VRE, given improvement in symptoms VRE was not treated. Admitted 3/26-4/1 at [facility] for SOB, cough, fever, weakness and dysuria, thought to have volume overload due to missing HD X 3 sessions treated with lasix but not discharged on any medications (also treated for COPD exacerbation with prednisone and doxycycline as well as ceftriaxone for UTI.)       Review of Systems    The 10 point Review of Systems is negative other than noted in the HPI.     Past Medical History    I have reviewed this patient's medical history and updated it with pertinent information if needed.   Past Medical History:   Diagnosis Date     Aneurysm of vertebral artery (H)      Asthma      Asthma      CHF (congestive heart failure) (H)      CHF (congestive heart failure) (H)     diastolic      Chronic kidney disease      Chronic kidney disease, stage 4, severely decreased GFR (H)      Chronic pulmonary heart disease, unspecified      Gout, unspecified      Hypercholesterolemia      Hypertension      Localized osteoarthrosis not specified whether primary or secondary, lower leg     Knee     Morbid obesity (H)     BMI>60     Obstructive sleep apnea (adult) (pediatric)      Type II or unspecified type diabetes mellitus with unspecified complication, not stated as uncontrolled      Unspecified glaucoma(365.9)      Unspecified urinary incontinence        Past Surgical History   I have reviewed this patient's surgical history and updated it with pertinent information if needed.  Past  Surgical History:   Procedure Laterality Date     C/SECTION, LOW TRANSVERSE  89     TUBAL LIGATION  80, 87       Social History   I have reviewed this patient's social history and updated it with pertinent information if needed.  Social History     Tobacco Use     Smoking status: Former Smoker     Packs/day: 1.00     Years: 45.00     Pack years: 45.00     Smokeless tobacco: Never Used   Substance Use Topics     Alcohol use: No     Drug use: No       Family History   I have reviewed this patient's family history and updated it with pertinent information if needed.  Family History   Problem Relation Age of Onset     Hypertension Other      Diabetes Other      Cancer Maternal Grandfather         stomach cancer       Prior to Admission Medications   Prior to Admission Medications   Prescriptions Last Dose Informant Patient Reported? Taking?   Ascorbic Acid (VITAMIN C-MILKA HIPS) 500 MG CHEW  Nursing Home Yes No   Sig: Take 500 mg by mouth 2 times daily   Dimethicone-Zinc Oxide 5-5 % CREA  Nursing Home Yes No   Sig: Apply topically to vulva each shift with each diaper change for pain   Epoetin Johnnie (EPOGEN IJ)   Yes No   Sig: Inject 2,800 Units as directed three times a week At dialysis Tues/Thurs/Sat   Iron Sucrose (VENOFER IV)   Yes No   Sig: Inject 50 mg into the vein once a week On Tuesday at dialysis.   [facility]   Lidocaine (LIDOCARE) 4 % Patch  Nursing Home Yes No   Sig: Place 1 patch onto the skin every 24 hours To prevent lidocaine toxicity, patient should be patch free for 12 hrs daily.   NIFEdipine ER (ADALAT CC) 30 MG 24 hr tablet   Yes No   Sig: Take 30 mg by mouth daily For heart disease   Vitamin D3 (CHOLECALCIFEROL) 125 MCG (5000 UT) tablet  Nursing Home Yes No   Sig: Take 1 tablet by mouth daily For osteoporosis   acetaminophen (TYLENOL) 325 MG tablet  Nursing Home Yes No   Sig: Take 650 mg by mouth 4 times daily For pain   albuterol (PROAIR HFA/PROVENTIL HFA/VENTOLIN HFA) 108 (90 Base) MCG/ACT  inhaler  Nursing Home Yes No   Sig: Inhale 2 puffs into the lungs every 6 hours as needed for shortness of breath / dyspnea or wheezing   allopurinol (ZYLOPRIM) 100 MG tablet  Nursing Home Yes No   Sig: Take 100 mg by mouth daily For gout   aspirin 81 MG EC tablet  Nursing Home Yes No   Sig: Take 81 mg by mouth daily For prevention of heart attack   benzonatate (TESSALON) 200 MG capsule   Yes No   Sig: Take 200 mg by mouth 3 times daily For cough   coenzyme Q-10 200 MG CAPS  Nursing Home Yes No   Sig: Take 200 mg by mouth daily For heart disease   colchicine (COLCYRS) 0.6 MG tablet  Nursing Home Yes No   Sig: Take 0.3 mg by mouth once a week Every Saturday evening for gout   diclofenac (VOLTAREN) 1 % topical gel  Nursing Home Yes No   Sig: Apply 1 g topically 3 times daily as needed for moderate pain (to bilateral toes)   diphenhydrAMINE (BENADRYL) 25 MG tablet  Nursing Home Yes No   Sig: Take 25 mg by mouth 2 times daily as needed for itching   docusate sodium (COLACE) 100 MG tablet  Nursing Home Yes No   Sig: Take 100 mg by mouth daily For constipation   fluticasone (FLONASE) 50 MCG/ACT nasal spray  Nursing Home Yes No   Sig: Spray 2 sprays into both nostrils daily For allergies   gabapentin (NEURONTIN) 100 MG capsule  Nursing Home Yes No   Sig: Take 200 mg by mouth three times a week Every Mon, Wed, Friday at bedtime on non-dialysis days for neuropathic pain   ipratropium - albuterol 0.5 mg/2.5 mg/3 mL (DUONEB) 0.5-2.5 (3) MG/3ML neb solution  Nursing Home Yes No   Sig: Take 1 vial by nebulization 4 times daily For shortness of breath   losartan (COZAAR) 50 MG tablet   Yes No   Sig: Take 50 mg by mouth daily For hypertension   magnesium oxide (MAG-OX) 400 (240 Mg) MG tablet  Nursing Home Yes No   Sig: Take 400 mg by mouth 2 times daily For hypomagnesium   montelukast (SINGULAIR) 10 MG tablet  Nursing Home Yes No   Sig: Take 10 mg by mouth At Bedtime For allergies   omeprazole (PRILOSEC) 20 MG DR capsule  Nursing  Home Yes No   Sig: Take 20 mg by mouth daily For GERD   oxybutynin ER (DITROPAN-XL) 5 MG 24 hr tablet  Nursing Home Yes No   Sig: Take 5 mg by mouth every evening For overactive bladder   polyethylene glycol (MIRALAX) 17 g packet  penitentiary Yes No   Sig: Take 1 packet by mouth daily as needed for constipation   rosuvastatin (CRESTOR) 10 MG tablet  Nursing Home Yes No   Sig: Take 10 mg by mouth daily At bedtime for hyperlipidemia   senna-docusate (SENOKOT-S/PERICOLACE) 8.6-50 MG tablet  Nursing Home Yes No   Sig: Take 1 tablet by mouth daily as needed for constipation   sevelamer carbonate (RENVELA) 800 MG tablet   Yes No   Sig: Take 800 mg by mouth With meals for phosphorus supplement   timolol maleate (TIMOPTIC) 0.5 % ophthalmic solution  Nursing Home Yes No   Sig: Place 1 drop into both eyes 2 times daily      Facility-Administered Medications: None     Allergies   No Known Allergies    Physical Exam   Vital Signs: Temp: 98.2  F (36.8  C) Temp src: Oral BP: 135/84 Pulse: 75     SpO2: 97 % O2 Device: Nasal cannula Oxygen Delivery: 2 LPM  Weight: 0 lbs 0 oz   GENERAL: Alert and oriented x 3. Mild respiratory distress.   HEENT: Anicteric sclera. PERRL. Mucous membranes moist and without lesions.   CV: RRR. S1, S2. No murmurs appreciated.   RESPIRATORY: Effort normal on 2L Lungs with crackles at bilateral bases with very mild inspiratory wheeze at left lung.   GI: Abdomen soft and non distended, bowel sounds present. No tenderness, rebound, guarding. Rectum visualized with brown/tan stool, no blood appreciated. Digital rectal exam declined.   MUSCULOSKELETAL: No joint swelling or tenderness. Moves all extremities.   NEUROLOGICAL: No focal deficits appreciated.    EXTREMITIES: 2+ bilateral peripheral edema. Intact bilateral pedal pulses.   SKIN: No jaundice. No rashes.       Data   Data reviewed today: I reviewed all medications, new labs and imaging results over the last 24 hours. I personally reviewed the EKG  tracing showing NSR.    Recent Labs   Lab 05/10/21  1659   WBC 6.7   HGB 9.3*   MCV 96         POTASSIUM 4.6   CHLORIDE 103   CO2 25   BUN 62*   CR 6.70*   ANIONGAP 10   JOANNA 8.8   GLC 95   ALBUMIN 3.2*   PROTTOTAL 8.3   BILITOTAL 0.4   ALKPHOS 136   ALT 12   AST 23     Recent Results (from the past 24 hour(s))   XR Chest Port 1 View    Narrative    Exam:  Chest X-ray 5/10/2021 5:36 PM    History: cough    Comparison: X-ray 4/9/2021    Findings: Frontal radiograph of the chest. Left chest wall ICD. Right  IJ central venous catheter with the tip over the right atrium. Midline  trachea. Atherosclerotic calcifications seen at the aortic arch.  Enlarged cardiac silhouette. Diffuse bilateral interstitial and  airspace opacities throughout both lungs. No pneumothorax. The upper  abdomen is unremarkable. No acute bone findings.      Impression    Impression:   1. Bilateral mixed interstitial and airspace opacities which can be  seen with infection or pulmonary edema.  2. Cardiomegaly.    I have personally reviewed the examination and initial interpretation  and I agree with the findings.    LUCILLE GALE, DO

## 2021-05-11 NOTE — CONSULTS
Nephrology Initial Consult  May 11, 2021      Sun Mireles MRN:[id number] YOB: 1955  Date of Admission:5/10/2021  Primary care provider: Collin Doran  Requesting physician: Rogelio Erickson MD    ASSESSMENT AND RECOMMENDATIONS:   ESRD-Since 8/2020, likely a mix of Dm2 and post renal issues, runs with tunneled line under the care of Dr Bailey at [facility], [facility].  EDW 94kg, no wt yet today but likely quite a bit up from EDW based on exam.  Will plan to run daily as BP's are good today until she is closer to euvolemic.    -Line is TDC, AVF attempts have been unsuccessful.     -HD today, 4h, 4L as BP's tolerate, will plan to run daily until euvolemic.     Volume-Overloaded, BNP elevated, SOB with minimal activity.  Will work on volume removal.      Electrolytes/pH-K 4.8, bicarb 26.      BMD-Ca 9.0, Mg and Phos not checked.      Anemia-Hgb 9.2, giving venofer and 4k EPO      Seen and discussed with Dr Peralta    Recommendations were communicated to primary team via note      Duglas Palmer, ADRIANA CNS  Clinical Nurse Specialist  [phone]    REASON FOR CONSULT: Requested to evaluate 65 yom for management of HD while admitted for volume overload.      HISTORY OF PRESENT ILLNESS:  Sun Mireles is a 65 yof with hx of uterine mass, SSS s/p PPM placement, DM2 and ESRD since 8/2020 thought to be due to DM2 and post renal issues with hx of needing ureteral stent placement.  Now admitted with volume overload and HFpEF with elevated BNP, nephrology consulted for management of RRT while admitted.  Still uses tunneled line as AVF has been unable to be successfully created.       PAST MEDICAL HISTORY:  ESRD since 8/2020.   Past Medical History:   Diagnosis Date     Aneurysm of vertebral artery (H)      Asthma      Asthma      CHF (congestive heart failure) (H)      CHF (congestive heart failure) (H)     diastolic      Chronic kidney disease      Chronic kidney disease, stage 4, severely decreased GFR  (H)      Chronic pulmonary heart disease, unspecified      Gout, unspecified      Hypercholesterolemia      Hypertension      Localized osteoarthrosis not specified whether primary or secondary, lower leg     Knee     Morbid obesity (H)     BMI>60     Obstructive sleep apnea (adult) (pediatric)      Type II or unspecified type diabetes mellitus with unspecified complication, not stated as uncontrolled      Unspecified glaucoma(365.9)      Unspecified urinary incontinence        Past Surgical History:   Procedure Laterality Date     C/SECTION, LOW TRANSVERSE  89     TUBAL LIGATION  80, 89        MEDICATIONS:  PTA Meds  Prior to Admission medications    Medication Sig Last Dose Taking? Auth Provider   acetaminophen (TYLENOL) 325 MG tablet Take 650 mg by mouth 4 times daily For pain  Yes Unknown, Entered By History   albuterol (PROAIR HFA/PROVENTIL HFA/VENTOLIN HFA) 108 (90 Base) MCG/ACT inhaler Inhale 2 puffs into the lungs every 6 hours as needed for shortness of breath / dyspnea or wheezing  Yes Unknown, Entered By History   allopurinol (ZYLOPRIM) 100 MG tablet Take 100 mg by mouth daily For gout  Yes Unknown, Entered By History   Ascorbic Acid (VITAMIN C-MILKA HIPS) 500 MG CHEW Take 500 mg by mouth 2 times daily  Yes Unknown, Entered By History   aspirin 81 MG EC tablet Take 81 mg by mouth daily For prevention of heart attack  Yes Unknown, Entered By History   benzonatate (TESSALON) 200 MG capsule Take 200 mg by mouth 3 times daily For cough  Yes Unknown, Entered By History   coenzyme Q-10 200 MG CAPS Take 200 mg by mouth daily For heart disease  Yes Unknown, Entered By History   colchicine (COLCYRS) 0.6 MG tablet Take 0.3 mg by mouth once a week Every Saturday evening for gout  Yes Unknown, Entered By History   diclofenac (VOLTAREN) 1 % topical gel Apply 1 g topically 3 times daily as needed for moderate pain (to bilateral toes)  Yes Unknown, Entered By History   Dimethicone-Zinc Oxide 5-5 % CREA Apply topically to  vulva each shift with each diaper change for pain  Yes Unknown, Entered By History   diphenhydrAMINE (BENADRYL) 25 MG tablet Take 25 mg by mouth 2 times daily as needed for itching  Yes Unknown, Entered By History   docusate sodium (COLACE) 100 MG tablet Take 100 mg by mouth daily For constipation  Yes Unknown, Entered By History   fluticasone (FLONASE) 50 MCG/ACT nasal spray Spray 2 sprays into both nostrils daily For allergies  Yes Unknown, Entered By History   gabapentin (NEURONTIN) 100 MG capsule Take 200 mg by mouth three times a week Every Mon, Wed, Friday at bedtime on non-dialysis days for neuropathic pain  Yes Unknown, Entered By History   ipratropium - albuterol 0.5 mg/2.5 mg/3 mL (DUONEB) 0.5-2.5 (3) MG/3ML neb solution Take 1 vial by nebulization 4 times daily For shortness of breath  Yes Unknown, Entered By History   Lidocaine (LIDOCARE) 4 % Patch Place 1 patch onto the skin every 24 hours To prevent lidocaine toxicity, patient should be patch free for 12 hrs daily.  Yes Unknown, Entered By History   losartan (COZAAR) 50 MG tablet Take 50 mg by mouth daily For hypertension  Yes Unknown, Entered By History   magnesium oxide (MAG-OX) 400 (240 Mg) MG tablet Take 400 mg by mouth 2 times daily For hypomagnesium  Yes Unknown, Entered By History   montelukast (SINGULAIR) 10 MG tablet Take 10 mg by mouth At Bedtime For allergies  Yes Unknown, Entered By History   NIFEdipine ER (ADALAT CC) 30 MG 24 hr tablet Take 30 mg by mouth daily For heart disease  Yes Unknown, Entered By History   omeprazole (PRILOSEC) 20 MG DR capsule Take 20 mg by mouth daily For GERD  Yes Unknown, Entered By History   oxybutynin ER (DITROPAN-XL) 5 MG 24 hr tablet Take 5 mg by mouth every evening For overactive bladder  Yes Unknown, Entered By History   polyethylene glycol (MIRALAX) 17 g packet Take 1 packet by mouth daily as needed for constipation  Yes Unknown, Entered By History   rosuvastatin (CRESTOR) 10 MG tablet Take 10 mg by mouth  daily At bedtime for hyperlipidemia  Yes Unknown, Entered By History   senna-docusate (SENOKOT-S/PERICOLACE) 8.6-50 MG tablet Take 1 tablet by mouth daily as needed for constipation  Yes Unknown, Entered By History   sevelamer carbonate (RENVELA) 800 MG tablet Take 800 mg by mouth With meals for phosphorus supplement  Yes Unknown, Entered By History   timolol maleate (TIMOPTIC) 0.5 % ophthalmic solution Place 1 drop into both eyes 2 times daily  Yes Unknown, Entered By History   Vitamin D3 (CHOLECALCIFEROL) 125 MCG (5000 UT) tablet Take 1 tablet by mouth daily For osteoporosis  Yes Unknown, Entered By History   Epoetin Johnnie (EPOGEN IJ) Inject 3,400 Units as directed three times a week With dialysis Tues, Thur, Sat   Unknown, Entered By History   Iron Sucrose (VENOFER IV) Inject 50 mg into the vein once a week On Tuesday at dialysis.   [facility]   Unknown, Entered By History      Current Meds    allopurinol  100 mg Oral Daily     aspirin  81 mg Oral Daily     benzonatate  200 mg Oral TID     [START ON 5/15/2021] colchicine  0.3 mg Oral Weekly     docusate sodium  100 mg Oral Daily     fluticasone  2 spray Both Nostrils Daily     gabapentin  200 mg Oral Once per day on Mon Wed Fri     heparin ANTICOAGULANT  5,000 Units Subcutaneous Q12H     ipratropium - albuterol 0.5 mg/2.5 mg/3 mL  1 vial Nebulization 4x Daily     losartan  50 mg Oral Daily     magnesium oxide  400 mg Oral BID     montelukast  10 mg Oral At Bedtime     NIFEdipine ER  30 mg Oral Daily     omeprazole  20 mg Oral QAM AC     oxybutynin ER  5 mg Oral QPM     rosuvastatin  10 mg Oral At Bedtime     senna-docusate  1 tablet Oral BID    Or     senna-docusate  2 tablet Oral BID     sevelamer carbonate  800 mg Oral TID w/meals     sodium chloride (PF)  3 mL Intracatheter Q8H     sodium chloride (PF)  9 mL Intracatheter During Hemodialysis (from stock)     sodium chloride (PF)  9 mL Intracatheter During Hemodialysis (from stock)     timolol maleate   1 drop Both Eyes BID     Vitamin D3  125 mcg Oral Daily     Infusion Meds    heparin (porcine) 500 Units/hr (05/11/21 1040)       ALLERGIES:    No Known Allergies    REVIEW OF SYSTEMS:  A 10 point review of systems was negative except as noted above.    SOCIAL HISTORY:   Social History     Socioeconomic History     Marital status: Single     Spouse name: Not on file     Number of children: Not on file     Years of education: Not on file     Highest education level: Not on file   Occupational History     Not on file   Social Needs     Financial resource strain: Not on file     Food insecurity     Worry: Not on file     Inability: Not on file     Transportation needs     Medical: Not on file     Non-medical: Not on file   Tobacco Use     Smoking status: Former Smoker     Packs/day: 1.00     Years: 45.00     Pack years: 45.00     Smokeless tobacco: Never Used   Substance and Sexual Activity     Alcohol use: No     Drug use: No     Sexual activity: Not on file   Lifestyle     Physical activity     Days per week: Not on file     Minutes per session: Not on file     Stress: Not on file   Relationships     Social connections     Talks on phone: Not on file     Gets together: Not on file     Attends Muslim service: Not on file     Active member of club or organization: Not on file     Attends meetings of clubs or organizations: Not on file     Relationship status: Not on file     Intimate partner violence     Fear of current or ex partner: Not on file     Emotionally abused: Not on file     Physically abused: Not on file     Forced sexual activity: Not on file   Other Topics Concern     Not on file   Social History Narrative     Not on file     Reviewed with patient, no changes to social hx.     FAMILY MEDICAL HISTORY:   Family History   Problem Relation Age of Onset     Hypertension Other      Diabetes Other      Cancer Maternal Grandfather         stomach cancer     Reviewed with patient, no changes.     PHYSICAL EXAM:    Temp  Av.2  F (36.8  C)  Min: 98.2  F (36.8  C)  Max: 98.2  F (36.8  C)      Pulse  Av  Min: 70  Max: 144 Resp  Av.8  Min: 10  Max: 20  SpO2  Av %  Min: 91 %  Max: 100 %       /76   Pulse 81   Temp 98.2  F (36.8  C) (Oral)   Resp 11   SpO2 95%       Admit       GENERAL APPEARANCE:  No distress, obese, in no distress.    Lymphatics: no cervical or supraclavicular LAD  Pulmonary: faint basilar crackles, with equal breath sounds bilaterally, no clubbing  CV: regular rhythm, normal rate, no rub   - JVD : none    - Edema : trace   GI: soft, nontender, normal bowel sounds  MS: no evidence of inflammation in joints, no muscle tenderness  : no torres  SKIN: no rash, warm, dry, no cyanosis  NEURO: face symmetric, no asterixis        LABS:   CMP  Recent Labs   Lab 21  0922 05/10/21  1659    138   POTASSIUM 4.8 4.6   CHLORIDE 105 103   CO2 26 25   ANIONGAP 9 10   * 95   BUN 68* 62*   CR 7.29* 6.70*   GFRESTIMATED 5* 6*   GFRESTBLACK 6* 7*   JOANNA 9.0 8.8   PROTTOTAL 7.8 8.3   ALBUMIN 3.2* 3.2*   BILITOTAL 0.4 0.4   ALKPHOS 126 136   AST 14 23   ALT 13 12     CBC  Recent Labs   Lab 21  0922 05/10/21  1659   HGB 9.2* 9.3*   WBC 5.9 6.7   RBC 3.04* 3.14*   HCT 29.8* 30.1*   MCV 98 96   MCH 30.3 29.6   MCHC 30.9* 30.9*   RDW 16.4* 16.8*    225     INRNo lab results found in last 7 days.  ABGNo lab results found in last 7 days.   URINE STUDIES  Recent Labs   Lab Test 21  0400 21  2130 21  0738   COLOR Light Yellow Yellow Yellow   APPEARANCE Slightly Cloudy Slightly Cloudy Slightly Cloudy   URINEGLC Negative Negative Negative   URINEBILI Negative Negative Negative   URINEKETONE Negative Negative Negative   SG 1.015 1.018 1.018   UBLD Large* Large* Moderate*   URINEPH 5.5 5.5 5.5   PROTEIN 70* 200* 100*   NITRITE Negative Negative Negative   LEUKEST Large* Moderate* Large*   RBCU 153* >182* >182*   WBCU 146* 222* >182*     No lab results found.  PTH  No  lab results found.  IRON STUDIES  No lab results found.

## 2021-05-11 NOTE — PROGRESS NOTES
Pt has had numerous issues since being in the ED. Pt has called numerous times for being uncomfortable in bed and has been adjusted and provided pillows. Pt has complained about wait time for a bed upstairs and has been reassured that she will get admitted after 2300 when staffing is available for the floor she is going to. Pt also had issues with not being provided food from the cafeteria which she was informed was closed and was offered numerous offerings from the ED. Pt most recently was upset about her IV complaining that it was hurting and wanted her IV removed. I removed the IV, gave the pt a heat pack for her area that was hurting and used ultra sound to put a second IV in. Pt is now resting in bed comfortably.

## 2021-05-11 NOTE — PLAN OF CARE
[id number]  Pt arrived from the ER at 6am with cough, SOB and LE edema.    Telemetry and continuous pulse ox placed on pt.    Oxygenating well on 2l/nc.   Pt lethargic at times and when sleeping doesn't always answer questions.     Blood sugar 127  Denies pain and nausea.    PIV and HD cath intact.  Pt had a medium stool on bedpan.    Voided last in the ER.  Skin intact.  Turns with 2 assist.    Admission still needs to be completed.     Sit <-> stand WBAT  LE with cane independently in 1 x PT

## 2021-05-11 NOTE — PROGRESS NOTES
[facility]    Medicine Progress Note - Hospitalist Service, Gold 8       Date of Admission:  5/10/2021  Assessment & Plan       Sun Mireles is a 65 year old female admitted on 5/10/2021. She has a history of uterine mass, HFpEF, sick sinus syndrome s/p pacemaker placement, atrial fibrillation, ESRD on dialysis (T/Th/Sat), DMII, COPD, HTN, HLD who presented to the ED with cough, shortness of breath and lower extremity edema.     # Cough   # SOB   Acute exacerbation of heart failure with preserved ejection fraction due to dietary non compliacne  COPD, not in acute exacerbation  Presented with 2 day history of increased SOB and cough. Baseline on 2L supplemental O2 at night due to COPD, KHUSHBOO. In the ED, COVID19 negative. Procalcitonin 0.28 in the setting of ESRD. WBC wnl. CXR with bilateral mixed interstitial and airspace opacities which can be seen in infection or pulmonary edema. Patient also notes edema of lower extremities. Received IV furosemide 40 mg in the ED. DDx likely multifactorial with volume overload, anxiety vs COPD exacerbation vs less likely infection.   - Echocardiogram  ordered  - Monitor strict I and O    - Daily weights, no weight documented in the ED   - NT proBNP elevated to 11k on admission  - Furosemide 120 mg IV given in the ED, plan for dialysis today with fluid removal    - Continue nebulizer, singulair for COPD    - Supplemental O2 PRN    - Nephrology consult for HD for volume management      # HFpEF   Last echo in 10/2020 with EF of 65%. LE US 4/9/21 without evidence of DVT. CTA chest at OSH 3/16/21 with enlarged main pulmonary artery consistent with pulmonary hypertension.    - Daily weights   - I and O   - Low Na diet   - Echocardiogram as above     # ESRD on HD   Dialysis (T/Th/Sat) at [facility] under care of Dr. Katie Bailey with internal jugular catheter. Dry weight 94 kg. Per report, has not missed any HD recently.   -  Nephrology consult   - Epogen and venofer per nephrology     - Continue PTA Renvela 800 mg TID     # Chronic anemia   Likely multifactorial 2/2 CKD, chronic disease and malnutrition. Hgb baseline 9.0. Hgb at baseline on admission.   - CBC stable    # Sick sinus syndrome s/p pacemaker   # Afib   PTA on nifedipine ER 30 mg daily. Not on anticoagulation due to hx of GI bleed. PTA on ASA. EKG in the ED with Normal sinus rhythm.   - Continue ASA 81 mg daily   - Continue PTA nifedipine     # DMII  Peripheral neuropathy   A1c 5.6% (4/6/21). Not currently on any diabetic medications.    - Glucose BID   - Hypoglycemic protocol    - Continue gabapentin 200 mg every Mon, Wed, Fri     # COPD   Baseline on 2L O2. PTA on Singulair daily, Duonebs BID PRN, albuterol PRN, fluticasone daily. Currently on 2L supplemental O2.   - Continue PTA Singulair, fluticasone    - Duonebs QID PRN for SOB, wheezing   - Supplemental O2 PRN     # Dysuria   # Hx of UTI with VRE on culture   Recently admitted 4/9-4/12/21 with recurrent UTI. Patient was treated with Ceftriaxone and cephalosporin. Culture grew 10,000-50,000 CFU VRE, given improvement in symptoms VRE was not treated.   - Check UA     # Uterine mass   Was seen by gynecology who suspects uterine fibroid which is causing patient abdominal discomfort and recurrent UTIs. Underwent pre op evaluation for XL, AUTUMN, BSO, however recommended to have cardiology and pulmonology consult prior to surgery which have not been completed.    - Continue with outpatient workup, could consider inpatient cardiology/pulmonology consult pending clinical course     # HTN  PTA on losartan 50 mg daily, nifedipine 30 mg daily.   - Continue PTA losartan and nifedipine     # Hx BRBPR   Patient reported BRBPR this AM with BM. Notes hx of hemorrhoids. Denies digital rectal exam. Rectum visualized with no evidence of bleeding, brown/tan stool present. Hgb stable on admission at baseline.   - Continue to monitor      Chronic stable medical issues:   # HLD: Continue PTA rosuvastatin   # GERD: Continue PTA omeprazole   # KHUSHBOO: CPAP at night with O2  # Gout: Continue PTA allopurinol 100 mg daily, colchicine 0.3 mg every Saturday   # Overactive bladder: Continue PTA oxybutynin 5 mg every evening          Diet: Combination Diet Regular Diet Adult; 2 gm NA Diet    DVT Prophylaxis: Heparin SQ  Phan Catheter: not present  Code Status: Full Code           Disposition Plan   Expected discharge: 1- 3 days, recommended to prior living arrangement once excess fluid removed.  Entered: Rogelio Erickson MD 05/11/2021, 7:57 AM       The patient's care was discussed with the Bedside Nurse, Care Coordinator/ and Patient.    Rogelio Erickson MD  Hospitalist Service, [address]  Contact information available via [facility] Paging/Directory  Please see sign in/sign out for up to date coverage information    Time Spent on this Encounter   I spent 45 minutes on the unit/floor managing the care of Sun Mireles. Over 50% of my time was spent on the following:   - Counseling the patient and/or family regarding: diagnostic results, prognosis and risks and benefits of treatment options  - Coordination of care with the: nurse    - discussion of the importance of low sodium diet. Patient had many questions. We discussed pathophysiology of excess sodium load. And made a compromise for a 3g sodium diet    Rogelio Erickson MD      ______________________________________________________    ________________    Interval History   Patient admitted for lower extremity edema and shortness of breath overnight, found to be in acute exacerbation of heart failure with preserved ejection fraction. This AM upset about sodium restriction.     Data reviewed today: I reviewed all medications, new labs and imaging results over the last 24 hours. I personally reviewed no images or EKG's today.    Physical Exam    Vital Signs: Temp: 98.2  F (36.8  C) Temp src: Oral BP: 116/72 Pulse: 74   Resp: 20 SpO2: 94 % O2 Device: Nasal cannula Oxygen Delivery: 2 LPM  Weight: 0 lbs 0 oz  Gen: NAD, sitting comfortably in bed  Eyes: PERRLA, EOMI, conjuctiva clear  CV: RRR, no murmurs, 2+ radial pulses  RESP: CTA bilaterally, no w/r/c  Abd: soft, nontender, nondistended  Ext: 2+ edema bilaterally    Data   Recent Labs   Lab 05/11/21  0922 05/10/21  1659   WBC 5.9 6.7   HGB 9.2* 9.3*   MCV 98 96    225    138   POTASSIUM 4.8 4.6   CHLORIDE 105 103   CO2 26 25   BUN 68* 62*   CR 7.29* 6.70*   ANIONGAP 9 10   JOANNA 9.0 8.8   * 95   ALBUMIN 3.2* 3.2*   PROTTOTAL 7.8 8.3   BILITOTAL 0.4 0.4   ALKPHOS 126 136   ALT 13 12   AST 14 23

## 2021-05-11 NOTE — PROGRESS NOTES
Admitted/transferred from: ED  Time of arrival on unit 0600  2 RN full  skin assessment completed by Margaret TOMLINSON & Carol LAL  Skin assessment finding: issues found dry skin   Interventions/actions: skin interventions protective Mepilex over sacrum     Will continue to monitor.

## 2021-05-11 NOTE — PROGRESS NOTES
HEMODIALYSIS TREATMENT NOTE    Date: 5/11/2021  Time: 2:59 PM    Data:  Pre Wt: 100.1 kg (220 lb 10.9 oz)(estimate)   Desired Wt: 96.1 kg   Post Wt: 96.1 kg (211 lb 13.8 oz)(estimate)  Weight change: 4 kg  Ultrafiltration - Post Run Net Total Removed (mL): 4000 mL  Vascular Access Status: CVC  patent  Dialyzer Rinse:    Total Blood Volume Processed: 76.76 L   Total Dialysis (Treatment) Time: 4   Dialysate Bath: K 2, Ca 2.5  Heparin 500 units loading + 500 units/hr    Lab:   Yes    Hep B    Interventions & Assessment:  4hr TX, 4L removed.  Hep B labs drawn.  CVC dressing change completed.  Order for Heparin 500/500 completed  CVC saline locked with clearguards in place. Handoff report given to pcn     Plan:    Next tx per renal team.

## 2021-05-11 NOTE — PLAN OF CARE
7A - Pt transferred to floor this AM and in dialysis after PT consult placed. Not available for PT.

## 2021-05-11 NOTE — ED NOTES
[facility]   ED Nurse to Floor Handoff     Sun Mireles is a 65 year old female who speaks English and lives with family members,  in a nursing home  They arrived in the ED by ambulance from emergency room    ED Chief Complaint: Shortness of Breath, Cough, and Rectal Bleeding    ED Dx;   Final diagnoses:   Cough   Hypervolemia, unspecified hypervolemia type         Needed?: No    Allergies: No Known Allergies.  Past Medical Hx:   Past Medical History:   Diagnosis Date     Aneurysm of vertebral artery (H)      Asthma      Asthma      CHF (congestive heart failure) (H)      CHF (congestive heart failure) (H)     diastolic      Chronic kidney disease      Chronic kidney disease, stage 4, severely decreased GFR (H)      Chronic pulmonary heart disease, unspecified      Gout, unspecified      Hypercholesterolemia      Hypertension      Localized osteoarthrosis not specified whether primary or secondary, lower leg     Knee     Morbid obesity (H)     BMI>60     Obstructive sleep apnea (adult) (pediatric)      Type II or unspecified type diabetes mellitus with unspecified complication, not stated as uncontrolled      Unspecified glaucoma(365.9)      Unspecified urinary incontinence       Baseline Mental status: WDL  Current Mental Status changes: at basesline    Infection present or suspected this encounter: yes urinary  Sepsis suspected: No  Isolation type: Contact  Patient tested for COVID 19 prior to admission: YES     Activity level - Baseline/Home:  Stand with assist x2  Activity Level - Current:   Stand with assist x2    Bariatric equipment needed?: Yes    In the ED these meds were given:   Medications   albuterol (PROAIR HFA/PROVENTIL HFA/VENTOLIN HFA) 108 (90 Base) MCG/ACT inhaler 2 puff (has no administration in time range)   allopurinol (ZYLOPRIM) tablet 100 mg (has no administration in time range)   aspirin EC tablet 81 mg (has no administration in time  range)   benzonatate (TESSALON) capsule 200 mg (has no administration in time range)   colchicine half-tab 0.3 mg (has no administration in time range)   diclofenac (VOLTAREN) 1 % topical gel 1 g (has no administration in time range)   Dimethicone-Zinc Oxide 5-5 % CREA (has no administration in time range)   diphenhydrAMINE (BENADRYL) capsule 25 mg (has no administration in time range)   docusate sodium (COLACE) capsule 100 mg (has no administration in time range)   fluticasone (FLONASE) 50 MCG/ACT spray 2 spray (has no administration in time range)   gabapentin (NEURONTIN) capsule 200 mg (has no administration in time range)   ipratropium - albuterol 0.5 mg/2.5 mg/3 mL (DUONEB) neb solution 3 mL (has no administration in time range)   losartan (COZAAR) tablet 50 mg (has no administration in time range)   magnesium oxide (MAG-OX) tablet 400 mg (has no administration in time range)   montelukast (SINGULAIR) tablet 10 mg (10 mg Oral Given 5/11/21 0200)   NIFEdipine ER OSMOTIC (PROCARDIA XL) 24 hr tablet 30 mg (has no administration in time range)   omeprazole (priLOSEC) CR capsule 20 mg (has no administration in time range)   oxybutynin ER (DITROPAN-XL) 24 hr tablet 5 mg (5 mg Oral Given 5/11/21 0201)   rosuvastatin (CRESTOR) tablet 10 mg (10 mg Oral Given 5/11/21 0201)   sevelamer carbonate (RENVELA) tablet 800 mg (has no administration in time range)   Vitamin D3 (CHOLECALCIFEROL) tablet 125 mcg (has no administration in time range)   timolol maleate (TIMOPTIC) 0.5 % ophthalmic solution 1 drop (1 drop Both Eyes Given 5/11/21 0202)   lidocaine 1 % 0.1-1 mL (has no administration in time range)   lidocaine (LMX4) cream (has no administration in time range)   sodium chloride (PF) 0.9% PF flush 3 mL (has no administration in time range)   sodium chloride (PF) 0.9% PF flush 3 mL (has no administration in time range)   melatonin tablet 1 mg (has no administration in time range)   heparin ANTICOAGULANT injection 5,000  Units (5,000 Units Subcutaneous Not Given 5/11/21 0202)   acetaminophen (TYLENOL) tablet 650 mg (has no administration in time range)   senna-docusate (SENOKOT-S/PERICOLACE) 8.6-50 MG per tablet 1 tablet (1 tablet Oral Given 5/11/21 0200)     Or   senna-docusate (SENOKOT-S/PERICOLACE) 8.6-50 MG per tablet 2 tablet ( Oral See Alternative 5/11/21 0200)   ondansetron (ZOFRAN-ODT) ODT tab 4 mg (has no administration in time range)     Or   ondansetron (ZOFRAN) injection 4 mg (has no administration in time range)   ipratropium - albuterol 0.5 mg/2.5 mg/3 mL (DUONEB) neb solution 3 mL (3 mLs Nebulization Given 5/10/21 2237)   furosemide (LASIX) injection 40 mg (40 mg Intravenous Given 5/10/21 1758)   furosemide (LASIX) injection 80 mg (80 mg Intravenous Given 5/10/21 2238)       Drips running?  No    Home pump  No    Current LDAs  Peripheral IV 05/10/21 Left Upper forearm (Active)   Number of days: 1       CVC Double Lumen Right Internal jugular Tunneled (Active)   Number of days:        Labs results:   Labs Ordered and Resulted from Time of ED Arrival Up to the Time of Departure from the ED   CBC WITH PLATELETS DIFFERENTIAL - Abnormal; Notable for the following components:       Result Value    RBC Count 3.14 (*)     Hemoglobin 9.3 (*)     Hematocrit 30.1 (*)     MCHC 30.9 (*)     RDW 16.8 (*)     All other components within normal limits   COMPREHENSIVE METABOLIC PANEL - Abnormal; Notable for the following components:    Urea Nitrogen 62 (*)     Creatinine 6.70 (*)     GFR Estimate 6 (*)     GFR Estimate If Black 7 (*)     Albumin 3.2 (*)     All other components within normal limits   ROUTINE UA WITH MICROSCOPIC REFLEX TO CULTURE - Abnormal; Notable for the following components:    Blood Urine Large (*)     Protein Albumin Urine 70 (*)     Leukocyte Esterase Urine Large (*)     WBC Urine 146 (*)     RBC Urine 153 (*)     Bacteria Urine Few (*)     Squamous Epithelial /HPF Urine 3 (*)     All other components within  normal limits   NT PROBNP INPATIENT - Abnormal; Notable for the following components:    N-Terminal Pro BNP Inpatient 11,064 (*)     All other components within normal limits   SARS-COV-2 (COVID-19) VIRUS RT-PCR   PROCALCITONIN   GLUCOSE MONITOR NURSING POCT   COMPREHENSIVE METABOLIC PANEL   CBC WITH PLATELETS   IP ASSIGN PROVIDER TEAM TO TREATMENT TEAM   PERIPHERAL IV CATHETER   VITAL SIGNS   PULSE OXIMETRY NURSING   TELEMETRY MONITORING MED/SURG   STRICT INTAKE AND OUTPUT   DAILY WEIGHTS   URINE CULTURE AEROBIC BACTERIAL       Imaging Studies:   Recent Results (from the past 24 hour(s))   XR Chest Port 1 View    Narrative    Exam:  Chest X-ray 5/10/2021 5:36 PM    History: cough    Comparison: X-ray 4/9/2021    Findings: Frontal radiograph of the chest. Left chest wall ICD. Right  IJ central venous catheter with the tip over the right atrium. Midline  trachea. Atherosclerotic calcifications seen at the aortic arch.  Enlarged cardiac silhouette. Diffuse bilateral interstitial and  airspace opacities throughout both lungs. No pneumothorax. The upper  abdomen is unremarkable. No acute bone findings.      Impression    Impression:   1. Bilateral mixed interstitial and airspace opacities which can be  seen with infection or pulmonary edema.  2. Cardiomegaly.    I have personally reviewed the examination and initial interpretation  and I agree with the findings.    LUCILLE GALE, DO       Recent vital signs:   /59   Pulse 77   Temp 98.2  F (36.8  C) (Oral)   SpO2 99%             Cardiac Rhythm: Normal Sinus  Pt needs tele? Yes  Skin/wound Issues: None    Code Status: Full Code    Pain control: good    Nausea control: pt had none    Abnormal labs/tests/findings requiring intervention:     Family present during ED course? No   Family Comments/Social Situation comments:     Tasks needing completion: None    Martha Bradford, RN    5-0121 [facility]

## 2021-05-12 NOTE — PLAN OF CARE
BP (!) 148/63 (BP Location: Right arm)   Pulse 80   Temp 99  F (37.2  C) (Oral)   Resp 18   Wt 96.1 kg (211 lb 13.8 oz)   SpO2 92%   BMI 35.80 kg/m      [id number]  Back to floor from HD at 1515; pt reporting much fatigue after HD. VSS on 2L via NC, no s/s of distress. Pt endorsed left knee pain--given tylenol x1 and voltaren gel applied to left knee with adequate relief of pain. Denied n/v--fair appetite on 3gm Na diet. R chest HD line, CDI. PIV SL. Anuric on HD. No BMs this shift. Up with assist of 2/lift device to BSC or using bedpan; pt seems to be deconditioned. Pt watching television most of shift; resting quietly now; call light and belongings within reach. Will continue to monitor and continue POC/notify team as needed.

## 2021-05-12 NOTE — PLAN OF CARE
1900 - 0730    64 y/o F w/hx of uterine mass, HFpEF, sick sinus syndrome (s/p pacemaker placement), atrial fibrillation, ESRD (HD TThSa) DM2, COPD, HTN, HLD. Presented to ED w/cough, SOB, bilateral LE edema. Admitted w/fluid overload.     Vitals: Temp: 98.7  F (37.1  C) Temp src: Oral BP: (!) 155/70 Pulse: 75   Resp: 20 SpO2: 95 % O2 Device: Oxymask Oxygen Delivery: 1 LPM  Pain/Nausea: Denied nausea; endorsed R shoulder pain.   Interventions: Repositioned.    Diet: 3g Na  BG orders: BID & at bedtime.   LDA: LPIV, saline locked. R tunneled dialysis line.   GI: BM x2 this shift. Formed & hard.   : Oliguric on HD. Complaining of significant dysuria.    Skin: Dusky, dry. Pt states her bottom is painful, but skin is intact - preventive mepilex in place.   Neuro: A&Ox4. Uncooperative at times.   Mobility: Lift assist//assist of 2 w/walker & gait belt for very short distances.   Education: Discussed HF, ESRD, fluid overload, diet.   Plan: Continue to monitor.     Of note this shift: Patient on tele - in afib. One episode of desaturation today. Patient went from mid-90s on RA to low 80s. Was briefly at 4L/oxymask, but was able to titrate down to 1L/oxymask w/sats in mid-90s.

## 2021-05-12 NOTE — PROGRESS NOTES
"Nephrology Progress Note  05/12/2021       Sun Mireles is a 65 yof with hx of uterine mass, SSS s/p PPM placement, DM2 and ESRD since 8/2020 thought to be due to DM2 and post renal issues with hx of needing ureteral stent placement.  Now admitted with volume overload and HFpEF with elevated BNP, nephrology consulted for management of RRT while admitted.  Still uses tunneled line as AVF has been unable to be successfully created.        Interval History :   Mrs Mireles had HD yesterday, pulled 4L without issue, BP's still on high side so will continue with similar plan for 4L run today.  She feels this is \"too much dialysis\" but will continue with daily runs as long as BP's are stable and she continues to have volume overload.      Assessment & Recommendations:   ESRD-Since 8/2020, likely a mix of Dm2 and post renal issues, runs with tunneled line under the care of Dr Bailey at [facility], TTS.  EDW 94kg, no wt yet today but likely quite a bit up from EDW based on exam.  Will plan to run daily as BP's are good today until she is closer to euvolemic.                 -Line is TDC, AVF attempts have been unsuccessful.                  -HD again today, 4h, 4L as BP's tolerate, will plan to run daily until euvolemic.      Volume-Overloaded, BNP elevated, SOB with minimal activity.  Will work on volume removal with another run today.       Electrolytes/pH-K 4.0, bicarb 26.       BMD-Ca 9.6, Mg and Phos not checked.       Anemia-Hgb 9.6, giving venofer and 4k EPO       Seen and discussed with Dr Peralta     Recommendations were communicated to primary team via note       ADRIANA Lee CNS  Clinical Nurse Specialist  [phone]    Review of Systems:   I reviewed the following systems:  Gen: No fevers or chills  CV: No CP at rest  Resp: No SOB at rest  GI: No N/V    Physical Exam:   I/O last 3 completed shifts:  In: 0   Out: 4115 [Urine:115; Other:4000]   /86   Pulse 77   Temp 98.2  F (36.8  C) " (Oral)   Resp (!) 48   Wt 104.7 kg (230 lb 13.2 oz)   SpO2 94%   BMI 39.01 kg/m       GENERAL APPEARANCE:  No distress, obese, in no distress.    Lymphatics: no cervical or supraclavicular LAD  Pulmonary: faint basilar crackles, with equal breath sounds bilaterally, no clubbing  CV: regular rhythm, normal rate, no rub   - JVD : none    - Edema : trace   GI: soft, nontender, normal bowel sounds  MS: no evidence of inflammation in joints, no muscle tenderness  : no torres  SKIN: no rash, warm, dry, no cyanosis  NEURO: face symmetric, no asterixis     Labs:   All labs reviewed by me  Electrolytes/Renal -   Recent Labs   Lab Test 05/12/21  0638 05/11/21  0922 05/10/21  1659 03/16/21  1105 03/16/21  1105    140 138   < >  --    POTASSIUM 4.0 4.8 4.6   < >  --    CHLORIDE 102 105 103   < >  --    CO2 26 26 25   < >  --    BUN 40* 68* 62*   < >  --    CR 4.72* 7.29* 6.70*   < >  --    * 103* 95   < >  --    JOANNA 9.6 9.0 8.8   < >  --    MAG  --   --   --   --  2.0   PHOS  --   --   --   --  6.1*    < > = values in this interval not displayed.       CBC -   Recent Labs   Lab Test 05/12/21  0638 05/11/21  0922 05/10/21  1659   WBC 6.1 5.9 6.7   HGB 9.6* 9.2* 9.3*    196 225       LFTs -   Recent Labs   Lab Test 05/11/21  0922 05/10/21  1659 04/09/21  2149   ALKPHOS 126 136 139   BILITOTAL 0.4 0.4 0.3   ALT 13 12 10   AST 14 23 11   PROTTOTAL 7.8 8.3 8.3   ALBUMIN 3.2* 3.2* 3.2*       Iron Panel - No lab results found.        Current Medications:    allopurinol  100 mg Oral Daily     aspirin  81 mg Oral Daily     benzonatate  200 mg Oral TID     [START ON 5/15/2021] colchicine  0.3 mg Oral Weekly     docusate sodium  100 mg Oral Daily     fluticasone  2 spray Both Nostrils Daily     gabapentin  200 mg Oral Once per day on Mon Wed Fri     guaiFENesin  600 mg Oral BID     heparin ANTICOAGULANT  5,000 Units Subcutaneous Q12H     ipratropium - albuterol 0.5 mg/2.5 mg/3 mL  1 vial Nebulization 4x Daily      losartan  50 mg Oral Daily     magnesium oxide  400 mg Oral BID     montelukast  10 mg Oral At Bedtime     NIFEdipine ER  30 mg Oral Daily     omeprazole  20 mg Oral QAM AC     oxybutynin ER  5 mg Oral QPM     rosuvastatin  10 mg Oral At Bedtime     senna-docusate  1 tablet Oral BID    Or     senna-docusate  2 tablet Oral BID     sevelamer carbonate  800 mg Oral TID w/meals     sodium chloride (PF)  3 mL Intracatheter Q8H     sodium chloride (PF)  9 mL Intracatheter During Hemodialysis (from stock)     sodium chloride (PF)  9 mL Intracatheter During Hemodialysis (from stock)     timolol maleate  1 drop Both Eyes BID     Vitamin D3  125 mcg Oral Daily       heparin (porcine) 500 Units/hr (05/12/21 1111)

## 2021-05-12 NOTE — PROGRESS NOTES
"Care Management Follow Up    Length of Stay (days): 2    Expected Discharge Date: 05/13/21     Concerns to be Addressed: discharge planning     Patient plan of care discussed at interdisciplinary rounds: Yes    Anticipated Discharge Disposition: [facility]  Address: [address] [place] [id number]  Phone: [phone]  Fax: [phone]     Anticipated Discharge Services: IMM, Transportation    Anticipated Discharge DME: None    Patient/family educated on Medicare website which has current facility and service quality ratings: N/A, from [facility]  Education Provided on the Discharge Plan: yes  Patient/Family in Agreement with the Plan: Family in agreement but patient does not want to return. SW will continue to discuss with patient.    Referrals Placed by CM/MAR: Internal Clinic Care Coordination  Private pay costs discussed: Not applicable    Additional Information:  MAR Lama at [facility] ([phone]): MAR received a message from Daina at the end of the day on 5/11. She requested a call back. MAR called her back and left a message requesting a call back.     Yesenia with [facility] ([phone]): MAR received a call from Yesenia, care coordinator with [facility]. Yesenia confirmed she is just following for patient's hospital stay and that Chris will be her ongoing care coordinator outside of the hospital. MAR provided update on discharge plan. Yesenia available for support if needed.    Rupal, Daughter ([phone]): MAR called patient's daughter Rupal to get more information on patient moving out of the LTC Facility. Rupal said her daughter is working on finding an apartment for patient and are also working on a plan for who will stay with her. 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 Rupal is okay with patient returning to [facility] until they can find an apartment for her. MAR thanked Rupal for the " information.     ELROY Oakley, [address]   Ph: [phone]  Pager: [phone]

## 2021-05-12 NOTE — PROGRESS NOTES
HEMODIALYSIS TREATMENT NOTE    Date: 5/12/2021  Time: 2:05 PM    Data:  Pre Wt: 104.7 kg (230 lb 13.2 oz)   Desired Wt: 96.1 kg   Post Wt: 101.7 kg (224 lb 3.3 oz)  Weight change: 3 kg  Ultrafiltration - Post Run Net Total Removed (mL): 3000 mL  Vascular Access Status: CVC  patent  Dialyzer Rinse: Streaked  Total Blood Volume Processed: 68.89 L   Total Dialysis (Treatment) Time: 3   Dialysate Bath: K 3, Ca 2.25  Heparin: None    Lab:   No    Interventions:  voltaren cream used on feet/ankles    Assessment:  Pt stated she would only run 3hours. Pt coughing freqauently RT called for nebulizer. Pt repositioned several times. voltaren applied to feet for neuropathic pain. Handoff given to floor rn.     Plan:    Per renal

## 2021-05-12 NOTE — PLAN OF CARE
/70   Pulse 71   Temp 98.2  F (36.8  C) (Oral)   Resp 25   Wt 101.7 kg (224 lb 3.3 oz)   SpO2 96%   BMI 37.89 kg/m      700-1530  A&O x4; forgetfull.  Patient intermittently hypertensive on RA-1L via oxy mask, afebrile. . Patient reported nephropathy pain in BLEs; provider notified. Volteran gel applied by dialysis RN. Tolerating 3g Na diet with adequate appetite. PIV saline locked. BM x1. Patient reports discomfort with urination; provider notified regarding patients request for UTI treatment. Patient is up with strong assist of 2, gaitbelt, and walker. Patient dialyzed again today; 3 kilos taken off.  Educated patient on benefits of POC. Will discharge back to LTC when medically stable.   Will continue with POC and notify MD with changes or concerns.

## 2021-05-12 NOTE — PROGRESS NOTES
[facility]    Medicine Progress Note - Hospitalist Service, Gold 8       Date of Admission:  5/10/2021  Assessment & Plan             Sun Mireles is a 65 year old female admitted on 5/10/2021. She has a history of uterine mass, HFpEF, sick sinus syndrome s/p pacemaker placement, atrial fibrillation, ESRD on dialysis (T/Th/Sat), DMII, COPD, HTN, HLD who presented to the ED with cough, shortness of breath and lower extremity edema.     # Cough   # SOB   Acute exacerbation of heart failure with preserved ejection fraction due to dietary non compliacne  COPD, not in acute exacerbation  Presented with 2 day history of increased SOB and cough. Baseline on 2L supplemental O2 at night due to COPD, KHUSHBOO. In the ED, COVID19 negative. Procalcitonin 0.28 in the setting of ESRD. WBC wnl. CXR with bilateral mixed interstitial and airspace opacities which can be seen in infection or pulmonary edema. Patient also notes edema of lower extremities. Received IV furosemide 40 mg in the ED. DDx likely multifactorial with volume overload, anxiety vs COPD exacerbation vs less likely infection.   - Echocardiogram  ordered  - Monitor strict I and O    - Daily weights, no weight documented in the ED, weight decreased by 9 lbs in dialysis  - NT proBNP elevated to 11k on admission  - Furosemide 120 mg IV given in the ED, plan for dialysis yesterday and today with fluid removal    - Continue nebulizer, singulair for COPD    - Supplemental O2 PRN    - Nephrology consult for HD for volume management    - mucinex started due to concerns of upper airway cough and mucus from patient    # HFpEF   Last echo in 10/2020 with EF of 65%. LE US 4/9/21 without evidence of DVT. CTA chest at OSH 3/16/21 with enlarged main pulmonary artery consistent with pulmonary hypertension.    - Daily weights   - I and O   - Low Na diet   - Echocardiogram as above   - will need cardiology outpatient follow up for pre-op  clearance since she missed her appointment 5/11    # ESRD on HD   Dialysis (T/Th/Sat) at [facility] under care of Dr. Katie Bailey with internal jugular catheter. Dry weight 94 kg. Per report, has not missed any HD recently.   - Nephrology consult   - Epogen and venofer per nephrology     - Continue PTA Renvela 800 mg TID     # Chronic anemia   Likely multifactorial 2/2 CKD, chronic disease and malnutrition. Hgb baseline 9.0. Hgb at baseline on admission.   - CBC stable    # Sick sinus syndrome s/p pacemaker   # Afib   PTA on nifedipine ER 30 mg daily. Not on anticoagulation due to hx of GI bleed. PTA on ASA. EKG in the ED with Normal sinus rhythm.   - Continue ASA 81 mg daily   - Continue PTA nifedipine     # DMII  Peripheral neuropathy   A1c 5.6% (4/6/21). Not currently on any diabetic medications.    - Glucose BID   - Hypoglycemic protocol    - Continue gabapentin 200 mg every Mon, Wed, Fri     # COPD   Baseline on 2L O2. PTA on Singulair daily, Duonebs BID PRN, albuterol PRN, fluticasone daily. Currently on 2L supplemental O2.   - Continue PTA Singulair, fluticasone    - Duonebs QID PRN for SOB, wheezing   - Supplemental O2 PRN     # Dysuria   # Hx of UTI with VRE on culture   Recently admitted 4/9-4/12/21 with recurrent UTI. Patient was treated with Ceftriaxone and cephalosporin. Culture grew 10,000-50,000 CFU VRE, given improvement in symptoms VRE was not treated.     # Uterine mass   Was seen by gynecology who suspects uterine fibroid which is causing patient abdominal discomfort and recurrent UTIs. Underwent pre op evaluation for XL, AUTUMN, BSO, however recommended to have cardiology and pulmonology consult prior to surgery which have not been completed.    - Continue with outpatient workup, could consider inpatient cardiology/pulmonology consult pending clinical course     # HTN  PTA on losartan 50 mg daily, nifedipine 30 mg daily.   - Continue PTA losartan and nifedipine     # Hx BRBPR   Patient  reported BRBPR this AM with BM. Notes hx of hemorrhoids. Denies digital rectal exam. Rectum visualized with no evidence of bleeding, brown/tan stool present. Hgb stable on admission at baseline.   - Continue to monitor     Chronic stable medical issues:   # HLD: Continue PTA rosuvastatin   # GERD: Continue PTA omeprazole   # KHUSHBOO, Obesity: CPAP at night with O2  # Gout: Continue PTA allopurinol 100 mg daily, colchicine 0.3 mg every Saturday   # Overactive bladder: Continue PTA oxybutynin 5 mg every evening        Diet: Combination Diet Regular Diet Adult; 3 gm NA Diet    DVT Prophylaxis: Heparin SQ  Phan Catheter: not present  Code Status: Full Code           Disposition Plan   Expected discharge: Tomorrow-2 days, recommended to prior living arrangement once volume status at baseline.  Entered: Rogelio Erickson MD 05/12/2021, 8:11 AM       The patient's care was discussed with the Bedside Nurse, Care Coordinator/ and Patient.    Rogelio Erickson MD  Hospitalist Service, [address]  Contact information available via [facility] Paging/Directory  Please see sign in/sign out for up to date coverage information    Time Spent on this Encounter   I spent 40 minutes on the unit/floor managing the care of Sun Mireles. Over 50% of my time was spent on the following:   - Counseling the patient and/or family regarding: diagnostic results, prognosis, risks and benefits of treatment options and recommended follow-up  - Coordination of care with the: care coordinator/    - discussion of discharge planning with patient. She very much wants to discharge to an appartment and not her TCU. We discussed her ADLs and how she would accomplish them. She identifies a significant other of 11 years as her support person but that he is in [place]. She eventually agreed that returning to her Tcu was the best course for now.     Rogelio Erickson,  MD    ______________________________________________________________________    Interval History   Overnight, with continued cough. She doesn't think its in her chest just in her throat. It feels like mucus. No relation to eating or drinking. No pain with the cough. Sometimes it makes her feel out of breath. 11lbs removed with dialysis yesterday    Otherwise 4pt ROS is negative    Data reviewed today: I reviewed all medications, new labs and imaging results over the last 24 hours. I personally reviewed no images or EKG's today.    Physical Exam   Vital Signs: Temp: 98.2  F (36.8  C) Temp src: Oral BP: 133/72 Pulse: 80   Resp: 16 SpO2: 96 % O2 Device: Oxi Plus Oxygen Delivery: 1 LPM  Weight: 211 lbs 13.79 oz  Gen: NAD, sitting comfortably in bed, obese  Eyes: EOMI, conjuctiva clear  Mouth: OP clear, no lesions  CV: RRR, no murmurs, 2+ radial pulses  RESP: CTA bilaterally, no w/r/c  Abd: soft, nontender, nondistended  Ext: no edema bilaterally    Data   Recent Labs   Lab 05/12/21  0638 05/11/21  0922 05/10/21  1659   WBC 6.1 5.9 6.7   HGB 9.6* 9.2* 9.3*   MCV 95 98 96    196 225    140 138   POTASSIUM 4.0 4.8 4.6   CHLORIDE 102 105 103   CO2 26 26 25   BUN 40* 68* 62*   CR 4.72* 7.29* 6.70*   ANIONGAP 7 9 10   JOANNA 9.6 9.0 8.8   * 103* 95   ALBUMIN  --  3.2* 3.2*   PROTTOTAL  --  7.8 8.3   BILITOTAL  --  0.4 0.4   ALKPHOS  --  126 136   ALT  --  13 12   AST  --  14 23     No results found for this or any previous visit (from the past 24 hour(s)).

## 2021-05-12 NOTE — PROVIDER NOTIFICATION
"Action: sent text page @6:32 PM to WILLIS Perez 5270:TAIWO Mireles [id number]: patient would like to leave AMA. Can you please come see her asap? Thanks. MOOSE Alvarado [phone]   Result: Provider called & declined to come to bedside. Ordered one-time dose of IV lasix.     Action: sent text page @8:42 PM to WILLIS Perez 5270:TAIWO Mireles 7212-2: patient coughing incessantly; gave scheduled mucinex already. Thanks. MOOSE Alvarado [phone]   Result: No response from provider.     Action: sent text page @9:13 PM to  WILLIS Perez 5270:TAIWO Mireles 7212-2: coughing continues. patient has also received albuterol inhaler. She is asking for \"cough syrup\" and becoming agitated. MOOSE Alvarado [phone]   Result: No return call. One-time dose of robitussin ordered.     "

## 2021-05-13 NOTE — PROGRESS NOTES
Care Management Follow Up    Length of Stay (days): 3    Expected Discharge Date: 05/14/21     Concerns to be Addressed: discharge planning     Patient plan of care discussed at interdisciplinary rounds: Yes     Anticipated Discharge Disposition: [facility]  Address: [address] [place] [address]  Phone: [phone]  Nurse to Nurse: [phone]  Fax: [phone]    [facility]  Address: [address], [place], [address]  Telephone: [phone]  Fax: [phone]  Schedule: [facility], Run Time is 11:15 AM-2:30 PM     Anticipated Discharge Services: IMM, Transportation     Anticipated Discharge DME: None     Patient/family educated on Medicare website which has current facility and service quality ratings: N/A, from [facility]  Education Provided on the Discharge Plan: Yes  Patient/Family in Agreement with the Plan: Yes     Referrals Placed by CM/SW: Internal Clinic Care Coordination  Private pay costs discussed: Not applicable  Additional Information:    Treatment Team: MD anticipates patient will be ready for discharge on 5/14/21.    MAR Lama at [facility] ([phone]): MAR spoke with MAR Lama at [facility], to get more information on patient's psychosocial situation. She provided the number for Nurse to Nurse ([phone]) and for Anat in admissions ([phone]). Daina shared that in patient's current condition she is not able to live independently. Prior to coming to the hospital, Daina had gotten permission from patient to start looking at group homes and assisted living facilities. Daina will continue to work on that     Gautam Link (Ph and F: [phone]): MAR spoke with Estela Florez, to get more information on patient's psychosocial situation. Per Gautam, patient does not want to live in an assisted living or group home as she does not want all of her money going to the cost of care. Gautam has connected her with housing stabilization services and  "an agency is already working with patient on trying to get her into a subsidized apartment. Per Gautam, patient's daughter Winston would be a good contact but often says she is patient's \"legal guardian\" but does not have any paperwork. Gautam would like a copy of the discharge summary sent to her when patient discharges.     Anat, Admissions at [facility] (P: [phone], F: [phone]): MAR left a message with Anat giving a heads up on anticipated discharge date. MAR also asked if patient had rides for dialysis set up already. MAR left call back number. Addendum: MAR received a call back from Anat who confirmed that patient will have rides to and from dialysis. Anat requested the H & P be faxed to her. MAR to coordinate patient's discharge with Anat on 5/14/21.    ELROY Oakley, BLADIMIR  7A   Ph: [phone]  Pager: [phone]  "

## 2021-05-13 NOTE — PLAN OF CARE
Vitals: 133/83, HR 80, 93% RA, on scheduled nebs, frequent congested, productive cough.  Endocrine: Declines glucose checks, morning glucose 96.  Labs: Creatinine 4.03, other labs stable.  Pain: Mild neuropathy pain.  PRN's: n/a  Diet: 3 GM Na diet, good appetite.  LDA: R CVC, PIV saline locked.  GI: BM 5/12. Declined laxatives.  : Oliguric, 200cc out.  Skin: Skin is intact, generalized edema.  Neuro: Alert and oriented.  Mobility: Stand by assist of 1, walker to commode, wheelchair.  Education: n/a  Plan: Dialysis this afternoon, transfer to previous facility when medically stable.

## 2021-05-13 NOTE — PROGRESS NOTES
Nephrology Progress Note  05/13/2021         Sun Mireles is a 65 yof with hx of uterine mass, SSS s/p PPM placement, DM2 and ESRD since 8/2020 thought to be due to DM2 and post renal issues with hx of needing ureteral stent placement.  Now admitted with volume overload and HFpEF with elevated BNP, nephrology consulted for management of RRT while admitted.  Still uses tunneled line as AVF has been unable to be successfully created.        Interval History :   Mrs Mireles had HD the past 2 days, running again today on her usual TTS schedule, nearing discharge.  Has improved with volume removal although she has not been happy about running every day.  She has clearly improved from resp standpoint with volume removal.       Assessment & Recommendations:   ESRD-Since 8/2020, likely a mix of Dm2 and post renal issues, runs with tunneled line under the care of Dr Bailey at [facility], TTS.  EDW 94kg, no wt yet today but likely quite a bit up from EDW based on exam.  Will plan to run daily as BP's are good today until she is closer to euvolemic.                 -Line is TDC, AVF attempts have been unsuccessful.                  -HD again today, 4h, 4L as BP's tolerate, will plan to run daily until euvolemic.      Volume-Overloaded but improving, now on RA.  Running today on her usual TTS schedule, nearing discharge.        Electrolytes/pH-K 3.9, bicarb 28.       BMD-Ca 9.3, Mg and Phos not checked.       Anemia-Hgb 9.4, giving venofer and 4k EPO with runs.     Seen and discussed with Dr Peralta     Recommendations were communicated to primary team via note       ADRIANA Lee CNS  Clinical Nurse Specialist  [phone]    Review of Systems:   I reviewed the following systems:  Gen: No fevers or chills  CV: No CP at rest  Resp: No SOB at rest  GI: No N/V    Physical Exam:   I/O last 3 completed shifts:  In: 240 [P.O.:240]  Out: 3220 [Urine:220; Other:3000]   /60   Pulse 76   Temp 98.2  F (36.8  C)  (Oral)   Resp 11   Wt 101.7 kg (224 lb 3.3 oz)   SpO2 98%   BMI 37.89 kg/m       GENERAL APPEARANCE:  No distress, obese, in no distress.    Lymphatics: no cervical or supraclavicular LAD  Pulmonary: faint basilar crackles, with equal breath sounds bilaterally, no clubbing  CV: regular rhythm, normal rate, no rub   - JVD : none    - Edema : trace   GI: soft, nontender, normal bowel sounds  MS: no evidence of inflammation in joints, no muscle tenderness  : no torres  SKIN: no rash, warm, dry, no cyanosis  NEURO: face symmetric, no asterixis     Labs:   All labs reviewed by me  Electrolytes/Renal -   Recent Labs   Lab Test 05/13/21  0742 05/12/21  0638 05/11/21  0922 03/16/21  1105 03/16/21  1105    135 140   < >  --    POTASSIUM 3.9 4.0 4.8   < >  --    CHLORIDE 102 102 105   < >  --    CO2 28 26 26   < >  --    BUN 31* 40* 68*   < >  --    CR 4.03* 4.72* 7.29*   < >  --    GLC 96 104* 103*   < >  --    JOANNA 9.3 9.6 9.0   < >  --    MAG  --   --   --   --  2.0   PHOS  --   --   --   --  6.1*    < > = values in this interval not displayed.       CBC -   Recent Labs   Lab Test 05/13/21  0742 05/12/21  0638 05/11/21  0922   WBC 5.9 6.1 5.9   HGB 9.4* 9.6* 9.2*    215 196       LFTs -   Recent Labs   Lab Test 05/11/21  0922 05/10/21  1659 04/09/21  2149   ALKPHOS 126 136 139   BILITOTAL 0.4 0.4 0.3   ALT 13 12 10   AST 14 23 11   PROTTOTAL 7.8 8.3 8.3   ALBUMIN 3.2* 3.2* 3.2*       Iron Panel - No lab results found.        Current Medications:    allopurinol  100 mg Oral Daily     aspirin  81 mg Oral Daily     benzonatate  200 mg Oral TID     [START ON 5/15/2021] colchicine  0.3 mg Oral Weekly     docusate sodium  100 mg Oral Daily     epoetin padmini-epbx (RETACRIT) inj ESRD  4,000 Units Intravenous Once in dialysis     fluticasone  2 spray Both Nostrils Daily     gabapentin  200 mg Oral Once per day on Mon Wed Fri     heparin ANTICOAGULANT  5,000 Units Subcutaneous Q12H     ipratropium - albuterol 0.5  mg/2.5 mg/3 mL  1 vial Nebulization 4x Daily     losartan  50 mg Oral Daily     magnesium oxide  400 mg Oral BID     montelukast  10 mg Oral At Bedtime     NIFEdipine ER  30 mg Oral Daily     omeprazole  20 mg Oral QAM AC     oxybutynin ER  5 mg Oral QPM     rosuvastatin  10 mg Oral At Bedtime     senna-docusate  1 tablet Oral BID    Or     senna-docusate  2 tablet Oral BID     sevelamer carbonate  800 mg Oral TID w/meals     sodium chloride (PF)  3 mL Intracatheter Q8H     sodium chloride (PF)  9 mL Intracatheter During Hemodialysis (from stock)     sodium chloride (PF)  9 mL Intracatheter During Hemodialysis (from stock)     timolol maleate  1 drop Both Eyes BID     Vitamin D3  125 mcg Oral Daily

## 2021-05-13 NOTE — PROGRESS NOTES
[facility]    Medicine Progress Note - Hospitalist Service, Gold 8       Date of Admission:  5/10/2021  Assessment & Plan                   Sun Mireles is a 65 year old female admitted on 5/10/2021. She has a history of uterine mass, HFpEF, sick sinus syndrome s/p pacemaker placement, atrial fibrillation, ESRD on dialysis (T/Th/Sat), DMII, COPD, HTN, HLD who presented to the ED with cough, shortness of breath and lower extremity edema. Improved with dialysis. Will plan on dialysis today and discharge tomorrow if respiratory status improved.     # Cough   # SOB   Acute exacerbation of heart failure with preserved ejection fraction due to dietary non compliacne  COPD, not in acute exacerbation  Presented with 2 day history of increased SOB and cough. Baseline on 2L supplemental O2 at night due to COPD, KHUSHBOO. In the ED, COVID19 negative. Procalcitonin 0.28 in the setting of ESRD. WBC wnl. CXR with bilateral mixed interstitial and airspace opacities which can be seen in infection or pulmonary edema. Patient also notes edema of lower extremities. Received IV furosemide 40 mg in the ED. DDx likely multifactorial with volume overload, anxiety vs COPD exacerbation vs less likely infection.   - Echocardiogram  With EF 65-70% with indeterminate diastolic function  - Monitor strict I and O    - Daily weights, no weight documented in the ED, weight decreased by 9 lbs in dialysis  - NT proBNP elevated to 11k on admission  - Furosemide 120 mg IV given in the ED, plan for dialysis yesterday and today with fluid removal    - Continue nebulizer, singulair for COPD    - Supplemental O2 PRN    - Nephrology consult for HD for volume management    - continue tessalon and robitussin for cough, though cough is much improved today after dialysis yesterday    # HFpEF   Last echo in 10/2020 with EF of 65%. LE US 4/9/21 without evidence of DVT. CTA chest at OSH 3/16/21 with enlarged main  pulmonary artery consistent with pulmonary hypertension.    - Daily weights   - I and O   - Low Na diet   - Echocardiogram as above   - will need cardiology outpatient follow up for pre-op clearance since she missed her appointment 5/11    # ESRD on HD   Dialysis (T/Th/Sat) at [facility] under care of Dr. Katie Bailey with internal jugular catheter. Dry weight 94 kg. Per report, has not missed any HD recently.   - Nephrology consulted, plan for dialysis today   - Epogen and venofer per nephrology     - Continue PTA Renvela 800 mg TID     # Chronic anemia   Likely multifactorial 2/2 CKD, chronic disease and malnutrition. Hgb baseline 9.0. Hgb at baseline on admission.   - CBC stable    # Sick sinus syndrome s/p pacemaker   # Afib   PTA on nifedipine ER 30 mg daily. Not on anticoagulation due to hx of GI bleed. PTA on ASA. EKG in the ED with Normal sinus rhythm.   - Continue ASA 81 mg daily   - Continue PTA nifedipine     # DMII  Peripheral neuropathy   A1c 5.6% (4/6/21). Not currently on any diabetic medications.    - Glucose BID   - Hypoglycemic protocol    - Continue gabapentin 200 mg every Mon, Wed, Fri     # COPD   Baseline on 2L O2. PTA on Singulair daily, Duonebs BID PRN, albuterol PRN, fluticasone daily. Currently on 2L supplemental O2.   - Continue PTA Singulair, fluticasone    - Duonebs QID PRN for SOB, wheezing   - Supplemental O2 PRN     # Dysuria   # Hx of UTI with VRE on culture   Recently admitted 4/9-4/12/21 with recurrent UTI. Patient was treated with Ceftriaxone and cephalosporin. Culture grew 10,000-50,000 CFU VRE, given improvement in symptoms VRE was not treated.     # Uterine mass   Was seen by gynecology who suspects uterine fibroid which is causing patient abdominal discomfort and recurrent UTIs. Underwent pre op evaluation for XL, AUTUMN, BSO, however recommended to have cardiology and pulmonology consult prior to surgery which have not been completed.    - Continue with outpatient workup,  could consider inpatient cardiology/pulmonology consult pending clinical course   - will need cardiology follow up outpatient as she missed her appointment while inpatient    # HTN  PTA on losartan 50 mg daily, nifedipine 30 mg daily.   - Continue PTA losartan and nifedipine     # Hx BRBPR   Patient reported BRBPR this AM with BM. Notes hx of hemorrhoids. Denies digital rectal exam. Rectum visualized with no evidence of bleeding, brown/tan stool present. Hgb stable on admission at baseline.   - Continue to monitor     Chronic stable medical issues:   # HLD: Continue PTA rosuvastatin   # GERD: Continue PTA omeprazole   # KHUSHBOO, Obesity: CPAP at night with O2  # Gout: Continue PTA allopurinol 100 mg daily, colchicine 0.3 mg every Saturday   # Overactive bladder: Continue PTA oxybutynin 5 mg every evening          Diet: Combination Diet Regular Diet Adult; 3 gm NA Diet    DVT Prophylaxis: Heparin SQ  Phan Catheter: not present  Code Status: Full Code           Disposition Plan   Expected discharge: Tomorrow, recommended to transitional care unit once respiratory status improved, dialysis today.  Entered: Rogelio Erickson MD 05/13/2021, 12:37 PM       The patient's care was discussed with the Bedside Nurse, Care Coordinator/ and Patient.    Rogelio Erickson MD  Hospitalist Service, [address]  Contact information available via [facility] Paging/Directory  Please see sign in/sign out for up to date coverage information  ______________________________________________________________________    Interval History   Patient with improved cough today following dialysis. Still doesn't think dialysis is helpful despite having fluid removed. Calls it boring.     Otherwise 4pt ROS is negative    Data reviewed today: I reviewed all medications, new labs and imaging results over the last 24 hours. I personally reviewed no images or EKG's today.    Physical Exam   Vital  Signs: Temp: 98.1  F (36.7  C) Temp src: Oral BP: 136/72 Pulse: 80   Resp: 16 SpO2: 98 % O2 Device: None (Room air)    Weight: 224 lbs 3.33 oz  Gen: NAD, sitting comfortably in bed, obese  Eyes: EOMI, conjuctiva clear  CV: RRR, no murmurs, 2+ radial pulses  RESP: CTA bilaterally, no w/r/c  Abd: soft, nontender, nondistended  Ext: trace edema bilaterally    Data   Recent Labs   Lab 05/13/21  0742 05/12/21  0638 05/11/21  0922 05/10/21  1659   WBC 5.9 6.1 5.9 6.7   HGB 9.4* 9.6* 9.2* 9.3*   MCV 94 95 98 96    215 196 225    135 140 138   POTASSIUM 3.9 4.0 4.8 4.6   CHLORIDE 102 102 105 103   CO2 28 26 26 25   BUN 31* 40* 68* 62*   CR 4.03* 4.72* 7.29* 6.70*   ANIONGAP 4 7 9 10   JOANNA 9.3 9.6 9.0 8.8   GLC 96 104* 103* 95   ALBUMIN  --   --  3.2* 3.2*   PROTTOTAL  --   --  7.8 8.3   BILITOTAL  --   --  0.4 0.4   ALKPHOS  --   --  126 136   ALT  --   --  13 12   AST  --   --  14 23

## 2021-05-13 NOTE — PROVIDER NOTIFICATION
FYI, Patient complaining of burning sensation with urination, as well as an increase in frequency and urgency. Team paged. Waiting for orders on how to proceed.

## 2021-05-13 NOTE — PROGRESS NOTES
HEMODIALYSIS TREATMENT NOTE    Date: 5/13/2021  Time: 5:15 PM    Data:  Pre Wt: 101.7 kg (224 lb 3.3 oz)   Desired Wt: 98.7 kg   Post Wt: 98.7 kg (217 lb 9.5 oz)  Weight change: 3 kg  Ultrafiltration - Post Run Net Total Removed (mL): 3000 mL  Vascular Access Status: CVC  patent  Dialyzer Rinse: Streaked  Total Blood Volume Processed: 59.8 L   Total Dialysis (Treatment) Time: 3   Dialysate Bath: K 3, Ca 2.25  Heparin: None    Lab:   No    Interventions:Assessment:  Tx complete. CVC utilized without complication. VSS throughout tx. 3 L of fluid obtained without c/o cramping or nausea. Pt requested bed pan to urinate but was unable to go. Pt states she feels she has to urinate frequently but unable to.   Epogen administered per order, see MAR. CVC lumens saline locked and clear guard caps applied. Hand off report given to primary nurse.     Plan:    Per nephrology team.

## 2021-05-13 NOTE — PLAN OF CARE
"[id number]    66 y/o F w/hx of uterine mass, HFpEF, sick sinus syndrome (s/p pacemaker placement), atrial fibrillation, ESRD (HD TThSa) DM2, COPD, HTN, HLD. Presented to ED w/cough, SOB, bilateral LE edema. Admitted w/fluid overload.      Vitals: Temp: 98.7  F (37.1  C) Temp src: Oral BP: 121/78 Pulse: 83   Resp: 18 SpO2: 93 % Oxygen Delivery: 1 LPM  Pain/Nausea: Denied both pain & nausea.      Diet: 3g Na  BG orders: BID & at bedtime. Pt refusing checks.    LDA: LPIV, saline locked. R tunneled dialysis line.   GI: BM x1 this shift. Formed & hard.   : Oliguric on HD. Complaining of significant dysuria.    Skin: Dusky, dry. Pt states her bottom is painful, but skin is intact.  Neuro: A&Ox4. Uncooperative at times.   Mobility: Lift assist//assist of 2 w/walker & gait belt for very short distances.   Education: Discussed HF, ESRD, fluid overload, diet.   Plan: Continue to monitor.     Of note this shift: Patient very upset with plan & threatening to leave AMA. Wanted to go outside & \"get back on my water pills.\" Discussed with patient that hospital policy does not allow her to leave 7th floor, and paged provider re: diuretics/AMA. Provider declined to come to bedside but ordered one-time dose of lasix. Pt went for wheelchair ride around unit. Later in shift patient developed dry hacking cough not resolved by repositioning, neb, albuterol inhaler, mucin ex. Cough continued for 30+ minutes while RN attempted to contact provider. Robitussin was ordered & administered & resulted in a decrease in pt coughing.     "

## 2021-05-13 NOTE — PLAN OF CARE
PT DEFER/7A: Pt mobilizing with Ax1 with FWW, declines further ambulation but is able to demo safety with short distances with FWW. Pt likely near her baseline as she was having assistance at LTC facility. Pt with no further acute PT needs at this time, as pt anticipating discharge soon and has safe plan in place to return to LTC. PT orders will be completed at this time.

## 2021-05-14 NOTE — PROVIDER NOTIFICATION
Provider Notification  MD paged x3 concerning severe incessant cough, pt has had all available PRNs and is still experiencing incessant cough; MD ordered one time dose of Robitussin AC. MD also notified pt's IV had infiltrated and she is declining to have a new PIV placed this shift.

## 2021-05-14 NOTE — DISCHARGE SUMMARY
[facility]  Hospitalist Discharge Summary      Date of Admission:  5/10/2021  Date of Discharge:  5/14/2021  Discharging Provider: Rogelio Ercikson MD  Discharge Team: Hospitalist Service, Gold 8    Discharge Diagnoses   Cough  Volume overload  COPD with chronic respiratory failure  Chronic heart failure with preserved ejection fraction  ESRD on Dialysis T/H/S  Sick sinus syndrome s/p pacemoaker  Afib  DM2 complicated by peripheral neuropathy  Uterine fibroid causing dysuria  HTN      Follow-ups Needed After Discharge   Follow-up Appointments     Follow Up and recommended labs and tests      Follow up with specialist, Dialysis,on Saturday 5/15             Unresulted Labs Ordered in the Past 30 Days of this Admission     No orders found from 4/10/2021 to 5/11/2021.      These results will be followed up by NA    Discharge Disposition   Discharged to long-term care facility  Condition at discharge: Stable    Hospital Course   Sun Mireles is a 65 year old female admitted on 5/10/2021. She has a history of uterine mass, HFpEF, sick sinus syndrome s/p pacemaker placement, atrial fibrillation, ESRD on dialysis (T/Th/Sat), DMII, COPD, HTN, HLD who presented to the ED with cough, shortness of breath and lower extremity edema. Found to have volume overload. All improved with dialysis.     Acute exacerbation of heart failure with preserved ejection fraction due to dietary non compliacne  COPD, not in acute exacerbation  ESRD on HD  Presented with 2 day history of increased SOB and cough. Baseline on 2L supplemental O2 at night due to COPD, KHUSHBOO. In the ED, COVID19 negative. CXR with pulmonary edema and with lower extremity edema on exam. Elevated NT-ProBNP. Echocardiogram  With EF 65-70% with indeterminate diastolic function. ESRD nephrology consulted for dialysis. Furosemide was started with slight improvement in symptoms, but much more improvement came with dialysis. Patient  was making increased urine with dialysis and was discharged on lasix 120mg PO daily. She needs cardiology follow up as an outpatient both for mangement of cardiac issues and pre-op evaluation prior to fibroid removal (see below) and this was ordered. She should resume her outpatient dialysis schedule on Saturday.      # Chronic anemia   Likely multifactorial 2/2 CKD, chronic disease and malnutrition. Hgb baseline 9.0. Hgb at baseline on admission.   - CBC stable    # Sick sinus syndrome s/p pacemaker   # Afib   Continued on PTA on nifedipine ER 30 mg daily and ASA 81mg daily. Not on anticoagulation due to hx of GI bleed.     # DMII c/b Peripheral neuropathy , not currently on insulin  A1c 5.6% (4/6/21). Not currently on any diabetic medications.      # COPD, not in acute exacerbation, chronic respiratory failure  Baseline on 2L O2. PTA on Singulair daily, Duonebs BID PRN, albuterol PRN, fluticasone daily. Currently on 2L supplemental O2.      # Dysuria   # Uterine mass   Was seen by gynecology during prior admission who suspects uterine fibroid which is causing patient abdominal discomfort and recurrent UTIs. Underwent pre op evaluation for XL, AUTUMN, BSO, however recommended to have cardiology and pulmonology consult prior to surgery which have not been completed.    - has had multiple evaluations for dysuria and UTI work up has been negative or significant for asymptomatic bacteriuria. Dysuria has been attributed to fibroid resting on bladder which should improve with surgery.  - Continue with outpatient workup, could consider inpatient cardiology/pulmonology consult pending clinical course   - will need cardiology follow up outpatient as she missed her appointment while inpatient    # HTN  Continue PTA on losartan 50 mg daily, nifedipine 30 mg daily.      Chronic stable medical issues:   # HLD: Continue PTA rosuvastatin   # GERD: Continue PTA omeprazole   # KHUSHBOO, Obesity: CPAP at night with O2  # Gout: Continue PTA  allopurinol 100 mg daily, colchicine 0.3 mg every Saturday   # Overactive bladder: Continue PTA oxybutynin 5 mg every evening         Consultations This Hospital Stay   NEPHROLOGY ESRD ADULT IP CONSULT  PHYSICAL THERAPY ADULT IP CONSULT  CARE MANAGEMENT / SOCIAL WORK IP CONSULT  PHYSICAL THERAPY ADULT IP CONSULT    Code Status   Full Code    Time Spent on this Encounter   IRogelio MD, personally saw the patient today and spent 45 minutes discharging this patient.       Rogelio Erickson MD  [facility] [address] [address]  Phone: [phone]  ______________________________________________________________________    Physical Exam   Vital Signs: Temp: 98.2  F (36.8  C) Temp src: Oral BP: 119/42 Pulse: 78   Resp: 16 SpO2: 95 % O2 Device: None (Room air)    Weight: 225 lbs 3.2 oz  Gen: NAD, sitting comfortably in bed  Eyes: PERRLA, EOMI, conjuctiva clear  Mouth: OP clear, no lesions  CV: RRR, no murmurs, 2+ radial pulses  RESP: CTA bilaterally, no w/r/c  Abd: soft, nontender, nondistended  Ext: no edema bilaterally       Primary Care Physician   Collin Doran    Discharge Orders      CARDIOLOGY EVAL ADULT REFERRAL      General info for SNF    Length of Stay Estimate: Long term care  Condition at Discharge: Improving  Level of care:skilled   Rehabilitation Potential: Fair  Admission H&P remains valid and up-to-date: Yes  Recent Chemotherapy: N/A  Use Nursing Home Standing Orders: Yes     Mantoux instructions    Give two-step Mantoux (PPD) Per Facility Policy Yes     Reason for your hospital stay    You were admitted for shortness of breath and cough and this was from fluid build up and improved with Dialysis. You should follow a 2L per day fluid restriction and low sodium diet     Daily weights    Call Provider for weight gain of more than 2 pounds per day or 5 pounds per week.     Follow Up and recommended labs and tests    Follow up with specialist,  Dialysis,on Saturday 5/15     Activity - Up with nursing assistance     3 Gram Sodium Diet       Significant Results and Procedures   Most Recent 3 CBC's:  Recent Labs   Lab Test 05/13/21  0742 05/12/21  0638 05/11/21  0922   WBC 5.9 6.1 5.9   HGB 9.4* 9.6* 9.2*   MCV 94 95 98    215 196     Most Recent 3 BMP's:  Recent Labs   Lab Test 05/14/21  0548 05/13/21  0742 05/12/21  0638    134 135   POTASSIUM 4.3 3.9 4.0   CHLORIDE 101 102 102   CO2 28 28 26   BUN 29 31* 40*   CR 3.84* 4.03* 4.72*   ANIONGAP 6 4 7   JOANNA 9.4 9.3 9.6   * 96 104*     Most Recent 2 LFT's:  Recent Labs   Lab Test 05/11/21  0922 05/10/21  1659   AST 14 23   ALT 13 12   ALKPHOS 126 136   BILITOTAL 0.4 0.4   ,   Results for orders placed or performed during the hospital encounter of 05/10/21   XR Chest Port 1 View    Narrative    Exam:  Chest X-ray 5/10/2021 5:36 PM    History: cough    Comparison: X-ray 4/9/2021    Findings: Frontal radiograph of the chest. Left chest wall ICD. Right  IJ central venous catheter with the tip over the right atrium. Midline  trachea. Atherosclerotic calcifications seen at the aortic arch.  Enlarged cardiac silhouette. Diffuse bilateral interstitial and  airspace opacities throughout both lungs. No pneumothorax. The upper  abdomen is unremarkable. No acute bone findings.      Impression    Impression:   1. Bilateral mixed interstitial and airspace opacities which can be  seen with infection or pulmonary edema.  2. Cardiomegaly.    I have personally reviewed the examination and initial interpretation  and I agree with the findings.    LUCILLE GALE, DO   XR Chest Port 1 View    Narrative    EXAM: XR CHEST PORT 1 VIEW  5/13/2021 11:15 AM      HISTORY: increased cough    COMPARISON: Chest x-ray 5/10/2021    FINDINGS: Portable semiupright AP radiograph of the chest. Right IJ  central venous catheter tip projects over the right atrium. Left chest  wall cardiac device with leads projecting over the  right atrium and  right ventricle. The trachea is midline. The cardiac silhouette is  stable. Atherosclerotic calcifications of the aortic arch. No pleural  effusion or pneumothorax. Decreased bilateral patchy airspace  opacities. Surgical clips projecting over the right upper quadrant.       Impression    IMPRESSION: Decreased bilateral patchy airspace opacities, likely  improving infection/pulmonary edema.    I have personally reviewed the examination and initial interpretation  and I agree with the findings.    EDVIN WHITE MD   Echo Complete    Narrative    [id number]  [id number]  [id number]  [id number]^[email or web address]^[email or web address]     [facility]  Echocardiography Laboratory  [address] [place] [id number]     Name: GRACY LOPEZ  MRN: [id number]  : 1955  Study Date: 2021 12:44 PM  Age: 65 yrs  Gender: Female  Patient Location: [facility]  Reason For Study: Respiratory Failure  Ordering Physician: MANJIT SAN  Performed By: Marianna Fuchs RDCS     BSA: 2.0 m2  Height: 64 in  Weight: 200 lb  HR: 74  BP: 137/86 mmHg  ______________________________________________________________________________  Procedure  Complete Portable Echo Adult.  ______________________________________________________________________________  Interpretation Summary  Global and regional left ventricular function is hyperkinetic with an EF of  65-70%. Mild concentric wall thickening consistent with left ventricular  hypertrophy is present. Hyperdynamic LV function and cavity obliteration in  systole results in a small, hemodynamically-insignificant LV intracavitary  gradient.  RV size and function are probably normal on limited views.  No significant valvular abnormalities were noted.  No pericardial effusion is present.  Previous study not available for comparison.  ______________________________________________________________________________  Left Ventricle  Global and regional left ventricular  function is hyperkinetic with an EF of  65-70%. Left ventricular size is normal. Mild concentric wall thickening  consistent with left ventricular hypertrophy is present. Hyperdynamic LV  function and cavity obliteration in systole results in a small,  hemodynamically-insignificant LV intracavitary gradient. Left ventricular  diastolic function is indeterminate.     Right Ventricle  RV size and function are probably normal on limited views.     Atria  Both atria appear normal.     Mitral Valve  Mild mitral annular calcification is present.     Aortic Valve  The aortic valve is tricuspid. Trileaflet aortic sclerosis without stenosis.     Tricuspid Valve  The tricuspid valve is normal. Trace tricuspid insufficiency is present. The  peak velocity of the tricuspid regurgitant jet is not obtainable. Pulmonary  artery systolic pressure cannot be assessed.     Pulmonic Valve  The pulmonic valve is normal.     Vessels  The aorta root is normal. The pulmonary artery cannot be assessed. The  inferior vena cava was normal in size with preserved respiratory variability.  IVC diameter <2.1 cm collapsing >50% with sniff suggests a normal RA pressure  of 3 mmHg.     Pericardium  No pericardial effusion is present.     Compared to Previous Study  Previous study not available for comparison.  ______________________________________________________________________________  MMode/2D Measurements & Calculations     IVSd: 1.2 cm  LVIDd: 4.7 cm  LVIDs: 3.3 cm  LVPWd: 1.1 cm  FS: 30.6 %  LV mass(C)d: 197.0 grams  LV mass(C)dI: 100.7 grams/m2  Ao root diam: 3.0 cm  asc Aorta Diam: 2.8 cm  LVOT diam: 2.1 cm  LVOT area: 3.5 cm2  LA Volume (BP): 48.0 ml  LA Volume Index (BP): 24.5 ml/m2  RWT: 0.46     Doppler Measurements & Calculations  MV E max milana: 70.3 cm/sec  MV A max milana: 124.0 cm/sec  MV E/A: 0.57  MV dec slope: 268.0 cm/sec2  PA acc time: 0.16 sec  E/E' av.4  Lateral E/e': 13.8  Medial E/e': 19.0      ______________________________________________________________________________  Report approved by: Diana Callejas 05/12/2021 02:07 PM               Discharge Medications   Current Discharge Medication List      CONTINUE these medications which have NOT CHANGED    Details   acetaminophen (TYLENOL) 325 MG tablet Take 650 mg by mouth 4 times daily For pain      albuterol (PROAIR HFA/PROVENTIL HFA/VENTOLIN HFA) 108 (90 Base) MCG/ACT inhaler Inhale 2 puffs into the lungs every 6 hours as needed for shortness of breath / dyspnea or wheezing    Comments: Pharmacy may dispense brand covered by insurance (Proair, or proventil or ventolin or generic albuterol inhaler)      allopurinol (ZYLOPRIM) 100 MG tablet Take 100 mg by mouth daily For gout      Ascorbic Acid (VITAMIN C-MILKA HIPS) 500 MG CHEW Take 500 mg by mouth 2 times daily      aspirin 81 MG EC tablet Take 81 mg by mouth daily For prevention of heart attack      benzonatate (TESSALON) 200 MG capsule Take 200 mg by mouth 3 times daily For cough      coenzyme Q-10 200 MG CAPS Take 200 mg by mouth daily For heart disease      colchicine (COLCYRS) 0.6 MG tablet Take 0.3 mg by mouth once a week Every Saturday evening for gout      diclofenac (VOLTAREN) 1 % topical gel Apply 1 g topically 3 times daily as needed for moderate pain (to bilateral toes)      Dimethicone-Zinc Oxide 5-5 % CREA Apply topically to vulva each shift with each diaper change for pain      diphenhydrAMINE (BENADRYL) 25 MG tablet Take 25 mg by mouth 2 times daily as needed for itching      docusate sodium (COLACE) 100 MG tablet Take 100 mg by mouth daily For constipation      fluticasone (FLONASE) 50 MCG/ACT nasal spray Spray 2 sprays into both nostrils daily For allergies      gabapentin (NEURONTIN) 100 MG capsule Take 200 mg by mouth three times a week Every Mon, Wed, Friday at bedtime on non-dialysis days for neuropathic pain      ipratropium - albuterol 0.5 mg/2.5 mg/3 mL (DUONEB) 0.5-2.5  (3) MG/3ML neb solution Take 1 vial by nebulization 4 times daily For shortness of breath      Lidocaine (LIDOCARE) 4 % Patch Place 1 patch onto the skin every 24 hours To prevent lidocaine toxicity, patient should be patch free for 12 hrs daily.      losartan (COZAAR) 50 MG tablet Take 50 mg by mouth daily For hypertension      magnesium oxide (MAG-OX) 400 (240 Mg) MG tablet Take 400 mg by mouth 2 times daily For hypomagnesium      montelukast (SINGULAIR) 10 MG tablet Take 10 mg by mouth At Bedtime For allergies      NIFEdipine ER (ADALAT CC) 30 MG 24 hr tablet Take 30 mg by mouth daily For heart disease      omeprazole (PRILOSEC) 20 MG DR capsule Take 20 mg by mouth daily For GERD      oxybutynin ER (DITROPAN-XL) 5 MG 24 hr tablet Take 5 mg by mouth every evening For overactive bladder      polyethylene glycol (MIRALAX) 17 g packet Take 1 packet by mouth daily as needed for constipation      rosuvastatin (CRESTOR) 10 MG tablet Take 10 mg by mouth daily At bedtime for hyperlipidemia      senna-docusate (SENOKOT-S/PERICOLACE) 8.6-50 MG tablet Take 1 tablet by mouth daily as needed for constipation      sevelamer carbonate (RENVELA) 800 MG tablet Take 800 mg by mouth With meals for phosphorus supplement      timolol maleate (TIMOPTIC) 0.5 % ophthalmic solution Place 1 drop into both eyes 2 times daily      Vitamin D3 (CHOLECALCIFEROL) 125 MCG (5000 UT) tablet Take 1 tablet by mouth daily For osteoporosis      Epoetin Johnnie (EPOGEN IJ) Inject 3,400 Units as directed three times a week With dialysis Tues, Thur, Sat      Iron Sucrose (VENOFER IV) Inject 50 mg into the vein once a week On Tuesday at dialysis.   [facility]           Allergies   No Known Allergies

## 2021-05-14 NOTE — PLAN OF CARE
/66   Pulse 83   Temp 98.2  F (36.8  C) (Oral)   Resp 16   Wt 101.7 kg (224 lb 3.3 oz)   SpO2 95%   BMI 37.89 kg/m       Patient alert and oriented. VS stable. Patient on room air. Patient complains of pain with coughing. Scheduled benzonatate controls pain. Patient denies nausea. BG - patient refusing blood sugar checks. Urine Output - oliguric. Patient on hemodialysis. Bowel Function - No BM during shift. Nutrition - 3 g Na diet. PIV - saline locked. CVC - HD line. Activity - Up with assist of 2 with gait belt and walker. Plan of Care - Will continue to monitor and notify care team of any changes. Possible discharge tomorrow if respiratory status remains stable.

## 2021-05-14 NOTE — PROGRESS NOTES
Care Management Discharge Note    Discharge Date: 05/14/21 at 1330 via [facility]    Discharge Disposition:     [facility]  Address: [address] [place] [address]  Phone: [phone]  Nurse to Nurse: [phone]  Fax: [phone]     [facility]  Address: [address], [place], [place], [address]  Telephone: [phone]  Fax: [phone]  Schedule: [facility], Run Time is 11:15 AM-2:30 PM     Discharge Services: IMM, Transportation    Discharge DME: None    Discharge Transportation: [facility] [employer] ([phone])    Private pay costs discussed: Not applicable    PAS Confirmation Code:  N/A  Patient/family educated on Medicare website which has current facility and service quality ratings: N/A from [facility]    Education Provided on the Discharge Plan: Yes  Persons Notified of Discharge Plans: Patient, Dr. Erickson, Nursing Staff, LTC Admissions,  [facility], Gautam at [facility], Dialysis Unit  Patient/Family in Agreement with the Plan: Yes    Handoff Referral Completed: Yes    Additional Information:  Staff: Please complete nurse to nurse. Please sent patient with packet.     SW: SW sent orders to facility. SW to fax Gautam at [facility] ([phone]) the discharge summary once it is available.     ELROY Oakley, BLADIMIR  7A   Ph: [phone]  Pager: [phone]

## 2021-05-14 NOTE — PLAN OF CARE
VSS.  Patient sats well on room air.  Dysneic with activity.  Up with 1 assist and walker with gait belt.   Persistent strong non-productive cough.  Getting scheduled Tessalon jay  and neb. Treatments as well as PRN inhalers and cough syrup.  History of COPD. Patient with numerous requests and concerns around discharge.  Dr. Erickson up to see several times to address concerns. .  NA assisted patient to dress to leave.  Left hospital via outside transport at 1330 via  to return to [facility].  Family aware of transfer. Patient has all belongings in 2 bags.  To follow up as outpatient.

## 2021-05-14 NOTE — DISCHARGE SUMMARY
Dialysis Discharge Summary Brief    [facility]  Division of Nephrology  Nephrology Discharge Dialysis Orders  Ph: [phone]  Fax: [phone]    Sun Mireles  MRN: [id number]  YOB: 1955    [facility]: [place]    Date of Admission: 5/10/2021  Date of Discharge: 5/14/2021  Discharge Diagnosis: ESRD    Mrs Mireles was admitted 5/10 with SOB and volume overload, we ran HD daily from 5/10-5/13 for total of 11L of UF in 3 days, now on RA and feeling better, discharging with plan to go back to outpt HD on 5/15.  No fundamental changes in dialysis plan, I encouraged fluid and Na restriction.        [x] Resume all previous dialysis orders without exception.     Name of provider completing this form: ADRIANA Lee MD  [phone]

## 2021-05-14 NOTE — PLAN OF CARE
/80 (BP Location: Right arm)   Pulse 76   Temp 98.2  F (36.8  C) (Oral)   Resp 16   Wt 102.2 kg (225 lb 3.2 oz)   SpO2 95%   BMI 38.06 kg/m     Shift: 2728-3672  VS: VSS on RA, afebrile.   Neuro: AOx4  BG: TID  Cardiopulmonary: VIZCAINO, incessant cough unrelieved with PRN robitussin; MD ordered robitussin AC which provided minor relief   Pain/Nausea/PRN: Denies  Diet: 3gNa+  LDA: No IV access (team aware)  GI/: Oliguric, LBM 5/13  Skin: LE edema  Mobility: A1-2 with walker  Plan: Probable HD today.     Will continue with POC, will notify MD with changes or concerns.

## 2021-05-17 NOTE — TELEPHONE ENCOUNTER
"Pt needs to schedule an appointment with a general cardiologist per a referral.    Pt no showed to 5/11 appointment with Priscilla Campo.    CARDIOLOGY EVAL ADULT REFERRAL HAS BEEN CANCELLED; can be found in the \"finalized requests\" tab of the pt's appointment desk. PLEASE REINSTATE (right click on request and select \"reinstate\") AND LINK TO APPOINTMENT WHEN SCHEDULING.     "

## 2021-05-18 NOTE — TELEPHONE ENCOUNTER
BRIEF SOCIAL WORK NOTE:    As requested, MAR sent the discharge summary to Gautam Link, Care Coordinator with [employer] (Ph and F: [phone]).     ELROY Oakley, [address]   Ph: [phone]  Pager: [phone]

## 2021-05-27 NOTE — TELEPHONE ENCOUNTER
I did send a prescription for enema to help with her constipation, also send a prescription for stool softener to use as needed for constipation in the future, will also encourage her to stay hydrated, eat high-fiber food and to follow-up if she is not improving.

## 2021-05-27 NOTE — PROGRESS NOTES
Attempt 1: Rupal CM, Clinic Care Coordination (CCC) Care Guide (CG), called patient for CCC outreach.  If this patient is returning my call, please transfer to  Rupal at ext [phone] MWF or [phone] on T/TH.    Next outreach: 04/03/19    Plan:  -Reschedule [facility]    Care Guide Delegation from RN (Attempt 1: 03/26/19):  Due Date: Within 2 weeks from today's date: 3/25/19  Spoke to patient and she did not want to take the time to talk today.  Was awaiting a call from the clinic regarding a triage call.  Would like to schedule another appointment for CCC.  Delegation: No Show for RN Appointment. Please Follow unsuccessful outreach, no show standard work.

## 2021-05-27 NOTE — TELEPHONE ENCOUNTER
RN cannot approve Refill Request    RN can NOT refill this medication med is not covered by policy/route to provider.       Shirin Calvo, Care Connection Triage/Med Refill 4/12/2019    Requested Prescriptions   Pending Prescriptions Disp Refills     ondansetron (ZOFRAN) 8 MG tablet 20 tablet 0     Sig: Take 1 tablet (8 mg total) by mouth every 8 (eight) hours as needed.       There is no refill protocol information for this order      Signed Prescriptions Disp Refills    metOLazone (ZAROXOLYN) 5 MG tablet 45 tablet 3     Sig: Take 0.5 tablets (2.5 mg total) by mouth daily.       Diuretics/Combination Diuretics Refill Protocol  Passed - 4/11/2019  2:27 PM        Passed - Visit with PCP or prescribing provider visit in past 12 months     Last office visit with prescriber/PCP: 3/20/2019 Collin Doran MD OR same dept: 3/20/2019 Collin Doran MD OR same specialty: 3/20/2019 Collin Doran MD  Last physical: Visit date not found Last MTM visit: Visit date not found   Next visit within 3 mo: Visit date not found  Next physical within 3 mo: Visit date not found  Prescriber OR PCP: Collin Doran MD  Last diagnosis associated with med order: 1. Acute decompensated heart failure (H)  - metOLazone (ZAROXOLYN) 5 MG tablet; Take 0.5 tablets (2.5 mg total) by mouth daily.  Dispense: 45 tablet; Refill: 3    2. Mood disorder due to medical condition  - sertraline (ZOLOFT) 50 MG tablet; Take 1.5 tablets (75 mg total) by mouth daily.  Dispense: 135 tablet; Refill: 3    3. Arthralgia of both knees    4. Degeneration of lumbar or lumbosacral intervertebral disc    5. CKD stage 4 due to type 2 diabetes mellitus (H)  - ondansetron (ZOFRAN) 8 MG tablet; Take 1 tablet (8 mg total) by mouth every 8 (eight) hours as needed.  Dispense: 20 tablet; Refill: 0    6. Benign Essential Hypertension  - metoprolol succinate (TOPROL-XL) 100 MG 24 hr tablet; Take 1 tablet (100 mg total) by mouth daily.   Dispense: 90 tablet; Refill: 3    If protocol passes may refill for 12 months if within 3 months of last provider visit (or a total of 15 months).             Passed - Serum Potassium in past 12 months      Lab Results   Component Value Date    Potassium 4.3 03/20/2019             Passed - Serum Sodium in past 12 months      Lab Results   Component Value Date    Sodium 137 03/20/2019             Passed - Blood pressure on file in past 12 months     BP Readings from Last 1 Encounters:   03/20/19 125/49             Passed - Serum Creatinine in past 12 months      Creatinine   Date Value Ref Range Status   03/20/2019 3.86 (H) 0.60 - 1.10 mg/dL Final            sertraline (ZOLOFT) 50 MG tablet 135 tablet 3     Sig: Take 1.5 tablets (75 mg total) by mouth daily.       SSRI Refill Protocol  Passed - 4/11/2019  2:27 PM        Passed - PCP or prescribing provider visit in last year     Last office visit with prescriber/PCP: 3/20/2019 Collin Doran MD OR same dept: 3/20/2019 Collin Doran MD OR same specialty: 3/20/2019 Collin Doran MD  Last physical: Visit date not found Last MTM visit: Visit date not found   Next visit within 3 mo: Visit date not found  Next physical within 3 mo: Visit date not found  Prescriber OR PCP: Collin Doran MD  Last diagnosis associated with med order: 1. Acute decompensated heart failure (H)  - metOLazone (ZAROXOLYN) 5 MG tablet; Take 0.5 tablets (2.5 mg total) by mouth daily.  Dispense: 45 tablet; Refill: 3    2. Mood disorder due to medical condition  - sertraline (ZOLOFT) 50 MG tablet; Take 1.5 tablets (75 mg total) by mouth daily.  Dispense: 135 tablet; Refill: 3    3. Arthralgia of both knees    4. Degeneration of lumbar or lumbosacral intervertebral disc    5. CKD stage 4 due to type 2 diabetes mellitus (H)  - ondansetron (ZOFRAN) 8 MG tablet; Take 1 tablet (8 mg total) by mouth every 8 (eight) hours as needed.  Dispense: 20 tablet; Refill:  0    6. Benign Essential Hypertension  - metoprolol succinate (TOPROL-XL) 100 MG 24 hr tablet; Take 1 tablet (100 mg total) by mouth daily.  Dispense: 90 tablet; Refill: 3    If protocol passes may refill for 12 months if within 3 months of last provider visit (or a total of 15 months).            metoprolol succinate (TOPROL-XL) 100 MG 24 hr tablet 90 tablet 3     Sig: Take 1 tablet (100 mg total) by mouth daily.       Beta-Blockers Refill Protocol Passed - 4/11/2019  2:27 PM        Passed - PCP or prescribing provider visit in past 12 months or next 3 months     Last office visit with prescriber/PCP: 3/20/2019 Collin Doran MD OR same dept: 3/20/2019 Collin Doran MD OR same specialty: 3/20/2019 Collin Droan MD  Last physical: Visit date not found Last MTM visit: Visit date not found   Next visit within 3 mo: Visit date not found  Next physical within 3 mo: Visit date not found  Prescriber OR PCP: Collin Doran MD  Last diagnosis associated with med order: 1. Acute decompensated heart failure (H)  - metOLazone (ZAROXOLYN) 5 MG tablet; Take 0.5 tablets (2.5 mg total) by mouth daily.  Dispense: 45 tablet; Refill: 3    2. Mood disorder due to medical condition  - sertraline (ZOLOFT) 50 MG tablet; Take 1.5 tablets (75 mg total) by mouth daily.  Dispense: 135 tablet; Refill: 3    3. Arthralgia of both knees    4. Degeneration of lumbar or lumbosacral intervertebral disc    5. CKD stage 4 due to type 2 diabetes mellitus (H)  - ondansetron (ZOFRAN) 8 MG tablet; Take 1 tablet (8 mg total) by mouth every 8 (eight) hours as needed.  Dispense: 20 tablet; Refill: 0    6. Benign Essential Hypertension  - metoprolol succinate (TOPROL-XL) 100 MG 24 hr tablet; Take 1 tablet (100 mg total) by mouth daily.  Dispense: 90 tablet; Refill: 3    If protocol passes may refill for 12 months if within 3 months of last provider visit (or a total of 15 months).             Passed - Blood  pressure filed in past 12 months     BP Readings from Last 1 Encounters:   03/20/19 125/49

## 2021-05-27 NOTE — TELEPHONE ENCOUNTER
Refill Approved    Rx renewed per Medication Renewal Policy. Medication was last renewed on 3/16/19.    Shirin Calvo, Care Connection Triage/Med Refill 4/12/2019     Requested Prescriptions   Pending Prescriptions Disp Refills     metOLazone (ZAROXOLYN) 5 MG tablet 10 tablet 0     Sig: Take 0.5 tablets (2.5 mg total) by mouth daily.       Diuretics/Combination Diuretics Refill Protocol  Passed - 4/11/2019  2:27 PM        Passed - Visit with PCP or prescribing provider visit in past 12 months     Last office visit with prescriber/PCP: 3/20/2019 Collin Doran MD OR same dept: 3/20/2019 Collin Doran MD OR same specialty: 3/20/2019 Collin Doran MD  Last physical: Visit date not found Last MTM visit: Visit date not found   Next visit within 3 mo: Visit date not found  Next physical within 3 mo: Visit date not found  Prescriber OR PCP: Collin Doran MD  Last diagnosis associated with med order: 1. Acute decompensated heart failure (H)  - metOLazone (ZAROXOLYN) 5 MG tablet; Take 0.5 tablets (2.5 mg total) by mouth daily.  Dispense: 10 tablet; Refill: 0    2. Mood disorder due to medical condition  - sertraline (ZOLOFT) 50 MG tablet; Take 1.5 tablets (75 mg total) by mouth daily.  Dispense: 30 tablet; Refill: 0    3. Arthralgia of both knees    4. Degeneration of lumbar or lumbosacral intervertebral disc    5. CKD stage 4 due to type 2 diabetes mellitus (H)  - ondansetron (ZOFRAN) 8 MG tablet; Take 1 tablet (8 mg total) by mouth every 8 (eight) hours as needed.  Dispense: 20 tablet; Refill: 0    6. Benign Essential Hypertension  - metoprolol succinate (TOPROL-XL) 100 MG 24 hr tablet; Take 1 tablet (100 mg total) by mouth daily.  Dispense: 30 tablet; Refill: 0    If protocol passes may refill for 12 months if within 3 months of last provider visit (or a total of 15 months).             Passed - Serum Potassium in past 12 months      Lab Results   Component Value Date     Potassium 4.3 03/20/2019             Passed - Serum Sodium in past 12 months      Lab Results   Component Value Date    Sodium 137 03/20/2019             Passed - Blood pressure on file in past 12 months     BP Readings from Last 1 Encounters:   03/20/19 125/49             Passed - Serum Creatinine in past 12 months      Creatinine   Date Value Ref Range Status   03/20/2019 3.86 (H) 0.60 - 1.10 mg/dL Final             sertraline (ZOLOFT) 50 MG tablet 30 tablet 0     Sig: Take 1.5 tablets (75 mg total) by mouth daily.       SSRI Refill Protocol  Passed - 4/11/2019  2:27 PM        Passed - PCP or prescribing provider visit in last year     Last office visit with prescriber/PCP: 3/20/2019 Collin Doran MD OR same dept: 3/20/2019 Collin Doran MD OR same specialty: 3/20/2019 Collin Doran MD  Last physical: Visit date not found Last MTM visit: Visit date not found   Next visit within 3 mo: Visit date not found  Next physical within 3 mo: Visit date not found  Prescriber OR PCP: Collin Doran MD  Last diagnosis associated with med order: 1. Acute decompensated heart failure (H)  - metOLazone (ZAROXOLYN) 5 MG tablet; Take 0.5 tablets (2.5 mg total) by mouth daily.  Dispense: 10 tablet; Refill: 0    2. Mood disorder due to medical condition  - sertraline (ZOLOFT) 50 MG tablet; Take 1.5 tablets (75 mg total) by mouth daily.  Dispense: 30 tablet; Refill: 0    3. Arthralgia of both knees    4. Degeneration of lumbar or lumbosacral intervertebral disc    5. CKD stage 4 due to type 2 diabetes mellitus (H)  - ondansetron (ZOFRAN) 8 MG tablet; Take 1 tablet (8 mg total) by mouth every 8 (eight) hours as needed.  Dispense: 20 tablet; Refill: 0    6. Benign Essential Hypertension  - metoprolol succinate (TOPROL-XL) 100 MG 24 hr tablet; Take 1 tablet (100 mg total) by mouth daily.  Dispense: 30 tablet; Refill: 0    If protocol passes may refill for 12 months if within 3 months of last  provider visit (or a total of 15 months).             ondansetron (ZOFRAN) 8 MG tablet 20 tablet 0     Sig: Take 1 tablet (8 mg total) by mouth every 8 (eight) hours as needed.       There is no refill protocol information for this order        metoprolol succinate (TOPROL-XL) 100 MG 24 hr tablet 30 tablet 0     Sig: Take 1 tablet (100 mg total) by mouth daily.       Beta-Blockers Refill Protocol Passed - 4/11/2019  2:27 PM        Passed - PCP or prescribing provider visit in past 12 months or next 3 months     Last office visit with prescriber/PCP: 3/20/2019 Collin Doran MD OR same dept: 3/20/2019 Collin Doran MD OR same specialty: 3/20/2019 Collin Doran MD  Last physical: Visit date not found Last MTM visit: Visit date not found   Next visit within 3 mo: Visit date not found  Next physical within 3 mo: Visit date not found  Prescriber OR PCP: Collin Doran MD  Last diagnosis associated with med order: 1. Acute decompensated heart failure (H)  - metOLazone (ZAROXOLYN) 5 MG tablet; Take 0.5 tablets (2.5 mg total) by mouth daily.  Dispense: 10 tablet; Refill: 0    2. Mood disorder due to medical condition  - sertraline (ZOLOFT) 50 MG tablet; Take 1.5 tablets (75 mg total) by mouth daily.  Dispense: 30 tablet; Refill: 0    3. Arthralgia of both knees    4. Degeneration of lumbar or lumbosacral intervertebral disc    5. CKD stage 4 due to type 2 diabetes mellitus (H)  - ondansetron (ZOFRAN) 8 MG tablet; Take 1 tablet (8 mg total) by mouth every 8 (eight) hours as needed.  Dispense: 20 tablet; Refill: 0    6. Benign Essential Hypertension  - metoprolol succinate (TOPROL-XL) 100 MG 24 hr tablet; Take 1 tablet (100 mg total) by mouth daily.  Dispense: 30 tablet; Refill: 0    If protocol passes may refill for 12 months if within 3 months of last provider visit (or a total of 15 months).             Passed - Blood pressure filed in past 12 months     BP Readings from Last 1  Encounters:   03/20/19 125/49

## 2021-05-27 NOTE — TELEPHONE ENCOUNTER
"Pt calling  [phone]    Pt states she is \"having problems with her bottom\".  She can feel stool in her - she is feeling constipated and feels like she has to go to the bathroom but whenever she tries to go the\"poop will not come out.\"    Pt states she can feel stool in her rectum.  Last BM was 3 days ago.  Denies rectal pain, abdominal pain,     Pt states she is hydrated and is having normal urine output.    PLAN  Advised Pt that she should try a sitz bath.  Pt only has a shower at home.  Writer recommended a suppository or enema.  Pt states she does not have money for a suppository or enema and is recommending an request sent to PCP asking if he is OK writing an Rx for an enema.  Will send Pt's request to PCP and will call Pt back at [phone].    Katie Guido, RN, Care Connection RN Triage/Med Refills    PCP Please advise -     Reason for Disposition    Rectal pain or fullness from fecal impaction (rectum full of stool) has not tried a SITZ bath, suppository, or enema    Protocols used: CONSTIPATION-A-OH      "

## 2021-05-27 NOTE — TELEPHONE ENCOUNTER
Fluconazole 150 mg single dose prescription was sent to the patient  pharmacy, she will hold on Lipitor the days she takes that medication.  Office visit if not improving after treatment.

## 2021-05-27 NOTE — PROGRESS NOTES
Attempt 2: Rupal CM, Clinic Care Coordination (CCC) Care Guide (CG), called patient for CCC outreach.  If this patient is returning my call, please transfer to  Rupal at ext [phone] MWF or [phone] on T/TH.     Next outreach: 04/03/19     Plan:  -Reschedule [facility]     Care Guide Delegation from RN (Attempt 1: 03/26/19, Attempt 2: 03/27/19):  Due Date: Within 2 weeks from today's date: 3/25/19  Spoke to patient and she did not want to take the time to talk today.  Was awaiting a call from the clinic regarding a triage call.  Would like to schedule another appointment for CCC.  Delegation: No Show for RN Appointment. Please Follow unsuccessful outreach, no show standard work.    MTM Delegation from [place]  Reschedule MTM appointment

## 2021-05-27 NOTE — TELEPHONE ENCOUNTER
Triage note:    63 year old female called with concerns about vaginal (intermal and external) itching that started yesterday morning.  She called yesterday but never heard back (see telephone encounter from yesterday).      She states that itching is so bad it has kept her awake all night. She took last dose of antibiotic one week ago.  No fever. No sores. No redness. No vaginal discharge.  She states she had this before and her doctor gave her a cream that helped but that was years ago. She asks for a response today because the vaginal itching 'is driving me crazy'.      RN triaged to be seen in the office but she states she can't come in due to transportation issues.   She is asking for an Rx to be sent to her pharmacy?    *Ok to leave a detailed message upon call back    [facility]    Reason for Disposition    ALL other vulvar symptoms (Exception: feels like prior yeast infection, or rash < 24 hour duration)    Protocols used: VULVAR SYMPTOMS-A-OH

## 2021-05-27 NOTE — PROGRESS NOTES
Care Guide Delegation:  Due Date: Within 2 weeks from today's date: 3/25/19  Spoke to patient and she did not want to take the time to talk today.  Was awaiting a call from the clinic regarding a triage call.  Would like to schedule another appointment for CCC.  Delegation: No Show for RN Appointment. Please Follow unsuccessful outreach, no show standard work.

## 2021-05-27 NOTE — TELEPHONE ENCOUNTER
Who is calling: Patient   Reason for Call:  2nd call regarding possible yeast infection, Pt symptoms getting worse- Please call patient and advise   Date of last appointment with primary care:  3/20/19  Has the patient been recently seen:  Yes  Okay to leave a detailed message: Yes

## 2021-05-27 NOTE — TELEPHONE ENCOUNTER
Triage call:   Patient is calling to check on status of the prescription from earlier triage call-     fluconazole (DIFLUCAN) 150 MG tablet 1 tablet 1 3/26/2019 3/26/2019    Sig - Route: Take 1 tablet (150 mg total) by mouth once for 1 dose. - Oral    Class: Phone In      Called [facility] and they did not have a prescription called into them, resubmitted prescription electronically.      Disp Refills Start End    fluconazole (DIFLUCAN) 150 MG tablet 1 tablet 1 3/26/2019 3/26/2019    Sig - Route: Take 1 tablet (150 mg total) by mouth once for 1 dose. - Oral    Sent to pharmacy as: fluconazole (DIFLUCAN) 150 MG tablet    E-Prescribing Status: Receipt confirmed by pharmacy (3/26/2019  7:39 PM CDT)      Called patient back and advised that she would be able to  her prescription this evening. Reminded her of Dr Doran's instructions to hold her Lipitor on the days she takes the medication and she verbalizes understanding. Denies further questions at this time.     Nusrat Dias RN [facility] Care Connection Triage/Med Refill 3/26/2019 7:44 PM    Reason for Disposition    Caller has medication question, adult has minor symptoms, caller declines triage, and triager answers question    Protocols used: MEDICATION QUESTION CALL-A-

## 2021-05-27 NOTE — TELEPHONE ENCOUNTER
Question following Office Visit  When did you see your provider: 03/20/19  What is your question: Patient states she is experiencing vaginal itching, and thinks this was caused by Antibiotic that was prescribed at the time of ov.Patient is requesting Rx for this symptom .Please call when Rx has been sent pharmacy per PCP advise.   [facility] [facility] ( listed)   Okay to leave a detailed message: Yes  [phone]

## 2021-05-28 NOTE — TELEPHONE ENCOUNTER
Medication Request  Medication name:  Tramadol  Pharmacy Name and Location:  [facility] [id number] - [place], [place] - [address] AT [facility]  Reason for request:  pain  When did you use medication last?:   n/a  Patient offered appointment:  n/a  Okay to leave a detailed message: yes

## 2021-05-28 NOTE — TELEPHONE ENCOUNTER
Controlled Substance Refill Request  Medication Name:   Requested Prescriptions     Pending Prescriptions Disp Refills     oxyCODONE-acetaminophen (PERCOCET/ENDOCET) 5-325 mg per tablet 60 tablet 0     Sig: Take 1 tablet by mouth every 8 (eight) hours as needed for pain (severe pain).     Date Last Fill: 03/20/2019  Pharmacy: [facility] [place]     Submit electronically to pharmacy  Controlled Substance Agreement Date Scanned:   Encounter-Level CSA Scan Date - 11/01/2017:    Scan on 11/7/2017 12:03 PM (below)         Last office visit with prescriber/PCP: 3/20/2019 Collin Doran MD OR same dept: 3/20/2019 Collin Doran MD OR same specialty: 3/20/2019 Collin Doran MD  Last physical: Visit date not found Last MTM visit: Visit date not found

## 2021-05-28 NOTE — TELEPHONE ENCOUNTER
Refill Approved    Rx renewed per Medication Renewal Policy. Medication was last renewed on 5/18/2018 for 100/11  Last OV 3/20/2019    Cherry Benítez, Care Connection Triage/Med Refill 5/17/2019     Requested Prescriptions   Pending Prescriptions Disp Refills     ACCU-CHEK CARLOTA PLUS TEST STRP strips [Pharmacy Med Name: Accu-Chek Carlota Plus Strip (50)   Strip] 1 strip 10     Sig: Use to test blood sugar four times a day. Generic: Blood Glucose Test       Diabetic Supplies Refill Protocol Passed - 5/16/2019  5:40 PM        Passed - Visit with PCP or prescribing provider visit in last 6 months     Last office visit with prescriber/PCP: 7/21/2017 Rosalia Swartz MD OR same dept: 3/20/2019 Collin Doran MD OR same specialty: 3/20/2019 Collin Doran MD  Last physical: Visit date not found Last MTM visit: Visit date not found   Next visit within 3 mo: Visit date not found  Next physical within 3 mo: Visit date not found  Prescriber OR PCP: Rosalia Swartz MD  Last diagnosis associated with med order: 1. DM (diabetes mellitus) (H)  - ACCU-CHEK CARLOTA PLUS TEST STRP strips [Pharmacy Med Name: Accu-Chek Carlota Plus Strip (50)   Strip]; Use to test blood sugar four times a day Generic: Blood Glucose Test  Dispense: 1 strip; Refill: 10    If protocol passes may refill for 12 months if within 3 months of last provider visit (or a total of 15 months).             Passed - A1C in last 6 months     Hemoglobin A1c   Date Value Ref Range Status   02/25/2019 8.5 (H) 3.5 - 6.0 % Final

## 2021-05-28 NOTE — TELEPHONE ENCOUNTER
Patient is out of medication.  Patient uses the Oxycodone-Acetaminophen for foot pain.          Controlled Substance Refill Request  Medication Name:   Requested Prescriptions     Pending Prescriptions Disp Refills     oxyCODONE-acetaminophen (PERCOCET/ENDOCET) 5-325 mg per tablet 60 tablet 0     Sig: Take 1 tablet by mouth every 8 (eight) hours as needed for pain (severe pain).     Date Last Fill: 3/20/19  [id number]      Submit electronically to pharmacy  Controlled Substance Agreement Date Scanned:   Encounter-Level CSA Scan Date - 11/01/2017:    Scan on 11/7/2017 12:03 PM (below)         Last office visit with prescriber/PCP: 3/20/2019 Collin Doran MD OR same dept: 3/20/2019 Collin Doran MD OR same specialty: 3/20/2019 Collin Doran MD  Last physical: Visit date not found Last MTM visit: Visit date not found

## 2021-05-29 NOTE — TELEPHONE ENCOUNTER
Patient called to check the status of the below request. She states she also needs pen needle at this time.

## 2021-05-29 NOTE — TELEPHONE ENCOUNTER
Who is calling:  patient  Reason for Call:  Calling to check on below request. Reports she is out of pain medication. Has a medical and social security appointment today. Patient reports it will be difficult for her to get there with her pain in her feet. Please advise asap!  Date of last appointment with primary care: 3/20/19  Okay to leave a detailed message: Yes

## 2021-05-29 NOTE — TELEPHONE ENCOUNTER
Refill Request  Did you contact pharmacy: No  Medication name: Metolazone 5 mg  Requested Prescriptions      No prescriptions requested or ordered in this encounter     Who prescribed the medication: Dr. Doran  Ph[facility] Name and Location: [facility] [place]  Is patient out of medication: Yes  Patient notified refills processed in 72 hours:  no  Okay to leave a detailed message: yes  The pharmacy s calling for the patient refill as the patient expressed she is unable to cut pills in half so she has been taking a whole tab at night and is now out of medication.   Metabolic acidosis Renal bone disease

## 2021-05-29 NOTE — PROGRESS NOTES
Writer spoke to patient over phone for RN Assessment of goals and needs and possible enrollment in CCC. Patient stated she was not interested in completing the RN Assessment at this time. She said she was waiting on her medical insurance and did not want to complete the Assessment until she said she had that in place. Writer informed patient CCC may be able to assist her with her medical insurance, but she declined the offer and said she felt comfortable handling her insurance on her own. She said she spoke to a Supervisor at [facility] and was expected a call back in the near future. CCC will continue to be available when patient feels she is for assessment.

## 2021-05-29 NOTE — TELEPHONE ENCOUNTER
Refill Approved    Rx renewed per Medication Renewal Policy. Medication was last renewed on 10/11/18 .    Yesenia Mcknight, Care [facility] Triage/Med Refill 5/25/2019     Requested Prescriptions   Pending Prescriptions Disp Refills     albuterol (PROVENTIL) 2.5 mg /3 mL (0.083 %) nebulizer solution [Pharmacy Med Name: Albuterol Sulf .083% 2.5mg/3ml (60) (2.5 MG/3ML)  Vial] 60 vial 11     Sig: Use one vial in nebulizer four times a day as needed. Generic: Albuterol Sulfate       Albuterol/Levalbuterol Refill Protocol Passed - 5/23/2019  6:11 PM        Passed - PCP or prescribing provider visit in last year     Last office visit with prescriber/PCP: 3/20/2019 Collin Doran MD OR same dept: 3/20/2019 Collin Doran MD OR same specialty: 3/20/2019 Collin Doran MD Last physical: Visit date not found       Next appt within 3 mo: Visit date not found  Next physical within 3 mo: Visit date not found  Prescriber OR PCP: Collin Doran MD  Last diagnosis associated with med order: 1. Asthma  - albuterol (PROVENTIL) 2.5 mg /3 mL (0.083 %) nebulizer solution [Pharmacy Med Name: Albuterol Sulf .083% 2.5mg/3ml (60) (2.5 MG/3ML)  Vial]; Use one vial in nebulizer four times a day as needed. Generic: Albuterol Sulfate  Dispense: 60 vial; Refill: 11    If protocol passes may refill for 6 months if within 3 months of last provider visit (or a total of 9 months). If patient requesting >1 inhaler per month refill x 6 months and have patient make appointment with provider.

## 2021-05-29 NOTE — TELEPHONE ENCOUNTER
RN Telephone note:    [facility]      Pt self reports she has been taking 1 tablet of Metolazone 5 mg vs 1/2 a tablet daily because the pill is difficult to cut in half.  RN cannot approve Refill Request    RN can NOT refill this medication Pt is self reporting 5 mg dose vs 2.5 mg.. Last office visit: 3/20/2019 Collin Doran MD Last Physical: Visit date not found Last MTM visit: Visit date not found Last visit same specialty: 3/20/2019 Collin Doran MD.  Next visit within 3 mo: Visit date not found  Next physical within 3 mo: Visit date not found      [facility] Connection Triage/Med Refill 6/10/2019    Requested Prescriptions   Pending Prescriptions Disp Refills     metOLazone (ZAROXOLYN) 5 MG tablet 45 tablet 3     Sig: Take 0.5 tablets (2.5 mg total) by mouth daily.       Diuretics/Combination Diuretics Refill Protocol  Passed - 6/10/2019 12:50 PM        Passed - Visit with PCP or prescribing provider visit in past 12 months     Last office visit with prescriber/PCP: 3/20/2019 Collin Doran MD OR same dept: 3/20/2019 Collin Doran MD OR same specialty: 3/20/2019 Collin Doran MD  Last physical: Visit date not found Last MTM visit: Visit date not found   Next visit within 3 mo: Visit date not found  Next physical within 3 mo: Visit date not found  Prescriber OR PCP: Collin Doran MD  Last diagnosis associated with med order: 1. Acute decompensated heart failure (H)  - metOLazone (ZAROXOLYN) 5 MG tablet; Take 0.5 tablets (2.5 mg total) by mouth daily.  Dispense: 45 tablet; Refill: 3    If protocol passes may refill for 12 months if within 3 months of last provider visit (or a total of 15 months).             Passed - Serum Potassium in past 12 months      Lab Results   Component Value Date    Potassium 4.3 03/20/2019             Passed - Serum Sodium in past 12 months      Lab Results   Component Value Date     Sodium 137 03/20/2019             Passed - Blood pressure on file in past 12 months     BP Readings from Last 1 Encounters:   03/20/19 125/49             Passed - Serum Creatinine in past 12 months      Creatinine   Date Value Ref Range Status   03/20/2019 3.86 (H) 0.60 - 1.10 mg/dL Final                Pharmacy indicates there is a 2.5 mg tablet.    Should pt be taking 5 mg or 2.5 mg daily?    Will forward to PCP for review.    Margaret Potetr RN   Care Connection RN Triage

## 2021-05-29 NOTE — TELEPHONE ENCOUNTER
Patient is calling to check the status of the below request. She states she is out of medication and asking for this to be expedited.

## 2021-05-29 NOTE — TELEPHONE ENCOUNTER
Who is calling:  Patient   Reason for Call:  Calling repetitively about order for medications.  List all her aliments and why the medications are needed.  Looks as thought there is a care guide.  Writer left VM on care guide ext to see if they were aware of the issues.  Will note accept redirections.  Please advise.   Date of last appointment with primary care: NA  Okay to leave a detailed message: No

## 2021-05-29 NOTE — TELEPHONE ENCOUNTER
RN cannot approve Refill Request    RN can NOT refill this medication med is not covered by policy/route to provider. Last office visit: 3/20/2019 Collin Doran MD Last Physical: Visit date not found Last MTM visit: Visit date not found Last visit same specialty: 3/20/2019 Collin oDran MD.  Next visit within 3 mo: Visit date not found  Next physical within 3 mo: Visit date not found      Amanda Braun, [facility] Connection Triage/Med Refill 5/23/2019    Requested Prescriptions   Pending Prescriptions Disp Refills     ondansetron (ZOFRAN) 8 MG tablet [Pharmacy Med Name: ONDANSETRON 8MG TABLETS] 20 tablet 0     Sig: TAKE 1 TABLET(8 MG) BY MOUTH EVERY 8 HOURS AS NEEDED       There is no refill protocol information for this order

## 2021-05-29 NOTE — TELEPHONE ENCOUNTER
Medication Request  Medication name:   insulin detemir U-100 (LEVEMIR FLEXPEN) 100 unit/mL (3 mL) pen 5 adj dose pen 0 3/17/2019     Sig - Route: Inject 10 unit marking on U-100 syringe under the skin at bedtime. - Subcutaneous    Sent to pharmacy as: insulin detemir U-100 (LEVEMIR FLEXPEN) 100 unit/mL (3 mL) pen    E-Prescribing Status: Receipt confirmed by pharmacy (3/17/2019 11:47 AM CDT)      Pharmacy Name and Location: [facility]  Reason for request: Refill. Last filled by a hospitalist  When did you use medication last?:  yesterday  Patient offered appointment:  n/a  Okay to leave a detailed message: no

## 2021-05-29 NOTE — TELEPHONE ENCOUNTER
Left message to call back for: if needed  Information to relay to patient:  Left detailed VM on patient's phone stating refills were sent to the pharmacy.

## 2021-05-29 NOTE — TELEPHONE ENCOUNTER
Pt called in ask the status of oxyCODONE-acetaminophen (PERCOCET/ENDOCET) 5-325 mg per tablet refill.  Pt states she request the Rx yesterday also.  Pt states she has appointment to the kidney specialist and she need the medic ation.      Please advise      Ag Angelo RN, [facility] Triage/Med Refill 5/22/2019 6:33 PM

## 2021-05-29 NOTE — TELEPHONE ENCOUNTER
Dr. Doran,    RX's pended in another encounter for you to review.    Kaylee MENG LPN .......... 9:57 AM  05/22/19

## 2021-05-29 NOTE — TELEPHONE ENCOUNTER
Medications are pended for provider review already.    Kaylee MENG LPN .......... 2:48 PM  05/22/19

## 2021-05-29 NOTE — TELEPHONE ENCOUNTER
Who is calling:  Papo at [facility]  Reason for Call:  Form sent to [facility] for CPAP machine and supplies was cut off on their end.  Please refax.  Date of last appointment with primary care: 3/20/19  Okay to leave a detailed message: Yes

## 2021-05-29 NOTE — TELEPHONE ENCOUNTER
Controlled Substance Refill Request  Medication Name:   Requested Prescriptions     Pending Prescriptions Disp Refills     oxyCODONE-acetaminophen (PERCOCET/ENDOCET) 5-325 mg per tablet 60 tablet 0     Sig: Take 1 tablet by mouth every 8 (eight) hours as needed for pain (severe pain).     Date Last Fill: 4/22/19  Pharmacy: [facility])      Submit electronically to pharmacy  Controlled Substance Agreement Date Scanned:   Encounter-Level CSA Scan Date - 11/01/2017:    Scan on 11/7/2017 12:03 PM (below)         Last office visit with prescriber/PCP: 3/20/2019 Collin Doran MD OR same dept: 3/20/2019 Collin Doran MD OR same specialty: 3/20/2019 Collin Doran MD  Last physical: Visit date not found Last MTM visit: Visit date not found

## 2021-05-29 NOTE — TELEPHONE ENCOUNTER
Left detail message that Dr. Doran approved refill request and prescription was send in to her pharmacy. xl

## 2021-05-29 NOTE — TELEPHONE ENCOUNTER
Controlled Substance Refill Request  Medication Name:   Requested Prescriptions     Pending Prescriptions Disp Refills     traMADol (ULTRAM) 50 mg tablet 60 tablet 0     Sig: Take 1 tab po two times a day prn     Date Last Fill: 5/2/19  Pharmacy: [id number]    Submit electronically to pharmacy  Controlled Substance Agreement Date Scanned:   Encounter-Level CSA Scan Date - 11/01/2017:    Scan on 11/7/2017 12:03 PM (below)         Last office visit with prescriber/PCP: 3/20/2019 Collin Doran MD OR same dept: 3/20/2019 Collin Doran MD OR same specialty: 3/20/2019 Collin Doran MD  Last physical: Visit date not found Last MTM visit: Visit date not found

## 2021-05-29 NOTE — PROGRESS NOTES
The clinic Community Health Worker called the patient at the request of the PCP to discuss possible Clinic Care Coordination enrollment. The program was described to the patient and immediate needs were discussed. The patient agreed to enrollment and an assessment was scheduled. The patient was provided with contact information for the clinic CHW. Assessment date 6/7/19.    Patient Reports:  She needs assistance with housing and financial assistance.  She would also like to see if she can get assistance with transportation

## 2021-05-29 NOTE — TELEPHONE ENCOUNTER
Patient is not enrolled in Clinic Care Coordination; recent documentation was decline.    Rupal CM, Community Health Worker (CHW)  Clinic Care Coordination (CCC)

## 2021-05-29 NOTE — TELEPHONE ENCOUNTER
Dr. Doran,    Insulin last prescribed by the hospitalist.    Please approve or deny medication for patient. She did have a hospital follow up visit with you on 3/20/2019.    Kaylee MENG LPN .......... 10:06 AM  05/22/19

## 2021-05-30 NOTE — TELEPHONE ENCOUNTER
Fax received from [facility] pharmacy requesting Prior Authorization    Medication Name:   traMADol (ULTRAM) 50 mg tablet 60 tablet 0 6/6/2019     Sig: Take 1 tab po two times a day prn    Sent to pharmacy as: traMADol (ULTRAM) 50 mg tablet    E-Prescribing Status: Receipt confirmed by pharmacy (6/6/2019  6:35 PM CDT)          Insurance Plan: Medicare Part D   PBM: Aleksandr   Patient ID Number: [id number]    Please start PA process. Required for scripts that are more than 7 day supplies

## 2021-05-30 NOTE — TELEPHONE ENCOUNTER
RN cannot approve Refill Request    RN can NOT refill this medication med is not covered by policy/route to provider     . Last office visit: 3/20/2019 Collin Doran MD Last Physical: Visit date not found Last MTM visit: Visit date not found Last visit same specialty: 3/20/2019 Collin Doran MD.  Next visit within 3 mo: Visit date not found  Next physical within 3 mo: Visit date not found      Shirin Calvo, Care Connection Triage/Med Refill 7/1/2019    Requested Prescriptions   Pending Prescriptions Disp Refills     timolol maleate (TIMOPTIC) 0.5 % ophthalmic solution [Pharmacy Med Name: TIMOLOL MALEATE 0.5% OPHTH SOLN 5ML] 10 mL 0     Sig: ADMINISTER 1 DROP TO BOTH EYES TWICE DAILY       There is no refill protocol information for this order        ondansetron (ZOFRAN) 8 MG tablet [Pharmacy Med Name: ONDANSETRON 8MG TABLETS] 20 tablet 0     Sig: TAKE 1 TABLET(8 MG) BY MOUTH EVERY 8 HOURS AS NEEDED       There is no refill protocol information for this order

## 2021-05-30 NOTE — TELEPHONE ENCOUNTER
RN cannot approve Refill Request    RN can NOT refill this medication historical medication requested.       Shirin Calvo, Care Connection Triage/Med Refill 6/27/2019    Requested Prescriptions   Pending Prescriptions Disp Refills     albuterol (PROAIR HFA;PROVENTIL HFA;VENTOLIN HFA) 90 mcg/actuation inhaler [Pharmacy Med Name: Albuterol Sulf HFA 90 MCG Inh (18) 90  Vial]  10     Sig: Inhale 1-2 puffs every 4-6 hours as needed for wheezing. Generic: Albuterol Sulfate HFA       Albuterol/Levalbuterol Refill Protocol Passed - 6/26/2019  6:40 PM        Passed - PCP or prescribing provider visit in last year     Last office visit with prescriber/PCP: 3/20/2019 Collin Doran MD OR same dept: 3/20/2019 Collin Doran MD OR same specialty: 3/20/2019 Collin Doran MD Last physical: Visit date not found       Next appt within 3 mo: Visit date not found  Next physical within 3 mo: Visit date not found  Prescriber OR PCP: Collin Doran MD  Last diagnosis associated with med order: There are no diagnoses linked to this encounter.  If protocol passes may refill for 6 months if within 3 months of last provider visit (or a total of 9 months). If patient requesting >1 inhaler per month refill x 6 months and have patient make appointment with provider.

## 2021-05-30 NOTE — TELEPHONE ENCOUNTER
Call from pt       Checking on status of CII refill ?      A/P:    Currently being reviewed by provider - CIIs are not refilled outside of clinic hours       Shamir Murray RN   Triage and Medication Refills

## 2021-05-30 NOTE — TELEPHONE ENCOUNTER
RN cannot approve Refill Request    RN can NOT refill this medication historical medication requested. Last office visit: 7/21/2017 Rosalia Swartz MD Last Physical: Visit date not found Last MTM visit: Visit date not found Last visit same specialty: 3/20/2019 Collin Doran MD.  Next visit within 3 mo: Visit date not found  Next physical within 3 mo: Visit date not found      Nusrat FINNEY Arun, [facility] Connection Triage/Med Refill 6/27/2019    Requested Prescriptions   Pending Prescriptions Disp Refills     amLODIPine (NORVASC) 10 MG tablet [Pharmacy Med Name: Amlodipine Oral Tablet 10mg 10  Tablet] 30 tablet 10     Sig: Take one tablet by mouth every day.  Generic: AmLODIPine Besylate       Calcium-Channel Blockers Protocol Passed - 6/26/2019  6:39 PM        Passed - PCP or prescribing provider visit in past 12 months or next 3 months     Last office visit with prescriber/PCP: 7/21/2017 Rosalia Swartz MD OR same dept: 3/20/2019 Collin Doran MD OR same specialty: 3/20/2019 Collin Doran MD  Last physical: Visit date not found Last MTM visit: Visit date not found   Next visit within 3 mo: Visit date not found  Next physical within 3 mo: Visit date not found  Prescriber OR PCP: Rosalia Swartz MD  Last diagnosis associated with med order: 1. Essential hypertension, benign  - amLODIPine (NORVASC) 10 MG tablet [Pharmacy Med Name: Amlodipine Oral Tablet 10mg 10  Tablet]; Take one tablet by mouth every day.  Generic: AmLODIPine Besylate  Dispense: 30 tablet; Refill: 10    If protocol passes may refill for 12 months if within 3 months of last provider visit (or a total of 15 months).             Passed - Blood pressure filed in past 12 months     BP Readings from Last 1 Encounters:   03/20/19 125/49

## 2021-05-30 NOTE — TELEPHONE ENCOUNTER
RN cannot approve Refill Request    RN can NOT refill this medication medication not on med list. Last office visit: 3/20/2019 Collin Doran MD Last Physical: Visit date not found Last MTM visit: Visit date not found Last visit same specialty: 3/20/2019 Collin Doran MD.  Next visit within 3 mo: Visit date not found  Next physical within 3 mo: Visit date not found      Margaret Potter [facility] Connection Triage/Med Refill 7/22/2019    Requested Prescriptions   Pending Prescriptions Disp Refills     potassium chloride (KLOR-CON) 10 MEQ CR tablet [Pharmacy Med Name: Potassium CL ER 10 MEQ Tab 10  Tablet] 30 tablet 11     Sig: Take one tablet by mouth daily.  Generic: Potassium Chloride       Potassium Supplements Refill Protocol Passed - 7/22/2019 12:06 PM        Passed - PCP or prescribing provider visit in past 12 months       Last office visit with prescriber/PCP: 3/20/2019 Collin Doran MD OR same dept: 3/20/2019 Collin Doran MD OR same specialty: 3/20/2019 Collin Doran MD  Last physical: Visit date not found Last MTM visit: Visit date not found   Next visit within 3 mo: Visit date not found  Next physical within 3 mo: Visit date not found  Prescriber OR PCP: Collin Doran MD  Last diagnosis associated with med order: 1. Hypokalemia  - potassium chloride (KLOR-CON) 10 MEQ CR tablet [Pharmacy Med Name: Potassium CL ER 10 MEQ Tab 10  Tablet]; Take one tablet by mouth daily.  Generic: Potassium Chloride  Dispense: 30 tablet; Refill: 11    If protocol passes may refill for 12 months if within 3 months of last provider visit (or a total of 15 months).             Passed - Potassium level in last 12 months     Lab Results   Component Value Date    Potassium 4.3 03/20/2019

## 2021-05-30 NOTE — TELEPHONE ENCOUNTER
Central PA team  [phone]  Pool: HE PA MED ([id number])          PA has been initiated.       PA form completed and faxed insurance via Cover My Meds     Key:  [id number]       Medication:  traMADol (ULTRAM) 50 mg tablet    Insurance:  [employer]        Response will be received via fax and may take up to 5-10 business days depending on plan

## 2021-05-30 NOTE — TELEPHONE ENCOUNTER
Patient reported she is out of her medication.      Controlled Substance Refill Request  Medication Name:   Requested Prescriptions     Pending Prescriptions Disp Refills     oxyCODONE-acetaminophen (PERCOCET/ENDOCET) 5-325 mg per tablet 60 tablet 0     Sig: Take 1 tablet by mouth every 8 (eight) hours as needed for pain (severe pain).     Date Last Fill: 5/23/2019  Pharmacy: [facility]      Submit electronically to pharmacy  Controlled Substance Agreement Date Scanned:   Encounter-Level CSA Scan Date - 11/01/2017:    Scan on 11/7/2017 12:03 PM (below)         Last office visit with prescriber/PCP: 3/20/2019 Collin Doran MD OR same dept: 3/20/2019 Collin Doran MD OR same specialty: 3/20/2019 Collin Doran MD  Last physical: Visit date not found Last MTM visit: Visit date not found     (4) no impairment

## 2021-05-30 NOTE — TELEPHONE ENCOUNTER
Fax received from [facility], they have started the Prior Authorization Process via Cover My Meds    CoverMyMeds Key: [id number]    Medication Name:   ondansetron (ZOFRAN) 8 MG tablet 20 tablet 0 7/1/2019     Sig: TAKE 1 TABLET(8 MG) BY MOUTH EVERY 8 HOURS AS NEEDED    Sent to pharmacy as: ondansetron (ZOFRAN) 8 MG tablet    E-Prescribing Status: Receipt confirmed by pharmacy (7/1/2019  8:40 AM CDT)          Insurance Plan: Medicare Part D  PBM: Aleksandr  Patient ID: [id number]    Please complete the PA process

## 2021-05-30 NOTE — TELEPHONE ENCOUNTER
Patient Returning Call  Reason for call:  Patient called back.  Information relayed to patient:  Informed of message listed below.  Patient would like medication to be sent to the pharmacy today.  Patient has additional questions:  Yes  If YES, what are your questions/concerns:  As above.  Okay to leave a detailed message?: Yes

## 2021-05-30 NOTE — TELEPHONE ENCOUNTER
"Refill Approved    Rx renewed per Medication Renewal Policy. Medication was last renewed on 10/1/18.    Shirin Calvo, Care Connection Triage/Med Refill 7/16/2019     Requested Prescriptions   Pending Prescriptions Disp Refills     NOVOFINE AUTOCOVER 30 gauge x 1/3\" Ndle [Pharmacy Med Name: NOVOFINE AUTOCVR [id number] PEN NEEDLE] 500 each 0     Sig: USE FIVE TIMES DAILY AS DIRECTED       Diabetic Supplies Refill Protocol Passed - 7/16/2019  5:46 PM        Passed - Visit with PCP or prescribing provider visit in last 6 months     Last office visit with prescriber/PCP: 3/20/2019 Collin Doran MD OR same dept: 3/20/2019 Collin Doran MD OR same specialty: 3/20/2019 Collin Doran MD  Last physical: Visit date not found Last MTM visit: Visit date not found   Next visit within 3 mo: Visit date not found  Next physical within 3 mo: Visit date not found  Prescriber OR PCP: Collin Doran MD  Last diagnosis associated with med order: 1. Diabetes (H)  - NOVOFINE AUTOCOVER 30 gauge x 1/3\" Ndle [Pharmacy Med Name: NOVOFINE AUTOCVR [id number] PEN NEEDLE]; USE FIVE TIMES DAILY AS DIRECTED  Dispense: 500 each; Refill: 0    If protocol passes may refill for 12 months if within 3 months of last provider visit (or a total of 15 months).             Passed - A1C in last 6 months     Hemoglobin A1c   Date Value Ref Range Status   02/25/2019 8.5 (H) 3.5 - 6.0 % Final                           "

## 2021-05-30 NOTE — TELEPHONE ENCOUNTER
Central PA team  [phone]  Pool: HE PA MED ([id number])          PA has been initiated.       PA form completed and faxed insurance via Cover My Meds     Key:  [id number] - PA Case ID: [id number] - Rx #: [id number]     Medication:  Ondansetron HCl 8MG tablets    Insurance:  [employer]        Response will be received via fax and may take up to 5-10 business days depending on plan

## 2021-05-31 NOTE — TELEPHONE ENCOUNTER
Refill Approved    Rx renewed per Medication Renewal Policy. Medication was last renewed on 3/16/19.    Shirin Calvo, [facility] Connection Triage/Med Refill 8/13/2019     Requested Prescriptions   Pending Prescriptions Disp Refills     furosemide (LASIX) 80 MG tablet [Pharmacy Med Name: Furosemide Tablet 80 MG 80  Tablet] 30 tablet 5     Sig: Take one tablet (80 mg total) by mouth daily.  Generic: Furosemide Generic: Furosemide       Diuretics/Combination Diuretics Refill Protocol  Passed - 8/12/2019  3:04 PM        Passed - Visit with PCP or prescribing provider visit in past 12 months     Last office visit with prescriber/PCP: 8/25/2016 Lisseth Vargas MD OR same dept: 3/20/2019 Collin Doran MD OR same specialty: 3/20/2019 Collin Doran MD  Last physical: Visit date not found Last MTM visit: Visit date not found   Next visit within 3 mo: Visit date not found  Next physical within 3 mo: Visit date not found  Prescriber OR PCP: Lisseth Vargas MD  Last diagnosis associated with med order: 1. Congestive heart failure (H)  - furosemide (LASIX) 80 MG tablet [Pharmacy Med Name: Furosemide Tablet 80 MG 80  Tablet]; Take one tablet (80 mg total) by mouth daily.  Generic: Furosemide Generic: Furosemide  Dispense: 30 tablet; Refill: 5    If protocol passes may refill for 12 months if within 3 months of last provider visit (or a total of 15 months).             Passed - Serum Potassium in past 12 months      Lab Results   Component Value Date    Potassium 4.3 03/20/2019             Passed - Serum Sodium in past 12 months      Lab Results   Component Value Date    Sodium 137 03/20/2019             Passed - Blood pressure on file in past 12 months     BP Readings from Last 1 Encounters:   03/20/19 125/49             Passed - Serum Creatinine in past 12 months      Creatinine   Date Value Ref Range Status   03/20/2019 3.86 (H) 0.60 - 1.10 mg/dL Final

## 2021-05-31 NOTE — TELEPHONE ENCOUNTER
I did refill her pain medication, but also would like her to see a specialist at the pain clinic to discuss about treatment alternative, she should expect a call from their office.

## 2021-05-31 NOTE — TELEPHONE ENCOUNTER
Controlled Substance Refill Request  Medication Name:   Requested Prescriptions     Pending Prescriptions Disp Refills     oxyCODONE-acetaminophen (PERCOCET/ENDOCET) 5-325 mg per tablet 60 tablet 0     Sig: Take 1 tablet by mouth every 8 (eight) hours as needed for pain (severe pain).     Date Last Fill: 7/22/19  Ph[facility]acy: [facility]      Submit electronically to pharmacy  Controlled Substance Agreement Date Scanned:   Encounter-Level CSA Scan Date - 11/01/2017:    Scan on 11/7/2017 12:03 PM (below)         Last office visit with prescriber/PCP: 3/20/2019 Collin Doran MD OR same dept: 3/20/2019 Collin Doran MD OR same specialty: 3/20/2019 Collin Doran MD  Last physical: Visit date not found Last MTM visit: Visit date not found

## 2021-05-31 NOTE — TELEPHONE ENCOUNTER
Controlled Substance Refill Request  Medication:   Requested Prescriptions     Pending Prescriptions Disp Refills     traMADol (ULTRAM) 50 mg tablet [Pharmacy Med Name: TRAMADOL 50MG TABLETS] 60 tablet 0     Sig: TAKE 1 TABLET BY MOUTH TWICE DAILY AS NEEDED     Date Last Fill: 7/15/19  Ph[facility]: [facility]   Submit electronically to pharmacy  Controlled Substance Agreement on File:   Encounter-Level CSA Scan Date - 11/01/2017:    Scan on 11/7/2017 12:03 PM (below)         Last office visit: 3/20/2019 Collin Doran MD

## 2021-05-31 NOTE — TELEPHONE ENCOUNTER
Spoke with patient, relayed message per MD. Patient stated she is in pain, will  medication and will wait for call from Pain clinic.

## 2021-05-31 NOTE — TELEPHONE ENCOUNTER
Pharmacy change.    Sent remaining refills.    Vivi Avalos, RN, [facility] Nurse Triage/Med Refills RN

## 2021-06-01 NOTE — ED NOTES
Did not see/evaluate patient. Managed by Dr. Yates and Charge RN/Triage RN     Jordyn, Allison Rucker MD  05/31/21 3172

## 2021-06-01 NOTE — TELEPHONE ENCOUNTER
Spoke with patient relayed message per MD. Patient then asked about her shoulder pain, still bothering her. Per Dr. Doran patient can either do PT or get a cortisone injection. Patient will let us know on the PT, doesn't want injection.

## 2021-06-01 NOTE — TELEPHONE ENCOUNTER
Who is requesting the letter?  Patient   Why is the letter needed? Patient has lost her Section 8 benefits. Patient is asking to have Dr. Doran to write a letter detailing her medical issues. Patient having acute respiratory failure, heart failure, gout, polyneuropathy, oxygen use, stage 4 kidney disease etc. Patient is trying to get her benefits reinstated.  How would you like this letter returned? Patient is asking for a call once the letter is ready so her daughter Rupal Mireles, can pick it up.  Okay to leave a detailed message? Yes, [phone]

## 2021-06-01 NOTE — DISCHARGE INSTRUCTIONS
Await culture results  Return if you want to be evaluated for your shortness of breath, otherwise make sure to keep your dialysis appointment on Wednesday.  Start the antibiotic as prescribed

## 2021-06-01 NOTE — TELEPHONE ENCOUNTER
Controlled Substance Refill Request  Medication Name:   Requested Prescriptions     Pending Prescriptions Disp Refills     oxyCODONE-acetaminophen (PERCOCET/ENDOCET) 5-325 mg per tablet 60 tablet 0     Sig: Take 1 tablet by mouth every 8 (eight) hours as needed for pain (severe pain).     Date Last Fill: 8/21/19  [id number]      Submit electronically to pharmacy  Controlled Substance Agreement Date Scanned:   Encounter-Level CSA Scan Date - 11/01/2017:    Scan on 11/7/2017 12:03 PM (below)         Last office visit with prescriber/PCP: 9/17/2019 Collin Doran MD OR same dept: 9/17/2019 Collin Doran MD OR same specialty: 9/17/2019 Collin Doran MD  Last physical: Visit date not found Last MTM visit: Visit date not found

## 2021-06-01 NOTE — TELEPHONE ENCOUNTER
Did look, because of her kidney function she is not  a good candidate for Cymbalta, I will just have her treat the neuropathy with symptomatic over-the-counter cream, and follow with the pain clinic.

## 2021-06-01 NOTE — ED TRIAGE NOTES
Pt missed dialysis today and is reporting pain to her lower extremities. Pt states that she missed dialysis due to her ride not showing up. Pt reports increase in SOB starting this evening.

## 2021-06-01 NOTE — TELEPHONE ENCOUNTER
Who is calling:  Patient  Reason for Call:  Patient was checking the status of her request below. Patient was informed of Collin Doran MD's message below. Patient stated she wants to go back on gabapentin then because that helped with the neuropathy.  Date of last appointment with primary care: 9/17/19  Okay to leave a detailed message: Yes  [phone]

## 2021-06-01 NOTE — TELEPHONE ENCOUNTER
Controlled Substance Refill Request  Medication Name:   Requested Prescriptions     Pending Prescriptions Disp Refills     traMADol (ULTRAM) 50 mg tablet 60 tablet 0     Sig: TAKE 1 TABLET BY MOUTH TWICE DAILY AS NEEDED     Date Last Fill: 08/19/19  Pharmacy: [id number] - [place], [place] - [address] [place] [address]     Submit electronically to pharmacy  Controlled Substance Agreement Date Scanned:   Encounter-Level CSA Scan Date - 11/01/2017:    Scan on 11/7/2017 12:03 PM       Last office visit with prescriber/PCP: 9/17/2019 Collin Doran MD OR same dept: 9/17/2019 Collin Doran MD OR same specialty: 9/17/2019 Collin Doran MD  Last physical: Visit date not found Last MTM visit: Visit date not found

## 2021-06-01 NOTE — TELEPHONE ENCOUNTER
RN cannot approve Refill Request    RN can NOT refill this medication medication not on med list. Last office visit: 9/17/2019 Collin Doran MD Last Physical: Visit date not found Last MTM visit: Visit date not found Last visit same specialty: 9/17/2019 Collin Doran MD.  Next visit within 3 mo: Visit date not found  Next physical within 3 mo: Visit date not found      Hilaria Chang, [facility] Triage/Med Refill 9/19/2019    Requested Prescriptions   Pending Prescriptions Disp Refills     BANOPHEN 25 mg capsule [Pharmacy Med Name: BANOPHEN (DIPHENHYDRAMINE) 25MG CP] 180 capsule 0     Sig: TAKE 2 CAPSULES BY MOUTH DAILY AS NEEDED       Antihistamine Refill Protocol Passed - 9/19/2019  7:29 PM        Passed - Patient has had office visit/physical in last year     Last office visit with prescriber/PCP: 9/17/2019 Collin Doran MD OR same dept: 9/17/2019 Collin Doran MD OR same specialty: 9/17/2019 Collin Doran MD  Last physical: Visit date not found Last MTM visit: Visit date not found   Next visit within 3 mo: Visit date not found  Next physical within 3 mo: Visit date not found  Prescriber OR PCP: Collin Doran MD  Last diagnosis associated with med order: 1. Itching  - BANOPHEN 25 mg capsule [Pharmacy Med Name: BANOPHEN (DIPHENHYDRAMINE) 25MG CP]; TAKE 2 CAPSULES BY MOUTH DAILY AS NEEDED  Dispense: 180 capsule; Refill: 0    If protocol passes may refill for 12 months if within 3 months of last provider visit (or a total of 15 months).

## 2021-06-01 NOTE — ED PROVIDER NOTES
ED Provider Note  [facility]      History     Chief Complaint   Patient presents with     Shortness of Breath     Leg Swelling     HPI  Sun Mireles is a 65 year old female who has end-stage renal disease currently on dialysis presented via ambulance for leg swelling.   Patient missed her dialysis run today.  She has had increased shortness of breath.  Patient reports that her daughter called the ambulance, and patient does not feel that she needs to be here.  She would like to leave without being seen.  Her main complaint is dysuria which started today.  She also has frequency.  She denies abdominal or flank pain.  She has no chest pain.  She reports that her ankles are always swollen.  Patient typically dialyzes Mondays, Wednesdays, and Friday.    Past Medical History  Past Medical History:   Diagnosis Date     Aneurysm of vertebral artery (H)      Asthma      Asthma      CHF (congestive heart failure) (H)      CHF (congestive heart failure) (H)     diastolic      Chronic kidney disease      Chronic kidney disease, stage 4, severely decreased GFR (H)      Chronic pulmonary heart disease, unspecified      Gout, unspecified      Hypercholesterolemia      Hypertension      Localized osteoarthrosis not specified whether primary or secondary, lower leg     Knee     Morbid obesity (H)     BMI>60     Obstructive sleep apnea (adult) (pediatric)      Type II or unspecified type diabetes mellitus with unspecified complication, not stated as uncontrolled      Unspecified glaucoma(365.9)      Unspecified urinary incontinence      Past Surgical History:   Procedure Laterality Date     C/SECTION, LOW TRANSVERSE  89     TUBAL LIGATION  80, 89     cefdinir (OMNICEF) 300 MG capsule  acetaminophen (TYLENOL) 325 MG tablet  albuterol (PROAIR HFA/PROVENTIL HFA/VENTOLIN HFA) 108 (90 Base) MCG/ACT inhaler  allopurinol (ZYLOPRIM) 100 MG tablet  Ascorbic Acid (VITAMIN C-MILKA HIPS) 500 MG CHEW  aspirin 81 MG  EC tablet  benzonatate (TESSALON) 200 MG capsule  coenzyme Q-10 200 MG CAPS  colchicine (COLCYRS) 0.6 MG tablet  diclofenac (VOLTAREN) 1 % topical gel  Dimethicone-Zinc Oxide 5-5 % CREA  diphenhydrAMINE (BENADRYL) 25 MG tablet  docusate sodium (COLACE) 100 MG tablet  Epoetin Johnnie (EPOGEN IJ)  fluticasone (FLONASE) 50 MCG/ACT nasal spray  furosemide (LASIX) 40 MG tablet  gabapentin (NEURONTIN) 100 MG capsule  ipratropium - albuterol 0.5 mg/2.5 mg/3 mL (DUONEB) 0.5-2.5 (3) MG/3ML neb solution  Iron Sucrose (VENOFER IV)  Lidocaine (LIDOCARE) 4 % Patch  losartan (COZAAR) 50 MG tablet  magnesium oxide (MAG-OX) 400 (240 Mg) MG tablet  montelukast (SINGULAIR) 10 MG tablet  NIFEdipine ER (ADALAT CC) 30 MG 24 hr tablet  omeprazole (PRILOSEC) 20 MG DR capsule  oxybutynin ER (DITROPAN-XL) 5 MG 24 hr tablet  polyethylene glycol (MIRALAX) 17 g packet  rosuvastatin (CRESTOR) 10 MG tablet  senna-docusate (SENOKOT-S/PERICOLACE) 8.6-50 MG tablet  sevelamer carbonate (RENVELA) 800 MG tablet  timolol maleate (TIMOPTIC) 0.5 % ophthalmic solution  Vitamin D3 (CHOLECALCIFEROL) 125 MCG (5000 UT) tablet      No Known Allergies  Family History  Family History   Problem Relation Age of Onset     Hypertension Other      Diabetes Other      Cancer Maternal Grandfather         stomach cancer     Social History   Social History     Tobacco Use     Smoking status: Former Smoker     Packs/day: 1.00     Years: 45.00     Pack years: 45.00     Smokeless tobacco: Never Used   Substance Use Topics     Alcohol use: No     Drug use: No      Past medical history, past surgical history, medications, allergies, family history, and social history were reviewed with the patient. No additional pertinent items.       Review of Systems  A complete review of systems was performed with pertinent positives and negatives noted in the HPI, and all other systems negative.    Physical Exam   BP: (!) 151/61  Pulse: 79  Temp: 97.8  F (36.6  C)  Resp: 21  SpO2: 94  %  Physical Exam  Constitutional:       General: She is not in acute distress.     Appearance: She is well-developed.   HENT:      Head: Normocephalic and atraumatic.   Eyes:      Conjunctiva/sclera: Conjunctivae normal.      Pupils: Pupils are equal, round, and reactive to light.   Neck:      Musculoskeletal: Normal range of motion and neck supple.      Thyroid: No thyromegaly.      Trachea: No tracheal deviation.   Cardiovascular:      Rate and Rhythm: Normal rate and regular rhythm.      Heart sounds: Normal heart sounds. No murmur.   Pulmonary:      Effort: Pulmonary effort is normal. No respiratory distress.      Breath sounds: Normal breath sounds. No wheezing. Decreased breath sounds: slight crackles both lung bases.   Chest:      Chest wall: No tenderness.   Abdominal:      General: There is no distension.      Palpations: Abdomen is soft.      Tenderness: There is no abdominal tenderness.   Musculoskeletal:         General: No tenderness.      Right lower leg: Edema present.      Left lower leg: Edema present.   Skin:     General: Skin is warm.      Findings: No rash.   Neurological:      Mental Status: She is alert and oriented to person, place, and time.      Sensory: No sensory deficit.   Psychiatric:         Behavior: Behavior normal.         ED Course      Procedures             Results for orders placed or performed during the hospital encounter of 05/31/21   UA reflex to Microscopic and Culture     Status: Abnormal    Specimen: Urine Midstream; Midstream Urine   Result Value Ref Range    Color Urine Yellow     Appearance Urine Slightly Cloudy     Glucose Urine Negative NEG^Negative mg/dL    Bilirubin Urine Negative NEG^Negative    Ketones Urine Negative NEG^Negative mg/dL    Specific Gravity Urine 1.016 1.003 - 1.035    Blood Urine Small (A) NEG^Negative    pH Urine 5.5 5.0 - 7.0 pH    Protein Albumin Urine 100 (A) NEG^Negative mg/dL    Urobilinogen mg/dL Normal 0.0 - 2.0 mg/dL    Nitrite Urine  Negative NEG^Negative    Leukocyte Esterase Urine Large (A) NEG^Negative    Source Midstream Urine     RBC Urine 15 (H) 0 - 2 /HPF    WBC Urine 108 (H) 0 - 5 /HPF    Bacteria Urine Few (A) NEG^Negative /HPF    Squamous Epithelial /HPF Urine 25 (H) 0 - 1 /HPF    Transitional Epi <1 0 - 1 /HPF   Urine Culture Aerobic Bacterial     Status: None (Preliminary result)    Specimen: Midstream Urine   Result Value Ref Range    Specimen Description Midstream Urine     Special Requests Specimen received in preservative     Culture Micro PENDING      Medications   cefdinir (OMNICEF) capsule 300 mg (300 mg Oral Given 6/1/21 0051)   acetaminophen (TYLENOL) tablet 650 mg (650 mg Oral Given 6/1/21 0201)        Assessments & Plan (with Medical Decision Making)   Patient is a 65-year-old female who missed dialysis today, and her daughter called an ambulance to have her brought in.  Patient let the ambulance know that she did not feel she needed to be seen.  On arrival here, she would rather not be evaluated.  She reports that not much is new with her with the exception of dysuria and frequency which started today.  She is agreeable to be seen for this.  She does not want any evaluation for reported leg swelling or shortness of breath.  She appears competent to make that decision.    On arrival here, patient is a blood pressure 151/61, pulse 79, temperature 97.8, respirations 21, and O2 saturation 94%.  She is not appear to be in acute respiratory distress.  She has a nonsurgical abdomen.  She has no flank pain.    A urinalysis was obtained which showed a specific gravity 1.016.  She had 15 red cells in the 108 white cells per high-powered field with some squamous epithelial cells.  A urine culture was sent.  Patient will be started on Omnicef.  She can take Tylenol for discomfort.  We will follow up on her urine culture results for sensitivities if bacterial growth.  She will follow up on Wednesday for her next dialysis run, and was  instructed to return should she want evaluation for her shortness of breath.    I have reviewed the nursing notes. I have reviewed the findings, diagnosis, plan and need for follow up with the patient.    Discharge Medication List as of 6/1/2021  1:04 AM      START taking these medications    Details   cefdinir (OMNICEF) 300 MG capsule Take 1 capsule (300 mg) by mouth daily for 7 days, Disp-7 capsule, R-0, Local Print             Final diagnoses:   Acute cystitis without hematuria       --  Osiel Yates  [facility] EMERGENCY DEPARTMENT  5/31/2021     Osiel Yates MD  06/01/21 1767

## 2021-06-01 NOTE — PROGRESS NOTES
"OFFICE VISIT - FAMILY MEDICINE     ASSESSMENT AND PLAN     1. Type 2 diabetes mellitus with complication, with long-term current use of insulin (H)  Glycosylated Hemoglobin A1c    Hepatic Profile    pen needle, diabetic (BD ULTRA-FINE CHARITO PEN NEEDLE) 32 gauge x 5/32\" Ndle    Microalbumin, Random Urine   2. Hypercholesteremia  Lipid Cascade   3. Morbid obesity with alveolar hypoventilation (H)  Thyroid Cascade   4. Screening for HIV without presence of risk factors  HIV Antigen/Antibody Screening Cascade   5. CKD stage 4 due to type 2 diabetes mellitus (H)  Renal Function Profile   6. Chronic diastolic CHF (congestive heart failure) (H)  N-Terminal PRO BNP Outpatient (NTBNP)   7. Screening for osteoporosis  Beta-CrossLaps (Beta-CTx)   8. Degeneration of lumbar or lumbosacral intervertebral disc  Beta-CrossLaps (Beta-CTx)   9. Candidiasis, intertrigo  miconazole (MICOTIN) 2 % powder   10. Visit for screening mammogram  Mammo Screening Bilateral   11. Diabetic ulcer of toe of right foot associated with type 2 diabetes mellitus, unspecified ulcer stage (H)  Ambulatory referral to Podiatry   Diabetes type 2 appear well controlled with current management, A1c down to 7.8%.  Continue healthy lifestyle changes.  Chronic kidney disease, according to patient not no change made by nephrologist to her current regimen continue to avoid nephrotoxic substance exposure.  Chronic CHF, she does appear euvolemic today, continue to monitor and adjust therapy accordingly.  Chronic pain syndrome secondary to degenerative joint disease of multiple sites, she is on chronic narcotics, she was referred to pain clinic for treatment options.  Has had reaction to gabapentin, Lyrica in the past.  Cymbalta could be an option.  Screening for osteoporosis was discussed, she is due for a bone density test.  CHIEF COMPLAINT   Request For Letter (for Section 8. Has been homeless, was staying in hotels until financial difficulties. Now has been " staying with daughter temporarily-needs to get Section 8 for a home. ); Shoulder Pain (Rt., pain started a week ago. Unable to lift arm.); and Eczema (bilateral feet, not eczema)    HPI   Sun Mireles is a 63 y.o. female.  Medical history significant for diabetes type 2 with associated peripheral neuropathies, CKD stage III, not adequately controlled on insulin, congestive heart failure, obesity, obstructive sleep apnea mild persistent asthma chronic pain syndrome secondary to degenerative joint disease of the knees, spine on chronic narcotics.    She is here primarily to get a letter in order to reapply for section 8 housing.  She also has chronic issue that she like to discuss so about; diabetes type 2, has been stable, A1c went down to 7.8% from 8.5% last February.  Has been working on weight loss program.  Compliant with current regimen.  She takes long-acting insulin 10 units daily, short acting NovoLog to 3 units with meals.  She has a wound in her left great toe, has been doing home therapy with some mild improvement.  Has never seen a podiatrist.  Right shoulder pain, no specific injury, but significant limitation of range of motion, has done PT in the past but declined at this time.  She saw her kidney doctor  yesterday, no changes according to patient were made to  Her  medication.  She has chronic pain syndrome secondary to degenerative arthritis of multiple sites, she is on chronic narcotics, with mild improvement she was referred to this pain clinic to discuss other treatment alternatives.  Review of Systems As per HPI, otherwise negative.    OBJECTIVE   /62 (Patient Site: Left Arm, Patient Position: Sitting, Cuff Size: Adult Large)   Pulse (!) 56   Wt (!) 246 lb (111.6 kg)   SpO2 94%   BMI 40.94 kg/m    Physical Exam   Constitutional: She is oriented to person, place, and time. She appears well-developed and well-nourished.   HENT:   Head: Normocephalic and atraumatic.   Neck: Normal  range of motion. Neck supple. No JVD present. No tracheal deviation present. No thyromegaly present.   Cardiovascular: Normal rate, regular rhythm, normal heart sounds and intact distal pulses. Exam reveals no gallop and no friction rub.   No murmur heard.  Pulmonary/Chest: Effort normal and breath sounds normal. No respiratory distress. She has no wheezes. She has no rales.   Musculoskeletal: She exhibits no edema or tenderness.   Lymphadenopathy:     She has no cervical adenopathy.   Neurological: She is alert and oriented to person, place, and time. Coordination normal.   Skin:   Small 2 mm wound right great toe, area was cleaned with Betadine, topical antibiotic applied with bandages.  Multiple calluses noted.   Psychiatric: She has a normal mood and affect. Judgment and thought content normal.       PFSH     Family History   Problem Relation Age of Onset     Hypertension Mother      COPD Mother      Diabetes Mother      Hypertension Father      COPD Father      Diabetes Father      Heart attack Maternal Grandmother      Hypertension Maternal Grandmother      Alcohol abuse Sister      Drug abuse Sister      COPD Sister      Diabetes Sister      Hypertension Sister      Heart disease Brother      Hypertension Brother      Hypertension Daughter      Hypertension Son      Hypertension Maternal Grandfather      Heart attack Maternal Grandfather      Hypertension Paternal Grandmother      Heart attack Paternal Grandmother      Hypertension Paternal Grandfather      Social History     Socioeconomic History     Marital status: Single     Spouse name: Not on file     Number of children: Not on file     Years of education: Not on file     Highest education level: Not on file   Occupational History     Not on file   Social Needs     Financial resource strain: Not on file     Food insecurity:     Worry: Not on file     Inability: Not on file     Transportation needs:     Medical: Not on file     Non-medical: Not on file    Tobacco Use     Smoking status: Former Smoker     Last attempt to quit: 2014     Years since quittin.6     Smokeless tobacco: Never Used   Substance and Sexual Activity     Alcohol use: No     Drug use: No     Sexual activity: Not on file   Lifestyle     Physical activity:     Days per week: Not on file     Minutes per session: Not on file     Stress: Not on file   Relationships     Social connections:     Talks on phone: Not on file     Gets together: Not on file     Attends Gnosticism service: Not on file     Active member of club or organization: Not on file     Attends meetings of clubs or organizations: Not on file     Relationship status: Not on file     Intimate partner violence:     Fear of current or ex partner: Not on file     Emotionally abused: Not on file     Physically abused: Not on file     Forced sexual activity: Not on file   Other Topics Concern     Not on file   Social History Narrative    Reports on disability. She is not working and lives with her children and friend.  She reports she turns to her doctor in times of crisis.  She reports she needs assistance with ADLs and has a PCA 11 hours per day, 7 days per week.  Does not have a home nurse.       Relevant history was reviewed with the patient today, unless noted in HPI, nothing is pertinent for this visit.  [facility]     Patient Active Problem List    Diagnosis Date Noted     Degeneration of lumbar or lumbosacral intervertebral disc 2019     Adjustment disorder with mixed anxiety and depressed mood      Reactive depression      Mood disorder due to medical condition      Elevated troponin 2019     Acute respiratory failure (H) 2019     Acute respiratory failure with hypoxia (H) 2019     Hyperosmolar syndrome 2019     Overview Note:     Non-ketotic; diabetes type 2; >500       Acute decompensated heart failure (H)      Community acquired pneumonia, unspecified laterality      Class 3 severe obesity due to  excess calories with serious comorbidity and body mass index (BMI) of 45.0 to 49.9 in adult (H)      CKD stage 4 due to type 2 diabetes mellitus (H) 02/25/2019     Pyelonephritis 12/26/2018     Eczema 04/09/2018     Chronic diastolic CHF (congestive heart failure) (H) 06/12/2017     Acute on chronic respiratory failure with hypoxia and hypercapnia (H) 06/08/2017     Pulmonary HTN (H) 06/08/2017     Bilateral edema of lower extremity 06/28/2016     Diabetic polyneuropathy associated with type 2 diabetes mellitus (H) 06/28/2016     Overview Note:     Previously on gabapentin which helped, but stopped in the hospital due to potential drug interaction.  Lyrica made her sleepy.       Candidiasis, intertrigo 06/28/2016     Tobacco use 07/30/2015     Joint pain, localized to the knee 01/08/2015     Joint pain, localized in the right shoulder 01/08/2015     Lethargy 07/08/2014     Chronic pain 06/15/2014     Gout      Overview Note:     Created by Conversion         Morbid obesity with alveolar hypoventilation (H)      Overview Note:     Created by Conversion         Cor Pulmonale      Overview Note:     Created by Conversion    Replacement Utility updated for latest IMO load       Asthma      Overview Note:     Created by Conversion         Urinary Incontinence      Overview Note:     Created by Conversion         Microcytic anemia      Overview Note:     Created by Conversion         Hypercholesteremia      Overview Note:     Created by Conversion         Obstructive Sleep Apnea      Overview Note:     Created by Conversion         Long-term insulin use in type 2 diabetes (H)      Overview Note:              Acute diastolic heart failure (H)      Overview Note:     Created by Conversion         Postmenopausal bleeding      Overview Note:     Created by Conversion         Benign Essential Hypertension      Overview Note:     Created by Conversion         Acute kidney injury superimposed on CKD (H)      Overview Note:      Created by Conversion         Localized Osteoarthritis Of The Knee      Overview Note:     Created by Conversion    Replacement Utility updated for latest IMO load       Degenerative arthritis of lumbar spine      Overview Note:     Created by Conversion         Unspecified glaucoma      Overview Note:     Created by Conversion         Aneurysm Of The Right Vertebral Artery      Overview Note:     Created by Conversion         Past Surgical History:   Procedure Laterality Date     EYE SURGERY       SD  DELIVERY ONLY      Description:  Section;  Recorded: 10/20/2011;     SD LIGATE FALLOPIAN TUBE      Description: Tubal Ligation;  Recorded: 10/20/2011;     SD REMOVAL GALLBLADDER      Description: Cholecystectomy;  Recorded: 10/20/2011;       RESULTS/CONSULTS (Lab/Rad)     Recent Results (from the past 168 hour(s))   Glycosylated Hemoglobin A1c   Result Value Ref Range    Hemoglobin A1c 7.8 (H) 3.5 - 6.0 %   Lipid Cascade   Result Value Ref Range    Cholesterol 178 <=199 mg/dL    Triglycerides 130 <=149 mg/dL    HDL Cholesterol 35 (L) >=50 mg/dL    LDL Calculated 117 <=129 mg/dL    Patient Fasting > 8hrs? No    Thyroid Cascade   Result Value Ref Range    TSH 1.14 0.30 - 5.00 uIU/mL   HIV Antigen/Antibody Screening Cascade   Result Value Ref Range    HIV Antigen / Antibody Negative Negative   Hepatic Profile   Result Value Ref Range    Bilirubin, Total 0.3 0.0 - 1.0 mg/dL    Bilirubin, Direct 0.1 <=0.5 mg/dL    Protein, Total 7.3 6.0 - 8.0 g/dL    Albumin 3.0 (L) 3.5 - 5.0 g/dL    Alkaline Phosphatase 110 45 - 120 U/L    AST 13 0 - 40 U/L    ALT <9 0 - 45 U/L   Renal Function Profile   Result Value Ref Range    Albumin 3.0 (L) 3.5 - 5.0 g/dL    Calcium 9.0 8.5 - 10.5 mg/dL    Phosphorus 4.0 2.5 - 4.5 mg/dL    Glucose 275 (H) 70 - 125 mg/dL    BUN 99 (H) 8 - 22 mg/dL    Creatinine 2.85 (H) 0.60 - 1.10 mg/dL    Sodium 137 136 - 145 mmol/L    Potassium 4.7 3.5 - 5.0 mmol/L    Chloride 103 98 - 107 mmol/L     CO2 23 22 - 31 mmol/L    Anion Gap, Calculation 11 5 - 18 mmol/L    GFR MDRD Af Amer 20 (L) >60 mL/min/1.73m2    GFR MDRD Non Af Amer 17 (L) >60 mL/min/1.73m2   Microalbumin, Random Urine   Result Value Ref Range    Microalbumin, Random Urine 74.26 (H) 0.00 - 1.99 mg/dL    Creatinine, Urine 124.9 mg/dL    Microalbumin/Creatinine Ratio Random Urine 594.6 (H) <=19.9 mg/g     No results found.  MEDICATIONS     Current Outpatient Medications on File Prior to Visit   Medication Sig Dispense Refill     ACCU-CHEK RACHEL CONTROL SOLN Soln        ACCU-CHEK RACHEL PLUS TEST STRP strips Use to test blood sugar four times a day. Generic: Blood Glucose Test 100 strip 4     albuterol (PROAIR HFA;PROVENTIL HFA;VENTOLIN HFA) 90 mcg/actuation inhaler Inhale 1-2 puffs every 4-6 hours as needed for wheezing. Generic: Albuterol Sulfate HFA 1 Inhaler 3     albuterol (PROVENTIL) 2.5 mg /3 mL (0.083 %) nebulizer solution Use one vial in nebulizer four times a day as needed. Generic: Albuterol Sulfate 60 vial 10     allopurinol (ZYLOPRIM) 100 MG tablet Take 100 mg by mouth daily.       amLODIPine (NORVASC) 10 MG tablet Take one tablet by mouth every day.  Generic: AmLODIPine Besylate 30 tablet 10     aspirin 81 MG EC tablet Take 81 mg by mouth daily.       atorvastatin (LIPITOR) 40 MG tablet Take 40 mg by mouth at bedtime.       clobetasol (TEMOVATE) 0.05 % cream LIN AA BID  6     cloNIDine HCl (CATAPRES) 0.2 MG tablet Take 0.2 mg by mouth 2 (two) times a day.       DIAPER,BRIEF,ADULT, DISPOSABLE (DEPEND PANTS MISC) Use As Directed. Large       diphenhydrAMINE (BENADRYL) 25 mg capsule Take 50 mg by mouth at bedtime as needed for allergies.       EASY TOUCH LANCING DEVICE Misc Use to test blood sugar twice a day.  Generic: Easy Touch Lancing Device 1 each 2     EASY TOUCH TWIST LANCETS 28 gauge Misc USE TO CHECK BLOOD SUGAR FOUR TIMES A  each 1     esomeprazole (NEXIUM) 40 MG capsule Take 40 mg by mouth daily before breakfast.        fluticasone-salmeterol (ADVAIR) 250-50 mcg/dose DISKUS Inhale 1 puff 2 (two) times a day.       furosemide (LASIX) 80 MG tablet Take 1 tablet (80 mg total) by mouth 3 (three) times a day. 90 tablet 0     generic lancets (ACCU-CHEK SOFTCLIX LANCETS) CHECK 4 TIMES DAILY 100 each 2     GLUCAGON 1 mg injection Use as directed 1 each 0     hydrALAZINE (APRESOLINE) 100 MG tablet Take 100 mg by mouth 3 (three) times a day.       insulin aspart U-100 (NOVOLOG) 100 unit/mL injection Inject 2 Units under the skin 3 (three) times a day before meals. Correctional scale as directed              insulin detemir U-100 (LEVEMIR FLEXPEN) 100 unit/mL (3 mL) pen Inject 10 unit marking on U-100 syringe under the skin at bedtime. 5 adj dose pen 4     magnesium oxide (MAG-OX) 400 mg (241.3 mg magnesium) tablet Take 200 mg by mouth daily.       metOLazone (ZAROXOLYN) 5 MG tablet Take 0.5 tablets (2.5 mg total) by mouth daily. 45 tablet 3     metoprolol succinate (TOPROL-XL) 200 MG 24 hr tablet Take one tablet by mouth daily.  Generic: Metoprolol Succinate ER 90 tablet 2     montelukast (SINGULAIR) 10 mg tablet Take 10 mg by mouth at bedtime.       multivitamin with minerals (THERA-M) 9 mg iron-400 mcg Tab tablet Take 1 tablet by mouth daily.       ondansetron (ZOFRAN) 8 MG tablet TAKE 1 TABLET(8 MG) BY MOUTH EVERY 8 HOURS AS NEEDED 20 tablet 0     oxyCODONE-acetaminophen (PERCOCET/ENDOCET) 5-325 mg per tablet Take 1 tablet by mouth every 8 (eight) hours as needed for pain (severe pain). 60 tablet 0     polyethylene glycol (MIRALAX) 17 gram packet Take 1 packet (17 g total) by mouth daily.  0     sertraline (ZOLOFT) 50 MG tablet Take 1.5 tablets (75 mg total) by mouth daily. 135 tablet 3     spironolactone (ALDACTONE) 25 MG tablet Take 1 tablet (25 mg total) by mouth daily. 90 tablet 3     timolol maleate (TIMOPTIC) 0.5 % ophthalmic solution ADMINISTER 1 DROP TO BOTH EYES TWICE DAILY 10 mL 0     traMADol (ULTRAM) 50 mg tablet TAKE 1 TABLET  BY MOUTH TWICE DAILY AS NEEDED 60 tablet 0     wheelchair [employer] mobility device  Face-to-face exam  October 6, 2016  impaired mobility  Length of need : 99 1 each 0     bisacodyl (DULCOLAX, BISACODYL,) 5 mg EC tablet Take 1 tablet (5 mg total) by mouth daily as needed for constipation. 30 tablet 1     No current facility-administered medications on file prior to visit.        HEALTH MAINTENANCE / SCREENING   PHQ-2 Total Score: 5 (9/17/2019  5:00 PM)  , PHQ-9 Total Score: 12 (9/17/2019  5:00 PM)  ,JAJA 7 Total Score: 16 (9/17/2019  5:00 PM)    Immunization History   Administered Date(s) Administered     Td, Adult, Absorbed 11/09/2005     Td,adult,historic,unspecified 11/09/2005     Health Maintenance   Topic     MAMMOGRAM      TDAP ADULT ONE TIME DOSE      ZOSTER VACCINES (1 of 2)     TD 18+ HE      MEDICARE ANNUAL WELLNESS VISIT      DXA SCAN      PNEUMOCOCCAL POLYSACCHARIDE VACCINE AGE 65 AND OVER      PNEUMOCOCCAL CONJUGATE VACCINE FOR ADULTS (PCV13 OR PREVNAR)      INFLUENZA VACCINE RULE BASED (1)     DIABETES FOLLOW-UP      DIABETES FOOT EXAM      DIABETES OPHTHALMOLOGY EXAM      FALL RISK ASSESSMENT      DEPRESSION FOLLOW UP      DIABETES HEMOGLOBIN A1C      ASTHMA CONTROL TEST      DIABETES URINE MICROALBUMIN      ADVANCE CARE PLANNING      HEPATITIS C SCREENING      HIV SCREENING      The following high BMI interventions were performed this visit: encouragement to exercise and weight loss from baseline weight  Collin Doran MD  Family Medicine, [facility]     This note was dictated using a voice recognition software.  Any grammatical or context distortion are unintentional and inherent to the software.

## 2021-06-01 NOTE — TELEPHONE ENCOUNTER
I will call gabapentin to start with a low-dose 100 mg at night to help with her neuropathy symptoms, possible side effect could be drowsiness sleepiness.

## 2021-06-01 NOTE — TELEPHONE ENCOUNTER
"Pt reports her diabetic polyneuropathy has gotten worse recently especially in her R shoulder. See in clinic 9/17/19 and referred to a Pain Clinic. Pt states she has filled out the paperwork but does not have an appointment yet. Pt states she needs something to help her sleep.     Advised pt message would be routed to PCP. Pharmacy verified.    Pt verbalizes understanding and agrees to plan.         Reason for Disposition    Requesting medication for sleep (\"sleeping pill\")    Protocols used: INSOMNIA-A-AH      "

## 2021-06-02 NOTE — TELEPHONE ENCOUNTER
Controlled Substance Refill Request  Medication Name:   Requested Prescriptions     Pending Prescriptions Disp Refills     oxyCODONE-acetaminophen (PERCOCET/ENDOCET) 5-325 mg per tablet 60 tablet 0     Sig: Take 1 tablet by mouth every 8 (eight) hours as needed for pain (severe pain).     Date Last Fill: 9/19/19  Ph[facility]: [id number]      Submit electronically to pharmacy  Controlled Substance Agreement Date Scanned:   Encounter-Level CSA Scan Date - 11/01/2017:    Scan on 11/7/2017 12:03 PM       Last office visit with prescriber/PCP: 9/17/2019 Collin Doran MD OR same dept: 9/17/2019 Collin Doran MD OR same specialty: 9/17/2019 Collin Doran MD  Last physical: Visit date not found Last MTM visit: Visit date not found        Patient stated that she is out of her medication.

## 2021-06-02 NOTE — TELEPHONE ENCOUNTER
RN cannot approve Refill Request    RN can NOT refill this medication historical medication requested.       Shirin Calvo, [facility] Triage/Med Refill 10/18/2019    Requested Prescriptions   Pending Prescriptions Disp Refills     esomeprazole (NEXIUM) 40 MG capsule [Pharmacy Med Name: Esomeprazole Mag 40 mg Capsule 40  Wafer]  5     Sig: Take one capsule by mouth once daily. Generic: Esomeprazole Magnesium       GI Medications Refill Protocol Passed - 10/18/2019 10:49 AM        Passed - PCP or prescribing provider visit in last 12 or next 3 months.     Last office visit with prescriber/PCP: 9/17/2019 Collin Doran MD OR same dept: 9/17/2019 Collin Doran MD OR same specialty: 9/17/2019 Collin Doran MD  Last physical: Visit date not found Last MTM visit: Visit date not found   Next visit within 3 mo: Visit date not found  Next physical within 3 mo: Visit date not found  Prescriber OR PCP: Collin Doran MD  Last diagnosis associated with med order: 1. GERD (gastroesophageal reflux disease)  - esomeprazole (NEXIUM) 40 MG capsule [Pharmacy Med Name: Esomeprazole Mag 40 mg Capsule 40  Wafer]; Take one capsule by mouth once daily. Generic: Esomeprazole Magnesium; Refill: 5    If protocol passes may refill for 12 months if within 3 months of last provider visit (or a total of 15 months).

## 2021-06-02 NOTE — TELEPHONE ENCOUNTER
Controlled Substance Refill Request  Medication:   Requested Prescriptions     Pending Prescriptions Disp Refills     ZEASORB, MICONAZOLE, 2 % powder [Pharmacy Med Name: ZEASORB-AF POWDER 71GM] 71 g 0     Sig: APPLY TOPICALLY TWICE DAILY     traMADol (ULTRAM) 50 mg tablet [Pharmacy Med Name: TRAMADOL 50MG TABLETS] 60 tablet 0     Sig: TAKE 1 TABLET BY MOUTH TWICE DAILY AS NEEDED     timolol maleate (TIMOPTIC) 0.5 % ophthalmic solution [Pharmacy Med Name: TIMOLOL MALEATE 0.5% OPHTH SOLN 5ML] 20 mL 0     Sig: ADMINISTER 1 DROP TO BOTH EYES TWICE DAILY     Date Last Fill: 9.24.19  Ph[facility]acy: [facility]   Submit electronically to pharmacy  Controlled Substance Agreement on File:   Encounter-Level CSA Scan Date - 11/01/2017:    Scan on 11/7/2017 12:03 PM       Last office visit: Last office visit pertaining to requested medication was 9.17.19.

## 2021-06-02 NOTE — TELEPHONE ENCOUNTER
RN cannot approve Refill Request    RN can NOT refill this medication historical medication requested. Last office visit: 9/17/2019 Collin Doran MD Last Physical: Visit date not found Last MTM visit: Visit date not found Last visit same specialty: 9/17/2019 Collin Doran MD.  Next visit within 3 mo: Visit date not found  Next physical within 3 mo: Visit date not found      Lilliam Nayak, [facility] Triage/Med Refill 10/24/2019    Requested Prescriptions   Pending Prescriptions Disp Refills     hydrALAZINE (APRESOLINE) 100 MG tablet [Pharmacy Med Name: HydrALAZINE HCl 100 MG Tablet 100  Tablet] 90 tablet 5     Sig: Take one tablet by mouth three times daily. Generic: HydrALAZINE HCl       Clonidine/Hydralazine Refill Protocol Passed - 10/24/2019  9:40 AM        Passed - PCP or prescribing provider visit in past 12 months       Last office visit with prescriber/PCP: 9/17/2019 Collin Doran MD OR same dept: 9/17/2019 Collin Doran MD OR same specialty: 9/17/2019 Collin Doran MD  Last physical: Visit date not found Last MTM visit: Visit date not found   Next visit within 3 mo: Visit date not found  Next physical within 3 mo: Visit date not found  Prescriber OR PCP: Collin Doran MD  Last diagnosis associated with med order: 1. Pruritic disorder  - hydrALAZINE (APRESOLINE) 100 MG tablet [Pharmacy Med Name: HydrALAZINE HCl 100 MG Tablet 100  Tablet]; Take one tablet by mouth three times daily. Generic: HydrALAZINE HCl  Dispense: 90 tablet; Refill: 5    If protocol passes may refill for 12 months if within 3 months of last provider visit (or a total of 15 months).             Passed - Blood pressure filed in past 12 months     BP Readings from Last 1 Encounters:   09/17/19 120/62

## 2021-06-02 NOTE — TELEPHONE ENCOUNTER
Medication Question or Clarification  Who is calling: Pharmacy: [facility]  What medication are you calling about? (include dose and sig)   clobetasol (TEMOVATE) 0.05 % cream  6 3/18/2019  --   Sig: LIN AA BID   Class: Historical Med       Who prescribed the medication?: Collin Doran MD   What is your question/concern?: This medication/product is not covered under the patients plan.  Preferred alternative - Halobetasol cream  Please send alternative or begin PA process.     Pharmacy: [facility]  Okay to leave a detailed message?: Yes  Site CMT - Please call the pharmacy to obtain any additional needed information.

## 2021-06-03 NOTE — ANESTHESIA CARE TRANSFER NOTE
Last vitals:   Vitals:    11/09/19 1927   BP: 133/64   Pulse: (!) 130   Resp: 16   Temp: 37.2  C (99  F)   SpO2: 99%     Patient's level of consciousness is drowsy  Spontaneous respirations: yes  Maintains airway independently: yes  Dentition unchanged: yes  Oropharynx: oropharynx clear of all foreign objects    QCDR Measures:  ASA# 20 - Surgical Safety Checklist: WHO surgical safety checklist completed prior to induction    PQRS# 430 - Adult PONV Prevention: NA - Not adult patient, not GA or 3 or more risk factors NOT present  ASA# 8 - Peds PONV Prevention: NA - Not pediatric patient, not GA or 2 or more risk factors NOT present  PQRS# 424 - Maggy-op Temp Management: 4559F - At least one body temp DOCUMENTED => 35.5C or 95.9F within required timeframe  PQRS# 426 - PACU Transfer Protocol: - Transfer of care checklist used  ASA# 14 - Acute Post-op Pain: ASA14B - Patient did NOT experience pain >= 7 out of 10

## 2021-06-03 NOTE — ANESTHESIA PREPROCEDURE EVALUATION
Anesthesia Evaluation        Airway   Mallampati: III  Neck ROM: limited   Pulmonary    (+) COPD, asthma  sleep apnea, decreased breath sounds, a smoker                         Cardiovascular   (+) hypertension, dysrhythmias (AF with RVR), CHF, , hypercholesterolemia,     Rhythm: regular  Rate: normal,      ROS comment: Cor Pulmonale  Pulm HTN     Neuro/Psych      Comments: Obtunded on admission    Endo/Other    (+) diabetes mellitus type 2 poorly controlled, obesity,      GI/Hepatic/Renal    (+) GERD,   chronic renal disease ARF, ESRD and dialysis, last dialysis date: 2019,      Other findings: Perianal abscess  Necrotizing soft tissue infection            Echo Complete   Order# [id number]   Reading physician: Nusrat Segovia MD Ordering physician: Darrell Alva MD Study date: 19  Performing Date Performing Department  2019  CARDIAC TESTING ([id number])  Patient Information     Patient Name  Sun Mireles MRN  [id number] Sex  Female  1  1955 (63 y.o.)  Indications     Atrial fibrillation  Summary       1.Left ventricle ejection fraction is normal. The calculated left ventricular ejection fraction is 55%.    2.TAPSE is abnormal, which is consistent with abnormal right ventricular systolic function. Right ventricular hypertrophy present.    3.Moderately dilated pulmonary artery.    4.Estimated central venous pressure equal to 15 mmHg as evidenced by dilated inferior vena cava.    5.No hemodynamically significant valvular heart abnormalities other than the minimal regurgitant lesions mentioned below.    6.Moderate to severe pulmonary hypertension suggested.    7.When compared to the previous study dated 3/3/2019, right ventricular hypertrophy and dysfunction now seen, pulmonary artery is now dilated, significant pulmonary hypertension is present.     Measurements     Height:   64 in       Weight:   265 lbs       BSA:   2.27 m2      HR:   120 bpm       BP:   133/71  mmHg       Technical Details     Technical Quality Sonographer: DMA  Rhythm: atrial fibrillation  Findings     Left Ventricle Normal left ventricular size and systolic function.The calculated left ventricular ejection fraction is 55%. This represents a normal ejection fraction. Borderline hypertrophy noted. Normal left ventricle diastolic function.  Wall Scoring   Resting Score Index: 1.00            The left ventricular wall motion is normal.          Right Ventricle Normal right ventricular size. There is mild hypertrophy. The systolic function is mildly reduced. TAPSE is abnormal, which is consistent with abnormal right ventricular systolic function.  Left Atrium Left atrium of normal size. There is no atrial septal defect.  Right Atrium Right atrium of normal size.  IVC Severely elevated central venous pressure with IVC diameter greater than 2.1 cm and less than 50% decrease during inspiration. Estimated central venous pressure equal to 15 mmHg.  Aortic Valve Normal aortic valve structure and function. The valve is tricuspid. The aortic valve is sclerotic without reduced excursion. No aortic stenosis. No aortic regurgitation is present.  Mitral Valve Normal mitral valve structure and function. There is moderate mitral annular calcification. No mitral stenosis present. No mitral regurgitation.  Tricuspid Valve Normal tricuspid valve structure. There is no evidence of tricuspid stenosis. Mild tricuspid valve regurgitation. Moderate pulmonary hypertension present. The estimated systolic pulmonary artery pressure is 80 mmhg.  Pulmonic Valve The pulmonic valve was not well visualized. Normal pulmonic valve structure and function. There is no significant pulmonic stenosis. No pulmonic regurgitation. Moderately dilated pulmonary artery.  Thoracic Aorta No aneurysm present.  Pericardium No pericardial effusion.  Left Ventricle Measurements and Calculations       Echo LVEF calculated:   55 % (Range: 55 - 75)       LV  CO:   7.0 l/min       LV Ci:   3.1 l/min/m2      LV SVi:   25.7 ml/m2       LV diastolic volume index:   28.2 cm3/m2 (Range: 29 - 61)       LV systolic volume index:   12.6 cm3/m2 (Range: 8 - 24)      LV volume diastolic:   [id number] cm3 (Range: 46 - 106)       LV volume systolic:   28.7 cm3 (Range: 14 - 42)       LVOT SV:   58.4 cm3        Diastolic Filling       MV Avg E/e' Ratio:   14.6 cm/s       MV E' lat milana:   10.8 cm/s       MV E' med milana:   8.05 cm/s      MV lat E/e' ratio:   12.8        MV med E/e' ratio:   17.1          Shunt Ratio       LVOT SV:   58.4 cm3          Left Atrium Measurements and Calculcations       LA area 1:   20.0 cm2       LA area 2:   18.7 cm2       LA length:   5.3 cm      LA volume:   60.0 mL       LA volume index:   26.4 mL/m2         Right Ventricle Measurements and Calculations       TAPSE:   0.6 cm            Thoracic Aorta, Pulmonary and IVC Measurements       AO root:   2.8 cm          Aortic Valve Measurements and Calculcations       LVOT area:   3.14 cm2       LVOT diam:   2 cm       LVOT peak VTI:   18.6 cm      LVOT peak milana:   90.5 cm/s          Mitral Valve Measurements and Calculcations       MV decel time:   204 ms       MV peak E milana:   138 cm/s         Tricuspid Valve Measurements and Calculcations       TR peak gradent:   60.2 mmHg       TR peak milana:              Dental    (+) poor dentition                       Anesthesia Plan  Planned anesthetic: general endotracheal    ASA 4 - emergent   Induction: intravenous   Anesthetic plan and risks discussed with: patient and healthcare power of   Anesthesia plan special considerations: video-assisted, antiemetics, CVP line, arterial catheterization, IV therapy two IVs,   Post-op plan: extended intubation/vent support and routine recovery

## 2021-06-03 NOTE — ANESTHESIA POSTPROCEDURE EVALUATION
Patient: Sun Mireles  INCISION AND DRAINAGE, ABSCESS, RECTAL OR PERIRECTAL  Anesthesia type: general    Patient location: PACU  Last vitals:   Vitals Value Taken Time   /68 11/9/2019  8:00 PM   Temp 37.2  C (99  F) 11/9/2019  7:27 PM   Pulse 122 11/9/2019  8:13 PM   Resp 25 11/9/2019  8:13 PM   SpO2 91 % 11/9/2019  8:13 PM   Vitals shown include unvalidated device data.  Post vital signs: stable  Level of consciousness: awake and responds to simple questions  Post-anesthesia pain: pain controlled  Post-anesthesia nausea and vomiting: no  Pulmonary: unassisted, return to baseline  Cardiovascular: stable and blood pressure at baseline  Hydration: adequate  Anesthetic events: no    QCDR Measures:  ASA# 11 - Maggy-op Cardiac Arrest: ASA11B - Patient did NOT experience unanticipated cardiac arrest  ASA# 12 - Maggy-op Mortality Rate: ASA12B - Patient did NOT die  ASA# 13 - PACU Re-Intubation Rate: ASA13B - Patient did NOT require a new airway mgmt  ASA# 10 - Composite Anes Safety: ASA10A - No serious adverse event    Additional Notes:  
no abdominal pain, no nausea and no vomiting.

## 2021-06-03 NOTE — TELEPHONE ENCOUNTER
RN cannot approve Refill Request    RN can NOT refill this medication pt in the hospital.       Shirin Calvo, [facility] Triage/Med Refill 11/16/2019    Requested Prescriptions   Pending Prescriptions Disp Refills     allopurinol (ZYLOPRIM) 100 MG tablet 90 tablet 3     Sig: Take 1 tablet (100 mg total) by mouth daily.       Allopurinol/Febuxostat Refill Protocol  Passed - 11/16/2019 12:55 AM        Passed - LFT or AST or ALT in last 12 months     Albumin   Date Value Ref Range Status   11/15/2019 1.7 (L) 3.5 - 5.0 g/dL Final     Bilirubin, Total   Date Value Ref Range Status   11/09/2019 0.6 0.0 - 1.0 mg/dL Final     Bilirubin, Direct   Date Value Ref Range Status   09/17/2019 0.1 <=0.5 mg/dL Final     Alkaline Phosphatase   Date Value Ref Range Status   11/09/2019 288 (H) 45 - 120 U/L Final     AST   Date Value Ref Range Status   11/09/2019 42 (H) 0 - 40 U/L Final     ALT   Date Value Ref Range Status   11/09/2019 36 0 - 45 U/L Final     Protein, Total   Date Value Ref Range Status   11/09/2019 6.7 6.0 - 8.0 g/dL Final                Passed - Visit with PCP or prescribing provider visit in past 12 months     Last office visit with prescriber/PCP: 9/17/2019 Collin Doran MD OR same dept: 9/17/2019 Collin Doran MD OR same specialty: 9/17/2019 Collin Doran MD  Last physical: Visit date not found Last MTM visit: Visit date not found   Next visit within 3 mo: Visit date not found  Next physical within 3 mo: Visit date not found  Prescriber OR PCP: Collin Doran MD  Last diagnosis associated with med order: There are no diagnoses linked to this encounter.  If protocol passes may refill for 12 months if within 3 months of last provider visit (or a total of 15 months).

## 2021-06-03 NOTE — ANESTHESIA PROCEDURE NOTES
Arterial Line  Reason for Procedure: hemodynamic monitoring and multiple ABGs  Patient location during procedure: Pre-op  Start time: 11/9/2019 6:19 PM  End time: 11/9/2019 6:26 PM  Staffing:  Performing  Anesthesiologist: Leonel Camargo MD  Performing CRNA: Hermann Banuelos CRNA  Sterile Precautions:  sterile barriers used during insertion: cap, mask, sterile gloves, large sheet, and hand hygiene used.  Arterial Line:   Immediately prior to procedure a time out was called to verify the correct patient, procedure, equipment, support staff and site/side marked as required  Laterality: left  Location: radial  Prepped with: ChloroPrep    Needle gauge: 22 G  Number of Attempts: 1  Secured with: tape, transparent dressing and pressure dressing  Flushed with: saline  1% lidocaine local anesthesia used for skin prep.   See MAR for additional medications given.

## 2021-06-03 NOTE — TELEPHONE ENCOUNTER
RN cannot approve Refill Request: Atorvastatin    RN can NOT refill this medication historical medication requested. Last office visit: 9/17/2019 Collin Doran MD Last Physical: Visit date not found Last MTM visit: Visit date not found Last visit same specialty: 9/17/2019 Collin Doran MD.  Next visit within 3 mo: Visit date not found  Next physical within 3 mo: Visit date not found    RN cannot approve Refill Request: Levemir insulin    RN can NOT refill this medication Please clarify current dosage with provider. Requested dosage is different than what is on the current medication list. . Last office visit: 9/17/2019 Collin Doran MD Last Physical: Visit date not found Last MTM visit: Visit date not found Last visit same specialty: 9/17/2019 Collin Doran MD.  Next visit within 3 mo: Visit date not found  Next physical within 3 mo: Visit date not found      Dorene Hartmann, [facility] Triage/Med Refill 11/30/2019    Requested Prescriptions   Pending Prescriptions Disp Refills     atorvastatin (LIPITOR) 40 MG tablet [Pharmacy Med Name: Atorvastatin 40mg Tablet 40  Tablet] 30 tablet 10     Sig: Take 1 tablet by mouth every night at bedtime Brand: Lipitor Generic: Atorvastatin  Generic: Atorvastatin Calcium       Statins Refill Protocol (Hmg CoA Reductase Inhibitors) Passed - 11/30/2019 10:00 AM        Passed - PCP or prescribing provider visit in past 12 months      Last office visit with prescriber/PCP: 9/17/2019 Collin Doran MD OR same dept: 9/17/2019 Collin Doran MD OR same specialty: 9/17/2019 Collin Doran MD  Last physical: Visit date not found Last MTM visit: Visit date not found   Next visit within 3 mo: Visit date not found  Next physical within 3 mo: Visit date not found  Prescriber OR PCP: Collin Doran MD  Last diagnosis associated with med order: 1. Hyperlipidemia  - atorvastatin (LIPITOR) 40 MG tablet  [Pharmacy Med Name: Atorvastatin 40mg Tablet 40  Tablet]; Take 1 tablet by mouth every night at bedtime Brand: Lipitor Generic: Atorvastatin  Generic: Atorvastatin Calcium  Dispense: 30 tablet; Refill: 10    2. Diabetic polyneuropathy associated with type 2 diabetes mellitus (H)  - LEVEMIR FLEXTOUCH U-100 INSULN 100 unit/mL (3 mL) pen [Pharmacy Med Name: Levemir FlexTouch Pen 100U/ml (#) 100  Wafer]; Inject 32 units under the skin daily.  Generic: Insulin Detemir  Generic: Insulin Detemir; Refill: 10    If protocol passes may refill for 12 months if within 3 months of last provider visit (or a total of 15 months).             LEVEMIR FLEXTOUCH U-100 INSULN 100 unit/mL (3 mL) pen [Pharmacy Med Name: Levemir FlexTouch Pen 100U/ml (#) 100  Wafer]  10     Sig: Inject 32 units under the skin daily.  Generic: Insulin Detemir  Generic: Insulin Detemir       Insulin/GLP-1 Refill Protocol Passed - 11/30/2019 10:00 AM        Passed - Visit with PCP or prescribing provider visit in last 6 months     Last office visit with prescriber/PCP: 9/17/2019 OR same dept: 9/17/2019 Collin Doran MD OR same specialty: 9/17/2019 Collin Doran MD Last physical: Visit date not found Last MTM visit: Visit date not found     Next appt within 3 mo: Visit date not found  Next physical within 3 mo: Visit date not found  Prescriber OR PCP: Collin Doran MD  Last diagnosis associated with med order: 1. Hyperlipidemia  - atorvastatin (LIPITOR) 40 MG tablet [Pharmacy Med Name: Atorvastatin 40mg Tablet 40  Tablet]; Take 1 tablet by mouth every night at bedtime Brand: Lipitor Generic: Atorvastatin  Generic: Atorvastatin Calcium  Dispense: 30 tablet; Refill: 10    2. Diabetic polyneuropathy associated with type 2 diabetes mellitus (H)  - LEVEMIR FLEXTOUCH U-100 INSULN 100 unit/mL (3 mL) pen [Pharmacy Med Name: Levemir FlexTouch Pen 100U/ml (#) 100  Wafer]; Inject 32 units under the skin daily.  Generic: Insulin Detemir   Generic: Insulin Detemir; Refill: 10    If protocol passes may refill for 6 months if within 3 months of last provider visit (or a total of 9 months).              Passed - A1C in last 6 months     Hemoglobin A1c   Date Value Ref Range Status   09/17/2019 7.8 (H) 3.5 - 6.0 % Final               Passed - Microalbumin in last year     Microalbumin, Random Urine   Date Value Ref Range Status   09/17/2019 74.26 (H) 0.00 - 1.99 mg/dL Final                  Passed - Blood pressure in last year     BP Readings from Last 1 Encounters:   11/30/19 124/59             Passed - Creatinine done in last year     Creatinine   Date Value Ref Range Status   11/25/2019 6.68 (HH) 0.60 - 1.10 mg/dL Final

## 2021-06-04 NOTE — TELEPHONE ENCOUNTER
RN cannot approve Refill Request    RN can NOT refill this medication historical medication requested.       Shirin Calvo, [facility] Triage/Med Refill 12/25/2019    Requested Prescriptions   Pending Prescriptions Disp Refills     atorvastatin (LIPITOR) 40 MG tablet [Pharmacy Med Name: Atorvastatin 40mg Tablet 40  Tablet] 30 tablet 10     Sig: Take 1 tablet by mouth every night at bedtime Brand: Lipitor Generic: Atorvastatin  Generic: Atorvastatin Calcium       Statins Refill Protocol (Hmg CoA Reductase Inhibitors) Passed - 12/24/2019 10:37 AM        Passed - PCP or prescribing provider visit in past 12 months      Last office visit with prescriber/PCP: 9/17/2019 Collin Doran MD OR same dept: 9/17/2019 Collin Doran MD OR same specialty: 9/17/2019 Collin Doran MD  Last physical: Visit date not found Last MTM visit: Visit date not found   Next visit within 3 mo: Visit date not found  Next physical within 3 mo: Visit date not found  Prescriber OR PCP: Collin Doran MD  Last diagnosis associated with med order: There are no diagnoses linked to this encounter.  If protocol passes may refill for 12 months if within 3 months of last provider visit (or a total of 15 months).

## 2021-06-05 NOTE — PROGRESS NOTES
HPI: Pt is here for follow up s/p I&D of perirectal abscesses with Dr. Hancock on 11/9/169.   she is doing well.  Pain is well controlled:  Yes. No difficulties with the surgical wound/wounds.  she is eating well and denies fever and chills.  She has been at [place] and then recently [facility]. Daily dressing changes.      There were no vitals taken for this visit.    EXAM:  GENERAL:Appears well  Wound: 2 well healing wounds in right and left perirectal area, no sign of erythema, or infection.       Assessment/Plan: Doing well after surgery and should follow up as needed.     Kolton Matos PA-C  [phone]  General Surgery

## 2021-06-05 NOTE — TELEPHONE ENCOUNTER
RN cannot approve Refill Request    RN can NOT refill this medication medication not on med list.         Shirin Calvo, [facility] Triage/Med Refill 1/8/2020    Requested Prescriptions   Pending Prescriptions Disp Refills     LEVEMIR FLEXTOUCH U-100 INSULN 100 unit/mL (3 mL) pen [Pharmacy Med Name: Levemir FlexTouch Pen 100U/ml (#) 100  Wafer] 5 Box 10     Sig: Inject 32 units under the skin daily.  Generic: Insulin Detemir  Generic: Insulin Detemir       Insulin/GLP-1 Refill Protocol Passed - 1/7/2020 10:34 AM        Passed - Visit with PCP or prescribing provider visit in last 6 months     Last office visit with prescriber/PCP: 9/17/2019 OR same dept: 9/17/2019 Collin Doran MD OR same specialty: 9/17/2019 Collin Doran MD Last physical: Visit date not found Last MTM visit: Visit date not found     Next appt within 3 mo: Visit date not found  Next physical within 3 mo: Visit date not found  Prescriber OR PCP: Collin Doran MD  Last diagnosis associated with med order: 1. Diabetic polyneuropathy associated with type 2 diabetes mellitus (H)  - LEVEMIR FLEXTOUCH U-100 INSULN 100 unit/mL (3 mL) pen [Pharmacy Med Name: Levemir FlexTouch Pen 100U/ml (#) 100  Wafer]; Inject 32 units under the skin daily.  Generic: Insulin Detemir  Generic: Insulin Detemir  Dispense: 5 Box; Refill: 10    If protocol passes may refill for 6 months if within 3 months of last provider visit (or a total of 9 months).              Passed - A1C in last 6 months     Hemoglobin A1c   Date Value Ref Range Status   09/17/2019 7.8 (H) 3.5 - 6.0 % Final               Passed - Microalbumin in last year     Microalbumin, Random Urine   Date Value Ref Range Status   09/17/2019 74.26 (H) 0.00 - 1.99 mg/dL Final                  Passed - Blood pressure in last year     BP Readings from Last 1 Encounters:   01/06/20 150/64             Passed - Creatinine done in last year     Creatinine   Date Value Ref Range Status    01/02/2020 2.99 (H) 0.60 - 1.10 mg/dL Final

## 2021-06-06 NOTE — TELEPHONE ENCOUNTER
Patient is at [facility] and states she continues to have symptoms of UTI.  FNA advised patient to speak with nursing at rehab facility and they will contact provider.    Lisseth Rizvi RN  [facility] Triage Nurse Advisor

## 2021-06-07 NOTE — PROGRESS NOTES
Sun is a 65 year old who is being evaluated via a billable telephone visit.      What phone number would you like to be contacted at? [phone]  How would you like to obtain your AVS? Mail a copy    Phone call duration: 24 minutes    [facility]- General Pulmonary Clinic    Assessment and Plan:  Sun Mireles is a 65 year old female with a history of HTN, HLD, atrial fib (not on anticoagulation due to prior GI bleed), pacemaker in place, HFpEF, COPD, KHUSHBOO on CPAP, chronic hypoxic respiratory failure on O2, diabetes, morbid obesity, GERD, and ESRD on HD, who presents to clinic for evaluation of COPD.     COPD, possible asthma overlap, cough, dyspnea, centrilobular emphysema: COPD/asthma by history. No obstruction on spirometry, but did not meet ATS criteria (was non-specific pattern, which can be from obstruction if lung volumes are normal, but no lung volumes were done). Has had moderate centrilobular emphysema on prior CT imaging. 45 pack year smoking history, quit about 2000. Not on adequate treatment- using albuterol 0-1 time daily, and duonebs typically just once daily. Does have a cough, and feels she has a cold currently, but no sputum production or fever.   -At this point, will try to optimize treatment. She does not want to try new inhalers, so will attempt to optimize treatment with nebulizers (She prefers)  -needs to increase duoneb use to four times daily, no matter what.   -Use albuterol as needed  -may need to add budesonide nebs BID if uncontrolled as well  -Alternatively, could us Trelegy inhaler daily, or other combination(s) of inhalers, if she were willing to use inhalers rather than nebs, but wants nebs.   -GERD currently controlled, continue omeprazole daily   -unclear if she is currently having an exacerbation. As she is not optimized on treatment, and complains mainly of volume overload, without sputum, at this point, I am inclined to think current cough  is due to pulmonary edema, rather than COPD exacerbation (denies wheeze as well). Will hold off on exacerbation treatment, and instead try to optimize baseline control with above measures.   -should have annual flu vaccination, up to date  -up to date on 13 and 23 valent pneumonia shots (2013 and 2020 respectively).   -Should get COVID-19 vaccination series, I don't see that this has been done  -tessalon PRN for cough.   -repeat PFTs at next visit  -work up of low diffusion as below    Chronic hypoxic respiratory failure, severe diffusion defect on PFTs: on 2 LPM at baseline, but not using consistently.   -Counseled extensively on need to use oxygen 15+ hours a day, and likely at night.   -Would get 6MWT with O2 titration at next visit  -will check CT chest given severe diffusion defect, to have better sense of lung parenchyma and ensure no ILD.   -I question if she may have pulmonary HTN as well, the right heart and PA were not well visualized on most recent echo.   -needs CHF optimization and additional fluid removal. States this was better when she was on lasix. Unclear if this can be restarted. She states she still urinates some. Would defer to cardiology and nephrology  -needs additional fluid removal with HD, consistent HD, may need extra UF if this is a problem.     KHUSHBOO on CPAP:  -continue CPAP nightly with oxygen bleed in. Not using consistently, but should be using every night.     Surgical readiness: patient expressing she may not want her planned surgery. From a pulmonary prospective, she needs optimization of breathing. This is likely more contingent on fluid removal and fluid status optimization, but I am also changing COPD treatment, as she has not been on adequate treatment.   -Work up and treatment as above  -not yet optimized, will need to see how she responds to increased nebulization treatments, see results of CT, and needs better fluid management from cardiology and nephrology.   -she is at high  "risk of pulmonary complications from surgery (42.1%) based on ARISCAT preoperative risk calculator score of 55. This includes a range of pulmonary complications from need for mechanical ventilation to aspiration.   -would plan for at least 2 months of consistent 4 time a day duoneb use prior to re-considering if she is better optimized for COPD.     Follow up in 3-4 months with repeat testing.     48 min spent on care today.   Ranjeet Menezes MD   of Medicine  Division of Pulmonary, Critical Care, Allergy, and Sleep Medicine  ____________________________________________________________________  CC: \"COPD\"    HPI:    Sun Mireles is a 65 year old female with a history of HTN, HLD, atrial fib (not on anticoagulation due to prior GI bleed), pacemaker in place, HFpEF, COPD, KHUSHBOO on CPAP, chronic hypoxic respiratory failure on O2, diabetes, morbid obesity, GERD, and ESRD on HD, who presents to clinic for evaluation of COPD.     She was seen in pre-op anesthesia clinic due to a pelvic mass requiring surgical removal by Dr. Patino. At that visit, complained of a history of COPD/asthma, as well as chronic oxygen use \"sometimes\" during the day, and ongoing shortness of breath and cough. She as using Singulair, albuterol PRN, and duonebs 1-2 times daily. She was referred to pulmonary for optimization prior to surgery.     Ms. Mireles states she isn't sure she wants the surgery.     She notes that she has was told she had COPD about 1-2 years ago. She doesn't remember what was happening at the time that lead to the diagnosis. She also has oxygen at home, which she only uses \"if she needs it.\" She describes needing it when her nebulizers don't work. She does not cough every day, but notes she is developing a cold over the last few day, and has had a lot more cough. The nebulizers seem to help her breathing and cough. Cough is dry. She is not sure if she is wheezing. She does have an albuterol inhaler " "at home, which she uses 0-1 times daily. She uses a nebulizer 1-2 times a day. Her breathing seems to be worse at night. She does occasionally wake up at night of breath or coughing. She doesn't feel particularly limited by her breathing. She gets more benefit from the nebulizer than from the inhaler treatment. She has not tried inhalers besides albuterol in the past.     She has noted leg swelling as well, and she has been told she has fluid on her lungs as well. She goes to dialysis, but doesn't feel they are removing as much fluid as they should. She does produce urine, and feels like the amount is decreasing. She is on lasix.     No chest pain, no palpitation. She previously had acid reflux, but this is improved. She generally feels she has trouble sleeping flat, and raising her head up to sleep. She uses her CPAP \"most of the time.\" She isn't sure if she snores. She does feel well rested in the morning, but does typically nap during the day.     She denies rhinorrhea, sore throat, or nasal congestion. No fevers, but constantly has chills.     She denies a family history of lung problems.     Exposure History:  Tobacco: prior smoker, 45 pack years. Quit about 2000.   Other inhaled substances: No vaping, sandblasting, or welding. No asbestos exposures.   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 No memory of particular respiratory complaints with this, but some cleaning agent exposures.   Pets: no pets.   Allergies: possible seasonal allergies.   Hobbies: no particular hobbies, sits outside.   Travel: no recent travel  Home: Lives in an assisted living ([facility]). Gets three meals a day there. No mold or water damage they are aware of.     ROS: Complete 10 point ROS negative unless mentioned in HPI    Allergies and current medications: Reviewed and updated in EMR.   Past medical, surgical, social, and family histories: Personally reviewed and updated in EMR during this visit.     Labs and " Radiology: All new data personally reviewed and summarized below. I have reviewed the results with the patient today.   Laboratory data:  21 VB.39/46/35/25.7  WBC 8.3, Hgb 8.8, Plts 213  BNP 1758 (high)    Cardiac studies:  5/10/21 echo: Global and regional left ventricular function is hyperkinetic with an EF of 65-70%. Mild concentric wall thickening consistent with left ventricular hypertrophy is present. Hyperdynamic LV function and cavity obliteration in systole results in a small, hemodynamically-insignificant LV intracavitary gradient. RV size and function are probably normal on limited views. No significant valvular abnormalities were noted. No pericardial effusion is present. Previous study not available for comparison.    Imaging studies:   21 CXR: Decreased bilateral patchy airspace opacities, likely improving infection/pulmonary edema.    Pulmonary Function Testing: personally reviewed.   Date and Site FEV1 FVC FEV1/FVC TLC RV DLCO   21 [facility] 1.25 (57%) 1.6 (57%) 78%   7.67 (38%)   Most recent PFT interpretation: non specific spirometry pattern. No lung volumes were done. Did not meet ATS criteria. Severe diffusion defect.

## 2021-06-07 NOTE — PROGRESS NOTES
"Electrophysiology Clinic Telephone Visit    Sun Mireles is a 65 year old female who is being evaluated via a billable telephone visit.      The patient has been notified of following:     \"This telephone visit will be conducted via a call between you and your physician/provider. We have found that certain health care needs can be provided without the need for a physical exam.  This service lets us provide the care you need with a short phone conversation.  If a prescription is necessary we can send it directly to your pharmacy.  If lab work is needed we can place an order for that and you can then stop by our lab to have the test done at a later time.    If during the course of the call the physician/provider feels a telephone visit is not appropriate, you will not be charged for this service.\"     Patient has given verbal consent for Telephone visit?  Yes    HPI:   66 yo referred for evaluation of atrial fibrillation and sick sinus syndrome.    She was seen in March 2021 by oncology for abdominal pain and large pelvic mass, thought to be probably uterine fibroid. She was sent to pre op anesthesia to assess her surgical risks due to multiple medical issues. She has ESRD and is on dialysis, has had multiple recent admissions for volume overload when she missed dialysis runs. She has a h/o sick sinus and is s/p pacemaker implant at [facility] 10/2020. She has not been seen there since post pacemaker implant, her last remote pacemaker transmission was December 2020.    She has been living in an assisted living facility for the last year, according to patient. She has her own room, but nurses there checks her vital signs and gives her medications. She is essentially wheelchair bound, reports that she is unable to walk due to leg pain. She can get from bed to bedside commode, perhaps walk a few more steps with walker. She denies any chest discomfort, palpitations, dizziness, or syncope. She has COPD and has " intermittent shortness of breath during which her nurse would monitor her O2 sat and give her oxygen as needed - this morning her room air O2 sat was 85% so she says she is currently wearing oxygen. She has chronic lower extremity edema that is worse between dialysis runs. She denies orthopnea or PND. She still makes some urine. She has KHUSHBOO but has not used CPAP since she's been at assisted living in the last year.     Patient does not appear to understands her medical issues, understandably since they are extensive. She says that she thinks the pelvic mass is coming from her bladder (per oncology mass appears to be uterine vs ovarian,  marcus), she doesn't know if she wants surgery, and that she's never had a blood transfusion (she did in 11/2019).      PAST MEDICAL HISTORY:  1. Sick sinus syndrome  s/p pacemaker 10/23/2020. She was sent to [facility] ED from dialysis center in 10/2020 with sinus zoraida in the 30s, /57, initial notes states patient was asymptomatic, but she was then found to be lethargic with SBP in the 90. Also found to have K of 7.3, underwent urgent dialysis, and placement of temp wire. She was on amiodarone for h/o PAF and was possibly mistakenly given metoprolol at nursing home, both were discontinued.  Permanent dual chamber ppm implanted - followed at [facility], last remote transmission 12/2/2020: SJM, AF burden <1%, longest duration 17 minutes by report only.  2. AF reported on device interrogations, not on anticoagulation due to h/o bleeding  3. ESRD on dialysis (MWF), still makes urine, right AV fistula  4. Hypertension  5. Hyperlipidemia  6. COPD, O2 intermittently when O2 sat <90%  7. KHUSHBOO, had CPAP at home but not since she's lived at assisted living for the last year  8. Necrotizing fascitis s/p surgical I & D with bleeding at surgical site, s/p blood transfusions, 11/2019  9. Diabetes type II diet controlled   10. Uterine fibroid being considered for AUTUMN/BSO  11.  Cholecystectomy  12. Sepsis due to cystitis with bilateral hydronephrosis 2021    CURRENT MEDICATIONS  Rosuvastatin 10 every day  Nifedipine 30 every day  Losartan 50 every day  Aspirin 81 every day     ALLERGIES:   No Known Allergies    FAMILY HISTORY:  Family History   Problem Relation Age of Onset     Hypertension Other      Diabetes Other      Cancer Maternal Grandfather         stomach cancer       SOCIAL HISTORY:  Social History     Tobacco Use     Smoking status: Former Smoker     Packs/day: 1.00     Years: 45.00     Pack years: 45.00     Smokeless tobacco: Never Used   Substance Use Topics     Alcohol use: No     Drug use: No       ROS:  10 point ROS neg other than the symptoms noted above in the HPI.    Labs:  2021: cr 3.84, hgb 9.4, plt 204K, A1C 5.6    Testing/Procedures:  ECHOCARDIOGRAM 2021: EF 65-70%, mild concentric LVH, normal RV/IVC,  no valve disease, DARCI 24.5    EK/10/2021: sinus 71 bpm, nonspecific T waves    Chest CT without contrast at [facility] 10/26/2020: severe coronary calcifications    Assessment and Plan:   1. Sick sinus syndrome s/p pacemaker implant 10/23/2020.  She is followed at [facility], last interrogation by remote 2020. I do not have access to their interrogations and so am not able to review programming. However, review of her EKG from 5/10/2021 shows that she is not pacemaker dependent. Nothing needs to be done to her pacemaker during surgery. She has not been following at [facility]. I recommended switching her pacemaker follow up her since she is hospitalized here frequently, and she is agreeable.  2. Paroxysmal atrial fibrillation. Noted during previous admissions with sepsis and blood loss, also reported on pacemaker interrogation. Her [id number] score is 4 for HTN, DM, age >65, female. She is not on anticoagulation. She does have significant risk factors for bleeding, with renal failure, h/o blood transfusions, h/o noncompliance. It is challenging  for me to assess whether she is a good candidate for long term anticoagulation with a telephone visit. Can be addressed at a later date if I get to know her better.  3.  Volume overload, usually in setting of missing dialysis runs. Her most recent echo from 5/12/2021 showed normal EF, normal IVC, no evidence of HFpEF.  4. Hypertension, fairly well controlled per BP readings during recent ED visits.  5. Diabetes type II, well controlled without meds  6. Cardiovascular evaluation prior to AUTUMN/BSO. Nuclear stress test 2002 normal. She did have a chest CT without contrast which reported severe coronary calcification. Her functional status extremely limited, but numerous recent hospitalizations with volume overload and respiratory distress all without ischemic EKG changes, troponin elevations, or wall motion abnormality on echo.  I would not pursue further ischemic work up if she is not interested in pursuing with surgery.         I have spent 25 minutes of medical discussion.    In addition to telephone time documented above, I spent an additional 15 minutes on data review and documentation.    Priscilla Campo MD  Cardiology    [facility]

## 2021-06-08 NOTE — PATIENT INSTRUCTIONS
"You were evaluated today in the Cardiovascular Telemedicine Clinic at the [facility].     Cardiology Provider during your visit: Dr. Priscilla Campo    Diagnosis:  Pacemaker in place, high blood pressure    Results: discussed with patient    Orders:   None    Medication Changes:   None    Recommendations: I will transfer you pacemaker monitor follow up to [facility] from [facility]    Follow-up:   As needed  Please follow up with primary care provider for medication refills         Please feel free to call me with any questions or concerns.       Leanne Veloz RN     Questions and [phone].   First press #1 for the 591wed and then press #3 for \"Medical Questions\" to reach us Cardiology Nurses.      On Call Cardiologist for after hours or on weekends: [phone]   option #4 and ask to speak to the on-call Cardiologist.          If you need a medication refill please contact your pharmacy.  Please allow 3 business days for your refill to be completed.   "

## 2021-06-08 NOTE — PROGRESS NOTES
"Sun is a 65 year old who is being evaluated via a billable telephone visit.      What phone number would you like to be contacted at? [phone]  How would you like to obtain your AVS? Mail a copy    Vitals - Patient Reported  Weight (Patient Reported): 99.8 kg (220 lb)  Height (Patient Reported): 162.6 cm (5' 4\")  BMI (Based on Pt Reported Ht/Wt): 37.76  Pain Score: No Pain (0)  Pain Loc: Chest        "

## 2021-06-08 NOTE — LETTER
"6/8/2021      RE: Sun Mireles  Albion Alizeu  [address] [facility] [address]  [place] [address]       Dear Colleague,    Thank you for the opportunity to participate in the care of your patient, Sun Mireles, at the [facility]. Please see a copy of my visit note below.    Electrophysiology Clinic Telephone Visit    Sun Mireles is a 65 year old female who is being evaluated via a billable telephone visit.      The patient has been notified of following:     \"This telephone visit will be conducted via a call between you and your physician/provider. We have found that certain health care needs can be provided without the need for a physical exam.  This service lets us provide the care you need with a short phone conversation.  If a prescription is necessary we can send it directly to your pharmacy.  If lab work is needed we can place an order for that and you can then stop by our lab to have the test done at a later time.    If during the course of the call the physician/provider feels a telephone visit is not appropriate, you will not be charged for this service.\"     Patient has given verbal consent for Telephone visit?  Yes    HPI:   66 yo referred for evaluation of atrial fibrillation and sick sinus syndrome.    She was seen in March 2021 by oncology for abdominal pain and large pelvic mass, thought to be probably uterine fibroid. She was sent to pre op anesthesia to assess her surgical risks due to multiple medical issues. She has ESRD and is on dialysis, has had multiple recent admissions for volume overload when she missed dialysis runs. She has a h/o sick sinus and is s/p pacemaker implant at [facility] 10/2020. She has not been seen there since post pacemaker implant, her last remote pacemaker transmission was December 2020.    She has been living in an assisted living facility for the last year, according " to patient. She has her own room, but nurses there checks her vital signs and gives her medications. She is essentially wheelchair bound, reports that she is unable to walk due to leg pain. She can get from bed to bedside commode, perhaps walk a few more steps with walker. She denies any chest discomfort, palpitations, dizziness, or syncope. She has COPD and has intermittent shortness of breath during which her nurse would monitor her O2 sat and give her oxygen as needed - this morning her room air O2 sat was 85% so she says she is currently wearing oxygen. She has chronic lower extremity edema that is worse between dialysis runs. She denies orthopnea or PND. She still makes some urine. She has KHUSHBOO but has not used CPAP since she's been at assisted living in the last year.     Patient does not appear to understands her medical issues, understandably since they are extensive. She says that she thinks the pelvic mass is coming from her bladder (per oncology mass appears to be uterine vs ovarian,  marcus), she doesn't know if she wants surgery, and that she's never had a blood transfusion (she did in 11/2019).      PAST MEDICAL HISTORY:  1. Sick sinus syndrome  s/p pacemaker 10/23/2020. She was sent to [facility] ED from dialysis center in 10/2020 with sinus zoraida in the 30s, /57, initial notes states patient was asymptomatic, but she was then found to be lethargic with SBP in the 90. Also found to have K of 7.3, underwent urgent dialysis, and placement of temp wire. She was on amiodarone for h/o PAF and was possibly mistakenly given metoprolol at nursing home, both were discontinued.  Permanent dual chamber ppm implanted - followed at [facility], last remote transmission 12/2/2020: CONCEPCION, AF burden <1%, longest duration 17 minutes by report only.  2. AF reported on device interrogations, not on anticoagulation due to h/o bleeding  3. ESRD on dialysis (MWF), still makes urine, right AV fistula  4.  Hypertension  5. Hyperlipidemia  6. COPD, O2 intermittently when O2 sat <90%  7. KHUSHBOO, had CPAP at home but not since she's lived at assisted living for the last year  8. Necrotizing fascitis s/p surgical I & D with bleeding at surgical site, s/p blood transfusions, 2019  9. Diabetes type II diet controlled   10. Uterine fibroid being considered for AUTUMN/BSO  11. Cholecystectomy  12. Sepsis due to cystitis with bilateral hydronephrosis 2021    CURRENT MEDICATIONS  Rosuvastatin 10 every day  Nifedipine 30 every day  Losartan 50 every day  Aspirin 81 every day     ALLERGIES:   No Known Allergies    FAMILY HISTORY:  Family History   Problem Relation Age of Onset     Hypertension Other      Diabetes Other      Cancer Maternal Grandfather         stomach cancer       SOCIAL HISTORY:  Social History     Tobacco Use     Smoking status: Former Smoker     Packs/day: 1.00     Years: 45.00     Pack years: 45.00     Smokeless tobacco: Never Used   Substance Use Topics     Alcohol use: No     Drug use: No       ROS:  10 point ROS neg other than the symptoms noted above in the HPI.    Labs:  2021: cr 3.84, hgb 9.4, plt 204K, A1C 5.6    Testing/Procedures:  ECHOCARDIOGRAM 2021: EF 65-70%, mild concentric LVH, normal RV/IVC,  no valve disease, DARCI 24.5    EK/10/2021: sinus 71 bpm, nonspecific T waves    Chest CT without contrast at [facility] 10/26/2020: severe coronary calcifications    Assessment and Plan:   1. Sick sinus syndrome s/p pacemaker implant 10/23/2020.  She is followed at [facility], last interrogation by remote 2020. I do not have access to their interrogations and so am not able to review programming. However, review of her EKG from 5/10/2021 shows that she is not pacemaker dependent. Nothing needs to be done to her pacemaker during surgery. She has not been following at [facility]. I recommended switching her pacemaker follow up her since she is hospitalized here frequently, and she is  "agreeable.  2. Paroxysmal atrial fibrillation. Noted during previous admissions with sepsis and blood loss, also reported on pacemaker interrogation. Her [id number] score is 4 for HTN, DM, age >65, female. She is not on anticoagulation. She does have significant risk factors for bleeding, with renal failure, h/o blood transfusions, h/o noncompliance. It is challenging for me to assess whether she is a good candidate for long term anticoagulation with a telephone visit. Can be addressed at a later date if I get to know her better.  3.  Volume overload, usually in setting of missing dialysis runs. Her most recent echo from 5/12/2021 showed normal EF, normal IVC, no evidence of HFpEF.  4. Hypertension, fairly well controlled per BP readings during recent ED visits.  5. Diabetes type II, well controlled without meds  6. Cardiovascular evaluation prior to AUTUMN/BSO. Nuclear stress test 2002 normal. She did have a chest CT without contrast which reported severe coronary calcification. Her functional status extremely limited, but numerous recent hospitalizations with volume overload and respiratory distress all without ischemic EKG changes, troponin elevations, or wall motion abnormality on echo.  I would not pursue further ischemic work up if she is not interested in pursuing with surgery.       I have spent 25 minutes of medical discussion.    In addition to telephone time documented above, I spent an additional 15 minutes on data review and documentation.    Priscilla Campo MD  Cardiology    CC  ELEONORA HATCH           Sun is a 65 year old who is being evaluated via a billable telephone visit.      What phone number would you like to be contacted at? [phone]  How would you like to obtain your AVS? Mail a copy    Vitals - Patient Reported  Weight (Patient Reported): 99.8 kg (220 lb)  Height (Patient Reported): 162.6 cm (5' 4\")  BMI (Based on Pt Reported Ht/Wt): 37.76  Pain Score: No Pain (0)  Pain Loc: " Chest      Please do not hesitate to contact me if you have any questions/concerns.     Sincerely,     Priscilla Campo MD

## 2021-06-09 NOTE — PATIENT INSTRUCTIONS
We need to optimize your breathing prior to any surgery.     You need better fluid removal with dialysis, as well as possible re-introduction of diuretics. Kidney and heart doctors need to manage this.     For your COPD, you need better control. Need to use Duonebs FOUR TIMES DAILY no matter how you are feeling. Use albuterol as needed in addition. We can use inhalers instead of 4 times daily nebs, but you stated you preferred nebs. We may need to add more medications, if still not controlled after about 8 weeks of consistent use.     Use omeprazole daily.     Get annual flu shots, GET YOUR COVID SHOTS!    Use oxygen consistently, for at least 15 hours a day and at night!     Use CPAP every night with oxygen connected too.    Follow up breathing tests and oxygen test at next IN PERSON visit.

## 2021-06-09 NOTE — CONFIDENTIAL NOTE
Ranjeet Menezes MD Hagerty, Marjean, MOOSE             Hi-   Can we send AVS to her assisted living, and communicate with the RNs there that she needs to be given her duonbs four times daily NO MATTER WHAT? She is only getting them once daily currently, and doesn't want controller inhalers.   Thanks!   Will      Ulises Hull,   Please fax AVS and Duo neb order to [facility] at fax [phone]. I have a call out to them to return call to my cell.  Thanks  Jaun VIRK

## 2021-06-09 NOTE — Clinical Note
Hi-  Can we send AVS to her assisted living, and communicate with the RNs there that she needs to be given her duonbs four times daily NO MATTER WHAT? She is only getting them once daily currently, and doesn't want controller inhalers.   Thanks!  Will

## 2021-06-09 NOTE — LETTER
6/9/2021         RE: Sun Mireles  [facility]  [address]  [place] [address]        Dear Colleague,    Thank you for referring your patient, Sun Mireles, to the [facility] [place]. Please see a copy of my visit note below.    Sun is a 65 year old who is being evaluated via a billable telephone visit.      What phone number would you like to be contacted at? [phone]  How would you like to obtain your AVS? Mail a copy    Phone call duration: 24 minutes    [facility]- General Pulmonary Clinic    Assessment and Plan:  Sun Mireles is a 65 year old female with a history of HTN, HLD, atrial fib (not on anticoagulation due to prior GI bleed), pacemaker in place, HFpEF, COPD, KHUSHBOO on CPAP, chronic hypoxic respiratory failure on O2, diabetes, morbid obesity, GERD, and ESRD on HD, who presents to clinic for evaluation of COPD.     COPD, possible asthma overlap, cough, dyspnea, centrilobular emphysema: COPD/asthma by history. No obstruction on spirometry, but did not meet ATS criteria (was non-specific pattern, which can be from obstruction if lung volumes are normal, but no lung volumes were done). Has had moderate centrilobular emphysema on prior CT imaging. 45 pack year smoking history, quit about 2000. Not on adequate treatment- using albuterol 0-1 time daily, and duonebs typically just once daily. Does have a cough, and feels she has a cold currently, but no sputum production or fever.   -At this point, will try to optimize treatment. She does not want to try new inhalers, so will attempt to optimize treatment with nebulizers (She prefers)  -needs to increase duoneb use to four times daily, no matter what.   -Use albuterol as needed  -may need to add budesonide nebs BID if uncontrolled as well  -Alternatively, could us Trelegy inhaler daily, or other combination(s) of inhalers, if she were  willing to use inhalers rather than nebs, but wants nebs.   -GERD currently controlled, continue omeprazole daily   -unclear if she is currently having an exacerbation. As she is not optimized on treatment, and complains mainly of volume overload, without sputum, at this point, I am inclined to think current cough is due to pulmonary edema, rather than COPD exacerbation (denies wheeze as well). Will hold off on exacerbation treatment, and instead try to optimize baseline control with above measures.   -should have annual flu vaccination, up to date  -up to date on 13 and 23 valent pneumonia shots (2013 and 2020 respectively).   -Should get COVID-19 vaccination series, I don't see that this has been done  -tessalon PRN for cough.   -repeat PFTs at next visit  -work up of low diffusion as below    Chronic hypoxic respiratory failure, severe diffusion defect on PFTs: on 2 LPM at baseline, but not using consistently.   -Counseled extensively on need to use oxygen 15+ hours a day, and likely at night.   -Would get 6MWT with O2 titration at next visit  -will check CT chest given severe diffusion defect, to have better sense of lung parenchyma and ensure no ILD.   -I question if she may have pulmonary HTN as well, the right heart and PA were not well visualized on most recent echo.   -needs CHF optimization and additional fluid removal. States this was better when she was on lasix. Unclear if this can be restarted. She states she still urinates some. Would defer to cardiology and nephrology  -needs additional fluid removal with HD, consistent HD, may need extra UF if this is a problem.     KHUSHBOO on CPAP:  -continue CPAP nightly with oxygen bleed in. Not using consistently, but should be using every night.     Surgical readiness: patient expressing she may not want her planned surgery. From a pulmonary prospective, she needs optimization of breathing. This is likely more contingent on fluid removal and fluid status  "optimization, but I am also changing COPD treatment, as she has not been on adequate treatment.   -Work up and treatment as above  -not yet optimized, will need to see how she responds to increased nebulization treatments, see results of CT, and needs better fluid management from cardiology and nephrology.   -she is at high risk of pulmonary complications from surgery (42.1%) based on ARISCAT preoperative risk calculator score of 55. This includes a range of pulmonary complications from need for mechanical ventilation to aspiration.   -would plan for at least 2 months of consistent 4 time a day duoneb use prior to re-considering if she is better optimized for COPD.     Follow up in 3-4 months with repeat testing.     48 min spent on care today.   Ranjeet Menezes MD   of Medicine  Division of Pulmonary, Critical Care, Allergy, and Sleep Medicine  ____________________________________________________________________  CC: \"COPD\"    HPI:    Sun Mireles is a 65 year old female with a history of HTN, HLD, atrial fib (not on anticoagulation due to prior GI bleed), pacemaker in place, HFpEF, COPD, KHUSHBOO on CPAP, chronic hypoxic respiratory failure on O2, diabetes, morbid obesity, GERD, and ESRD on HD, who presents to clinic for evaluation of COPD.     She was seen in pre-op anesthesia clinic due to a pelvic mass requiring surgical removal by Dr. Patino. At that visit, complained of a history of COPD/asthma, as well as chronic oxygen use \"sometimes\" during the day, and ongoing shortness of breath and cough. She as using Singulair, albuterol PRN, and duonebs 1-2 times daily. She was referred to pulmonary for optimization prior to surgery.     Ms. Mireles states she isn't sure she wants the surgery.     She notes that she has was told she had COPD about 1-2 years ago. She doesn't remember what was happening at the time that lead to the diagnosis. She also has oxygen at home, which she only uses \"if " "she needs it.\" She describes needing it when her nebulizers don't work. She does not cough every day, but notes she is developing a cold over the last few day, and has had a lot more cough. The nebulizers seem to help her breathing and cough. Cough is dry. She is not sure if she is wheezing. She does have an albuterol inhaler at home, which she uses 0-1 times daily. She uses a nebulizer 1-2 times a day. Her breathing seems to be worse at night. She does occasionally wake up at night of breath or coughing. She doesn't feel particularly limited by her breathing. She gets more benefit from the nebulizer than from the inhaler treatment. She has not tried inhalers besides albuterol in the past.     She has noted leg swelling as well, and she has been told she has fluid on her lungs as well. She goes to dialysis, but doesn't feel they are removing as much fluid as they should. She does produce urine, and feels like the amount is decreasing. She is on lasix.     No chest pain, no palpitation. She previously had acid reflux, but this is improved. She generally feels she has trouble sleeping flat, and raising her head up to sleep. She uses her CPAP \"most of the time.\" She isn't sure if she snores. She does feel well rested in the morning, but does typically nap during the day.     She denies rhinorrhea, sore throat, or nasal congestion. No fevers, but constantly has chills.     She denies a family history of lung problems.     Exposure History:  Tobacco: prior smoker, 45 pack years. Quit about 2000.   Other inhaled substances: No vaping, sandblasting, or welding. No asbestos exposures.   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 No memory of particular respiratory complaints with this, but some cleaning agent exposures.   Pets: no pets.   Allergies: possible seasonal allergies.   Hobbies: no particular hobbies, sits outside.   Travel: no recent travel  Home: Lives in an assisted living ([facility]). Gets three " meals a day there. No mold or water damage they are aware of.     ROS: Complete 10 point ROS negative unless mentioned in HPI    Allergies and current medications: Reviewed and updated in EMR.   Past medical, surgical, social, and family histories: Personally reviewed and updated in EMR during this visit.     Labs and Radiology: All new data personally reviewed and summarized below. I have reviewed the results with the patient today.   Laboratory data:  21 VB.39/46/35/25.7  WBC 8.3, Hgb 8.8, Plts 213  BNP 1758 (high)    Cardiac studies:  5/10/21 echo: Global and regional left ventricular function is hyperkinetic with an EF of 65-70%. Mild concentric wall thickening consistent with left ventricular hypertrophy is present. Hyperdynamic LV function and cavity obliteration in systole results in a small, hemodynamically-insignificant LV intracavitary gradient. RV size and function are probably normal on limited views. No significant valvular abnormalities were noted. No pericardial effusion is present. Previous study not available for comparison.    Imaging studies:   21 CXR: Decreased bilateral patchy airspace opacities, likely improving infection/pulmonary edema.    Pulmonary Function Testing: personally reviewed.   Date and Site FEV1 FVC FEV1/FVC TLC RV DLCO   21 [facility] 1.25 (57%) 1.6 (57%) 78%   7.67 (38%)   Most recent PFT interpretation: non specific spirometry pattern. No lung volumes were done. Did not meet ATS criteria. Severe diffusion defect.         Again, thank you for allowing me to participate in the care of your patient.        Sincerely,        Ranjeet Menezes MD

## 2021-06-09 NOTE — PROGRESS NOTES
Clinic Care Coordination Contact    Situation: Patient chart reviewed by care coordinator.    Background: Patient on hospital discharge list from external resources, high risk score    Assessment: Patient currently residing at [facility]. She does not meet criteria for enrollment in [facility].     Plan/Recommendations: RN will not forward referral to CCC.

## 2021-06-09 NOTE — PROGRESS NOTES
12/27/16 - Care Guide called patient for Clinic Care Coordination outreach follow up. No answer.  01/10/17 - Care Guide called patient for Clinic Care Coordination outreach follow up. Left message for patient to call back.  01/25/17 -  Care Guide called patient for Clinic Care Coordination outreach follow up. Left message for patient to call back.    I have called Sun 3 times over the past two weeks and have been unsuccessful in reaching this patient for 1 month now.  This patient has also not returned any of my messages.   I will continue attempting to reach out to this patient in one month. I will also check this patient s chart for upcoming appointments, ER reports that may contain a new phone number, or any other recent activity.  I have informed the primary care provider by tasking regarding the lack of successful follow up.    03/01/17 Care Guide called patient for Clinic Care Coordination outreach follow up. Left message for patient to call back.    I have called Sun and have been unsuccessful in reaching this patient for 2 months now.  This patient has also not returned any of my messages.  I will continue attempting to reach out to this patient in one month.  I will also check this patient's chart for upcoming appointments, ER reports that may contain a new phone number, or any other recent activity.  I have informed the primary care provider by tasking regarding the lack of successful follow up.    04/03/17 Care Guide called patient for Clinic Care Coordination outreach follow up. Left message for patient to call back.    I have called Sun several times over the past 3 months now and sent them a letter.  This patient has not returned any of my messages.  I will continue attempting to reach out to this patient in 3 months.  I will also check this patient's chart for upcoming appointments, ER reports that may contain a new phone number, or any other recent activity.  I have informed the primary  care provider by tasking regarding the lack of successful follow up.       Planned Outreach Frequency: Every 2 months      Notes: Patient's weight went up by 2 pounds in the last 3 months.          Health History  1. Morbid obesity        Outreach Preferences: telephone      Family:  n/a      SCOTTIE's on File: gave info to patient      Plans for at Next Outreach: will discuss with patient other exercises options she can do. Ask if she seen a nutrition counselor in the past, if no will ask if she is interested. Patient was recently in the ER on 12/31, ask to schedule ER follow up appointment with Dr. Vanessa. Ask patient if she was able to schedule pool therapy at [facility].

## 2021-06-10 NOTE — PROGRESS NOTES
MURTAZA Garsia is a 61 year old lady who is here for several issues - 1. She is continuing to have bilateral foot pain. This has been ongoing for multiple months. She does have a hx of gout. Also has a hx of diabetic neuropathy. She has in the past been on gabapentin but this caused her to jerk her extremities so was stopped. She has also been on Tramadol for pain. She has been on uloric for her gout as well.  2. She has a URI. She has congestion, coughing., sligiht wheezing. No significant SOB. No fevers.  3. She needs a refill on her Percocet. 3. She is continuing to have GERD. She is taking Nexiuim although this is not on her med list. She wonders about increasing the dose of this.   Patient Active Problem List   Diagnosis     Gout     Morbid obesity     Cor Pulmonale     Asthma     Urinary Incontinence     Anemia     Essential Hypercholesterolemia     Obstructive Sleep Apnea     Shortness Of Breath     Type 2 diabetes mellitus     Chronic Diastolic Congestive Heart Failure     Postmenopausal bleeding     Benign Essential Hypertension     Chronic Kidney Disease, Stage 4     Localized Osteoarthritis Of The Knee     Lower Back Pain     Glaucoma     Aneurysm Of The Right Vertebral Artery     Acute arthritis     Back pain     Chronic pain     Lethargy     Joint pain, localized to the knee     Joint pain, localized in the right shoulder     Tobacco use     Bilateral edema of lower extremity     Diabetic neuropathy     Candidiasis, intertrigo     Current Outpatient Prescriptions on File Prior to Visit   Medication Sig Dispense Refill     ACCU-CHEK RACHEL PLUS TEST STRP strips USE TO CHECK BLOOD SUGAR FOUR TIMES A  strip 6     ACCU-CHEK SOFTCLIX LANCETS lancets CHECK 4 TIMES DAILY 100 each 2     ADVAIR DISKUS 250-50 mcg/dose DISKUS Inhale 1 puff by mouth twice a day 60 each 3     ADVAIR DISKUS 250-50 mcg/dose DISKUS Inhale 1 puff by mouth twice a day 60 each 0     albuterol (ACCUNEB) 1.25 mg/3 mL nebulizer solution  Take 3 mL (1.25 mg total) by nebulization 4 (four) times a day as needed for wheezing. 75 mL 3     albuterol (PROVENTIL HFA;VENTOLIN HFA) 90 mcg/actuation inhaler Inhale 2 puffs every 6 (six) hours as needed for wheezing.       albuterol (PROVENTIL) 2.5 mg /3 mL (0.083 %) nebulizer solution Inhale 1 vial in nebulizer four times daily as needed 180 mL 4     amLODIPine (NORVASC) 10 MG tablet Take 1 tablet by mouth every day 30 tablet 6     aspirin 81 MG EC tablet Take 1 tablet (81 mg total) by mouth daily. 90 tablet 3     atorvastatin (LIPITOR) 40 MG tablet Take 1 tablet by mouth every night at bedtime 30 tablet 3     blood-glucose meter (ACCU-CHEK RACHEL PLUS METER) Misc USE AS DIRECTED 1 each 1     clobetasol (TEMOVATE) 0.05 % cream APPLY TOPICALLY TWICE DAILY 60 g 0     DIAPER,BRIEF,ADULT, DISPOSABLE (DEPEND PANTS MISC) Use As Directed. Large       diphenhydrAMINE (BENADRYL) 25 mg capsule TAKE 2 CAPSULES BY MOUTH DAILY AS NEEDED 180 capsule 3     EASY TOUCH TWIST LANCETS 28 gauge Misc USE TO CHECK BLOOD SUGAR FOUR TIMES A  each 1     esomeprazole (NEXIUM) 40 MG capsule Take 1 capsule (40 mg total) by mouth daily. 90 capsule 3     fluconazole (DIFLUCAN) 100 MG tablet Take 1 tab po q day x 10 days 10 tablet 1     fluconazole (DIFLUCAN) 150 MG tablet Take 1 tablet (150 mg total) by mouth once a week. 12 tablet 0     fluticasone-salmeterol (ADVAIR) 500-50 mcg/dose DISKUS Inhale 1 puff 2 (two) times a day. 60 each 6     FOLIC ACID/MV,FE,OTHER MIN (CENTRUM ORAL) Take by mouth.       furosemide (LASIX) 80 MG tablet Take 1 tablet (80 mg total) by mouth 3 (three) times a day. 270 tablet 3     GLUCAGON 1 mg injection Use as directed 1 each 0     hydrALAZINE (APRESOLINE) 100 MG tablet Take 1 tablet by mouth three times daily 90 tablet 3     hydrALAZINE (APRESOLINE) 100 MG tablet Take 1 tablet by mouth three times daily 90 tablet 2     ipratropium-albuterol (DUO-NEB) 0.5-2.5 mg/3 mL nebulizer Take 3 mL by nebulization  "every 4 (four) hours. 25 vial 0     ketoconazole (NIZORAL) 2 % cream Apply to affected area BID 15 g 0     LEVEMIR FLEXTOUCH 100 unit/mL (3 mL) pen Inject 35 units subcutaneously twice a day 15 mL 4     magnesium oxide (MAG-OX) 400 mg tablet TAKE 1/2 TABLET BY MOUTH ONCE DAILY 45 tablet 0     menthol-zinc oxide (MENTHOL-ZINC OXIDE) 0.15-1 % Powd powder Apply to affected area TID over the nystatin cream 283 g      metoprolol succinate (TOPROL-XL) 200 MG 24 hr tablet Take 1 tablet (200 mg total) by mouth daily. 90 tablet 3     miconazole (MICATIN) 2 % cream Apply to affected area 2 times daily 56.7 g 1     miconazole nitrate (CRUEX) 2 % AerP Apply twcie daily after drying area of redness  0     [employer] medical supply Misc Use with nebulizer 1 each 1     montelukast (SINGULAIR) 10 mg tablet TAKE ONE TABLET BY MOUTH EVERY NIGHT AT BEDTIME 90 tablet 03     nebulizers Misc Use As Directed. Adult mask       NEEDLES, INSULIN DISPOSABLE (NOVOFINE 30 MISC) Use As Directed 5 (five) times a day. X 8mm       NOVOFINE AUTOCOVER 30 gauge x 1/3\" Ndle INJECT UNDER THE SKIN 5 TIMES DAILY AS DIRECTED 400 each 0     NOVOFINE AUTOCOVER 30 gauge x 1/3\" Ndle INJECT UNDER THE SKIN 5 TIMES DAILY AS DIRECTED 400 each 0     NOVOLOG FLEXPEN 100 unit/mL injection pen INJECT 15 UNITS UNDER THE SKIN THREE TIMES DAILY BEFORE EACH MEAL 15 mL 0     P5 - DIC/HANNAH/BUP/IBU/PENT 5/2/1/3/3% Apply 1-2gms to affected area 3-4 times daily. Rub in for 1-2 minutes 240 g 3     potassium chloride SA (K-DUR,KLOR-CON) 10 MEQ tablet TAKE ONE TABLET BY MOUTH ONCE DAILY 90 tablet 3     predniSONE (DELTASONE) 20 MG tablet Take 3 tab x 4d,, then 2 tab x 4d,, then 1 tab x 4d, then stop 24 tablet 0     timolol maleate (TIMOPTIC) 0.5 % ophthalmic solution USE 1 DROP IN BOTH EYES TWICE DAILY 10 mL 0     traMADol (ULTRAM) 50 mg tablet TAKE 2 TABLETS BY MOUTH IN THE MORNING AND 1 TABLET IN THE EVENING AND 2 TABLETS AT BEDTIME 75 tablet 0     traMADol (ULTRAM) 50 mg " tablet TAKE 2 TABLETS BY MOUTH EVERY MORNING, 1 TABLET DAILY IN THE AFTERNOON AND 2 TABLETS EVERY NIGHT AT BEDTIME 75 tablet 0     triamcinolone (KENALOG) 0.1 % ointment Apply to affected area BID x 10 days 80 g 1     ULORIC 40 mg tablet TAKE 1 TABLET BY MOUTH DAILY AS DIRECTED 90 tablet 0     varenicline (CHANTIX) 1 mg tablet Take 1 tablet (1 mg total) by mouth 2 (two) times a day. 180 tablet 0     wheelchair [employer] mobility device  Face-to-face exam  October 6, 2016  impaired mobility  Length of need : 99 1 each 0     ZEASORB, MICONAZOLE, 2 % powder LIN BID AFTER DRYING AREA OF REDNESS  0     oxyCODONE-acetaminophen (PERCOCET) 5-325 mg per tablet Take 1-2 tablets by mouth every 6 (six) hours as needed for pain. 20 tablet 0     Current Facility-Administered Medications on File Prior to Visit   Medication Dose Route Frequency Provider Last Rate Last Dose     ipratropium-albuterol 0.5-2.5 mg/3 mL nebulizer solution 3 mL (DUO-NEB)  3 mL Nebulization Once Kelli Blanca Vanessa MD         Social History     Social History     Marital status: Single     Spouse name: N/A     Number of children: N/A     Years of education: N/A     Occupational History     Not on file.     Social History Main Topics     Smoking status: Former Smoker     Quit date: 2/8/2014     Smokeless tobacco: Never Used     Alcohol use No     Drug use: No     Sexual activity: Not on file     Other Topics Concern     Not on file     Social History Narrative    Reports on disability. She is not working and lives with her children and friend.  She reports she turns to her doctor in times of crisis.  She reports she needs assistance with ADLs and has a PCA 11 hours per day, 7 days per week.  Does not have a home nurse.       family history includes Heart attack in her maternal grandmother; Hypertension in her father and mother.    O - VS_ BP - 10/60 Temp is afebrile. O2sat is 92%. This lady is alert and in NAD> HEENT - Ears - TM clear. Canals are  normal. Throat - Posterior pharynx is non-red. No exudate or adenopathy. No cervical adenopathy anteriorly or posteriorly. Lungs - clear throughout. Mild expiratory wheezing. Heart - RR and R S1S2 normal without murmurs or ectopy. Ext - 1+ edema.   , L  ASS - 1. Foot pain- probable diabetic neuropathy              2. Bronchitis              3. Pain medication refill              4. GERD     PLAN - 1. Will restart Gabapentin gingerly - 300 mg a night.                2. Will treat with Augmentin for the bronchitis                3. Rx printed for Percocet                4. Will increase dose of Nexium to 40 mg a day                5. Labs drawn - glycohgb, hm1, ferritin, iron, magnesium, BMP,LDL

## 2021-06-10 NOTE — PROGRESS NOTES
Rodolfo is scheduled for a colonoscopy with Dr. Camarena on 6/22/17 at [facility]. Patient was given instructions of arrival time, need a , pre-op physical within 30days and NPO after midnight. Patient verbalized understanding. I did put colonoscopy prep in the mail for the patient.    Salome Richardson [facility]  Physician    [facility]   [phone]

## 2021-06-10 NOTE — TELEPHONE ENCOUNTER
New Appointment Needed  What is the reason for the visit:    Office visit, follow up  Provider Preference: PCP only  How soon do you need to be seen?: this week or next week, patient has dialysis on Tues & Thurs. She needs an appointment on a Mon, Weds or Friday  Waitlist offered?: No  Okay to leave a detailed message:  Yes

## 2021-06-10 NOTE — TELEPHONE ENCOUNTER
"RN Triage:    Surgery in November of 2019 for perirectal abscess.  Was transferred to [facility] where she was between 11/20/19-1/6/20.  She was then transferred to TCU at [facility] where she has been since then.  She is on dialysis.  She is scheduled for a CT scan of ABD on 8/26/20.    She is calling today for several reasons.    1.  Frequency and burning with urination. \"They are giving me things for it, but they aren't helping\".    2. Rash on right thigh.    3.  Pain inside vagina.    Telephone visit scheduled with primary physician this afternoon.    Yumiko Anand RN  [facility] Nurse Advisor      "

## 2021-06-11 NOTE — PROGRESS NOTES
OFFICE VISIT - FAMILY MEDICINE     ASSESSMENT AND PLAN     1. Gastroesophageal reflux disease without esophagitis  lansoprazole (PREVACID) 30 MG capsule   2. Nausea  prochlorperazine (COMPAZINE) 10 MG tablet   3. Unspecified severe protein-calorie malnutrition (H)     4. Chronic diastolic (congestive) heart failure (H)     5. Diabetic polyneuropathy associated with type 2 diabetes mellitus (H)     6. Morbid (severe) obesity with alveolar hypoventilation (H)     7. Chronic obstructive pulmonary disease, unspecified COPD type (H)     8. Visit for screening mammogram  Mammo Screening Bilateral   Gastroesophageal reflux disease, stop omeprazole try lansoprazole, if not helping could consider referral to GI for upper endoscopy. Nausea, trial of Compazine as needed,  Chronic heart failure, end-stage disease on hemodialysis 3 times a week, she will continue to follow with cardiologist, nephrologist tight control of risk factors included diabetes, hypertension, hyperlipidemia etc. hypokalemia has been followed by nephrologist, she is on potassium replacement therapy.    CHIEF COMPLAINT   Urinary Tract Infection (was in ER and given antibiotic, culture show yeast-given medication. Patient is still experiencing burning.) and Nausea (even when laying down, would like medication to help with nausea.)    ERNA   Sun Mireles is a 64 y.o. female.  Medical history significant for diabetes type 2 with associated peripheral neuropathies, CKD stage III, not adequately controlled on insulin, congestive heart failure, obesity, obstructive sleep apnea mild persistent asthma chronic pain syndrome secondary to degenerative joint disease of the knees, spine on chronic narcotics.  Patient has not been seen at this clinic for more than a year, she is currently living in a nursing home, in the interim, she has been getting hemodialysis 3 times a week, Tuesday Thursday and Saturday, COPD has become severe to make her getting dependent on  oxygen.  Has had some recent lab work, results available in the system, A1c has been at 6.2%, medication has been given to her by nursing staff.  Medication were reconciled today.  She was recently seen in the ER with vaginal pain, she was diagnosis of yeast UTI, fluconazole was prescribed.  Main complaint today is recurrent nausea with GERD type of symptoms, currently on omeprazole but does not seems to help, she is stating 1 of the pill that she was taking a few years ago did help but she is not sure about the name.      Review of Systems As per HPI, otherwise negative.    OBJECTIVE   BP (!) 85/45 (Patient Site: Right Arm, Patient Position: Sitting, Cuff Size: Adult Large)   Pulse 79   Temp 98.9  F (37.2  C) (Tympanic)   SpO2 98% Comment: on oxygen  Physical Exam   Constitutional: She is oriented to person, place, and time. She appears well-developed and well-nourished.   HENT:   Head: Normocephalic and atraumatic.   Neck: Normal range of motion. Neck supple. No JVD present. No tracheal deviation present. No thyromegaly present.   Cardiovascular: Normal rate, regular rhythm, normal heart sounds and intact distal pulses. Exam reveals no gallop and no friction rub.   No murmur heard.  Pulmonary/Chest: Effort normal and breath sounds normal. No respiratory distress. She has no wheezes. She has no rales.   Musculoskeletal:         General: No tenderness or edema.   Lymphadenopathy:     She has no cervical adenopathy.   Neurological: She is alert and oriented to person, place, and time. Coordination normal.   Psychiatric: She has a normal mood and affect. Judgment and thought content normal.       PFSH     Family History   Problem Relation Age of Onset     Hypertension Mother      COPD Mother      Diabetes Mother      Hypertension Father      COPD Father      Diabetes Father      Heart attack Maternal Grandmother      Hypertension Maternal Grandmother      Alcohol abuse Sister      Drug abuse Sister      COPD Sister       Diabetes Sister      Hypertension Sister      Heart disease Brother      Hypertension Brother      Hypertension Daughter      Hypertension Son      Hypertension Maternal Grandfather      Heart attack Maternal Grandfather      Hypertension Paternal Grandmother      Heart attack Paternal Grandmother      Hypertension Paternal Grandfather      Social History     Socioeconomic History     Marital status: Single     Spouse name: Not on file     Number of children: Not on file     Years of education: Not on file     Highest education level: Not on file   Occupational History     Not on file   Social Needs     Financial resource strain: Not on file     Food insecurity     Worry: Not on file     Inability: Not on file     Transportation needs     Medical: Not on file     Non-medical: Not on file   Tobacco Use     Smoking status: Former Smoker     Last attempt to quit: 2014     Years since quittin.6     Smokeless tobacco: Never Used   Substance and Sexual Activity     Alcohol use: No     Drug use: No     Sexual activity: Not on file   Lifestyle     Physical activity     Days per week: Not on file     Minutes per session: Not on file     Stress: Not on file   Relationships     Social connections     Talks on phone: Not on file     Gets together: Not on file     Attends [employer] service: Not on file     Active member of club or organization: Not on file     Attends meetings of clubs or organizations: Not on file     Relationship status: Not on file     Intimate partner violence     Fear of current or ex partner: Not on file     Emotionally abused: Not on file     Physically abused: Not on file     Forced sexual activity: Not on file   Other Topics Concern     Not on file   Social History Narrative    Reports on disability. She is not working and lives with her children and friend.  She reports she turns to her doctor in times of crisis.  She reports she needs assistance with ADLs and has a PCA 11 hours per day, 7  days per week.  Does not have a home nurse.       Relevant history was reviewed with the patient today, unless noted in HPI, nothing is pertinent for this visit.  [facility]     Patient Active Problem List    Diagnosis Date Noted     Unspecified severe protein-calorie malnutrition (H) 09/24/2020     COPD (chronic obstructive pulmonary disease) (H) 09/24/2020     Maggy-rectal abscess 11/20/2019     Dialysis patient (H) 11/13/2019     Overview Note:     Dialysis started during admit (11/02/19); [facility]       Atrial flutter with rapid ventricular response (H)      Anemia due to blood loss, acute      Sepsis, due to unspecified organism, unspecified whether acute organ dysfunction present (H) 11/10/2019     Necrotizing soft tissue infection      Atrial fibrillation with RVR (H)      Anemia of chronic renal failure 11/03/2019     Perianal abscess 11/01/2019     Overview Note:     S/P incision, drainage and debridement on 11/9/19 Dr CORREIA  INCISION AND DRAINAGE, ABSCESS, RECTAL OR PERIRECTAL;          Diabetic ulcer of toe of right foot associated with type 2 diabetes mellitus, unspecified ulcer stage (H) 09/19/2019     Degeneration of lumbar or lumbosacral intervertebral disc 03/21/2019     Adjustment disorder with mixed anxiety and depressed mood      Reactive depression      Mood disorder due to medical condition      Elevated troponin 03/05/2019     Acute respiratory failure (H) 03/03/2019     Acute respiratory failure with hypoxia (H) 03/03/2019     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     Overview Note:     Non-ketotic; diabetes type 2; >500       Chronic heart failure with preserved ejection fraction (H)      Community acquired pneumonia, unspecified laterality      Class 3 severe obesity due to excess calories with serious comorbidity and body mass index (BMI) of 45.0 to 49.9 in adult (H)      CKD stage 4 due to type 2 diabetes mellitus (H) 02/25/2019     Pyelonephritis 12/26/2018     Eczema 04/09/2018     Chronic diastolic CHF  (congestive heart failure) (H) 06/12/2017     Acute on chronic respiratory failure with hypoxia and hypercapnia (H) 06/08/2017     Pulmonary HTN (H) 06/08/2017     Bilateral edema of lower extremity 06/28/2016     Diabetic polyneuropathy associated with type 2 diabetes mellitus (H) 06/28/2016     Overview Note:     Previously on gabapentin which helped, but stopped in the hospital due to potential drug interaction.  Lyrica made her sleepy.       Candidiasis, intertrigo 06/28/2016     Tobacco use 07/30/2015     Joint pain, localized to the knee 01/08/2015     Joint pain, localized in the right shoulder 01/08/2015     Lethargy 07/08/2014     Chronic pain 06/15/2014     Gout      Overview Note:     Created by Conversion         Morbid obesity with alveolar hypoventilation (H)      Overview Note:     Created by Conversion         Cor Pulmonale      Overview Note:     Created by Conversion    Replacement Utility updated for latest IMO load       Asthma      Overview Note:     Created by Conversion         Urinary Incontinence      Overview Note:     Created by Conversion         Microcytic anemia      Overview Note:     Created by Conversion         Hypercholesteremia      Overview Note:     Created by Conversion         Obstructive Sleep Apnea      Overview Note:     Created by Conversion         Long-term insulin use in type 2 diabetes (H)      Overview Note:              Acute diastolic heart failure (H)      Overview Note:     Created by Conversion         Postmenopausal bleeding      Overview Note:     Created by Conversion         Benign Essential Hypertension      Overview Note:     Created by Conversion         Acute kidney injury superimposed on CKD (H)      Overview Note:     Creatinine 4.3 11/2/19         Localized Osteoarthritis Of The Knee      Overview Note:     Created by Conversion    Replacement Utility updated for latest IMO load       Degenerative arthritis of lumbar spine      Overview Note:      Created by Conversion         Unspecified glaucoma      Overview Note:     Created by Conversion         Aneurysm Of The Right Vertebral Artery      Overview Note:     Created by Conversion         Past Surgical History:   Procedure Laterality Date     EYE SURGERY       INCISION AND DRAINAGE PERIRECTAL ABSCESS Left 2019    Procedure: INCISION AND DRAINAGE, ABSCESS, RECTAL OR PERIRECTAL;  Surgeon: Yumiko Hancock DO;  Location: [facility];  Service: General     IR NON TUNNELED CATHETER >5 YEARS  2019     IR TUNNELED CATHETER INSERT  11/15/2019     RI  DELIVERY ONLY      Description:  Section;  Recorded: 10/20/2011;     RI LIGATE FALLOPIAN TUBE      Description: Tubal Ligation;  Recorded: 10/20/2011;     RI REMOVAL GALLBLADDER      Description: Cholecystectomy;  Recorded: 10/20/2011;       RESULTS/CONSULTS (Lab/Rad)     Recent Results (from the past 168 hour(s))   Culture, Urine    Specimen: Urine   Result Value Ref Range    Culture 10,000-50,000 col/ml Candida albicans (!)    Wet Prep, Vaginal    Specimen: Genital   Result Value Ref Range    Yeast Result No yeast seen No yeast seen    Trichomonas No Trichomonas seen No Trichomonas seen    Clue Cells, Wet Prep No Clue cells seen No Clue cells seen   Urinalysis   Result Value Ref Range    Color, UA Red (!) Colorless, Yellow, Straw, Light Yellow    Clarity, UA Turbid (!) Clear    Glucose, UA Negative Negative    Bilirubin, UA Negative Negative    Ketones, UA Negative Negative    Specific Gravity, UA 1.023 1.001 - 1.030    Blood, UA Large (!) Negative    pH, UA 6.0 4.5 - 8.0    Protein,  mg/dL (!) Negative mg/dL    Urobilinogen, UA <2.0 E.U./dL <2.0 E.U./dL, 2.0 E.U./dL    Nitrite, UA Negative Negative    Leukocytes, UA Large (!) Negative    Bacteria, UA Many (!) None Seen hpf    RBC, UA >100 (!) None Seen, 0-2 hpf    WBC, UA >100 (!) None Seen, 0-5 hpf    Squam Epithel, UA 0-5 None Seen, 0-5 lpf   Comprehensive Metabolic Panel    Result Value Ref Range    Sodium 135 (L) 136 - 145 mmol/L    Potassium 3.1 (L) 3.5 - 5.0 mmol/L    Chloride 100 98 - 107 mmol/L    CO2 26 22 - 31 mmol/L    Anion Gap, Calculation 9 5 - 18 mmol/L    Glucose 131 (H) 70 - 125 mg/dL    BUN 17 8 - 22 mg/dL    Creatinine 3.13 (H) 0.60 - 1.10 mg/dL    GFR MDRD Af Amer 18 (L) >60 mL/min/1.73m2    GFR MDRD Non Af Amer 15 (L) >60 mL/min/1.73m2    Bilirubin, Total 0.5 0.0 - 1.0 mg/dL    Calcium 8.6 8.5 - 10.5 mg/dL    Protein, Total 7.7 6.0 - 8.0 g/dL    Albumin 2.9 (L) 3.5 - 5.0 g/dL    Alkaline Phosphatase 218 (H) 45 - 120 U/L    AST 39 0 - 40 U/L    ALT 33 0 - 45 U/L   HM1 (CBC with Diff)   Result Value Ref Range    WBC 6.6 4.0 - 11.0 thou/uL    RBC 4.24 3.80 - 5.40 mill/uL    Hemoglobin 12.4 12.0 - 16.0 g/dL    Hematocrit 37.5 35.0 - 47.0 %    MCV 88 80 - 100 fL    MCH 29.2 27.0 - 34.0 pg    MCHC 33.1 32.0 - 36.0 g/dL    RDW 15.3 (H) 11.0 - 14.5 %    Platelets 203 140 - 440 thou/uL    MPV 9.7 8.5 - 12.5 fL    Neutrophils % 75 (H) 50 - 70 %    Lymphocytes % 10 (L) 20 - 40 %    Monocytes % 10 2 - 10 %    Eosinophils % 3 0 - 6 %    Basophils % 1 0 - 2 %    Immature Granulocyte % 1 (H) <=0 %    Neutrophils Absolute 4.9 2.0 - 7.7 thou/uL    Lymphocytes Absolute 0.7 (L) 0.8 - 4.4 thou/uL    Monocytes Absolute 0.7 0.0 - 0.9 thou/uL    Eosinophils Absolute 0.2 0.0 - 0.4 thou/uL    Basophils Absolute 0.1 0.0 - 0.2 thou/uL    Immature Granulocyte Absolute 0.1 (H) <=0.0 thou/uL   Blood culture from PERIPHERAL SITE    Specimen: Vein, Peripheral; Blood   Result Value Ref Range    Anaerobic Blood Culture Bottle No Growth No Growth, No organisms seen, bottle returned to instrument, Specimen not received, No Growth at 24 hours, No Growth at 48 hours, No Growth at 72 hours, No Growth at 96 hours, No Growth at 120 hours    Aerobic Blood Culture Bottle No Growth No Growth, No organisms seen, bottle returned to instrument, Specimen not received, No Growth at 24 hours, No Growth at 120  hours, No Growth at 48 hours, No Growth at 72 hours, No Growth at 96 hours   BNP(B-type Natriuretic Peptide)   Result Value Ref Range    BNP 1,177 (H) 0 - 109 pg/mL   Comprehensive Metabolic Panel   Result Value Ref Range    Sodium 136 136 - 145 mmol/L    Potassium 3.1 (L) 3.5 - 5.0 mmol/L    Chloride 101 98 - 107 mmol/L    CO2 26 22 - 31 mmol/L    Anion Gap, Calculation 9 5 - 18 mmol/L    Glucose 90 70 - 125 mg/dL    BUN 19 8 - 22 mg/dL    Creatinine 3.19 (H) 0.60 - 1.10 mg/dL    GFR MDRD Af Amer 18 (L) >60 mL/min/1.73m2    GFR MDRD Non Af Amer 15 (L) >60 mL/min/1.73m2    Bilirubin, Total 0.4 0.0 - 1.0 mg/dL    Calcium 8.4 (L) 8.5 - 10.5 mg/dL    Protein, Total 7.0 6.0 - 8.0 g/dL    Albumin 2.6 (L) 3.5 - 5.0 g/dL    Alkaline Phosphatase 180 (H) 45 - 120 U/L    AST 35 0 - 40 U/L    ALT 29 0 - 45 U/L   Magnesium   Result Value Ref Range    Magnesium 1.7 (L) 1.8 - 2.6 mg/dL   Hemoglobin   Result Value Ref Range    Hemoglobin 11.1 (L) 12.0 - 16.0 g/dL   Potassium   Result Value Ref Range    Potassium 3.1 (L) mmol/L     Ct Abdomen Pelvis Without Oral Without Iv Contrast    Result Date: 8/26/2020  EXAM: CT ABDOMEN PELVIS WO ORAL WO IV CONTRAST LOCATION: [facility] DATE/TIME: 8/26/2020 2:54 PM INDICATION: Well defined large hypoechoic solid lesion in the lower abdomen in the vicinity aorta with minor internal vascularity as noted on abdominal ultrasound COMPARISON: CT abdomen and pelvis 11/13/2019 and 3/9/2019.. TECHNIQUE: CT scan of the abdomen and pelvis was performed without oral or IV contrast. Multiplanar reformats were obtained. Dose reduction techniques were used. CONTRAST: None. FINDINGS: LOWER CHEST: Respiratory motion. Trace right pleural effusion. No focal consolidation. HEPATOBILIARY: Cholecystectomy with no bile duct dilatation. Negative noncontrast liver. PANCREAS: No significant mass, duct dilatation, or inflammatory change. SPLEEN: No splenomegaly. ADRENAL GLANDS: No significant nodules.  KIDNEY/BLADDER: Hyper dense, isodense and hypodense bilateral renal cortical lesions are stable suggesting cysts. No hydronephrosis. BOWEL: No obstruction or inflammatory change. LYMPH NODES: No lymphadenopathy. VASCULATURE: No aortoiliac aneurysm. PELVIC ORGANS: Stable enlarged myomatous uterus measuring 16.2 x 9.3 x 12.8 cm no focal fluid collection. Bilateral phleboliths. MUSCULOSKELETAL: Open surgical wound extending from low right buttock into the perineum, incompletely imaged. Stable small fat-containing right inguinal hernia. Degenerative changes within the lumbar spine.     1.  No evidence of retroperitoneal mass/adenopathy. 2.  Enlarged myomatous uterus extending cephalad to the aortic bifurcation there is stable dating back to 3/9/2019.    MEDICATIONS     Current Outpatient Medications on File Prior to Visit   Medication Sig Dispense Refill     ACCU-CHEK RACHEL CONTROL SOLN Soln        acetaminophen (TYLENOL) 325 MG tablet Take 2 tablets (650 mg total) by mouth 4 (four) times a day.  0     albuterol (PROAIR HFA;PROVENTIL HFA;VENTOLIN HFA) 90 mcg/actuation inhaler Inhale 2 puffs 4 (four) times a day as needed for wheezing.  0     allopurinol (ZYLOPRIM) 100 MG tablet Take 1 tablet (100 mg total) by mouth daily.  0     amiodarone (PACERONE) 200 MG tablet Take 1 tablet (200 mg total) by mouth 2 (two) times a day.  0     amLODIPine (NORVASC) 10 MG tablet        aspirin 81 MG EC tablet Take 1 tablet (81 mg total) by mouth daily.  0     atorvastatin (LIPITOR) 40 MG tablet Take 1 tablet by mouth every night at bedtime Brand: Lipitor Generic: Atorvastatin  Generic: Atorvastatin Calcium 30 tablet 10     bisacodyl (DULCOLAX) 10 mg suppository 1 PA prn daily for constipation  0     chlorhexidine (PERIDEX) 0.12 % solution        cloNIDine (CATAPRES-TTS) 0.2 mg/24 hr        COLCRYS 0.6 mg tablet        DIAPER,BRIEF,ADULT, DISPOSABLE (DEPEND PANTS MISC) Use As Directed. Large       diclofenac sodium (VOLTAREN) 1 % Gel         diphenhydrAMINE (BENADRYL) 25 mg tablet Take 1 tablet (25 mg total) by mouth 2 (two) times a day.  0     docusate sodium (COLACE) 100 MG capsule Take 1 capsule (100 mg total) by mouth daily.  0     EASY TOUCH LANCING DEVICE Misc Use to test blood sugar twice a day.  Generic: Easy Touch Lancing Device 1 each 2     EASY TOUCH TWIST LANCETS 28 gauge Misc USE TO CHECK BLOOD SUGAR FOUR TIMES A  each 1     epoetin padmini (EPOGEN,PROCRIT) 20,000 unit/mL injection Inject 2 mL (40,000 Units total) under the skin once a week in the evening.  0     ESTRACE 0.01 % (0.1 mg/gram) vaginal cream        fluconazole (DIFLUCAN) 100 MG tablet Take 200 mg (two tabs) PO x 1 day after dialysis. Than take 100 mg (1 tab) PO Qday after dialysis for an additional 13 days. 15 tablet 0     fluconazole (DIFLUCAN) 150 MG tablet        fluticasone propionate (FLONASE) 50 mcg/actuation nasal spray        furosemide (LASIX) 80 MG tablet        gabapentin (NEURONTIN) 100 MG capsule Take 200 mg by mouth at bedtime.  0     generic lancets (ACCU-CHEK SOFTCLIX LANCETS) CHECK 4 TIMES DAILY 100 each 2     heparin sodium,porcine (HEPARIN, PORCINE,) 5,000 unit/mL injection        heparin sodium,porcine/PF (HEPARIN, PF,) 5,000 unit/0.5 mL Syrg Inject 0.5 mL (5,000 Units total) under the skin every 8 (eight) hours.  0     insulin aspart U-100 (NOVOLOG FLEXPEN U-100 INSULIN) 100 unit/mL (3 mL) injection pen Check blood sugar two (2) times daily.  11.9 Type 2 without complications 1 Pre-filled Pen Syringe 0     ipratropium-albuterol (DUO-NEB) 0.5-2.5 mg/3 mL nebulizer Take 3 mL by nebulization every 6 (six) hours as needed.  0     LEVEMIR FLEXTOUCH U-100 INSULN 100 unit/mL (3 mL) pen Inject 32 units under the skin daily.  Generic: Insulin Detemir  Generic: Insulin Detemir 5 Box 10     levoFLOXacin (LEVAQUIN) 500 MG tablet Take 1 tablet (500 mg total) by mouth daily for 10 days. 10 tablet 0     lidocaine 4 % patch Place 1 patch on the skin daily. Remove  "and discard patch with 12 hours or as directed by MD.  0     magnesium oxide (MAG-OX) 400 mg (241.3 mg magnesium) tablet        metoprolol succinate (TOPROL-XL) 100 MG 24 hr tablet Take 1 tablet (100 mg total) by mouth daily.  0     montelukast (SINGULAIR) 10 mg tablet Take 10 mg by mouth at bedtime.       nystatin (MYCOSTATIN) 100,000 unit/mL suspension        omeprazole (PRILOSEC) 20 MG capsule Take 1 capsule (20 mg total) by mouth daily before breakfast.  0     oxybutynin (DITROPAN XL) 5 MG ER tablet        oxyCODONE (ROXICODONE) 5 MG immediate release tablet        pen needle, diabetic (BD ULTRA-FINE CHARITO PEN NEEDLE) 32 gauge x 5/32\" Ndle Use 1 needle daily with Levemir flexpen as directed 100 each 3     pen needle, diabetic, safety (NOVOFINE AUTOCOVER) 30 gauge x 1/3\" Ndle USE FIVE TIMES DAILY AS DIRECTED 500 each 3     phenazopyridine (PYRIDIUM) 100 MG tablet Take 1 tablet (100 mg total) by mouth 3 (three) times a day. 9 tablet 0     polyethylene glycol (MIRALAX) 17 gram packet Take 1 packet (17 g total) by mouth daily as needed.  0     PREMARIN vaginal cream        rosuvastatin (CRESTOR) 10 MG tablet        senna-docusate (PERICOLACE) 8.6-50 mg tablet Take 1 tablet by mouth daily as needed for constipation.  0     sertraline (ZOLOFT) 25 MG tablet Take 3 tablets (75 mg total) by mouth daily.  0     sevelamer carbonate (RENVELA) 800 mg tablet Take 1 tablet (800 mg total) by mouth 3 (three) times a day with meals.  0     sodium hypochlorite (DAKIN'S, QUARTER-STRENGTH,) 0.125 % Soln external solution Use as directed two times a day for wound care  0     timolol maleate (TIMOPTIC) 0.5 % ophthalmic solution 1 gtt both eyes bid 10 mL 12     traMADol (ULTRAM) 50 mg tablet Take 1 tablet (50 mg total) by mouth every 6 (six) hours as needed. 8 tablet 0     triamcinolone (KENALOG) 0.1 % paste        venlafaxine (EFFEXOR-XR) 75 MG 24 hr capsule        wheelchair [employer] mobility device  Face-to-face exam  October 6, " 2016  impaired mobility  Length of need : 99 1 each 0     No current facility-administered medications on file prior to visit.        HEALTH MAINTENANCE / SCREENING   No data recorded, No data recorded,No data recorded  Immunization History   Administered Date(s) Administered     Hep B, Adult 01/14/2020     Influenza, inj, historic,unspecified 02/01/2020, 09/17/2020     PPD Test 01/09/2020, 01/16/2020     Pneumo Polysac 23-V 02/16/2013     Pneumococcal Vaccine, Unspecified 01/25/2020     Td, Adult, Absorbed 11/09/2005     Td,adult,historic,unspecified 11/09/2005     Health Maintenance   Topic     HEART FAILURE ACTION PLAN      MAMMOGRAM      ZOSTER VACCINES (1 of 2)     COLORECTAL CANCER SCREENING      TD 18+ HE      MEDICARE ANNUAL WELLNESS VISIT      PNEUMOCOCCAL IMMUNIZATION 65+ HIGH/HIGHEST RISK (1 of 2 - PCV13)     DXA SCAN      HEPATITIS B VACCINES (2 of 3 - Risk 3-dose series)     DIABETIC FOOT EXAM      DIABETIC EYE EXAM      Asthma Control Test      LIPID      MICROALBUMIN      A1C      BMP      ALT      CBC      ASTHMA ACTION PLAN      FALL RISK ASSESSMENT      ADVANCE CARE PLANNING      TSH      DEPRESSION ACTION PLAN      HEPATITIS C SCREENING      INFLUENZA VACCINE RULE BASED      Collin Doran MD  Family Medicine, [facility]     This note was dictated using a voice recognition software.  Any grammatical or context distortion are unintentional and inherent to the software.

## 2021-06-11 NOTE — PROGRESS NOTES
Patient seen in clinic for HF education s/p recent hospital discharge 6/11/17  Reviewed with pt and his spouse HF Binder that includes the  HF Sx Awareness & Action plan  handout and  A [employer] Pump  booklet and Weight log booklet highlighting :  __X_patient s type of heart failure _X__Na management in diet  __X_importance of daily wts  _X__Fluid Guidelines, if applicable  __X_medication review and importance of compliance     Instructed patient in signs and sx of heart failure, reiterated when to call clinic - reviewed HF hotline # [phone] and after hours call # [phone].  Majority of time was spent reviewing: low sodium diet, s/ chf exacerbation and when to call [facility]  Patient verbalized understanding of HF discussion.  Plan for f/u with continued HF education reviewed.  No formal f/u scheduled with nurse clinician for continued education - will continue to reinforce HF management education.

## 2021-06-11 NOTE — PROGRESS NOTES
07/05/17    Care Guide called patient for Clinic Care Coordination outreach follow up. Spoke to patient and follow up on goals and action steps.  Patient reported:  - Since she was discharged from [facility] on 06/26/17, she's been eating exactly like how she was while in the SNF. Per patient she likes to go shopping; therefore, she's been walking around the stores and not use wheelchairs. Patient states her swollen leg is not swollen anymore and it's the best she's seen it. Patient declined home care OT/PT. States she exercises everyday and doesn't need additional support at home. States she already has a wheelchair, CPAP machine, and a large bed.     While on the phone with patient, family members started to scream at each other. Patient states she has to go and will connect with me later.       Plan:  - outreach frequency:  Monthly  - Care Guide will ask patient if she is keeping track of her weight.

## 2021-06-11 NOTE — TELEPHONE ENCOUNTER
Medication Question or Clarification  Who is calling: Sarah from [facility]  What medication are you calling about (include dose and sig)?:   lansoprazole (PREVACID) 30 MG capsule  30 capsule  3  9/23/2020      Sig - Route: Take 1 capsule (30 mg total) by mouth daily. - Oral     Class: Print         Who prescribed the medication?: Collin Doran MD   What is your question/concern?: Caller stated the pharmacy informed her the patient is also on omeprazole 40 mg. Please advise if the patient is supposed to take both or clarify which PPI the patient supposed to be on.  Requested Pharmacy: n/a  Okay to leave a detailed message?: Yes

## 2021-06-11 NOTE — TELEPHONE ENCOUNTER
Upcoming Appointment Question  When is the appointment: 9/23/20  What is your appointment for?: vaginal pain and urinary per appointment notes  Who is your appointment scheduled with?: PCP only  What is your question/concern?: Melvina from [facility], stated she would like a call back to confirm Collin Doran MD is okay with seeing a nursing home patient. Caller stated the patient informed her that the patient fired their in-house doctor and prefers Collin Doran MD as a primary.  Okay to leave a detailed message?: No  [phone]

## 2021-06-11 NOTE — TELEPHONE ENCOUNTER
Pt w PMH of ESRD on HD and presents with dysuria and vaginal itching. UA without evidence of contamination and UC with candida growth and wet prep is negative. . Discussed PMH with pharmacy who recommends 200 mg loading dose with 50% reduction in dose (taken after dialysis) for the remaining doses for 14 days. Left VM regarding test results and need to start fluconazole and D/C levofloxacin. Left call back number. Fluconazole prescription sent to pharmacy on file.

## 2021-06-11 NOTE — PROGRESS NOTES
Code Status:  FULL CODE  Visit Type: Problem Visit (edema)     Facility:  [facility] [[id number]]        Facility Type: SNF (Skilled Nursing Facility, TCU)    History of Present Illness: Sun Mireles is a 61 y.o. female with morbid obesity and sequela of that including obstructive sleep apnea cor pulmonale congestive heart failure chronic kidney injury and massive edema.  Unfortunately it has been difficult for her to motivate in do much in therapy.  The therapists are trying to engage her but she has not been too keen on participating.  She does report that the back of her knees are better with the can diet.  She does report that she is less breathless than prior.  Weights have been difficult to get on her due to her super morbid obesity.    Review of Systems     Physical Exam   Constitutional:   Patient is pleasant and engaging with me.  When she speaks she does not appear to be short of breath.  She is at this juncture totally O2 dependent and will desaturate without it.  Lungs show fairly good air excursion without any extra adventitial sounds on inspiration and expiration.  Heart is regular rate and rhythm.  She of course still has the massive obesity that is truncal and leg predominant.   Vitals reviewed.      Labs:  All labs reviewed in the nursing home record.    Assessment:  1. Morbid obesity with alveolar hypoventilation     2. Acute kidney injury superimposed on CKD     3. Cor Pulmonale     4. Obstructive Sleep Apnea     5. Chronic diastolic CHF (congestive heart failure)     6. Candida-induced panniculitis         Plan: At this juncture motivation is going to be the key.  We have done serial blood pressures on her which I do feel her accurate and she is trending high so we are going to introduce spironolactone 25 mg daily and increase the metolazone from 2.5 q. O day to 2.5 daily.  We do realize this could be deleterious to the kidneys but it will allow us to mobilize more fluid.  We will  recheck a BMP BN P and CBC with differential on Monday.  I also made special attention to talk to both therapists and the staff D ON about doing everything possible to try to motivate her.  I impressed on her for her to be successful and to be able to accomplish things she wants to do in her life she is going to need to participate in therapy.  Additionally if we can get the edema off she could indeed even be a candidate for possible obesity surgery and then ultimately repairing her knees which have bone-on-bone osteoarthritis.      25 minutes spent of which greater than 65% was face to face communication with the patient about above plan of care    Electronically signed by: Osiel Nesbitt MD

## 2021-06-11 NOTE — TELEPHONE ENCOUNTER
Left message to call back for: Amanuel Seth-Nurses station-Sarah  Information to relay to patient:  Left detailed message on nurses station VM of message below per MD. To call back to clinic if needed, and to verify pharmacy they utilized for the residents.

## 2021-06-11 NOTE — TELEPHONE ENCOUNTER
Left message to call back for: Jef Seth-if needed  Information to relay to patient:  Left detailed message from MD on Melvina's VM, if she any additional questions to call back to clinic.

## 2021-06-11 NOTE — PROGRESS NOTES
Code Status:  FULL CODE  Visit Type: H & P     Facility:  [facility] [[id number]]      Facility Type: SNF (Skilled Nursing Facility, TCU)    History of Present Illness:   Hospital Admission Date: June 7, 2017 [facility] Hospital Discharge Date: June 11, 2017  Facility Admission Date: June 11, 2017 [facility]    Sun Mireles is a 61 y.o. female who is -American morbidly obese with a history of chronic diastolic heart failure, obstructive sleep apnea, COPD on home oxygen, insulin-dependent diabetes, chronic kidney disease, dyslipidemia, and a 45 year history of smoking who presented to [facility] with continued fatigue and respiratory difficulties.  It is thought that she may have gained from 330 pounds to 390 pounds over the last 3-4 weeks.  They do feel that perhaps it was secondary to the purchase of a scooter and then she became more interested in being in her bed and not walking.  She also apparently likes a high salt diet and has been non-compliant.  Her BMI was 70.72 at the time of admission as she had blossomed up to 390 pounds.    Facility course; the patient had a repeat echo that showed a left ventricular ejection fraction of 65% and her initial troponin was negative and chest x-ray showed no evidence of pneumonia.  Cardiology was consulted and the initially began diuresis with IV diuretics and then switched her to Lasix 80 mg twice daily with metolazone 2.5 mg q. O day and insisted on a low-salt diet.  There was a 1500 cc a day fluid restriction.  It does not state how much weight she lost during this time.  Initially on admission her creatinine was 0.6.  Did trend down to a creatinine of 1.8.  Her initial BUN was also elevated 79 and this trended down to 89.  Her brain natruretic peptide however was 1330.  Sugars during the hospitalization stayed in reasonable control.  Her hemoglobin A1c was 7.4.  Actually decreased her dose of Lantus to 30 units at at bedtime  but continued NovoLog to 15 units with each meal.    H[place] [place] course; patient arrived yesterday and unfortunately numerous of her meds were not given secondary to them not being covered by insurance.  Her [place], timolol maleate for glaucoma, furosemide, hydralazine, magnesium oxide, and metoprolol succinate were not given.  Thankfully her blood pressures were not significantly elevated and/or affected by this.  We did not have a baseline weight from admission so uncertain as to the overall diuresis.  Also there is not a weight indicated from discharge.        Past Medical History:   Diagnosis Date     Aneurysm of vertebral artery     Right     Asthma      CHF (congestive heart failure)      Chronic diastolic CHF (congestive heart failure)      Chronic kidney disease (CKD), stage IV (severe)      Diabetes mellitus type 2 with complications      Glaucoma      Gout      Hypercholesteremia      Hypertension      Morbid obesity      Obstructive sleep apnea      Osteoarthritis of knee      Urinary incontinence      Past Surgical History:   Procedure Laterality Date     TX  DELIVERY ONLY      Description:  Section;  Recorded: 10/20/2011;     TX LIGATE FALLOPIAN TUBE      Description: Tubal Ligation;  Recorded: 10/20/2011;     TX REMOVAL GALLBLADDER      Description: Cholecystectomy;  Recorded: 10/20/2011;     Family History   Problem Relation Age of Onset     Hypertension Mother      Hypertension Father      Heart attack Maternal Grandmother      Social History     Social History     Marital status: Single     Spouse name: N/A     Number of children: N/A     Years of education: N/A     Occupational History     Not on file.     Social History Main Topics     Smoking status: Former Smoker     Quit date: 2014     Smokeless tobacco: Never Used     Alcohol use No     Drug use: No     Sexual activity: Not on file     Other Topics Concern     Not on file     Social History Narrative    Reports on  "disability. She is not working and lives with her children and friend.  She reports she turns to her doctor in times of crisis.  She reports she needs assistance with ADLs and has a PCA 11 hours per day, 7 days per week.  Does not have a home nurse.       Additional Geriatric Review she was living with her fiancé and a adopted 14-year-old boy as well as another 16-year-old boy.  She has 7 children total they all live in the area the oldest is 48 years old.  She did smoke for 45 years quit 3 years ago.  She reports that she actually was walking in the latter stages of April but when she got her scooter she was walking much less.  She feels like fluids came on 3 weeks after.  Previously was able to care for herself but recently has not been able to adequately \"clean herself and her fiancé has been helping with that.  Current Outpatient Prescriptions   Medication Sig Dispense Refill     ACCU-CHEK RACHEL PLUS TEST STRP strips Use to test blood sugar four times a day. Generic: Blood Glucose Test 400 strip 1     ACCU-CHEK SOFTCLIX LANCETS lancets CHECK 4 TIMES DAILY 100 each 2     acetaminophen (TYLENOL) 325 MG tablet Take 2 tablets (650 mg total) by mouth every 4 (four) hours as needed. 30 tablet 0     albuterol (PROVENTIL HFA;VENTOLIN HFA) 90 mcg/actuation inhaler Inhale 2 puffs every 6 (six) hours as needed for wheezing.       albuterol (PROVENTIL) 2.5 mg /3 mL (0.083 %) nebulizer solution Inhale 1 vial in nebulizer four times daily as needed 180 mL 4     amLODIPine (NORVASC) 10 MG tablet Take 1 tablet by mouth every day 30 tablet 6     aspirin 81 MG EC tablet Take 1 tablet (81 mg total) by mouth daily. 90 tablet 3     atorvastatin (LIPITOR) 40 MG tablet Take 1 tablet by mouth every night at bedtime 30 tablet 3     blood-glucose meter (ACCU-CHEK RACHEL PLUS METER) Misc USE AS DIRECTED 1 each 1     clobetasol (TEMOVATE) 0.05 % cream APPLY TOPICALLY TWICE DAILY 60 g 0     cloNIDine HCl (CATAPRES) 0.2 MG tablet Take 1 " "tablet (0.2 mg total) by mouth 2 (two) times a day. 60 tablet 0     DIAPER,BRIEF,ADULT, DISPOSABLE (DEPEND PANTS MISC) Use As Directed. Large       diphenhydrAMINE HCl 25 mg TbDL Take 1-2 capsules by mouth every 6 (six) hours as needed. Q 6-8  Hours prn       EASY TOUCH TWIST LANCETS 28 gauge Misc USE TO CHECK BLOOD SUGAR FOUR TIMES A  each 1     esomeprazole (NEXIUM) 40 MG capsule Take 1 capsule (40 mg total) by mouth daily. 90 capsule 3     febuxostat (ULORIC) 40 mg tablet Take 40 mg by mouth daily.       fluticasone-salmeterol (ADVAIR) 500-50 mcg/dose DISKUS Inhale 1 puff 2 (two) times a day. 60 each 6     furosemide (LASIX) 80 MG tablet Take 1 tablet (80 mg total) by mouth 2 (two) times a day at 9am and 6pm. 60 tablet 0     GLUCAGON 1 mg injection Use as directed 1 each 0     hydrALAZINE (APRESOLINE) 100 MG tablet Take 1 tablet by mouth three times daily 90 tablet 3     ketoconazole (NIZORAL) 2 % cream Apply to affected area BID 15 g 0     LANTUS 100 unit/mL injection Inject 30 Units under the skin at bedtime. 11.9 Type 2 without complications  Contact provider if insulin prescribed is not the preferred insulin per insurance. 10 mL PRN     magnesium oxide (MAG-OX) 400 mg tablet Take 200 mg by mouth daily.       metOLazone (ZAROXOLYN) 2.5 MG tablet Take 1 tablet (2.5 mg total) by mouth every other day.  0     metoprolol succinate (TOPROL-XL) 200 MG 24 hr tablet Take 1 tablet (200 mg total) by mouth daily. 90 tablet 3     miscellaneous medical supply Misc Use with nebulizer 1 each 1     montelukast (SINGULAIR) 10 mg tablet TAKE ONE TABLET BY MOUTH EVERY NIGHT AT BEDTIME 90 tablet 03     NEEDLES, INSULIN DISPOSABLE (NOVOFINE 30 MISC) Use As Directed 5 (five) times a day. X 8mm       NOVOFINE AUTOCOVER 30 gauge x 1/3\" Ndle INJECT UNDER THE SKIN 5 TIMES DAILY AS DIRECTED 400 each 0     NOVOFINE AUTOCOVER 30 gauge x 1/3\" Ndle INJECT UNDER THE SKIN 5 TIMES DAILY AS DIRECTED 400 each 0     NOVOLOG 100 unit/mL " injection Check blood sugar three (3) times daily.  11.9 Type 2 without complications 10 mL PRN     NOVOLOG 100 unit/mL injection Check blood sugar one (1) times daily.  11.9 Type 2 without complications 10 mL PRN     NOVOLOG FLEXPEN 100 unit/mL injection pen INJECT 15 UNITS UNDER THE SKIN THREE TIMES DAILY BEFORE EACH MEAL (Patient taking differently: INJECT 15 UNITS UNDER THE SKIN TWO TIMES DAILY  AS NEEDED) 15 mL 0     oxyCODONE-acetaminophen (PERCOCET) 5-325 mg per tablet Take 1 tablet by mouth every 6 (six) hours as needed for pain. 30 tablet 0     potassium chloride SA (K-DUR,KLOR-CON) 10 MEQ tablet TAKE ONE TABLET BY MOUTH ONCE DAILY 90 tablet 3     senna-docusate (PERICOLACE) 8.6-50 mg tablet Take 1 tablet by mouth 2 (two) times a day.  0     timolol maleate (TIMOPTIC) 0.5 % ophthalmic solution USE 1 DROP IN BOTH EYES TWICE DAILY 10 mL 0     traMADol (ULTRAM) 50 mg tablet Take 1 tablet (50 mg total) by mouth 2 (two) times a day as needed. 30 tablet 0     wheelchair [employer] mobility device  Face-to-face exam  October 6, 2016  impaired mobility  Length of need : 99 1 each 0     ZEASORB, MICONAZOLE, 2 % powder LIN BID AFTER DRYING AREA OF REDNESS 70 g 0     No current facility-administered medications for this visit.      No Known Allergies  Immunization History   Administered Date(s) Administered     Pneumo Conj 13-V (2010&after) 02/16/2013     Td, historic 11/09/2005         Review of Systems   Constitutional: Negative.  Negative for appetite change, chills, diaphoresis, fatigue, fever and unexpected weight change.        Vision to fearful of standing independently and feels that she can only stand with a walker.   HENT: Negative for congestion, dental problem, ear pain, hearing loss, nosebleeds, sinus pressure, sore throat, tinnitus and trouble swallowing.    Eyes: Negative for photophobia, pain, discharge, itching and visual disturbance.   Respiratory: Negative for apnea, cough, chest tightness,  shortness of breath and wheezing.         Patient was on home O2 but apparently has been weaned if that and is only using CPAP at night.   Cardiovascular: Negative for chest pain and palpitations.   Gastrointestinal: Negative for abdominal distention, abdominal pain, constipation and diarrhea.   Endocrine: Negative for cold intolerance, heat intolerance and polydipsia.   Genitourinary: Negative.  Negative for decreased urine volume, difficulty urinating, dysuria, enuresis, frequency, hematuria, menstrual problem, urgency and vaginal discharge.   Musculoskeletal: Negative for arthralgias, back pain and gait problem.   Skin:        Right bothered by her popliteal area candida the left greater than right   Allergic/Immunologic: Negative.    Neurological: Negative for dizziness, tremors, syncope, facial asymmetry, speech difficulty, weakness, light-headedness, numbness and headaches.   Hematological: Negative.    Psychiatric/Behavioral: Negative for agitation, behavioral problems, confusion, decreased concentration, dysphoric mood and hallucinations. The patient is not hyperactive.         Physical Exam   Constitutional: She is oriented to person, place, and time. She appears well-developed and well-nourished.   HENT:   Head: Normocephalic and atraumatic.   Right Ear: External ear normal.   Left Ear: External ear normal.   Nose: Nose normal.   Mouth/Throat: Oropharynx is clear and moist.   Eyes: Conjunctivae and EOM are normal. Pupils are equal, round, and reactive to light. Left eye exhibits no discharge. No scleral icterus.   Neck: Neck supple. No JVD present. No tracheal deviation present. No thyromegaly present.   Cardiovascular: Normal rate, regular rhythm and normal heart sounds.  Exam reveals no friction rub.    No murmur heard.  Pulmonary/Chest: Effort normal and breath sounds normal. No respiratory distress. She has no wheezes. She has no rales. She exhibits no tenderness.   Abdominal: Soft. Bowel sounds are  normal. She exhibits no distension and no mass. There is no tenderness. There is no guarding.   Musculoskeletal: Normal range of motion. She exhibits no edema or tenderness.   She does slow to move in the bed and obviously significantly deconditioned.  She does have enormous legs but they are not of the pitting quality.  She also has some central adiposity.   Lymphadenopathy:     She has no cervical adenopathy.   Neurological: She is alert and oriented to person, place, and time. She has normal reflexes. No cranial nerve deficit. She exhibits normal muscle tone. Coordination normal.   Skin: Skin is warm and dry. Rash noted. No erythema.   In her abdominal skin folds there is some mild rash suggestive of yeast as there is in her popliteal areas secondary to her morbid obesity.   Psychiatric: She has a normal mood and affect. Her behavior is normal. Thought content normal.         Labs:  All labs reviewed in the nursing home record.  Recent Results (from the past 240 hour(s))   ECG 12 lead nursing unit performed   Result Value Ref Range    SYSTOLIC BLOOD PRESSURE  mmHg    DIASTOLIC BLOOD PRESSURE  mmHg    VENTRICULAR RATE 58 BPM    ATRIAL RATE 58 BPM    P-R INTERVAL 164 ms    QRS DURATION 86 ms    Q-T INTERVAL 436 ms    QTC CALCULATION (BEZET) 428 ms    P Axis 34 degrees    R AXIS 46 degrees    T AXIS 101 degrees    MUSE DIAGNOSIS       Sinus bradycardia with sinus arrhythmia  Otherwise normal ECG  When compared with ECG of 27-APR-2016 15:58,  No significant change was found  Confirmed by LINN BRYAN MD LOC:WW ([id number]) on 6/8/2017 11:56:49 AM     Lactic Acid   Result Value Ref Range    Lactic Acid 0.9 0.5 - 2.2 mmol/L   Lipase   Result Value Ref Range    Lipase 20 0 - 52 U/L   Magnesium   Result Value Ref Range    Magnesium 2.1 1.8 - 2.6 mg/dL   APTT(PTT)   Result Value Ref Range    PTT 39 (H) 24 - 37 seconds   INR   Result Value Ref Range    INR 1.22 (H) 0.90 - 1.10   HM1 (CBC with Diff)   Result Value Ref Range     WBC 9.4 4.0 - 11.0 thou/uL    RBC 3.58 (L) 3.80 - 5.40 mill/uL    Hemoglobin 8.4 (L) 12.0 - 16.0 g/dL    Hematocrit 27.9 (L) 35.0 - 47.0 %    MCV 78 (L) 80 - 100 fL    MCH 23.5 (L) 27.0 - 34.0 pg    MCHC 30.1 (L) 32.0 - 36.0 g/dL    RDW 21.2 (H) 11.0 - 14.5 %    Platelets 279 140 - 440 thou/uL    MPV 9.9 8.5 - 12.5 fL    Neutrophils % 74 (H) 50 - 70 %    Lymphocytes % 14 (L) 20 - 40 %    Monocytes % 11 (H) 2 - 10 %    Eosinophils % 2 0 - 6 %    Basophils % 0 0 - 2 %    Neutrophils Absolute 6.9 2.0 - 7.7 thou/uL    Lymphocytes Absolute 1.3 0.8 - 4.4 thou/uL    Monocytes Absolute 1.0 (H) 0.0 - 0.9 thou/uL    Eosinophils Absolute 0.1 0.0 - 0.4 thou/uL    Basophils Absolute 0.0 0.0 - 0.2 thou/uL   Manual Differential   Result Value Ref Range    Platelet Estimate Normal Normal    Ovalocytes 2+ (!) Negative    Polychromasia 1+ (!) Negative    Tear Drop Cells 1+ (!) Negative   Comprehensive Metabolic Panel   Result Value Ref Range    Sodium 141 136 - 145 mmol/L    Potassium 4.3 3.5 - 5.0 mmol/L    Chloride 110 (H) 98 - 107 mmol/L    CO2 22 22 - 31 mmol/L    Anion Gap, Calculation 9 5 - 18 mmol/L    Glucose 98 70 - 125 mg/dL    BUN 79 (H) 8 - 22 mg/dL    Creatinine 2.65 (H) 0.60 - 1.10 mg/dL    GFR MDRD Af Amer 22 (L) >60 mL/min/1.73m2    GFR MDRD Non Af Amer 18 (L) >60 mL/min/1.73m2    Bilirubin, Total 0.5 0.0 - 1.0 mg/dL    Calcium 8.9 8.5 - 10.5 mg/dL    Protein, Total 7.6 6.0 - 8.0 g/dL    Albumin 3.0 (L) 3.5 - 5.0 g/dL    Alkaline Phosphatase 282 (H) 45 - 120 U/L    AST 35 0 - 40 U/L    ALT 30 0 - 45 U/L   CK Total   Result Value Ref Range    CK, Total 119 30 - 190 U/L   Troponin I   Result Value Ref Range    Troponin I 0.02 0.00 - 0.29 ng/mL   Blood Gases, Venous   Result Value Ref Range    pH, Venous 7.36 7.35 - 7.45    pCO2, Venous 41 35 - 50 mm Hg    pO2, Jack 64 (H) 25 - 47 mm Hg    Base Excess, Venous -2.1 mmol/L    HCO3, Venous 23.2 (L) 24.0 - 30.0 mmol/L    Oxyhemoglobin 90.7 (H) 70.0 - 75.0 %    O2 Sat, Venous  95.0 (H) 70.0 - 75.0 %   BNP(B-type Natriuretic Peptide)   Result Value Ref Range    BNP 1088 (H) 0 - 101 pg/mL   POCT Glucose   Result Value Ref Range    Glucose, POC 89 mg/dL   Basic metabolic panel   Result Value Ref Range    Sodium 141 136 - 145 mmol/L    Potassium 4.2 3.5 - 5.0 mmol/L    Chloride 110 (H) 98 - 107 mmol/L    CO2 19 (L) 22 - 31 mmol/L    Anion Gap, Calculation 12 5 - 18 mmol/L    Glucose 149 (H) 70 - 125 mg/dL    Calcium 8.5 8.5 - 10.5 mg/dL    BUN 84 (H) 8 - 22 mg/dL    Creatinine 2.65 (H) 0.60 - 1.10 mg/dL    GFR MDRD Af Amer 22 (L) >60 mL/min/1.73m2    GFR MDRD Non Af Amer 18 (L) >60 mL/min/1.73m2   Hemogram with PLT   Result Value Ref Range    WBC 9.1 4.0 - 11.0 thou/uL    RBC 3.41 (L) 3.80 - 5.40 mill/uL    Hemoglobin 8.0 (L) 12.0 - 16.0 g/dL    Hematocrit 26.9 (L) 35.0 - 47.0 %    MCV 79 (L) 80 - 100 fL    MCH 23.5 (L) 27.0 - 34.0 pg    MCHC 29.7 (L) 32.0 - 36.0 g/dL    RDW 21.3 (H) 11.0 - 14.5 %    Platelets 242 140 - 440 thou/uL    MPV 9.7 8.5 - 12.5 fL   Glycosylated Hemoglobin A1C   Result Value Ref Range    Hemoglobin A1c 5.9 4.2 - 6.1 %   POCT Glucose   Result Value Ref Range    Glucose,  mg/dL   Echo Complete   Result Value Ref Range    LV volume diastolic 110 46 - 106 cm3    LV volume systolic 40 14 - 42 cm3    IVSd 1 0.6 - 0.9 cm    LVIDd 5.9 3.8 - 5.2 cm    LVIDs 3.9 2.2 - 3.5 cm    LVOT diam 2.5 cm    LVOT mean gradient 4 mmHg    LVOT peak VTI 27.9 cm    LVOT mean milana 88.2 cm/s    LVOT peak milana 124 cm/s    LVOT peak gradient 6 mmHg    LV PWd 1.2 0.6 - 0.9 cm    MV E' lat milana 7.12 cm/s    MV E' med milana 4.68 cm/s    LA area 25.4 cm2    LA size 4.7 cm    LA/AO root ratio 1.52 no units    AV mean milana 121 cm/s    AV mean gradient 7 mmHg    AV VTI 42.4 cm    AV peak milana 197 cm/s    AO root 3.1 cm    MV decel slope 5090 mm/s2    MV decel time 268 ms    MV P 1/2 time 91 ms    MV peak A milana 108 cm/s    MV peak E milana 140 cm/s    RA peak systolic pressure 10 mmHg    TR peak milana 291  cm/s    TAPSE 2.3 cm    BSA 2.77 m2    Hieght 63 in    Weight 6097.6 lbs    /52 mmHg    HR 57 bpm    IVS/PW ratio 0.8     TR peak gradent 33.9 mmHg    LV FS 33.9 28 - 44 %    Echo LVEF calculated 64 55 - 75 %    LV mass 271.9 g    AV area 3.2 cm2    AV DIM IND milana 0.6     MV area p 1/2 time 2.4 cm2    MV E/A Ratio 1.3     LVOT area 4.91 cm2    LVOT .9 cm3    AV peak gradient 15.5 mmHg    LV systolic volume index 14.4 11 - 31 cm3/m2    LV diastolic volume index 39.7 34 - 74 cm3/m2    LV mass index 98.2 g/m2    LV SVi 49.4 ml/m2    MV med E/e' ratio 29.9     MV lat E/e' ratio 19.7     LV CO 7.8 l/min    LV Ci 2.8 l/min/m2    Height 63.0 in    Weight 381 lbs    MV Avg E/e' Ratio 23.7 cm/s    AV DIM IND VTI 0.7    POCT Glucose   Result Value Ref Range    Glucose,  mg/dL   TSH   Result Value Ref Range    TSH 1.17 0.30 - 5.00 uIU/mL   POCT Glucose   Result Value Ref Range    Glucose,  mg/dL   POCT Glucose   Result Value Ref Range    Glucose,  mg/dL   Potassium - Next AM   Result Value Ref Range    Potassium 4.4 3.5 - 5.0 mmol/L   BNP(B-type Natriuretic Peptide)   Result Value Ref Range    BNP 1320 (H) 0 - 101 pg/mL   POCT Glucose   Result Value Ref Range    Glucose,  mg/dL   POCT Glucose   Result Value Ref Range    Glucose,  mg/dL   POCT Glucose   Result Value Ref Range    Glucose,  mg/dL   Basic Metabolic Panel   Result Value Ref Range    Sodium 142 136 - 145 mmol/L    Potassium 4.0 3.5 - 5.0 mmol/L    Chloride 108 (H) 98 - 107 mmol/L    CO2 24 22 - 31 mmol/L    Anion Gap, Calculation 10 5 - 18 mmol/L    Glucose 145 (H) 70 - 125 mg/dL    Calcium 9.3 8.5 - 10.5 mg/dL    BUN 78 (H) 8 - 22 mg/dL    Creatinine 2.31 (H) 0.60 - 1.10 mg/dL    GFR MDRD Af Amer 26 (L) >60 mL/min/1.73m2    GFR MDRD Non Af Amer 21 (L) >60 mL/min/1.73m2   POCT Glucose   Result Value Ref Range    Glucose,  mg/dL   Basic Metabolic Panel   Result Value Ref Range    Sodium 144 136 - 145 mmol/L     Potassium 3.9 3.5 - 5.0 mmol/L    Chloride 110 (H) 98 - 107 mmol/L    CO2 25 22 - 31 mmol/L    Anion Gap, Calculation 9 5 - 18 mmol/L    Glucose 91 70 - 125 mg/dL    Calcium 9.3 8.5 - 10.5 mg/dL    BUN 77 (H) 8 - 22 mg/dL    Creatinine 1.99 (H) 0.60 - 1.10 mg/dL    GFR MDRD Af Amer 31 (L) >60 mL/min/1.73m2    GFR MDRD Non Af Amer 25 (L) >60 mL/min/1.73m2   POCT Glucose   Result Value Ref Range    Glucose, POC 92 mg/dL   POCT Glucose   Result Value Ref Range    Glucose,  mg/dL   POCT Glucose   Result Value Ref Range    Glucose,  mg/dL   POCT Glucose   Result Value Ref Range    Glucose,  mg/dL   Basic Metabolic Panel   Result Value Ref Range    Sodium 144 136 - 145 mmol/L    Potassium 3.6 3.5 - 5.0 mmol/L    Chloride 107 98 - 107 mmol/L    CO2 25 22 - 31 mmol/L    Anion Gap, Calculation 12 5 - 18 mmol/L    Glucose 92 70 - 125 mg/dL    Calcium 9.1 8.5 - 10.5 mg/dL    BUN 69 (H) 8 - 22 mg/dL    Creatinine 1.80 (H) 0.60 - 1.10 mg/dL    GFR MDRD Af Amer 34 (L) >60 mL/min/1.73m2    GFR MDRD Non Af Amer 28 (L) >60 mL/min/1.73m2   Platelet Count   Result Value Ref Range    Platelets 253 140 - 440 thou/uL   POCT Glucose   Result Value Ref Range    Glucose, POC 73 mg/dL   POCT Glucose   Result Value Ref Range    Glucose,  mg/dL         Assessment:  1. Morbid obesity with alveolar hypoventilation     2. Cor Pulmonale     3. Obstructive Sleep Apnea     4. Long-term insulin use in type 2 diabetes     5. Acute kidney injury superimposed on CKD     6. Chronic diastolic CHF (congestive heart failure)     7. Diabetic polyneuropathy associated with type 2 diabetes mellitus     8. Gout     9. Microcytic anemia     10. Localized Osteoarthritis Of The Knee     11. Candidiasis, intertrigo     12. Candida-induced panniculitis         Plan: OT and PT will be very helpful for deconditioning.  We are going to try to discourage her scooter use and try to get her motivated to walk herself.  Additionally we will  keep an eye on the BUN and creatinine with repeat BMP on Wednesday.  We will also check magnesium at that time.  In regards to her medicines I feel the amlodipine is not adding anything other than perhaps increasing the edema.  We will discontinue that.  If her creatinine stabilizes I would consider adding spironolactone.  We will attempt to get some reliable weights and use these as benchmarks.    Total 65 minutes of which 65% was spent in counseling and coordination of care of the above plan.    Electronically signed by: Osiel Nesbitt MD

## 2021-06-11 NOTE — TELEPHONE ENCOUNTER
Ok to see her, make sure she bring all her medications,living will ( if she has one),nurse and MD  Note  from Amanuel herndon and anything that will be helpful to assist her

## 2021-06-11 NOTE — PROGRESS NOTES
MURTAZA Garsia is a 61 year old lady who is here following a hospitalization and a stay in the TCU. She was admitted for fluid overload, edema, CHF exacerbation, acute on chronic kidney disease, alveloar hypoventilation. These were all felt to be exacerbated by massive obesity. She was discharged to the TCU on 6/11/17 and was discharged from there to home on 6/27. She began with a weight of ? 347 pounds on admission to the hospital to a discharge weight from the TCU of 310 pounds. She participated in PT and OT and although motivation was a problem initially, it sounds like she did do well after several days. Sun says that her diet is good as she now knows what to eat and what not to eat. She is chewing on ice rather than drinking water when she is thirsty. Her mouth is dry a lot from the diuretics she is on. Balancing her need for diuresis vs. Her CKD was an issue in both places. She is currently on Zaroxylyn once a day as well as Furosemide twice a day. She was to be DC'd on  Home PT and OT but this didn't happen. Sun says she doesn't think she needs these as she is doing the same things they were doing on her own.   She is feeling better and is excited as her brother told her if she gets down to 250 pounds he will get her a hansa ring. She knows she has to get out of her scooter and only use this occasionally to get to places that are too far to walk. She states she realizes that she got herself into trouble by relying on the scooter too much.   Patient Active Problem List   Diagnosis     Gout     Morbid obesity with alveolar hypoventilation     Cor Pulmonale     Asthma     Urinary Incontinence     Microcytic anemia     Essential Hypercholesterolemia     Obstructive Sleep Apnea     Shortness of breath     Long-term insulin use in type 2 diabetes     Acute diastolic heart failure     Postmenopausal bleeding     Benign Essential Hypertension     Acute kidney injury superimposed on CKD     Localized  Osteoarthritis Of The Knee     Lower Back Pain     Glaucoma     Aneurysm Of The Right Vertebral Artery     Acute arthritis     Back pain     Chronic pain     Lethargy     Joint pain, localized to the knee     Joint pain, localized in the right shoulder     Tobacco use     Bilateral edema of lower extremity     Diabetic polyneuropathy associated with type 2 diabetes mellitus     Candidiasis, intertrigo     Acute on chronic respiratory failure with hypoxia and hypercapnia     Pulmonary HTN     Chronic diastolic CHF (congestive heart failure)     Current Outpatient Prescriptions on File Prior to Visit   Medication Sig Dispense Refill     ACCU-CHEK RACHEL PLUS TEST STRP strips Use to test blood sugar four times a day. Generic: Blood Glucose Test 400 strip 1     amLODIPine (NORVASC) 10 MG tablet Take 1 tablet by mouth every day 30 tablet 6     aspirin 81 MG EC tablet Take 1 tablet (81 mg total) by mouth daily. 90 tablet 3     blood-glucose meter (ACCU-CHEK RACHEL PLUS METER) Misc USE AS DIRECTED 1 each 1     clobetasol (TEMOVATE) 0.05 % cream APPLY TOPICALLY TWICE DAILY 60 g 0     cloNIDine HCl (CATAPRES) 0.2 MG tablet Take 1 tablet (0.2 mg total) by mouth 2 (two) times a day. 60 tablet 0     DIAPER,BRIEF,ADULT, DISPOSABLE (DEPEND PANTS MISC) Use As Directed. Large       diphenhydrAMINE HCl 25 mg TbDL Take 1-2 capsules by mouth every 6 (six) hours as needed. Q 6-8  Hours prn       EASY TOUCH TWIST LANCETS 28 gauge Misc USE TO CHECK BLOOD SUGAR FOUR TIMES A  each 1     esomeprazole (NEXIUM) 40 MG capsule Take 1 capsule (40 mg total) by mouth daily. 90 capsule 3     GLUCAGON 1 mg injection Use as directed 1 each 0     ketoconazole (NIZORAL) 2 % cream Apply to affected area BID 15 g 0     magnesium oxide (MAG-OX) 400 mg tablet TAKE 1/2 TABLET BY MOUTH ONCE DAILY 45 tablet 0     senna-docusate (PERICOLACE) 8.6-50 mg tablet Take 1 tablet by mouth 2 (two) times a day.  0     spironolactone (ALDACTONE) 25 MG tablet Take 1  "tablet (25 mg total) by mouth daily. 30 tablet 0     timolol maleate (TIMOPTIC) 0.5 % ophthalmic solution USE 1 DROP IN BOTH EYES TWICE DAILY 10 mL 0     wheelchair [employer] mobility device  Face-to-face exam  October 6, 2016  impaired mobility  Length of need : 99 1 each 0     ZEASORB, MICONAZOLE, 2 % powder LIN BID AFTER DRYING AREA OF REDNESS 70 g 0     [DISCONTINUED] potassium chloride (KLOR-CON) 10 MEQ CR tablet Take 1 tablet (10 mEq total) by mouth daily. 30 tablet 0     fluticasone-salmeterol (ADVAIR) 500-50 mcg/dose DISKUS Inhale 1 puff 2 (two) times a day. 60 each 6     LANTUS 100 unit/mL injection Inject 30 Units under the skin at bedtime. 11.9 Type 2 without complications  Contact provider if insulin prescribed is not the preferred insulin per insurance. 10 mL PRN     metOLazone (ZAROXOLYN) 2.5 MG tablet Take 1 tablet (2.5 mg total) by mouth daily. (Patient taking differently: Take 2.5 mg by mouth 2 (two) times a day. ) 30 tablet 0     miscellaneous medical supply Misc Use with nebulizer 1 each 1     NEEDLES, INSULIN DISPOSABLE (NOVOFINE 30 MISC) Use As Directed 5 (five) times a day. X 8mm       NOVOFINE AUTOCOVER 30 gauge x 1/3\" Ndle INJECT UNDER THE SKIN 5 TIMES DAILY AS DIRECTED 400 each 0     NOVOFINE AUTOCOVER 30 gauge x 1/3\" Ndle INJECT UNDER THE SKIN 5 TIMES DAILY AS DIRECTED 400 each 0     NOVOLOG 100 unit/mL injection Check blood sugar three (3) times daily.  11.9 Type 2 without complications 10 mL PRN     NOVOLOG 100 unit/mL injection Check blood sugar one (1) times daily.  11.9 Type 2 without complications 10 mL PRN     NOVOLOG FLEXPEN 100 unit/mL injection pen INJECT 15 UNITS UNDER THE SKIN THREE TIMES DAILY BEFORE EACH MEAL (Patient taking differently: INJECT 15 UNITS UNDER THE SKIN TWO TIMES DAILY  AS NEEDED) 15 mL 0     No current facility-administered medications on file prior to visit.      Social History     Social History     Marital status: Single     Spouse name: N/A     Number of " children: N/A     Years of education: N/A     Occupational History     Not on file.     Social History Main Topics     Smoking status: Former Smoker     Quit date: 2/8/2014     Smokeless tobacco: Never Used     Alcohol use No     Drug use: No     Sexual activity: Not on file     Other Topics Concern     Not on file     Social History Narrative    Reports on disability. She is not working and lives with her children and friend.  She reports she turns to her doctor in times of crisis.  She reports she needs assistance with ADLs and has a PCA 11 hours per day, 7 days per week.  Does not have a home nurse.       family history includes Heart attack in her maternal grandmother; Hypertension in her father and mother.    O - VS_ BP - not taken weight 293. This lady is alert and in NAD. She looks visibly smaller. Lungs - are clear throughout today. No wheezes, rales, or rhonchi. Heart - RR and R S1S2 normal without murmurs or ectopy. Abd  - Soft BS+ througout. No organomegaly or masses. Ext - Trace edema - 1+ edema bilaterally. This is MUCH improved. Skin - warm and dry.     ASS/PLAN - 1. Diastolic CHF - doing well. She has lost a total of 50 pounds as of today's weight reading. She is continuing to do fluid restriction and diuretics. Will contiinue at the current doses                           And recheck her kidney functions.                         2. CKD - Will check BMP today.                       3. KHUSHBOO - she is using her CPAP faithfully and feels better with this.                       4. DM - Her last glycohgb was 5.6 on 6-8-17. She will continue on her current regimen                      5. Hypoventilation - this should improve with weight loss                       6. Massive obesity - 50 pound weight loss thus far. She is congratulated on her efforts and we talked about keeping this up whle at home. Will follow simoneely                      7. Deconditioning - will watch closely. If we get the sense that she is  not being able to continue will reinstitute home health PT and OT.                       8. Labs drawn - BMP, magnesium                       9. FU in 2 weeks   REVIEWED - hospital admission                        Hospital progress notes                         Hospital DC summary                         TCU DC summary   MEDICATION RECONCILIATION WAS DONE

## 2021-06-11 NOTE — TELEPHONE ENCOUNTER
Spoke with pt. She voiced understanding of needing to discontinue antibiotics and start fluconazole. But she is unable to tell me where to send a prescription. Denies having family or friends to help. Requests I call her rehab center to figure it out. Which I did. They have not returned a call after 3 hours. Attempted to call again. Left VM with nursing manager.

## 2021-06-11 NOTE — PATIENT INSTRUCTIONS - HE
uSn Mireles,    It was a pleasure to see you today at the [facility].     My recommendations after this visit include:  - No changes to your medications   - Follow up with Dr. Banerjee in 1-2 months   - Please call my nurse Pepper if you have any concerns: [phone]    Sarah Strickland, CNP

## 2021-06-12 NOTE — PROGRESS NOTES
MURTAZA Garsia is a 61 year old lady well known to me who is here for a FU of  Her fluid status. She was seen by dr. Swartz last week. At that point, she had lost 7 pounds in a 1 week period of time. It was felt that she was overdiuresed. Her Bumex has been DC'd, and her Furosemide has been decreased to 80 mg once a day. We are going to check again today to see what her fluid status is; and what her kidney status is. She says she is still feeling very poorly. She still has her pain in her legs . We are trying to get her lidocaine topical to help this but this is an insurance issue.   Patient Active Problem List   Diagnosis     Gout     Morbid obesity with alveolar hypoventilation     Cor Pulmonale     Asthma     Urinary Incontinence     Microcytic anemia     Essential Hypercholesterolemia     Obstructive Sleep Apnea     Shortness of breath     Long-term insulin use in type 2 diabetes     Acute diastolic heart failure     Postmenopausal bleeding     Benign Essential Hypertension     Acute kidney injury superimposed on CKD     Localized Osteoarthritis Of The Knee     Lower Back Pain     Glaucoma     Aneurysm Of The Right Vertebral Artery     Acute arthritis     Back pain     Chronic pain     Lethargy     Joint pain, localized to the knee     Joint pain, localized in the right shoulder     Tobacco use     Bilateral edema of lower extremity     Diabetic polyneuropathy associated with type 2 diabetes mellitus     Candidiasis, intertrigo     Acute on chronic respiratory failure with hypoxia and hypercapnia     Pulmonary HTN     Chronic diastolic CHF (congestive heart failure)     Current Outpatient Prescriptions on File Prior to Visit   Medication Sig Dispense Refill     ACCU-CHEK RACHEL PLUS TEST STRP strips Use to test blood sugar four times a day. Generic: Blood Glucose Test 400 strip 1     ADVAIR DISKUS 250-50 mcg/dose DISKUS        albuterol (PROAIR HFA;PROVENTIL HFA;VENTOLIN HFA) 90 mcg/actuation inhaler Inhale 2  puffs.       albuterol (PROVENTIL) 2.5 mg /3 mL (0.083 %) nebulizer solution Use 1 vial via nebulizer four times a day as needed Generic: Albuterol Sulfate 60 vial 3     amLODIPine (NORVASC) 10 MG tablet Take 1 tablet by mouth every day 30 tablet 6     aspirin 81 MG EC tablet Take 1 tablet (81 mg total) by mouth daily. 90 tablet 3     atorvastatin (LIPITOR) 40 MG tablet Take 1 tablet (40 mg total) by mouth at bedtime. 90 tablet 3     blood-glucose meter (ACCU-CHEK RACHEL PLUS METER) Misc USE AS DIRECTED 1 each 1     clobetasol (TEMOVATE) 0.05 % cream APPLY TOPICALLY TWICE DAILY 60 g 0     cloNIDine HCl (CATAPRES) 0.2 MG tablet Take 1 tablet (0.2 mg total) by mouth 2 (two) times a day. 60 tablet 0     DIAPER,BRIEF,ADULT, DISPOSABLE (DEPEND PANTS MISC) Use As Directed. Large       diphenhydrAMINE HCl 25 mg TbDL Take 1-2 capsules by mouth every 6 (six) hours as needed. Q 6-8  Hours prn       EASY TOUCH TWIST LANCETS 28 gauge Misc USE TO CHECK BLOOD SUGAR FOUR TIMES A  each 1     esomeprazole (NEXIUM) 40 MG capsule Take 1 capsule (40 mg total) by mouth daily. 90 capsule 3     febuxostat (ULORIC) 40 mg tablet Take 40 mg by mouth.       fluticasone-salmeterol (ADVAIR) 500-50 mcg/dose DISKUS Inhale 1 puff 2 (two) times a day. 60 each 6     furosemide (LASIX) 80 MG tablet Take 2 tablets by mouth daily  Generic: Furosemide 60 tablet 3     furosemide (LASIX) 80 MG tablet Take 2 tablets by mouth daily  Generic: Furosemide 60 tablet 10     generic lancets (ACCU-CHEK SOFTCLIX LANCETS) CHECK 4 TIMES DAILY 100 each 2     GLUCAGON 1 mg injection Use as directed 1 each 0     hydrALAZINE (APRESOLINE) 100 MG tablet Take one tablet by mouth three times daily. Generic: HydrALAZINE HCl 90 tablet 0     ketoconazole (NIZORAL) 2 % cream Apply to affected area BID 15 g 0     LANTUS 100 unit/mL injection Inject 30 Units under the skin at bedtime. 11.9 Type 2 without complications  Contact provider if insulin prescribed is not the  "preferred insulin per insurance. 10 mL PRN     LANTUS SOLOSTAR 100 unit/mL (3 mL) pen        LEVEMIR FLEXTOUCH 100 unit/mL (3 mL) pen Inject 35 units subcutaneously twice daily Generic: Insulin Detemir 1 adj dose pen 3     LEVEMIR FLEXTOUCH 100 unit/mL (3 mL) pen INJECT 35 UNITS BID  3     lidocaine (LIDODERM) 5 % Remove & Discard patch within 12 hours or as directed by MD 15 patch 0     magnesium oxide (MAG-OX) 400 mg tablet TAKE 1/2 TABLET BY MOUTH ONCE DAILY 45 tablet 0     metOLazone (ZAROXOLYN) 2.5 MG tablet Take 1 tablet (2.5 mg total) by mouth daily. (Patient taking differently: Take 2.5 mg by mouth 2 (two) times a day. ) 30 tablet 0     metoprolol succinate (TOPROL-XL) 200 MG 24 hr tablet Take 1 tablet (200 mg total) by mouth daily. 90 tablet 3     miscellaneous medical supply Misc Use with nebulizer 1 each 1     montelukast (SINGULAIR) 10 mg tablet TAKE ONE TABLET BY MOUTH EVERY NIGHT AT BEDTIME 90 tablet 03     NEEDLES, INSULIN DISPOSABLE (NOVOFINE 30 MISC) Use As Directed 5 (five) times a day. X 8mm       NOVOFINE AUTOCOVER 30 gauge x 1/3\" Ndle INJECT UNDER THE SKIN 5 TIMES DAILY AS DIRECTED 400 each 0     NOVOFINE AUTOCOVER 30 gauge x 1/3\" Ndle INJECT UNDER THE SKIN 5 TIMES DAILY AS DIRECTED 400 each 0     NOVOLOG 100 unit/mL injection Check blood sugar three (3) times daily.  11.9 Type 2 without complications 10 mL PRN     NOVOLOG 100 unit/mL injection Check blood sugar one (1) times daily.  11.9 Type 2 without complications 10 mL PRN     NOVOLOG FLEXPEN 100 unit/mL injection pen INJECT 15 UNITS UNDER THE SKIN THREE TIMES DAILY BEFORE EACH MEAL (Patient taking differently: INJECT 15 UNITS UNDER THE SKIN TWO TIMES DAILY  AS NEEDED) 15 mL 0     oxyCODONE-acetaminophen (PERCOCET) 5-325 mg per tablet Take 1 tablet by mouth every 6 (six) hours as needed for pain. 30 tablet 0     potassium chloride (KLOR-CON) 10 MEQ CR tablet Take 1 tablet (10 mEq total) by mouth daily. 30 tablet 0     potassium chloride " (KLOR-CON) 10 MEQ CR tablet Take 1 tablet by mouth daily  Generic: Potassium Chloride 30 tablet 10     senna-docusate (PERICOLACE) 8.6-50 mg tablet Take 1 tablet by mouth 2 (two) times a day.  0     spironolactone (ALDACTONE) 25 MG tablet Take 1 tablet (25 mg total) by mouth daily. 30 tablet 0     timolol maleate (TIMOPTIC) 0.5 % ophthalmic solution USE 1 DROP IN BOTH EYES TWICE DAILY 10 mL 0     traMADol (ULTRAM) 50 mg tablet Take 1 tablet (50 mg total) by mouth 2 (two) times a day as needed for severe pain (7-10). 60 tablet 0     traMADol (ULTRAM) 50 mg tablet TAKE 2 TABLETS BY MOUTH EVERY MORNING, 1 TABLET DAILY IN THE EVENING, AND 2 TABLETS EVERY NIGHT AT BEDTIME 75 tablet 0     wheelchair Lindsey Power mobility device  Face-to-face exam  October 6, 2016  impaired mobility  Length of need : 99 1 each 0     ZEASORB, MICONAZOLE, 2 % powder LIN BID AFTER DRYING AREA OF REDNESS 70 g 0     No current facility-administered medications on file prior to visit.      Social History     Social History     Marital status: Single     Spouse name: N/A     Number of children: N/A     Years of education: N/A     Occupational History     Not on file.     Social History Main Topics     Smoking status: Former Smoker     Quit date: 2/8/2014     Smokeless tobacco: Never Used     Alcohol use No     Drug use: No     Sexual activity: Not on file     Other Topics Concern     Not on file     Social History Narrative    Reports on disability. She is not working and lives with her children and friend.  She reports she turns to her doctor in times of crisis.  She reports she needs assistance with ADLs and has a PCA 11 hours per day, 7 days per week.  Does not have a home nurse.       family history includes Heart attack in her maternal grandmother; Hypertension in her father and mother.    O - VS_ BP - 116/58 this lady is alert and in NAD. She does appear tired . Lungs  - clear throiughout. Heart - RR and R S1S2 normal wthout murmurs or  ectopy. Ext - no edema.     ASS  - 1. CHF               2. CKD with probable overdiuresis at this time              3. Agree with diagnosis of bursitis               4. Peripheral neuropathy     PLAN - 1. Will recheck BMP, magnesium, and BNP               2. Will refer to neurology. We have tried several different things for this but unfortunately, with Sun it seems that when we get a medication to a dose that is effective, she gets side effects that limit usage.                3. May need to adjust again diuretic. It is a balancing act between fluid overload and over diuresis.

## 2021-06-12 NOTE — PROGRESS NOTES
08/07/17 Attempt 1: Care Guide called patient.  If this patient is returning my call, please transfer to [phone].  08/23/17 Attempt 2: Care Guide called patient.  If this patient is returning my call, please transfer to [phone].

## 2021-06-12 NOTE — PROGRESS NOTES
08/07/17 Attempt 1: Care Guide called patient.  If this patient is returning my call, please transfer to [phone].   16

## 2021-06-12 NOTE — TELEPHONE ENCOUNTER
Who is calling:  MOOSE Alvarez   Reason for Call:  Caller stated she has questions regarding patient's status regarding her recent hospitalization and medications. Caller stated it will only take 2 minutes. Caller stated that she would like a call back today.  Date of last appointment with primary care: n/a  Okay to leave a detailed message: Yes  [phone]

## 2021-06-12 NOTE — TELEPHONE ENCOUNTER
----- Message from Collin Doran MD sent at 10/7/2020  4:57 PM CDT -----  Please let the patient or the staff know that potassium was very high,at 6.6  she needs to go to the ER for a recheck.

## 2021-06-12 NOTE — TELEPHONE ENCOUNTER
Spoke w/Martine @[facility], relayed message per MD. She will let the nurses for patient know right away.

## 2021-06-12 NOTE — TELEPHONE ENCOUNTER
Medication Question or Clarification  Who is calling: Nurse  What medication are you calling about (include dose and sig)?:   gabapentin (NEURONTIN) 100 MG capsule        Sig - Route: Take 200 mg by mouth 4 (four) times a week. Monday, Wednesday, Friday, Sunday - at bedtime on non-HD days - Oral    Class: Historical Med      gabapentin (NEURONTIN) 100 MG capsule 30 capsule 0 10/28/2020     Sig - Route: Take 200 mg by mouth 3 (three) times a week. - Oral    Class: Print          Who prescribed the medication?: Hospital provider  What is your question/concern?: Nurse is asking for these two sigs to be clarified on what the patient should be taking.  Please call.    Okay to leave a detailed message?: Yes

## 2021-06-12 NOTE — TELEPHONE ENCOUNTER
Who is calling:  Kym from [facility]   Reason for Call:  Calling to check on below request. Informed her of Collin Doran MD message. Kym is asking if she can that as an order faxed to them @ Fax # [phone]. Please advise asap.  Date of last appointment with primary care: 9/23/2020  Okay to leave a detailed message: Yes

## 2021-06-12 NOTE — PROGRESS NOTES
08/07/17 Attempt 1: Care Guide called patient.  If this patient is returning my call, please transfer to [phone].  08/23/17 Attempt 2: Care Guide called patient.  If this patient is returning my call, please transfer to [phone].  09/06/17 Attempt 3: Care Guide called patient.  If this patient is returning my call, please transfer to [phone].  I have called this patient 3 times over the past two weeks and have been unsuccessful in reaching her.  This patient has also not returned any of my messages.  I will continue attempting to reach out to this patient in one month.  I will also check this patient's chart for upcoming appointments, ER reports that may contain a new phone number, or any other recent activity.

## 2021-06-12 NOTE — PROGRESS NOTES
"DEVICE CLINIC RN POST-OP NOTE    Reason for visit: In-clinic post-operative wound and device check; s/p implant of a dual chamber pacemaker system; MRI conditional     Device System: [employer] 2272 Assurity MRI, RA and RV Leads: [employer] 2088TC Tendril STS  Procedure date: 10/23/2020  Implant Diagnosis: Severe Bradycardia  Implanting Physician: Dr. Wilbert Bolaños      Assessment  Subjective: \"I'm feeling much better than I did before.\"  Vitals:   Vitals:    11/06/20 1234   BP: 130/62   Pulse: 78   Resp: 14   Temp: 98.8  F (37.1  C)   SpO2: 100%     Heart Sounds: normal  Lung Sounds: clear bilaterally  Incision/device pocket: Clean, dry and intact with resolving ecchymosis and edema.  There are no signs of infection present.  The remaining Steri-strips were removed at today's visit and the area cleaned of residual adhesive.      Device Findings  Interrogation of device reveals normal sensing and capture thresholds  See the Paceart Report for a full summary of the device information.    Other: Ms. Mireles presents in normal sinus rhythm at 75 bpm today.      Patient Education  Sun SHARMIN Chi was unaccompanied today.    [facility]'s Partnership Agreement for Device Patients and Post-operative Checklist were presented and reviewed with Ms. Mireles at today's appointment.    Signs and symptoms of infection, poor wound healing, and normal device function were reviewed. Range of motion and weight restrictions for the left arm are for four weeks. She was issued a [email or web address] remote monitor and instructed on its set-up and use for remote monitoring by the [employer] representative prior to hospital discharge. These instructions were reviewed at today s visit.  She further tells me she will be moving from [facility] \"in with my daughter in a few weeks.\"  I asked that she call us when her address changes so we can update our information.  Ms. iMreles verbalizes " understanding of these instructions and is agreeable to the plan.  Two copies of today's Post-operative Checklist, After Visit Summary and [facility]'s Partnership Agreement for Device Patients was sent with Ms. Mirelse in separate envelopes, one copy for Ms. Mireles and an identical set for the Nursing Staff at [facility].      Plan  - One month post-op remote transmission on 12/02/202  - Three month post-op in-clinic wound and device check on 01/22/2021 at our [facility] location    Tena Cast, RN, MA  Device Nurse  [facility]

## 2021-06-12 NOTE — PATIENT INSTRUCTIONS - HE
Pacemaker Post-operative Checklist      The Device Registered Nurse (RN) reviewed the pacemaker function.      The Device RN did a wound assessment and wound care teaching.    Please call the Device Nurses with any signs of infection or questions regarding wound healing. Device Nurse Line: [phone], Option #3      The Device RN demonstrated and displayed the specific remote monitoring system for your pacemaker.      The Device RN reviewed the Partnership Agreement Form.    Patient Instructions    Do not lift your LEFT arm above the shoulder height, perform any vigorous arm movements such as swimming, golfing, washing windows, shoveling, digging, raking, sweeping, vacuuming or lifting greater than 10 lbs with the affected arm for FOUR weeks from the date of implant, until after 11/20/2020.  You may apply an ice pack to the surgical site for 10 minutes 4 to 7 times a day as needed for pain or discomfort.  Do not use lotions or powder on or around the incision until after 12/04/2020.      To reduce the risk of infection, try to avoid any dental procedures for the first 6 weeks, after 12/04/2020, after your pacemaker implant. If you have an emergent or urgent dental need during this time, contact the device clinic for a prescription for an antibiotic.      You will receive a device identification (ID) card in the mail from the device  within 6 weeks to replace the temporary ID card you were given in the hospital.      You may travel by any mode of transportation; just show your pacemaker ID card. You may be subject to a hand search or use of a handheld wand, but official should not keep the wand over the implant site for greater than 5-10 seconds.      For any surgery, let your doctor know you have a pacemaker.       Your pacemaker is MRI safe after 12/04/2020       Most household appliances, including a microwave, will not interfere with your pacemaker function. If you suspect interference, simply move  away from the source. Cell phones do not cause a problem. Please refer to the device booklet from the  or their website under the section on electromagnetic interference (PIERO) for further guidelines on things that may interfere with your pacemaker.       Device Clinic Contact Information  Device Nurses: [phone], Option #3    Device Remote Specialists: [phone], Option #2. For questions about your Remote Transmission or Transmission Schedule  Device Schedulers: [phone], Option #1

## 2021-06-12 NOTE — TELEPHONE ENCOUNTER
I do not see any discharge summary from the hospital to review medication, but 200 mg 3 times a week seems more reasonable for her.

## 2021-06-12 NOTE — PROGRESS NOTES
SUBJECTIVE: Sun Mireles is a 61 y.o. female with:  Chief Complaint   Patient presents with     Leg Pain     bilateral x last week, throbing pain . 6/10 only when laying on sides  travel from feet to hips, pt has neuropthy, sleep on side it bothers pt -      Medication Refill     eye drops for glocuma    She has history of CHF/ diabetes mellitus / peripheral neuropathy / morbid obesity / sleep apnea on CPAP/ chronic pain.  She had recent hospitalization for CHF in June and her diuretics were increased.  She present with hypoxia/ shortness of breath and was found to have diastolic heart failure.  She saw Kelli Vanessa MD 7/12/17 with worsening of her renal failure and metolazone was decreased to every other day.  She has been taking furosemide 80 mg twice a day. Currently she denies any shortness of breath / chest pain / nausea.    She developed bilateral pain in the hips and upper thighs. Started after 7/12/17. No falls/ trauma or injuries.  She was lying on her right side and she had pain that was cramping from right buttock to right foot.  It got better when she got up.  This similar pain occurs on both sides of her hips.  She has to sleep on her stomach.  The pain is only when she is on her side.  She does have peripheral neuropathy. She has diabetes mellitus type 2 on insulin and recent blood sugar was 116.  She is taking magnesium 200 mg daily.    She has glaucoma and is requesting a refill of her eye drops.    SH: Single.  Lives in an apartment.    OBJECTIVE: /40 (Patient Site: Left Arm, Patient Position: Sitting, Cuff Size: Adult Large)  Wt (!) 286 lb (129.7 kg)  Breastfeeding? No  BMI 50.66 kg/m2 no distress  Lungs: CTA  CV: RRR. S1 and S2.  Hips: Tender over greater trochanters bilaterally.  Legs: No lesions/ erythema or rash on lateral thighs.  Neuro: AAOx3. She is confined to wheelchair.     Wt Readings from Last 3 Encounters:   07/21/17 (!) 286 lb (129.7 kg)   07/12/17 (!) 293 lb  (132.9 kg)   06/19/17 (!) 334 lb 3.2 oz (151.6 kg)     BUN - 135  Cr. 3.55    Sun was seen today for leg pain and medication refill.    Diagnoses and all orders for this visit:    Acute kidney injury superimposed on CKD- She has had a 7 lb weight loss since last week.  Will need to recheck BMP/ magnesium given her acute renal failure on the diuretics.  I suspect she may be even drier this week.  -     Basic Metabolic Panel  -     Magnesium    Hip bursitis, unspecified laterality- We discussed steroid injection but she declined.  Will try lidoderm patch to each hip at night.  -     lidocaine (LIDODERM) 5 %; Remove & Discard patch within 12 hours or as directed by MD    Diabetic polyneuropathy associated with type 2 diabetes mellitus  -     lidocaine (LIDODERM) 5 %; Remove & Discard patch within 12 hours or as directed by MD    Glaucoma  -     timolol maleate (TIMOPTIC) 0.5 % ophthalmic solution; USE 1 DROP IN BOTH EYES TWICE DAILY    Chronic diastolic CHF (congestive heart failure) - Weight is down on diuretics and I suspect she is too dry.  Her blood pressure is stable.    Rosalia Swartz

## 2021-06-12 NOTE — TELEPHONE ENCOUNTER
Caller needs more information other than just the dosing of that specific medication as she is with [facility], and is investigating a complaint. Caller would like to speak directly with Dr Doran, questions regarding patient care and current medications. She believes it would take  just 2 minutes of his time.    If Dr Doran can please call her at [phone] to assist her to complete this investigation.    Caller appreciates your time.

## 2021-06-12 NOTE — TELEPHONE ENCOUNTER
Left voice mail for estefania to call clinic back .     Dr. anna said . (do not see any discharge summary from the hospital to review medication, but 200 mg 3 times a week seems more reasonable for her) okay to relay message .

## 2021-06-13 NOTE — TELEPHONE ENCOUNTER
Called and spoke with Sun Mireles, Message was given, Sun Mireles understood, no further questions.

## 2021-06-13 NOTE — TELEPHONE ENCOUNTER
Who is calling:  Gautam [facility] Coodinator  Reason for Call:  Caller states she has faxed form on 12/01 and 11/23 to [facility] fax number, [phone] and is faxing form again at 9:20 am today. Gautam would like call back from care team either way ( if fax has been received or has not been received)- and is asking if there is an alternate fax or e-mail she can send this form to? Patient is needing form filled out as soon as possible.  Date of last appointment with primary care:   Okay to leave a detailed message: Yes

## 2021-06-13 NOTE — TELEPHONE ENCOUNTER
Patient Returning Call  Reason for call:  Calling back on status of message left earlier.  Information relayed to patient:  The patient was read the note entry of Dr Reynolds. And the patient responds to the questions as follows.  Patient states that she is using about a dime size of the cream after every urination.  The patient also  Says that it burns in the inside of the vaginal with every urination.  Please advise  Patient has additional questions:  Yes  If YES, what are your questions/concerns:  Patient says is very uncomfortable.  Please advise.  Okay to leave a detailed message?: Yes

## 2021-06-13 NOTE — PROGRESS NOTES
Please call patient to let her know her urine was not infected.  Check to see if she is using the barrier cream and if this is helping with the burning sensation.  Thank you!  Zahra Reynolds, DO

## 2021-06-13 NOTE — TELEPHONE ENCOUNTER
----- Message from Collin Doran MD sent at 12/7/2020 11:07 AM CST -----  Regarding: FW: ob/gyn referral  What type of diet is she on? renal?  Diabetes?  If yes she probably need to be on that because of her ongoing current medical condition.  Dr Doran  ----- Message -----  From: Ioana Cancino  Sent: 12/4/2020   1:57 PM CST  To: Collin Doran MD  Subject: ob/gyn referral                                  ,   I called pt to schedule appt with . Pt declined appt she said she isnt having any bleeding at this time. She asked that you take her off whatever diet she is on she doesn't like it. She asked that I fax her Med list to [phone] which was a appt that she was currently at.  Erick Triplett

## 2021-06-13 NOTE — TELEPHONE ENCOUNTER
RN Triage:    Spoke with 64 yr old Sun who lives at [facility] who c/o:    Continued severe burning with urination, especially at the very end of urinating.    Tylenol has not been helpful.    Started AB for Cipro 3 evenings ago.    Pt on dialysis.     White vaginal discharge noted from caregiver and patient this weekend.    Small amount of blood when wiping after urination yesterday.  Pt thinks from vaginal discharge.    Pt thinks a vaginal cream she has used in the past for vaginal burning would be helpful.  Pt is uncertain the name of the cream.    [address]  [phone]  [address] [place] [address] for orders.    PLAN:  Will consult with PCP per protocol for level of care or Rx orders.  Please advise.    Margaret Potter RN   [facility] RN Triage      Reason for Disposition    Taking antibiotic > 72 hours (3 days) and symptoms (other than fever) not improved    Additional Information    Negative: SEVERE difficulty breathing (e.g., struggling for each breath, speaks in single words)    Negative: Sounds like a life-threatening emergency to the triager    Negative: Sinus infection and taking an antibiotic    Negative: Wound infection and taking an antibiotic    Negative: MODERATE difficulty breathing (e.g., speaks in phrases, SOB even at rest, pulse 100-120)    Negative: Fever > 103 F (39.4 C)    Negative: Patient sounds very sick or weak to the triager    Negative: Finished taking antibiotics and symptoms are BETTER but are not completely gone    Negative: Patient wants to be seen    Negative: Taking antibiotic and new onset of fever    Negative: Taking antibiotic > 48 hours (2 days) and fever still present (SAME)    Negative: Shock suspected (e.g., cold/pale/clammy skin, too weak to stand, low BP, rapid pulse)    Negative: Sounds like a life-threatening emergency to the triager    Negative: Urinary tract infection suspected, but not taking antibiotics    Negative: [1]  Unable to urinate (or only a few drops) > 4 hours AND     [2] bladder feels very full (e.g., palpable bladder or strong urge to urinate)    Negative: Passing pure blood or large blood clots (i.e., size > a dime)  (Exceptions: flecks, small strands, or pinkish-red color)    Negative: Patient sounds very sick or weak to the triager    [1] SEVERE pain (e.g., excruciating) AND [2] no improvement 2 hours after pain medications    Protocols used: INFECTION ON ANTIBIOTIC FOLLOW-UP CALL-A-OH, URINARY TRACT INFECTION ON ANTIBIOTIC FOLLOW-UP CALL - FEMALE-A-

## 2021-06-13 NOTE — TELEPHONE ENCOUNTER
Patient is calling reports she is at a rehab floor at [facility].  Reports when she urinated it hurts, especially near the end of the urination.  Reports pain with urination present for 1.5 weeks.   Has taken urine sample twice, but states TCU didn't do anything about it. Patient mentioned she may be on antibiotics, but patient wasn't sure, but states nothing has been working. Per our protocols, patient was advised to see a provider within 4 hours. Advised she should speak to her nurse at the TCU to contact the  MD that covers the patient's at the [facility]. Patient multiple times states she has done that already and they don't do anything about her symptoms. Patient wanted an appointment at the clinic. Warm transferred to scheduling.     Della Hughes RN/[facility]                COVID 19 Nurse Triage Plan/Patient Instructions    Please be aware that novel coronavirus (COVID-19) may be circulating in the community. If you develop symptoms such as fever, cough, or SOB or if you have concerns about the presence of another infection including coronavirus (COVID-19), please contact your health care provider or visit [email or web address].     Disposition/Instructions    Virtual Visit with provider recommended. Reference Visit Selection Guide.    Thank you for taking steps to prevent the spread of this virus.  o Limit your contact with others.  o Wear a simple mask to cover your cough.  o Wash your hands well and often.    Resources    [facility]: About COVID-19: [email or web address]    CDC: What to Do If You're Sick: www.cdc.gov/coronavirus/2019-ncov/about/steps-when-sick.html    CDC: Ending Home Isolation: www.cdc.gov/coronavirus/2019-ncov/hcp/disposition-in-home-patients.html     CDC: Caring for Someone: www.cdc.gov/coronavirus/2019-ncov/if-you-are-sick/care-for-someone.html     ANN: Interim Guidance for Hospital Discharge to Home:  www.health.[facility].[place].us/diseases/coronavirus/hcp/hospdischarge.pdf    [facility] clinical trials (COVID-19 research studies): [email or web address]-trials     Below are the COVID-19 hotlines at the [place] [facility] ([facility]). Interpreters are available.   o For health questions: Call 855-595-7744 or 1-608.352.9217 (7 a.m. to 7 p.m.)  o For questions about schools and childcare: Call 018-094-1564 or 1-940.545.1253 (7 a.m. to 7 p.m.)       Additional Information    Negative: Fever > 100.5 F (38.1 C)    Negative: Vomiting    Negative: Patient sounds very sick or weak to the triager    Negative: [1] SEVERE pain with urination  (e.g., excruciating) AND [2] not improved after 2 hours of pain medicine and Sitz bath    Negative: [1] Unable to urinate (or only a few drops) > 4 hours AND [2] bladder feels very full (e.g., palpable bladder or strong urge to urinate)    Negative: Shock suspected (e.g., cold/pale/clammy skin, too weak to stand, low BP, rapid pulse)    Negative: Sounds like a life-threatening emergency to the triager    Negative: Followed a genital area injury    Negative: Taking antibiotic for urinary tract infection (UTI)    Negative: Pregnant    Negative: Postpartum (from 0 to 6 weeks after delivery)    Negative: Side (flank) or lower back pain present    Diabetes mellitus or weak immune system (e.g., HIV positive, cancer chemo, splenectomy, organ transplant, chronic steroids)    Protocols used: URINATION PAIN - FEMALE-A-

## 2021-06-13 NOTE — PROGRESS NOTES
Chronic Kidney Disease (CKD)  Chronic kidney disease can result from many causes including infections, diabetes, high blood pressure, kidney stones, circulation problems, and reactions to medication. Having kidney disease means making many changes in your life. Learn as much as you can about it so that you can better adjust to these changes. It is important to remember that the main goal of treatment is to stop chronic kidney disease (CKD) from progressing to complete kidney failure. Treatments may vary based on the progression of CKD, so always follow your doctor's instructions in the care and management of your condition.  When to seek medical care  Call your doctor right away if you have any of the following:    Trouble eating or drinking    Weight loss of more than 2 pounds in 24 hours or more than 5 pounds in 7 days    Little or no urine output    Trouble breathing    Muscle aches    Fever of 100.4 F or higher, or chills    Blood in your urine or stool    Bloody discharge from your nose, mouth, or ears    Severe headache or a seizure    Vomiting    Swelling of legs or ankles    Chest pain (call 911)    Diabetes: Sick-Day Plan  Infections, the flu, and even a cold, can cause your blood sugar to rise. And, eating less, nausea, and vomiting may cause your blood glucose to fall (hypoglycemia). Ask your health care provider to help you develop a sick-day plan. The following information can help.    Call your health care provider if:    You vomit or have diarrhea for more than 6 hours.    Your blood glucose level is higher than usual or over 250 mg/dL after you have taken extra insulin (if recommended in your sick-day plan).    You take oral medicine for diabetes, and your blood sugar is higher than usual or over 250 mg/dL, before a meal and stays that high for more than 24 hours.    Your blood glucose is lower than usual or less than 70 mg/dL    You have moderate to large amounts of ketones in your blood or  urine.    You aren t better after 2 days.

## 2021-06-13 NOTE — PATIENT INSTRUCTIONS - HE
1. Dysuria  - Wet Prep, Vaginal  - Culture, Urine    Wet prep is negative for yeast infection.  We will be in touch with urine culture.  At this point in time, I think burning is more related to skin irritation rather than infection.    2. Vaginal bleeding  - US Pelvis With Transvaginal Non OB; Future    We need to investigate source of your vaginal bleeding.  Follow-up with ultrasound is scheduled on December 9.  I recommend seeing OB/GYN after this ultrasound is completed.    3. Pain in vulva  - dimethicone-zinc oxide (DIMETHICONE-ZINC OXIDE) Crea; Apply liberally to vulva with each adult diaper change and after voiding  Dispense: 142 g; Refill: 11     Please apply this barrier cream liberally to the vulvar area with each adult diaper change in after voiding.  I think this will help protect her skin from further irritation and burning.  Try this for the next few weeks.  If not improving, return for further evaluation.

## 2021-06-13 NOTE — PROGRESS NOTES
OFFICE VISIT - FAMILY MEDICINE     ASSESSMENT AND PLAN     1. Vaginal bleeding     2. Chronic cystitis     3. Localized Osteoarthritis Of The Knee  Shower chair    Wheelchair, manual Standard Manual    Walker Standard (2 Wheel)   4. Spondylosis of lumbar region without myelopathy or radiculopathy  Shower chair    Wheelchair, manual Standard Manual    Walker Standard (2 Wheel)   5. Chronic heart failure with preserved ejection fraction (H)     6. Diabetic ulcer of toe of right foot associated with type 2 diabetes mellitus, unspecified ulcer stage (H)  Shower chair    Wheelchair, manual Standard Manual    Walker Standard (2 Wheel)   Recurrent cystitis, vaginal bleeding, she is scheduled to get a pelvic ultrasound to follow-up with GYN.  She was also referred to see urologist.  She will continue to get hemodialysis 3 times a week.  She has osteoarthritis of multiple sites with difficulty walking, she she would benefit by having a wheelchair, shower chair and a walker.  New prescription written.  Continue aggressive risk factor modification , tight control of diabetes, blood pressure congestive heart failure.  CHIEF COMPLAINT   Follow-up ([facility] ER) and Painful urination    HPI   Sun Mireles is a 65 y.o. female.  Medical history significant for diabetes type 2 with associated peripheral neuropathies, end-stage kidney disease on hemodialysis 3 times a week, congestive heart failure, obesity, obstructive sleep apnea mild persistent asthma chronic pain syndrome secondary to degenerative joint disease of the knees, spine.  Recently seen in the ER for bladder infection, CT scan did show bladder wall thickening and renal cysts.  Has also has recurrent vaginal infection and bleeding, she is scheduled to have a pelvic ultrasound on to see Dr. Shoshana MANRIQUE soon.  Denies any worsening urinary symptoms today.  She is currently living at [facility], but she did apply to move to her own places, she would like to get a   consult and possibly getting the nurse helping.  Her daughter is a PCA and power of  also.  She continues to have hemodialysis 3 times a week current weight is about 82.4 kg.  Denies any chest pain, shortness of breath or dizziness.  She has osteoarthritis of multiple side included lumbar spine and knees, hips etc. difficulty with ambulation mild to moderate pain, would like to get a wheelchair, shower chair and walker.  Advance care directive discussed today, POLST and Honoring choice forms  given to patient to complete with assistance of her daughter.    Review of Systems As per HPI, otherwise negative.    OBJECTIVE   /63 (Patient Site: Left Arm, Patient Position: Sitting, Cuff Size: Adult Large)   Pulse 66   Temp 98  F (36.7  C) (Temporal)   Resp 16   SpO2 97%   Physical Exam   Constitutional: She is oriented to person, place, and time. She appears well-developed and well-nourished.   Sitting on a wheelchair   HENT:   Head: Normocephalic and atraumatic.   Neck: Normal range of motion. Neck supple. No JVD present. No tracheal deviation present. No thyromegaly present.   Cardiovascular: Normal rate, regular rhythm, normal heart sounds and intact distal pulses. Exam reveals no gallop and no friction rub.   No murmur heard.  Pulmonary/Chest: Effort normal and breath sounds normal. No respiratory distress. She has no wheezes. She has no rales.   Abdominal: There is no abdominal tenderness. There is no rebound and no guarding.   Musculoskeletal:         General: No tenderness or edema.   Lymphadenopathy:     She has no cervical adenopathy.   Neurological: She is alert and oriented to person, place, and time. Coordination normal.   Psychiatric: She has a normal mood and affect. Judgment and thought content normal.       PFSH     Family History   Problem Relation Age of Onset     Hypertension Mother      COPD Mother      Diabetes Mother      Hypertension Father      COPD Father       Diabetes Father      Heart attack Maternal Grandmother      Hypertension Maternal Grandmother      Alcohol abuse Sister      Drug abuse Sister      COPD Sister      Diabetes Sister      Hypertension Sister      Heart disease Brother      Hypertension Brother      Hypertension Daughter      Hypertension Son      Hypertension Maternal Grandfather      Heart attack Maternal Grandfather      Hypertension Paternal Grandmother      Heart attack Paternal Grandmother      Hypertension Paternal Grandfather      Social History     Socioeconomic History     Marital status: Single     Spouse name: Not on file     Number of children: Not on file     Years of education: Not on file     Highest education level: Not on file   Occupational History     Not on file   Social Needs     Financial resource strain: Not on file     Food insecurity     Worry: Not on file     Inability: Not on file     Transportation needs     Medical: Not on file     Non-medical: Not on file   Tobacco Use     Smoking status: Former Smoker     Quit date: 2014     Years since quittin.8     Smokeless tobacco: Never Used   Substance and Sexual Activity     Alcohol use: No     Drug use: No     Sexual activity: Not on file   Lifestyle     Physical activity     Days per week: Not on file     Minutes per session: Not on file     Stress: Not on file   Relationships     Social connections     Talks on phone: Not on file     Gets together: Not on file     Attends Oriental orthodox service: Not on file     Active member of club or organization: Not on file     Attends meetings of clubs or organizations: Not on file     Relationship status: Not on file     Intimate partner violence     Fear of current or ex partner: Not on file     Emotionally abused: Not on file     Physically abused: Not on file     Forced sexual activity: Not on file   Other Topics Concern     Not on file   Social History Narrative    Reports on disability. She is not working and lives with her  children and friend.  She reports she turns to her doctor in times of crisis.  She reports she needs assistance with ADLs and has a PCA 11 hours per day, 7 days per week.  Does not have a home nurse.       Relevant history was reviewed with the patient today, unless noted in HPI, nothing is pertinent for this visit.  [facility]     Patient Active Problem List    Diagnosis Date Noted     Cardiac pacemaker in situ, dual chamber 11/06/2020     Overview Note:     [facility] [employer] MRI, RA and RV Leads: [facility] 2088TC Tendril STS, DOI 10/23/2020 by Dr. Wilbert Bolaños.       KHUSHBOO (obstructive sleep apnea)      Acute respiratory failure with hypoxia (H)      NYHA class 3 heart failure with preserved ejection fraction (H)      Chronic pulmonary edema      Bradycardia 10/20/2020     Severe sinus bradycardia 10/20/2020     Overview Note:     Added automatically from request for surgery [id number]       Cardiogenic shock (H) 10/20/2020     Complete heart block (H) 10/20/2020     Acute encephalopathy      Depression with anxiety      Sleep difficulties      Folate deficiency 10/11/2020     Somnolence 10/11/2020     Subclinical hyperthyroidism 10/11/2020     Hyperkalemia 10/07/2020     ESRD (end stage renal disease) on dialysis (H) 09/25/2020     Atrial flutter (H) 09/25/2020     Atrial fibrillation (H) 09/25/2020     Unspecified severe protein-calorie malnutrition (H) 09/24/2020     COPD (chronic obstructive pulmonary disease) (H) 09/24/2020     Dialysis patient (H) 11/13/2019     Overview Note:     Dialysis started during admit (11/02/19); [facility]       CRF (chronic renal failure), stage 5 (H) 11/03/2019     Diabetic ulcer of toe of right foot associated with type 2 diabetes mellitus, unspecified ulcer stage (H) 09/19/2019     Degeneration of lumbar or lumbosacral intervertebral disc 03/21/2019     Adjustment disorder with mixed anxiety and depressed mood      Reactive depression      Mood disorder due to medical  condition      Chronic heart failure with preserved ejection fraction (H)      Class 3 severe obesity due to excess calories with serious comorbidity and body mass index (BMI) of 45.0 to 49.9 in adult (H)      Chronic kidney disease, stage IV (severe) (H) 02/25/2019     Eczema 04/09/2018     Pulmonary HTN (H) 06/08/2017     Diabetic polyneuropathy associated with type 2 diabetes mellitus (H) 06/28/2016     Overview Note:     Previously on gabapentin which helped, but stopped in the hospital due to potential drug interaction.  Lyrica made her sleepy.       Candidiasis, intertrigo 06/28/2016     Tobacco use 07/30/2015     Chronic pain 06/15/2014     Gout      Overview Note:     Created by Conversion         Morbid obesity with alveolar hypoventilation (H)      Overview Note:     Created by Conversion         Cor Pulmonale      Overview Note:     Created by Conversion    Replacement Utility updated for latest IMO load       Asthma      Overview Note:     Created by Conversion         Microcytic anemia      Overview Note:     Created by Conversion         Hypercholesteremia      Overview Note:     Created by Conversion         Obstructive Sleep Apnea      Overview Note:     Created by Conversion         Long-term insulin use in type 2 diabetes (H)      Overview Note:              Benign Essential Hypertension      Overview Note:     Created by Conversion         Localized Osteoarthritis Of The Knee      Overview Note:     Created by Conversion    Replacement Utility updated for latest IMO load       Degenerative arthritis of lumbar spine      Overview Note:     Created by Conversion         Unspecified glaucoma      Overview Note:     Created by Conversion         Aneurysm Of The Right Vertebral Artery      Overview Note:     Created by Conversion         Past Surgical History:   Procedure Laterality Date     EP PACEMAKER INSERT N/A 10/23/2020    Procedure: EP Pacemaker Insertion;  Surgeon: Wilbert Bolaños MD;   Location: [facility] Cath Lab;  Service: Cardiology     EYE SURGERY       INCISION AND DRAINAGE PERIRECTAL ABSCESS Left 2019    Procedure: INCISION AND DRAINAGE, ABSCESS, RECTAL OR PERIRECTAL;  Surgeon: Yumiko Hancock DO;  Location: [facility] Main OR;  Service: General     IR NON TUNNELED CATHETER >5 YEARS  2019     IR TUNNELED CATHETER INSERT  11/15/2019     PICC INSERTION - TRIPLE LUMEN  10/20/2020          MA  DELIVERY ONLY      Description:  Section;  Recorded: 10/20/2011;     MA LIGATE FALLOPIAN TUBE      Description: Tubal Ligation;  Recorded: 10/20/2011;     MA REMOVAL GALLBLADDER      Description: Cholecystectomy;  Recorded: 10/20/2011;       RESULTS/CONSULTS (Lab/Rad)   No results found for this or any previous visit (from the past 168 hour(s)).  Ct Abdomen Pelvis Without Oral Without Iv Contrast    Result Date: 2020  EXAM: CT ABDOMEN PELVIS WO ORAL WO IV CONTRAST LOCATION: [facility] DATE/TIME: 2020 4:22 PM INDICATION: Hematuria, unknown cause COMPARISON: CT exams 10/26/2020, 10/14/2020 and 2020 TECHNIQUE: CT scan of the abdomen and pelvis was performed without oral or IV contrast. Multiplanar reformats were obtained. Dose reduction techniques were used. CONTRAST: None. FINDINGS: LOWER CHEST: Small hiatal hernia. HEPATOBILIARY: Postcholecystectomy with stable biliary ductal dilatation. No obstructing mass lesion or radiodense stone is identified. Unremarkable unenhanced liver. PANCREAS: Normal. SPLEEN: Normal. ADRENAL GLANDS: Normal. KIDNEY/BLADDER: Multiple stable small bilateral renal cysts and mildly hyperdense renal cortical lesions, likely hemorrhagic cysts. As a precaution, recommend attention on follow-up. No hydronephrosis or hydroureter. Diffuse bladder wall thickening with surrounding edema. Tiny air bubbles within the bladder lumen. No definite discrete focal masslike bladder wall thickening although evaluation of the  bladder is very limited due to lack of distention. BOWEL: Normal caliber small bowel. Normal appendix. Tortuous normal caliber colon with moderate fecal retention suggesting constipation. No free air or free fluid. LYMPH NODES: Normal. VASCULATURE: Moderate to severe atherosclerosis. PELVIC ORGANS: Stable enlarged heterogeneous presumably leiomyomatous uterus. Uterine vascular calcifications are noted. MUSCULOSKELETAL: Spinal degenerative changes.     1.  Diffuse bladder wall thickening and surrounding edema suspicious for cystitis. Tiny air bubbles in the bladder lumen could reflect recent catheterization. Correlation recommended. The lack of bladder distention limits the evaluation for a bladder mass. Follow-up with a CT cystogram may be helpful if there is concern for a bladder lesion. 2.  Stable bilateral renal lesions, likely simple and hemorrhagic cysts. As a precaution, recommend attention on follow-up. No hydronephrosis or urinary tract calculus. 3.  Stable enlarged presumed leiomyomatous uterus.    Clinic Device Check    Result Date: 11/9/2020  Type: In-clinic post-op wound and device check; s/p implant of a dual chamber pacemaker system. Presenting Rhythm: Sinus Rhythm at 75 bpm. Lead/Battery status: Stable lead and battery measurements. Arrhythmias: None Comments: Normal device function; reprogrammed ventricular high rate detection out from 5 to 10 cycles and RA output from Monitor 3.5 to Monitor 2.5 V. Please see post-op note in today's Device RN Epic encounter for patient assessment and education. [facility]     MEDICATIONS     Current Outpatient Medications on File Prior to Visit   Medication Sig Dispense Refill     ACCU-CHEK RACHEL CONTROL SOLN Soln        acetaminophen (TYLENOL) 325 MG tablet Take 2 tablets (650 mg total) by mouth 4 (four) times a day.  0     albuterol (PROAIR HFA;PROVENTIL HFA;VENTOLIN HFA) 90 mcg/actuation inhaler Inhale 2 puffs every 6 (six) hours as needed for wheezing.       allopurinol  (ZYLOPRIM) 100 MG tablet Take 1 tablet (100 mg total) by mouth daily.  0     ascorbic acid, vitamin C, (ASCORBIC ACID WITH MILKA HIPS) 500 MG tablet Take 500 mg by mouth 2 (two) times a day.       aspirin 81 MG EC tablet Take 1 tablet (81 mg total) by mouth daily.  0     bisacodyL (DULCOLAX) 10 mg suppository Insert 10 mg into the rectum daily as needed.       cefdinir (OMNICEF) 300 MG capsule Take 1 capsule (300 mg total) by mouth see administration instructions for 6 doses. First dose now.  Then 12/10 after dialysis, 12/12 after dialysis, 12/14 in the evening, 12/15 after dialysis, 12/17 after dialysis.  Stop ciprofloxacin. 6 capsule 0     cholecalciferol, vitamin D3, 5,000 unit Tab Take 1 tablet by mouth daily.       ciprofloxacin HCl (CIPRO) 250 MG tablet Take 1 tablet (250 mg total) by mouth daily. Take after dailysis 10 tablet 0     clotrimazole 2 % Crea Insert 1 applicatorful of 2% vaginal cream daily (preferably at bedtime) for 3 consecutive days. May also apply externally twice daily for 7 days as needed for itching and irritation. 21 g 0     coenzyme Q10 (COQ-10) 100 mg capsule Take 2 capsules (200 mg total) by mouth daily.  0     COLCRYS 0.6 mg tablet Take 0.3 mg by mouth once a week. Saturdays       DIAPER,BRIEF,ADULT, DISPOSABLE (DEPEND PANTS MISC) Use As Directed. Large       diclofenac sodium (VOLTAREN) 1 % Gel Apply 1 g topically 3 (three) times a day as needed (bilateral toes).       dimethicone-zinc oxide (DIMETHICONE-ZINC OXIDE) Crea Apply liberally to vulva with each adult diaper change and after voiding 142 g 11     diphenhydrAMINE (BENADRYL) 25 mg capsule Take 1 capsule (25 mg total) by mouth 2 (two) times a day as needed for itching. 30 capsule 0     docusate sodium (COLACE) 100 MG capsule Take 1 capsule (100 mg total) by mouth daily. (Patient taking differently: Take 100 mg by mouth 2 (two) times a day. )  0     EASY TOUCH LANCING DEVICE Misc Use to test blood sugar twice a day.  Generic: Easy  "Touch Lancing Device 1 each 2     EASY TOUCH TWIST LANCETS 28 gauge Misc USE TO CHECK BLOOD SUGAR FOUR TIMES A  each 1     epoetin padmini (EPOGEN,PROCRIT) 40,000 unit/mL injection Inject 40,000 Units under the skin once a week in the evening. At Dialysis. Saturday or Sunday       fluconazole (DIFLUCAN) 150 MG tablet Take 150 mg by mouth once a week. Mondays. END November 8th       fluticasone propionate (FLONASE) 50 mcg/actuation nasal spray Apply 2 sprays into each nostril daily.       furosemide (LASIX) 20 MG tablet Take 60 mg by mouth 2 (two) times a day at 9am and 6pm.       gabapentin (NEURONTIN) 100 MG capsule Take 200 mg by mouth 4 (four) times a week. Monday, Wednesday, Friday, Sunday - at bedtime on non-HD days       gabapentin (NEURONTIN) 100 MG capsule Take 200 mg by mouth 3 (three) times a week. 30 capsule 0     generic lancets (ACCU-CHEK SOFTCLIX LANCETS) CHECK 4 TIMES DAILY 100 each 2     ipratropium-albuterol (DUO-NEB) 0.5-2.5 mg/3 mL nebulizer Take 3 mL by nebulization every 6 (six) hours as needed.  0     lidocaine 4 % patch Place 1 patch on the skin daily. Remove and discard patch with 12 hours or as directed by MD.       magnesium oxide (MAG-OX) 400 mg (241.3 mg magnesium) tablet Take 400 mg by mouth 2 (two) times a day.        montelukast (SINGULAIR) 10 mg tablet Take 10 mg by mouth at bedtime.       omeprazole (PRILOSEC) 20 MG capsule Take 1 capsule (20 mg total) by mouth daily before breakfast. (Patient taking differently: Take 20 mg by mouth 2 (two) times a day before meals. Two times a day x 6 weeks)  0     oxybutynin (DITROPAN XL) 5 MG ER tablet Take 5 mg by mouth every evening.        pen needle, diabetic (BD ULTRA-FINE CHARITO PEN NEEDLE) 32 gauge x 5/32\" Ndle Use 1 needle daily with Levemir flexpen as directed 100 each 3     pen needle, diabetic, safety (NOVOFINE AUTOCOVER) 30 gauge x 1/3\" Ndle USE FIVE TIMES DAILY AS DIRECTED 500 each 3     polyethylene glycol (MIRALAX) 17 gram packet " Take 17 g by mouth daily as needed.       rosuvastatin (CRESTOR) 10 MG tablet Take 10 mg by mouth daily.        saliva stimulant (BIOTENE) oral spray Take 2 sprays by mouth 4 (four) times a day.       senna-docusate (PERICOLACE) 8.6-50 mg tablet Take 1 tablet by mouth daily as needed for constipation.       sevelamer carbonate (RENVELA) 800 mg tablet Take 1 tablet (800 mg total) by mouth 3 (three) times a day with meals.  0     timolol maleate (TIMOPTIC) 0.5 % ophthalmic solution 1 gtt both eyes bid (Patient taking differently: Administer 1 drop to both eyes 2 (two) times a day. 1 gtt both eyes bid) 10 mL 12     venlafaxine (EFFEXOR-XR) 150 MG 24 hr capsule Take 150 mg by mouth daily.       wheelchair [employer] mobility device  Face-to-face exam  October 6, 2016  impaired mobility  Length of need : 99 1 each 0     No current facility-administered medications on file prior to visit.        HEALTH MAINTENANCE / SCREENING   PHQ-2 Total Score: 3 (9/24/2020  1:00 PM)  , PHQ-9 Total Score: 14 (9/24/2020  1:00 PM)  ,JAJA 7 Total Score: 18 (9/24/2020  1:00 PM)    Immunization History   Administered Date(s) Administered     Hep B, Adult 01/14/2020     Influenza, inj, historic,unspecified 02/01/2020, 09/17/2020     PPD Test 01/09/2020, 01/16/2020     Pneumo Polysac 23-V 02/16/2013     Pneumococcal Vaccine, Unspecified 01/25/2020     Td, Adult, Absorbed 11/09/2005     Td,adult,historic,unspecified 11/09/2005     Health Maintenance   Topic     SPIROMETRY      COPD ACTION PLAN      MAMMOGRAM      DEXA SCAN      ZOSTER VACCINES (1 of 2)     COLORECTAL CANCER SCREENING      TD 18+ HE      MEDICARE ANNUAL WELLNESS VISIT      Pneumococcal Vaccine: 65+ Years (1 of 1 - PPSV23)     DIABETIC FOOT EXAM      DIABETIC EYE EXAM      LIPID      MICROALBUMIN      A1C      Pneumococcal Vaccine: Pediatrics (0 to 5 Years) and At-Risk Patients (6 to 64 Years) (2 of 3 - PCV13)     BMP      ADVANCE CARE PLANNING      FALL RISK ASSESSMENT      ALT       CBC      HEART FAILURE ACTION PLAN      TSH      DEPRESSION ACTION PLAN      HEPATITIS C SCREENING      INFLUENZA VACCINE RULE BASED        Collin Doran MD  Family Medicine, [facility] [place]     This note was dictated using a voice recognition software.  Any grammatical or context distortion are unintentional and inherent to the software.

## 2021-06-13 NOTE — TELEPHONE ENCOUNTER
Triage call:    Pt states Dr. Doran gave her medication for burning  Vaginal burning and pain.   Pt was seen in the ED on 11/18/20 for these symptoms.  Pt was given antibiotics and diflucan.    Pt states there was no improvement of symptoms.   Pt states when she is is done urinating she is having pain.   Pt is wanting to talk to Dr. Doran regarding her symptoms.   Pt states she is having blood in the urine, notices when wiping. Pt states the bleeding is vaginal.     Pt was advised of protocol recommendation/disposition of See today in office.   Patient states she is not able to go to clinic due to transportation problems and being in a wheelchair.   Pt was agreeable to a video visit. Pt set up for 140 today.     Senia Mejia RN 12/02/20 10:51 AM  [facility] Nurse Advisor      COVID 19 Nurse Triage Plan/Patient Instructions    Please be aware that novel coronavirus (COVID-19) may be circulating in the community. If you develop symptoms such as fever, cough, or SOB or if you have concerns about the presence of another infection including coronavirus (COVID-19), please contact your health care provider or visit [email or web address].     Disposition/Instructions    Virtual Visit with provider recommended. Reference Visit Selection Guide. and In-Person Visit with provider recommended. Reference Visit Selection Guide.    Thank you for taking steps to prevent the spread of this virus.  o Limit your contact with others.  o Wear a simple mask to cover your cough.  o Wash your hands well and often.    Resources    [facility]: About COVID-19: [email or web address]    CDC: What to Do If You're Sick: www.cdc.gov/coronavirus/2019-ncov/about/steps-when-sick.html    CDC: Ending Home Isolation: www.cdc.gov/coronavirus/2019-ncov/hcp/disposition-in-home-patients.html     CDC: Caring for Someone: www.cdc.gov/coronavirus/2019-ncov/if-you-are-sick/care-for-someone.html     ANN: Interim Guidance for [facility]  Discharge to Home: [email or web address]/[email or web address]/hcp/hospdischarge.pdf    [facility] clinical trials (COVID-19 research studies): [email or web address]     Below are the COVID-19 hotlines at the [place] [facility] ([facility]). Interpreters are available.   o For health questions: Call 473-364-9426 or 1-273.472.7797 (7 a.m. to 7 p.m.)  o For questions about schools and childcare: Call 787-886-0583 or 1-719.291.7862 (7 a.m. to 7 p.m.)     Reason for Disposition    Age > 50 years    Unusual vaginal discharge    > 2 UTIs in last year    Additional Information    Negative: Unable to urinate (or only a few drops) and bladder feels very full    Negative: Vomiting    Negative: Patient sounds very sick or weak to the triager    Negative: Shock suspected (e.g., cold/pale/clammy skin, too weak to stand, low BP, rapid pulse)    Negative: Sounds like a life-threatening emergency to the triager    Negative: Fever > 100.5 F (38.1 C)    Negative: Side (flank) or lower back pain present    Negative: Severe pain with urination    Negative: Taking antibiotic > 24 hours for UTI and fever persists    Negative: Taking antibiotic > 3 days for UTI and painful urination not improved    Negative: Patient is worried about sexually transmitted disease (STD)    Protocols used: URINATION PAIN - FEMALE-A-OH

## 2021-06-13 NOTE — TELEPHONE ENCOUNTER
Sent message to Sun via [employer] and called and left message to return phone call. If patient returns phone call please relay message below. Thank you

## 2021-06-13 NOTE — PROGRESS NOTES
Subjective: Sun is a 61-year-old lady well-known to me who is here for several issues.  First reviewing her medications.  She states she needs refills of both her tramadol and oxycodone.  I did look these up and her   Tramadol was just refilled on 1026 so we will not be filling this and her oxycodone was filled on 10 6 so it is not due yet.  She will call for these when they are due.  She continues to complain of pain in her feet and her knees.  She was recently seen by nephrology who DC'd her U Tato and started what sounds like allopurinol although I do not have that on my record.  She states that her feet and knees are still hurting.  #2 she is complaining of sinus congestion.  She states she has some rhinorrhea and facial irritation.  No fevers have been noted.  No ear pain or congestion.  No sore throat.  Lungs have been clear.  She is presently using furosemide as a diuretic at a dose of 40 mg a day.  This is working very nicely in terms of her pedal edema and weight.  Socially she is looking for an apartment to move to with 2 of her sons.  That is taken up a lot of her time and effort and is a source of stress.    Objective: Vital signs: Blood pressure 118/62 height is 5 feet 3 weight is 287 pounds this lady is alert and in no acute distress.  HEENT exam ears TMs are clear bilaterally no fluid no redness noted ear canals are normal.  Nose septum midline turbinates slightly swollen bilaterally no exudate noted.  Throat posterior pharynx is minimally pink no exudate or adenopathy.  No cervical adenopathy anteriorly or posteriorly.  Lungs are clear to auscultation throughout with good air movement.  No wheezes rales or rhonchi.  Heart regular rate and rhythm S1-S2 normal without murmurs or ectopy.  Extremities trace pedal edema bilaterally.    Assessment: #1 chronic knee and leg pain.  Some of this is due to gout and some of this is due to osteoarthritis as well as diabetic neuropathy.  2.  Sinus congestion  without signs of infection at present  3.  Chronic kidney disease with recent visit to nephrology  4.  Hypertension with good control at present    Plan: #1 completed a controlled substance agreement today and copy given to her.  2.  Urine drug screen obtained  3.  Other labs drawn glycohemoglobin, BMP, LDL.  4.  Will fax in a prescription for nasal spray to see if this can help those symptoms.  #5 she will have pharmacy call at the appropriate times for refill of her medications today  Length of time spent here was 25 minutes greater than 50% of this time was spent in review of her chart review of previous labs and discussion of plan going forward.

## 2021-06-13 NOTE — PROGRESS NOTES
10/09/17   Care Guide called patient for Clinic Care Coordination outreach follow up. Per patient, she is currently at a restaurant eating breakfast. Unable to talk to CG at the moment. Care Guide will meet with patient on 10/11/17 at 02:40 pm after her appointment with PCP.     Plan:  - Will ask patient if she was able to schedule appointment with [facility].

## 2021-06-13 NOTE — TELEPHONE ENCOUNTER
Says the irritation in the genital area is not better/ has spread to the vaginal area/ does not sleep because of this and cannot sit with out discomfort/ the cream that was ordered on 12/04 has not helped.Wants a call from the doctor.  MOOSE Nayak    Reason for Disposition    MODERATE-SEVERE itching (i.e., interferes with school, work, or sleep)    Additional Information    Negative: Sounds like a life-threatening emergency to the triager    Negative: Followed a genital area injury    Negative: Foreign body in vagina (e.g., tampon)    Negative: Vaginal bleeding is main symptom    Negative: Vaginal discharge is main symptom    Negative: Pain or burning with passing urine (urination) is main symptom    Negative: Menstrual cramps is main symptom    Negative: Abdomen pain is main symptom    Negative: Pubic lice suspected    Negative: Itching or rash of external female genital area (vulva)    Negative: Patient sounds very sick or weak to the triager    Negative: [1] SEVERE pain AND [2] not improved 2 hours after pain medicine    Negative: [1] Genital area looks infected (e.g., draining sore, spreading redness) AND [2] fever    Negative: [1] Something is hanging out of the vagina AND [2] can't easily be pushed back inside    Protocols used: VAGINAL SYMPTOMS-A-AH

## 2021-06-13 NOTE — PROGRESS NOTES
[id number]:  [id number]    Patient name: Sun Mireles    Care Guide: Lizette    Discuss at Care Conferences: Yes    Barriers: Goal: to lose weight. Patient's realization on realistic action steps.   Plan Summary: Delete weight loss goal, move to maintenance. PCP ok with this.    Action  Due Date   CCC RN will:  n/a n/a   Delegations: Action  Due Date   CCC SW will:  n/a n/a   [facility] Care Guide will: Adjust goals, move to maintenance.

## 2021-06-13 NOTE — TELEPHONE ENCOUNTER
Called and spoke with patient inform her of message appointment was made for tomorrow. Patient stated she is very uncomfortable right now is there anything you can send for you until her appointment time with you?

## 2021-06-13 NOTE — TELEPHONE ENCOUNTER
MD prescribed medications but patient can't take them without the facility being notified first.    [phone] - [address] (push #4). Spoke with RN and gave verbal instructions for benadryl and cipro.    Called patient back to inform of above.    Brittney Castano RN, Triage Nurse Advisor    Additional Information    Negative: Lab calling with strep throat test results and triager can call in prescription    Negative: Lab calling with urinalysis test results and triager can call in prescription    Negative: Medication questions    Negative: ED call to PCP    Negative: Physician call to PCP    Negative: Call about patient who is currently hospitalized    Negative: Lab or radiology calling with CRITICAL test results    Negative: [1] Prescription not at pharmacy AND [2] was prescribed today by PCP    Negative: [1] Follow-up call from patient regarding patient's clinical status AND [2] information urgent    Negative: [1] Caller requests to speak ONLY to PCP AND [2] URGENT question    Negative: [1] Caller requests to speak to PCP now AND [2] won't tell us reason for call  (Exception: if 10 pm to 6 am, caller must first discuss reason for the call)    Negative: Notification of hospital admission    Negative: Notification of death    Negative: Caller requesting lab results    Negative: Lab or radiology calling with test results    Negative: [1] Follow-up call from patient regarding patient's clinical status AND [2] information NON-URGENT    Negative: [1] Caller requests to speak ONLY to PCP AND [2] NON-URGENT question    Negative: Caller requesting an appointment, triage offered and declined    [1] Other NON-URGENT information for PCP AND [2] does not require PCP response    Protocols used: [facility] - [facility]

## 2021-06-13 NOTE — TELEPHONE ENCOUNTER
1. Vaginal itching  - clotrimazole 2 % Crea; Insert 1 applicatorful of 2% vaginal cream daily (preferably at bedtime) for 3 consecutive days. May also apply externally twice daily for 7 days as needed for itching and irritation.  Dispense: 21 g; Refill: 0     Prescription now sent to [facility] on [address].     Zahra Reynolds, DO

## 2021-06-13 NOTE — TELEPHONE ENCOUNTER
"Patient calling stating she was seen 12/4/20 for dysuria and vaginal burning.   Reviewed visit notes with patient. Patient stating her cream is to be delivered today from the pharmacy. Patient reporting \"sore\" inside her vagina that is causing irritation.  Afebrile. Ongoing burning with voiding. Denies change in symptoms.     Patient to update clinic tomorrow if no improvement with barrier cream and or any change in symptoms.   Patient was transferred to [facility] support per request to assist with log in information.    Virginia Romero RN  [facility] Nurse Advisors    COVID 19 Nurse Triage Plan/Patient Instructions    Please be aware that novel coronavirus (COVID-19) may be circulating in the community. If you develop symptoms such as fever, cough, or SOB or if you have concerns about the presence of another infection including coronavirus (COVID-19), please contact your health care provider or visit [email or web address].     Disposition/Instructions    Virtual Visit with provider recommended. Reference Visit Selection Guide.    Thank you for taking steps to prevent the spread of this virus.  o Limit your contact with others.  o Wear a simple mask to cover your cough.  o Wash your hands well and often.    Resources    [facility]: About COVID-19: [email or web address]    CDC: What to Do If You're Sick: www.cdc.gov/coronavirus/2019-ncov/about/steps-when-sick.html    CDC: Ending Home Isolation: www.cdc.gov/coronavirus/2019-ncov/hcp/disposition-in-home-patients.html     CDC: Caring for Someone: www.cdc.gov/coronavirus/2019-ncov/if-you-are-sick/care-for-someone.html     [facility]: Interim Guidance for Hospital Discharge to Home: www.health.[facility].mn.us/diseases/coronavirus/hcp/hospdischarge.pdf    [facility] clinical trials (COVID-19 research studies): [email or web address].[facility]/umn-clinical-trials     Below are the COVID-19 hotlines at the [place] Department of Health ([facility]). Interpreters are " available.   o For health questions: Call 854-611-7996 or 1-396.998.8901 (7 a.m. to 7 p.m.)  o For questions about schools and childcare: Call 961-329-6294 or 1-285.304.3782 (7 a.m. to 7 p.m.)       Additional Information    Negative: [1] Caller is not with the adult (patient) AND [2] reporting urgent symptoms    Negative: Lab result questions    Negative: Medication questions    Negative: Caller can't be reached by phone    Negative: Caller has already spoken to PCP or another triager    Negative: RN needs further essential information from caller in order to complete triage    Negative: Requesting regular office appointment    Negative: [1] Caller requesting NON-URGENT health information AND [2] PCP's office is the best resource    Negative: Health Information question, no triage required and triager able to answer question    Negative: Question about upcoming scheduled test, no triage required and triager able to answer question    Negative: General information question, no triage required and triager able to answer question    Negative: [1] Caller is not with the adult (patient) AND [2] probable NON-URGENT symptoms    [1] Follow-up call to recent contact AND [2] information only call, no triage required    Protocols used: INFORMATION ONLY [facility]

## 2021-06-13 NOTE — PROGRESS NOTES
.12/10/2020  Clinic Care Coordination Contact  Community Health Worker Initial Outreach    CHW Initial Information Gathering:  Referral Source: PCP  Preferred Hospital: [facility]  [phone]  Preferred Urgent Care: [facility], [phone]  Current living arrangement:: I live in a private home with family  Type of residence:: Independent Senior Living  Community Resources: County Worker, DME  Supplies Currently Used at Home: Other(oxyen tank,)  Equipment Currently Used at Home: walker, jamie, shower chair, grab bar, tub/shower, grab bar, toilet  Informal Support system:: Children(daughter)  No PCP office visit in Past Year: No  Transportation means:: Family, Medical transport([facility] transportation)  CHW Additional Questions  If ED/Hospital discharge, follow-up appointment scheduled as recommended?: No  Patient agreeable to assistance with scheduling?: Yes  CHW assisted patient to schedule (specify): 12-15-20 at 3:05pm with PCP  Medication changes made following ED/Hospital discharge?: Yes, patient to be scheduled with CC RN/SW within approx 1 business day    Patient accepts CC: Yes. Patient scheduled for assessment with CCC SW  on 12-18-20 at 1pm. Patient noted desire to discuss housing, lives with brother and resources..     CHW route encounter  to CCC RN to help regarding medications for yeast and bladder infections.      CHW Follow up: 12-18-20  review assessment, goals and consult with CCC SW/RN if needed.

## 2021-06-13 NOTE — TELEPHONE ENCOUNTER
"Triage call:    Pt states when she urinates the vaginal area hurts.   Pt states she notices blood when urinating. Pt states she notices it when she wipes.  RN had a hard time confirming with Sun if blood is vaginal or urinary.  Pt did state there is bright red blood in the toilet, no blood clots but described it as \"bright red blood\"  The cream is not helping. Skin on the outside burns.   No abnormal, no odor.   No pain with urination only pain on the outside skin. States the skin feels irritated.   RN could hear patient in pain.  Pt states it is only vaginal pain, no abdominal pain. Pt states pain is severe.     Pt currently taking Cipro daily x10 days and is using terconazole cream.   Pt states cream is not helping.     Please advise as passing pure blood recommends ED now or PCP triage.  Do you want patient to go to ED or would you prefer patient to schedule appointment to see you?     Senia Mejia RN 11/18/20 10:43 AM  [facility] Nurse Advisor      Reason for Disposition    SEVERE pain (e.g., excruciating)    Passing pure blood or large blood clots (i.e., size > a dime)  (Exceptions: flecks, small strands, or pinkish-red color)    Additional Information    Negative: Pain or burning with passing urine (urination) is main symptom    Negative: Vaginal discharge is the main symptom    Negative: Pubic lice suspected    Negative: STD exposure and prevention, question about    Negative: Patient sounds very sick or weak to the triager    Negative: [1] Unable to urinate (or only a few drops) > 4 hours AND     [2] bladder feels very full (e.g., palpable bladder or strong urge to urinate)    Negative: Urinary tract infection suspected, but not taking antibiotics    Negative: Shock suspected (e.g., cold/pale/clammy skin, too weak to stand, low BP, rapid pulse)    Negative: Sounds like a life-threatening emergency to the triager    Protocols used: VULVAR SYMPTOMS-A-OH, URINARY TRACT INFECTION ON ANTIBIOTIC " FOLLOW-UP CALL - FEMALE-SHARMIN-MICHELLE

## 2021-06-13 NOTE — TELEPHONE ENCOUNTER
"She was on Dr. Conroy' schedule this morning - marked as \"no show\".    I will send in a prescription for Diphenhydramine for her.  Perhaps she should have a follow up with Dr. Doran next week.   "

## 2021-06-13 NOTE — PROGRESS NOTES
This Maintenance Wellness Plan provides private information in regards to the work I have done with my Care Team from my Primary Care Clinic.  This document provides insight on the goals I have worked hard to achieve.  My Care Team congratulates me on my journey to become well.  With the assistance of my Clinic Care Guide and Primary Care Physician,  I have succeeded in improving areas of my health that I identified as barriers to becoming well.  I will continue to seek wellness and use the skills I have obtained to further my journey.  My Care Guide will follow up with me in 3 months.  In the meantime, if I should have any questions or concerns I will contact my Care Guide.     My Clinic Care Coordination Wellness Plan    [address]  [address]  [phone]      My Preferred Method of Contact:  Phone: [phone]    My Primary/Preferred Language:  English    Preferred Learning Style:  Reading information    Emergency Contact: Extended Emergency Contact Information  Primary Emergency Contact: Winston Mireles   [facility]  Home Phone: [phone]  Relation: Child  Secondary Emergency Contact: Liudmila Mireles   [facility]  Home Phone: [phone]  Mobile Phone: [phone]  Relation: Child     Care Team            Kelli Vanessa MD PCP - General    [phone]     Christine Santillan (Stacie) Clinic Care Coordination Care Guide, Clinic Care Coordination    [facility]  phone: [phone], fax: [phone]          Accomplishments:  Goals       COMPLETED: I will continue to live in my current home and keep my Section [employer] vouchers. (pt-stated)            Personal Plan:    1. I will keep my current home clean and pass all my house inspection for section 8.  2. I will submit any paperwork or documents Section 8 housing requires of me in the future.              Advanced Directive/Living Will: The patient was given information  regarding Adanced Directives/Living Will    Clinical Emergency Plan    Chronic Kidney Disease (CKD)  Chronic kidney disease can result from many causes including infections, diabetes, high blood pressure, kidney stones, circulation problems, and reactions to medication. Having kidney disease means making many changes in your life. Learn as much as you can about it so that you can better adjust to these changes. It is important to remember that the main goal of treatment is to stop chronic kidney disease (CKD) from progressing to complete kidney failure. Treatments may vary based on the progression of CKD, so always follow your doctor's instructions in the care and management of your condition.  When to seek medical care  Call your doctor right away if you have any of the following:    Trouble eating or drinking    Weight loss of more than 2 pounds in 24 hours or more than 5 pounds in 7 days    Little or no urine output    Trouble breathing    Muscle aches    Fever of 100.4 F or higher, or chills    Blood in your urine or stool    Bloody discharge from your nose, mouth, or ears    Severe headache or a seizure    Vomiting    Swelling of legs or ankles    Chest pain (call 911)     Diabetes: Sick-Day Plan  Infections, the flu, and even a cold, can cause your blood sugar to rise. And, eating less, nausea, and vomiting may cause your blood glucose to fall (hypoglycemia). Ask your health care provider to help you develop a sick-day plan. The following information can help.     Call your health care provider if:    You vomit or have diarrhea for more than 6 hours.    Your blood glucose level is higher than usual or over 250 mg/dL after you have taken extra insulin (if recommended in your sick-day plan).    You take oral medicine for diabetes, and your blood sugar is higher than usual or over 250 mg/dL, before a meal and stays that high for more than 24 hours.    Your blood glucose is lower than usual or less than 70  mg/dL    You have moderate to large amounts of ketones in your blood or urine.    You aren t better after 2 days.       All [facility] clinic patients have access to a Nurse 24 hours a day, 7 days a week.  If you have questions or want advice from a Nurse, please know [facility] is here for you.  You can call your clinic and they will connect you or you can call [facility] at [phone].  [facility] also has Walk In Care clinics in multiple locations.  Call the number listed above for more information about our Walk In Care clinics or visit the [facility] website at [email or web address].

## 2021-06-13 NOTE — PROGRESS NOTES
[facility] Note    Name: Sun Mireles  : 1955   MRN: [id number]    Sun Mireles is a 64 y.o. female presenting to discuss the following:     CC:   Chief Complaint   Patient presents with     Dysuria       HPI:  Sun reports this symptoms have been going on for about a year. Has taken antibiotics for these symptoms. Antibiotics help but as soon as she is done her symptoms come right back. Has also been given antifungals for yeast infection.     Is on hemodialysis. Continues to urinate. Has burning sensation near her vagina and on the outside tissues and around external tissues near urethra.     No suprapubic pain. Does have symptoms of urgency. Denies any incontinence.   Did have white vaginal discharge. That had improved after the ED visit.   Not having any itching.   Notices blood with urination and when wiping. Bleeding has resolved.   She has some lower pelvic pain.    ROS:   See HPI above.     PMH:   Patient Active Problem List   Diagnosis     Gout     Morbid obesity with alveolar hypoventilation (H)     Cor Pulmonale     Asthma     Microcytic anemia     Hypercholesteremia     Obstructive Sleep Apnea     Long-term insulin use in type 2 diabetes (H)     Benign Essential Hypertension     Localized Osteoarthritis Of The Knee     Degenerative arthritis of lumbar spine     Unspecified glaucoma     Aneurysm Of The Right Vertebral Artery     Chronic pain     Tobacco use     Diabetic polyneuropathy associated with type 2 diabetes mellitus (H)     Candidiasis, intertrigo     Pulmonary HTN (H)     Eczema     Chronic kidney disease, stage IV (severe) (H)     Chronic heart failure with preserved ejection fraction (H)     Class 3 severe obesity due to excess calories with serious comorbidity and body mass index (BMI) of 45.0 to 49.9 in adult (H)     Adjustment disorder with mixed anxiety and depressed mood     Reactive depression     Mood disorder due to medical condition     Degeneration of  lumbar or lumbosacral intervertebral disc     Diabetic ulcer of toe of right foot associated with type 2 diabetes mellitus, unspecified ulcer stage (H)     CRF (chronic renal failure), stage 5 (H)     Dialysis patient (H)     Unspecified severe protein-calorie malnutrition (H)     COPD (chronic obstructive pulmonary disease) (H)     ESRD (end stage renal disease) on dialysis (H)     Atrial flutter (H)     Atrial fibrillation (H)     Hyperkalemia     Folate deficiency     Somnolence     Subclinical hyperthyroidism     Acute encephalopathy     Depression with anxiety     Sleep difficulties     Bradycardia     Severe sinus bradycardia     Cardiogenic shock (H)     Complete heart block (H)     Acute respiratory failure with hypoxia (H)     NYHA class 3 heart failure with preserved ejection fraction (H)     Chronic pulmonary edema     KHUSHBOO (obstructive sleep apnea)     Cardiac pacemaker in situ, dual chamber       Past Medical History:   Diagnosis Date     Acute arthritis 6/15/2014     Acute diastolic heart failure (H)     Created by Conversion      Acute kidney injury superimposed on CKD (H)     Created by Conversion      Acute on chronic respiratory failure with hypoxia and hypercapnia (H) 6/8/2017     Anemia due to blood loss, acute      Aneurysm Of The Right Vertebral Artery     Created by Conversion      Aneurysm of vertebral artery (H)     Right     Asthma      Back pain 6/15/2014     Benign Essential Hypertension     Created by Conversion      Bilateral edema of lower extremity 6/28/2016     Candidiasis, intertrigo 6/28/2016     Chronic diastolic CHF (congestive heart failure) (H)      Chronic kidney disease (CKD), stage IV (severe) (H)      Chronic pain 6/15/2014     CKD stage 4 due to type 2 diabetes mellitus (H) 2/25/2019     COPD (chronic obstructive pulmonary disease) (H)      Cor Pulmonale     Created by Conversion  Replacement Utility updated for latest IMO load     Depression with anxiety      Diabetic  polyneuropathy associated with type 2 diabetes mellitus (H) 6/28/2016    Previously on gabapentin which helped, but stopped in the hospital due to potential drug interaction.  Lyrica made her sleepy.     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    Non-ketotic; diabetes type 2; >500     Joint pain, localized in the right shoulder 1/8/2015     Lethargy 7/8/2014     Localized Osteoarthritis Of The Knee     Created by Conversion  Replacement Utility updated for latest IMO load     Long-term insulin use in type 2 diabetes (H)     Created by Conversion      Lower Back Pain     Created by Conversion      Microcytic anemia     Created by Conversion      Morbid obesity (H)      Morbid obesity with alveolar hypoventilation (H)     Created by Conversion      Neuromuscular disorder (H)      Obstructive sleep apnea      Obstructive Sleep Apnea     Created by Conversion      Osteoarthritis of knee      Maggy-rectal abscess 11/20/2019     Perianal abscess 11/1/2019    S/P incision, drainage and debridement on 11/9/19 Dr CORREIA INCISION AND DRAINAGE, ABSCESS, RECTAL OR PERIRECTAL;       Postmenopausal bleeding     Created by Conversion      Pulmonary HTN (H) 6/8/2017     Pyelonephritis 12/26/2018     Reactive depression      Shortness of breath     Created by Conversion      Subclinical hyperthyroidism 10/11/2020     Tobacco use 7/30/2015     Urinary incontinence      Urinary Incontinence     Created by Conversion        PSH:   Past Surgical History:   Procedure Laterality Date     EP PACEMAKER INSERT N/A 10/23/2020    Procedure: EP Pacemaker Insertion;  Surgeon: Wilbert Bolaños MD;  Location: [facility];  Service: Cardiology     EYE SURGERY       INCISION AND DRAINAGE PERIRECTAL ABSCESS Left 11/9/2019    Procedure: INCISION AND DRAINAGE, ABSCESS, RECTAL  OR PERIRECTAL;  Surgeon: Yumiko Hancock DO;  Location: [facility] OR;  Service: General     IR NON TUNNELED CATHETER >5 YEARS  2019     IR TUNNELED CATHETER INSERT  11/15/2019     PICC INSERTION - TRIPLE LUMEN  10/20/2020          IA  DELIVERY ONLY      Description:  Section;  Recorded: 10/20/2011;     IA LIGATE FALLOPIAN TUBE      Description: Tubal Ligation;  Recorded: 10/20/2011;     IA REMOVAL GALLBLADDER      Description: Cholecystectomy;  Recorded: 10/20/2011;         MEDICATIONS:   Current Outpatient Medications on File Prior to Visit   Medication Sig Dispense Refill     ACCU-CHEK RACHEL CONTROL SOLN Soln        acetaminophen (TYLENOL) 325 MG tablet Take 2 tablets (650 mg total) by mouth 4 (four) times a day.  0     albuterol (PROAIR HFA;PROVENTIL HFA;VENTOLIN HFA) 90 mcg/actuation inhaler Inhale 2 puffs every 6 (six) hours as needed for wheezing.       allopurinol (ZYLOPRIM) 100 MG tablet Take 1 tablet (100 mg total) by mouth daily.  0     ascorbic acid, vitamin C, (ASCORBIC ACID WITH MILKA HIPS) 500 MG tablet Take 500 mg by mouth 2 (two) times a day.       aspirin 81 MG EC tablet Take 1 tablet (81 mg total) by mouth daily.  0     bisacodyL (DULCOLAX) 10 mg suppository Insert 10 mg into the rectum daily as needed.       cholecalciferol, vitamin D3, 5,000 unit Tab Take 1 tablet by mouth daily.       ciprofloxacin HCl (CIPRO) 250 MG tablet Take 1 tablet (250 mg total) by mouth daily. Take after dailysis 10 tablet 0     coenzyme Q10 (COQ-10) 100 mg capsule Take 2 capsules (200 mg total) by mouth daily.  0     COLCRYS 0.6 mg tablet Take 0.3 mg by mouth once a week.        DIAPER,BRIEF,ADULT, DISPOSABLE (DEPEND PANTS MISC) Use As Directed. Large       diclofenac sodium (VOLTAREN) 1 % Gel Apply 1 g topically 3 (three) times a day as needed (bilateral toes).       diphenhydrAMINE (BENADRYL) 25 mg capsule Take 1 capsule (25 mg total) by mouth 2 (two) times a day as needed for  itching. 30 capsule 0     docusate sodium (COLACE) 100 MG capsule Take 1 capsule (100 mg total) by mouth daily. (Patient taking differently: Take 100 mg by mouth 2 (two) times a day. )  0     EASY TOUCH LANCING DEVICE Misc Use to test blood sugar twice a day.  Generic: Easy Touch Lancing Device 1 each 2     EASY TOUCH TWIST LANCETS 28 gauge Misc USE TO CHECK BLOOD SUGAR FOUR TIMES A  each 1     epoetin padmini (EPOGEN,PROCRIT) 40,000 unit/mL injection Inject 40,000 Units under the skin once a week in the evening. At Dialysis. Saturday or Sunday       fluconazole (DIFLUCAN) 150 MG tablet Take 150 mg by mouth once a week. Mondays. END November 8th       fluticasone propionate (FLONASE) 50 mcg/actuation nasal spray Apply 2 sprays into each nostril daily.       furosemide (LASIX) 20 MG tablet Take 60 mg by mouth 2 (two) times a day at 9am and 6pm.       gabapentin (NEURONTIN) 100 MG capsule Take 200 mg by mouth 4 (four) times a week. Monday, Wednesday, Friday, Sunday - at bedtime on non-HD days       gabapentin (NEURONTIN) 100 MG capsule Take 200 mg by mouth 3 (three) times a week. 30 capsule 0     generic lancets (ACCU-CHEK SOFTCLIX LANCETS) CHECK 4 TIMES DAILY 100 each 2     ipratropium-albuterol (DUO-NEB) 0.5-2.5 mg/3 mL nebulizer Take 3 mL by nebulization every 6 (six) hours as needed.  0     lidocaine 4 % patch Place 1 patch on the skin daily. Remove and discard patch with 12 hours or as directed by MD.       magnesium oxide (MAG-OX) 400 mg (241.3 mg magnesium) tablet Take 400 mg by mouth 2 (two) times a day.        montelukast (SINGULAIR) 10 mg tablet Take 10 mg by mouth at bedtime.       omeprazole (PRILOSEC) 20 MG capsule Take 1 capsule (20 mg total) by mouth daily before breakfast. (Patient taking differently: Take 20 mg by mouth 2 (two) times a day before meals. Two times a day x 6 weeks)  0     oxybutynin (DITROPAN XL) 5 MG ER tablet Take 5 mg by mouth every evening.        pen needle, diabetic (BD  "ULTRA-FINE CHARITO PEN NEEDLE) 32 gauge x 5/32\" Ndle Use 1 needle daily with Levemir flexpen as directed 100 each 3     pen needle, diabetic, safety (NOVOFINE AUTOCOVER) 30 gauge x 1/3\" Ndle USE FIVE TIMES DAILY AS DIRECTED 500 each 3     polyethylene glycol (MIRALAX) 17 gram packet Take 17 g by mouth daily as needed.       rosuvastatin (CRESTOR) 10 MG tablet Take 10 mg by mouth daily.        saliva stimulant (BIOTENE) oral spray Take 2 sprays by mouth 4 (four) times a day.       senna-docusate (PERICOLACE) 8.6-50 mg tablet Take 1 tablet by mouth daily as needed for constipation.       sevelamer carbonate (RENVELA) 800 mg tablet Take 1 tablet (800 mg total) by mouth 3 (three) times a day with meals.  0     timolol maleate (TIMOPTIC) 0.5 % ophthalmic solution 1 gtt both eyes bid (Patient taking differently: Administer 1 drop to both eyes 2 (two) times a day. 1 gtt both eyes bid) 10 mL 12     venlafaxine (EFFEXOR-XR) 150 MG 24 hr capsule Take 150 mg by mouth daily.       wheelchair [employer] mobility device  Face-to-face exam  October 6, 2016  impaired mobility  Length of need : 99 1 each 0     No current facility-administered medications on file prior to visit.        ALLERGIES:  Allergies   Allergen Reactions     Beta-Blockers (Beta-Adrenergic Blocking Agts) Other (See Comments)     Heart block     Metoprolol Other (See Comments)     Heart block       PHYSICAL EXAM:   /67   Pulse 71    GENERAL: Sun is a pleasant, elderly female, in no acute distress.   HEART: Regular rate and rhythm, no murmurs.   LUNGS: Clear to auscultation bilaterally, unlabored.   ABDOMEN: Obese, soft  GYN: No ulcerations or gross vulvar lesions evident. Skin on right inner thigh has patches of hypopigmentation but no evidence of abrasions. She has slight pink tinge to vaginal discharge on wet prep swab. On speculum exam, there is significant amount of white discharge without odor, no clumping. Unable to visualize cervix due to " discomfort of speculum exam and amount of discharge. Procedure was aborted.   MSK: wheelchair dependent.     LABS:   Recent Results (from the past 24 hour(s))   Wet Prep, Vaginal    Specimen: Genital   Result Value Ref Range    Yeast Result No yeast seen No yeast seen    Trichomonas No Trichomonas seen No Trichomonas seen    Clue Cells, Wet Prep No Clue cells seen No Clue cells seen      Unable to process UA.  Insufficient quantity for microscopy.  Will send urinary culture.    ASSESSMENT & PLAN:   Sun Mireles is a 64 y.o. female presenting today for evaluation of persistent vaginitis symptoms, burning with urination, and intermittent vaginal bleeding.     1. Dysuria  - Urinalysis-UC if Indicated  - Wet Prep, Vaginal    Sun has had issues of dysuria for months now.  She has been retreated recurrently for UTIs as well as yeast infections.  It is possible that she is having recurrent yeast infections due to recurrent antibiotic use.  I think it is important that we rule out active UTI with repeat UA today and urine culture.  Although patient denies urinary incontinence issues, she does wear an adult diaper and did have an accident while in our exam room.  I am suspicious that she has local irritation and burning due to sitting in her urine in an adult diaper.    She has difficulty with leaving a urine sample. Obtained sample with hat.     Wet prep is negative for yeast infection.  Will not repeat treatment for candidiasis.    My recommendation today is to hold off on any subsequent antibiotics.  We will repeat culture today.  I suspect repeat antibiotics is what contributed to her prior yeast infection.    I suspect she is having burning after and with urination due to skin irritation from sitting in wet adult diapers.  Recommend application of barrier cream to help protect her skin.  No significant wounds or open sores evident on examination today.    Use barrier cream frequently for the next few weeks.   If symptoms or not improving, we will discuss alternative options.  If urine culture reveals infection, we will send in antibiotics.    2. Vaginal bleeding  - US Pelvis With Transvaginal Non OB; Future     With slight blood discoloration, it is important that we search for uterine causes of vaginal bleeding. Recommend proceeding with pelvic US. During our visit, she did receive a call stating she has been referred to OB/GYN.  I recommend she defer her OB/GYN consult until pelvic ultrasound results return.    RTC: Pending ultrasound results    Zahra Reynolds, DO

## 2021-06-13 NOTE — PROGRESS NOTES
10/12/17 Care Guide was not able to meet patient yesterday 10/11/17. Patient called to reschedule appointment with PCP and this Care Guide to 11/01/17 at 03:40 pm. Will try outreach then.

## 2021-06-13 NOTE — TELEPHONE ENCOUNTER
I think an initial evaluation in the ER where they can check her hemoglobin and perhaps her electrolytes will be a good idea.

## 2021-06-13 NOTE — TELEPHONE ENCOUNTER
I sent over a topical antifungal cream prescription to use intravaginally nightly for 3 nights and to apply on the outside twice daily for 7 days.     She should increase the amount of barrier cream she is using to protect her skin. Slather it on as a thick barrier. I would use what she is using when changing my baby. She will require a larger amount.    We will be in touch when your ultrasound results return.    1. Vaginal itching  - clotrimazole 2 % Crea; Insert 1 applicatorful of 2% vaginal cream daily (preferably at bedtime) for 3 consecutive days. May also apply externally twice daily for 7 days as needed for itching and irritation.  Dispense: 21 g; Refill: 0       Zahra Reynolds,

## 2021-06-13 NOTE — TELEPHONE ENCOUNTER
I spoke with Sun - she would like the script sent to [facility] on [address]. She will have her daughter  for her and bring.

## 2021-06-13 NOTE — PROGRESS NOTES
10/09/17   Care Guide called patient for Clinic Care Coordination outreach follow up. Per patient, she is currently at a restaurant eating breakfast. Unable to talk to CG at the moment. Care Guide will meet with patient on 10/11/17 at 02:40 pm after her appointment with PCP.     Plan:  - Will ask patient if she was able to schedule appointment with [facility].    10/12/17 Care Guide was not able to meet patient yesterday 10/11/17. Patient called to reschedule appointment with PCP and this Care Guide to 11/01/17 at 03:40 pm. Will try outreach then.    11/01/17      Patient declined to see Care Guide today in clinic after her appointment with PCP. This patient was discussed at [facility] Care Conferences on 10/13/17 and PCP ok to delete weight loss goal and transition patient to maintenance wellness plan. [facility] RN Margaret morris delegation:    [facility] Care Guide will: Adjust goals, move to maintenance     Care Guide will delete weight loss goal and transition patient to Maintenance wellness plan.

## 2021-06-13 NOTE — TELEPHONE ENCOUNTER
Reason for Call:  Home Health Care    Neris RN with [facility] called regarding (reason for call): inappropriate orders. Patient currently living in the nursing home. Once patient does move out of the nursing home and into her own home home health care orders can be re-ordered.     Orders are needed for this patient. None    PT:    OT:     Skilled Nursing:     Pt Provider: Dr. Doran     Phone Number Homecare Nurse can be reached at: NA    Can we leave a detailed message on this number? No call back needed     Phone number patient can be reached at: [place] number on file [phone] (home)    Best Time:     Call taken on 12/18/2020 at 10:07 AM by Africa Simental

## 2021-06-13 NOTE — TELEPHONE ENCOUNTER
Seen in ER yesterday. MD took tests, gave medications she's taking. He took xray of stomach. Told her there are spots on her kidneys and told her that her primary MD has to look into that. Right side of back has some pain from time to time. She wants the Dr Doran to look at the xray and get back to her by phone about next steps. Two spots on kidneys found by ER MD.  [phone].  Thank you,  Poonam Ba RN  [facility]    Reason for Disposition    Nursing judgment    Additional Information    Negative: Nursing judgment    Negative: Nursing judgment    Protocols used: NO PROTOCOL AVAILABLE - INFORMATION ONLY-A-[place]

## 2021-06-13 NOTE — TELEPHONE ENCOUNTER
Detail Level: Detailed Pt is calling in about her phone call with a nurse last night. Pt reports a doctor was suppose to call her back this morning. Appointment is scheduled for tomorrow morning. Pt was informed of her appointment time tomorrow morning. Pt verbalized understanding.     Dieudonne Charles RN Care Connection Triage/Medication Refill

## 2021-06-13 NOTE — PROGRESS NOTES
"12/10/2020  TCM DISCHARGE FOLLOW UP CALL  \"Hi, my name is  Aun, LAUREN , and I am calling from [facility].  I am calling to follow up and see how things are going for you after your recent hospitalization.      Tell me how you are doing now that you are home?\"     \" have not got my medications for the yeast and bladder infections. \"    Discharge Instructions     Do you understand your discharge instructions?  Pt. Response:     \"Not really. Someone call and said I have 2 spots on my liver and need to talk to the doctor.\"    \"Has an appointment with your primary care provider been scheduled?\" no  \"If yes, when is that appointment?    If no, date of appointment scheduled:   I can assist you to schedule that now.   CC refer to AVS to schedule when instructed.     CHW assisted and supported to scheduled ER follow up with PCP  ER follow up with PCP on 12-15-20 at 3:05pm.      Medications    \"Did you have any medication changes?   \"no. Only 2 new medications for yeast and bladder infections\"   Do you have any concerns with obtaining the medications or questions about your medications that you would like a RN to review with you?    Yes don't have the medications.  **If yes, escalate to CC RN responsible for patient's clinic or [facility] RN  Send an inbasket Epic message if RN is unavailable after call.   \"When you see the provider, I would recommend that you bring your medications with you.\"    Call Summary    \"What questions or concerns do you have about your recent visit and your follow-up care?\"            Can be addressed if scheduled with RN or SW either Care Coordination or if declining to triage for further questions.         \"If you have questions or things don't continue to improve, we encourage you contact us through the main clinic number [phone].  Even if the clinic is not open, triage nurses are available 24/7 to help you.      I am with Clinic Care Coordination, and we are a team of nurses, social workers, " community health workers, and financial resource workers. We work together with your primary doctor.   We are here to see if we can help you with a variety of things - from resources in the community such as food assistance, transportation, help in the home, equipment needs, assistance with medications, coordination of your appointments, help with any medical questions, and things like this.      We would have a nurse or  speak with you and together come up with goals to help you to improve your health and wellness and do the best to help you stay out of the hospital.          If the patient agrees, then explain that the appointment can be in person (If has RN or SW in clinic) or on the telephone if remote or if easier.      I would like to assist to help you to make that appointment now.  The next available appointment is   .       Appointment for Care Coordination assessment has been made with [facility] MAR on (Date) at 12-18-20 at 1pm.

## 2021-06-13 NOTE — TELEPHONE ENCOUNTER
Forms Request  Name of form/paperwork: Other:  [employer] Form  Have you been seen for this request: N/A  Do we have the form: Yes- 11/2020 and 12/1  When is form needed by: as soon as possible   How would you like the form returned: Fax:  [phone]  Patient Notified form requests are processed in 3-5 business days: No  Okay to leave a detailed message? Yes  [phone]    FYI: Caller stated the patient wants to move back on community and there is a service that may be available to patient. Caller stated she would like a confirmation call back to confirm form was received.

## 2021-06-13 NOTE — TELEPHONE ENCOUNTER
Called and spoke with patient she stated she is on the Renal diet but she is also on a diet plan called [facility]. She wants to know what that is?

## 2021-06-13 NOTE — TELEPHONE ENCOUNTER
Called and spoke to pt. Pt states she is having UTI symptoms. No available appointments today with any provider. Please advise.

## 2021-06-13 NOTE — TELEPHONE ENCOUNTER
Called and LM for Gautam, care coordinator with [employer] informing her that Dr. Doran has not received the forms that she faxed. Reviewed the message below and fax number was incorrect. Fax number ([phone]) was left on voice mail.

## 2021-06-13 NOTE — TELEPHONE ENCOUNTER
S: Antibiotic and CT scan results.  B: Was at ED 12/9 [facility].  UTI antibiotic was changed to Cefdinir. Still had not received antibiotic yet.  Writer called [facility] of [phone].  They received the e-scribed antibiotic unsure why it has not been filled yet.     A: Stated they will fill and get set out the [facility] in [facility].  Staff will call and get patient an appointment to See Dr. Doran about CT scan renal lesions.  R: Writer informed patient of action steps being done on her behalf.    Blanca Johnston RN  Triage Nurse Advisor  [facility]

## 2021-06-13 NOTE — PROGRESS NOTES
Clinic Care Coordination Contact  Care Team Conversations     [facility] MAR contacted patient for her assessment. After further chart review and conversation with her, MAR found out she is at [facility] and this is a skilled rehabilitation facility. Since she is in a higher level of care and has access to services SW explained we would not enroll at this time. We can help if she does move out, she said she has help from someone who is looking for housing options.     MAR called and left voicemail for [facility]  and informed her of this

## 2021-06-13 NOTE — TELEPHONE ENCOUNTER
Medication Question or Clarification  Who is calling: The patient   What medication are you calling about (include dose and sig)?:    terconazole (TERAZOL 7) 0.4 % vaginal cream     Who prescribed the medication?: Collin Doran MD   What is your question/concern?: The patient states the prescription needs to be sent to a different pharmacy. The patient states she has never used [facility]  pharmacy.     Requested Pharmacy:  The patient does not know the name of the pharmacy. The phone number to the pharmacy is #[phone].   Okay to leave a detailed message?: Yes

## 2021-06-13 NOTE — TELEPHONE ENCOUNTER
Reason for Disposition    [1] Prescription not at pharmacy AND [2] was prescribed today by PCP    Protocols used: MEDICATION QUESTION CALL-A-AH

## 2021-06-13 NOTE — TELEPHONE ENCOUNTER
Medication Request  Medication name: The patient states she needs a refill on her medication for itching.The patient is unsure the name of the medication.   Requested Pharmacy: [facility]   Reason for request: The patient states she has been itching for three days. The patient states she cannot sleep due to the itching. The patient declined speaking to triage.   When did you use medication last?:  NA  Patient offered appointment:  patient declined The patient states she had an appointment this morning but no one called her. The patient is requesting a call.  Okay to leave a detailed message: yes

## 2021-06-14 NOTE — ANESTHESIA POSTPROCEDURE EVALUATION
Patient: Sun Mireles  Procedure(s):  CYSTOSCOPY, WITH RETROGRADE PYELOGRAM AND FULGERATIONAND BILATERAL URETERAL STENT REPLACEMENT, (Bilateral)  BLADDER BIOPSY  Anesthesia type: MAC    Patient location: PACU  Last vitals:   Vitals Value Taken Time   /58 02/03/21 1100   Temp 37  C (98.6  F) 02/03/21 1100   Pulse 66 02/03/21 1100   Resp 16 02/03/21 1100   SpO2 99 % 02/03/21 1100     Post vital signs: stable  Level of consciousness: responds to simple questions, responds to stimulation, return to baseline and resting comfortably  Post-anesthesia pain: pain controlled  Post-anesthesia nausea and vomiting: no  Pulmonary: unassisted, spontaneous ventilation, nasal cannula  Cardiovascular: stable and blood pressure at baseline  Hydration: adequate  Anesthetic events: no    QCDR Measures:  ASA# 11 - Maggy-op Cardiac Arrest: ASA11B - Patient did NOT experience unanticipated cardiac arrest  ASA# 12 - Maggy-op Mortality Rate: ASA12B - Patient did NOT die  ASA# 13 - PACU Re-Intubation Rate: NA - No ETT / LMA used for case  ASA# 10 - Composite Anes Safety: ASA10A - No serious adverse event    Additional Notes:

## 2021-06-14 NOTE — TELEPHONE ENCOUNTER
I will suggest she rescheduled an appointment for recheck; based on the recheck we can determine what cream need to be prescribed, in the meantime continue with previous cream(dimethicone zinc oxide).

## 2021-06-14 NOTE — TELEPHONE ENCOUNTER
Sun had an appointment for today but thought it was for the 14th and she can not get a ride on short notice. She was to be seen for recheck after UTI. Today she reports rash and burning on her upper labia.  She would like a cream prescribed to help with the discomfort of the rash. She states it burns.  She will reschedule appointment and get a ride worked out for that.   Can call and update her re:cream at [phone].    Reason for Disposition    Localized rash present > 7 days    Protocols used: RASH OR REDNESS - [id number]

## 2021-06-14 NOTE — ANESTHESIA PREPROCEDURE EVALUATION
Anesthesia Evaluation      Patient summary reviewed     Airway   Comment: Grossly normal, unable to fully assess 2/2 cooperation   Pulmonary - normal exam    breath sounds clear to auscultation  (+) COPD, sleep apnea,                          Cardiovascular - normal exam  (+) pacemaker, hypertension, dysrhythmias, CHF, ,     Rate: normal,         Neuro/Psych    (+) neuromuscular disease,      Endo/Other    (+) diabetes mellitus, hyperthyroidism,      GI/Hepatic/Renal    (+) GERD,   chronic renal disease ESRD and dialysis,      Other findings: Unable to appropriately answer questions, laying in bed moaning      Dental                         Anesthesia Plan  Planned anesthetic: MAC  Patient is unable to consent, currently waiting to determine family member to call for consent.    ASA 4 - emergent   Induction: intravenous       Post-op plan: routine recovery      ADDENDUM:  I spoke with both Dr. Webster and the patient's daugher.  Will proceed with MAC for this very ill patient.  Right now she is hemodynamically stable but has been febrile and has an altered mental state. Plan for recovery in PACU to watch her more closely.

## 2021-06-14 NOTE — TELEPHONE ENCOUNTER
Reason for Call:  Medication or medication refill: pt called and said shes having reaction to her meds    Do you use a [facility]?  Name of the pharmacy and phone number for the current request: no    Name of the medication requested: premarin    Other request: she is having burning when she urinates and has been very irritable    Can we leave a detailed message on this number? Yes    Phone number patient can be reached at: Home number on file [phone] (home)    Best Time:asap     Call taken on 12/29/2020 at 2:51 PM by Kathy Ogden

## 2021-06-14 NOTE — TELEPHONE ENCOUNTER
"Pt no showed yesterday's appointment with Starr. She thought it was a phone visit. She doesn't have rides to get to her appointment. I offered to schedule her an office visit but she only wants to do phone visit. \" I need this bleeding to stop. My doctor is gone for 1 week and I needed to do a phone visit with another doctor.\" please advise.  "

## 2021-06-14 NOTE — TELEPHONE ENCOUNTER
Patient was calling about 2 issues, routing in different encounters.      Patient verbalized that a provider was supposed to call her today for an appointment, as she is still peeing blood.    Reviewed chart and did see appointment today at 10:25, although from my understanding it was to be an in person visit and in discussin that with patient, she verbalized twice that it was to be a phone call appointment.    Informed patient would route a note to the providers team for follow-up tomorrow.    Shari Pryor RN 01/05/21 6:34 PM

## 2021-06-14 NOTE — TELEPHONE ENCOUNTER
Patient just needs to be scheduled - this doesn't need to come to DOD!  Please assist her in scheduling a visit.

## 2021-06-14 NOTE — TELEPHONE ENCOUNTER
"Patient reports burning and blood w/ urination.  Patient reports long history of burning w/ urination.  Patient reports blood w/ urination occurred about 4 days ago and lower abdominal pain.  Patient reports pain is 7/10 when she urinates and having \" a little bit\" of lower abdominal pain but also says pain is 7/10. Patient reports no fever.    Patient has an appointment tomorrow w/ Dr. Kirkpatrick.  FNA advised she could either wait until then or she can be seen in urgent care.      Patient is asking for an antibiotic and something for the pain.   FNA advised she probably needs to be seen for the these issues but will route message to her provider to see if she will order anything prior to seeing her.  Patient verbalized understanding.          Reason for Disposition    > 2 UTIs in last year    Additional Information    Negative: Shock suspected (e.g., cold/pale/clammy skin, too weak to stand, low BP, rapid pulse)    Negative: Sounds like a life-threatening emergency to the triager    Negative: Severe pain with urination    Negative: Fever > 100.5 F (38.1 C)    Negative: Side (flank) or lower back pain present    Negative: Unusual vaginal discharge    Negative: Taking antibiotic > 3 days for UTI and painful urination not improved    Negative: Taking antibiotic > 24 hours for UTI and fever persists    Protocols used: URINATION PAIN - FEMALE-A-OH      "

## 2021-06-14 NOTE — ANESTHESIA PREPROCEDURE EVALUATION
Anesthesia Evaluation      Patient summary reviewed   No history of anesthetic complications     Airway   Mallampati: II  Neck ROM: limited  Comment: Grossly normal, unable to fully assess 2/2 cooperation   Pulmonary - normal exam    breath sounds clear to auscultation  (+) COPD, asthma  moderate, shortness of breath, sleep apnea,                          Cardiovascular - normal exam  (+) pacemaker, hypertension, dysrhythmias, CHF, ,     Rate: normal,         Neuro/Psych    (+) neuromuscular disease,      Endo/Other    (+) diabetes mellitus, hyperthyroidism,      GI/Hepatic/Renal    (+) GERD,   chronic renal disease,           Dental                           Anesthesia Plan  Planned anesthetic: MAC    ASA 4     Anesthetic plan and risks discussed with: patient  Anesthesia plan special considerations: antiemetics,   Post-op plan: routine recovery      ADDENDUM:  I spoke with both Dr. Webster and the patient's daugher.  Will proceed with MAC for this very ill patient.  Right now she is hemodynamically stable but has been febrile and has an altered mental state. Plan for recovery in PACU to watch her more closely.

## 2021-06-14 NOTE — TELEPHONE ENCOUNTER
Reason for Call:  Other appointment     Detailed comments: pt returned call and said her bleeding has lessened but she will see amanda arvizu I refaxed ref    Phone Number Patient can be reached at: Home number on file [phone] (home)    Best Time: no call back needed    Can we leave a detailed message on this number?: No call back needed    Call taken on 1/20/2021 at 10:14 AM by Kathy Ogden

## 2021-06-14 NOTE — TELEPHONE ENCOUNTER
Patient is going to have her daughter stop in tomorrow to  a sterile urine cup to leave a urine sample in anticipation of Dr. Crespo's phone visit.  I put in a order for the UA.  Please leave cup with label for her at the

## 2021-06-14 NOTE — PROGRESS NOTES
"Sun Mireles is a 65 y.o. female who is being evaluated via a billable telephone visit.      The patient has been notified of following:     \"This telephone visit will be conducted via a call between you and your physician/provider. We have found that certain health care needs can be provided without the need for a physical exam.  This service lets us provide the care you need with a short phone conversation.  If a prescription is necessary we can send it directly to your pharmacy.  If lab work is needed we can place an order for that and you can then stop by our lab to have the test done at a later time.    Telephone visits are billed at different rates depending on your insurance coverage. During this emergency period, for some insurers they may be billed the same as an in-person visit.  Please reach out to your insurance provider with any questions.    If during the course of the call the physician/provider feels a telephone visit is not appropriate, you will not be charged for this service.\"    Patient has given verbal consent to a Telephone visit? Yes    What phone number would you like to be contacted at? [phone]      Patient would like to receive their AVS by AVS Preference: Johnny.    ____________________________    Virtual Visit - Telephone Encounter  [facility]  Family Medicine  Date of Service: 12/31/2020  Subjective:    Pain with urination  Urethra only  No bladder or back pain  Urinating a small amount of blood  Blood on toilet paper when wipes  Blood seems to be from the urine, not the skin  Has had this before, when she went to the hospital.    Currently at [facility].      Objective:  not currently breastfeeding.   Speech is normal  Patient is calm  No visits with results within 1 Week(s) from this visit.   Latest known visit with results is:   Admission on 12/09/2020, Discharged on 12/09/2020   Component Date Value Ref Range Status     WBC 12/09/2020 5.3  " 4.0 - 11.0 thou/uL Final     RBC 12/09/2020 4.02  3.80 - 5.40 mill/uL Final     Hemoglobin 12/09/2020 11.9* 12.0 - 16.0 g/dL Final     Hematocrit 12/09/2020 36.9  35.0 - 47.0 % Final     MCV 12/09/2020 92  80 - 100 fL Final     MCH 12/09/2020 29.6  27.0 - 34.0 pg Final     MCHC 12/09/2020 32.2  32.0 - 36.0 g/dL Final     RDW 12/09/2020 15.4* 11.0 - 14.5 % Final     Platelets 12/09/2020 230  140 - 440 thou/uL Final     MPV 12/09/2020 10.1  8.5 - 12.5 fL Final     Sodium 12/09/2020 136  136 - 145 mmol/L Final     Potassium 12/09/2020 3.7  3.5 - 5.0 mmol/L Final     Chloride 12/09/2020 100  98 - 107 mmol/L Final     CO2 12/09/2020 25  22 - 31 mmol/L Final     Anion Gap, Calculation 12/09/2020 11  5 - 18 mmol/L Final     Glucose 12/09/2020 83  70 - 125 mg/dL Final     BUN 12/09/2020 34* 8 - 22 mg/dL Final     Creatinine 12/09/2020 4.29* 0.60 - 1.10 mg/dL Final     GFR MDRD Af Amer 12/09/2020 12* >60 mL/min/1.73m2 Final     GFR MDRD Non Af Amer 12/09/2020 10* >60 mL/min/1.73m2 Final     Bilirubin, Total 12/09/2020 0.5  0.0 - 1.0 mg/dL Final     Calcium 12/09/2020 8.6  8.5 - 10.5 mg/dL Final     Protein, Total 12/09/2020 7.5  6.0 - 8.0 g/dL Final     Albumin 12/09/2020 2.8* 3.5 - 5.0 g/dL Final     Alkaline Phosphatase 12/09/2020 154* 45 - 120 U/L Final     AST 12/09/2020 21  0 - 40 U/L Final     ALT 12/09/2020 17  0 - 45 U/L Final     Yeast Result 12/09/2020 Yeast Seen* No yeast seen Final     Trichomonas 12/09/2020 No Trichomonas seen  No Trichomonas seen Final     Clue Cells, Wet Prep 12/09/2020 No Clue cells seen  No Clue cells seen Final     Color, UA 12/09/2020 Red* Colorless, Yellow, Straw, Light Yellow Final     Clarity, UA 12/09/2020 Cloudy* Clear Final     Glucose, UA 12/09/2020 Negative  Negative Final     Bilirubin, UA 12/09/2020 Negative  Negative Final     Ketones, UA 12/09/2020 Negative  Negative Final     Specific Gravity, UA 12/09/2020 1.025  1.001 - 1.030 Final     Blood, UA 12/09/2020 Large* Negative  Final     pH, UA 12/09/2020 7.0  4.5 - 8.0 Final     Protein, UA 12/09/2020 300 mg/dL* Negative mg/dL Final     Urobilinogen, UA 12/09/2020 <2.0 E.U./dL  <2.0 E.U./dL, 2.0 E.U./dL Final     Nitrite, UA 12/09/2020 Negative  Negative Final     Leukocytes, UA 12/09/2020 Large* Negative Final     Culture 12/09/2020 50,000-100,000 col/ml Candida albicans*  Final     Ct Abdomen Pelvis Without Oral Without Iv Contrast    Result Date: 12/9/2020  EXAM: CT ABDOMEN PELVIS WO ORAL WO IV CONTRAST LOCATION: [facility] DATE/TIME: 12/9/2020 4:22 PM INDICATION: Hematuria, unknown cause COMPARISON: CT exams 10/26/2020, 10/14/2020 and 8/26/2020 TECHNIQUE: CT scan of the abdomen and pelvis was performed without oral or IV contrast. Multiplanar reformats were obtained. Dose reduction techniques were used. CONTRAST: None. FINDINGS: LOWER CHEST: Small hiatal hernia. HEPATOBILIARY: Postcholecystectomy with stable biliary ductal dilatation. No obstructing mass lesion or radiodense stone is identified. Unremarkable unenhanced liver. PANCREAS: Normal. SPLEEN: Normal. ADRENAL GLANDS: Normal. KIDNEY/BLADDER: Multiple stable small bilateral renal cysts and mildly hyperdense renal cortical lesions, likely hemorrhagic cysts. As a precaution, recommend attention on follow-up. No hydronephrosis or hydroureter. Diffuse bladder wall thickening with surrounding edema. Tiny air bubbles within the bladder lumen. No definite discrete focal masslike bladder wall thickening although evaluation of the bladder is very limited due to lack of distention. BOWEL: Normal caliber small bowel. Normal appendix. Tortuous normal caliber colon with moderate fecal retention suggesting constipation. No free air or free fluid. LYMPH NODES: Normal. VASCULATURE: Moderate to severe atherosclerosis. PELVIC ORGANS: Stable enlarged heterogeneous presumably leiomyomatous uterus. Uterine vascular calcifications are noted. MUSCULOSKELETAL: Spinal  degenerative changes.     1.  Diffuse bladder wall thickening and surrounding edema suspicious for cystitis. Tiny air bubbles in the bladder lumen could reflect recent catheterization. Correlation recommended. The lack of bladder distention limits the evaluation for a bladder mass. Follow-up with a CT cystogram may be helpful if there is concern for a bladder lesion. 2.  Stable bilateral renal lesions, likely simple and hemorrhagic cysts. As a precaution, recommend attention on follow-up. No hydronephrosis or urinary tract calculus. 3.  Stable enlarged presumed leiomyomatous uterus.    Us Pelvis Non Ob    Result Date: 12/23/2020  EXAM: US PELVIS NON OB LOCATION: [facility] DATE/TIME: 12/23/2020 2:54 PM INDICATION: vaginal bleeding COMPARISON: CT 12/09/2020 and MRI pelvis 12/17/2019 TECHNIQUE: Transabdominal scans were performed. No endovaginal ultrasound per patient request. FINDINGS: UTERUS: 14.0 x 8.1 x 14.6 cm. Large myomatous uterus. This corresponds to previous MRI 12/17/2019 12 cm fibroid was noted anteriorly off the body and fundus. Because of the large fibroid, the remainder of the uterus is not clearly visualized. By MRI, this is predominantly one large fibroid. Poorly defined by today's ultrasound. ENDOMETRIUM: Not seen due to the large fibroid. RIGHT OVARY: Not seen due to large fibroid. LEFT OVARY: Not seen due to large fibroid. No significant free fluid.     1.  Enlarged uterus due to a very large fibroid. 2.  This large fibroid obscures detail of the remainder of the uterus and adnexa.      Assessment & Plan:  1. Gross hematuria and dysuria in patient with recurrent UTI symptoms. Previous urine cultures have shown yeast. Patient has some degree of immunocompromise due to ESRD on dialysis, DM2, though clean catch UA may be limited by BMI >45. Order sent to her home for UA/UC and antibiotics sent for bacterial and yeast UTI. Renal dosing used. Follow up with PCP in one week  ensure resolution of symptoms and hematuria.       Order Summary                                                      1. Gross hematuria  fluconazole (DIFLUCAN) 100 MG tablet    CANCELED: Urinalysis    CANCELED: Culture, Urine   2. Urinary tract infection without hematuria, site unspecified  ciprofloxacin HCl (CIPRO) 250 MG tablet   3. Yeast UTI  fluconazole (DIFLUCAN) 100 MG tablet      Future Appointments   Date Time Provider Department Center   1/18/2021  2:00 PM Collin Doran MD [facility] OB [facility]   1/22/2021  2:50 PM [facility] DEVICE NURSE 1 [facility]       Completed by: Gladys Kirkpatrick M.D., [facility]. 1/6/2021 4:03 PM.  This transcription uses voice recognition software, which may contain typographical errors.  ____________________________  Start call: 4:03 PM   End call: 4:23 PM      Phone call duration:  20 minutes    Gladys Kirkpatrick MD

## 2021-06-14 NOTE — TELEPHONE ENCOUNTER
Patient had estrogen vaginal cream refill sent 12/28/2020.  She has appointment with Dr. Kirkpatrick on 12/31/2020.  I wouldn't change anything at this time.

## 2021-06-14 NOTE — ANESTHESIA CARE TRANSFER NOTE
Last vitals:   Vitals:    02/03/21 0850   BP: 95/53   Pulse: 64   Resp: 10   Temp:    SpO2: 100%   temp 97.4f  Patient's level of consciousness is drowsy  Spontaneous respirations: yes  Maintains airway independently: yes  Dentition unchanged: yes  Oropharynx: oropharynx clear of all foreign objects    QCDR Measures:  ASA# 20 - Surgical Safety Checklist: WHO surgical safety checklist completed prior to induction    PQRS# 430 - Adult PONV Prevention: 4558F - Pt received => 2 anti-emetic agents (different classes) preop & intraop  ASA# 8 - Peds PONV Prevention: NA - Not pediatric patient, not GA or 2 or more risk factors NOT present  PQRS# 424 - Maggy-op Temp Management: 4559F - At least one body temp DOCUMENTED => 35.5C or 95.9F within required timeframe  PQRS# 426 - PACU Transfer Protocol: - Transfer of care checklist used  ASA# 14 - Acute Post-op Pain: ASA14B - Patient did NOT experience pain >= 7 out of 10

## 2021-06-14 NOTE — TELEPHONE ENCOUNTER
Patient calling, she is supposed to be on a renal diet and she is wondering if her PCP can send note to the [facility] to let them know that she can have a tomato on her salad, as she is on a renal diet and she wants to have the occasional tomatoes on her salad.   Patient is very frustrated about this.  Educated patient as to why tomatoes are considered not to be a part of the renal diet and she verbalized she didn't want them every day, just occasionally.      Routing note to provider to follow-up with her place of residence.    Shari Pryor RN 01/05/21 6:30 PM

## 2021-06-14 NOTE — PROGRESS NOTES
CC: I was asked to see Sun Mireles by Dr Doran secondary to vulvar and vaginal irritation and some bleeding.    HPI: The pt is a 65 y.o. SAAF P7 who presents with irritation for some time.  She had a small amount of bleeding when she saw Dr Reynolds on Dec 4, but that resolved with the medications prescribed at that time.  She is using the barrier cream every time she voids, and she states it is helping.  She does still have some irritation.  She is using wipes every time she voids as well and describes what sounds like a vigorous cleaning with them.  She had an ultrasound yesterday that revealed a stable 12 cm fibroid, but endometrium and ovaries couldn't be evaluated because of the fibroid.  CT scan was done on 12/9/2020 with similar pelvic findings, stable since at least 2012.  There was also bladder wall thickening and edema consistent with cystitis.  Her kidneys showed stable cysts.    Past Medical History:   Diagnosis Date     Acute diastolic heart failure (H)     Created by Conversion      Acute on chronic respiratory failure with hypoxia and hypercapnia (H) 06/08/2017     Aneurysm of vertebral artery (H)     Right     Asthma      Benign Essential Hypertension     Created by Conversion      Bilateral edema of lower extremity 06/28/2016     Chronic diastolic CHF (congestive heart failure) (H)      Chronic pain 06/15/2014     COPD (chronic obstructive pulmonary disease) (H)      Cor Pulmonale     Created by Conversion  Replacement Utility updated for latest IMO load     Depression with anxiety      Diabetic polyneuropathy associated with type 2 diabetes mellitus (H) 06/28/2016    Previously on gabapentin which helped, but stopped in the hospital due to potential drug interaction.  Lyrica made her sleepy.     Eczema 04/09/2018     GERD (gastroesophageal reflux disease)      Glaucoma      Glaucoma     Created by Conversion      Gout     Created by Conversion      Hypercholesteremia      Hyperosmolar syndrome  2019    Non-ketotic; diabetes type 2; >500     Localized Osteoarthritis Of The Knee     Created by Conversion  Replacement Utility updated for latest IMO load     Long-term insulin use in type 2 diabetes (H)     Created by Conversion      Microcytic anemia     Created by Conversion      Morbid obesity (H)      Morbid obesity with alveolar hypoventilation (H)     Created by Conversion      Neuromuscular disorder (H)      Obstructive sleep apnea      Obstructive Sleep Apnea     Created by Conversion      Perianal abscess 2019    S/P incision, drainage and debridement on 19 Dr HANCOCK INCISION AND DRAINAGE, ABSCESS, RECTAL OR PERIRECTAL;       Postmenopausal bleeding     Created by Conversion      Pulmonary HTN (H) 2017     Pyelonephritis 2018     Shortness of breath     Created by Conversion      Subclinical hyperthyroidism 10/11/2020     Tobacco use 2015     Urinary incontinence      Urinary Incontinence     Created by Conversion        Past Surgical History:   Procedure Laterality Date     EP PACEMAKER INSERT N/A 10/23/2020    Procedure: EP Pacemaker Insertion;  Surgeon: Wilbert Bolaños MD;  Location: [facility];  Service: Cardiology     EYE SURGERY       INCISION AND DRAINAGE PERIRECTAL ABSCESS Left 2019    Procedure: INCISION AND DRAINAGE, ABSCESS, RECTAL OR PERIRECTAL;  Surgeon: Yumiko Hancock DO;  Location: [facility] OR;  Service: General     IR NON TUNNELED CATHETER >5 YEARS  2019     IR TUNNELED CATHETER INSERT  11/15/2019     PICC INSERTION - TRIPLE LUMEN  10/20/2020          NE  DELIVERY ONLY      Description:  Section;  Recorded: 10/20/2011;     NE LIGATE FALLOPIAN TUBE      Description: Tubal Ligation;  Recorded: 10/20/2011;     NE REMOVAL GALLBLADDER      Description: Cholecystectomy;  Recorded: 10/20/2011;       Patient's   Family History   Problem Relation Age of Onset     Hypertension Mother      COPD Mother       Diabetes Mother      Hypertension Father      COPD Father      Diabetes Father      Heart attack Maternal Grandmother      Hypertension Maternal Grandmother      Alcohol abuse Sister      Drug abuse Sister      COPD Sister      Diabetes Sister      Hypertension Sister      Heart disease Brother      Hypertension Brother      Hypertension Daughter      Hypertension Son      Hypertension Maternal Grandfather      Heart attack Maternal Grandfather      Hypertension Paternal Grandmother      Heart attack Paternal Grandmother      Hypertension Paternal Grandfather        Patient   Social History     Socioeconomic History     Marital status: Single     Spouse name: None     Number of children: None     Years of education: None     Highest education level: None   Occupational History     None   Social Needs     Financial resource strain: None     Food insecurity     Worry: None     Inability: None     Transportation needs     Medical: None     Non-medical: None   Tobacco Use     Smoking status: Former Smoker     Quit date: 2014     Years since quittin.8     Smokeless tobacco: Never Used   Substance and Sexual Activity     Alcohol use: No     Drug use: No     Sexual activity: None   Lifestyle     Physical activity     Days per week: None     Minutes per session: None     Stress: None   Relationships     Social connections     Talks on phone: None     Gets together: None     Attends Restorationism service: None     Active member of club or organization: None     Attends meetings of clubs or organizations: None     Relationship status: None     Intimate partner violence     Fear of current or ex partner: None     Emotionally abused: None     Physically abused: None     Forced sexual activity: None   Other Topics Concern     None   Social History Narrative    Reports on disability. She is not working and lives with her children and friend.  She reports she turns to her doctor in times of crisis.  She reports she needs  assistance with ADLs and has a PCA 11 hours per day, 7 days per week.  Does not have a home nurse.         Outpatient Medications Prior to Visit   Medication Sig Dispense Refill     ACCU-CHEK RACHEL CONTROL SOLN Soln        acetaminophen (TYLENOL) 325 MG tablet Take 2 tablets (650 mg total) by mouth 4 (four) times a day.  0     albuterol (PROAIR HFA;PROVENTIL HFA;VENTOLIN HFA) 90 mcg/actuation inhaler Inhale 2 puffs every 6 (six) hours as needed for wheezing.       allopurinol (ZYLOPRIM) 100 MG tablet Take 1 tablet (100 mg total) by mouth daily.  0     ascorbic acid, vitamin C, (ASCORBIC ACID WITH MILKA HIPS) 500 MG tablet Take 500 mg by mouth 2 (two) times a day.       aspirin 81 MG EC tablet Take 1 tablet (81 mg total) by mouth daily.  0     cefdinir (OMNICEF) 300 MG capsule Take 1 capsule (300 mg total) by mouth see administration instructions for 6 doses. First dose now.  Then 12/10 after dialysis, 12/12 after dialysis, 12/14 in the evening, 12/15 after dialysis, 12/17 after dialysis.  Stop ciprofloxacin. 6 capsule 0     cholecalciferol, vitamin D3, 5,000 unit Tab Take 1 tablet by mouth daily.       ciprofloxacin HCl (CIPRO) 250 MG tablet Take 1 tablet (250 mg total) by mouth daily. Take after dailysis 10 tablet 0     EASY TOUCH LANCING DEVICE Misc Use to test blood sugar twice a day.  Generic: Easy Touch Lancing Device 1 each 2     EASY TOUCH TWIST LANCETS 28 gauge Misc USE TO CHECK BLOOD SUGAR FOUR TIMES A  each 1     fluconazole (DIFLUCAN) 150 MG tablet Take 150 mg by mouth once a week. Mondays. END November 8th       furosemide (LASIX) 20 MG tablet Take 60 mg by mouth 2 (two) times a day at 9am and 6pm.       gabapentin (NEURONTIN) 100 MG capsule Take 200 mg by mouth 4 (four) times a week. Monday, Wednesday, Friday, Sunday - at bedtime on non-HD days       generic lancets (ACCU-CHEK SOFTCLIX LANCETS) CHECK 4 TIMES DAILY 100 each 2     ipratropium-albuterol (DUO-NEB) 0.5-2.5 mg/3 mL nebulizer Take 3 mL  by nebulization every 6 (six) hours as needed.  0     lidocaine 4 % patch Place 1 patch on the skin daily. Remove and discard patch with 12 hours or as directed by MD.       magnesium oxide (MAG-OX) 400 mg (241.3 mg magnesium) tablet Take 400 mg by mouth 2 (two) times a day.        montelukast (SINGULAIR) 10 mg tablet Take 10 mg by mouth at bedtime.       omeprazole (PRILOSEC) 20 MG capsule Take 1 capsule (20 mg total) by mouth daily before breakfast. (Patient taking differently: Take 20 mg by mouth 2 (two) times a day before meals. Two times a day x 6 weeks)  0     timolol maleate (TIMOPTIC) 0.5 % ophthalmic solution 1 gtt both eyes bid (Patient taking differently: Administer 1 drop to both eyes 2 (two) times a day. 1 gtt both eyes bid) 10 mL 12     bisacodyL (DULCOLAX) 10 mg suppository Insert 10 mg into the rectum daily as needed.       clotrimazole 2 % Crea Insert 1 applicatorful of 2% vaginal cream daily (preferably at bedtime) for 3 consecutive days. May also apply externally twice daily for 7 days as needed for itching and irritation. 21 g 0     coenzyme Q10 (COQ-10) 100 mg capsule Take 2 capsules (200 mg total) by mouth daily.  0     COLCRYS 0.6 mg tablet Take 0.3 mg by mouth once a week. Saturdays       DIAPER,BRIEF,ADULT, DISPOSABLE (DEPEND PANTS MISC) Use As Directed. Large       diclofenac sodium (VOLTAREN) 1 % Gel Apply 1 g topically 3 (three) times a day as needed (bilateral toes).       dimethicone-zinc oxide (DIMETHICONE-ZINC OXIDE) Crea Apply liberally to vulva with each adult diaper change and after voiding 142 g 11     diphenhydrAMINE (BENADRYL) 25 mg capsule Take 1 capsule (25 mg total) by mouth 2 (two) times a day as needed for itching. 30 capsule 0     docusate sodium (COLACE) 100 MG capsule Take 1 capsule (100 mg total) by mouth daily. (Patient taking differently: Take 100 mg by mouth 2 (two) times a day. )  0     epoetin padmini (EPOGEN,PROCRIT) 40,000 unit/mL injection Inject 40,000 Units under  "the skin once a week in the evening. At Dialysis. Saturday or Sunday       fluticasone propionate (FLONASE) 50 mcg/actuation nasal spray Apply 2 sprays into each nostril daily.       gabapentin (NEURONTIN) 100 MG capsule Take 200 mg by mouth 3 (three) times a week. 30 capsule 0     oxybutynin (DITROPAN XL) 5 MG ER tablet Take 5 mg by mouth every evening.        pen needle, diabetic (BD ULTRA-FINE CHARITO PEN NEEDLE) 32 gauge x 5/32\" Ndle Use 1 needle daily with Levemir flexpen as directed 100 each 3     pen needle, diabetic, safety (NOVOFINE AUTOCOVER) 30 gauge x 1/3\" Ndle USE FIVE TIMES DAILY AS DIRECTED 500 each 3     polyethylene glycol (MIRALAX) 17 gram packet Take 17 g by mouth daily as needed.       rosuvastatin (CRESTOR) 10 MG tablet Take 10 mg by mouth daily.        saliva stimulant (BIOTENE) oral spray Take 2 sprays by mouth 4 (four) times a day.       senna-docusate (PERICOLACE) 8.6-50 mg tablet Take 1 tablet by mouth daily as needed for constipation.       sevelamer carbonate (RENVELA) 800 mg tablet Take 1 tablet (800 mg total) by mouth 3 (three) times a day with meals.  0     venlafaxine (EFFEXOR-XR) 150 MG 24 hr capsule Take 150 mg by mouth daily.       wheelchair [employer] mobility device  Face-to-face exam  October 6, 2016  impaired mobility  Length of need : 99 1 each 0     No facility-administered medications prior to visit.        Patient is allergic to beta-blockers (beta-adrenergic blocking agts) and metoprolol.    ROS:  12 part ROS is negative aside from those symptoms in the HPI    PE:  /56 (Patient Site: Right Arm, Patient Position: Sitting, Cuff Size: Adult Large)   Pulse 78           There is no height or weight on file to calculate BMI.    General: obese AAF, NAD, uses a wheelchair  EG/BUS: some depigmentation on inner thighs, no skin erosion or erythema, no lesions, no discharge  Psych: normal mood  Neuro: CN I-XII grossly intact    Assessment: 65 y.o. SAAF P7 with chronic " vulvitis.    Plan: Natural history of skin irritation discussed with the patient.  We discussed that it could be from urine, menopause, or other contacts like the wipes she's using.  We discussed continuing the barrier cream; prescription was sent to a different pharmacy for her.  I also recommended that she stop using the wipes to reduce that contact exposure.  We also discussed that she should use a detergent on her clothes without perfumes or dyes.  I counseled her that this may take a few weeks to improve.  If there isn't improvement, then we could consider a topical estrogen as well.    Questions were answered to the best of my ability.  Approximately 35 minutes were spent with the patient with the majority in counseling.

## 2021-06-14 NOTE — ANESTHESIA POSTPROCEDURE EVALUATION
Patient: Sun Mireles  Procedure(s):  CYSTOSCOPY, WITH RETROGRADE PYELOGRAM AND URETERAL STENT PLACEMENT BILATERAL, CLOT EVACUATION CLOT EVACUATION OF BLADDER (Bilateral)  Anesthesia type: MAC    Patient location: Phase II Recovery  Last vitals:   Vitals Value Taken Time   /56 01/22/21 1730   Temp 36.5  C (97.7  F) 01/22/21 1723   Pulse 75 01/22/21 1734   Resp 6 01/22/21 1734   SpO2 100 % 01/22/21 1734   Vitals shown include unvalidated device data.  Post vital signs: stable  Level of consciousness: awakens to stimulation-- back at pre procedure baseline  Post-anesthesia pain: pain controlled  Post-anesthesia nausea and vomiting: no  Pulmonary: unassisted, return to baseline  Cardiovascular: stable and blood pressure at baseline  Hydration: adequate  Anesthetic events: no    QCDR Measures:  ASA# 11 - Maggy-op Cardiac Arrest: ASA11B - Patient did NOT experience unanticipated cardiac arrest  ASA# 12 - Maggy-op Mortality Rate: ASA12B - Patient did NOT die  ASA# 13 - PACU Re-Intubation Rate: NA - No ETT / LMA used for case  ASA# 10 - Composite Anes Safety: ASA10A - No serious adverse event    Additional Notes:  Patient monitored in PACU prior to transfer back to floor.  She is hemodynamically stable and level of consciousness is the same as preprocedure.  She arouses to voice and stimulation.  She is not at the mental status that was noted in her chart earlier today.

## 2021-06-14 NOTE — TELEPHONE ENCOUNTER
Patient called in regards to her medication that was suppose to be sent to the pharmacy after yesterday's Appointment.     Patient had [facility] with Dr. Fraire 12/28/2020. Informed her that Looks like Dr. Fraire sent the Premarin to [facility] yesterday. Pharmacy number was provided. Patient will call pharmacy for the status.

## 2021-06-14 NOTE — PROGRESS NOTES
OFFICE VISIT - FAMILY MEDICINE     ASSESSMENT AND PLAN     1. Gross hematuria  Ambulatory referral to Urology - [place] [facility]   2. Pain in vulva  dimethicone-zinc oxide (DIMETHICONE-ZINC OXIDE) Crea   3. Vaginal itching  Wet Prep, Vaginal    dimethicone-zinc oxide (DIMETHICONE-ZINC OXIDE) Crea   4. Dry eyes  polyethylene glycol 400 (BLINK GEL TEARS) 0.25 % Drop   5. Mild major depression (H)     6. Dependence on renal dialysis (H)     7. Chronic heart failure with preserved ejection fraction (H)     8. Type 2 diabetes mellitus with chronic kidney disease, with long-term current use of insulin, unspecified CKD stage (H)     9. Sick sinus syndrome (H)     Gross hematuria, will refer to urologist for cystoscopy.  Vaginal itching, vulvar pain, dimethicone cream seems to help, medication was refilled, suspecting she might have a component of lichen sclerosis, consider adding topical steroid.  Dry eyes with  history of cataract surgery, polyethylene glycol eyedrop prescribed, consider follow-up with ophthalmologist if not improving.  Depression has been stable on Effexor.  Continue with hemodialysis 3 times a week.  Congestive heart failure, she is euvolemic, continue to monitor weight at home.  CHIEF COMPLAINT   Hematuria and Blurred Vision    HPI   Sun Mireles is a 65 y.o. female.  Medical history significant for diabetes type 2 with associated peripheral neuropathies, end-stage kidney disease on hemodialysis 3 times a week, congestive heart failure, obesity, obstructive sleep apnea mild persistent asthma chronic pain syndrome secondary to degenerative joint disease of the knees, spine.  Still having blood in her urine, denies any painful urination, just recently completed a course of antibiotic.  Occasional vaginal pain and itching.  Has seen GYN and was prescribed dimethicone cream seems to help; clotrimazole and  estrogen cream did not seems to help.  Dry eyes, history of cataract surgery.  Has not  "started any treatment.  Depression has been stable, she takes effexor daily, nonsuicidal.  She has end-stage kidney disease, she does do hemodialysis 3 times a week, blood sugar has been well controlled, she like to stay on renal diet only.    Review of Systems As per HPI, otherwise negative.    OBJECTIVE   /67 (Patient Site: Left Arm, Patient Position: Sitting, Cuff Size: Adult Regular)   Pulse 69   Temp 97.3  F (36.3  C) (Temporal)   Resp 19   Ht 5' 3.75\" (1.619 m)   Wt 193 lb (87.5 kg)   BMI 33.39 kg/m    Physical Exam   Constitutional: She is oriented to person, place, and time. She appears well-developed and well-nourished.   HENT:   Head: Normocephalic and atraumatic.   Neck: Normal range of motion. Neck supple. No JVD present. No tracheal deviation present. No thyromegaly present.   Cardiovascular: Normal rate, regular rhythm, normal heart sounds and intact distal pulses. Exam reveals no gallop and no friction rub.   No murmur heard.  Pulmonary/Chest: Effort normal and breath sounds normal. No respiratory distress. She has no wheezes. She has no rales.   Genitourinary:    No vaginal discharge.      Genitourinary Comments: Vulvar dryness and  atrophy, exam chaperoned by June duke CNA.     Musculoskeletal:         General: No tenderness or edema.   Lymphadenopathy:     She has no cervical adenopathy.   Neurological: She is alert and oriented to person, place, and time. Coordination normal.   Psychiatric: She has a normal mood and affect. Judgment and thought content normal.       PFSH     Family History   Problem Relation Age of Onset     Hypertension Mother      COPD Mother      Diabetes Mother      Hypertension Father      COPD Father      Diabetes Father      Heart attack Maternal Grandmother      Hypertension Maternal Grandmother      Alcohol abuse Sister      Drug abuse Sister      COPD Sister      Diabetes Sister      Hypertension Sister      Heart disease Brother      Hypertension " Brother      Hypertension Daughter      Hypertension Son      Hypertension Maternal Grandfather      Heart attack Maternal Grandfather      Hypertension Paternal Grandmother      Heart attack Paternal Grandmother      Hypertension Paternal Grandfather      Social History     Socioeconomic History     Marital status: Single     Spouse name: Not on file     Number of children: Not on file     Years of education: Not on file     Highest education level: Not on file   Occupational History     Not on file   Social Needs     Financial resource strain: Not on file     Food insecurity     Worry: Not on file     Inability: Not on file     Transportation needs     Medical: Not on file     Non-medical: Not on file   Tobacco Use     Smoking status: Former Smoker     Quit date: 2014     Years since quittin.9     Smokeless tobacco: Never Used   Substance and Sexual Activity     Alcohol use: No     Drug use: No     Sexual activity: Not on file   Lifestyle     Physical activity     Days per week: Not on file     Minutes per session: Not on file     Stress: Not on file   Relationships     Social connections     Talks on phone: Not on file     Gets together: Not on file     Attends Orthodoxy service: Not on file     Active member of club or organization: Not on file     Attends meetings of clubs or organizations: Not on file     Relationship status: Not on file     Intimate partner violence     Fear of current or ex partner: Not on file     Emotionally abused: Not on file     Physically abused: Not on file     Forced sexual activity: Not on file   Other Topics Concern     Not on file   Social History Narrative    Reports on disability. She is not working and lives with her children and friend.  She reports she turns to her doctor in times of crisis.  She reports she needs assistance with ADLs and has a PCA 11 hours per day, 7 days per week.  Does not have a home nurse.       Relevant history was reviewed with the patient  today, unless noted in HPI, nothing is pertinent for this visit.  [facility]     Patient Active Problem List    Diagnosis Date Noted     Mild major depression (H) 01/13/2021     Diabetic ulcer of toe of right foot associated with type 2 diabetes mellitus, unspecified ulcer stage (H) 01/06/2021     Cardiac pacemaker in situ, dual chamber 11/06/2020     Overview Note:     [facility] [employer] MRI, RA and RV Leads: [facility] 2088TC Tendril STS, DOI 10/23/2020 by Dr. Wilbert Bolaños.       KHUSHBOO (obstructive sleep apnea)      Chronic pulmonary edema      Complete heart block (H) 10/20/2020     Acute encephalopathy      Depression with anxiety      Sleep difficulties      Folate deficiency 10/11/2020     Somnolence 10/11/2020     Subclinical hyperthyroidism 10/11/2020     Hyperkalemia 10/07/2020     ESRD (end stage renal disease) on dialysis (H) 09/25/2020     Atrial flutter (H) 09/25/2020     Atrial fibrillation (H) 09/25/2020     CRF (chronic renal failure), stage 5 (H) 11/03/2019     Diabetic ulcer of toe of right foot associated with type 2 diabetes mellitus, unspecified ulcer stage (H) 09/19/2019     Degeneration of lumbar or lumbosacral intervertebral disc 03/21/2019     Adjustment disorder with mixed anxiety and depressed mood      Chronic heart failure with preserved ejection fraction (H)      Class 3 severe obesity due to excess calories with serious comorbidity and body mass index (BMI) of 45.0 to 49.9 in adult (H)      Eczema 04/09/2018     Pulmonary HTN (H) 06/08/2017     Diabetic polyneuropathy associated with type 2 diabetes mellitus (H) 06/28/2016     Overview Note:     Previously on gabapentin which helped, but stopped in the hospital due to potential drug interaction.  Lyrica made her sleepy.       Candidiasis, intertrigo 06/28/2016     Tobacco use 07/30/2015     Chronic pain 06/15/2014     Gout      Overview Note:     Created by Conversion         Morbid obesity with alveolar hypoventilation  (H)      Overview Note:     Created by Conversion         Cor Pulmonale      Overview Note:     Created by Conversion    Replacement Utility updated for latest IMO load       Asthma      Overview Note:     Created by Conversion         Microcytic anemia      Overview Note:     Created by Conversion         Hypercholesteremia      Overview Note:     Created by Conversion         Obstructive Sleep Apnea      Overview Note:     Created by Conversion         Long-term insulin use in type 2 diabetes (H)      Overview Note:              Benign Essential Hypertension      Overview Note:     Created by Conversion         Localized Osteoarthritis Of The Knee      Overview Note:     Created by Conversion    Replacement Utility updated for latest IMO load       Degenerative arthritis of lumbar spine      Overview Note:     Created by Conversion         Unspecified glaucoma      Overview Note:     Created by Conversion         Past Surgical History:   Procedure Laterality Date     EP PACEMAKER INSERT N/A 10/23/2020    Procedure: EP Pacemaker Insertion;  Surgeon: Wilbert Bolaños MD;  Location: [facility] Cath Lab;  Service: Cardiology     EYE SURGERY       INCISION AND DRAINAGE PERIRECTAL ABSCESS Left 2019    Procedure: INCISION AND DRAINAGE, ABSCESS, RECTAL OR PERIRECTAL;  Surgeon: Yumiko Hancock DO;  Location: [facility] OR;  Service: General     IR NON TUNNELED CATHETER >5 YEARS  2019     IR TUNNELED CATHETER INSERT  11/15/2019     PICC INSERTION - TRIPLE LUMEN  10/20/2020          ID  DELIVERY ONLY      Description:  Section;  Recorded: 10/20/2011;     ID LIGATE FALLOPIAN TUBE      Description: Tubal Ligation;  Recorded: 10/20/2011;     ID REMOVAL GALLBLADDER      Description: Cholecystectomy;  Recorded: 10/20/2011;       RESULTS/CONSULTS (Lab/Rad)     Recent Results (from the past 168 hour(s))   Wet Prep, Vaginal    Specimen: Genital   Result Value Ref Range    Yeast Result No yeast  seen No yeast seen    Trichomonas No Trichomonas seen No Trichomonas seen    Clue Cells, Wet Prep No Clue cells seen No Clue cells seen     Ct Abdomen Pelvis Without Oral Without Iv Contrast    Result Date: 12/9/2020  EXAM: CT ABDOMEN PELVIS WO ORAL WO IV CONTRAST LOCATION: [facility] DATE/TIME: 12/9/2020 4:22 PM INDICATION: Hematuria, unknown cause COMPARISON: CT exams 10/26/2020, 10/14/2020 and 8/26/2020 TECHNIQUE: CT scan of the abdomen and pelvis was performed without oral or IV contrast. Multiplanar reformats were obtained. Dose reduction techniques were used. CONTRAST: None. FINDINGS: LOWER CHEST: Small hiatal hernia. HEPATOBILIARY: Postcholecystectomy with stable biliary ductal dilatation. No obstructing mass lesion or radiodense stone is identified. Unremarkable unenhanced liver. PANCREAS: Normal. SPLEEN: Normal. ADRENAL GLANDS: Normal. KIDNEY/BLADDER: Multiple stable small bilateral renal cysts and mildly hyperdense renal cortical lesions, likely hemorrhagic cysts. As a precaution, recommend attention on follow-up. No hydronephrosis or hydroureter. Diffuse bladder wall thickening with surrounding edema. Tiny air bubbles within the bladder lumen. No definite discrete focal masslike bladder wall thickening although evaluation of the bladder is very limited due to lack of distention. BOWEL: Normal caliber small bowel. Normal appendix. Tortuous normal caliber colon with moderate fecal retention suggesting constipation. No free air or free fluid. LYMPH NODES: Normal. VASCULATURE: Moderate to severe atherosclerosis. PELVIC ORGANS: Stable enlarged heterogeneous presumably leiomyomatous uterus. Uterine vascular calcifications are noted. MUSCULOSKELETAL: Spinal degenerative changes.     1.  Diffuse bladder wall thickening and surrounding edema suspicious for cystitis. Tiny air bubbles in the bladder lumen could reflect recent catheterization. Correlation recommended. The lack of bladder  distention limits the evaluation for a bladder mass. Follow-up with a CT cystogram may be helpful if there is concern for a bladder lesion. 2.  Stable bilateral renal lesions, likely simple and hemorrhagic cysts. As a precaution, recommend attention on follow-up. No hydronephrosis or urinary tract calculus. 3.  Stable enlarged presumed leiomyomatous uterus.    Us Pelvis Non Ob    Result Date: 12/23/2020  EXAM: US PELVIS NON OB LOCATION: [facility] DATE/TIME: 12/23/2020 2:54 PM INDICATION: vaginal bleeding COMPARISON: CT 12/09/2020 and MRI pelvis 12/17/2019 TECHNIQUE: Transabdominal scans were performed. No endovaginal ultrasound per patient request. FINDINGS: UTERUS: 14.0 x 8.1 x 14.6 cm. Large myomatous uterus. This corresponds to previous MRI 12/17/2019 12 cm fibroid was noted anteriorly off the body and fundus. Because of the large fibroid, the remainder of the uterus is not clearly visualized. By MRI, this is predominantly one large fibroid. Poorly defined by today's ultrasound. ENDOMETRIUM: Not seen due to the large fibroid. RIGHT OVARY: Not seen due to large fibroid. LEFT OVARY: Not seen due to large fibroid. No significant free fluid.     1.  Enlarged uterus due to a very large fibroid. 2.  This large fibroid obscures detail of the remainder of the uterus and adnexa.     MEDICATIONS     Current Outpatient Medications on File Prior to Visit   Medication Sig Dispense Refill     ACCU-CHEK RACHEL CONTROL SOLN Soln        acetaminophen (TYLENOL) 325 MG tablet Take 2 tablets (650 mg total) by mouth 4 (four) times a day.  0     albuterol (PROAIR HFA;PROVENTIL HFA;VENTOLIN HFA) 90 mcg/actuation inhaler Inhale 2 puffs every 6 (six) hours as needed for wheezing.       allopurinol (ZYLOPRIM) 100 MG tablet Take 1 tablet (100 mg total) by mouth daily.  0     ascorbic acid, vitamin C, (ASCORBIC ACID WITH MILKA HIPS) 500 MG tablet Take 500 mg by mouth 2 (two) times a day.       aspirin 81 MG EC tablet  Take 1 tablet (81 mg total) by mouth daily.  0     bisacodyL (DULCOLAX) 10 mg suppository Insert 10 mg into the rectum daily as needed.       cefdinir (OMNICEF) 300 MG capsule Take 1 capsule (300 mg total) by mouth see administration instructions for 6 doses. First dose now.  Then 12/10 after dialysis, 12/12 after dialysis, 12/14 in the evening, 12/15 after dialysis, 12/17 after dialysis.  Stop ciprofloxacin. 6 capsule 0     cholecalciferol, vitamin D3, 5,000 unit Tab Take 1 tablet by mouth daily.       ciprofloxacin HCl (CIPRO) 250 MG tablet Take 1 tablet (250 mg total) by mouth daily. Take daily, but take AFTER dialysis on dialysis days. 14 tablet 0     clotrimazole 2 % Crea Insert 1 applicatorful of 2% vaginal cream daily (preferably at bedtime) for 3 consecutive days. May also apply externally twice daily for 7 days as needed for itching and irritation. 21 g 0     coenzyme Q10 (COQ-10) 100 mg capsule Take 2 capsules (200 mg total) by mouth daily.  0     COLCRYS 0.6 mg tablet Take 0.3 mg by mouth once a week. Saturdays       conjugated estrogens (PREMARIN) vaginal cream Insert 0.5g vaginally daily for two weeks followed by twice weekly ongoing 42.5 g 12     DIAPER,BRIEF,ADULT, DISPOSABLE (DEPEND PANTS MISC) Use As Directed. Large       diclofenac sodium (VOLTAREN) 1 % Gel Apply 1 g topically 3 (three) times a day as needed (bilateral toes).       dimethicone-zinc oxide (DIMETHICONE-ZINC OXIDE) Crea Apply 1 application topically continuous as needed. 142 g 12     diphenhydrAMINE (BENADRYL) 25 mg capsule Take 1 capsule (25 mg total) by mouth 2 (two) times a day as needed for itching. 30 capsule 0     docusate sodium (COLACE) 100 MG capsule Take 1 capsule (100 mg total) by mouth daily. (Patient taking differently: Take 100 mg by mouth 2 (two) times a day. )  0     EASY TOUCH LANCING DEVICE Misc Use to test blood sugar twice a day.  Generic: Easy Touch Lancing Device 1 each 2     EASY TOUCH TWIST LANCETS 28 gauge  "Misc USE TO CHECK BLOOD SUGAR FOUR TIMES A  each 1     epoetin padmini (EPOGEN,PROCRIT) 40,000 unit/mL injection Inject 40,000 Units under the skin once a week in the evening. At Dialysis. Saturday or Sunday       fluconazole (DIFLUCAN) 100 MG tablet Take 100 mg daily on NON-dialysis days. Take 200 mg daily AFTER dialysis on dialysis days. For 14 days. 20 tablet 0     fluticasone propionate (FLONASE) 50 mcg/actuation nasal spray Apply 2 sprays into each nostril daily.       furosemide (LASIX) 20 MG tablet Take 60 mg by mouth 2 (two) times a day at 9am and 6pm.       gabapentin (NEURONTIN) 100 MG capsule Take 200 mg by mouth 4 (four) times a week. Monday, Wednesday, Friday, Sunday - at bedtime on non-HD days       gabapentin (NEURONTIN) 100 MG capsule Take 200 mg by mouth 3 (three) times a week. 30 capsule 0     generic lancets (ACCU-CHEK SOFTCLIX LANCETS) CHECK 4 TIMES DAILY 100 each 2     ipratropium-albuterol (DUO-NEB) 0.5-2.5 mg/3 mL nebulizer Take 3 mL by nebulization every 6 (six) hours as needed.  0     lidocaine 4 % patch Place 1 patch on the skin daily. Remove and discard patch with 12 hours or as directed by MD.       magnesium oxide (MAG-OX) 400 mg (241.3 mg magnesium) tablet Take 400 mg by mouth 2 (two) times a day.        montelukast (SINGULAIR) 10 mg tablet Take 10 mg by mouth at bedtime.       omeprazole (PRILOSEC) 20 MG capsule Take 1 capsule (20 mg total) by mouth daily before breakfast. (Patient taking differently: Take 20 mg by mouth 2 (two) times a day before meals. Two times a day x 6 weeks)  0     oxybutynin (DITROPAN XL) 5 MG ER tablet Take 5 mg by mouth every evening.        pen needle, diabetic (BD ULTRA-FINE CHARITO PEN NEEDLE) 32 gauge x 5/32\" Ndle Use 1 needle daily with Levemir flexpen as directed 100 each 3     pen needle, diabetic, safety (NOVOFINE AUTOCOVER) 30 gauge x 1/3\" Ndle USE FIVE TIMES DAILY AS DIRECTED 500 each 3     polyethylene glycol (MIRALAX) 17 gram packet Take 17 g by " mouth daily as needed.       rosuvastatin (CRESTOR) 10 MG tablet Take 10 mg by mouth daily.        saliva stimulant (BIOTENE) oral spray Take 2 sprays by mouth 4 (four) times a day.       senna-docusate (PERICOLACE) 8.6-50 mg tablet Take 1 tablet by mouth daily as needed for constipation.       sevelamer carbonate (RENVELA) 800 mg tablet Take 1 tablet (800 mg total) by mouth 3 (three) times a day with meals.  0     timolol maleate (TIMOPTIC) 0.5 % ophthalmic solution 1 gtt both eyes bid (Patient taking differently: Administer 1 drop to both eyes 2 (two) times a day. 1 gtt both eyes bid) 10 mL 12     venlafaxine (EFFEXOR-XR) 150 MG 24 hr capsule Take 150 mg by mouth daily.       wheelchair [employer] mobility device  Face-to-face exam  October 6, 2016  impaired mobility  Length of need : 99 1 each 0     No current facility-administered medications on file prior to visit.        HEALTH MAINTENANCE / SCREENING   PHQ-2 Total Score: 3 (9/24/2020  1:00 PM)  , PHQ-9 Total Score: 14 (9/24/2020  1:00 PM)  ,JAJA 7 Total Score: 18 (9/24/2020  1:00 PM)    Immunization History   Administered Date(s) Administered     Hep B, Adult 01/14/2020     Influenza, inj, historic,unspecified 02/01/2020, 09/17/2020     PPD Test 01/09/2020, 01/16/2020     Pneumo Polysac 23-V 02/16/2013     Pneumococcal Vaccine, Unspecified 01/25/2020     Td, Adult, Absorbed 11/09/2005     Td,adult,historic,unspecified 11/09/2005     Health Maintenance   Topic     SPIROMETRY      COPD ACTION PLAN      MAMMOGRAM      DEXA SCAN      ZOSTER VACCINES (1 of 2)     TD 18+ HE      MEDICARE ANNUAL WELLNESS VISIT      Pneumococcal Vaccine: 65+ Years (1 of 1 - PPSV23)     LIPID      Pneumococcal Vaccine: Pediatrics (0 to 5 Years) and At-Risk Patients (6 to 64 Years) (2 of 3 - PCV13)     COLORECTAL CANCER SCREENING      A1C      BMP      ADVANCE CARE PLANNING      ALT      CBC      DIABETIC FOOT EXAM      DIABETIC EYE EXAM      MICROALBUMIN      FALL RISK ASSESSMENT       HEART FAILURE ACTION PLAN      TSH      DEPRESSION ACTION PLAN      HEPATITIS C SCREENING      INFLUENZA VACCINE RULE BASED        Collin Doran MD  Family Medicine, [facility]     This note was dictated using a voice recognition software.  Any grammatical or context distortion are unintentional and inherent to the software.

## 2021-06-14 NOTE — PROGRESS NOTES
Patient updated by MyChart with lab result. Seeing Dr. Anna for consult tomorrow.  Zahra Reynolds, DO

## 2021-06-14 NOTE — TELEPHONE ENCOUNTER
"Triage Call:    ELIZABETH Trinidad noted that pt had soaked about a 7 inches x 7 inches area on her bedding. LPN is unsure if it is vaginal bleeding or urethral bleeding. Stated it was a moderate amount of bleeding, but it isn't \"continueous soaking bleed\".  LPN stated pt wears briefs and it was pulled to the side and that is when she noticed the blood on the bed. She stated the pt is not in any pain. Pt has a hx of ongoing, months, of burning with urination, pcp aware. HX of UTI and yeast infections and blood in urine. Noted the bleeding started about an hour ago. Pt takes ASA once a day. No redness or swelling.     6pm vital signs- BP: 119/70 Temp: 98.4 P: 73 RR: 20 BS: 140  95% on 2L/min.     Paging Dr Janie Ferguson @10:47pm  -No fever- PCP Dr Doran can evaluate and decide on what to do with the pt.   -If pt has a temp of 101.0 or higher pt needs to be seen tonight.     Called BUDMARY Trinidad back and informed her of Dr Ferguson's advisement.     PCP or covering provider please advise.     Doris Foy, MOOSE Nurse Triage 1/19/2021 11:08 PM     Additional Information    Negative: Shock suspected (e.g., cold/pale/clammy skin, too weak to stand, low BP, rapid pulse)    Negative: Difficult to awaken or acting confused (e.g., disoriented, slurred speech)    Negative: Passed out (i.e., lost consciousness, collapsed and was not responding)    Negative: Sounds like a life-threatening emergency to the triager    Negative: Followed a genital area injury    Negative: Pregnant > 20 weeks  (5 months or more)    Negative: Pregnant < 20 weeks  (less than 5 months)    Negative: Postpartum (from 0 to 6 weeks after delivery)    Negative: Bleeding occurring > 12 months after menopause    Negative: Bleeding from sexual abuse or rape    Negative: [1] Vaginal discharge is main symptom AND [2] small amount of blood    Negative: SEVERE abdominal pain    Negative: SEVERE dizziness (e.g., unable to stand, requires support to walk, feels like " passing out now)    Negative: SEVERE vaginal bleeding (i.e., soaking 2 pads or tampons per hour and present 2 or more hours; 1 menstrual cup every 2 hours)    Negative: Patient sounds very sick or weak to the triager    MODERATE vaginal bleeding (i.e., soaking 1 pad or tampon per hour and present > 6 hours; 1 menstrual cup every 6 hours)    Protocols used: VAGINAL BLEEDING - [id number]

## 2021-06-14 NOTE — PROGRESS NOTES
"Sun Mireles is a 65 y.o. female who is being evaluated via a billable telephone visit.      The patient has been notified of following:     \"This telephone visit will be conducted via a call between you and your physician/provider. We have found that certain health care needs can be provided without the need for a physical exam.  This service lets us provide the care you need with a short phone conversation.  If a prescription is necessary we can send it directly to your pharmacy.  If lab work is needed we can place an order for that and you can then stop by our lab to have the test done at a later time.    Telephone visits are billed at different rates depending on your insurance coverage. During this emergency period, for some insurers they may be billed the same as an in-person visit.  Please reach out to your insurance provider with any questions.    If during the course of the call the physician/provider feels a telephone visit is not appropriate, you will not be charged for this service.\"    Patient has given verbal consent to a Telephone visit? Yes    What phone number would you like to be contacted at? [phone]    Patient would like to receive their AVS by AVS Preference: Johnny.    Additional provider notes:      Subjective   Chief Complaint:  Vaginal Itching (outer vaginal irritation, on and off for about 1 year)    HPI:   Sun Mireles is a 65 y.o. female who presents for vaginal irritation.     Long history of this.  Evaluated 12/4 with Dr. Reynolds.  Has taken antibiotics as well as antifungals with return of symptoms.  Burning sensation near her vagina, vulva and urethra. Does wear an adult brief and felt irritation could be d/t urinary incontinence.  Advised on barrier cream. Negative wet prep.     Follow up with Dr. Anna 12/24.  Noted barrier cream helping though slight irritation still present.  Advised to stop using wipes every time she voids. Continue with barrier cream. Change to " free and clear detergent.  Advised may take several weeks to improve though if does not could consider topical estrogen as well.     States A&D was not working.  Now using Vaseline.  No improvement. Burning is 'unbearable'.  Has stopped using wipes though cleans herself off with toilet paper very well after every urination. Frequently d/t diuretic.         Allergies:  is allergic to beta-blockers (beta-adrenergic blocking agts) and metoprolol.    SH/FH:  Social History and Family History reviewed and updated.   Tobacco Status:  She  reports that she quit smoking about 6 years ago. She has never used smokeless tobacco.    Review of Systems:  A complete head to toe ROS is negative unless otherwise noted in HPI    Assessment & Plan   1. Chronic vaginitis  Reviewed previous visits for vaginal symptoms.  History of chronic vaginitis. Visit with Dr. Anna just four days ago and reviewed notes from this visit with the patient.  She has stopped using wipes though still sounds like she is wiping quite vigorously with each void.  Discussed goal of leaving barrier cream on the skin and not wiping off with each void, reapplying. She does not want to wait to see if symptoms improve over the next 1-2 weeks.  Would like to try vaginal estrogen. Advised on risks, side effects and denies risk factors for use. F/u two weeks with Dr. Anna  - conjugated estrogens (PREMARIN) vaginal cream; Insert 0.5g vaginally daily for two weeks followed by twice weekly ongoing  Dispense: 42.5 g; Refill: 12    Toya Fraire CNP    Phone call duration:  11 minutes

## 2021-06-14 NOTE — TELEPHONE ENCOUNTER
Please check with patient and staff on   When she is scheduled to see the urologist, if not appointment was made, please help her schedule an appointment as soon as possible

## 2021-06-14 NOTE — TELEPHONE ENCOUNTER
"RN Triage:    Has frequent UTI's.  Has burning with urination and hematuria on and off for a year.  \"I need to know what is causing this.\"  Pink on toilet paper and pinkish tinge to toilet water.  Has pelvic pain and is moaning over the phone.  No back pain.  Cannot rate severity of pain when asked.  Triage nurse stressed the need for an in person evaluation today.  She states that she can't get a ride to the  clinic, and only wants to talk to Dr. Doran.  She states that a nurse who works at the assisted living facility where she is a resident gave her an antibiotic this morning.  She does not know the name of the pill.  Telephone visit scheduled with Dr. Doran next week.    Yumiko Anand RN  [facility] Nurse Advisor          Reason for Disposition    > 2 UTIs in last year    Additional Information    Negative: Shock suspected (e.g., cold/pale/clammy skin, too weak to stand, low BP, rapid pulse)    Negative: Sounds like a life-threatening emergency to the triager    Negative: Unable to urinate (or only a few drops) and bladder feels very full    Negative: Vomiting    Negative: Patient sounds very sick or weak to the triager    Negative: Severe pain with urination    Negative: Fever > 100.5 F (38.1 C)    Negative: Side (flank) or lower back pain present    Negative: Taking antibiotic > 24 hours for UTI and fever persists    Negative: Taking antibiotic > 3 days for UTI and painful urination not improved    Negative: Unusual vaginal discharge    Protocols used: URINATION PAIN - FEMALE-A-OH      "

## 2021-06-14 NOTE — ANESTHESIA CARE TRANSFER NOTE
Last vitals:   Vitals:    01/22/21 1723   BP: 118/61   Pulse: 77   Resp: (P) 16   Temp: (P) 36.5  C (97.7  F)   SpO2: 100%     Patient's level of consciousness is drowsy  Spontaneous respirations: yes  Maintains airway independently: yes  Dentition unchanged: yes  Oropharynx: oropharynx clear of all foreign objects    QCDR Measures:  ASA# 20 - Surgical Safety Checklist: WHO surgical safety checklist completed prior to induction    PQRS# 430 - Adult PONV Prevention: 4558F - Pt received => 2 anti-emetic agents (different classes) preop & intraop  ASA# 8 - Peds PONV Prevention: NA - Not pediatric patient, not GA or 2 or more risk factors NOT present  PQRS# 424 - Maggy-op Temp Management: 4559F - At least one body temp DOCUMENTED => 35.5C or 95.9F within required timeframe  PQRS# 426 - PACU Transfer Protocol: - Transfer of care checklist used  ASA# 14 - Acute Post-op Pain: ASA14B - Patient did NOT experience pain >= 7 out of 10

## 2021-06-15 NOTE — TELEPHONE ENCOUNTER
Relayed Dr. Ingram's message to the pt but pt was not too happy about the message and hung up on me.

## 2021-06-15 NOTE — TELEPHONE ENCOUNTER
It sounds like she probably should be tested for COVID-19 again.  I'd be happy to put in that request.  This may just be a run-of-the-mill cold which would not require antibiotics.  If not better after the weekend, should be seen by Dr. Doran.   If getting worse sooner, walk-in care is an option.

## 2021-06-15 NOTE — TELEPHONE ENCOUNTER
Called and spoke to pt and she states her facility can test her for COVID. Pt will go to walk in care if she gets worse.

## 2021-06-15 NOTE — ANESTHESIA CARE TRANSFER NOTE
Last vitals:   Vitals:    02/10/21 1430   BP: 148/67   Pulse: 68   Resp: 9   Temp: 36.2  C (97.2  F)   SpO2: 90%   Report to RN, she will place 2L Nasal prong O2  Patient's level of consciousness is drowsy  Spontaneous respirations: yes  Maintains airway independently: yes  Dentition unchanged: yes  Oropharynx: oropharynx clear of all foreign objects    QCDR Measures:  ASA# 20 - Surgical Safety Checklist: WHO surgical safety checklist completed prior to induction    PQRS# 430 - Adult PONV Prevention: 4558F - Pt received => 2 anti-emetic agents (different classes) preop & intraop  ASA# 8 - Peds PONV Prevention: NA - Not pediatric patient, not GA or 2 or more risk factors NOT present  PQRS# 424 - Maggy-op Temp Management: 4559F - At least one body temp DOCUMENTED => 35.5C or 95.9F within required timeframe  PQRS# 426 - PACU Transfer Protocol: - Transfer of care checklist used  ASA# 14 - Acute Post-op Pain: ASA14B - Patient did NOT experience pain >= 7 out of 10

## 2021-06-15 NOTE — TELEPHONE ENCOUNTER
For your Phan catheter, she needs to call the urology office,  they will explain what to do or perhaps even a clinic visit.  Blood sugar above 300 is quite high, is it every day?  Is she taking anything?  Let me know.   Dr Doran

## 2021-06-15 NOTE — TELEPHONE ENCOUNTER
Triage call:   States that she is getting a cold and her chest is bothering her, her nose is running, cough and chest feels congested, denies pain in her chest  No sore throat, no ear pain, denies additional symptoms  Notes that she has been using her inhaler with her cold symptoms and providing relief  Reports that she gets her temperature taken every day- no fever   States that she was tested about 12 days ago for COVID and was negative.     Patient is requesting an appointment with Dr Doran and she states that she is hoping to be put an antibiotics.     No available appointment with PCP or at [facility], patient declines a virtual appointment outside of providers home clinic. Patient is requesting a message sent to PCP care team for additional advice- Please review and advise.     Nusrat Dias RN [facility] Triage/Med Refill 3/12/2021 10:08 AM    COVID 19 Nurse Triage Plan/Patient Instructions    Please be aware that novel coronavirus (COVID-19) may be circulating in the community. If you develop symptoms such as fever, cough, or SOB or if you have concerns about the presence of another infection including coronavirus (COVID-19), please contact your health care provider or visit  [email or web address].    Disposition/Instructions    Virtual Visit with provider recommended. Reference Visit Selection Guide.    Thank you for taking steps to prevent the spread of this virus.  o Limit your contact with others.  o Wear a simple mask to cover your cough.  o Wash your hands well and often.    Resources    [facility]: About COVID-19: [email or web address]    CDC: What to Do If You're Sick: www.cdc.gov/coronavirus/2019-ncov/about/steps-when-sick.html    CDC: Ending Home Isolation: www.cdc.gov/coronavirus/2019-ncov/hcp/disposition-in-home-patients.html     CDC: Caring for Someone: www.cdc.gov/coronavirus/2019-ncov/if-you-are-sick/care-for-someone.html     ANN: Interim Guidance for [facility]  Discharge to Home: www.health.[facility].[place]/diseases/coronavirus/hcp/hospdischarge.pdf    [facility] clinical trials (COVID-19 research studies): [email or web address]/umn-[email or web address]     Below are the COVID-19 hotlines at the [place] [facility] ([facility]). Interpreters are available.   o For health questions: Call 249-358-8474 or 1-451.783.5944 (7 a.m. to 7 p.m.)  o For questions about schools and childcare: Call 234-924-2530 or 1-865.737.1880 (7 a.m. to 7 p.m.)         Reason for Disposition    Patient wants to be seen    Additional Information    Negative: SEVERE difficulty breathing (e.g., struggling for each breath, speaks in single words)    Negative: Very weak (can't stand)    Negative: Sounds like a life-threatening emergency to the triager    Negative: Runny nose is caused by pollen or other allergies    Negative: Cough is the main symptom    Negative: Sore throat is the main symptom    Negative: Patient sounds very sick or weak to the triager    Negative: Fever > 103 F (39.4 C)    Negative: Fever > 101 F (38.3 C) and over 60 years of age    Negative: Fever > 100.0 F (37.8 C) and has diabetes mellitus or a weak immune system (e.g., HIV positive, cancer chemotherapy, organ transplant, splenectomy, chronic steroids)    Negative: Fever > 100.0 F (37.8 C) and bedridden (e.g., nursing home patient, stroke, chronic illness, recovering from surgery)    Negative: Fever present > 3 days (72 hours)    Negative: Fever returns after gone for over 24 hours and symptoms worse or not improved    Negative: Sinus pain (not just congestion) and fever    Negative: Earache    Negative: Sinus congestion (pressure, fullness) present > 10 days    Negative: Nasal discharge present > 10 days    Negative: Using nasal washes and pain medicine > 24 hours and sinus pain (lower forehead, cheekbone, or eye) persists    Protocols used: COMMON COLD-A-OH

## 2021-06-15 NOTE — TELEPHONE ENCOUNTER
This patient had severe UTI with hemorrhagic cystitis.  She had stents, she was hospitalized for 2 weeks, just in the last month and a half.    She also has end-stage renal disease.    She sees urology, Dr. Webster, and saw him 1 week ago.    I think she needs to talk to her urologist, Dr. Webster, regarding this.

## 2021-06-15 NOTE — TELEPHONE ENCOUNTER
Took catheter out a week ago.  Now burning when she urinates. She is in a temporary assisted living situation now.    Uses pharmacy but does not know which one. She will call nurse back with pharmacy number.  Looks like she has been using [facility] on [address].    Vivi Avalos RN [facility] Triage Nurse Advisor

## 2021-06-15 NOTE — TELEPHONE ENCOUNTER
She or someone at [facility] needs to contact [place] [facility] to discuss catheter removal.  And Schedule a follow-up hospital appointment.with me

## 2021-06-15 NOTE — TELEPHONE ENCOUNTER
Patient called back, and was wondering what she should do about her catheter. Patient stated that she got the catheter put in at the hospital. Patient is at [facility] and the nurse there advised her to contact her PCP regarding the catheter. Please call patient to advise what she should do.

## 2021-06-15 NOTE — PROGRESS NOTES
In Basket message received for Hematology Consult,refering physician Dr Brad Hill from [facility] [place].  Placed call to Sun to set up appointment. Same scheduled for Thursday February 8 2018 at 1:00 pm with , with a nurse apt at 12:30. Packet sent via "[employer]  to Sun with informational letter, MD bio card, [facility] intake form, medication and allergy list, and appointment letter. Work up for Monoclonal Gammopathy has been done at [facility].   Medical records will collect any outside records.      I explained that Sun would becoming to a cancer center and that the doctors are both cancer and blood doctors. Explained the appointment process of arriving early and bringing his Health History and medication allergy list along to his appointment.     Given [facility] Number to call if Sun has further questions or concerns. [phone]

## 2021-06-15 NOTE — TELEPHONE ENCOUNTER
"Pt reports blood glucose 324 just a few minutes ago. Pt is at [facility]. Pt states she has told nursing staff to call her doctor. Pt states \"the doctors here, I told them they don't do their job\". Pt states the nurses at [facility] \"act like they are slow\" and \"this place is not right\".     Advised pt to increase fluids, speak with facility nurses and eat a healthy diet. Advised pt message will be sent to PCP to advise.     Pt spoke with facility nurse while on phone with Writer. Facility nurse will contact physician who is covering pt in facility. Pt agrees to plan.       Reason for Disposition    [1] Caller requesting NON-URGENT health information AND [2] PCP's office is the best resource    Protocols used: INFORMATION ONLY CALL-A-AH      "

## 2021-06-15 NOTE — PROGRESS NOTES
02/02/18 Attempt 1: Care Guide called patient.  If this patient is returning my call, please transfer to [phone].

## 2021-06-15 NOTE — ANESTHESIA PREPROCEDURE EVALUATION
Anesthesia Evaluation      Patient summary reviewed   No history of anesthetic complications     Airway   Mallampati: II  Neck ROM: limited  Comment: Grossly normal, unable to fully assess 2/2 cooperation   Pulmonary - normal exam    breath sounds clear to auscultation  (+) COPD, asthma  shortness of breath, sleep apnea,                          Cardiovascular - normal exam  (+) pacemaker, hypertension, dysrhythmias, CHF, ,     Rate: normal,      ROS comment: Echo 10/24/20:      Limited echocardiogram performed.    Left ventricle ejection fraction is normal. The estimated left ventricular ejection fraction is 65%.    Normal right ventricular systolic function.    No pericardial effusion.     Neuro/Psych    (+) neuromuscular disease,      Endo/Other    (+) diabetes mellitus, hyperthyroidism,      GI/Hepatic/Renal    (+) GERD,   chronic renal disease ESRD and dialysis, last dialysis date: 2/9/2021,      Other findings:   COVID negative 2/10/21    Hbg 7.8  Plt 255  Na 139  K 4.7  BUN/Cr 28/5.79      Dental      Comment: Fair dentition, no loose or removable teeth                         Anesthesia Plan  Planned anesthetic: MAC      ASA 4     Anesthetic plan and risks discussed with: patient  Anesthesia plan special considerations: antiemetics,   Post-op plan: routine recovery

## 2021-06-15 NOTE — TELEPHONE ENCOUNTER
She would like call back from care team when something has been called in at  [phone].    Vivi Avalos, RN [facility] Triage Nurse Advisor

## 2021-06-15 NOTE — TELEPHONE ENCOUNTER
Attempted #3 times.   We attempted to call this patient 3 times and have not received any return calls. Okay to complete task?

## 2021-06-15 NOTE — PROGRESS NOTES
02/06/18    Care Guide called patient for Clinic Care Coordination outreach follow up.   Patient reported:  - She cancelled her appointment with Dr. Peters due to no transportation. Patient was not aware her medical assistance insurance has medical transportation available to her to go to medical appointments.  - She was not aware her medical assistance insurance was inactive. She will call her [facility] [employer] worker. She will call me back once her insurance is active again and we can call to reschedule her appointment with Dr. Peters.  -     Once patient's insurance is active again and Dr. Peters's appointment is reschedule, this CG will give patient [employer] information to set up transportation for her appointment with Dr. Peters. CG gave patient my direct telephone number, [phone].     Plan:  - outreach frequency: every two weeks.

## 2021-06-15 NOTE — ANESTHESIA POSTPROCEDURE EVALUATION
Patient: Sun Mireles  Procedure(s):  CYSTOSCOPY, WITH BILATERAL URETERAL STENT REMOVAL, BILATERAL URETERAL RETROGRADES, STANTON PLACEMENT (Bilateral)  Anesthesia type: MAC    Patient location: [facility] Surgical Floor  Last vitals:   Vitals Value Taken Time   /60 02/10/21 1558   Temp 36.9  C (98.4  F) 02/10/21 1558   Pulse 65 02/10/21 1558   Resp 10 02/10/21 1558   SpO2 100 % 02/10/21 1558     Post vital signs: stable  Level of consciousness: awake and responds to simple questions  Post-anesthesia pain: pain controlled  Post-anesthesia nausea and vomiting: no  Pulmonary: unassisted, return to baseline  Cardiovascular: stable and blood pressure at baseline  Hydration: adequate  Anesthetic events: no    QCDR Measures:  ASA# 11 - Maggy-op Cardiac Arrest: ASA11B - Patient did NOT experience unanticipated cardiac arrest  ASA# 12 - Maggy-op Mortality Rate: ASA12B - Patient did NOT die  ASA# 13 - PACU Re-Intubation Rate: no ETTor LMA used  ASA# 10 - Composite Anes Safety: ASA10A - No serious adverse event    Additional Notes:

## 2021-06-15 NOTE — TELEPHONE ENCOUNTER
"Pt calling from the care center upset because she is not liking her urinary catheter, and doesn't understand why she still has to have it it place.   Says the catheter is \"bothering her\" and she can't get anyone to help her. Said she tried the nurse call light, but since she \"went off on\" the nurse aide no one will come.      Unable to find information in pt care notes to explain to her how long the doctor wants the catheter in place.    Suggested pt have a pleasant conversation with a nurse on the Day shift, and ask them to find out when the doctor will let it be taken out.    Also suggested pt ask the nurse to secure the catheter with special tape so it doesn't wiggle around so much and bother her. Apparently the catheter tape came off pt's leg and it wasn't reattached.  Pt calmer when call ended    Stephani Pond RN    "

## 2021-06-15 NOTE — PROGRESS NOTES
Pain Clinic Consultation  ENCOUNTER DATE: 1/15/2018    Sun FINNEY Chi    1955  MRN # [id number]  PCP: Kelli Vanessa MD    CC: Sun Mireles 62 y.o. is here today, sent to me by  to discuss   Chief Complaint   Patient presents with     Consult         HPI:     Pain started: Many years ago with no specific injury.  Her main pain is in the lumbar spine.  She has diabetic neuropathy which causes numbness in her feet.  Pain level: On a scale of 1-10, the patient rates their pain on average at a 9  Pain is described: Constant, sharp, burning, aching, tingling, numbness, shooting, stabbing, cramping, throbbing, tender, heavy  Pain interferes with: Daily activities  Aggravating factors: Standing, sitting, walking, laying down, lifting, going upstairs and downstairs, riding in the car, getting out of car and out of bed  Alleviating factors: Medication  Associated Symptoms: None      Past Medical History:   Diagnosis Date     Aneurysm of vertebral artery     Right     Asthma      CHF (congestive heart failure)      Chronic diastolic CHF (congestive heart failure)      Chronic kidney disease (CKD), stage IV (severe)      Diabetes mellitus type 2 with complications      Glaucoma      Gout      Hypercholesteremia      Hypertension      Morbid obesity      Obstructive sleep apnea      Osteoarthritis of knee      Urinary incontinence        Past Surgical History:   Procedure Laterality Date     MS  DELIVERY ONLY      Description:  Section;  Recorded: 10/20/2011;     MS LIGATE FALLOPIAN TUBE      Description: Tubal Ligation;  Recorded: 10/20/2011;     MS REMOVAL GALLBLADDER      Description: Cholecystectomy;  Recorded: 10/20/2011;       SOCIAL HISTORY:   reports that she quit smoking about 3 years ago. She has never used smokeless tobacco. She reports that she does not drink alcohol or use illicit drugs.    FAMILY HISTORY  family history includes Heart attack in her maternal  grandmother; Hypertension in her father and mother.    No Known Allergies    Current Outpatient Prescriptions   Medication Sig Dispense Refill     ACCU-CHEK RACHEL PLUS TEST STRP strips Use to test blood sugar four times a day. Generic: Blood Glucose Test 400 strip 1     albuterol (PROAIR HFA;PROVENTIL HFA;VENTOLIN HFA) 90 mcg/actuation inhaler Inhale 2 puffs.       albuterol (PROVENTIL) 2.5 mg /3 mL (0.083 %) nebulizer solution Use 1 vial via nebulizer four times a day as needed Generic: Albuterol Sulfate 60 vial 3     allopurinol (ZYLOPRIM) 100 MG tablet TK 1 T PO QD FOR GOUT PREVENTION  11     amLODIPine (NORVASC) 10 MG tablet Take 1 tablet by mouth every day  Generic: AmLODIPine Besylate 90 tablet 1     ANTIFUNGAL CREAM 2 % cream APPLY EXTERNALLY TO THE AFFECTED AREA TWICE DAILY 56 g 0     aspirin 81 mg chewable tablet Chew one tablet by mouth once daily  Generic: Aspirin 36 tablet 10     aspirin 81 MG EC tablet Take 1 tablet (81 mg total) by mouth daily. 90 tablet 3     atorvastatin (LIPITOR) 40 MG tablet Take 1 tablet by mouth every night at bedtime 30 tablet 7     blood-glucose meter (ACCU-CHEK RACHEL PLUS METER) Misc USE AS DIRECTED 1 each 1     clobetasol (TEMOVATE) 0.05 % cream APPLY EXTERNALLY TO THE AFFECTED AREA TWICE DAILY 60 g 0     cloNIDine HCl (CATAPRES) 0.2 MG tablet TAKE 1 TABLET(0.2 MG) BY MOUTH TWICE DAILY 180 tablet 2     DIAPER,BRIEF,ADULT, DISPOSABLE (DEPEND PANTS MISC) Use As Directed. Large       diphenhydrAMINE (BENADRYL) 25 mg capsule TAKE 2 CAPSULES BY MOUTH DAILY AS NEEDED 180 capsule 1     EASY TOUCH TWIST LANCETS 28 gauge Misc USE TO CHECK BLOOD SUGAR FOUR TIMES A  each 1     esomeprazole (NEXIUM) 40 MG capsule Take 1 capsule (40 mg total) by mouth daily. 90 capsule 3     fluticasone-salmeterol (ADVAIR DISKUS) 250-50 mcg/dose DISKUS Use one puff twice daily 3 each 3     furosemide (LASIX) 80 MG tablet Take 2 tablets by mouth daily  Generic: Furosemide 60 tablet 10     generic  "lancets (ACCU-CHEK SOFTCLIX LANCETS) CHECK 4 TIMES DAILY 100 each 2     GLUCAGON 1 mg injection Use as directed 1 each 0     hydrALAZINE (APRESOLINE) 100 MG tablet Take one tablet by mouth three times a day Generic: HydrALAZINE HCl 90 tablet 3     ketoconazole (NIZORAL) 2 % cream Apply to affected area BID 15 g 0     LANTUS 100 unit/mL injection Inject 30 Units under the skin at bedtime. 11.9 Type 2 without complications  Contact provider if insulin prescribed is not the preferred insulin per insurance. 10 mL PRN     LANTUS SOLOSTAR 100 unit/mL (3 mL) pen        LEVEMIR FLEXTOUCH 100 unit/mL (3 mL) pen Inject 35 units subcutaneously twice daily Generic: Insulin Detemir 1 adj dose pen 3     LEVEMIR FLEXTOUCH 100 unit/mL (3 mL) pen INJECT 35 UNITS TWICE DAILY 15 mL 0     lidocaine (LIDODERM) 5 % Remove & Discard patch within 12 hours or as directed by MD 15 patch 0     magnesium oxide (MAG-OX) 400 mg tablet TAKE 1/2 TABLET BY MOUTH ONCE DAILY 45 tablet 3     metOLazone (ZAROXOLYN) 2.5 MG tablet TAKE 1 TABLET(2.5 MG) BY MOUTH DAILY 30 tablet 0     metoprolol succinate (TOPROL-XL) 200 MG 24 hr tablet Take 1 tablet by mouth daily  Generic: Metoprolol Succinate ER 90 tablet 3     miscellaneous medical supply Misc Use with nebulizer 1 each 1     montelukast (SINGULAIR) 10 mg tablet TAKE ONE TABLET BY MOUTH EVERY NIGHT AT BEDTIME 90 tablet 03     NEEDLES, INSULIN DISPOSABLE (NOVOFINE 30 MISC) Use As Directed 5 (five) times a day. X 8mm       NOVOFINE AUTOCOVER 30 gauge x 1/3\" Ndle INJECT UNDER THE SKIN 5 TIMES DAILY AS DIRECTED 400 each 2     NOVOLOG 100 unit/mL injection Check blood sugar three (3) times daily.  11.9 Type 2 without complications 10 mL PRN     NOVOLOG 100 unit/mL injection Check blood sugar one (1) times daily.  11.9 Type 2 without complications 10 mL PRN     NOVOLOG FLEXPEN 100 unit/mL injection pen INJECT 15 UNITS UNDER THE SKIN THREE TIMES DAILY BEFORE EACH MEAL (Patient taking differently: INJECT 15 " UNITS UNDER THE SKIN TWO TIMES DAILY  AS NEEDED) 15 mL 0     oxyCODONE-acetaminophen (PERCOCET) 5-325 mg per tablet Take 1 tablet by mouth every 6 (six) hours as needed for pain. 30 tablet 0     permethrin (NIX) 1 % liquid Wash hair first - then apply to scalp. May repeat in 1 week prn 1 Bottle 0     potassium chloride (KLOR-CON) 10 MEQ CR tablet Take 1 tablet by mouth daily  Generic: Potassium Chloride 30 tablet 10     potassium chloride (KLOR-CON) 10 MEQ CR tablet TAKE 1 TABLET(10 MEQ) BY MOUTH DAILY 30 tablet 5     senna-docusate (PERICOLACE) 8.6-50 mg tablet Take 1 tablet by mouth 2 (two) times a day. 60 tablet 0     spironolactone (ALDACTONE) 25 MG tablet Take 1 tablet (25 mg total) by mouth daily. 30 tablet 5     timolol maleate (TIMOPTIC) 0.5 % ophthalmic solution USE 1 DROP IN BOTH EYES TWICE DAILY 10 mL 0     timolol maleate (TIMOPTIC) 0.5 % ophthalmic solution INSTILL 1 DROP IN BOTH EYES TWICE DAILY 10 mL 5     traMADol (ULTRAM) 50 mg tablet TAKE 2 TABLETS BY MOUTH EVERY MORNING, 1 TABLET DAILY IN THE EVENING, AND 2 TABLETS EVERY NIGHT AT BEDTIME 75 tablet 0     traMADol (ULTRAM) 50 mg tablet Take 1 tablet (50 mg total) by mouth 2 (two) times a day as needed for severe pain (7-10). 60 tablet 0     triamcinolone (NASACORT) 55 mcg nasal inhaler 2 sprays in each nostril q day. Wait for 1 week for effectiveness 1 Inhaler 12     wheelchair [employer] mobility device  Face-to-face exam  October 6, 2016  impaired mobility  Length of need : 99 1 each 0     ZEASORB, MICONAZOLE, 2 % powder APPLY TWICE DAILY AFTER DRYING AREA OF REDNESS 71 g 0     No current facility-administered medications for this encounter.        Chemical Dependency History: Denies      Mental Health History: Denies      REVIEW OF SYSTEMS:  12 point systems were reviewed with pt as documented on pt health form of 1/15/2018. ROS was positive for glasses, glaucoma, cataract, sinus pain, dry mouth, leg cramping, back pain, numbness, easy bruising,  allergies, change in appetite, stress  The rest of systems were pertinent negative.       PHYSICAL EXAM:    Constitutional: 62 y.o. Black or  female in NAD; alert and oriented x4/4; appears stated age.  Normal body habitus.  Patient is cooperative, polite, communicates well, makes eye contact, and expresses appropriate concern throughout history. Inspection of the neck demonstrates no skin abnormalities or deformities.  Posture is appropriate with no obvious listing, scoliosis, or rigidity.  HEENT:   Head: Normocephalic and atraumatic.   Right Ear: External ear normal.   Left Ear: External ear normal.   Nose: Nose normal.   Mouth/Throat: Oropharynx is clear and moist.   Eyes: Conjunctivae and EOM are normal. Right eye exhibits no discharge. Left eye exhibits no discharge.   Neck: Normal range of motion. Neck supple.   Cardiovascular: Normal rate, regular rhythm and normal heart sounds.    Pulmonary/Chest: Effort normal and breath sounds normal. No respiratory distress. No wheezes. Noo rales.  Integumentary: no rashes or breaks in the skin, no open wounds.   Psychiatry:  The patient described normal mood. Affect is normal. No abnormal speech. The patient denies any suicidal ideation. No hallucination. Judgement and insight are normal.     Musculoskeletal exam:      Spine:   Reduced range of motion of  spine with pain extension and flexion .Facet loading test is Positive bilateral L3-4 and 5. Sacroiliac joints test was normal.   Gait: Patient has very limited mobility and difficulty to stand without support.  Is able to toe and heel walk normally.  Patient rises from a seated position with difficulty.        Neurological exam:     Motor Examination:  Muscles are symmetrical, no deformities or atrophy.  Motor examination in the lower extremities demonstrates grade 5/5 strength in all major motor groups.   Sensory Examination:  Light touch sensation in the lower extremities is subjectively normal  throughout all major dermatomes except feet with hypoesthesia.   Deep Tendon Reflexes: Reflexes at the Patellar and Achilles are symmetric and grade 0 bilaterally.        Images: No new diagnostic studies    Diagnosis  1. Lumbar facet arthropathy           Assessment:    This is a 62-year-old female patient has multi site pain mainly in her lumbar spine.  She has diabetic neuropathy diagnosed.  She did not tolerate Neurontin even on low doses.  In terms of her low back pain, she did pool therapy which helped for very short period of time.  Physical therapy caused her more pain.  She was trialed before on tramadol and Percocet.  She feels tramadol was not helpful.  Percocet provided with some relief.  No recent MRI of lumbar spine, no interventions.    Her physical exam is consistent with lumbar facet joint pain.  I did discuss with her in detail plan of care as follows.          PLAN:    Available medical records including diagnostic studies were reviewed today with the patient. Plan of care was discussed with the patient. Education about patient pain condition, pathology, and strategies to manage the pain was provided.     Diagnotic Studies/Lab orders: MRI of lumbar spine to further assess the pathology of her symptoms.  She will have an open MRI as she is claustrophobic.    Interventions: I discussed risks versus benefits of bilateral L3-4 and 5 medial branch blocks after reviewing MRI.    Physical Medicine and rehabilitations: Consider another course of physical therapy in the future.    :  Dated 1/15/2018  Reviewed and consistent.    Opioid Management:    Consider Percocet as needed for pain after reviewing UDS.  She was provided with opiate agreement to sign.    The patient understands that pain medication is not prescribed during the initial patient consultation at the pain center. If the patient is on pain medications for chronic pain, the PCP or prescribing provider will continue to prescribe until the  patient presents for follow up at the pain center. Medication prescriptions provided by the pain center, will depend on the UA results, safe prescribing practices established by the CDC and patient response to their current treatment regimen.     Patient required a random Urine Drug Testing, due to the need to comply with [place] Model Policy Guidelines and CDC Guideline for the use of any controlled substances. This is to ensure that patient is compliant with treatment, and monitor for risks such as diversion, abuse, or any other aberrant behaviors. Patient is either being considered for or taking a controlled substance. Unexpected findings will be discussed and treatment decision may be adjusted. Testing is being implemented across the board randomly w/o bias related to age, race, gender, socioeconomic status or Yarsanism affiliation. Risks versus benefits of short and long term opioid management were discussed. Compliance, potential adverse effects Opioid agreement was provided to the patient for review.  report was reviewed and discussed with the patient today.     SAFETY REMINDERS  No alcohol while taking controlled substances. Alcohol is not an illegal substance, it is unsafe to use in combination. It is a build up of substances in the body that can be extremely hazardous and may cause respirations to slow to a dangerous rate resulting in hospitalization, brain damage, or death.    Opioid medications have been associated with sharp rise in unintentional overdose and death.  Overdose is a condition characterized by the consumption in excess of a particular drug causing adverse effects. This can happen b/c you are sick, accidentally or intentionally took an extra dose, are on multiple medication that can interact. Someone took your medication and they are not use to the medication.  Symptoms of overdose include:   !breathing slow and shallow, erratic or not at all  !pinpoint pupils,  hallucinations  !confusion  !muscle jerks, slack muscles   !extreme sleepiness or loss of alertness   !awake but not able to talk   !face pale or clammy, vomiting, for lighter skinned people, the skin tone turns bluish purple, for darker skinned people, it turns grayish or ashen   If in a situation where overdose is a concern engage the emergency response system (dial 911).    In one study it was noted that 80% of unintentional overdoses occurred in people who were taking a combination of opioids and benzodiazepines.    Do not sell, loan, borrow or share your opioid medication with anyone. Deaths have occurred as a result of this practice. It is illegal and patients are being prosecuted.     Prevent unexpected access/loss of medication: Keep medication locked. Only carry what you need with you.    The patient agrees to the plan and has no further questions, if questions arise the patient knows to call [phone].     Behavioral Health: We discussed body mind process of pain. We discussed importance of cognitive behavioral therapy to help patient to control pain, address any psychiatric condition that may contribute to patient's experience of pain. The patient agreed to be referred for a behavioral evaluation and treatment.    Please see your current provider for any continued prescription. They will continue to manage your general health and have requested you see the pain center for pain management. Please discuss any health concerns with your PCP     Follow up: 2 to 4 weeks.       Orders placed today:    No orders of the defined types were placed in this encounter.       Abel Mejia MD  [facility]  [address]  [place] [address]  Ph: [phone]  Fax: [phone]

## 2021-06-15 NOTE — TELEPHONE ENCOUNTER
I called and informed pt of message. She stated she wanted to let Dr. Doran know about her blood sugar and that the facility isn't doing anything to help her. Pt would like to talk to Dr. Doran. She would like her catheter removed.

## 2021-06-15 NOTE — TELEPHONE ENCOUNTER
She or someone at [facility] needs to contact [facility] to discuss catheter removal.  Schedule a follow-up hospital appointment.with me

## 2021-06-16 NOTE — TELEPHONE ENCOUNTER
Pt resides at [facility]. States she was discharged from hospital 2 days ago. States she wants to go home tomorrow to spend Easter w/ family but [facility] does not think it is safe for her to do so. Wants order from Dr Doran to be allowed to go home for tomorrow. Advised pt the nurse at the [facility] would have to call about this. She can ask nurse to call.    poor balance

## 2021-06-16 NOTE — TELEPHONE ENCOUNTER
Pt called and said that she had a missed call from [facility] I informed the pt that I checked and looks like there is no message saying that someone had called and her and that I can check with the doctor if her doctor had called. Per pt checked her phone and said to forget about it, the call was on 3/26. I did inform the pt that she had already spoken to someone at our clinic on 3/25 in regards to Covid vaccine. Pt have no other questions.

## 2021-06-16 NOTE — TELEPHONE ENCOUNTER
Telephone Encounter by Kristin Spangler at 7/11/2019  2:40 PM     Author: Kristin Spangler Service: -- Author Type: --    Filed: 7/11/2019  2:40 PM Encounter Date: 7/11/2019 Status: Signed    : Kristin Spangler APPROVED:    Approval start date:7/01/2019  Approval end date:7/10/2020    Pharmacy has been notified of approval and will contact patient when medication is ready for pickup.

## 2021-06-16 NOTE — TELEPHONE ENCOUNTER
RN Triage:   Patient calling back.   She has pain and burning with urination for the last 2 days.  NO fever.   She was advised to go into the clinic and be seen. Advised they will need a urine specimen and will run tests to see if you have bacteria in your urine.  Discussed patient going into the [facility] urgent care that is not too far from the patient, she stated she can call a cab. Address and hours of operation discussed with patient.     She agreed to go into be seen.     Lashon Rg RN, BSN Care Connection Triage Nurse        Reason for Disposition    Age > 50 years    Additional Information    Negative: Shock suspected (e.g., cold/pale/clammy skin, too weak to stand, low BP, rapid pulse)    Negative: Sounds like a life-threatening emergency to the triager    Negative: Unable to urinate (or only a few drops) and bladder feels very full    Negative: Vomiting    Negative: Patient sounds very sick or weak to the triager    Negative: Severe pain with urination    Negative: Fever > 100.5 F (38.1 C)    Negative: Side (flank) or lower back pain present    Negative: Patient is worried about sexually transmitted disease (STD)    Negative: > 2 UTIs in last year    Negative: Unusual vaginal discharge    Negative: Taking antibiotic > 3 days for UTI and painful urination not improved    Negative: Taking antibiotic > 24 hours for UTI and fever persists    Protocols used: URINATION PAIN - FEMALE-A-OH

## 2021-06-16 NOTE — TELEPHONE ENCOUNTER
LMTCB #1 to see if patient was interested in getting the COVID-19 Vaccine at [facility] on 3/27 if patient hasn't already.    If patient got it, please notate this information down and re-route it to the provider pool. Thanks.    Name of vaccine:  Place of vaccination:  Date of 1st dose:  Date of 2nd dose:  Lot #: (patient may or may not have this)

## 2021-06-16 NOTE — TELEPHONE ENCOUNTER
Sun is having troubles breathing and is coughing.  Feet is hurting and are swollen.  Sun has dialysis today.   Sun had a covid test in hospital and was negative.  Today Sun states that she is not breathing well and is constantly coughing and does not feel well.  Both feet and ankles are swollen.  Sun states that she will go to ED.    COVID 19 Nurse Triage Plan/Patient Instructions    Please be aware that novel coronavirus (COVID-19) may be circulating in the community. If you develop symptoms such as fever, cough, or SOB or if you have concerns about the presence of another infection including coronavirus (COVID-19), please contact your health care provider or visit  [email or web address].    Disposition/Instructions    ED Visit recommended. Follow protocol based instructions.      Bring Your Own Device:  Please also bring your smart device(s) (smart phones, tablets, laptops) and their charging cables for your personal use and to communicate with your care team during your visit.      Thank you for taking steps to prevent the spread of this virus.  o Limit your contact with others.  o Wear a simple mask to cover your cough.  o Wash your hands well and often.    Resources    [facility]: About COVID-19: [email or web address]    CDC: What to Do If You're Sick: www.cdc.gov/coronavirus/2019-ncov/about/steps-when-sick.html    CDC: Ending Home Isolation: www.cdc.gov/coronavirus/2019-ncov/hcp/disposition-in-home-patients.html     CDC: Caring for Someone: www.cdc.gov/coronavirus/2019-ncov/if-you-are-sick/care-for-someone.html     [facility]: Interim Guidance for Hospital Discharge to Home: www.health.[facility].[place]/diseases/coronavirus/hcp/hospdischarge.pdf    [facility] clinical trials (COVID-19 research studies): clinicalaffairs.[email or web address] [email or web address]/umn-clinical-trials     Below are the COVID-19 hotlines at the [place] [facility] ([facility]). Interpreters are available.   o For  health questions: Call 759-336-6677 or 1-495.985.7989 (7 a.m. to 7 p.m.)  o For questions about schools and childcare: Call 348-766-8441 or 1-962.671.4340 (7 a.m. to 7 p.m.)       Additional Information    Negative: Sounds like a life-threatening emergency to the triager    Negative: Difficulty breathing at rest    Negative: Entire foot is cool or blue in comparison to other side    SEVERE swelling (e.g., swelling extends above knee, entire leg is swollen, weeping fluid)    Protocols used: LEG SWELLING AND EDEMA-A-OH

## 2021-06-16 NOTE — PROGRESS NOTES
4/12/2021  [facility] referral  ED; Assist patient finding a different housing.  Clinic Care Coordination Contact  [facility]/Voicemail     Clinical Data: Care Coordinator Outreach  Outreach attempted x 2.  Left message on patient's voicemail with call back information and requested return call.  Plan: Care Coordinator  will try to reach patient again in 1-2 business days.      CHW Outreach: 4-14-21

## 2021-06-16 NOTE — PROGRESS NOTES
4/14/21  Clinic Care Coordination Contact  Care Team Conversations     Consulted with CCC RN for support to review chart clarify if patient in TCU or discharge back home.  CC RN verified that patient currently in TCU facility ( [facility])    Plan: schedule 15 min appt with [facility] SW to follow up on TCU   appt scheduled for 4-16-21 at 7:45am.    No further outreach at this time.

## 2021-06-16 NOTE — TELEPHONE ENCOUNTER
Reason for call:  Patient reporting a symptom    Symptom or request: coughing     Was tested for Covid this week no results back yet  Duration (how long have symptoms been present): 3 days    Have you been treated for this before? Yes    Additional comments: she would like something called in for this hard cough    Phone Number patient can be reached at:  Home number on file [phone] (home)    Best Time:  as soon as possible  Can we leave a detailed message on this number: Yes    Call taken on 4/23/2021 at 12:00 PM by Ioana Cancino

## 2021-06-16 NOTE — PROGRESS NOTES
This Graduation Wellness Plan provides private information in regards to the work I have done with my Care Team from my Primary Care Clinic.  This document provides insight on the goals I have accomplished.  My Care Team congratulates me on my journey to maintain wellness.  This document will help guide me on my journey to maintain a healthy lifestyle.  I will use this to help me overcome any barriers I may encounter.  If I should have any questions or concerns, I will continue to contact the members of my Care Team or contact my Primary Care Clinic for assistance.      My Clinic Care Coordination Wellness Plan    [address]  [address]  [phone]    My Preferred Method of Contact:  Phone: [phone]    My Primary/Preferred Language:  English    Preferred Learning Style:  Face to face discussion    Emergency Contact: Extended Emergency Contact Information  Primary Emergency Contact: Winston Mireles   [facility]  Home Phone: [phone]  Relation: Child  Secondary Emergency Contact: Liudmila Mireles   [facility]  Home Phone: [phone]  Mobile Phone: [phone]  Relation: Child     Care Team            Kelli Vanessa MD PCP - General    [phone]     Dilip Peters MD Physician, Hematology and Oncology    [phone]           Accomplishments:  Goals       COMPLETED: I will continue to live in my current home and keep my Section 8 vouchers. (pt-stated)            Personal Plan:    1. I will keep my current home clean and pass all my house inspection for section 8.  2. I will submit any paperwork or documents Section 8 housing requires of me in the future.              Advanced Directive/Living Will: The patient was given information regarding Adanced Directives/Living Will    Clinical Emergency Plan  Chronic Kidney Disease (CKD)  Chronic kidney disease can result from many causes including infections, diabetes, high  blood pressure, kidney stones, circulation problems, and reactions to medication. Having kidney disease means making many changes in your life. Learn as much as you can about it so that you can better adjust to these changes. It is important to remember that the main goal of treatment is to stop chronic kidney disease (CKD) from progressing to complete kidney failure. Treatments may vary based on the progression of CKD, so always follow your doctor's instructions in the care and management of your condition.  When to seek medical care  Call your doctor right away if you have any of the following:    Trouble eating or drinking    Weight loss of more than 2 pounds in 24 hours or more than 5 pounds in 7 days    Little or no urine output    Trouble breathing    Muscle aches    Fever of 100.4 F or higher, or chills    Blood in your urine or stool    Bloody discharge from your nose, mouth, or ears    Severe headache or a seizure    Vomiting    Swelling of legs or ankles    Chest pain (call 911)     Diabetes: Sick-Day Plan  Infections, the flu, and even a cold, can cause your blood sugar to rise. And, eating less, nausea, and vomiting may cause your blood glucose to fall (hypoglycemia). Ask your health care provider to help you develop a sick-day plan. The following information can help.     Call your health care provider if:    You vomit or have diarrhea for more than 6 hours.    Your blood glucose level is higher than usual or over 250 mg/dL after you have taken extra insulin (if recommended in your sick-day plan).    You take oral medicine for diabetes, and your blood sugar is higher than usual or over 250 mg/dL, before a meal and stays that high for more than 24 hours.    Your blood glucose is lower than usual or less than 70 mg/dL    You have moderate to large amounts of ketones in your blood or urine.    You aren t better after 2 days.    All [facility] clinic patients have access to a Nurse 24 hours a day, 7 days a  week.  If you have questions or want advice from a Nurse, please know [facility] is here for you.  You can call your clinic and they will connect you or you can call [facility] at [phone].  [facility] also has Walk In Care clinics in multiple locations.  Call the number listed above for more information about our Walk In Care clinics or visit the [facility] website at [email or web address].

## 2021-06-16 NOTE — TELEPHONE ENCOUNTER
With her breathing getting worse and her urinary symptoms not improving, she needs to be evaluated she can schedule an appointment with any available  Provider, if  her symptoms get worse then the ER will be the best place to be seen

## 2021-06-16 NOTE — TELEPHONE ENCOUNTER
Patient calling .She is at [facility].She has been in Rehab for 2 years.  She states she is having continued pain with urination, and also  She reports breathing issues .      She is expecting a nurse to come in to give patient a nebulizer treatment today.  She wanted to let Dr. Doran know she was taken off of her  Furosemide , she was taking 40 mg in AM and 40mg PM.  And now her breathing is worse.    She states she was on an antibiotic for UTI for 7 days, and hospital   Took her off of it last Saturday.    She still c/o pain with urination, and breathing difficulty.  FYI .  Please advise    Glenna Marina RN  Care Connection Triage/refill nurse      Reason for Disposition    All other females with painful urination, or patient wants to be seen    Age > 50 years    Protocols used: URINATION PAIN - FEMALE-A-OH

## 2021-06-16 NOTE — PROGRESS NOTES
4/9/2021  [facility] referral  ED; Assist patient finding a different housing.  Clinic Care Coordination Contact  [facility]/Voicemail     Clinical Data: Care Coordinator Outreach  Outreach attempted x 1.  Left message on patient's voicemail with call back information and requested return call.  Plan: Care Coordinator  will try to reach patient again in 1-2 business days.      CHW Outreach: 4-12-21

## 2021-06-16 NOTE — TELEPHONE ENCOUNTER
Telephone Encounter by Marquita Pradhan at 7/12/2019 12:37 PM     Author: Marquita Pradhan Service: -- Author Type: --    Filed: 7/12/2019 12:38 PM Encounter Date: 7/10/2019 Status: Signed    : Marquita Pradhan APPROVED:    Approval start date:07/01/2019  Approval end date:07/11/2020    Pharmacy has been notified of approval and will contact patient when medication is ready for pickup.

## 2021-06-16 NOTE — TELEPHONE ENCOUNTER
Sun is having painful urination and this started two days ago.  Sun just got out of the hospital.  Sun is requesting a virtual visit.      COVID 19 Nurse Triage Plan/Patient Instructions    Please be aware that novel coronavirus (COVID-19) may be circulating in the community. If you develop symptoms such as fever, cough, or SOB or if you have concerns about the presence of another infection including coronavirus (COVID-19), please contact your health care provider or visit  [email or web address].    Disposition/Instructions    Virtual Visit with provider recommended. Reference Visit Selection Guide.    Thank you for taking steps to prevent the spread of this virus.  o Limit your contact with others.  o Wear a simple mask to cover your cough.  o Wash your hands well and often.    Resources    [facility]: About COVID-19: [email or web address]    CDC: What to Do If You're Sick: www.cdc.gov/coronavirus/2019-ncov/about/steps-when-sick.html    CDC: Ending Home Isolation: www.cdc.gov/coronavirus/2019-ncov/hcp/disposition-in-home-patients.html     CDC: Caring for Someone: www.cdc.gov/coronavirus/2019-ncov/if-you-are-sick/care-for-someone.html     [facility]: Interim Guidance for Hospital Discharge to Home: www.health.[facility].mn.us/diseases/coronavirus/hcp/hospdischarge.pdf    [facility] clinical trials (COVID-19 research studies): clinicalaffairs.[facility]-clinical-trials     Below are the COVID-19 hotlines at the [facility] ([facility]). Interpreters are available.   o For health questions: Call 783-908-3265 or 1-385.365.2387 (7 a.m. to 7 p.m.)  o For questions about schools and childcare: Call 729-959-0674 or 1-478.328.2633 (7 a.m. to 7 p.m.)       Reason for Disposition    Severe pain with urination    Additional Information    Negative: Shock suspected (e.g., cold/pale/clammy skin, too weak to stand, low BP, rapid pulse)    Negative: Sounds like a  life-threatening emergency to the triager    Negative: Unable to urinate (or only a few drops) and bladder feels very full    Negative: Vomiting    Negative: Patient sounds very sick or weak to the triager    Protocols used: URINATION PAIN - FEMALE-A-OH

## 2021-06-16 NOTE — TELEPHONE ENCOUNTER
Pt called stating she is having heard time breathing due to fluids. Pt reported she cant rested because she has to sit up right to breath better. Pt reported she was told she has 6 kilos of water,and dialysis only take 3 kilos, and that she lost wieght.  Pt reported she was seen at the ER on 3/24, and was discharge 6 days later. Pt reported she is at rehabilitation center.     Per protocol pt was advised to go to the emergency room, and pt refused stating she would like water bill furosemide to get the fluid off. She reported she used to take 80 mg before for fluid retention.      RN paged on call provider, received a return call from Dr Torres,provider was informed pt is on dialysis, having hard time breathing, was adviced to go to the ER yesterday, PCP aware, and recommend ER visit, and pt refused to go to the ER now, instead requesting furosemide to get the fluid off. Provider stated due to pt kidney function furosemide will not work, and advice pt to come in to the emergency room to take the fluids off, other wise it will get worse.       RN called pt back informed her that due to your kidney function/ being on dialysis furosemide will not work, and Dr Sahu pt to come in to the emergency room to take the fluids off, other wise it will get worse. Pt stated okay.        Shad Lorenzo RN  [facility] Nurse Advisors    COVID 19 Nurse Triage Plan/Patient Instructions    Please be aware that novel coronavirus (COVID-19) may be circulating in the community. If you develop symptoms such as fever, cough, or SOB or if you have concerns about the presence of another infection including coronavirus (COVID-19), please contact your health care provider or visit  [email or web address].    Disposition/Instructions    ED Visit recommended. Follow protocol based instructions.      Bring Your Own Device:  Please also bring your smart device(s) (smart phones, tablets, laptops) and their charging cables for your  personal use and to communicate with your care team during your visit.      Thank you for taking steps to prevent the spread of this virus.  o Limit your contact with others.  o Wear a simple mask to cover your cough.  o Wash your hands well and often.    Resources    [facility]: About COVID-19: [email or web address]    CDC: What to Do If You're Sick: www.cdc.gov/coronavirus/2019-ncov/about/steps-when-sick.html    CDC: Ending Home Isolation: www.cdc.gov/coronavirus/2019-ncov/hcp/disposition-in-home-patients.html     CDC: Caring for Someone: www.cdc.gov/coronavirus/2019-ncov/if-you-are-sick/care-for-someone.html     [facility]: Interim Guidance for Hospital Discharge to Home: www.[facility].mn.us/diseases/coronavirus/hcp/hospdischarge.pdf    [facility] clinical trials (COVID-19 research studies): clinicalaffairs.[facility]-clinical-trials     Below are the COVID-19 hotlines at the [place] Department of Health ([facility]). Interpreters are available.   o For health questions: Call 214-216-0395 or 1-609.517.4341 (7 a.m. to 7 p.m.)  o For questions about schools and childcare: Call 103-159-0372 or 1-265.216.7463 (7 a.m. to 7 p.m.)       Reason for Disposition    [1] MODERATE difficulty breathing (e.g., speaks in phrases, SOB even at rest, pulse 100-120) AND [2] NEW-onset or WORSE than normal    Additional Information    Difficulty breathing, severe    Negative: [1] Breathing stopped AND [2] hasn't returned    Negative: Choking on something    Negative: Severe difficulty breathing (e.g., struggling for each breath, speaks in single words)    Negative: Bluish (or gray) lips or face now    Negative: Difficult to awaken or acting confused (e.g., disoriented, slurred speech)    Negative: Passed out (i.e., lost consciousness, collapsed and was not responding)    Negative: Wheezing started suddenly after medicine, an allergic food or bee sting    Negative: Stridor    Negative: Slow, shallow and weak  breathing    Negative: Sounds like a life-threatening emergency to the triager    Negative: Chest pain    Negative: [1] Wheezing (high pitched whistling sound) AND [2] previous asthma attacks or use of asthma medicines    Negative: [1] Difficulty breathing AND [2] only present when coughing    Negative: [1] Difficulty breathing AND [2] only from stuffy or runny nose    Protocols used: BREATHING DIFFICULTY-A-AH, RESPIRATORY MULTIPLE SYMPTOMS - GUIDELINE [id number]

## 2021-06-16 NOTE — PROGRESS NOTES
Updated emergency care plan for graduation.    Chronic Kidney Disease (CKD)  Chronic kidney disease can result from many causes including infections, diabetes, high blood pressure, kidney stones, circulation problems, and reactions to medication. Having kidney disease means making many changes in your life. Learn as much as you can about it so that you can better adjust to these changes. It is important to remember that the main goal of treatment is to stop chronic kidney disease (CKD) from progressing to complete kidney failure. Treatments may vary based on the progression of CKD, so always follow your doctor's instructions in the care and management of your condition.  When to seek medical care  Call your doctor right away if you have any of the following:    Trouble eating or drinking    Weight loss of more than 2 pounds in 24 hours or more than 5 pounds in 7 days    Little or no urine output    Trouble breathing    Muscle aches    Fever of 100.4 F or higher, or chills    Blood in your urine or stool    Bloody discharge from your nose, mouth, or ears    Severe headache or a seizure    Vomiting    Swelling of legs or ankles    Chest pain (call 911)    Diabetes: Sick-Day Plan  Infections, the flu, and even a cold, can cause your blood sugar to rise. And, eating less, nausea, and vomiting may cause your blood glucose to fall (hypoglycemia). Ask your health care provider to help you develop a sick-day plan. The following information can help.    Call your health care provider if:    You vomit or have diarrhea for more than 6 hours.    Your blood glucose level is higher than usual or over 250 mg/dL after you have taken extra insulin (if recommended in your sick-day plan).    You take oral medicine for diabetes, and your blood sugar is higher than usual or over 250 mg/dL, before a meal and stays that high for more than 24 hours.    Your blood glucose is lower than usual or less than 70 mg/dL    You have moderate to  large amounts of ketones in your blood or urine.    You aren t better after 2 days.

## 2021-06-16 NOTE — TELEPHONE ENCOUNTER
"Sun reports:  - severe burning with urination. \"I don;t know why they sent me home as I still have this burning pain.\" Tylenol not helping with pain.  - coughing really bad for 2 weeks now. COVID test negative on 3/16    Patient discharged from [facility] on 3/20. Dx: Cystitis. Pyelonephritis.    PLAN:  - OV today per protocol. She states she does not have a ride, so FNA recommended phone visit.  - care advice reviewed  - call back for further concerns  - patient verbalized understanding  - transferred to       Charla Price RN/[place] Nurse Advisor        Reason for Disposition    Severe pain with urination    Additional Information    Negative: Shock suspected (e.g., cold/pale/clammy skin, too weak to stand, low BP, rapid pulse)    Negative: Sounds like a life-threatening emergency to the triager    Negative: Unable to urinate (or only a few drops) and bladder feels very full    Negative: Vomiting    Negative: Patient sounds very sick or weak to the triager    Protocols used: URINATION PAIN - FEMALE-A-OH      "

## 2021-06-17 NOTE — TELEPHONE ENCOUNTER
Reason for Call:  Medication or medication refill:    Do you use a [facility]?  Name of the pharmacy and phone number for the current request: No, [facility]    Name of the medication requested: Cream    Other request: Patient also requesting a cream be prescribed for her to use vaginally. When she coughs, there is irritation down there. She would like a RX be sent for a cream to help until the antibiotics kick in.    Can we leave a detailed message on this number? Yes    Phone number patient can be reached at: Home number on file [phone] (home)    Best Time: any    Call taken on 5/19/2021 at 4:14 PM by Lani King

## 2021-06-17 NOTE — TELEPHONE ENCOUNTER
Patient stated that she is not ready to schedule an appointment yet and will call back. Completing task.

## 2021-06-17 NOTE — TELEPHONE ENCOUNTER
Patient called back, message was relayed. She is quiet upset and stated that she can not wait, she needs something to stop the burning. She requested if provider can send something to the pharmacy to help her and she will come in to leave a urine sample. Please call patient to advise.

## 2021-06-17 NOTE — TELEPHONE ENCOUNTER
Sun is coughing and also has shortness of breath.  Sun is having burning with urination.  Denies blood in urine.  Sun states that she is not sure if she has a fever.  Sun was into ED on 5/10/2021 with shortness of breath and rectal bleeding and cough.  Today cough is still present.  Sun states that she was tested in hospital for covid and was negative.  Today Sun is requesting a virtual visit.    COVID 19 Nurse Triage Plan/Patient Instructions    Please be aware that novel coronavirus (COVID-19) may be circulating in the community. If you develop symptoms such as fever, cough, or SOB or if you have concerns about the presence of another infection including coronavirus (COVID-19), please contact your health care provider or visit  [email or web address].    Disposition/Instructions    Virtual Visit with provider recommended. Reference Visit Selection Guide.    Thank you for taking steps to prevent the spread of this virus.  o Limit your contact with others.  o Wear a simple mask to cover your cough.  o Wash your hands well and often.    Resources    [facility]: About COVID-19: [email or web address]    CDC: What to Do If You're Sick: www.cdc.gov/coronavirus/2019-ncov/about/steps-when-sick.html    CDC: Ending Home Isolation: www.cdc.gov/coronavirus/2019-ncov/hcp/disposition-in-home-patients.html     CDC: Caring for Someone: www.cdc.gov/coronavirus/2019-ncov/if-you-are-sick/care-for-someone.html     [facility]: Interim Guidance for Hospital Discharge to Home: www.health.[facility].mn.us/diseases/coronavirus/hcp/hospdischarge.pdf    [facility] clinical trials (COVID-19 research studies): clinicalaffairs.[facility].edu/um-clinical-trials     Below are the COVID-19 hotlines at the [place] Department of Health ([facility]). Interpreters are available.   o For health questions: Call 657-574-1300 or 1-738.713.1074 (7 a.m. to 7 p.m.)  o For questions about schools and childcare: Call  926.440.5166 or 1-149.788.9487 (7 a.m. to 7 p.m.)       Reason for Disposition    SEVERE pain with urination    Additional Information    Negative: Shock suspected (e.g., cold/pale/clammy skin, too weak to stand, low BP, rapid pulse)    Negative: Sounds like a life-threatening emergency to the triager    Negative: Unable to urinate (or only a few drops) and bladder feels very full    Negative: Vomiting    Negative: Patient sounds very sick or weak to the triager    Protocols used: URINATION PAIN - FEMALE-A-OH

## 2021-06-17 NOTE — TELEPHONE ENCOUNTER
Reason for Call:  Other prescription     Detailed comments: Patient called to request if provider can prescribe something for her cough and possible UTI. She has burning sensation when urinating. She has had this cough for a while now. She would like something to help with the cough. Please send RX to [facility] on [address]. Please call patient to let her know if provider will send over RX.    Phone Number Patient can be reached at: Home number on file [phone] (home)    Best Time: any    Can we leave a detailed message on this number?: Yes    Call taken on 5/5/2021 at 11:36 AM by Lani King

## 2021-06-17 NOTE — PROGRESS NOTES
"Sun Mireles is a 65 y.o. female who is being evaluated via a billable telephone visit.      What phone number would you like to be contacted at? [phone]  How would you like to obtain your AVS? AVS Preference: MyChart.    Assessment & Plan     Cough  - benzonatate (TESSALON) 100 MG capsule; Take 2 capsules (200 mg total) by mouth 3 (three) times a day as needed for cough.  - cefPROZIL (CEFZIL) 250 MG tablet; Take 1 tablet (250 mg total) by mouth 2 (two) times a day for 10 days.    Mild asthma with exacerbation, unspecified whether persistent  - predniSONE (DELTASONE) 20 MG tablet; Take 20 mg by mouth 2 (two) times a day for 5 days.  Cough, differential diagnosis discussed, history of asthma, she is having some mucus production, will start empiric treatment with antibiotic and low-dose steroids, we did review worsening symptoms, she was instructed to seek prior medical attention to the ER if her symptoms worsen otherwise just follow-up at the clinic next week if still not improved.  Review of external notes as documented in note  19 minutes spent on the date of the encounter doing chart review, review of outside records, review of test results, interpretation of tests, patient visit and documentation        BMI:   Estimated body mass index is 36.84 kg/m  as calculated from the following:    Height as of 3/26/21: 5' 4\" (1.626 m).    Weight as of 4/1/21: 214 lb 9.6 oz (97.3 kg).     No follow-ups on file.    Collin Doran MD  [facility]    Subjective   Sun Mireles is 65 y.o. and presents today for the following health issues   HPI   Cough for the past few days, no difficulty breathing, no chest pain or shortness of breath.  Mild amount of yellow-greenish mucus production, no fever.  Was just recently in the hospital, was tested negative for Covid, she received hemodialysis and she felt better.  Cough is causing urinary incontinence with sometimes burning sensation. "  She does have albuterol inhaler and nebulizer that she is using as needed, did not seems to make any difference.      Review of Systems  As per HPI otherwise negative.        Objective       Vitals:  No vitals were obtained today due to virtual visit.    Physical Exam  Thought process and language is adequate, intermittent cough during the phone visit, but denies any Difficulty breathing or swelling at the  lower extremities.        Phone call duration: 18 minutes

## 2021-06-17 NOTE — TELEPHONE ENCOUNTER
[facility], calling to report vaginal burning with coughing and urination.  She is unsure if the nursing staff received the antibiotic that was ordered on 05/19 by PCP for her cough.  Pt states she told her PCP about her vaginal burning.     Disposition:  See a provider within 24 hours, UC advised.  Patient verbalized understanding and had no further questions.      COVID 19 Nurse Triage Plan/Patient Instructions    Please be aware that novel coronavirus (COVID-19) may be circulating in the community. If you develop symptoms such as fever, cough, or SOB or if you have concerns about the presence of another infection including coronavirus (COVID-19), please contact your health care provider or visit  [email or web address].    Disposition/Instructions    In-Person Visit with provider recommended. Reference Visit Selection Guide.    Thank you for taking steps to prevent the spread of this virus.  o Limit your contact with others.  o Wear a simple mask to cover your cough.  o Wash your hands well and often.    Resources    [facility]: About COVID-19: [email or web address]    CDC: What to Do If You're Sick: www.cdc.gov/coronavirus/2019-ncov/about/steps-when-sick.html    CDC: Ending Home Isolation: www.cdc.gov/coronavirus/2019-ncov/hcp/disposition-in-home-patients.html     CDC: Caring for Someone: www.cdc.gov/coronavirus/2019-ncov/if-you-are-sick/care-for-someone.html     [facility]: Interim Guidance for Hospital Discharge to Home: www.health.[facility].[place]/diseases/coronavirus/hcp/hospdischarge.pdf    [facility] clinical [facility] (COVID-19 research studies): [email or web address]     Below are the COVID-19 hotlines at the [place] [facility] of [facility] ([facility]). Interpreters are available.   o For health questions: Call 258-498-3309 or 1-562.430.9610 (7 a.m. to 7 p.m.)  o For questions about schools and childcare: Call 395-755-0081 or  1-344.638.6058 (7 a.m. to 7 p.m.)       Priscilla Ahn RN, FNA    Reason for Disposition    [1] MILD-MODERATE pain AND [2] present > 24 hours    Additional Information    Negative: Shock suspected (e.g., cold/pale/clammy skin, too weak to stand, low BP, rapid pulse)    Negative: Sounds like a life-threatening emergency to the triager    Negative: [1] Unable to urinate (or only a few drops) > 4 hours AND     [2] bladder feels very full (e.g., palpable bladder or strong urge to urinate)    Negative: [1] Decreased urination and [2] drinking very little AND [2] dehydration suspected (e.g., dark urine, no urine > 12 hours, very dry mouth, very lightheaded)    Negative: Patient sounds very sick or weak to the triager    Negative: Fever > 100.4 F (38.0 C)    Negative: Side (flank) or lower back pain present    Negative: [1] Can't control passage of urine (i.e., urinary incontinence) AND [2] new onset (< 2 weeks) or worsening    Negative: Urinating more frequently than usual (i.e., frequency)    Negative: Bad or foul-smelling urine    Negative: Sounds like a life-threatening emergency to the triager    Negative: Patient sounds very sick or weak to the triager    Negative: [1] SEVERE pain AND [2] not improved 2 hours after pain medicine    Negative: [1] Genital area looks infected (e.g., draining sore, spreading redness) AND [2] fever    Negative: [1] Something is hanging out of the vagina AND [2] can't easily be pushed back inside    Negative: MODERATE-SEVERE itching (i.e., interferes with school, work, or sleep)    Protocols used: VAGINAL SYMPTOMS-A-AH, URINARY SYMPTOMS-A-AH

## 2021-06-17 NOTE — TELEPHONE ENCOUNTER
"Dr Torres calling, states he is on call for the [facility]. Dr Torres states he received a page stating the patient wants an order for Lasix for the fluid in her legs and lungs. Dr Torres states patient is a dialysis patient and is refusing to go to the ER.     Dr Torres states he is not going to prescribe Lasix and he wants the patient seen in the ER for further management. RN did call the patient to report what Dr Torres stated. Patient responded \"I am not going to the emergency room, I am sick of going there\"    Will route encounter to patient's PCP- Dr Doran.     Della Hughes, RN/[facility]      Additional Information    Negative: [1] Caller is not with the adult (patient) AND [2] reporting urgent symptoms    Negative: Lab result questions    Negative: Medication questions    Negative: Caller can't be reached by phone    Negative: Caller has already spoken to PCP or another triager    Negative: RN needs further essential information from caller in order to complete triage    Negative: Requesting regular office appointment    Negative: [1] Caller requesting NON-URGENT health information AND [2] PCP's office is the best resource    Negative: Health Information question, no triage required and triager able to answer question    Negative: General information question, no triage required and triager able to answer question    Negative: Question about upcoming scheduled test, no triage required and triager able to answer question    Negative: [1] Caller is not with the adult (patient) AND [2] probable NON-URGENT symptoms    [1] Follow-up call to recent contact AND [2] information only call, no triage required    Protocols used: INFORMATION ONLY CALL-A-AH      "

## 2021-06-17 NOTE — PROGRESS NOTES
"Sun Mireles is a 65 y.o. female who is being evaluated via a billable telephone visit.      What phone number would you like to be contacted at? [phone]  How would you like to obtain your AVS? AVS Preference: [facility].    Assessment & Plan     ESRD (end stage renal disease) on dialysis (H)  - Comprehensive Metabolic Panel; Future  - HM2(CBC w/o Differential); Future  - Ambulatory referral to Nephrology - [facility]  - INR; Future  Swelling of limb  - N-Terminal PRO BNP Outpatient (NTBNP); Future  - Ambulatory referral to Nephrology [facility]  - INR; Future  CHF (congestive heart failure), NYHA class I, acute on chronic, combined (H)  - Comprehensive Metabolic Panel; Future  - N-Terminal PRO BNP Outpatient (NTBNP); Future   She has been noticing more swelling in her body, specifically at the lower extremities, she is currently getting dialysis Tuesday Thursday and Saturday, she feels like is not removing enough fluid, she is asking for furosemide, we discussed about involving nephrology for management of her possible fluid overload and determine if Lasix could be added to her regimen, she is still going to the bathroom for urination 3-4 times a day, we also discussed other possibilities included venous insufficiency etc. we are getting some lab works she will stop by at the clinic to have them done.  Worsening signs of congestive heart failure discussed and she was instructed to seek for medical attention to the ER.  She will follow up with a GYN for her fibroid management.  Review of external notes as documented in note  19 minutes spent on the date of the encounter doing chart review, review of outside records, review of test results, interpretation of tests, patient visit and documentation        BMI:   Estimated body mass index is 36.84 kg/m  as calculated from the following:    Height as of 3/26/21: 5' 4\" (1.626 m).    Weight as of 4/1/21: 214 lb 9.6 oz (97.3 kg).       No " "follow-ups on file.    Collin Doran MD  [facility]    Subjective   Sun Mireles is 65 y.o. and presents today for the following health issues   HPI   Has been noticing swelling at the lower extremities, has been progressive, she denies any shortness of breath or chest symptoms.  She has end-stage renal disease, she is on hemodialysis Tuesday Thursday and Saturday.  She only sees the nurse at the dialysis center, there is \"no doctor over there\", she is asking for Lasix to help with her swollen lower extremities.  She also has uterine fibroids, recently seen by GYN, oncologist, Pap smear was done, patient is not sure about the surgery.  She had emphysematous cystitis with hydronephrosis, she is status post stenting and Phan catheter placement, she is status post urologist overall has been doing fine denies any flank pain, denies any blood in the urine.  Her diabetes has been stable, also of the foot and toes did heal.        Review of Systems  As per HPI otherwise negative.        Objective       Vitals:  No vitals were obtained today due to virtual visit.    Physical Exam  Thought process and language is adequate, no apparent distress.            Phone call duration: 19 minutes  "

## 2021-06-17 NOTE — TELEPHONE ENCOUNTER
"Patient calling from her Dialysis appointment today.  She is reporting,   \" Why am I on this dialysis , it isnt removing any fluid from me\"?   she reports ;  \"Ankles are swollen.every morning, and her feet and her legs.\"    She states\" I need to get this fluid off of my lungs\"/    She states , please ask Dr. Doran why I need dialysis if it is'nt working for me anymore.?  Patient heard coughing , and when asked if she is sick right now (covid) , she reports , no, I cough a lot when I have this fluid buildup\"  KOLTON Marina RN  [facility] Triage/refill nurse              Reason for Disposition    MODERATE swelling of both ankles (e.g., swelling extends up to the knees) AND new onset or worsening    Swelling of face, arm or hands (Exception: slight puffiness of fingers during hot weather)    Additional Information    Negative: SEVERE swelling (e.g., swelling extends above knee, entire leg is swollen, weeping fluid)    Negative: Thigh or calf pain and only 1 side and present > 1 hour    Negative: Thigh, calf, or ankle swelling in only one leg    Negative: Thigh, calf, or ankle swelling in both legs, but one side is definitely more swollen    Negative: Cast on leg or ankle and has increasing pain    Negative: Can't walk or can barely stand (new onset)    Negative: Fever and red area (or area very tender to touch)    Negative: Patient sounds very sick or weak to the triager    Protocols used: LEG SWELLING AND EDEMA-A-OH      "

## 2021-06-17 NOTE — PROGRESS NOTES
Clinic Care Coordination Contact  Care Coordination Transition Communication         Clinical Data: Patient was hospitalized at [facility] from 5/10/2021 to 5/14/2021 with diagnosis of Discharge Diagnoses   Cough  Volume overload  COPD with chronic respiratory failure  Chronic heart failure with preserved ejection fraction  ESRD on Dialysis T/H/S  Sick sinus syndrome s/p pacemoaker  Afib  DM2 complicated by peripheral neuropathy  Uterine fibroid causing dysuria  HTN      Transition to Facility:             [facility]  Address: [address] [place] [address]  Phone: [phone]  Nurse to Nurse: [phone]  Fax: [phone]      Plan: RN/SW Care Coordinator will await notification from facility staff informing RN/SW Care Coordinator of patient's discharge plans/needs. RN/SW Care Coordinator will review chart and outreach to facility staff every 4 weeks and as needed.     [facility] SW left voicemail for SW at [facility]

## 2021-06-17 NOTE — PROGRESS NOTES
5/17/2021  Clinic Care Coordination Contact  Community Health Worker Initial Outreach        Patient in hospital. Discharging to TCU    Scheduled 15 min with CC SW to follow up discharge from  TCU 5-20-21    No further outreach from CHW at this time.    CC MAR follow up on TCU 5-20-21 at 8L30am.

## 2021-06-18 NOTE — PROGRESS NOTES
OFFICE VISIT - FAMILY MEDICINE     ASSESSMENT AND PLAN     1. Diabetic polyneuropathy associated with type 2 diabetes mellitus  Glycosylated Hemoglobin A1c    Comprehensive Metabolic Panel    Thyroid Stimulating Hormone (TSH)   2. Chronic diastolic CHF (congestive heart failure)  BNP(B-type Natriuretic Peptide)    Comprehensive Metabolic Panel   3. Anemia in CKD (chronic kidney disease)  HM1(CBC and Differential)    Vitamin B12    Vitamin D, Total (25-Hydroxy)    Magnesium    Iron and Transferrin Iron Binding Capacity    Ferritin    Renal Function Profile    HM1 (CBC with Diff)   4. Obesity  Thyroid Stimulating Hormone (TSH)    High Sensitivity C-Reactive Protein(hsCR   5. Benign Essential Hypertension  Uric Acid   6. Hyperlipidemia  Lipid Cascade RANDOM   7. CKD stage 4 due to type 2 diabetes mellitus  Renal Function Profile   8. Visit for screening mammogram  Mammo Screening Bilateral   Diabetes type 2 has been uncontrolled, she was instructed to increase her Levemir to 32 units daily, and start using NovoLog between 2-4 units with each meal,to check her blood sugar 3 times a day, and follow with reading in about 2- 3 weeks, she was also instructed with our in-house pharmacist.  Congestive heart failure with preserved ejection fraction at 64% on  echo done last summer, mild elevation of BNP, we will increase Lasix to 40 mg twice a day and recheck in about 2 weeks.  Chronic kidney disease, continue with aggressive risk factor modification, has been followed by nephrologist.  Obesity, encouraged patient to continue weight loss program, low-fat , low-salt and sugar diet, physical activity as tolerated.  More than  50% of this 55 minutes visit was spent in counseling and coordination of care.    CHIEF COMPLAINT   Diabetes and Medication Refill (OXYCODONE)    HPI   Sun Mireles is a 62 y.o. female.  No Patient Care Coordination Note on file.  Former patient of Dr. Vanessa who is now retired, I have seen her  once in October 6, 2016 for knee pain, she is here today for diabetes follow-up.  She has multiple medical issues included osteoarthritis of the knees and lumbar spine, with chronic pain, currently controlled with tramadol 2 tablets in the morning, and Percocet 1 tablet as needed during the day.  Current pain level is mild to minimal.  But usually exacerbated with movement like walking.  Has declined surgery option in the past and today also.   She has long-standing history of diabetes type 2, currently controlled with Levemir 30 units daily, NovoLog as needed.  A1c is coming back elevated today at 8%, this is an increase from 7% last November.  She does not check her blood sugar regularly, but she denies any hypoglycemic episode, she is also stating that she has been on Lantus in the past and her blood sugar was very low, she does not wish to go on that medication.  She sees an eye doctors on a yearly basis.  She has CKD stage IV secondary to diabetes type 2, has seen the nephrologist last November with recommendation to continue with tight control of her risk factor, included diabetes, hypertension.  She had a brief episode of hemodialysis when she was hospitalized in 2013, and at that time she was still making enough urine to the point that decision was made to stop hemodialysis after hospitalization.  Kidney ultrasound ordered by the nephrologist few months ago did not show any hydronephrosis, she have an incidental fibroma of about 12.6 cm in the uterus, but has not had any symptoms.  She has congestive heart failure with preserved ejection fraction, last echocardiogram was in some of 2017, at that time showed an ejection fraction of 64%, and there were no significant changes compared to the previous one done on July 11, 2014.  Has been complaining lately of increased fluid in her lower extremities, but denies any chest pain shortness of breath or dizziness.  She lives with her fiancé, she has a PCA at  "home,  She also has asthma, obstructive sleep apnea, she would like to get a new asthma machine.  Review of Systems As per HPI, otherwise negative.    OBJECTIVE   /68 (Patient Site: Left Arm)  Pulse 76  Temp 98.3  F (36.8  C) (Oral)   Resp 13  Ht 5' 4\" (1.626 m)  Wt (!) 295 lb (133.8 kg)  BMI 50.64 kg/m2  Physical Exam   Constitutional: She is oriented to person, place, and time. She appears well-developed and well-nourished.   HENT:   Head: Normocephalic and atraumatic.   Right Ear: External ear normal.   Left Ear: External ear normal.   Nose: Nose normal.   Mouth/Throat: Oropharynx is clear and moist.   Eyes: Conjunctivae and EOM are normal. Pupils are equal, round, and reactive to light. Right eye exhibits no discharge. Left eye exhibits no discharge.   Neck: Normal range of motion. Neck supple. No JVD present. No tracheal deviation present. No thyromegaly present.   Cardiovascular: Normal rate, regular rhythm, normal heart sounds and intact distal pulses.  Exam reveals no gallop and no friction rub.    No murmur heard.  Pulmonary/Chest: Effort normal and breath sounds normal. No respiratory distress. She has no wheezes. She has no rales.   Abdominal: Soft. Bowel sounds are normal. She exhibits no distension and no mass. There is no tenderness. There is no rebound and no guarding.   Musculoskeletal: Normal range of motion. She exhibits edema (2+ Ankles and feet). She exhibits no tenderness.   Lymphadenopathy:     She has no cervical adenopathy.   Neurological: She is alert and oriented to person, place, and time. She has normal reflexes. Coordination normal.   Diminished sensation in both feet on the monofilament.   Skin: Skin is warm and dry. No rash noted.   Psychiatric: She has a normal mood and affect. Judgment and thought content normal.       PFSH     Family History   Problem Relation Age of Onset     Hypertension Mother      COPD Mother      Diabetes Mother      Hypertension Father      COPD " Father      Diabetes Father      Heart attack Maternal Grandmother      Hypertension Maternal Grandmother      Alcohol abuse Sister      Drug abuse Sister      COPD Sister      Diabetes Sister      Hypertension Sister      Heart disease Brother      Hypertension Brother      Hypertension Daughter      Hypertension Son      Hypertension Maternal Grandfather      Heart attack Maternal Grandfather      Hypertension Paternal Grandmother      Heart attack Paternal Grandmother      Hypertension Paternal Grandfather      Social History     Social History     Marital status: Single     Spouse name: N/A     Number of children: N/A     Years of education: N/A     Occupational History     Not on file.     Social History Main Topics     Smoking status: Former Smoker     Quit date: 2/8/2014     Smokeless tobacco: Never Used     Alcohol use No     Drug use: No     Sexual activity: Not on file     Other Topics Concern     Not on file     Social History Narrative    Reports on disability. She is not working and lives with her children and friend.  She reports she turns to her doctor in times of crisis.  She reports she needs assistance with ADLs and has a PCA 11 hours per day, 7 days per week.  Does not have a home nurse.       Relevant history was reviewed with the patient today, unless noted in HPI, nothing is pertinent for this visit.  [facility]     Patient Active Problem List    Diagnosis Date Noted     Eczema 04/09/2018     Chronic diastolic CHF (congestive heart failure) 06/12/2017     Acute on chronic respiratory failure with hypoxia and hypercapnia 06/08/2017     Pulmonary HTN 06/08/2017     Bilateral edema of lower extremity 06/28/2016     Diabetic polyneuropathy associated with type 2 diabetes mellitus 06/28/2016     Overview Note:     Previously on gabapentin which helped, but stopped in the hospital due to potential drug interaction.  Lyrica made her sleepy.       Candidiasis, intertrigo 06/28/2016     Tobacco use 07/30/2015      Joint pain, localized to the knee 2015     Joint pain, localized in the right shoulder 2015     Lethargy 2014     Acute arthritis 06/15/2014     Back pain 06/15/2014     Chronic pain 06/15/2014     Gout      Overview Note:     Created by Conversion         Morbid obesity with alveolar hypoventilation      Overview Note:     Created by Conversion         Cor Pulmonale      Overview Note:     Created by Conversion    Replacement Utility updated for latest IMO load       Asthma      Overview Note:     Created by Conversion         Urinary Incontinence      Overview Note:     Created by Conversion         Microcytic anemia      Overview Note:     Created by Conversion         Essential Hypercholesterolemia      Overview Note:     Created by Conversion         Obstructive Sleep Apnea      Overview Note:     Created by Conversion         Shortness of breath      Overview Note:     Created by Conversion         Long-term insulin use in type 2 diabetes      Overview Note:     Created by Conversion         Acute diastolic heart failure      Overview Note:     Created by Conversion         Postmenopausal bleeding      Overview Note:     Created by Conversion         Benign Essential Hypertension      Overview Note:     Created by Conversion         Acute kidney injury superimposed on CKD      Overview Note:     Created by Conversion         Localized Osteoarthritis Of The Knee      Overview Note:     Created by Conversion    Replacement Utility updated for latest IMO load       Lower Back Pain      Overview Note:     Created by Conversion         Glaucoma      Overview Note:     Created by Conversion         Aneurysm Of The Right Vertebral Artery      Overview Note:     Created by Conversion         Past Surgical History:   Procedure Laterality Date     EYE SURGERY       [place]  DELIVERY ONLY      Description:  Section;  Recorded: 10/20/2011;     [place] LIGATE FALLOPIAN TUBE      Description:  Tubal Ligation;  Recorded: 10/20/2011;     [place] REMOVAL GALLBLADDER      Description: Cholecystectomy;  Recorded: 10/20/2011;       RESULTS/CONSULTS (Lab/Rad)     Recent Results (from the past 168 hour(s))   Glycosylated Hemoglobin A1c   Result Value Ref Range    Hemoglobin A1c 8.0 (H) 3.5 - 6.0 %   BNP(B-type Natriuretic Peptide)   Result Value Ref Range     (H) 0 - 103 pg/mL   Comprehensive Metabolic Panel   Result Value Ref Range    Sodium 139 136 - 145 mmol/L    Potassium 4.7 3.5 - 5.0 mmol/L    Chloride 107 98 - 107 mmol/L    CO2 22 22 - 31 mmol/L    Anion Gap, Calculation 10 5 - 18 mmol/L    Glucose 247 (H) 70 - 125 mg/dL    BUN 46 (H) 8 - 22 mg/dL    Creatinine 1.72 (H) 0.60 - 1.10 mg/dL    GFR MDRD Af Amer 36 (L) >60 mL/min/1.73m2    GFR MDRD Non Af Amer 30 (L) >60 mL/min/1.73m2    Bilirubin, Total 0.5 0.0 - 1.0 mg/dL    Calcium 9.0 8.5 - 10.5 mg/dL    Protein, Total 7.2 6.0 - 8.0 g/dL    Albumin 3.1 (L) 3.5 - 5.0 g/dL    Alkaline Phosphatase 143 (H) 45 - 120 U/L    AST 11 0 - 40 U/L    ALT <9 0 - 45 U/L   Thyroid Stimulating Hormone (TSH)   Result Value Ref Range    TSH 0.87 0.30 - 5.00 uIU/mL   High Sensitivity C-Reactive Protein(hsCR   Result Value Ref Range    CRP, High Sensitivity 10.6 (H) 0.0 - 3.0 mg/L   Vitamin B12   Result Value Ref Range    Vitamin B-12 457 213 - 816 pg/mL   Vitamin D, Total (25-Hydroxy)   Result Value Ref Range    Vitamin D, Total (25-Hydroxy) 14.3 (L) 30.0 - 80.0 ng/mL   Magnesium   Result Value Ref Range    Magnesium 1.7 (L) 1.8 - 2.6 mg/dL   Uric Acid   Result Value Ref Range    Uric Acid 6.0 2.0 - 7.5 mg/dL   Iron and Transferrin Iron Binding Capacity   Result Value Ref Range    Iron 57 42 - 175 ug/dL    Transferrin 185 (L) 212 - 360 mg/dL    Transferrin Saturation, Calculated 25 20 - 50 %    Transferrin IBC, Calculated 231 (L) 313 - 563 ug/dL   Lipid Cascade RANDOM   Result Value Ref Range    Cholesterol 157 <=199 mg/dL    Triglycerides 92 <=149 mg/dL    HDL Cholesterol  41 (L) >=50 mg/dL    LDL Calculated 98 <=129 mg/dL    Patient Fasting > 8hrs? Unknown    Ferritin   Result Value Ref Range    Ferritin 100 10 - 130 ng/mL   Renal Function Profile   Result Value Ref Range    Albumin 3.1 (L) 3.5 - 5.0 g/dL    Calcium 9.0 8.5 - 10.5 mg/dL    Phosphorus 3.1 2.5 - 4.5 mg/dL    Glucose 247 (H) 70 - 125 mg/dL    BUN 46 (H) 8 - 22 mg/dL    Creatinine 1.72 (H) 0.60 - 1.10 mg/dL    Sodium 139 136 - 145 mmol/L    Potassium 4.7 3.5 - 5.0 mmol/L    Chloride 107 98 - 107 mmol/L    CO2 22 22 - 31 mmol/L    Anion Gap, Calculation 10 5 - 18 mmol/L    GFR MDRD Af Amer 36 (L) >60 mL/min/1.73m2    GFR MDRD Non Af Amer 30 (L) >60 mL/min/1.73m2   HM1 (CBC with Diff)   Result Value Ref Range    WBC 7.4 4.0 - 11.0 thou/uL    RBC 3.98 3.80 - 5.40 mill/uL    Hemoglobin 10.3 (L) 12.0 - 16.0 g/dL    Hematocrit 31.6 (L) 35.0 - 47.0 %    MCV 79 (L) 80 - 100 fL    MCH 25.8 (L) 27.0 - 34.0 pg    MCHC 32.5 32.0 - 36.0 g/dL    RDW 17.3 (H) 11.0 - 14.5 %    Platelets 282 140 - 440 thou/uL    MPV 7.7 7.0 - 10.0 fL    Neutrophils % 71 (H) 50 - 70 %    Lymphocytes % 18 (L) 20 - 40 %    Monocytes % 8 2 - 10 %    Eosinophils % 3 0 - 6 %    Basophils % 0 0 - 2 %    Neutrophils Absolute 5.2 2.0 - 7.7 thou/uL    Lymphocytes Absolute 1.3 0.8 - 4.4 thou/uL    Monocytes Absolute 0.6 0.0 - 0.9 thou/uL    Eosinophils Absolute 0.2 0.0 - 0.4 thou/uL    Basophils Absolute 0.0 0.0 - 0.2 thou/uL     No results found.  MEDICATIONS     Current Outpatient Prescriptions on File Prior to Visit   Medication Sig Dispense Refill     ACCU-CHEK RACHEL CONTROL SOLN Soln Use to check accuracy of meter.  Generic: Control Solution 1 each 5     ACCU-CHEK RACHEL PLUS TEST STRP strips Use to test blood sugar four times a day Generic: Blood Glucose Test 100 strip 11     albuterol (PROVENTIL) 2.5 mg /3 mL (0.083 %) nebulizer solution Use 1 vial via nebulizer four times a day as needed Generic: Albuterol Sulfate 60 vial 3     allopurinol (ZYLOPRIM) 100 MG  tablet TK 1 T PO QD FOR GOUT PREVENTION  11     amLODIPine (NORVASC) 10 MG tablet Take 1 tablet by mouth every day  Generic: AmLODIPine Besylate 30 tablet 0     ANTIFUNGAL CREAM 2 % cream APPLY EXTERNALLY TO THE AFFECTED AREA TWICE DAILY 56 g 0     aspirin 81 mg chewable tablet Chew one tablet by mouth once daily  Generic: Aspirin 36 tablet 10     atorvastatin (LIPITOR) 40 MG tablet Take 1 tablet by mouth every night at bedtime 30 tablet 7     blood-glucose meter (ACCU-CHEK RACHEL PLUS METER) Misc USE AS DIRECTED 1 each 1     clobetasol (TEMOVATE) 0.05 % cream APPLY EXTERNALLY TO THE AFFECTED AREA TWICE DAILY 60 g 0     cloNIDine HCl (CATAPRES) 0.2 MG tablet TAKE 1 TABLET(0.2 MG) BY MOUTH TWICE DAILY 180 tablet 2     DIAPER,BRIEF,ADULT, DISPOSABLE (DEPEND PANTS MISC) Use As Directed. Large       diphenhydrAMINE (BENADRYL) 25 mg capsule TAKE 2 CAPSULES BY MOUTH DAILY AS NEEDED 180 capsule 1     EASY TOUCH LANCING DEVICE Misc Use to test blood sugar twice a day.  Generic: Easy Touch Lancing Device 1 each 5     EASY TOUCH TWIST LANCETS 28 gauge Misc USE TO CHECK BLOOD SUGAR FOUR TIMES A  each 1     esomeprazole (NEXIUM) 40 MG capsule Take one capsule by mouth once daily  Generic: Esomeprazole Magnesium 30 capsule 5     fluticasone-salmeterol (ADVAIR DISKUS) 250-50 mcg/dose DISKUS Use one puff twice daily 3 each 3     furosemide (LASIX) 80 MG tablet Take 2 tablets by mouth daily  Generic: Furosemide 60 tablet 10     generic lancets (ACCU-CHEK SOFTCLIX LANCETS) CHECK 4 TIMES DAILY 100 each 2     GLUCAGON 1 mg injection Use as directed 1 each 0     hydrALAZINE (APRESOLINE) 100 MG tablet Take one tablet by mouth three times a day Generic: HydrALAZINE HCl 90 tablet 3     ketoconazole (NIZORAL) 2 % cream Apply to affected area BID 15 g 0     LANTUS 100 unit/mL injection Inject 30 Units under the skin at bedtime. 11.9 Type 2 without complications  Contact provider if insulin prescribed is not the preferred insulin per  "insurance. 10 mL PRN     LANTUS SOLOSTAR 100 unit/mL (3 mL) pen        LEVEMIR FLEXTOUCH 100 unit/mL (3 mL) pen INJECT 35 UNITS TWICE DAILY 15 mL 0     LEVEMIR FLEXTOUCH 100 unit/mL (3 mL) pen Inject 35 units subcutaneously twice daily 1 Box 5     magnesium oxide (MAG-OX) 400 mg tablet TAKE 1/2 TABLET BY MOUTH ONCE DAILY 45 tablet 3     metoprolol succinate (TOPROL-XL) 200 MG 24 hr tablet Take 1 tablet by mouth daily  Generic: Metoprolol Succinate ER 90 tablet 3     miscellaneous medical supply Misc Use with nebulizer 1 each 1     montelukast (SINGULAIR) 10 mg tablet TAKE ONE TABLET BY MOUTH EVERY NIGHT AT BEDTIME 90 tablet 03     NEEDLES, INSULIN DISPOSABLE (NOVOFINE 30 MISC) Use As Directed 5 (five) times a day. X 8mm       NOVOFINE AUTOCOVER 30 gauge x 1/3\" Ndle INJECT UNDER THE SKIN 5 TIMES DAILY AS DIRECTED 400 each 2     NOVOLOG 100 unit/mL injection Check blood sugar three (3) times daily.  11.9 Type 2 without complications 10 mL PRN     NOVOLOG FLEXPEN 100 unit/mL injection pen Inject 15 units subcutaneously three times daily before meals. Generic: Insulin Aspart 5 Pre-filled Pen Syringe 5     oxyCODONE-acetaminophen (PERCOCET) 5-325 mg per tablet TAke 1 tab po q 8 hours prn severe pain 30 tablet 0     permethrin (NIX) 1 % liquid Wash hair first - then apply to scalp. May repeat in 1 week prn 1 Bottle 0     potassium chloride (KLOR-CON) 10 MEQ CR tablet TAKE 1 TABLET(10 MEQ) BY MOUTH DAILY 30 tablet 5     senna-docusate (PERICOLACE) 8.6-50 mg tablet Take 1 tablet by mouth 2 (two) times a day. 60 tablet 0     spironolactone (ALDACTONE) 25 MG tablet TAKE 1 TABLET(25 MG) BY MOUTH DAILY 30 tablet 6     timolol maleate (TIMOPTIC) 0.5 % ophthalmic solution INSTILL 1 DROP IN BOTH EYES TWICE DAILY 10 mL 5     traMADol (ULTRAM) 50 mg tablet TAKE 1 TABLET BY MOUTH TWICE DAILY AS NEEDED FOR SEVERE PAIN 60 tablet 0     triamcinolone (NASACORT) 55 mcg nasal inhaler 2 sprays in each nostril q day. Wait for 1 week for " effectiveness 1 Inhaler 12     VENTOLIN HFA 90 mcg/actuation inhaler Inhale 1-2 puffs by mouth every 4-6 hours as needed Generic: Albuterol Sulfate HFA 1 Inhaler 5     wheelchair [employer] mobility device  Face-to-face exam  October 6, 2016  impaired mobility  Length of need : 99 1 each 0     ZEASORB, MICONAZOLE, 2 % powder APPLY TWICE DAILY AFTER DRYING AREA OF REDNESS 71 g 0     No current facility-administered medications on file prior to visit.        HEALTH MAINTENANCE / SCREENING   PHQ-2 Total Score: 0 (5/23/2018  2:03 PM), No Data Recorded,No Data Recorded  Immunization History   Administered Date(s) Administered     Pneumo Conj 13-V (2010&after) 02/16/2013     Pneumo Polysac 23-V 02/16/2013     Td, Adult, Absorbed 11/09/2005     Td,adult,historic,unspecified 11/09/2005     Health Maintenance   Topic     MAMMOGRAM      ASTHMA FOLLOW-UP      TDAP ADULT ONE TIME DOSE      ZOSTER VACCINE      TD 18+ HE      PAP SMEAR      INFLUENZA VACCINE RULE BASED (Season Ended)     DIABETES HEMOGLOBIN A1C      DIABETES FOLLOW-UP      DIABETES OPHTHALMOLOGY EXAM      ASTHMA CONTROL TEST      DIABETES FOOT EXAM      DIABETES URINE MICROALBUMIN      ADVANCE DIRECTIVES DISCUSSED WITH PATIENT        Collin Doran MD  Family Medicine, [facility]     This note was dictated using a voice recognition software.  Any grammatical or context distortion are unintentional and inherent to the software.

## 2021-06-18 NOTE — LETTER
Letter by Collin Doran MD at      Author: Collin Doran MD Service: -- Author Type: --    Filed:  Encounter Date: 2/28/2019 Status: (Other)       Sun Mireles  [address] [place] [address]             February 28, 2019         Dear Ms. Mireles,    Below are the results from your recent visit:    Resulted Orders   Glycosylated Hemoglobin A1c   Result Value Ref Range    Hemoglobin A1c 8.5 (H) 3.5 - 6.0 %   Vitamin D, Total (25-Hydroxy)   Result Value Ref Range    Vitamin D, Total (25-Hydroxy) 29.9 (L) 30.0 - 80.0 ng/mL    Narrative    Deficiency <10.0 ng/mL  Insufficiency 10.0-29.9 ng/mL  Sufficiency 30.0-80.0 ng/mL  Toxicity (possible) >100.0 ng/mL   Uric Acid   Result Value Ref Range    Uric Acid 7.5 2.0 - 7.5 mg/dL   Magnesium   Result Value Ref Range    Magnesium 1.7 (L) 1.8 - 2.6 mg/dL   Comprehensive Metabolic Panel   Result Value Ref Range    Sodium 138 136 - 145 mmol/L    Potassium 4.5 3.5 - 5.0 mmol/L    Chloride 103 98 - 107 mmol/L    CO2 26 22 - 31 mmol/L    Anion Gap, Calculation 9 5 - 18 mmol/L    Glucose 131 (H) 70 - 125 mg/dL    BUN 56 (H) 8 - 22 mg/dL    Creatinine 2.40 (H) 0.60 - 1.10 mg/dL    GFR MDRD Af Amer 25 (L) >60 mL/min/1.73m2    GFR MDRD Non Af Amer 20 (L) >60 mL/min/1.73m2    Bilirubin, Total 0.5 0.0 - 1.0 mg/dL    Calcium 9.5 8.5 - 10.5 mg/dL    Protein, Total 7.6 6.0 - 8.0 g/dL    Albumin 3.1 (L) 3.5 - 5.0 g/dL    Alkaline Phosphatase 97 45 - 120 U/L    AST 14 0 - 40 U/L    ALT <9 0 - 45 U/L    Narrative    Fasting Glucose reference range is 70-99 mg/dL per  American Diabetes Association (ADA) guidelines.   BNP(B-type Natriuretic Peptide)   Result Value Ref Range     (H) 0 - 106 pg/mL       Magnesium was slightly low, take some over-the-counter magnesium supplementation, blood sugar with A1c improving, continue current management, kidney function abnormal but stable.    Please call with questions or contact us using  MyChart.    Sincerely,        Electronically signed by Collin Doran MD

## 2021-06-18 NOTE — LETTER
Letter by Collin Doran MD at      Author: Collin Doran MD Service: -- Author Type: --    Filed:  Encounter Date: 2/25/2019 Status: (Other)         [facility] FAMILY MEDICINE/OB  02/25/19    Patient: Sun Mireles  YOB: 1955  Medical Record Number: [id number]                                                                  Opioid / Opioid Plus Controlled Substance Agreement    I understand that my care provider has prescribed an opioid (narcotic) controlled substance to help manage my condition(s). I am taking this medicine to help me function or work. I know this is strong medicine, and that it can cause serious side effects. Opioid medicine can be sedating, addicting and may cause a dependency on the drug. They can affect my ability to drive or think, and cause depression. They need to be taken exactly as prescribed. Combining opioids with certain medicines or chemicals (such as cocaine, sedatives and tranquilizers, sleeping pills, meth) can be dangerous or even fatal. Also, if I stop opioids suddenly, I may have severe withdrawal symptoms. Last, I understand that opioids do not work for all types of pain nor for all patients. If not helpful, I may be asked to stop them.        The risks, benefits, and side effects of these medicine(s) were explained to me. I agree that:    1. I will take part in other treatments as advised by my care team. This may be psychiatry or counseling, physical therapy, behavioral therapy, group treatment or a referral to a pain clinic. I will reduce or stop my medicine when my care team tells me to do so.  2. I will take my medicines as prescribed. I will not change the dose or schedule unless my care team tells me to. There will be no refills if I run out early.  I may be contactedwithout warning and asked to complete a urine drug test or pill count at any time.   3. I will keep all my appointments, and understand this is part of the  monitoring of opioids. My care team may require an office visit for EVERY opioid/controlled substance refill. If I miss appointments or dont follow instructions, my care team may stop my medicine.  4. I will not ask other providers to prescribe controlled substances, and I will not accept controlled substances from other people. If I need another prescribed controlled substance for a new reason, I will tell my care team within 1 business day.  5. I will use one pharmacy to fill all of my controlled substance prescriptions, and it is up to me to make sure that I do not run out of my medicines on weekends or holidays. If my care team is willing to refill my opioid prescription without a visit, I must request refills only during office hours, refills may take up to 3 days to process, and it may take up to 5 to 7 days for my medicine to be mailed and ready at my pharmacy. Prescriptions will not be mailed anywhere except my pharmacy.        [id number]  Rev 12/18         Registration to scan to EHR                             Page 1 of 2               Controlled Substance Agreement Opioid        [facility] FAMILY MEDICINE/OB  02/25/19  Patient: Sun Mireles  YOB: 1955  Medical Record Number: [id number]                                                                  6. I am responsible for my prescriptions. If the medicine/prescription is lost or stolen, it will not be replaced. I also agree not to share controlled substance medicines with anyone.  7. I agree to not use ANY illegal or recreational drugs. This includes marijuana, cocaine, bath salts or other drugs. I agree not to use alcohol unless my care team says I may.          I agree to give urine samples whenever asked. If I dont give a urine sample, the care team may stop my medicine.    8. If I enroll in the [place] Medical Marijuana program, I will tell my care team. I will also sign an agreement to share my medical records with my care  team.   9. I will bring in my list of medicines (or my medicine bottles) each time I come to the clinic.   10. I will tell my care team right away if I become pregnant or have a new medical problem treated outside of my regular clinic.  11. I understand that this medicine can affect my thinking and judgment. It may be unsafe for me to drive, use machinery and do dangerous tasks. I will not do any of these things until I know how the medicine affects me. If my dose changes, I will wait to see how it affects me. I will contact my care team if I have concerns about medicine side effects.    I understand that if I do not follow any of the conditions above, my prescriptions or treatment may be stopped.      I agree that my provider, clinic care team, and pharmacy may work with any city, state or federal law enforcement agency that investigates the misuse, sale, or other diversion of my controlled medicine. I will allow my provider to discuss my care with or share a copy of this agreement with any other treating provider, pharmacy or emergency room where I receive care. I agree to give up (waive) any right of privacy or confidentiality with respect to these consents.     I have read this agreement and have asked questions about anything I did not understand.      ________________________________________________________________________  Patient signature - Date/Time -  Sun Mireles                                      ________________________________________________________________________  Witness signature                                                            ________________________________________________________________________  Provider signature - Collin Doran MD      [id number]  Rev 12/18         Registration to scan to EHR                         Page 2 of 2                   Controlled Substance Agreement Opioid           Page 1 of 2  Opioid Pain Medicines (also known as Narcotics)  What You  Need to Know    What are opioids?   Opioids are pain medicines that must be prescribed by a doctor.  They are also known as narcotics.    Examples are:     morphine (MS Contin, Gabriela)    oxycodone (Oxycontin)    oxycodone and acetaminophen (Percocet)    hydrocodone and acetaminophen (Vicodin, Norco)     fentanyl patch (Duragesic)     hydromorphone (Dilaudid)     methadone     What do opioids do well?   Opioids are best for short-term pain after a surgery or injury. They also work well for cancer pain. Unlike other pain medicines, they do not cause liver or kidney failure or ulcers. They may help some people with long-lasting (chronic) pain.     What do opioids NOT do well?   Opioids never get rid of pain entirely, and they do not work well for most patients with chronic pain. Opioids do not reduce swelling, one of the causes of pain. They also dont work well for nerve pain.                           For informational purposes only.  Not to replace the advice of your care provider.  Copyright [address] [facility]. All right reserved. [employer] 987449-[id number] 02/18.      Page 2 of 2    Risks and side effects   Talk to your doctor before you start or decide to keep taking one of these medicines. Side effects include:    Lowering your breathing rate enough to cause death    Overdose, including death, especially if taking higher than prescribed doses    Long-term opioid use    Worse depression symptoms; less pleasure in things you usually enjoy    Feeling tired or sluggish    Slower thoughts or cloudy thinking    Being more sensitive to pain over time; pain is harder to control    Trouble sleeping or restless sleep    Changes in hormone levels (for example, less testosterone)    Changes in sex drive or ability to have sex    Constipation    Unsafe driving    Itching and sweating    Feeling dizzy    Nausea, vomiting and dry mouth    What else should I know about opioids?  When someone takes opioids for too long or  too often, they become dependent. This means that if you stop or reduce the medicine too quickly, you will have withdrawal symptoms.    Dependence is not the same as addiction. Addiction is when people keep using a substance that harms their body, their mind or their relations with others. If you have a history of drug or alcohol abuse, taking opioids can cause a relapse.    Over time, opioids dont work as well. Most people will need higher and higher doses. The higher the dose, the more serious the side effects. We dont know the long-term effects of opioids.      Prescribed opioids aren't the best way to manage chronic pain    Other ways to manage pain include:      Ibuprofen or acetaminophen.  You should always try this first.      Treat health problems that may be causing pain.      acupuncture or massage, deep breathing, meditation, visual imagery, aromatherapy.      Use heat or ice at the pain site      Physical therapy and exercise      Stop smoking      See a counselor or therapist                                                  People who have used opioids for a long time may have a lower quality of life, worse depression, higher levels of pain and more visits to doctors.    Never share your opioids with others. Be sure to store opioids in a secure place, locked if possible.Young children can easily swallow them and overdose.     You can overdose on opioids.  Signs of overdose include decrease or loss of consciousness, slowed breathing, trouble waking and blue lips.  If someone is worried about overdose, they should call 911.    If you are at risk for overdose, you may get naloxone (Narcan, a medicine that reverses the effects of opioids.  If you overdose, a friend or family member can give you Narcan while waiting for the ambulance.  They need to know the signs of overdose and how to give Narcan.    While you're taking opioids:    Don't use alcohol or street drugs. Taking them together can cause  death.    Don't take any of these medicines unless your doctor says its okay.  Taking these with opioids can cause death.    Benzodiazepines (such as lorazepam         or diazepam)    Muscle relaxers (such as cyclobenzaprine)    sleeping pills    other opioids    Safe disposal of opioids  Find your area drug take-back program, your pharmacy mail-back program, buy a special disposal bag (such as Deterra) from your pharmacy or flush them down the toilet.  Use the guidelines at:  www.fda.gov/drugs/resourcesforyou

## 2021-06-19 NOTE — PROGRESS NOTES
"OFFICE VISIT - FAMILY MEDICINE     ASSESSMENT AND PLAN     1. Chronic diastolic CHF (congestive heart failure) (H)  Ambulatory referral to Cardiology    BNP(B-type Natriuretic Peptide)    Comprehensive Metabolic Panel   2. Lymphedema of lower extremity  Ambulatory referral to Lymphedema Care   3. CAD (coronary artery disease)  Ambulatory referral to Cardiology   4. Lumbago     5. Acute kidney injury superimposed on CKD (H)     6. Skin yeast infection  nystatin (MYCOSTATIN) ointment   Congestive heart failure improved with  Lasix 80 mg daily BNP is trending down, swelling in the legs has improved, but creatinine has slightly increased, consider decreasing Lasix, monitor her kidneys and electrolytes.  She has a follow-up appointment with cardiologist to discuss about long-term management of her coronary artery disease and congestive heart failure.  Lymphedema of lower extremities, referral to lymphedema clinic for long-term management  Yeast infection below the breast area, topical nystatin prescribed  Chronic kidney disease, continue to follow with kidney clinic  CHIEF COMPLAINT   Form (\"DISABILITY FORM, 07/17 DEADLINE\")    HPI   Sun Mireles is a 62 y.o. female.  No Patient Care Coordination Note on file.  She is here primarily to have a parking disability form completed, but she was seen a few weeks ago to establish care, she has congestive heart failure, she stating that her leg did swell the few days ago that she has to increase her Lasix to 80 mg daily, this swelling in the limb did improve, she denies any other symptoms beside occasional left below the breast pain.  Has been there for several weeks, not related to activities, lasting up to several minutes.  Denies any irregular heartbeat, she does have an appointment to see the cardiologist in a couple of weeks.  She has chronic lymphedema, she would like to see Dr. Dan for long-term management.  She has seen her in the past.  Has chronic kidney " "disease, has been seen by nephrologist in the past, euvolemic state has been difficult to achieve because of her CKD.  Has also been noticing more itching below the breast area, she does use some topical over-the-counter ointment with no improvement.      Review of Systems As per HPI, otherwise negative.    OBJECTIVE   /65 (Patient Site: Left Arm)  Pulse 64  Temp 97.9  F (36.6  C) (Oral)   Resp 13  Ht 5' 4\" (1.626 m)  Wt (!) 278 lb (126.1 kg)  BMI 47.72 kg/m2  Physical Exam   Constitutional: She is oriented to person, place, and time. She appears well-developed and well-nourished.   HENT:   Head: Normocephalic and atraumatic.   Neck: Normal range of motion. Neck supple. No JVD present. No tracheal deviation present. No thyromegaly present.   Cardiovascular: Normal rate, regular rhythm, normal heart sounds and intact distal pulses.  Exam reveals no gallop and no friction rub.    No murmur heard.  Pulmonary/Chest: Effort normal and breath sounds normal. No respiratory distress. She has no wheezes. She has no rales.   Musculoskeletal: She exhibits edema (Chronic lymphedema in both legs). She exhibits no tenderness.   Lymphadenopathy:     She has no cervical adenopathy.   Neurological: She is alert and oriented to person, place, and time. Coordination normal.   Skin: There is erythema (Below breast both sides).   Psychiatric: She has a normal mood and affect. Judgment and thought content normal.       PFSH     Family History   Problem Relation Age of Onset     Hypertension Mother      COPD Mother      Diabetes Mother      Hypertension Father      COPD Father      Diabetes Father      Heart attack Maternal Grandmother      Hypertension Maternal Grandmother      Alcohol abuse Sister      Drug abuse Sister      COPD Sister      Diabetes Sister      Hypertension Sister      Heart disease Brother      Hypertension Brother      Hypertension Daughter      Hypertension Son      Hypertension Maternal Grandfather      " Heart attack Maternal Grandfather      Hypertension Paternal Grandmother      Heart attack Paternal Grandmother      Hypertension Paternal Grandfather      Social History     Social History     Marital status: Single     Spouse name: N/A     Number of children: N/A     Years of education: N/A     Occupational History     Not on file.     Social History Main Topics     Smoking status: Former Smoker     Quit date: 2/8/2014     Smokeless tobacco: Never Used     Alcohol use No     Drug use: No     Sexual activity: Not on file     Other Topics Concern     Not on file     Social History Narrative    Reports on disability. She is not working and lives with her children and friend.  She reports she turns to her doctor in times of crisis.  She reports she needs assistance with ADLs and has a PCA 11 hours per day, 7 days per week.  Does not have a home nurse.       Relevant history was reviewed with the patient today, unless noted in HPI, nothing is pertinent for this visit.  [facility]     Patient Active Problem List    Diagnosis Date Noted     Eczema 04/09/2018     Chronic diastolic CHF (congestive heart failure) (H) 06/12/2017     Acute on chronic respiratory failure with hypoxia and hypercapnia (H) 06/08/2017     Pulmonary HTN 06/08/2017     Bilateral edema of lower extremity 06/28/2016     Diabetic polyneuropathy associated with type 2 diabetes mellitus (H) 06/28/2016     Overview Note:     Previously on gabapentin which helped, but stopped in the hospital due to potential drug interaction.  Lyrica made her sleepy.       Candidiasis, intertrigo 06/28/2016     Tobacco use 07/30/2015     Joint pain, localized to the knee 01/08/2015     Joint pain, localized in the right shoulder 01/08/2015     Lethargy 07/08/2014     Acute arthritis 06/15/2014     Back pain 06/15/2014     Chronic pain 06/15/2014     Gout      Overview Note:     Created by Conversion         Morbid obesity with alveolar hypoventilation (H)      Overview Note:      Created by Conversion         Cor Pulmonale      Overview Note:     Created by Conversion    Replacement Utility updated for latest IMO load       Asthma      Overview Note:     Created by Conversion         Urinary Incontinence      Overview Note:     Created by Conversion         Microcytic anemia      Overview Note:     Created by Conversion         Essential Hypercholesterolemia      Overview Note:     Created by Conversion         Obstructive Sleep Apnea      Overview Note:     Created by Conversion         Shortness of breath      Overview Note:     Created by Conversion         Long-term insulin use in type 2 diabetes (H)      Overview Note:     Created by Conversion         Acute diastolic heart failure (H)      Overview Note:     Created by Conversion         Postmenopausal bleeding      Overview Note:     Created by Conversion         Benign Essential Hypertension      Overview Note:     Created by Conversion         Acute kidney injury superimposed on CKD (H)      Overview Note:     Created by Conversion         Localized Osteoarthritis Of The Knee      Overview Note:     Created by Conversion    Replacement Utility updated for latest IMO load       Lower Back Pain      Overview Note:     Created by Conversion         Glaucoma      Overview Note:     Created by Conversion         Aneurysm Of The Right Vertebral Artery      Overview Note:     Created by Conversion         Past Surgical History:   Procedure Laterality Date     EYE SURGERY       [place]  DELIVERY ONLY      Description:  Section;  Recorded: 10/20/2011;     [place] LIGATE FALLOPIAN TUBE      Description: Tubal Ligation;  Recorded: 10/20/2011;     [place] REMOVAL GALLBLADDER      Description: Cholecystectomy;  Recorded: 10/20/2011;       RESULTS/CONSULTS (Lab/Rad)     Recent Results (from the past 168 hour(s))   BNP(B-type Natriuretic Peptide)   Result Value Ref Range     (H) 0 - 103 pg/mL   Comprehensive Metabolic Panel   Result Value  Ref Range    Sodium 136 136 - 145 mmol/L    Potassium 5.0 3.5 - 5.0 mmol/L    Chloride 98 98 - 107 mmol/L    CO2 26 22 - 31 mmol/L    Anion Gap, Calculation 12 5 - 18 mmol/L    Glucose 323 (H) 70 - 125 mg/dL    BUN 78 (H) 8 - 22 mg/dL    Creatinine 2.74 (H) 0.60 - 1.10 mg/dL    GFR MDRD Af Amer 21 (L) >60 mL/min/1.73m2    GFR MDRD Non Af Amer 17 (L) >60 mL/min/1.73m2    Bilirubin, Total 0.3 0.0 - 1.0 mg/dL    Calcium 9.5 8.5 - 10.5 mg/dL    Protein, Total 7.4 6.0 - 8.0 g/dL    Albumin 3.1 (L) 3.5 - 5.0 g/dL    Alkaline Phosphatase 113 45 - 120 U/L    AST 12 0 - 40 U/L    ALT <9 0 - 45 U/L     No results found.  MEDICATIONS     Current Outpatient Prescriptions on File Prior to Visit   Medication Sig Dispense Refill     ACCU-CHEK RACHEL CONTROL SOLN Soln Use to check accuracy of meter.  Generic: Control Solution 1 each 5     ACCU-CHEK RACHEL PLUS TEST STRP strips Use to test blood sugar four times a day Generic: Blood Glucose Test 100 strip 11     albuterol (PROVENTIL) 2.5 mg /3 mL (0.083 %) nebulizer solution Use 1 vial via nebulizer four times a day as needed Generic: Albuterol Sulfate 60 vial 3     allopurinol (ZYLOPRIM) 100 MG tablet TK 1 T PO QD FOR GOUT PREVENTION  11     amLODIPine (NORVASC) 10 MG tablet Take one tablet by mouth every day.  Generic: AmLODIPine Besylate 30 tablet 11     ANTIFUNGAL CREAM 2 % cream APPLY EXTERNALLY TO THE AFFECTED AREA TWICE DAILY 56 g 0     aspirin 81 mg chewable tablet Chew one tablet by mouth once daily  Generic: Aspirin 36 tablet 10     atorvastatin (LIPITOR) 40 MG tablet Take 1 tablet by mouth every night at bedtime 30 tablet 7     blood-glucose meter (ACCU-CHEK RACHEL PLUS METER) Misc USE AS DIRECTED 1 each 1     clobetasol (TEMOVATE) 0.05 % cream APPLY EXTERNALLY TO THE AFFECTED AREA TWICE DAILY 60 g 0     cloNIDine HCl (CATAPRES) 0.2 MG tablet TAKE 1 TABLET(0.2 MG) BY MOUTH TWICE DAILY 180 tablet 2     DIAPER,BRIEF,ADULT, DISPOSABLE (DEPEND PANTS MISC) Use As Directed. Large   "     diphenhydrAMINE (BENADRYL) 25 mg capsule TAKE 2 CAPSULES BY MOUTH DAILY AS NEEDED 180 capsule 1     EASY TOUCH LANCING DEVICE Misc Use to test blood sugar twice a day.  Generic: Easy Touch Lancing Device 1 each 5     EASY TOUCH TWIST LANCETS 28 gauge Misc USE TO CHECK BLOOD SUGAR FOUR TIMES A  each 1     esomeprazole (NEXIUM) 40 MG capsule TAKE 1 CAPSULE BY MOUTH TWICE DAILY 180 capsule 2     fluticasone-salmeterol (ADVAIR DISKUS) 250-50 mcg/dose DISKUS Use one puff twice daily 3 each 3     furosemide (LASIX) 80 MG tablet Take 2 tablets by mouth daily  Generic: Furosemide 60 tablet 10     generic lancets (ACCU-CHEK SOFTCLIX LANCETS) CHECK 4 TIMES DAILY 100 each 2     GLUCAGON 1 mg injection Use as directed 1 each 0     hydrALAZINE (APRESOLINE) 100 MG tablet Take one tablet by mouth three times a day Generic: HydrALAZINE HCl 90 tablet 3     ketoconazole (NIZORAL) 2 % cream Apply to affected area BID 15 g 0     LANTUS 100 unit/mL injection Inject 30 Units under the skin at bedtime. 11.9 Type 2 without complications  Contact provider if insulin prescribed is not the preferred insulin per insurance. 10 mL PRN     LANTUS SOLOSTAR 100 unit/mL (3 mL) pen        LEVEMIR FLEXTOUCH 100 unit/mL (3 mL) pen INJECT 35 UNITS TWICE DAILY 15 mL 0     LEVEMIR FLEXTOUCH 100 unit/mL (3 mL) pen Inject 35 units subcutaneously twice daily 1 Box 5     magnesium oxide (MAG-OX) 400 mg tablet TAKE 1/2 TABLET BY MOUTH ONCE DAILY 45 tablet 3     metoprolol succinate (TOPROL-XL) 200 MG 24 hr tablet Take 1 tablet by mouth daily  Generic: Metoprolol Succinate ER 90 tablet 3     miscellaneous medical supply Misc Use with nebulizer 1 each 1     montelukast (SINGULAIR) 10 mg tablet TAKE ONE TABLET BY MOUTH EVERY NIGHT AT BEDTIME 90 tablet 03     NEEDLES, INSULIN DISPOSABLE (NOVOFINE 30 MISC) Use As Directed 5 (five) times a day. X 8mm       NOVOFINE AUTOCOVER 30 gauge x 1/3\" Ndle INJECT UNDER THE SKIN 5 TIMES DAILY AS DIRECTED 400 each 2 "     NOVOLOG 100 unit/mL injection Check blood sugar three (3) times daily.  11.9 Type 2 without complications 10 mL PRN     NOVOLOG FLEXPEN 100 unit/mL injection pen Inject 15 units subcutaneously three times daily before meals. Generic: Insulin Aspart 5 Pre-filled Pen Syringe 5     permethrin (NIX) 1 % liquid Wash hair first - then apply to scalp. May repeat in 1 week prn 1 Bottle 0     potassium chloride (KLOR-CON) 10 MEQ CR tablet TAKE 1 TABLET(10 MEQ) BY MOUTH DAILY 30 tablet 5     senna-docusate (PERICOLACE) 8.6-50 mg tablet Take 1 tablet by mouth 2 (two) times a day. 60 tablet 0     spironolactone (ALDACTONE) 25 MG tablet TAKE 1 TABLET(25 MG) BY MOUTH DAILY 30 tablet 6     timolol maleate (TIMOPTIC) 0.5 % ophthalmic solution INSTILL 1 DROP IN BOTH EYES TWICE DAILY 10 mL 5     traMADol (ULTRAM) 50 mg tablet TAKE 1 TABLET BY MOUTH TWICE DAILY AS NEEDED FOR SEVERE PAIN 60 tablet 0     triamcinolone (NASACORT) 55 mcg nasal inhaler 2 sprays in each nostril q day. Wait for 1 week for effectiveness 1 Inhaler 12     VENTOLIN HFA 90 mcg/actuation inhaler Inhale 1-2 puffs by mouth every 4-6 hours as needed Generic: Albuterol Sulfate HFA 1 Inhaler 5     wheelchair [employer] mobility device  Face-to-face exam  October 6, 2016  impaired mobility  Length of need : 99 1 each 0     ZEASORB, MICONAZOLE, 2 % powder APPLY TWICE DAILY AFTER DRYING AREA OF REDNESS 71 g 0     No current facility-administered medications on file prior to visit.        HEALTH MAINTENANCE / SCREENING   PHQ-2 Total Score: 0 (5/23/2018  2:03 PM), No Data Recorded,No Data Recorded  Immunization History   Administered Date(s) Administered     Pneumo Conj 13-V (2010&after) 02/16/2013     Pneumo Polysac 23-V 02/16/2013     Td, Adult, Absorbed 11/09/2005     Td,adult,historic,unspecified 11/09/2005     Health Maintenance   Topic     MAMMOGRAM      TDAP ADULT ONE TIME DOSE      ZOSTER VACCINE      TD 18+ HE      PAP SMEAR      INFLUENZA VACCINE RULE BASED  (1)     DIABETES HEMOGLOBIN A1C      DIABETES FOLLOW-UP      DIABETES OPHTHALMOLOGY EXAM      DIABETES FOOT EXAM      DIABETES URINE MICROALBUMIN      ASTHMA CONTROL TEST      ASTHMA FOLLOW-UP      ADVANCE DIRECTIVES DISCUSSED WITH PATIENT        Collin Doran MD  Family Medicine, [facility]     This note was dictated using a voice recognition software.  Any grammatical or context distortion are unintentional and inherent to the software.

## 2021-06-19 NOTE — LETTER
Letter by Collin Doran MD at      Author: Collin Doran MD Service: -- Author Type: --    Filed:  Encounter Date: 3/21/2019 Status: (Other)         Sun FINNEY Chi  [address] [place] [address]             March 21, 2019         Dear MsMark Mireles,    Below are the results from your recent visit:    Resulted Orders   Comprehensive Metabolic Panel   Result Value Ref Range    Sodium 137 136 - 145 mmol/L    Potassium 4.3 3.5 - 5.0 mmol/L    Chloride 93 (L) 98 - 107 mmol/L    CO2 31 22 - 31 mmol/L    Anion Gap, Calculation 13 5 - 18 mmol/L    Glucose 168 (H) 70 - 125 mg/dL    BUN 87 (H) 8 - 22 mg/dL    Creatinine 3.86 (H) 0.60 - 1.10 mg/dL    GFR MDRD Af Amer 14 (L) >60 mL/min/1.73m2    GFR MDRD Non Af Amer 12 (L) >60 mL/min/1.73m2    Bilirubin, Total 0.4 0.0 - 1.0 mg/dL    Calcium 9.5 8.5 - 10.5 mg/dL    Protein, Total 7.7 6.0 - 8.0 g/dL    Albumin 3.3 (L) 3.5 - 5.0 g/dL    Alkaline Phosphatase 91 45 - 120 U/L    AST 20 0 - 40 U/L    ALT 13 0 - 45 U/L    Narrative    Fasting Glucose reference range is 70-99 mg/dL per  American Diabetes Association (ADA) guidelines.   BNP(B-type Natriuretic Peptide)   Result Value Ref Range     (H) 0 - 106 pg/mL       Sun,  Markers of fluid retention is improving with current treatment.  Kidney function abnormal but stable.  Dr. Doran    Please call with questions or contact us using [employer].    Sincerely,        Electronically signed by Collin Doran MD

## 2021-06-19 NOTE — LETTER
Letter by Collin Doran MD at      Author: Collin Doran MD Service: -- Author Type: --    Filed:  Encounter Date: 9/17/2019 Status: (Other)         November 21, 2017     Patient: Sun Mireles   YOB: 1955   Date of Visit: 09/17/2019       To Whom it May Concern:    Sun Mireles is a 61 year old lady who has been a patient at this clinic for the past  20 + years.     She is at this time totally disabled. This is on the basis of her diagnoses of chronic pain,    severe osteoarthritis of multiple sites, included feet, knees,and spine; congestive heart failure, pulmonary     hypertension, chronic kidney disease, peripheral diabetes neuropathy, foot ulcers,    and morbid obesity. She ambulates with the assistance of either a walker or a wheelchair. She is     largely homebound and currently homeless. Please contact me if there are any questions regarding this.       Sincerely,        Rafael Doran MD  [facility].  [address] [place] [address].  [place], [place], [address]  Ph:[phone]  Fax:[phone]

## 2021-06-20 NOTE — PROGRESS NOTES
[facility] Progress Note    Assessment:    1.  Diastolic heart failure stable with recent BNP level 169  2.  Essential hypertension  3.  Obstructive sleep apnea  4.  Morbid obesity with hypoventilation syndrome  5.  bilateral leg edema secondary to morbid obesity stable    Plan:    Continue the patient's current medical regimen.  Will arrange cardiology follow-up in 6 months with no interim cardiac testing ordered    An After Visit Summary was printed and given to the patient.    Subjective:    62-year-old female with a history of diastolic heart failure.  The patient was last admitted in June 2017 with both right heart failure as well as diastolic congestive heart failure.  Echocardiogram done on June 8, 2017 revealed normal left ventricular systolic function.  She had mild to moderate tricuspid regurgitation with minimal elevation pulmonary artery pressures present.  Over the last year she has had 100 pound weight loss with significant improvement in both her bilateral leg edema as well as her breathing.  The patient has insulin-dependent diabetes with diabetic nephropathy with a creatinine at 2.7 followed closely by nephrology.  She denies any chest pain or symptoms suggestive of congestive heart failure other than chronic dyspnea on exertion    Problem List:    Patient Active Problem List   Diagnosis     Gout     Morbid obesity with alveolar hypoventilation (H)     Cor Pulmonale     Asthma     Urinary Incontinence     Microcytic anemia     Essential Hypercholesterolemia     Obstructive Sleep Apnea     Shortness of breath     Long-term insulin use in type 2 diabetes (H)     Acute diastolic heart failure (H)     Postmenopausal bleeding     Benign Essential Hypertension     Acute kidney injury superimposed on CKD (H)     Localized Osteoarthritis Of The Knee     Lower Back Pain     Glaucoma     Aneurysm Of The Right Vertebral Artery     Back pain     Chronic pain     Lethargy     Joint pain,  localized to the knee     Joint pain, localized in the right shoulder     Tobacco use     Bilateral edema of lower extremity     Diabetic polyneuropathy associated with type 2 diabetes mellitus (H)     Candidiasis, intertrigo     Acute on chronic respiratory failure with hypoxia and hypercapnia (H)     Pulmonary HTN     Chronic diastolic CHF (congestive heart failure) (H)     Eczema         Current Outpatient Prescriptions   Medication Sig Dispense Refill     ACCU-CHEK RACHEL CONTROL SOLN Soln Use to check accuracy of meter.  Generic: Control Solution 1 each 5     ACCU-CHEK RACHEL PLUS TEST STRP strips Use to test blood sugar four times a day Generic: Blood Glucose Test 100 strip 11     albuterol (PROVENTIL) 2.5 mg /3 mL (0.083 %) nebulizer solution Use 1 vial via nebulizer four times a day as needed Generic: Albuterol Sulfate 60 vial 3     allopurinol (ZYLOPRIM) 100 MG tablet TK 1 T PO QD FOR GOUT PREVENTION  11     amLODIPine (NORVASC) 10 MG tablet Take one tablet by mouth every day.  Generic: AmLODIPine Besylate 30 tablet 11     aspirin 81 mg chewable tablet Chew one tablet by mouth once daily  Generic: Aspirin 36 tablet 10     atorvastatin (LIPITOR) 40 MG tablet Take 1 tablet by mouth every night at bedtime 30 tablet 7     blood-glucose meter (ACCU-CHEK RACHEL PLUS METER) Misc USE AS DIRECTED 1 each 1     clobetasol (TEMOVATE) 0.05 % cream APPLY EXTERNALLY TO THE AFFECTED AREA TWICE DAILY 60 g 0     cloNIDine HCl (CATAPRES) 0.2 MG tablet TAKE 1 TABLET(0.2 MG) BY MOUTH TWICE DAILY 180 tablet 2     DIAPER,BRIEF,ADULT, DISPOSABLE (DEPEND PANTS MISC) Use As Directed. Large       diphenhydrAMINE (BENADRYL) 25 mg capsule TAKE 2 CAPSULES BY MOUTH DAILY AS NEEDED 180 capsule 1     EASY TOUCH LANCING DEVICE Misc Use to test blood sugar twice a day.  Generic: Easy Touch Lancing Device 1 each 5     EASY TOUCH TWIST LANCETS 28 gauge Misc USE TO CHECK BLOOD SUGAR FOUR TIMES A  each 1     esomeprazole (NEXIUM) 40 MG  "capsule TAKE 1 CAPSULE BY MOUTH TWICE DAILY 180 capsule 2     fluticasone-salmeterol (ADVAIR DISKUS) 250-50 mcg/dose DISKUS Use one puff twice daily 3 each 3     furosemide (LASIX) 80 MG tablet Take one tablet (80 mg total) by mouth daily. Generic: Furosemide 90 tablet 3     generic lancets (ACCU-CHEK SOFTCLIX LANCETS) CHECK 4 TIMES DAILY 100 each 2     GLUCAGON 1 mg injection Use as directed 1 each 0     hydrALAZINE (APRESOLINE) 100 MG tablet Take one tablet by mouth three times a day Generic: HydrALAZINE HCl 90 tablet 3     insulin aspart U-100 (NOVOLOG FLEXPEN U-100 INSULIN) 100 unit/mL injection pen Inject 15 Units under the skin 3 (three) times a day before meals. 45 mL 1     insulin detemir U-100 (LEVEMIR FLEXTOUCH U-100 INSULN) 100 unit/mL (3 mL) pen Inject 32 Units under the skin daily. 1 Box 2     ketoconazole (NIZORAL) 2 % cream Apply to affected area BID 15 g 0     LANTUS 100 unit/mL injection Inject 30 Units under the skin at bedtime. 11.9 Type 2 without complications  Contact provider if insulin prescribed is not the preferred insulin per insurance. 10 mL PRN     LANTUS SOLOSTAR 100 unit/mL (3 mL) pen        LEVEMIR FLEXTOUCH 100 unit/mL (3 mL) pen INJECT 35 UNITS TWICE DAILY 15 mL 0     LEVEMIR FLEXTOUCH 100 unit/mL (3 mL) pen Inject 35 units subcutaneously twice daily 1 Box 5     magnesium oxide (MAG-OX) 400 mg tablet TAKE 1/2 TABLET BY MOUTH ONCE DAILY 45 tablet 3     metoprolol succinate (TOPROL-XL) 200 MG 24 hr tablet Take 1 tablet by mouth daily  Generic: Metoprolol Succinate ER 30 tablet 11     miconazole (ANTIFUNGAL CREAM) 2 % cream APPLY EXTERNALLY TO THE AFFECTED AREA TWICE DAILY 56 g 0     miscellaneous medical supply Misc Use with nebulizer 1 each 1     montelukast (SINGULAIR) 10 mg tablet TAKE 1 TABLET BY MOUTH EVERY NIGHT AT BEDTIME 90 tablet 2     NEEDLES, INSULIN DISPOSABLE (NOVOFINE 30 MISC) Use As Directed 5 (five) times a day. X 8mm       NOVOFINE AUTOCOVER 30 gauge x 1/3\" Ndle INJECT " UNDER THE SKIN 5 TIMES DAILY AS DIRECTED 400 each 2     NOVOLOG 100 unit/mL injection Check blood sugar three (3) times daily.  11.9 Type 2 without complications 10 mL PRN     nystatin (MYCOSTATIN) ointment Apply topically 2 (two) times a day. 30 g 3     oxyCODONE-acetaminophen (PERCOCET) 5-325 mg per tablet TAke 1 tab po q 8 hours prn severe pain 30 tablet 0     permethrin (NIX) 1 % liquid Wash hair first - then apply to scalp. May repeat in 1 week prn 1 Bottle 0     potassium chloride (KLOR-CON) 10 MEQ CR tablet Take 1 tablet by mouth daily  Generic: Potassium Chloride 30 tablet 11     senna-docusate (PERICOLACE) 8.6-50 mg tablet Take 1 tablet by mouth 2 (two) times a day. 60 tablet 0     spironolactone (ALDACTONE) 25 MG tablet TAKE 1 TABLET(25 MG) BY MOUTH DAILY 30 tablet 6     timolol maleate (TIMOPTIC) 0.5 % ophthalmic solution Administer 1 drop to both eyes 2 (two) times a day. 10 mL 0     traMADol (ULTRAM) 50 mg tablet TAKE 1 TABLET BY MOUTH TWICE DAILY AS NEEDED FOR SEVERE PAIN 60 tablet 0     triamcinolone (NASACORT) 55 mcg nasal inhaler 2 sprays in each nostril q day. Wait for 1 week for effectiveness 1 Inhaler 12     VENTOLIN HFA 90 mcg/actuation inhaler Inhale 1 to 2 puffs by mouth every 4-6 hours as needed. Generic: Albuterol Sulfate HFA 1 Inhaler 4     wheelchair [employer] mobility device  Face-to-face exam  2016  impaired mobility  Length of need : 99 1 each 0     ZEASORB, MICONAZOLE, 2 % powder APPLY TWICE DAILY AFTER DRYING AREA OF REDNESS 71 g 0     No current facility-administered medications for this visit.        .  Past Surgical History:   Procedure Laterality Date     EYE SURGERY       MO  DELIVERY ONLY      Description:  Section;  Recorded: 10/20/2011;     MO LIGATE FALLOPIAN TUBE      Description: Tubal Ligation;  Recorded: 10/20/2011;     MO REMOVAL GALLBLADDER      Description: Cholecystectomy;  Recorded: 10/20/2011;       .  Social History     Social History  "    Marital status: Single     Spouse name: N/A     Number of children: N/A     Years of education: N/A     Occupational History     Not on file.     Social History Main Topics     Smoking status: Former Smoker     Quit date: 2/8/2014     Smokeless tobacco: Never Used     Alcohol use No     Drug use: No     Sexual activity: Not on file     Other Topics Concern     Not on file     Social History Narrative    Reports on disability. She is not working and lives with her children and friend.  She reports she turns to her doctor in times of crisis.  She reports she needs assistance with ADLs and has a PCA 11 hours per day, 7 days per week.  Does not have a home nurse.         .  Family History   Problem Relation Age of Onset     Hypertension Mother      COPD Mother      Diabetes Mother      Hypertension Father      COPD Father      Diabetes Father      Heart attack Maternal Grandmother      Hypertension Maternal Grandmother      Alcohol abuse Sister      Drug abuse Sister      COPD Sister      Diabetes Sister      Hypertension Sister      Heart disease Brother      Hypertension Brother      Hypertension Daughter      Hypertension Son      Hypertension Maternal Grandfather      Heart attack Maternal Grandfather      Hypertension Paternal Grandmother      Heart attack Paternal Grandmother      Hypertension Paternal Grandfather      Review of Systems:  General: Weight Loss  Eyes: WNL  Ears/Nose/Throat: WNL  Lungs: Wheezing  Heart: Arm Pain, Shortness of Breath with activity, Leg Swelling  Stomach: WNL  Bladder: Frequent Urination at Night  Muscle/Joints: Joint Pain  Skin: WNL  Nervous System: WNL  Mental Health: Depression     Blood: Easy Bruising    Objective:     /70 (Patient Site: Left Arm, Patient Position: Sitting, Cuff Size: Adult Large)  Pulse 78  Resp 18  Ht 5' 4\" (1.626 m)  Wt (!) 287 lb (130.2 kg)  BMI 49.26 kg/m2  (!) 287 lb (130.2 kg)   [unfilled]    Physical Exam:    GENERAL APPEARANCE: alert, no " apparent distress  HEENT: no scleral icterus or xanthelasma  NECK: jugular venous pressure normal  CHEST: symmetric, the lungs were clear to auscultation  CARDIOVASCULAR: regular rhythm without murmur, click, or gallop; no carotid bruits  ABDOMEN: nondistended, nontender, bowel sounds present  EXTREMITIES: no cyanosis, clubbing with 3-4+ bilateral leg edema to the mid thigh    Cardiac Testing:    echocardiogram from June 8, 2017 results reviewed as above      Lab Results:    LIPIDS:  Lab Results   Component Value Date    CHOL 157 05/23/2018    CHOL 191 02/26/2016    CHOL 198 08/26/2015     Lab Results   Component Value Date    HDL 41 (L) 05/23/2018    HDL 50 02/26/2016    HDL 47 08/26/2015     Lab Results   Component Value Date    LDLCALC 98 05/23/2018    LDLCALC 127 02/26/2016    LDLCALC 131 (H) 08/26/2015     Lab Results   Component Value Date    TRIG 92 05/23/2018    TRIG 71 02/26/2016    TRIG 98 08/26/2015     No components found for: CHOLHDL    BMP:  Lab Results   Component Value Date    CREATININE 2.74 (H) 07/16/2018    BUN 78 (H) 07/16/2018     07/16/2018    K 5.0 07/16/2018    CL 98 07/16/2018    CO2 26 07/16/2018         Balwinder Pino MD,F.A.C.C.  [facility]  [phone]

## 2021-06-20 NOTE — LETTER
Letter by Collin Doran MD at      Author: Collin Doran MD Service: -- Author Type: --    Filed:  Encounter Date: 9/23/2020 Status: (Other)       My Heart Failure Action Plan   Name: Sun Mireles    YOB: 1955   Date: 9/23/2020    My doctor: Collin Doran MD     [facility]/OB     [address] [place] [id number]  [phone]  My Diagnosis: Preserved (HFp)- EF:Over 50%   My Exercise Goal: 30 minutes daily  .     My Weight Plan:   Wt Readings from Last 2 Encounters:   09/18/20 200 lb (90.7 kg)   09/03/20 208 lb (94.3 kg)     Weigh yourself daily using the same scale. If you gain more than 2 pounds in 24 hours or 5 pounds in a week call the clinic    My Diet Goal: 1,500 mg of sodium    Emergency Room Visits:    Our goal is to improve your quality of life and help you avoid a visit to the emergency room or hospital.  If we work together, we can achieve this goal. But, if you feel you need to call 911 or go to the emergency room, please do so.  If you go to the emergency room, please bring your list of medicines and your daily weight chart with you.       GREEN ZONE     Doing well today    Weight gained is no more than 2 pounds a day or 5 pounds a week.    No swelling in feet, ankles, legs or stomach.    No more swelling than usual.    No more trouble breathing than usual.    No change in my sleep.    No other problems. Actions:    I am doing fine.  I will take my medicine, follow my diet, see my doctor, exercise, and watch for symptoms.           YELLOW ZONE         Having a bad day or flare up    Weight gain of more than 2 pounds in one day or 5 pounds in one week.    New swelling in ankle, leg, knee or thigh.    Bloating in belly, pants feel tighter.    Swelling in hands or face.    Coughing or trouble breathing while walking or talking.    Harder to breathe last night.    Have trouble sleeping, wake up short of breath.    Much more  tired than usual.    Not eating.    Pain in my chest or bad leg cramps.    Feel weak or dizzy. Actions:    I need to take action and call my doctor or nurse today.                 RED ZONE         Need medical care now    Weight gain of 5 pounds overnight.    Chest pain or pressure that does not go away.    Feel less alert.    Wheezing or have trouble breathing when at rest.    Cannot sleep lying down.    Cannot take my water pill.    Pass out or faint. Actions:    I need to call my doctor or nurse now!    Call 911 if I have chest pain or cannot breathe.

## 2021-06-20 NOTE — PROGRESS NOTES
[facility] Consult Note    Date of Service:  9/14/2018    Requesting Provider: Dr. Collin Doran    Chief Complaint: BLE swelling    History of Present Illness: Sun Mireles is being seen at the Vascular Clinic today regarding BLE swelling. They arrive to the clinic today with don Law. The patient was previously seen and treated at the Vascular clinic for years seen by Dr. Dan she was lost to follow up; she has not been seen for 3 years. The patient reports that she has had issues with swelling since the 1990s. She is able to get the swelling down after several days of being in bed with the legs elevated; along with her diuretics. She stopped wearing her compression stockings 1.5 years ago. She has stopped smoking; quit 6 years ago; she smoked for 40 years 1ppd. She has diabetes but is inconsistent with checking her fs; her last A1C was 8.0%. She has KHUSHBOO and wear cpap with supplemental oxygen every night. She has been working on her weight and reports that she previously weighed 393 pounds; today she weighs 277; she did this with better diet choices and limited to 3 meals per day. She previously received her stockings from [employer]. She has attended lymphedema therapy in the past she did not like this. She also went to pool therapy and really liked this. Reports pain of 3/10; currently using apap for pain. Denies any fevers, chills, or generalized ill feeling. Denies history of cancer. Sleeps in a bed with legs elevated. Pt still has their uterus and ovaries, they deny any abnormal vaginal bleeding. Denies history of DVT, joint replacement, cellulitis and vein procedures. She has had 9 pregnancies; one miscarriage; 1 infant loss; and 7 healthy adult children. She also adopted 2 children, her niece and nephew who are now 15 and 17.       Review of Systems:   Constitutional:  negative  for fever, chills or night sweats; always feels cold  EENTM: negative for glasses;  negative  Ramona  GI:  negative for nausea/vomiting;  negative for constipation  negative diarrhea;  negative for fecal incontinence negative weight loss  :  negative dysuria, positive incontinence  MS: patient minimally ambulatory;  does use assistive devices; walker; scooter; arrives in w/c today, ckd stage 4  Cardiac:  positive chf  Respiratory:  positive shortness of breath; willa; uses cpap and supplemental oxygen at night  Endocrine:  positive diabetes  Psych:  negative depression/anxiety    Past Medical History:    Past Medical History:   Diagnosis Date     Acute arthritis 6/15/2014     Acute diastolic heart failure (H)     Created by Conversion      Acute kidney injury superimposed on CKD (H)     Created by Conversion      Acute on chronic respiratory failure with hypoxia and hypercapnia (H) 6/8/2017     Aneurysm Of The Right Vertebral Artery     Created by Conversion      Aneurysm of vertebral artery (H)     Right     Asthma      Back pain 6/15/2014     Benign Essential Hypertension     Created by Conversion      Bilateral edema of lower extremity 6/28/2016     Candidiasis, intertrigo 6/28/2016     CHF (congestive heart failure) (H)      Chronic diastolic CHF (congestive heart failure) (H)      Chronic kidney disease (CKD), stage IV (severe) (H)      Chronic pain 6/15/2014     COPD (chronic obstructive pulmonary disease) (H)      Cor Pulmonale     Created by Conversion  Replacement Utility updated for latest IMO load     Diabetes mellitus type 2 with complications (H)      Diabetic polyneuropathy associated with type 2 diabetes mellitus (H) 6/28/2016    Previously on gabapentin which helped, but stopped in the hospital due to potential drug interaction.  Lyrica made her sleepy.     Eczema 4/9/2018     Essential Hypercholesterolemia     Created by Conversion      GERD (gastroesophageal reflux disease)      Glaucoma      Glaucoma     Created by Conversion      Gout      Gout     Created by Conversion       Hypercholesteremia      Hypertension      Joint pain, localized in the right shoulder 2015     Lethargy 2014     Localized Osteoarthritis Of The Knee     Created by Conversion  Replacement Utility updated for latest IMO load     Long-term insulin use in type 2 diabetes (H)     Created by Conversion      Lower Back Pain     Created by Conversion      Microcytic anemia     Created by Conversion      Morbid obesity (H)      Morbid obesity with alveolar hypoventilation (H)     Created by Conversion      Neuromuscular disorder (H)      Obstructive sleep apnea      Obstructive Sleep Apnea     Created by Conversion      Osteoarthritis of knee      Postmenopausal bleeding     Created by Conversion      Pulmonary HTN 2017     Shortness of breath     Created by Conversion      Tobacco use 2015     Urinary incontinence      Urinary Incontinence     Created by Conversion         Surgical History:   Past Surgical History:   Procedure Laterality Date     EYE SURGERY       UT  DELIVERY ONLY      Description:  Section;  Recorded: 10/20/2011;     UT LIGATE FALLOPIAN TUBE      Description: Tubal Ligation;  Recorded: 10/20/2011;     UT REMOVAL GALLBLADDER      Description: Cholecystectomy;  Recorded: 10/20/2011;        Medications:    Current Outpatient Prescriptions:      ACCU-CHEK RACHEL CONTROL SOLN Soln, Use to check accuracy of meter.  Generic: Control Solution, Disp: 1 each, Rfl: 5     ACCU-CHEK RACHEL PLUS TEST STRP strips, Use to test blood sugar four times a day Generic: Blood Glucose Test, Disp: 100 strip, Rfl: 11     albuterol (PROVENTIL) 2.5 mg /3 mL (0.083 %) nebulizer solution, Use 1 vial via nebulizer four times a day as needed Generic: Albuterol Sulfate, Disp: 60 vial, Rfl: 3     allopurinol (ZYLOPRIM) 100 MG tablet, TK 1 T PO QD FOR GOUT PREVENTION, Disp: , Rfl: 11     amLODIPine (NORVASC) 10 MG tablet, Take one tablet by mouth every day.  Generic: AmLODIPine Besylate, Disp: 30 tablet,  Rfl: 11     aspirin 81 mg chewable tablet, Chew one tablet by mouth once daily  Generic: Aspirin, Disp: 36 tablet, Rfl: 10     atorvastatin (LIPITOR) 40 MG tablet, Take 1 tablet by mouth every night at bedtime, Disp: 30 tablet, Rfl: 7     BANOPHEN 25 mg capsule, TAKE 2 CAPSULES BY MOUTH DAILY AS NEEDED, Disp: 180 capsule, Rfl: 1     blood-glucose meter (ACCU-CHEK RACHEL PLUS METER) [facility] [place], USE AS DIRECTED, Disp: 1 each, Rfl: 1     clobetasol (TEMOVATE) 0.05 % cream, APPLY EXTERNALLY TO THE AFFECTED AREA TWICE DAILY, Disp: 60 g, Rfl: 0     cloNIDine HCl (CATAPRES) 0.2 MG tablet, TAKE 1 TABLET(0.2 MG) BY MOUTH TWICE DAILY, Disp: 180 tablet, Rfl: 2     DIAPER,BRIEF,ADULT, DISPOSABLE (DEPEND PANTS [facility] [place]), Use As Directed. Large, Disp: , Rfl:      EASY TOUCH LANCING DEVICE Misc, Use to test blood sugar twice a day.  Generic: Easy Touch Lancing Device, Disp: 1 each, Rfl: 5     EASY TOUCH TWIST LANCETS 28 gauge Misc, USE TO CHECK BLOOD SUGAR FOUR TIMES A DAY, Disp: 100 each, Rfl: 1     esomeprazole (NEXIUM) 40 MG capsule, TAKE 1 CAPSULE BY MOUTH TWICE DAILY, Disp: 180 capsule, Rfl: 2     fluticasone-salmeterol (ADVAIR DISKUS) 250-50 mcg/dose DISKUS, Use one puff twice daily, Disp: 3 each, Rfl: 3     furosemide (LASIX) 80 MG tablet, Take one tablet (80 mg total) by mouth daily. Generic: Furosemide (Patient taking differently: Take one tablet (80 mg total) by mouth daily. Generic: Furosemide; pt reports taking 40mg per day), Disp: 90 tablet, Rfl: 3     generic lancets (ACCU-CHEK SOFTCLIX LANCETS), CHECK 4 TIMES DAILY, Disp: 100 each, Rfl: 2     GLUCAGON 1 mg injection, Use as directed, Disp: 1 each, Rfl: 0     hydrALAZINE (APRESOLINE) 100 MG tablet, Take one tablet by mouth three times a day Generic: HydrALAZINE HCl, Disp: 90 tablet, Rfl: 3     insulin aspart U-100 (NOVOLOG FLEXPEN U-100 INSULIN) 100 unit/mL injection pen, Inject 15 Units under the skin 3 (three) times a day before meals., Disp: 45 mL, Rfl: 1     insulin detemir  "U-100 (LEVEMIR FLEXTOUCH U-100 INSULN) 100 unit/mL (3 mL) pen, Inject 32 Units under the skin daily., Disp: 1 Box, Rfl: 2     ketoconazole (NIZORAL) 2 % cream, Apply to affected area BID, Disp: 15 g, Rfl: 0     LANTUS 100 unit/mL injection, Inject 30 Units under the skin at bedtime. 11.9 Type 2 without complications Contact provider if insulin prescribed is not the preferred insulin per insurance., Disp: 10 mL, Rfl: PRN     LANTUS SOLOSTAR 100 unit/mL (3 mL) pen, , Disp: , Rfl:      LEVEMIR FLEXTOUCH 100 unit/mL (3 mL) pen, INJECT 35 UNITS TWICE DAILY, Disp: 15 mL, Rfl: 0     LEVEMIR FLEXTOUCH 100 unit/mL (3 mL) pen, Inject 35 units subcutaneously twice daily, Disp: 1 Box, Rfl: 5     magnesium oxide (MAG-OX) 400 mg tablet, TAKE 1/2 TABLET BY MOUTH ONCE DAILY, Disp: 45 tablet, Rfl: 3     metoprolol succinate (TOPROL-XL) 200 MG 24 hr tablet, Take 1 tablet by mouth daily  Generic: Metoprolol Succinate ER, Disp: 30 tablet, Rfl: 11     miconazole (ANTIFUNGAL CREAM) 2 % cream, APPLY EXTERNALLY TO THE AFFECTED AREA TWICE DAILY, Disp: 56 g, Rfl: 0     miscellaneous medical supply Misc, Use with nebulizer, Disp: 1 each, Rfl: 1     montelukast (SINGULAIR) 10 mg tablet, TAKE 1 TABLET BY MOUTH EVERY NIGHT AT BEDTIME, Disp: 90 tablet, Rfl: 2     NEEDLES, INSULIN DISPOSABLE (NOVOFINE 30 MISC), Use As Directed 5 (five) times a day. X 8mm, Disp: , Rfl:      NOVOFINE AUTOCOVER 30 gauge x 1/3\" Ndle, INJECT UNDER THE SKIN 5 TIMES DAILY AS DIRECTED, Disp: 400 each, Rfl: 2     NOVOLOG 100 unit/mL injection, Check blood sugar three (3) times daily. 11.9 Type 2 without complications, Disp: 10 mL, Rfl: PRN     nystatin (MYCOSTATIN) ointment, Apply topically 2 (two) times a day., Disp: 30 g, Rfl: 3     oxyCODONE-acetaminophen (PERCOCET) 5-325 mg per tablet, TAke 1 tab po q 8 hours prn severe pain, Disp: 30 tablet, Rfl: 0     permethrin (NIX) 1 % liquid, Wash hair first - then apply to scalp. May repeat in 1 week prn, Disp: 1 Bottle, Rfl: " 0     potassium chloride (KLOR-CON) 10 MEQ CR tablet, Take 1 tablet by mouth daily  Generic: Potassium Chloride, Disp: 30 tablet, Rfl: 11     senna-docusate (PERICOLACE) 8.6-50 mg tablet, Take 1 tablet by mouth 2 (two) times a day., Disp: 60 tablet, Rfl: 0     spironolactone (ALDACTONE) 25 MG tablet, TAKE 1 TABLET(25 MG) BY MOUTH DAILY, Disp: 30 tablet, Rfl: 6     timolol maleate (TIMOPTIC) 0.5 % ophthalmic solution, Administer 1 drop to both eyes 2 (two) times a day., Disp: 10 mL, Rfl: 0     traMADol (ULTRAM) 50 mg tablet, TAKE 1 TABLET BY MOUTH TWICE DAILY AS NEEDED FOR SEVERE PAIN, Disp: 60 tablet, Rfl: 0     VENTOLIN HFA 90 mcg/actuation inhaler, Inhale 1 to 2 puffs by mouth every 4-6 hours as needed. Generic: Albuterol Sulfate HFA, Disp: 1 Inhaler, Rfl: 4     wheelchair Lindsey, Power mobility device Face-to-face exam  October 6, 2016 impaired mobility Length of need : 99, Disp: 1 each, Rfl: 0     ZEASORB, MICONAZOLE, 2 % powder, APPLY TWICE DAILY AFTER DRYING AREA OF REDNESS, Disp: 71 g, Rfl: 0    Allergies: No Known Allergies    Family history:   Family History   Problem Relation Age of Onset     Hypertension Mother      COPD Mother      Diabetes Mother      Hypertension Father      COPD Father      Diabetes Father      Heart attack Maternal Grandmother      Hypertension Maternal Grandmother      Alcohol abuse Sister      Drug abuse Sister      COPD Sister      Diabetes Sister      Hypertension Sister      Heart disease Brother      Hypertension Brother      Hypertension Daughter      Hypertension Son      Hypertension Maternal Grandfather      Heart attack Maternal Grandfather      Hypertension Paternal Grandmother      Heart attack Paternal Grandmother      Hypertension Paternal Grandfather         Social History:   Social History     Social History     Marital status: Single     Spouse name: N/A     Number of children: N/A     Years of education: N/A     Occupational History     Not on file.     Social  "History Main Topics     Smoking status: Former Smoker     Quit date: 2/8/2014     Smokeless tobacco: Never Used     Alcohol use No     Drug use: No     Sexual activity: Not on file     Other Topics Concern     Not on file     Social History Narrative    Reports on disability. She is not working and lives with her children and friend.  She reports she turns to her doctor in times of crisis.  She reports she needs assistance with ADLs and has a PCA 11 hours per day, 7 days per week.  Does not have a home nurse.          Physical Exam  Vitals: Blood pressure 148/68, pulse 64, temperature 98.8  F (37.1  C), temperature source Oral, resp. rate 18, height 5' 4.5\" (1.638 m), weight (!) 277 lb 14.4 oz (126.1 kg), not currently breastfeeding.  General: This is a 62 y.o. female who appears their reported age, not in acute distress  Head: normocephalic, Atraumatic; not wearing glasses; non-icteric sclera; no exudate; no hearing loss   Respiratory: Clear throughout all lung fields; unlabored breathing; no cough   Cardiac: Regular, Rate and Rhythm, no murmurs appreciated   Skin: Uniformly warm and dry  Psych: Alert and oriented x4; calm and cooperative throughout exam  Abdomen: Normal bowel sounds. Soft, symmetric, no guarding or rigidity, nontender with palpation.  No organomegaly or masses palpated. Exam limited due to body habitus  Extremities: BLE with trace edema; non-pitting; +stemmer's sign bilateral 2nd toes;  strength testing revealed 4/4 to BLEs. Skin intact; diminished hair growth below the knee    Sensation: Intact to pinprick and light touch throughout lower extremities bilaterally Using a monofiliment the patient was able to identify 7/7 sites on the right plantar foot and 7/7 sites on the left plantar foot.    Peripheral Vascular: normal dorsalis pedis, posterior tibial pulses to bilateral feet , using a handheld doppler these were strong; easily found and biphasic in nature.  Good capillary refill. No unusual " venous distention. Positive for hemosiderin deposition or hyperpigmentation and fibrosis or scarring  Negative for spider veins and telangiectasias      Circumferential volume measures:    Vasc Edema 9/14/2018   Right just above MTP 24.2   Right Ankle 30   Right Widest Calf 54.8   Right Thigh Up 10cm 76.8   Left - just above MTP 24.1   Left Ankle 30.4   Left Widest Calf 53.2   Left Thigh Up 10cm 78       Ulceration(s)/Wound(s):     Pressure Ulcer 06/08/17 Heel Right Stage I (Active)       Laboratory studies:   Lab Results   Component Value Date    SEDRATE 95 (H) 07/11/2014     Lab Results   Component Value Date    CREATININE 2.74 (H) 07/16/2018     Lab Results   Component Value Date    HGBA1C 8.0 (H) 05/23/2018     Lab Results   Component Value Date    BUN 78 (H) 07/16/2018     Lab Results   Component Value Date    ALBUMIN 3.1 (L) 07/16/2018     Vitamin D, Total (25-Hydroxy)   Date Value Ref Range Status   05/23/2018 14.3 (L) 30.0 - 80.0 ng/mL Final             Impression:   Dependent edema  Secondary lymphedema  Morbid obesity  Activity intolerance  Type 2 diabetes poorly controlled  Vitamin d deficiency            Assessment/Plan:  1. Excisional debridement of all the ulcer(s) was not recommended today as her skin was intact    2. Edema. We spoke at length regarding their swelling, potential causes, and treatment options. Answered all questions. Their swelling is likely multifactorial including but not limited to the following: their weight, dependency of the limbs for most hours of the day, reduced activity with reduced calf pump mechanism, congestive heart failure, kidney disease, venous hypertension, valvular incompetence and side effect of certain medications. Her swelling appears well controlled today; will not order lymphedema therapy; she did not like this; will refer her to pool therapy, will order new stockings for her; sent [facility] to be sized with josette; she would prefer to continue her swelling  care with Dr. Dan will have her seen in 1 year. The patient should continue to elevate their legs periodically throughout the day. Continue to take their diuretics per their PMD recommendations.       3. Treatment: lotion daily    4. Offloading: na    5. Nutrition: the patient has done a great job so far with her weight down 116 pounds; continue to work on her weight and diabetes    Patient to return to clinic in 1 year for re-evaluation. They were instructed to call the clinic sooner with any further questions or concerns. Answered all questions.    Lety Haider DNP, RN, CNP, [facility]  [phone]      This note was electronically signed by Lety Haider

## 2021-06-20 NOTE — LETTER
Letter by Collin Doran MD at      Author: Collin Doran MD Service: -- Author Type: --    Filed:  Encounter Date: 9/23/2020 Status: (Other)                    My Depression Action Plan  Name: Sun Mireles   Date of Birth 1955  Date: 9/23/2020    My Doctor: Collin Doran MD   My Clinic: [facility]  [address] [address]  [phone]          GREEN    ZONE   Good Control    What it looks like:     Things are going generally well. You have normal ups and downs. You may even feel depressed from time to time, but bad moods usually last less than a day.   What you need to do:  1. Continue to care for yourself (see self care plan)  2. Check your depression survival kit and update it as needed  3. Follow your physicians recommendations including any medication.  4. Do not stop taking medication unless you consult with your physician first.           YELLOW         ZONE Getting Worse    What it looks like:     Depression is starting to interfere with your life.     It may be hard to get out of bed; you may be starting to isolate yourself from others.    Symptoms of depression are starting to last most all day and this has happened for several days.     You may have suicidal thoughts but they are not constant.   What you need to do:     1. Call your care team. Your response to treatment will improve if you keep your care team informed of your progress. Yellow periods are signs an adjustment may need to be made.     2. Continue your self-care.  Just get dressed and ready for the day.  Don't give yourself time to talk yourself out of it.    3. Talk to someone in your support network.    4. Open up your depression Depression Self-Care Plan / Wellness kit.           RED    ZONE Medical Alert - Get Help    What it looks like:     Depression is seriously interfering with your life.     You may experience these or other symptoms: You cant get  out of bed most days, cant work or engage in other necessary activities, you have trouble taking care of basic hygiene, or basic responsibilities, thoughts of suicide or death that will not go away, self-injurious behavior.     What you need to do:  1. Call your care team and request a same-day appointment. If they are not available (weekends or after hours) call your local crisis line, emergency room or 911.            Self-Care Plan / Wellness Kit    Self-Care for Depression  Heres the deal. Your body and mind are really not as separate as most people think.  What you do and think affects how you feel and how you feel influences what you do and think. This means if you do things that people who feel good do, it will help you feel better.  Sometimes this is all it takes.  There is also a place for medication and therapy depending on how severe your depression is, so be sure to consult with your medical provider and/ or Behavioral Health Consultant if your symptoms are worsening or not improving.     In order to better manage my stress, I will:    Exercise  Get some form of exercise, every day. This will help reduce pain and release endorphins, the feel good chemicals in your brain. This is almost as good as taking antidepressants!  This is not the same as joining a gym and then never going! (they count on that by the way?) It can be as simple as just going for a walk or doing some gardening, anything that will get you moving.      Hygiene   Maintain good hygiene (get out of bed in the morning, make your bed, brush your teeth, take a shower, and get dressed like you were going to work, even if you are unemployed).  If your clothes don't fit try to get ones that do.    Diet  Strive to eat foods that are good for me, drink plenty of water, and avoid excessive sugar, caffeine, alcohol, and other mood-altering substances.  Some foods that are helpful in depression are: complex carbohydrates, B vitamins, flaxseed, fish  or fish oil, fresh fruits and vegetables.    Psychotherapy  Agree to participate in Individual Therapy (if recommended).    Medication  If prescribed medications, I agree to take them.  Missing doses can result in serious side effects.  I understand that drinking alcohol, or other illicit drug use, may cause potential side effects.  I will not stop my medication abruptly without first discussing it with my provider.    Staying Connected With Others  Stay in touch with my friends, family members, and my primary care provider/team.    Use your imagination  Be creative.  We all have a creative side; it doesnt matter if its oil painting, sand castles, or mud pies! This will also kick up the endorphins.    Witness Beauty  (AKA stop and smell the roses) Take a look outside, even in mid-winter. Notice colors, textures. Watch the squirrels and birds.     Service to others  Be of service to others.  There is always someone else in need.  By helping others we can get out of ourselves and remember the really important things.  This also provides opportunities for practicing all the other parts of the program.    Humor  Laugh and be silly!  Adjust your TV habits for less news and crime-drama and more comedy.    Control your stress  Try breathing deep, massage therapy, biofeedback, and meditation. Find time to relax each day.     Crisis Text Line  http://www.crisistextline.org    The Crisis Text Line serves anyone, in any type of crisis, providing access to free, 24/7 support and information via the medium people already use and trust:    Here's how it works:  1.  Text 613-558 from anywhere in the [place], anytime, about any type of crisis.  2.  A live, trained Crisis Counselor receives the text and responds quickly.  3.  The volunteer Crisis Counselor will help you move from a 'hot moment to a cool moment'.  My support system    Clinic Contact:  Phone number:    Contact 1:  Phone number:    Contact 2:  Phone number:     [place]:  Phone number:    Therapist:  Phone number:    [facility] crisis center:    Phone number:    Other community support:  Phone number:

## 2021-06-20 NOTE — LETTER
Letter by Collin Doran MD at      Author: Collin Doran MD Service: -- Author Type: --    Filed:  Encounter Date: 9/23/2020 Status: (Other)       My Asthma Action Plan     Name: Sun Mireles   YOB: 1955  Date: 9/23/2020   My doctor: Collin Doran MD   My clinic: [facility]/OB        My Rescue Medicine:   Albuterol (Proair/Ventolin/Proventil HFA) 2-4 puffs EVERY 4 HOURS as needed. Use a spacer if recommended by your provider.   My Asthma Severity:   Moderate Persistent  Know your asthma triggers: upper respiratory infections             GREEN ZONE   Good Control    I feel good    No cough or wheeze    Can work, sleep and play without asthma symptoms     Take your asthma control medicine every day.     1. If exercise triggers your asthma, take your rescue medication    15 minutes before exercise or sports, and    During exercise if you have asthma symptoms  2. Spacer to use with inhaler: If you have a spacer, make sure to use it with your inhaler             YELLOW ZONE Getting Worse  I have ANY of these:    I do not feel good    Cough or wheeze    Chest feels tight    Wake up at night 1. Keep taking your Green Zone medications  2. Start taking your rescue medicine:    every 20 minutes for up to 1 hour. Then every 4 hours for 24-48 hours.  3. If you stay in the Yellow Zone for more than 12-24 hours, contact your doctor.  4. If you do not return to the Green Zone in 12-24 hours or you get worse, start taking your oral steroid medicine if prescribed by your provider.           RED ZONE Medical Alert - Get Help  I have ANY of these:    I feel awful    Medicine is not helping    Breathing getting harder    Trouble walking or talking    Nose opens wide to breathe     1. Take your rescue medicine NOW  2. If your provider has prescribed an oral steroid medicine, start taking it NOW  3. Call your doctor NOW  4. If you are still in the Red Zone  after 20 minutes and you have not reached your doctor:    Take your rescue medicine again and    Call 911 or go to the emergency room right away    See your regular doctor within 2 weeks of an Emergency Room or Urgent Care visit for follow-up treatment.          Annual Reminders:  Meet with Asthma Educator,  Flu Shot in the Fall, consider Pneumonia Vaccination for patients with asthma (aged 19 and older).    Pharmacy:   [id number] - [place], [place] - [address] [place] [address] AT [facility] & [place]  [address] [place] [address]  Phone: [phone] Fax: [phone]    [facility] [place] - [address]  [address]  [address]  [facility] [address]  Phone: [phone] Fax: [phone]      Electronically signed by Collin Doran MD   Date: 09/23/20                      Asthma Triggers  How To Control Things That Make Your Asthma Worse    Triggers are things that make your asthma worse.  Look at the list below to help you find your triggers and what you can do about them.  You can help prevent asthma flare-ups by staying away from your triggers.      Trigger                                                          What you can do   Cigarette Smoke  Tobacco smoke can make asthma worse. Do not allow smoking in your home, car or around you.  Be sure no one smokes at a vivienne day care or school.  If you smoke, ask your health care provider for ways to help you quit.  Ask family members to quit too.  Ask your health care provider for a referral to Quit Plan to help you quit smoking, or call 8-954-614-PLAN.     Colds, Flu, Bronchitis  These are common triggers of asthma. Wash your hands often.  Dont touch your eyes, nose or mouth.  Get a flu shot every year.     Dust Mites  These are tiny bugs that live in cloth or carpet. They are too small to see. Wash sheets and blankets in hot water every week.   Encase pillows and mattress in dust mite proof covers.  Avoid having carpet if you can. If  you have carpet, vacuum weekly.   Use a dust mask and HEPA vacuum.   Pollen and Outdoor Mold  Some people are allergic to trees, grass, or weed pollen, or molds. Try to keep your windows closed.  Limit time out doors when pollen count is high.   Ask you health care provider about taking medicine during allergy season.     Animal Dander  Some people are allergic to skin flakes, urine or saliva from pets with fur or feathers. Keep pets with fur or feathers out of your home.    If you cant keep the pet outdoors, then keep the pet out of your bedroom.  Keep the bedroom door closed.  Keep pets off cloth furniture and away from stuffed toys.     Mice, Rats, and Cockroaches  Some people are allergic to the waste from these pests.   Cover food and garbage.  Clean up spills and food crumbs.  Store grease in the refrigerator.   Keep food out of the bedroom.   Indoor Mold  This can be a trigger if your home has high moisture. Fix leaking faucets, pipes, or other sources of water.   Clean moldy surfaces.  Dehumidify basement if it is damp and smelly.   Smoke, Strong Odors, and Sprays  These can reduce air quality. Stay away from strong odors and sprays, such as perfume, powder, hair spray, paints, smoke incense, paint, cleaning products, candles and new carpet.   Exercise or Sports  Some people with asthma have this trigger. Be active!  Ask your doctor about taking medicine before sports or exercise to prevent symptoms.    Warm up for 5-10 minutes before and after sports or exercise.     Other Triggers of Asthma  Cold air:  Cover your nose and mouth with a scarf.  Sometimes laughing or crying can be a trigger.  Some medicines and food can trigger asthma.

## 2021-06-21 NOTE — LETTER
Letter by Collin Doran MD at      Author: Collin Doran MD Service: -- Author Type: --    Filed:  Encounter Date: 1/13/2021 Status: (Other)         January 14, 2021     Patient: Sun Mireles   YOB: 1955   Date of Visit: 1/13/2021       To Whom It May Concern:     Sun Mireles was seen at the clinic today, plan is to:  Make an appointment with a urologist for a cystoscopic exam.  Continue to use dimethicone cream daily as needed  Change diet to renal diet only.  Continue to monitor blood sugar.    If you have any questions or concerns, please don't hesitate to call.    Sincerely,        Electronically signed by Collin Doran MD

## 2021-06-21 NOTE — PROGRESS NOTES
"OFFICE VISIT - FAMILY MEDICINE     ASSESSMENT AND PLAN     1. Diabetic polyneuropathy associated with type 2 diabetes mellitus (H)  Renal Function Profile    Glycosylated Hemoglobin A1c    HM1(CBC and Differential)    BNP(B-type Natriuretic Peptide)    HM1 (CBC with Diff)    Hepatic Profile    Uric Acid   2. Chronic diastolic CHF (congestive heart failure) (H)  BNP(B-type Natriuretic Peptide)   3. Obstructive Sleep Apnea     4. Microcytic anemia  HM1(CBC and Differential)    HM1 (CBC with Diff)   5. Candida-induced panniculitis     6. Intertrigo  miconazole (ANTIFUNGAL CREAM, MICONAZOLE,) 2 % cream   7. Gout  traMADol (ULTRAM) 50 mg tablet    Hepatic Profile    Uric Acid   8. Bunion     Diabetes type 2 not adequately controlled with current management, will involve our in-house pharmacist to help adjust her insulin, check with insurance if they will allow coverage for tresiba or basaglar insulin  Has diabetes with peripheral neuropathies, face-to-face exam was done today, she is a candidate for diabetes shoes to minimize feet injuries, and calluses formation.  CHF, appears stable, continue to monitor her weight at home, adjust medication accordingly  Obstructive sleep apnea, controlled with CPAP device at home, she is in need of a new machine, order will be placed.  Kidney disease, continue to follow with nephrologist.  Degenerative joint disease of lumbar spine, knees and fingers area, continue symptomatic management, she is on chronic narcotics, risk for long-term use discussed with the patient with possible side effect.  Consider referral to pain clinic if worsening pain.    CHIEF COMPLAINT   Diabetes (F/U); Leg Swelling; Referral (TILLGES - FOOT EXAM); Concerns (ABD, BREAST, \"ODOR\"); Leg Pain; Foot Pain (\"NUMB\"); and Paperwork (CPAP)    HPI   Sun Mireles is a 62 y.o. female.  Medical history significant for diabetes type 2 with associated peripheral neuropathies, CKD stage III, not adequately controlled " "on insulin, congestive heart failure, obesity, obstructive sleep apnea mild persistent asthma chronic pain syndrome secondary to degenerative joint disease of the knees, spine on chronic narcotics.  She is following up on chronic medical issues, has diabetes type 2 with associated peripheral neuropathies, CKD stage III, blood sugar has been running too high 180s at home, A1c is coming back at 9.7 today, previously at 8%, currently doing Levemir 32 units daily, and NovoLog between 2-5 units with each meal.  She denies any worsening shortness of breath for excessive fluid retention.  She is on a diuretic and has been seen by the nephrologist in the past.  She does have degenerative joint disease of her lumbar spine, knees and hands are limiting her mobility is, she does use a wheelchair.  She also has peripheral neuropathies needing a specific diabetes shoes to minimize wound and ulcer.  She does use a CPAP machine at night and stating that she is in need of a new device  She has a yeast infection secondary to excessive skin friction in the groin, lower abdomen and below the breast area, symptom has been controlled with topical miconazole powder, asking for a refill.        Review of Systems As per HPI, otherwise negative.    OBJECTIVE   /60 (Patient Site: Left Arm)  Pulse 68  Temp 97.9  F (36.6  C) (Oral)   Resp 13  Ht 5' 4.5\" (1.638 m)  Wt (!) 291 lb (132 kg)  SpO2 94%  BMI 49.18 kg/m2  Physical Exam   Constitutional: She is oriented to person, place, and time. She appears well-developed and well-nourished.   HENT:   Head: Normocephalic and atraumatic.   Neck: Normal range of motion. Neck supple. No JVD present. No tracheal deviation present. No thyromegaly present.   Cardiovascular: Normal rate, regular rhythm, normal heart sounds and intact distal pulses.  Exam reveals no gallop and no friction rub.    No murmur heard.  Pulmonary/Chest: Effort normal and breath sounds normal. No respiratory distress. " She has no wheezes. She has no rales.   Musculoskeletal: She exhibits no edema or tenderness.   Lymphadenopathy:     She has no cervical adenopathy.   Neurological: She is alert and oriented to person, place, and time. Coordination normal.   Loss of sensation on monofilament testing in both plantar feet area, with bunion and calluses present in both plantar feet.   Psychiatric: She has a normal mood and affect. Judgment and thought content normal.       PFSH     Family History   Problem Relation Age of Onset     Hypertension Mother      COPD Mother      Diabetes Mother      Hypertension Father      COPD Father      Diabetes Father      Heart attack Maternal Grandmother      Hypertension Maternal Grandmother      Alcohol abuse Sister      Drug abuse Sister      COPD Sister      Diabetes Sister      Hypertension Sister      Heart disease Brother      Hypertension Brother      Hypertension Daughter      Hypertension Son      Hypertension Maternal Grandfather      Heart attack Maternal Grandfather      Hypertension Paternal Grandmother      Heart attack Paternal Grandmother      Hypertension Paternal Grandfather      Social History     Social History     Marital status: Single     Spouse name: N/A     Number of children: N/A     Years of education: N/A     Occupational History     Not on file.     Social History Main Topics     Smoking status: Former Smoker     Quit date: 2/8/2014     Smokeless tobacco: Never Used     Alcohol use No     Drug use: No     Sexual activity: Not on file     Other Topics Concern     Not on file     Social History Narrative    Reports on disability. She is not working and lives with her children and friend.  She reports she turns to her doctor in times of crisis.  She reports she needs assistance with ADLs and has a PCA 11 hours per day, 7 days per week.  Does not have a home nurse.       Relevant history was reviewed with the patient today, unless noted in HPI, nothing is pertinent for this  visit.  [facility]     Patient Active Problem List    Diagnosis Date Noted     Eczema 04/09/2018     Chronic diastolic CHF (congestive heart failure) (H) 06/12/2017     Acute on chronic respiratory failure with hypoxia and hypercapnia (H) 06/08/2017     Pulmonary HTN (H) 06/08/2017     Bilateral edema of lower extremity 06/28/2016     Diabetic polyneuropathy associated with type 2 diabetes mellitus (H) 06/28/2016     Overview Note:     Previously on gabapentin which helped, but stopped in the hospital due to potential drug interaction.  Lyrica made her sleepy.       Candidiasis, intertrigo 06/28/2016     Tobacco use 07/30/2015     Joint pain, localized to the knee 01/08/2015     Joint pain, localized in the right shoulder 01/08/2015     Lethargy 07/08/2014     Chronic pain 06/15/2014     Gout      Overview Note:     Created by Conversion         Morbid obesity with alveolar hypoventilation (H)      Overview Note:     Created by Conversion         Cor Pulmonale      Overview Note:     Created by Conversion    Replacement Utility updated for latest IMO load       Asthma      Overview Note:     Created by Conversion         Urinary Incontinence      Overview Note:     Created by Conversion         Microcytic anemia      Overview Note:     Created by Conversion         Hypercholesteremia      Overview Note:     Created by Conversion         Obstructive Sleep Apnea      Overview Note:     Created by Conversion         Long-term insulin use in type 2 diabetes (H)      Overview Note:     Created by Conversion         Acute diastolic heart failure (H)      Overview Note:     Created by Conversion         Postmenopausal bleeding      Overview Note:     Created by Conversion         Benign Essential Hypertension      Overview Note:     Created by Conversion         Acute kidney injury superimposed on CKD (H)      Overview Note:     Created by Conversion         Localized Osteoarthritis Of The Knee      Overview Note:     Created by  Conversion    Replacement Utility updated for latest IMO load       Degenerative arthritis of lumbar spine      Overview Note:     Created by Conversion         Glaucoma      Overview Note:     Created by Conversion         Aneurysm Of The Right Vertebral Artery      Overview Note:     Created by Conversion         Past Surgical History:   Procedure Laterality Date     EYE SURGERY       AZ  DELIVERY ONLY      Description:  Section;  Recorded: 10/20/2011;     [place] LIGATE FALLOPIAN TUBE      Description: Tubal Ligation;  Recorded: 10/20/2011;     [place] REMOVAL GALLBLADDER      Description: Cholecystectomy;  Recorded: 10/20/2011;       RESULTS/CONSULTS (Lab/Rad)     Recent Results (from the past 168 hour(s))   Glycosylated Hemoglobin A1c   Result Value Ref Range    Hemoglobin A1c 9.7 (H) 3.5 - 6.0 %   HM1 (CBC with Diff)   Result Value Ref Range    WBC 10.0 4.0 - 11.0 thou/uL    RBC 4.45 3.80 - 5.40 mill/uL    Hemoglobin 11.3 (L) 12.0 - 16.0 g/dL    Hematocrit 35.5 35.0 - 47.0 %    MCV 80 80 - 100 fL    MCH 25.3 (L) 27.0 - 34.0 pg    MCHC 31.8 (L) 32.0 - 36.0 g/dL    RDW 17.1 (H) 11.0 - 14.5 %    Platelets 334 140 - 440 thou/uL    MPV 7.6 7.0 - 10.0 fL    Neutrophils % 75 (H) 50 - 70 %    Lymphocytes % 15 (L) 20 - 40 %    Monocytes % 7 2 - 10 %    Eosinophils % 2 0 - 6 %    Basophils % 1 0 - 2 %    Neutrophils Absolute 7.6 2.0 - 7.7 thou/uL    Lymphocytes Absolute 1.5 0.8 - 4.4 thou/uL    Monocytes Absolute 0.7 0.0 - 0.9 thou/uL    Eosinophils Absolute 0.2 0.0 - 0.4 thou/uL    Basophils Absolute 0.1 0.0 - 0.2 thou/uL     No results found.  MEDICATIONS     Current Outpatient Prescriptions on File Prior to Visit   Medication Sig Dispense Refill     ACCU-CHEK RACHEL CONTROL SOLN Soln Use to check accuracy of meter.  Generic: Control Solution 1 each 5     ACCU-CHEK RACHEL PLUS TEST STRP strips Use to test blood sugar four times a day Generic: Blood Glucose Test 100 strip 11     albuterol (PROVENTIL) 2.5 mg /3 mL  (0.083 %) nebulizer solution Use one vial in nebulizer four times a day as needed. Generic: Albuterol Sulfate 60 vial 5     allopurinol (ZYLOPRIM) 100 MG tablet TK 1 T PO QD FOR GOUT PREVENTION  11     amLODIPine (NORVASC) 10 MG tablet Take one tablet by mouth every day.  Generic: AmLODIPine Besylate 30 tablet 11     aspirin 81 mg chewable tablet Chew one tablet by mouth once daily  Generic: Aspirin 36 tablet 10     atorvastatin (LIPITOR) 40 MG tablet Take 1 tablet by mouth every night at bedtime 30 tablet 7     BANOPHEN 25 mg capsule TAKE 2 CAPSULES BY MOUTH DAILY AS NEEDED 180 capsule 1     blood-glucose meter (ACCU-CHEK RACHEL PLUS METER) Misc USE AS DIRECTED 1 each 1     cloNIDine HCl (CATAPRES) 0.2 MG tablet TAKE 1 TABLET(0.2 MG) BY MOUTH TWICE DAILY 180 tablet 2     DIAPER,BRIEF,ADULT, DISPOSABLE (DEPEND PANTS MISC) Use As Directed. Large       EASY TOUCH LANCING DEVICE Misc Use to test blood sugar twice a day.  Generic: Easy Touch Lancing Device 1 each 5     EASY TOUCH TWIST LANCETS 28 gauge Misc USE TO CHECK BLOOD SUGAR FOUR TIMES A  each 1     esomeprazole (NEXIUM) 40 MG capsule Take one capsule by mouth once daily. Magnesium Generic: Esomeprazole Magnesium 30 capsule 9     fluticasone-salmeterol (ADVAIR DISKUS) 250-50 mcg/dose DISKUS Use one puff twice daily 3 each 3     furosemide (LASIX) 80 MG tablet Take one tablet (80 mg total) by mouth daily. Generic: Furosemide (Patient taking differently: Take one tablet (80 mg total) by mouth daily. Generic: Furosemide; pt reports taking 40mg per day) 90 tablet 3     generic lancets (ACCU-CHEK SOFTCLIX LANCETS) CHECK 4 TIMES DAILY 100 each 2     GLUCAGON 1 mg injection Use as directed 1 each 0     hydrALAZINE (APRESOLINE) 100 MG tablet Take one tablet by mouth three times a day Generic: HydrALAZINE HCl 90 tablet 3     insulin aspart U-100 (NOVOLOG FLEXPEN U-100 INSULIN) 100 unit/mL injection pen Inject 15 Units under the skin 3 (three) times a day before  "meals. 45 mL 1     insulin detemir U-100 (LEVEMIR FLEXTOUCH U-100 INSULN) 100 unit/mL (3 mL) pen Inject 32 Units under the skin daily. 1 Box 2     ketoconazole (NIZORAL) 2 % cream Apply to affected area BID 15 g 0     LANTUS 100 unit/mL injection Inject 30 Units under the skin at bedtime. 11.9 Type 2 without complications  Contact provider if insulin prescribed is not the preferred insulin per insurance. 10 mL PRN     LEVEMIR FLEXTOUCH 100 unit/mL (3 mL) pen Inject 35 units subcutaneously twice daily 1 Box 5     magnesium oxide (MAG-OX) 400 mg tablet TAKE 1/2 TABLET BY MOUTH ONCE DAILY 45 tablet 3     metoprolol succinate (TOPROL-XL) 200 MG 24 hr tablet Take 1 tablet by mouth daily  Generic: Metoprolol Succinate ER 30 tablet 11     miscellaneous medical supply Misc Use with nebulizer 1 each 1     montelukast (SINGULAIR) 10 mg tablet TAKE 1 TABLET BY MOUTH EVERY NIGHT AT BEDTIME 90 tablet 2     NEEDLES, INSULIN DISPOSABLE (NOVOFINE 30 MISC) Use As Directed 5 (five) times a day. X 8mm       NOVOFINE AUTOCOVER 30 gauge x 1/3\" Ndle INJECT UNDER THE SKIN 5 TIMES DAILY AS DIRECTED 500 each 0     NOVOLOG 100 unit/mL injection Check blood sugar three (3) times daily.  11.9 Type 2 without complications 10 mL PRN     nystatin (MYCOSTATIN) ointment Apply topically 2 (two) times a day. 30 g 3     permethrin (NIX) 1 % liquid Wash hair first - then apply to scalp. May repeat in 1 week prn 1 Bottle 0     potassium chloride (KLOR-CON) 10 MEQ CR tablet Take 1 tablet by mouth daily  Generic: Potassium Chloride 30 tablet 11     senna-docusate (PERICOLACE) 8.6-50 mg tablet Take 1 tablet by mouth 2 (two) times a day. 60 tablet 0     spironolactone (ALDACTONE) 25 MG tablet TAKE 1 TABLET(25 MG) BY MOUTH DAILY 30 tablet 6     timolol maleate (TIMOPTIC) 0.5 % ophthalmic solution Administer 1 drop to both eyes 2 (two) times a day. 10 mL 0     VENTOLIN HFA 90 mcg/actuation inhaler Inhale 1 to 2 puffs by mouth every 4-6 hours as needed. " Generic: Albuterol Sulfate HFA 1 Inhaler 4     wheelchair [employer] mobility device  Face-to-face exam  October 6, 2016  impaired mobility  Length of need : 99 1 each 0     [DISCONTINUED] LANTUS SOLOSTAR 100 unit/mL (3 mL) pen        [DISCONTINUED] miconazole (ANTIFUNGAL CREAM) 2 % cream APPLY EXTERNALLY TO THE AFFECTED AREA TWICE DAILY 56 g 0     [DISCONTINUED] traMADol (ULTRAM) 50 mg tablet TAKE 1 TABLET BY MOUTH TWICE DAILY AS NEEDED FOR SEVERE PAIN 60 tablet 0     [DISCONTINUED] ZEASORB, MICONAZOLE, 2 % powder APPLY TWICE DAILY AFTER DRYING AREA OF REDNESS 71 g 0     clobetasol (TEMOVATE) 0.05 % cream APPLY EXTERNALLY TO THE AFFECTED AREA TWICE DAILY 60 g 0     oxyCODONE-acetaminophen (PERCOCET/ENDOCET) 5-325 mg per tablet TAke 1 tab po q 8 hours prn severe pain 60 tablet 0     [DISCONTINUED] LEVEMIR FLEXTOUCH 100 unit/mL (3 mL) pen INJECT 35 UNITS TWICE DAILY 15 mL 0     No current facility-administered medications on file prior to visit.        HEALTH MAINTENANCE / SCREENING   PHQ-2 Total Score: 0 (5/23/2018  2:03 PM), No Data Recorded,No Data Recorded  Immunization History   Administered Date(s) Administered     Pneumo Conj 13-V (2010&after) 02/16/2013     Pneumo Polysac 23-V 02/16/2013     Td, Adult, Absorbed 11/09/2005     Td,adult,historic,unspecified 11/09/2005     Health Maintenance   Topic     MAMMOGRAM      TDAP ADULT ONE TIME DOSE      ZOSTER VACCINE      TD 18+ HE      PAP SMEAR      INFLUENZA VACCINE RULE BASED (1)     DIABETES HEMOGLOBIN A1C      DIABETES OPHTHALMOLOGY EXAM      DIABETES FOLLOW-UP      DIABETES FOOT EXAM      DIABETES URINE MICROALBUMIN      ASTHMA CONTROL TEST      ASTHMA FOLLOW-UP      ADVANCE DIRECTIVES DISCUSSED WITH PATIENT        Collin Rafael Doran MD  Family Medicine, [facility]     This note was dictated using a voice recognition software.  Any grammatical or context distortion are unintentional and inherent to the software.

## 2021-06-21 NOTE — LETTER
Letter by Yenni Gomez RDCS at      Author: Yenni Gomez RDCS Service: -- Author Type: --    Filed:  Encounter Date: 12/2/2020 Status: (Other)         Sun Mireles  [address]  [place] [address]      December 2, 2020      Dear Ms. Mireles,    RE: Remote Results    We are writing to you regarding your recent Remote Pacemaker check from home. Your transmission was received successfully. Battery status is satisfactory at this time.     Your results are showing no significant changes.    Your next device appointment will be a clinic visit on January 22, 2021 at 2:50pm at our  [place] location, [address] [facility].    To schedule or reschedule, please call [phone] and press 1.    NOTE: If you would like to do an extra transmission, please call [phone] and press 3 to speak to a nurse BEFORE transmitting. This ensures that the Device Clinic staff is aware of the reason you are sending a transmission, and can follow-up with you after it has been reviewed.    We will be checking your implanted device from home (remotely) every three months unless otherwise instructed. We will need to see you in the clinic at least once a year. You may need to be seen in the clinic sooner depending on the results of your check.    Please be aware:    The follow-up schedule is like a Physician prescription.    Your remote monitor is paired to your specific implanted device.      Sincerely,    [facility]

## 2021-06-21 NOTE — CONFIDENTIAL NOTE
Left second message with nursing staff to please return call to clinic phone that they have received faxed order to complete Duo neb nebulization QID instead of daily. Left clinic phone number for the return call.

## 2021-06-22 NOTE — TELEPHONE ENCOUNTER
Medication: traMADol (ULTRAM) 50 mg tablet  Pharmacy: [facility]   Last OV: 10/23/18  Last Refill: 11/21/18  Q:60 Refills: 0    Please advise on refill.     TOMMY/AMBER

## 2021-06-22 NOTE — TELEPHONE ENCOUNTER
[facility] Transition of Care Encounter  Assessment & Plan                                                     Pneumonia/SOB: Improving. Patient to follow up with PCP and pulmonology. Can discuss switching inhalers with patient at follow up. Recommended that she discuss her tongue with Dr. Doran.     Pyuria: Improved.     Type 2 Diabetes:  control uncertain. A1C not at goal of <7% and does not reflect the blood sugars she tells me. Can consider switching to Tresiba or a GLP1 agonist, which would replace the Novolog. Will discuss at follow up.   Due for microalbuminuria, but likely elevated since not on ACE/ARB.    Future consideration = change Novolog to GLP1 agonist, A1c at [facility] follow up    Gout: Last uric acid was right at goal. Recommended rechecking UA at follow up and if >6, increase allopurinol to decrease risk of gout attacks.   PLAN:   1. Future uric acid level     Hypertension: BP well controlled. I do agree that she is on numerous BP medications and we may be able to simplify. Appears that she was on losartan in the past -- unclear why d/c'ed. Would want her to check with nephrology about restarting and if so, could d/c K supplement and/or hydralazine. Could also try switching clonidine to patch to lower pill burden. Will discuss at follow up.     Pain/Neuropathy: Stable. Will defer to Dr. Doran for tramadol refill. Appears that patient did not tolerate gabapentin in the past. Per chart review, she may have been on duloxetine in the past. Lyrica made her sleepy. Could trial CBD or naltrexone. Will discuss at follow up.     Chronic Diastolic CHF: Stable and last K WNL. Continue current regimen.     Hypomagnesemia: Last mag level was low. I looked at her MAR and she did receive mag in the hospital. Likely low due to furosemide. Recommend rechecking at follow up with Dr. Doran and if still low, then increase magnesium supplement.   PLAN:   1. Future Mag level     Vitamin D Deficiency: Last Vitamin D level was low  "and likely still low since patient admits to not taking a supplement. Will recheck at follow up and if low, restart Vitamin D 50,000 IU once weekly.   PLAN:   1. Vitamin D level at follow up     GERD: Ok to continue current therapy.     Follow Up  2/6/19    Subjective & Objective                                                       Sun Mireles is a 63 y.o. female called for a transitions of care visit. she was discharged from [facility] on 12/28/18 for pneumonia.    Chief Complaint: Needs a refill of tramadol. Ran out.     Medication Adherence/Access: Would like to be on less medications.     Pneumonia/SOB: Community acquired. Completed the levofloxacin. Breathing has been \"ok.\" When she first came home breathing was the same, but now using O2 at night. Was able to get new tubing. Using albuterol not now and continues Advair 250-50 mcg -- rinses mouth after. Uses both albuterol neb and HFA, but not every day. Thinks Advair is messing up her tongue which is sore. Not sure if white looking. Also taking montelukast 10 mg once daily.   Follows with [facility]    Pyuria: Completed the levofloxacin. Urinating more especially with the furosemide. No pain or blood in urine. Urine culture had no growth.     Type 2 Diabetes: Currently taking Levemir 35 units HS and Novolog sliding scale. Reports using about 2-3 units of Novolog.   Reports blood sugars: 110, fasting . Reports 90s in the morning.  Last A1c check = 9.7% on 10/23/18  Microalbumin checked 2015  Is taking atorvastatin 40 mg daily. Last lipids checked 5/23/18  Is taking aspirin 81 mg for primary prevention.     Gout: Currently taking allopurinol 100 mg daily. Last gout attack in the hospital she thinks. Leg hurting so bad.   Last uric acid level = 6 on 10/23/18    Hypertension: Currently taking amlodipine 10 mg daily, clonidine 0.2 mg two times a day, hydralazine 100 mg three times a day, metoprolol succinate 200 mg daily, and spironolactone " 25 mg daily. She wonders why she is on so many BP medications. Worried about metoprolol in the news and her kidneys     Pain/Neuropathy: Pain in legs and knees, lower back. Tramadol AM iss helpful. Was taking once daily and oxycodone PM. Not taking ibuprofen. Did not tolerate gabapentin even at low doses.     Chronic Diastolic CHF: Currently taking furosemide 80mg once daily and potassium 10 meq once daily.   Last K = 4.5 on 18    Hypomagnesemia: Currently taking magnesium oxide 200 mg once daily. Denies any cramps.   Last mag = 1.3 on 18    Vitamin D Deficiency: Reports she is not taking Vitamin D. Last Vitamin D level = 14.3 on 18    GERD: Currently taking esomeprazole 40 mg once daily. Reports if she skips it her symptoms will return.     PMH: reviewed in EPIC   Allergies/ADRs: reviewed in EPIC   Alcohol: reviewed in EPIC   Tobacco:   Social History     Tobacco Use   Smoking Status Former Smoker     Last attempt to quit: 2014     Years since quittin.9   Smokeless Tobacco Never Used     Recent Vitals:   BP Readings from Last 3 Encounters:   18 145/73   10/23/18 118/60   18 148/68      Wt Readings from Last 3 Encounters:   18 (!) 284 lb 8 oz (129 kg)   10/23/18 (!) 291 lb (132 kg)   18 (!) 277 lb 14.4 oz (126.1 kg)     ----------------    Much or all of the text in this note was generated through the use of Dragon Dictate voice-to-text software. Errors in spelling or words which seem out of context are unintentional. Sound alike errors, in particular, may have escaped editing.    The patient declined an after visit summary    I spent 30 minutes with this patient today;  . All changes were made via collaborative practice agreement with Collin Doran MD. A copy of the visit note was provided to the patient's provider.     Vira Bear, Pharm.D., BCACP  Medication Therapy Management Pharmacist  [facility]     Current Outpatient  Medications   Medication Sig Dispense Refill     ACCU-CHEK RACHEL CONTROL SOLN Soln Use to check accuracy of meter.  Generic: Control Solution 1 each 5     ACCU-CHEK RACHEL PLUS TEST STRP strips Use to test blood sugar four times a day Generic: Blood Glucose Test 100 strip 11     albuterol (PROVENTIL) 2.5 mg /3 mL (0.083 %) nebulizer solution Use one vial in nebulizer four times a day as needed. Generic: Albuterol Sulfate 60 vial 5     allopurinol (ZYLOPRIM) 100 MG tablet TAKE 1 TABLET BY MOUTH EVERY DAY FOR GOUT PREVENTION 31 tablet 3     amLODIPine (NORVASC) 10 MG tablet Take one tablet by mouth every day.  Generic: AmLODIPine Besylate 30 tablet 11     aspirin 81 mg chewable tablet Chew one tablet by mouth once daily  Generic: Aspirin 36 tablet 10     atorvastatin (LIPITOR) 40 MG tablet Take 1 tablet by mouth every night at bedtime Brand: Lipitor Generic: Atorvastatin 90 tablet 3     blood-glucose meter (ACCU-CHEK RACHEL PLUS METER) Misc USE AS DIRECTED 1 each 1     clobetasol (TEMOVATE) 0.05 % cream APPLY EXTERNALLY TO THE AFFECTED AREA TWICE DAILY 60 g 0     cloNIDine HCl (CATAPRES) 0.2 MG tablet TAKE 1 TABLET(0.2 MG) BY MOUTH TWICE DAILY 180 tablet 3     DIAPER,BRIEF,ADULT, DISPOSABLE (DEPEND PANTS MISC) Use As Directed. Large       diphenhydrAMINE (BENADRYL) 25 mg capsule Take 50 mg by mouth at bedtime as needed for allergies.       EASY TOUCH LANCING DEVICE Misc Use to test blood sugar twice a day.  Generic: Easy Touch Lancing Device 1 each 5     EASY TOUCH TWIST LANCETS 28 gauge Misc USE TO CHECK BLOOD SUGAR FOUR TIMES A  each 1     esomeprazole (NEXIUM) 40 MG capsule Take one capsule by mouth once daily. Magnesium Generic: Esomeprazole Magnesium 30 capsule 9     fluticasone-salmeterol (ADVAIR DISKUS) 250-50 mcg/dose DISKUS Use one puff twice daily. 3 each 3     furosemide (LASIX) 80 MG tablet Take 80 mg by mouth daily.       generic lancets (ACCU-CHEK SOFTCLIX LANCETS) CHECK 4 TIMES DAILY 100 each 2      "GLUCAGON 1 mg injection Use as directed 1 each 0     hydrALAZINE (APRESOLINE) 100 MG tablet Take one tablet by mouth three times a day Generic: HydrALAZINE HCl 90 tablet 3     insulin aspart U-100 (NOVOLOG) 100 unit/mL injection Inject under the skin 3 (three) times a day before meals. Correctional scale as directed       insulin detemir U-100 (LEVEMIR) 100 unit/mL injection Inject 35 Units under the skin at bedtime.       magnesium oxide (MAG-OX) 400 mg (241.3 mg magnesium) tablet Take 0.5 tablets (200 mg total) by mouth daily. 45 tablet 3     metoprolol succinate (TOPROL-XL) 200 MG 24 hr tablet Take 1 tablet by mouth daily  Generic: Metoprolol Succinate ER 30 tablet 11     miconazole (MICOTIN) 2 % cream Apply 1 application topically 2 (two) times a day as needed (yeast).       miconazole (MICOTIN) 2 % powder Apply 1 application topically as needed for itching.       miscellaneous medical supply Misc Use with nebulizer 1 each 1     montelukast (SINGULAIR) 10 mg tablet TAKE 1 TABLET BY MOUTH EVERY NIGHT AT BEDTIME 90 tablet 2     multivitamin with minerals (THERA-M) 9 mg iron-400 mcg Tab tablet Take 1 tablet by mouth daily.       NEEDLES, INSULIN DISPOSABLE (NOVOFINE 30 MISC) Use As Directed 5 (five) times a day. X 8mm       NOVOFINE AUTOCOVER 30 gauge x 1/3\" Ndle INJECT UNDER THE SKIN 5 TIMES DAILY AS DIRECTED 500 each 0     oxyCODONE-acetaminophen (PERCOCET/ENDOCET) 5-325 mg per tablet TAke 1 tab po q 8 hours prn severe pain. 60 tablet 0     potassium chloride (KLOR-CON) 10 MEQ CR tablet Take 1 tablet by mouth daily  Generic: Potassium Chloride 30 tablet 11     spironolactone (ALDACTONE) 25 MG tablet TAKE 1 TABLET(25 MG) BY MOUTH DAILY 90 tablet 3     timolol maleate (TIMOPTIC) 0.5 % ophthalmic solution Administer 1 drop to both eyes 2 (two) times a day. 10 mL 0     traMADol (ULTRAM) 50 mg tablet TAKE 1 TABLET BY MOUTH TWICE DAILY AS NEEDED FOR SEVERE PAIN. 60 tablet 0     VENTOLIN HFA 90 mcg/actuation inhaler " Inhale 1 to 2 puffs by mouth every 4-6 hours as needed. Generic: Albuterol Sulfate HFA 1 Inhaler 4     wheelchair [employer] mobility device  Face-to-face exam  October 6, 2016  impaired mobility  Length of need : 99 1 each 0     No current facility-administered medications for this visit.

## 2021-06-22 NOTE — TELEPHONE ENCOUNTER
Controlled Substance Refill Request  Medication Name:   Requested Prescriptions     Pending Prescriptions Disp Refills     traMADol (ULTRAM) 50 mg tablet 60 tablet 0     Sig: TAKE 1 TABLET BY MOUTH TWICE DAILY AS NEEDED FOR SEVERE PAIN     Date Last Fill: 11/21/2018    Pharmacy:  [facility] on  [facility]   Submit electronically to pharmacy  Controlled Substance Agreement Date Scanned:   Encounter-Level CSA Scan Date - 11/01/2017:    Scan on 11/7/2017 12:03 PM (below)         Last office visit with prescriber/PCP: 10/23/2018 Collin Doran MD OR same dept: 10/23/2018 Collin Doran MD OR same specialty: 10/23/2018 Collin Doran MD  Last physical: Visit date not found Last MTM visit: Visit date not found      Patient states she was just released from the hospital and is in quite a bit of pain

## 2021-06-23 NOTE — TELEPHONE ENCOUNTER
Refill Approved    Rx renewed per Medication Renewal Policy. Medication was last renewed on 8/12/18.    Hilaria Chang, [facility] Triage/Med Refill 1/31/2019     Requested Prescriptions   Pending Prescriptions Disp Refills     VENTOLIN HFA 90 mcg/actuation inhaler [Pharmacy Med Name: Ventolin HFA 90 MCG INH 18  (90 Base)  Inhaler] 1 Inhaler 5     Sig: Inhale 1 to 2 puffs by mouth every 4-6 hours as needed. Generic: Albuterol Sulfate HFA    Albuterol/Levalbuterol Refill Protocol Passed - 1/31/2019 10:15 AM       Passed - PCP or prescribing provider visit in last year    Last office visit with prescriber/PCP: 10/23/2018 Collin Doran MD OR same dept: 10/23/2018 Collin Doran MD OR same specialty: 10/23/2018 Collin Doran MD Last physical: Visit date not found       Next appt within 3 mo: Visit date not found  Next physical within 3 mo: Visit date not found  Prescriber OR PCP: Collin Doran MD  Last diagnosis associated with med order: 1. Shortness of breath  - VENTOLIN HFA 90 mcg/actuation inhaler [Pharmacy Med Name: Ventolin HFA 90 MCG INH 18  (90 Base)  Inhaler]; Inhale 1 to 2 puffs by mouth every 4-6 hours as needed. Generic: Albuterol Sulfate HFA  Dispense: 1 Inhaler; Refill: 5    If protocol passes may refill for 6 months if within 3 months of last provider visit (or a total of 9 months). If patient requesting >1 inhaler per month refill x 6 months and have patient make appointment with provider.

## 2021-06-23 NOTE — TELEPHONE ENCOUNTER
If current pain management is not helping then I will suggest she goes to the ER.  We will also place a referral to be seen at the pain clinic in the near future for long-term management of her chronic pain.

## 2021-06-23 NOTE — TELEPHONE ENCOUNTER
"Triage call:   Patient is unable to walk and having increased pain. Pain in the Bottom of her feet, ankles, right leg is worse and shoulders, hip. Patient is also getting a cold. No shortness of breath. Has been taking her allopurinol which doesn't seem to be helping. Denies redness on either foot and just indicates that she is having increased pain and unable to walk. Unable to tell triage RN if the legs are swollen.      Patient was unable to make it to her appointment today (triaged to be seen in the office). Pain medications are not helping. Advised patient to be seen in the ER since she was not well enough to make it to her clinic appointment. Patient is requesting PCP advice.     Daughters are with her currently and she states that they can take her to the ER if PCP advises.   *Ok to leave a detailed message upon call back  Pharmacy and allergies verified. ([place] [id number])    Nusrat Dias RN BA [facility] Triage/Med Refill 1/23/2019 2:04 PM    Reason for Disposition    SEVERE pain (e.g., excruciating, unable to do any normal activities)    Answer Assessment - Initial Assessment Questions  1. ONSET: \"When did the pain start?\"       Yesterday  2. LOCATION: \"Where is the pain located?\"       Both feet are hurting but right side is bothering her more  3. PAIN: \"How bad is the pain?\"    (Scale 1-10; or mild, moderate, severe)    -  MILD (1-3): doesn't interfere with normal activities     -  MODERATE (4-7): interferes with normal activities (e.g., work or school) or awakens from sleep, limping     -  SEVERE (8-10): excruciating pain, unable to do any normal activities, unable to walk      10/10   4. WORK OR EXERCISE: \"Has there been any recent work or exercise that involved this part of the body?\"       Nothing out of the ordinary  5. CAUSE: \"What do you think is causing the foot pain?\"      Patient is questioning gout as she has never has problems with her feet before   6. OTHER SYMPTOMS: \"Do you have any " "other symptoms?\" (e.g., leg pain, rash, fever, numbness)      Leg pain, no additional symptoms  7. PREGNANCY: \"Is there any chance you are pregnant?\" \"When was your last menstrual period?\"      no    Protocols used: FOOT PAIN-A-[place]      "

## 2021-06-23 NOTE — TELEPHONE ENCOUNTER
Patient Returning Call  Reason for call:  Medication update  Information relayed to patient:  The writer read the following to patient per Dr Doran: If current pain management is not helping then I will suggest she goes to the ER.  We will also place a referral to be seen at the pain clinic in the near future for long-term management of her chronic pain.  Patient has additional questions:  Yes  If YES, what are your questions/concerns:  She states she has been to pain clinic before.  She is going to schedule an appointment to discuss with PCP the information.  Transferred to scheduling.   Okay to leave a detailed message?: No call back needed

## 2021-06-23 NOTE — TELEPHONE ENCOUNTER
Med check appointment with you is set for 1/23.  I did not change the quantity of the percocet.  Please change as needed. Thanks!

## 2021-06-23 NOTE — TELEPHONE ENCOUNTER
Controlled Substance Refill Request  Medication Name:   Requested Prescriptions     Pending Prescriptions Disp Refills     traMADol (ULTRAM) 50 mg tablet 60 tablet 0     Sig: TAKE 1 TABLET BY MOUTH TWICE DAILY AS NEEDED FOR SEVERE PAIN     Date Last Fill: 1/3/2019  Pharmacy: [facility].      Submit electronically to pharmacy  Controlled Substance Agreement Date Scanned:   Encounter-Level CSA Scan Date - 11/01/2017:    Scan on 11/7/2017 12:03 PM (below)         Last office visit with prescriber/PCP: 10/23/2018 Collin Doran MD OR same dept: 10/23/2018 Collin Doran MD OR same specialty: 10/23/2018 Collin Doran MD  Last physical: Visit date not found Last MTM visit: Visit date not found

## 2021-06-23 NOTE — TELEPHONE ENCOUNTER
Left message to call back for: pt.  Information to relay to patient:  Please see message from Dr. Doran, please relay to patient when she does call back.

## 2021-06-24 NOTE — PROGRESS NOTES
OFFICE VISIT - FAMILY MEDICINE     ASSESSMENT AND PLAN     1. Hip pain, left  Heating pad   2. Gout  traMADol (ULTRAM) 50 mg tablet   3. Diabetic polyneuropathy associated with type 2 diabetes mellitus (H)  Comprehensive Metabolic Panel    Comprehensive Metabolic Panel   4. Aneurysm of artery of neck (H)     5. Chronic diastolic CHF (congestive heart failure) (H)  BNP(B-type Natriuretic Peptide)    BNP(B-type Natriuretic Peptide)   6. Morbid obesity with BMI of 45.0-49.9, adult (H)     7. CKD stage 4 due to type 2 diabetes mellitus (H)     8. Acute on chronic respiratory failure with hypoxia and hypercapnia (H)     9. Type 2 diabetes mellitus with complication, with long-term current use of insulin (H)  Glycosylated Hemoglobin A1c   10. Vitamin D deficiency  Vitamin D, Total (25-Hydroxy)   11. Gout, unspecified cause, unspecified chronicity, unspecified site  Uric Acid   12. Acute diastolic heart failure (H)  Magnesium   Fall with left hip pain, mostly musculoskeletal injury, small hematoma in the left proximal leg area we discussed about management with elevation, heating pad, Tylenol as needed.  CHF, appear euvolemic today, we monitored therapy by checking her electrolytes and encouraged her to continue to check her awake at home.  CKD secondary to diabetes type 2, has been seen by nephrologist in the past and she will continue to follow with them.  A1c is trending down today, continue current management, hypertension appears stable on current medications.    CHIEF COMPLAINT   Fall (2/9/19 on ice); Leg Pain (bilateral; bump on left); and Hip Pain (bilateral)    HPI   Sun Mireles is a 63 y.o. female.  Medical history significant for diabetes type 2 with associated peripheral neuropathies, CKD stage III, not adequately controlled on insulin, congestive heart failure, obesity, obstructive sleep apnea mild persistent asthma chronic pain syndrome secondary to degenerative joint disease of the knees, spine on  chronic narcotics.   was trying to help her going out of state, she fell on her left side buttocks proximal leg area, the proximal left leg area seems to be more sensitive with mild throbbing pain  And swelling.  Elevation seems to partially help her symptoms.  She also has chronic pain syndrome, she is on oxycodone and tramadol as needed.  She is mostly wheelchair-bound because of her multiple site osteoarthritis, but she stating that she is able to stand up without significant limitation.  Diabetes type 2, she takes Levemir 35 units daily's, NovoLog 2 units daily with meal and she seems to be tolerating well, she denies any episode of hypoglycemia.  A1c is trending down today at 8.5%.  She has peripheral neuropathies, sometimes tingling and pain in the legs and feet area.  Has tried several different medication included Lyrica, did make her feel too sleepy, gabapentin made her have jerking movements.  Congestive heart failure has been stable, she is compliant with therapy shortness of breath or weight gain.      Review of Systems As per HPI, otherwise negative.    OBJECTIVE   There were no vitals taken for this visit.  Physical Exam   Constitutional: She is oriented to person, place, and time. She appears well-developed and well-nourished.   HENT:   Head: Normocephalic and atraumatic.   Neck: Normal range of motion. Neck supple. No JVD present. No tracheal deviation present. No thyromegaly present.   Cardiovascular: Normal rate, regular rhythm, normal heart sounds and intact distal pulses. Exam reveals no gallop and no friction rub.   No murmur heard.  Pulmonary/Chest: Effort normal and breath sounds normal. No respiratory distress. She has no wheezes. She has no rales.   Musculoskeletal: She exhibits no edema or tenderness.   Lymphadenopathy:     She has no cervical adenopathy.   Neurological: She is alert and oriented to person, place, and time. Coordination normal.   Skin:   2 x 3 cm hematoma left  proximal medial below  knee area.   Psychiatric: She has a normal mood and affect. Judgment and thought content normal.       PFSH     Family History   Problem Relation Age of Onset     Hypertension Mother      COPD Mother      Diabetes Mother      Hypertension Father      COPD Father      Diabetes Father      Heart attack Maternal Grandmother      Hypertension Maternal Grandmother      Alcohol abuse Sister      Drug abuse Sister      COPD Sister      Diabetes Sister      Hypertension Sister      Heart disease Brother      Hypertension Brother      Hypertension Daughter      Hypertension Son      Hypertension Maternal Grandfather      Heart attack Maternal Grandfather      Hypertension Paternal Grandmother      Heart attack Paternal Grandmother      Hypertension Paternal Grandfather      Social History     Socioeconomic History     Marital status: Single     Spouse name: Not on file     Number of children: Not on file     Years of education: Not on file     Highest education level: Not on file   Occupational History     Not on file   Social Needs     Financial resource strain: Not on file     Food insecurity:     Worry: Not on file     Inability: Not on file     Transportation needs:     Medical: Not on file     Non-medical: Not on file   Tobacco Use     Smoking status: Former Smoker     Last attempt to quit: 2014     Years since quittin.0     Smokeless tobacco: Never Used   Substance and Sexual Activity     Alcohol use: No     Drug use: No     Sexual activity: Not on file   Lifestyle     Physical activity:     Days per week: Not on file     Minutes per session: Not on file     Stress: Not on file   Relationships     Social connections:     Talks on phone: Not on file     Gets together: Not on file     Attends Sabianist service: Not on file     Active member of club or organization: Not on file     Attends meetings of clubs or organizations: Not on file     Relationship status: Not on file     Intimate  partner violence:     Fear of current or ex partner: Not on file     Emotionally abused: Not on file     Physically abused: Not on file     Forced sexual activity: Not on file   Other Topics Concern     Not on file   Social History Narrative    Reports on disability. She is not working and lives with her children and friend.  She reports she turns to her doctor in times of crisis.  She reports she needs assistance with ADLs and has a PCA 11 hours per day, 7 days per week.  Does not have a home nurse.       Relevant history was reviewed with the patient today, unless noted in HPI, nothing is pertinent for this visit.  [facility]     Patient Active Problem List    Diagnosis Date Noted     CKD stage 4 due to type 2 diabetes mellitus (H) 02/25/2019     Pyelonephritis 12/26/2018     Eczema 04/09/2018     Chronic diastolic CHF (congestive heart failure) (H) 06/12/2017     Acute on chronic respiratory failure with hypoxia and hypercapnia (H) 06/08/2017     Pulmonary HTN (H) 06/08/2017     Bilateral edema of lower extremity 06/28/2016     Diabetic polyneuropathy associated with type 2 diabetes mellitus (H) 06/28/2016     Overview Note:     Previously on gabapentin which helped, but stopped in the hospital due to potential drug interaction.  Lyrica made her sleepy.       Candidiasis, intertrigo 06/28/2016     Tobacco use 07/30/2015     Joint pain, localized to the knee 01/08/2015     Joint pain, localized in the right shoulder 01/08/2015     Lethargy 07/08/2014     Chronic pain 06/15/2014     Gout      Overview Note:     Created by Conversion         Morbid obesity with alveolar hypoventilation (H)      Overview Note:     Created by Conversion         Cor Pulmonale      Overview Note:     Created by Conversion    Replacement Utility updated for latest IMO load       Asthma      Overview Note:     Created by Conversion         Urinary Incontinence      Overview Note:     Created by Conversion         Microcytic anemia      Overview  Note:     Created by Conversion         Hypercholesteremia      Overview Note:     Created by Conversion         Obstructive Sleep Apnea      Overview Note:     Created by Conversion         Long-term insulin use in type 2 diabetes (H)      Overview Note:     Created by Conversion         Acute diastolic heart failure (H)      Overview Note:     Created by Conversion         Postmenopausal bleeding      Overview Note:     Created by Conversion         Benign Essential Hypertension      Overview Note:     Created by Conversion         Acute kidney injury superimposed on CKD (H)      Overview Note:     Created by Conversion         Localized Osteoarthritis Of The Knee      Overview Note:     Created by Conversion    Replacement Utility updated for latest IMO load       Degenerative arthritis of lumbar spine      Overview Note:     Created by Conversion         Glaucoma      Overview Note:     Created by Conversion         Aneurysm Of The Right Vertebral Artery      Overview Note:     Created by Conversion         Past Surgical History:   Procedure Laterality Date     EYE SURGERY       TX  DELIVERY ONLY      Description:  Section;  Recorded: 10/20/2011;     TX LIGATE FALLOPIAN TUBE      Description: Tubal Ligation;  Recorded: 10/20/2011;     TX REMOVAL GALLBLADDER      Description: Cholecystectomy;  Recorded: 10/20/2011;       RESULTS/CONSULTS (Lab/Rad)     Recent Results (from the past 168 hour(s))   Glycosylated Hemoglobin A1c   Result Value Ref Range    Hemoglobin A1c 8.5 (H) 3.5 - 6.0 %   Vitamin D, Total (25-Hydroxy)   Result Value Ref Range    Vitamin D, Total (25-Hydroxy) 29.9 (L) 30.0 - 80.0 ng/mL   Uric Acid   Result Value Ref Range    Uric Acid 7.5 2.0 - 7.5 mg/dL   Magnesium   Result Value Ref Range    Magnesium 1.7 (L) 1.8 - 2.6 mg/dL   Comprehensive Metabolic Panel   Result Value Ref Range    Sodium 138 136 - 145 mmol/L    Potassium 4.5 3.5 - 5.0 mmol/L    Chloride 103 98 - 107 mmol/L     CO2 26 22 - 31 mmol/L    Anion Gap, Calculation 9 5 - 18 mmol/L    Glucose 131 (H) 70 - 125 mg/dL    BUN 56 (H) 8 - 22 mg/dL    Creatinine 2.40 (H) 0.60 - 1.10 mg/dL    GFR MDRD Af Amer 25 (L) >60 mL/min/1.73m2    GFR MDRD Non Af Amer 20 (L) >60 mL/min/1.73m2    Bilirubin, Total 0.5 0.0 - 1.0 mg/dL    Calcium 9.5 8.5 - 10.5 mg/dL    Protein, Total 7.6 6.0 - 8.0 g/dL    Albumin 3.1 (L) 3.5 - 5.0 g/dL    Alkaline Phosphatase 97 45 - 120 U/L    AST 14 0 - 40 U/L    ALT <9 0 - 45 U/L   BNP(B-type Natriuretic Peptide)   Result Value Ref Range     (H) 0 - 106 pg/mL     No results found.  MEDICATIONS     Current Outpatient Medications on File Prior to Visit   Medication Sig Dispense Refill     ACCU-CHEK RACHEL PLUS TEST STRP strips Use to test blood sugar four times a day Generic: Blood Glucose Test 100 strip 11     albuterol (PROVENTIL) 2.5 mg /3 mL (0.083 %) nebulizer solution Use one vial in nebulizer four times a day as needed. Generic: Albuterol Sulfate 60 vial 5     allopurinol (ZYLOPRIM) 100 MG tablet TAKE 1 TABLET BY MOUTH EVERY DAY FOR GOUT PREVENTION 31 tablet 3     amLODIPine (NORVASC) 10 MG tablet Take one tablet by mouth every day.  Generic: AmLODIPine Besylate 30 tablet 11     aspirin 81 mg chewable tablet Chew one tablet by mouth once daily  Generic: Aspirin 36 tablet 10     atorvastatin (LIPITOR) 40 MG tablet Take 1 tablet by mouth every night at bedtime Brand: Lipitor Generic: Atorvastatin 90 tablet 3     clobetasol (TEMOVATE) 0.05 % cream APPLY EXTERNALLY TO THE AFFECTED AREA TWICE DAILY 60 g 0     cloNIDine HCl (CATAPRES) 0.2 MG tablet TAKE 1 TABLET(0.2 MG) BY MOUTH TWICE DAILY 180 tablet 3     DIAPER,BRIEF,ADULT, DISPOSABLE (DEPEND PANTS MISC) Use As Directed. Large       diphenhydrAMINE (BENADRYL) 25 mg capsule Take 50 mg by mouth at bedtime as needed for allergies.       EASY TOUCH TWIST LANCETS 28 gauge Misc USE TO CHECK BLOOD SUGAR FOUR TIMES A  each 1     esomeprazole (NEXIUM) 40 MG  "capsule Take one capsule by mouth once daily. Magnesium Generic: Esomeprazole Magnesium 30 capsule 9     fluticasone-salmeterol (ADVAIR DISKUS) 250-50 mcg/dose DISKUS Use one puff twice daily. 3 each 3     furosemide (LASIX) 80 MG tablet Take 80 mg by mouth daily.       generic lancets (ACCU-CHEK SOFTCLIX LANCETS) CHECK 4 TIMES DAILY 100 each 2     hydrALAZINE (APRESOLINE) 100 MG tablet Take one tablet by mouth three times a day Generic: HydrALAZINE HCl 90 tablet 3     insulin aspart U-100 (NOVOLOG) 100 unit/mL injection Inject under the skin 3 (three) times a day before meals. Correctional scale as directed       insulin detemir U-100 (LEVEMIR) 100 unit/mL injection Inject 35 Units under the skin at bedtime.       magnesium oxide (MAG-OX) 400 mg (241.3 mg magnesium) tablet Take 0.5 tablets (200 mg total) by mouth daily. 45 tablet 3     metoprolol succinate (TOPROL-XL) 200 MG 24 hr tablet Take 1 tablet by mouth daily  Generic: Metoprolol Succinate ER 30 tablet 11     miconazole (MICOTIN) 2 % cream Apply 1 application topically 2 (two) times a day as needed (yeast).       miconazole (MICOTIN) 2 % powder Apply 1 application topically as needed for itching.       montelukast (SINGULAIR) 10 mg tablet TAKE 1 TABLET BY MOUTH EVERY NIGHT AT BEDTIME 90 tablet 2     multivitamin with minerals (THERA-M) 9 mg iron-400 mcg Tab tablet Take 1 tablet by mouth daily.       NOVOFINE AUTOCOVER 30 gauge x 1/3\" Ndle INJECT UNDER THE SKIN 5 TIMES DAILY AS DIRECTED 500 each 0     oxyCODONE-acetaminophen (PERCOCET/ENDOCET) 5-325 mg per tablet TAke 1 tab po q 8 hours prn severe pain 60 tablet 0     potassium chloride (KLOR-CON) 10 MEQ CR tablet Take 1 tablet by mouth daily  Generic: Potassium Chloride 30 tablet 11     spironolactone (ALDACTONE) 25 MG tablet Take 1 tablet (25 mg total) by mouth daily. 90 tablet 3     timolol maleate (TIMOPTIC) 0.5 % ophthalmic solution ADMINISTER 1 DROP TO BOTH EYES TWICE DAILY 10 mL 0     VENTOLIN HFA 90 " mcg/actuation inhaler Inhale 1 to 2 puffs by mouth every 4-6 hours as needed. Generic: Albuterol Sulfate HFA 1 Inhaler 3     wheelchair [employer] mobility device  Face-to-face exam  October 6, 2016  impaired mobility  Length of need : 99 1 each 0     GLUCAGON 1 mg injection Use as directed 1 each 0     No current facility-administered medications on file prior to visit.        HEALTH MAINTENANCE / SCREENING   PHQ-2 Total Score: 0 (5/23/2018  2:03 PM)  , No Data Recorded,No Data Recorded  Immunization History   Administered Date(s) Administered     Pneumo Conj 13-V (2010&after) 02/16/2013     Pneumo Polysac 23-V 02/16/2013     Td, Adult, Absorbed 11/09/2005     Td,adult,historic,unspecified 11/09/2005     Health Maintenance   Topic     INFLUENZA VACCINE RULE BASED (1)     DXA SCAN      TD 18+ HE      PNEUMOCOCCAL POLYSACCHARIDE VACCINE AGE 65 AND OVER      TDAP ADULT ONE TIME DOSE      ZOSTER VACCINES (1 of 2)     MAMMOGRAM      DIABETES URINE MICROALBUMIN      DIABETES HEMOGLOBIN A1C      DIABETES FOLLOW-UP      ASTHMA CONTROL TEST      ASTHMA FOLLOW-UP      DIABETES FOOT EXAM      DIABETES OPHTHALMOLOGY EXAM      FALL RISK ASSESSMENT      ADVANCE DIRECTIVES DISCUSSED WITH PATIENT      PNEUMOCOCCAL CONJUGATE VACCINE FOR ADULTS (PCV13 OR PREVNAR)        Collin Doran MD  Family Medicine, [facility]     This note was dictated using a voice recognition software.  Any grammatical or context distortion are unintentional and inherent to the software.

## 2021-06-24 NOTE — TELEPHONE ENCOUNTER
Left message to call back for: Patient  Information to relay to patient:  Please let Sun know that Dr. Fair placed the order for the neb machine and she can come in to pick this up when she is able.

## 2021-06-24 NOTE — TELEPHONE ENCOUNTER
Refill Approved    Rx renewed per Medication Renewal Policy. Medication was last renewed on 2/25/19.    Shirin Calvo, [facility] Triage/Med Refill 3/1/2019     Requested Prescriptions   Pending Prescriptions Disp Refills     EASY TOUCH LANCING DEVICE Misc [Pharmacy Med Name: Lancing Device-Easy Touch   Each] 1 each 2     Sig: Use to test blood sugar twice a day.  Generic: Easy Touch Lancing Device    Diabetic Supplies Refill Protocol Passed - 3/1/2019 10:23 AM       Passed - Visit with PCP or prescribing provider visit in last 6 months    Last office visit with prescriber/PCP: 11/1/2017 Kelli Vanessa MD OR same dept: 2/25/2019 Collin Doran MD OR same specialty: 2/25/2019 Collin Doran MD  Last physical: Visit date not found Last MTM visit: Visit date not found   Next visit within 3 mo: Visit date not found  Next physical within 3 mo: Visit date not found  Prescriber OR PCP: Kelli Vanessa MD  Last diagnosis associated with med order: There are no diagnoses linked to this encounter.  If protocol passes may refill for 12 months if within 3 months of last provider visit (or a total of 15 months).            Passed - A1C in last 6 months    Hemoglobin A1c   Date Value Ref Range Status   02/25/2019 8.5 (H) 3.5 - 6.0 % Final

## 2021-06-24 NOTE — TELEPHONE ENCOUNTER
Pt woke up wheezing today and took an inhaler  Pt needs a new machine and the tubing and mask  Pt has an inhaler     1.) pt asking for a neb machine to be sent in, thanks    Pharmacy is [facility] on [address], [facility] RN Triage    Reason for Disposition    Asthma medicine (nebulizer or inhaler) is needed more frequently than every 4 hours to keep you comfortable    Protocols used: ASTHMA ATTACK-A-OH

## 2021-06-24 NOTE — TELEPHONE ENCOUNTER
Pt is calling in, message was relayed from this morning. Pt would prefer if the Nebulizer could be called in to her pharmacy for her.    Provider please advise    Dieudonne Charles RN [facility] Triage/Medication Refill

## 2021-06-24 NOTE — TELEPHONE ENCOUNTER
Left message to call back for: Regarding nebulizer machine and tubing.  Information to relay to patient:  Please relay to patient PCP is not in clinic today. Patient can get nebulize machine and tubing supply here in clinic. Otherwise the on call doctor will send in order to pharmacy. Please have pt clarify what she would like us to do. Thank you.

## 2021-06-24 NOTE — ED TRIAGE NOTES
Patient comes in for evaluation of hypoxia. Patient is on home oxygen at 2L intermittently. Her machine at home failed and started to smoke. Patient is 83% on room air upon arrival. Patient states she was feeling well prior to the machine breaking.

## 2021-06-24 NOTE — TELEPHONE ENCOUNTER
RN cannot approve Refill Request    RN can NOT refill this medication med is not covered by policy/route to provider. Last office visit: 10/23/2018 Collin Doran MD Last Physical: Visit date not found Last MTM visit: Visit date not found Last visit same specialty: 10/23/2018 Collin Doran MD.  Next visit within 3 mo: Visit date not found  Next physical within 3 mo: Visit date not found      Dorene Hartmann, [facility] Triage/Med Refill 2/14/2019    Requested Prescriptions   Pending Prescriptions Disp Refills     timolol maleate (TIMOPTIC) 0.5 % ophthalmic solution [Pharmacy Med Name: TIMOLOL MALEATE 0.5% OPHTH SOLN 5ML] 10 mL 0     Sig: ADMINISTER 1 DROP TO BOTH EYES TWICE DAILY    There is no refill protocol information for this order

## 2021-06-24 NOTE — TELEPHONE ENCOUNTER
Spoke with pt.  Pt is in the hospital right now.  Once pt is discharged, pt would like to pick the neb machine here in clinic.  Neb machine and forms placed at .

## 2021-06-24 NOTE — TELEPHONE ENCOUNTER
Controlled Substance Refill Request  Medication Name:   Requested Prescriptions     Pending Prescriptions Disp Refills     oxyCODONE-acetaminophen (PERCOCET/ENDOCET) 5-325 mg per tablet 60 tablet 0     Sig: TAke 1 tab po q 8 hours prn severe pain     Date Last Fill: 1/16/2019  Pharmacy: [facility] #[id number]      Submit electronically to pharmacy  Controlled Substance Agreement Date Scanned:   Encounter-Level CSA Scan Date - 11/01/2017:    Scan on 11/7/2017 12:03 PM (below)         Last office visit with prescriber/PCP: 10/23/2018 Collin Doran MD OR same dept: 10/23/2018 Collin Doran MD OR same specialty: 10/23/2018 Collin Doran MD  Last physical: Visit date not found Last MTM visit: Visit date not found      Patient has appt scheduled with Collin Doran MD for follow up.   Patient reports she fell and her hip, leg, and back are hurting more since the fall.

## 2021-06-24 NOTE — TELEPHONE ENCOUNTER
Refill Approved    Rx renewed per Medication Renewal Policy. Medication was last renewed on 10/31/18.    Shirin Calvo, [facility] Triage/Med Refill 2/28/2019     Tramadol filled 2/25/19    Requested Prescriptions   Pending Prescriptions Disp Refills     traMADol (ULTRAM) 50 mg tablet [Pharmacy Med Name: TRAMADOL 50MG TABLETS] 60 tablet 0     Sig: TAKE 1 TABLET BY MOUTH TWICE DAILY AS NEEDED FOR SEVERE PAIN    Controlled Substances Refill Protocol Failed - 2/28/2019 12:09 PM       Failed - Route all Controlled Substance Requests to Provider       Passed - Patient has controlled substance agreement in past 12 months    Encounter-Level CSA Scan Date - 11/01/2017:    Scan on 11/7/2017 12:03 PM (below)                Passed - Visit with PCP or prescribing provider visit in past 12 months     Last office visit with prescriber/PCP: 2/25/2019 Collin Doran MD OR same dept: 2/25/2019 Collin Doran MD OR same specialty: 2/25/2019 Collin Doran MD Last physical: Visit date not found Last MTM visit: Visit date not found    Next visit within 3 mo: Visit date not found  Next physical within 3 mo: Visit date not found  Prescriber OR PCP: Collin Doran MD  Last diagnosis associated with med order: 1. Gout  - traMADol (ULTRAM) 50 mg tablet [Pharmacy Med Name: TRAMADOL 50MG TABLETS]; TAKE 1 TABLET BY MOUTH TWICE DAILY AS NEEDED FOR SEVERE PAIN  Dispense: 60 tablet; Refill: 0               allopurinol (ZYLOPRIM) 100 MG tablet [Pharmacy Med Name: ALLOPURINOL 100MG TABLETS] 31 tablet 0     Sig: TAKE 1 TABLET BY MOUTH EVERY DAY FOR GOUT PREVENTION    Allopurinol/Febuxostat Refill Protocol  Passed - 2/28/2019 12:09 PM       Passed - LFT or AST or ALT in last 12 months    Albumin   Date Value Ref Range Status   02/25/2019 3.1 (L) 3.5 - 5.0 g/dL Final     Bilirubin, Total   Date Value Ref Range Status   02/25/2019 0.5 0.0 - 1.0 mg/dL Final     Bilirubin, Direct   Date Value Ref Range  Status   10/23/2018 0.1 <=0.5 mg/dL Final     Alkaline Phosphatase   Date Value Ref Range Status   02/25/2019 97 45 - 120 U/L Final     AST   Date Value Ref Range Status   02/25/2019 14 0 - 40 U/L Final     ALT   Date Value Ref Range Status   02/25/2019 <9 0 - 45 U/L Final     Protein, Total   Date Value Ref Range Status   02/25/2019 7.6 6.0 - 8.0 g/dL Final               Passed - Visit with PCP or prescribing provider visit in past 12 months    Last office visit with prescriber/PCP: 2/25/2019 Collin Doran MD OR same dept: 2/25/2019 Collin Doran MD OR same specialty: 2/25/2019 Collin Doran MD  Last physical: Visit date not found Last MTM visit: Visit date not found   Next visit within 3 mo: Visit date not found  Next physical within 3 mo: Visit date not found  Prescriber OR PCP: Collin Doran MD  Last diagnosis associated with med order: 1. Gout  - traMADol (ULTRAM) 50 mg tablet [Pharmacy Med Name: TRAMADOL 50MG TABLETS]; TAKE 1 TABLET BY MOUTH TWICE DAILY AS NEEDED FOR SEVERE PAIN  Dispense: 60 tablet; Refill: 0    If protocol passes may refill for 12 months if within 3 months of last provider visit (or a total of 15 months).             clobetasol (TEMOVATE) 0.05 % cream [Pharmacy Med Name: CLOBETASOL PROP 0.05% CREAM 60GM] 60 g 0     Sig: APPLY TO THE AFFECTED AREA TWICE DAILY    There is no refill protocol information for this order

## 2021-06-24 NOTE — TELEPHONE ENCOUNTER
RN cannot approve Refill Request    RN can NOT refill this medication Protocol failed and NO refill given.         Shirin Calvo, [facility] Triage/Med Refill 3/1/2019    Requested Prescriptions   Pending Prescriptions Disp Refills     NOVOLOG FLEXPEN U-100 INSULIN 100 unit/mL injection pen [Pharmacy Med Name: NovoLOG FlexPen Sub-q 100u/ml (#) 100  Box]  11     Sig: Inject 15 units under the skin 3 times a day before meals. Generic: Insulin Aspart    Insulin/GLP-1 Refill Protocol Failed - 3/1/2019 10:19 AM       Failed - Microalbumin in last year    Microalbumin, Random Urine   Date Value Ref Range Status   08/26/2015 15.64 (H) 0.00 - 1.99 mg/dL Final                 Passed - Visit with PCP or prescribing provider visit in last 6 months    Last office visit with prescriber/PCP: 2/25/2019 OR same dept: 2/25/2019 Collin Doran MD OR same specialty: 2/25/2019 Collin Doran MD Last physical: Visit date not found Last MTM visit: Visit date not found     Next appt within 3 mo: Visit date not found  Next physical within 3 mo: Visit date not found  Prescriber OR PCP: Collin Doran MD  Last diagnosis associated with med order: 1. Type II diabetes mellitus (H)  - NOVOLOG FLEXPEN U-100 INSULIN 100 unit/mL injection pen [Pharmacy Med Name: NovoLOG FlexPen Sub-q 100u/ml (#) 100  Box]; Inject 15 units under the skin 3 times a day before meals. Generic: Insulin Aspart; Refill: 11    If protocol passes may refill for 6 months if within 3 months of last provider visit (or a total of 9 months).             Passed - A1C in last 6 months    Hemoglobin A1c   Date Value Ref Range Status   02/25/2019 8.5 (H) 3.5 - 6.0 % Final              Passed - Blood pressure in last year    BP Readings from Last 1 Encounters:   12/28/18 145/73            Passed - Creatinine done in last year    Creatinine   Date Value Ref Range Status   02/25/2019 2.40 (H) 0.60 - 1.10 mg/dL Final

## 2021-06-24 NOTE — TELEPHONE ENCOUNTER
RN cannot approve Refill Request    RN can NOT refill this medication med is not covered by policy/route to provider.       Shirin Calvo, [facility] Triage/Med Refill 2/28/2019    Requested Prescriptions   Pending Prescriptions Disp Refills     clobetasol (TEMOVATE) 0.05 % cream [Pharmacy Med Name: CLOBETASOL PROP 0.05% CREAM 60GM] 60 g 0     Sig: APPLY TO THE AFFECTED AREA TWICE DAILY    There is no refill protocol information for this order      Signed Prescriptions Disp Refills     allopurinol (ZYLOPRIM) 100 MG tablet 31 tablet 11     Sig: TAKE 1 TABLET BY MOUTH EVERY DAY FOR GOUT PREVENTION    Allopurinol/Febuxostat Refill Protocol  Passed - 2/28/2019 12:09 PM       Passed - LFT or AST or ALT in last 12 months    Albumin   Date Value Ref Range Status   02/25/2019 3.1 (L) 3.5 - 5.0 g/dL Final     Bilirubin, Total   Date Value Ref Range Status   02/25/2019 0.5 0.0 - 1.0 mg/dL Final     Bilirubin, Direct   Date Value Ref Range Status   10/23/2018 0.1 <=0.5 mg/dL Final     Alkaline Phosphatase   Date Value Ref Range Status   02/25/2019 97 45 - 120 U/L Final     AST   Date Value Ref Range Status   02/25/2019 14 0 - 40 U/L Final     ALT   Date Value Ref Range Status   02/25/2019 <9 0 - 45 U/L Final     Protein, Total   Date Value Ref Range Status   02/25/2019 7.6 6.0 - 8.0 g/dL Final               Passed - Visit with PCP or prescribing provider visit in past 12 months    Last office visit with prescriber/PCP: 2/25/2019 Collin Doran MD OR same dept: 2/25/2019 Collin Doran MD OR same specialty: 2/25/2019 Collin Doran MD  Last physical: Visit date not found Last MTM visit: Visit date not found   Next visit within 3 mo: Visit date not found  Next physical within 3 mo: Visit date not found  Prescriber OR PCP: Collin Doran MD  Last diagnosis associated with med order: 1. Gout  - traMADol (ULTRAM) 50 mg tablet [Pharmacy Med Name: TRAMADOL 50MG TABLETS]; TAKE 1 TABLET  BY MOUTH TWICE DAILY AS NEEDED FOR SEVERE PAIN  Dispense: 60 tablet; Refill: 0  - allopurinol (ZYLOPRIM) 100 MG tablet; TAKE 1 TABLET BY MOUTH EVERY DAY FOR GOUT PREVENTION  Dispense: 31 tablet; Refill: 11    If protocol passes may refill for 12 months if within 3 months of last provider visit (or a total of 15 months).           Refused Prescriptions Disp Refills     traMADol (ULTRAM) 50 mg tablet [Pharmacy Med Name: TRAMADOL 50MG TABLETS] 60 tablet 0     Sig: TAKE 1 TABLET BY MOUTH TWICE DAILY AS NEEDED FOR SEVERE PAIN    Controlled Substances Refill Protocol Failed - 2/28/2019 12:09 PM       Failed - Route all Controlled Substance Requests to Provider       Passed - Patient has controlled substance agreement in past 12 months    Encounter-Level CSA Scan Date - 11/01/2017:    Scan on 11/7/2017 12:03 PM (below)                Passed - Visit with PCP or prescribing provider visit in past 12 months     Last office visit with prescriber/PCP: 2/25/2019 Collin Doran MD OR same dept: 2/25/2019 Collin Doran MD OR same specialty: 2/25/2019 Collin Doran MD Last physical: Visit date not found Last MTM visit: Visit date not found    Next visit within 3 mo: Visit date not found  Next physical within 3 mo: Visit date not found  Prescriber OR PCP: Collin Doran MD  Last diagnosis associated with med order: 1. Gout  - traMADol (ULTRAM) 50 mg tablet [Pharmacy Med Name: TRAMADOL 50MG TABLETS]; TAKE 1 TABLET BY MOUTH TWICE DAILY AS NEEDED FOR SEVERE PAIN  Dispense: 60 tablet; Refill: 0  - allopurinol (ZYLOPRIM) 100 MG tablet; TAKE 1 TABLET BY MOUTH EVERY DAY FOR GOUT PREVENTION  Dispense: 31 tablet; Refill: 11

## 2021-06-25 NOTE — TELEPHONE ENCOUNTER
Patient called and would like to know why she is on a restriction? The nurse at the assisted living told her that they were instructed that she is not allowed to leave the site. Patient is unsure of this and would like someone from PCP team to call her to advise.

## 2021-06-25 NOTE — TELEPHONE ENCOUNTER
Go clarification and Patient said she was seen at the emergency room and the doctors there told her she had fluid in her legs after leaving her daughter place so they placed her on restrictions with her assistant  Living  Place she thought it was doctor Espinoza that placed the restriction but she got clarification that it was the ER doctor that did so she said disregard this message.

## 2021-06-25 NOTE — PROGRESS NOTES
TCM DISCHARGE FOLLOW UP CALL    Discharge Date:  3/17/2019  Reason for hospital stay (discharge diagnosis)::  CHF. acute resp failure  Are you feeling better, the same or worse since your discharge?:  Patient is feeling the same (Still nauseated Won't take Zofran because she doesn't like to take so many meds. Eating small portions, is drinking fluids. Feels better after she eats something. BG low 100s. Denies abd pain of fever. Is having intermittent hot falshes.)  Do you feel like you have a plan in the event of a health emergency?: Yes (She calls Dr Doran. Pt is aware of nurse triage.)    As part of your discharge plan, were  home care services ordered for you?: Yes    Have you seen them yet, or are they scheduled to visit?: Yes (pt refused home care. )    Do you have any follow up visits scheduled with your PCP or Specialist?:  Yes, with PCP (3/20)  (RN) Is PCP appt scheduled soon enough (within 14 days of discharge date)?: Yes    RN NOTES::  Referral made for [facility]. Pt is homeless and is staying in a hotel with family. Pt's scale is in storage and doesn't have access to another one.

## 2021-06-25 NOTE — ED NOTES
Patient states the pad is cutting into her side.  Changed pad and provided doreen care.  VSS.  Titrating 02 to 2L.  Awaiting plan of care.

## 2021-06-25 NOTE — PROGRESS NOTES
OFFICE VISIT - FAMILY MEDICINE     ASSESSMENT AND PLAN     1. Hospital discharge follow-up     2. Acute decompensated heart failure (H)     3. Chronic diastolic CHF (congestive heart failure) (H)  Comprehensive Metabolic Panel    BNP(B-type Natriuretic Peptide)   4. Community acquired pneumonia, unspecified laterality     5. CKD stage 4 due to type 2 diabetes mellitus (H)     6. Arthralgia of both knees  oxyCODONE-acetaminophen (PERCOCET/ENDOCET) 5-325 mg per tablet   7. Degeneration of lumbar or lumbosacral intervertebral disc  oxyCODONE-acetaminophen (PERCOCET/ENDOCET) 5-325 mg per tablet   Recent hospitalization for acute on chronic heart failure, CKD, diabetes type 2, Hospital record reviewed medication were reconciled, overall improving, BNP is down, potassium appear normal on recent labs, she does have a follow-up appointment with nephrologist in about a couple of weeks, she was encouraged to continue to check her weight on a daily basis, if she notices any gain of more than a couple of pounds he should follow-up at the clinic.  Degenerative joint disease of the lumbar spine Percocet was refilled, possible side effect discussed included risk of addiction.  CHIEF COMPLAINT   Hospital Visit Follow Up (d/c 3/17/19 from [facility] for Acute Resp. Failure, Pneumonia)    ERNA Mireles is a 63 y.o. female.  Medical history significant for diabetes type 2 with associated peripheral neuropathies, CKD stage III, not adequately controlled on insulin, congestive heart failure, obesity, obstructive sleep apnea mild persistent asthma chronic pain syndrome secondary to degenerative joint disease of the knees, spine on chronic narcotics.  She was admitted at [facility] from March 13 March 17 for acute on chronic heart failure, she was diuresed, initially thought to have pneumonia she received a few doses of antibiotic IV, medication were readjusted, she is currently taking Lasix 80 mg 3 times a day,  potassium was stopped.  She is stating that she is improving her breathing is better, she still having lower back pain secondary to degenerative joint disease, asking for refill of her Percocet.  She takes that as needed.  Hospital record was reviewed, medication were reconciled.      Review of Systems As per HPI, otherwise negative.    OBJECTIVE   /49 (Patient Site: Right Arm, Patient Position: Sitting, Cuff Size: Adult Large)   Pulse 62   Wt (!) 265 lb (120.2 kg)   SpO2 96%   BMI 44.10 kg/m    Physical Exam    PFSH     Family History   Problem Relation Age of Onset     Hypertension Mother      COPD Mother      Diabetes Mother      Hypertension Father      COPD Father      Diabetes Father      Heart attack Maternal Grandmother      Hypertension Maternal Grandmother      Alcohol abuse Sister      Drug abuse Sister      COPD Sister      Diabetes Sister      Hypertension Sister      Heart disease Brother      Hypertension Brother      Hypertension Daughter      Hypertension Son      Hypertension Maternal Grandfather      Heart attack Maternal Grandfather      Hypertension Paternal Grandmother      Heart attack Paternal Grandmother      Hypertension Paternal Grandfather      Social History     Socioeconomic History     Marital status: Single     Spouse name: Not on file     Number of children: Not on file     Years of education: Not on file     Highest education level: Not on file   Occupational History     Not on file   Social Needs     Financial resource strain: Not on file     Food insecurity:     Worry: Not on file     Inability: Not on file     Transportation needs:     Medical: Not on file     Non-medical: Not on file   Tobacco Use     Smoking status: Former Smoker     Last attempt to quit: 2014     Years since quittin.1     Smokeless tobacco: Never Used   Substance and Sexual Activity     Alcohol use: No     Drug use: No     Sexual activity: Not on file   Lifestyle     Physical activity:      Days per week: Not on file     Minutes per session: Not on file     Stress: Not on file   Relationships     Social connections:     Talks on phone: Not on file     Gets together: Not on file     Attends Hindu service: Not on file     Active member of club or organization: Not on file     Attends meetings of clubs or organizations: Not on file     Relationship status: Not on file     Intimate partner violence:     Fear of current or ex partner: Not on file     Emotionally abused: Not on file     Physically abused: Not on file     Forced sexual activity: Not on file   Other Topics Concern     Not on file   Social History Narrative    Reports on disability. She is not working and lives with her children and friend.  She reports she turns to her doctor in times of crisis.  She reports she needs assistance with ADLs and has a PCA 11 hours per day, 7 days per week.  Does not have a home nurse.       Relevant history was reviewed with the patient today, unless noted in HPI, nothing is pertinent for this visit.  [facility]     Patient Active Problem List    Diagnosis Date Noted     Degeneration of lumbar or lumbosacral intervertebral disc 03/21/2019     Adjustment disorder with mixed anxiety and depressed mood      Reactive depression      Mood disorder due to medical condition      Elevated troponin 03/05/2019     Acute respiratory failure (H) 03/03/2019     Acute respiratory failure with hypoxia (H) 03/03/2019     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     Overview Note:     Non-ketotic; diabetes type 2; >500       Acute decompensated heart failure (H)      Community acquired pneumonia, unspecified laterality      Class 3 severe obesity due to excess calories with serious comorbidity and body mass index (BMI) of 45.0 to 49.9 in adult (H)      CKD stage 4 due to type 2 diabetes mellitus (H) 02/25/2019     Pyelonephritis 12/26/2018     Eczema 04/09/2018     Chronic diastolic CHF (congestive heart failure) (H) 06/12/2017     Acute  on chronic respiratory failure with hypoxia and hypercapnia (H) 06/08/2017     Pulmonary HTN (H) 06/08/2017     Bilateral edema of lower extremity 06/28/2016     Diabetic polyneuropathy associated with type 2 diabetes mellitus (H) 06/28/2016     Overview Note:     Previously on gabapentin which helped, but stopped in the hospital due to potential drug interaction.  Lyrica made her sleepy.       Candidiasis, intertrigo 06/28/2016     Tobacco use 07/30/2015     Joint pain, localized to the knee 01/08/2015     Joint pain, localized in the right shoulder 01/08/2015     Lethargy 07/08/2014     Chronic pain 06/15/2014     Gout      Overview Note:     Created by Conversion         Morbid obesity with alveolar hypoventilation (H)      Overview Note:     Created by Conversion         Cor Pulmonale      Overview Note:     Created by Conversion    Replacement Utility updated for latest IMO load       Asthma      Overview Note:     Created by Conversion         Urinary Incontinence      Overview Note:     Created by Conversion         Microcytic anemia      Overview Note:     Created by Conversion         Hypercholesteremia      Overview Note:     Created by Conversion         Obstructive Sleep Apnea      Overview Note:     Created by Conversion         Long-term insulin use in type 2 diabetes (H)      Overview Note:              Acute diastolic heart failure (H)      Overview Note:     Created by Conversion         Postmenopausal bleeding      Overview Note:     Created by Conversion         Benign Essential Hypertension      Overview Note:     Created by Conversion         Acute kidney injury superimposed on CKD (H)      Overview Note:     Created by Conversion         Localized Osteoarthritis Of The Knee      Overview Note:     Created by Conversion    Replacement Utility updated for latest IMO load       Degenerative arthritis of lumbar spine      Overview Note:     Created by Conversion         Glaucoma      Overview Note:      Created by Conversion         Aneurysm Of The Right Vertebral Artery      Overview Note:     Created by Conversion         Past Surgical History:   Procedure Laterality Date     EYE SURGERY       CT  DELIVERY ONLY      Description:  Section;  Recorded: 10/20/2011;     CT LIGATE FALLOPIAN TUBE      Description: Tubal Ligation;  Recorded: 10/20/2011;     CT REMOVAL GALLBLADDER      Description: Cholecystectomy;  Recorded: 10/20/2011;       RESULTS/CONSULTS (Lab/Rad)     Recent Results (from the past 168 hour(s))   POCT Glucose - 4 times daily before meals and at bedtime   Result Value Ref Range    Glucose 108 70 - 139 mg/dL   Renal Function Profile   Result Value Ref Range    Albumin 3.0 (L) 3.5 - 5.0 g/dL    Calcium 9.3 8.5 - 10.5 mg/dL    Phosphorus 4.4 2.5 - 4.5 mg/dL    Glucose 87 70 - 125 mg/dL    BUN 91 (H) 8 - 22 mg/dL    Creatinine 3.80 (H) 0.60 - 1.10 mg/dL    Sodium 137 136 - 145 mmol/L    Potassium 4.1 3.5 - 5.0 mmol/L    Chloride 98 98 - 107 mmol/L    CO2 26 22 - 31 mmol/L    Anion Gap, Calculation 13 5 - 18 mmol/L    GFR MDRD Af Amer 14 (L) >60 mL/min/1.73m2    GFR MDRD Non Af Amer 12 (L) >60 mL/min/1.73m2   POCT Glucose - 4 times daily before meals and at bedtime   Result Value Ref Range    Glucose 86 70 - 139 mg/dL   POCT Glucose - 4 times daily before meals and at bedtime   Result Value Ref Range    Glucose 97 70 - 139 mg/dL   POCT Glucose - 4 times daily before meals and at bedtime   Result Value Ref Range    Glucose 125 70 - 139 mg/dL   POCT Glucose - 4 times daily before meals and at bedtime   Result Value Ref Range    Glucose 146 (H) 70 - 139 mg/dL   POCT Glucose - 4 times daily before meals and at bedtime   Result Value Ref Range    Glucose 94 70 - 139 mg/dL   Renal Function Profile   Result Value Ref Range    Albumin 2.8 (L) 3.5 - 5.0 g/dL    Calcium 9.2 8.5 - 10.5 mg/dL    Phosphorus 4.3 2.5 - 4.5 mg/dL    Glucose 133 (H) 70 - 125 mg/dL    BUN 91 (H) 8 - 22 mg/dL     Creatinine 3.73 (H) 0.60 - 1.10 mg/dL    Sodium 138 136 - 145 mmol/L    Potassium 4.0 3.5 - 5.0 mmol/L    Chloride 98 98 - 107 mmol/L    CO2 27 22 - 31 mmol/L    Anion Gap, Calculation 13 5 - 18 mmol/L    GFR MDRD Af Amer 15 (L) >60 mL/min/1.73m2    GFR MDRD Non Af Amer 12 (L) >60 mL/min/1.73m2   Magnesium   Result Value Ref Range    Magnesium 2.2 1.8 - 2.6 mg/dL   Hepatic Profile   Result Value Ref Range    Bilirubin, Total 0.3 0.0 - 1.0 mg/dL    Bilirubin, Direct 0.2 <=0.5 mg/dL    Protein, Total 7.0 6.0 - 8.0 g/dL    Albumin 2.8 (L) 3.5 - 5.0 g/dL    Alkaline Phosphatase 83 45 - 120 U/L    AST 18 0 - 40 U/L    ALT 12 0 - 45 U/L   POCT Glucose - 4 times daily before meals and at bedtime   Result Value Ref Range    Glucose 87 70 - 139 mg/dL   POCT Glucose - 4 times daily before meals and at bedtime   Result Value Ref Range    Glucose 146 (H) 70 - 139 mg/dL   POCT Glucose - 4 times daily before meals and at bedtime   Result Value Ref Range    Glucose 98 70 - 139 mg/dL   Comprehensive Metabolic Panel   Result Value Ref Range    Sodium 137 136 - 145 mmol/L    Potassium 4.3 3.5 - 5.0 mmol/L    Chloride 93 (L) 98 - 107 mmol/L    CO2 31 22 - 31 mmol/L    Anion Gap, Calculation 13 5 - 18 mmol/L    Glucose 168 (H) 70 - 125 mg/dL    BUN 87 (H) 8 - 22 mg/dL    Creatinine 3.86 (H) 0.60 - 1.10 mg/dL    GFR MDRD Af Amer 14 (L) >60 mL/min/1.73m2    GFR MDRD Non Af Amer 12 (L) >60 mL/min/1.73m2    Bilirubin, Total 0.4 0.0 - 1.0 mg/dL    Calcium 9.5 8.5 - 10.5 mg/dL    Protein, Total 7.7 6.0 - 8.0 g/dL    Albumin 3.3 (L) 3.5 - 5.0 g/dL    Alkaline Phosphatase 91 45 - 120 U/L    AST 20 0 - 40 U/L    ALT 13 0 - 45 U/L   BNP(B-type Natriuretic Peptide)   Result Value Ref Range     (H) 0 - 106 pg/mL     Ct Abdomen Pelvis Without Oral Without Iv Contrast    Result Date: 3/9/2019  [facility] RADIOLOGY EXAM: CT ABDOMEN PELVIS WO ORAL WO IV CONTRAST LOCATION: [facility] DATE/TIME: 3/9/2019 1:55 PM INDICATION: Abdominal  pain, vomiting. Concern for obstruction. COMPARISON: 03/05/2019 TECHNIQUE: Helical thin-section CT scan of the abdomen and pelvis was performed without oral or IV contrast. Multiplanar reformats were obtained. Dose reduction techniques were used. CONTRAST: None. FINDINGS: LUNG BASES: Cardiomegaly and atherosclerotic disease. Mitral annular calcification. Low-attenuation in the cardiac ventricles suggests anemia. ABDOMEN: Normal spleen, pancreas, adrenal glands, and liver. Status post cholecystectomy. Hypoattenuating renal lesions are incompletely assessed though are statistically likely to represent simple cysts. A high attenuation left renal lesion is likely a hemorrhagic cyst. No hydronephrosis or hydroureter. No renal or ureteral calculi. Normal stomach. Normal caliber of the small bowel. There is atherosclerotic disease. PELVIS: Normal urinary bladder. Enlarged lobulated uterus. No adnexal masses. Normal appearance of the colon and appendix. MUSCULOSKELETAL: Small fat-containing hernia in the right lower pelvis, likely in the inguinal canal. There is heterogeneity of the bony structures.  Degenerative changes are present.     CONCLUSION: 1.  No bowel obstruction. Normal appendix. 2.  No hydronephrosis or hydroureter. 3.  Atherosclerotic disease. 4.  Findings suggestive of anemia. 5.  Heterogeneous lobulated uterus likely from fibroids. 6.  Heterogeneity of the bony structures is nonspecific though could be seen with osteopenia. Degenerative osseous changes are present.    Ct Abdomen Pelvis Without Oral Without Iv Contrast    Result Date: 3/5/2019  [facility] RADIOLOGY EXAM: CT ABDOMEN PELVIS WO ORAL WO IV CONTRAST LOCATION: [facility] DATE/TIME: 3/5/2019 11:34 AM INDICATION: Abdominal pain diffuse pain in abdomen and back COMPARISON: CT exams 12/26/2018 and 2/15/2012 TECHNIQUE: Helical thin-section CT scan of the abdomen and pelvis was performed without oral or IV contrast. Multiplanar reformats were  obtained. Dose reduction techniques were used. CONTRAST: None. FINDINGS: LUNG BASES: Trace bilateral pleural effusions with adjacent consolidation and patchy lower lobar airspace opacities. Moderate cardiomegaly. Mitral annulus and moderate coronary artery calcifications. ABDOMEN: Limited due to lack of intravenous contrast. Postcholecystectomy. Mild biliary ectasia. Unremarkable liver, spleen, pancreas and adrenals. Bilateral renal cysts and stable hyperdense left renal cortical lesion, likely a hemorrhagic cyst. Stable 2.5 cm low-density left renal cortical lesion image 76 series 2, correlating with a cyst seen on comparison ultrasound from 12/13/2017. No evidence of hydronephrosis. Normal caliber visualized ureters. Unremarkable stomach. Normal caliber small bowel. Normal appendix. Tortuous normal caliber colon with mild fecal retention. No free air or free fluid. PELVIS: Stable enlarged heterogeneous uterus. The urinary bladder is decompressed by a Phan catheter. No adenopathy. MUSCULOSKELETAL: Stable.     CONCLUSION: 1.  No acute or suspicious findings in the abdomen or pelvis to explain the patient's symptoms. No evidence of an inflammatory process or bowel obstruction. 2.  Stable enlarged heterogeneous uterus. 3.  Trace bilateral pleural effusions with adjacent bilateral lower lobar consolidation and patchy airspace opacities likely reflecting pneumonia.    Xr Chest 1 View Portable    Result Date: 3/13/2019  XR CHEST 1 VIEW PORTABLE 3/13/2019 2:55 PM INDICATION: Chf, recent pneumonia. COMPARISON: Chest x-ray 03/03/2019 FINDINGS: No definite pneumothorax. Stable cardiomegaly and central vascular congestion. Previously seen interstitial coarsening with superimposed opacities likely reflecting pulmonary edema are mild to moderately improved. No definite pleural effusion.    Xr Chest 1 View Portable    Result Date: 3/3/2019  XR CHEST 1 VIEW PORTABLE 3/3/2019 11:01 AM INDICATION: Resp distress COMPARISON: Chest  x-ray 03/11/2018 FINDINGS: Diffuse bilateral multilobar pulmonary airspace opacities which may reflect pneumonia or edema. No pleural effusion. Stable cardiomegaly.    MEDICATIONS     Current Outpatient Medications on File Prior to Visit   Medication Sig Dispense Refill     ACCU-CHEK RACHEL CONTROL SOLN Soln        ACCU-CHEK RACHEL PLUS TEST STRP strips Use to test blood sugar four times a day Generic: Blood Glucose Test 100 strip 11     acetaminophen (TYLENOL) 325 MG tablet Take 1-2 tablets (325-650 mg total) by mouth every 4 (four) hours as needed.  0     albuterol (PROAIR HFA;PROVENTIL HFA;VENTOLIN HFA) 90 mcg/actuation inhaler Inhale 1-2 puffs every 6 (six) hours as needed for wheezing.       albuterol (PROVENTIL) 2.5 mg /3 mL (0.083 %) nebulizer solution Use one vial in nebulizer four times a day as needed. Generic: Albuterol Sulfate 60 vial 5     allopurinol (ZYLOPRIM) 100 MG tablet Take 100 mg by mouth daily.       amLODIPine (NORVASC) 10 MG tablet Take 10 mg by mouth daily.       aspirin 81 MG EC tablet Take 81 mg by mouth daily.       atorvastatin (LIPITOR) 40 MG tablet Take 40 mg by mouth at bedtime.       clobetasol (TEMOVATE) 0.05 % cream LIN AA BID  6     cloNIDine HCl (CATAPRES) 0.2 MG tablet Take 0.2 mg by mouth 2 (two) times a day.       DIAPER,BRIEF,ADULT, DISPOSABLE (DEPEND PANTS MISC) Use As Directed. Large       diphenhydrAMINE (BENADRYL) 25 mg capsule Take 50 mg by mouth at bedtime as needed for allergies.       EASY TOUCH LANCING DEVICE Misc Use to test blood sugar twice a day.  Generic: Easy Touch Lancing Device 1 each 2     EASY TOUCH TWIST LANCETS 28 gauge Misc USE TO CHECK BLOOD SUGAR FOUR TIMES A  each 1     esomeprazole (NEXIUM) 40 MG capsule Take 40 mg by mouth daily before breakfast.       fluticasone-salmeterol (ADVAIR) 250-50 mcg/dose DISKUS Inhale 1 puff 2 (two) times a day.       furosemide (LASIX) 80 MG tablet Take 1 tablet (80 mg total) by mouth 3 (three) times a day. 90  "tablet 0     generic lancets (ACCU-CHEK SOFTCLIX LANCETS) CHECK 4 TIMES DAILY 100 each 2     GLUCAGON 1 mg injection Use as directed 1 each 0     hydrALAZINE (APRESOLINE) 100 MG tablet Take 100 mg by mouth 3 (three) times a day.       insulin aspart U-100 (NOVOLOG) 100 unit/mL injection Inject 2 Units under the skin 3 (three) times a day before meals. Correctional scale as directed              insulin detemir U-100 (LEVEMIR FLEXPEN) 100 unit/mL (3 mL) pen Inject 10 unit marking on U-100 syringe under the skin at bedtime. 5 adj dose pen 0     magnesium oxide (MAG-OX) 400 mg (241.3 mg magnesium) tablet Take 200 mg by mouth daily.       metOLazone (ZAROXOLYN) 5 MG tablet Take 0.5 tablets (2.5 mg total) by mouth daily. 10 tablet 0     metoprolol succinate (TOPROL-XL) 100 MG 24 hr tablet Take 1 tablet (100 mg total) by mouth daily. 30 tablet 0     miconazole (MICOTIN) 2 % cream Apply topically 2 (two) times a day for 7 days. 28.35 g 0     miconazole (MICOTIN) 2 % powder Apply topically 2 (two) times a day. 71 g 0     montelukast (SINGULAIR) 10 mg tablet Take 10 mg by mouth at bedtime.       multivitamin with minerals (THERA-M) 9 mg iron-400 mcg Tab tablet Take 1 tablet by mouth daily.       NOVOFINE AUTOCOVER 30 gauge x 1/3\" Ndle INJECT UNDER THE SKIN 5 TIMES DAILY AS DIRECTED 500 each 0     ondansetron (ZOFRAN) 8 MG tablet Take 1 tablet (8 mg total) by mouth every 8 (eight) hours as needed. 20 tablet 0     polyethylene glycol (MIRALAX) 17 gram packet Take 1 packet (17 g total) by mouth daily.  0     senna-docusate (PERICOLACE) 8.6-50 mg tablet Take 1 tablet by mouth 2 (two) times a day.  0     sertraline (ZOLOFT) 50 MG tablet Take 1.5 tablets (75 mg total) by mouth daily. 30 tablet 0     spironolactone (ALDACTONE) 25 MG tablet Take 1 tablet (25 mg total) by mouth daily. 90 tablet 3     thiamine 100 MG tablet Take 1 tablet (100 mg total) by mouth daily.  0     timolol maleate (TIMOPTIC) 0.5 % ophthalmic solution " Administer 1 drop to both eyes 2 (two) times a day.       traMADol (ULTRAM) 50 mg tablet Take 50 mg by mouth 2 (two) times a day as needed for severe pain (7-10).       wheelchair [employer] mobility device  Face-to-face exam  October 6, 2016  impaired mobility  Length of need : 99 1 each 0     No current facility-administered medications on file prior to visit.        HEALTH MAINTENANCE / SCREENING   PHQ-2 Total Score: 0 (5/23/2018  2:03 PM)  , No Data Recorded,No Data Recorded  Immunization History   Administered Date(s) Administered     Pneumo Conj 13-V (2010&after) 02/16/2013     Pneumo Polysac 23-V 02/16/2013     Td, Adult, Absorbed 11/09/2005     Td,adult,historic,unspecified 11/09/2005     Health Maintenance   Topic     DEPRESSION FOLLOW UP      INFLUENZA VACCINE RULE BASED (1)     DXA SCAN      TD 18+ HE      PNEUMOCOCCAL POLYSACCHARIDE VACCINE AGE 65 AND OVER      TDAP ADULT ONE TIME DOSE      ZOSTER VACCINES (1 of 2)     MAMMOGRAM      DIABETES URINE MICROALBUMIN      DIABETES HEMOGLOBIN A1C      DIABETES FOLLOW-UP      ASTHMA FOLLOW-UP      DIABETES FOOT EXAM      DIABETES OPHTHALMOLOGY EXAM      FALL RISK ASSESSMENT      ASTHMA CONTROL TEST      ADVANCE DIRECTIVES DISCUSSED WITH PATIENT      PNEUMOCOCCAL CONJUGATE VACCINE FOR ADULTS (PCV13 OR PREVNAR)        Collin Doran MD  Family Medicine, [facility] [place]     This note was dictated using a voice recognition software.  Any grammatical or context distortion are unintentional and inherent to the software.

## 2021-06-25 NOTE — ED NOTES
Patient notified she will be discharging to [facility].  She states she would like to go back to her daughters.  MD in room consulting.  Patient agreed to go back to [facility].  Transport called and report to RN at facility.  Patient ate 20% of lunch tray.

## 2021-06-25 NOTE — PROGRESS NOTES
CCC MAR received voicemail from MAR Lama at [facility]. MAR attempted to reach her back, no answer, left voicemail.

## 2021-06-25 NOTE — PROGRESS NOTES
Progress Notes by Osiel Nesbitt MD at 6/26/2017  3:59 PM     Author: Osiel Nesbitt MD Service: -- Author Type: Physician    Filed: 6/26/2017  4:51 PM Encounter Date: 6/26/2017 Status: Signed    : Osiel Nesbitt MD (Physician)       Code Status:  FULL CODE  Visit Type: Discharge Summary     Facility:  [facility] [[id number]]          PCP:  Kelli Vanessa MD  [phone]       Admission Date to our Facility: June 11, 2017 from [facility] June 7  Discharge Date from our Facility: June 27, 2017    Discharge Diagnosis:    1. Chronic diastolic CHF (congestive heart failure)     2. Acute kidney injury superimposed on CKD     3. Morbid obesity with alveolar hypoventilation     4. Obstructive Sleep Apnea     5. Diabetic polyneuropathy associated with type 2 diabetes mellitus     6. Localized Osteoarthritis Of The Knee     7. Cor Pulmonale          History of Present Illness: Sun Mireles is a 61 y.o. female     Skilled Nursing Facility Course: Patient came to us with obesity related driven problems including massive edema and fluid accumulation, diastolic heart failure, obstructive sleep apnea, COPD on home oxygen, insulin-dependent diabetes, chronic kidney disease, and dyslipidemia.  She also had a 45 year history of smoking.  She went up to 390 pounds it was reported prior to that.  When she discharged to us I am uncertain as to her exact weight as we did a calculated error and recorded at 189.  The first re-done number was 347 pounds on 13 June.    Facility course; the patient voiced great wishes for motivation and wishing to try to lose weight and increase her exercise regimen.  Unfortunately she always refused any kind of efforts that require her to exercise or do any kind of therapy.  The max that she walked was 30 feet.  We did make several medicine modifications and discontinue her amlodipine which I felt was making her edema worse.  We wrote for a sliding scale as  she was breaking through the standard one that she had.  This was both done on 12 June the 14th we added spironolactone 25 mg daily and metolazone 2.5 mg daily.  On the 19th we increased her metolazone to 2.5 twice daily and asked her to use AMANUEL hose which she did not.  We also increased her potassium to 20 mEq.  We did ask for daily BPs and weights unfortunately she did not allow us to do this.  He had to discontinuing her U Tato because of concerns of coverage and inability for her to pay for this.  I was not too concerned about this though is that she had no gout flares and her kidney function was of concern especially in lieu of me increasing her diuretics.  Her blood pressures did come down into the 130s regularly with these adjustments.  Her weight significantly reduced from the 347.2 pounds to 310.8 for a 36.4 pound weight loss.  In addition her brain natruretic peptide improved from 532 on June 19 267 on June 26.  Unfortunately her BUN went from 47 on the 19th 264 on the 26th and the creatinine went from 2.02-2.54.  Because of this we will need to reduce her Zaroxolyn to once daily.    Discharge Medications: Insulin 15 units before each meal in addition a slide 2 units for 150-200, 4 units for 201-250, 251-306 units, 301-358 units, 351-410 units, 401-450 12 units, greater than 450 call MD. Hydralazine 100 mg 3 times daily, magnesium oxide 400 mg one half daily Toprol succinate 200 mg 1 daily singular 1 tab 10 mg nightly, amlodipine was discontinued secondary to edema, aspirin 81 mg daily, Lipitor 40 mg nightly, clonidine 0.2 mg 1 twice daily Nexium 40 mg daily, senna S1 twice daily, timolol maleate 0.5% drops 1 drop OU twice daily, insulin Lantus 30 units nightly, spironolactone 25 mg daily, metolazone 2.5 mg reduction to daily because of the increasing creatinine, potassium 20 mEq daily, and finally she may resume her Uloric at home.    For most current and accurate medication list, please contact the  [facility] that this patient visit took place at.      Discharge Plan: Recommend a BMP in 1 week's time, follow-up with PCP in 1 week, light weight wheelchair at home, CPAP machine at home, large bed to accommodate morbid obesity, oxygen.  Will need home health aide PT, and OT.    Review of Systems     Physical Exam   Cardiovascular: Normal rate and regular rhythm.    Pulmonary/Chest: Effort normal and breath sounds normal.   Abdominal: Soft. Bowel sounds are normal.   Vitals reviewed.      Labs:  All labs reviewed in the nursing home record. /17  7:20 AM   6/19/17  6:55 AM   6/9/17  5:40 AM   6/7/17 10:59 PM   12/16/16  3:10 PM         BNP 0 - 101 pg/mL 267 (H) 532 (H) 1320 (H) 1088 (H) 209 (H)   Resulting Agency  [facility]      Specimen Collected: 06/26/17  7:20 AM Last Resulted: 06/26/17  9:51 AM Lab Flowsheet Order Details View Encounter Lab and Collection Details Routing Result History               Other Results from 6/25/2017      Basic Metabolic Panel   Order: [id number]     Status:  Final result   Visible to patient:  Yes (MyChart) Next appt:  07/03/2017 at 01:30 PM in Cardiology (Jena Mcneill, CNP) Dx:  Heart failure         Ref Range & Units 6/26/17  7:20 AM   6/19/17  6:55 AM   6/14/17  5:55 AM   6/11/17  6:51 AM   6/10/17  6:27 AM      Sodium 136 - 145 mmol/L 138 139 144 144 144    Potassium 3.5 - 5.0 mmol/L 3.9 3.6 3.4 (L) 3.6 3.9    Chloride 98 - 107 mmol/L 97 (L) 99 105 107 110 (H)    CO2 22 - 31 mmol/L 31 32 (H) 30 25 25    Anion Gap, Calculation 5 - 18 mmol/L 10 8 9 12 9    Glucose 70 - 125 mg/dL 112 77 93 92 91    Calcium 8.5 - 10.5 mg/dL 9.5 8.9 8.6 9.1 9.3    BUN 8 - 22 mg/dL 64 (H) 47 (H) 53 (H) 69 (H) 77 (H)    Creatinine 0.60 - 1.10 mg/dL 2.54 (H) 2.02 (H) 1.90 (H) 1.80 (H) 1.99 (H)    GFR MDRD Af Amer >60 mL/min/1.73m2 23 (L) 30 (L) 32 (L) 34 (L) 31 (L)    GFR MDRD Non Af Amer >60 mL/min/1.73m2 19 (L) 25 (L) 27 (L) 28 (L) 25 (L)   [facility]  [facility]   Narrative     Fasting Glucose reference range is 70-99 mg/dL per  American Diabetes Association (ADA) guidelines.      Specimen Collected: 06/26/17  7:20 AM Last Resulted: 06/26/17  9:45 AM Lab Flowsheet Order Details View Encounter Lab and Collection Details Routing Result History         Related Result Highlights       Magnesium  Final result 6/26/2017                              Magnesium   Order: [id number]     Status:  Final result   Visible to patient:  Yes (Johnny) Next appt:  07/03/2017 at 01:30 PM in Cardiology (Jena Mcneill, CNP) Dx:  Heart failure         Ref Range & Units 6/26/17  7:20 AM   6/7/17 10:36 PM   5/10/17  4:41 PM   7/8/16  3:20 PM   6/28/16  5:37 PM      Magnesium 1.8 - 2.6 mg/dL 1.7 (L) 2.1 2.1 1.7 (L) 1.7 (L)   Resulting Agency  [facility]      Specimen Collected: 06/26/17  7:20 AM Last Resulted: 06/26/17  9:45 AM Lab Flowsheet Order Details View Encounter Lab and Collection Details Routing Result History                           Assessment:  1. Chronic diastolic CHF (congestive heart failure)     2. Acute kidney injury superimposed on CKD     3. Morbid obesity with alveolar hypoventilation     4. Obstructive Sleep Apnea     5. Diabetic polyneuropathy associated with type 2 diabetes mellitus     6. Localized Osteoarthritis Of The Knee     7. Cor Pulmonale         MEDICAL EQUIPMENT NEEDS:  Wheelchair mechanical, large bed, CPAP machine.  Patient has all of the above already      DISCHARGE PLAN/FACE TO FACE:  I certify that services are/were furnished while this patient was under the care of a physician and that a physician or an allowed non-physician practitioner (NPP), had a face-to-face encounter that meets the physician face-to-face encounter requirements. The encounter was in whole, or in part, related to the primary reason for home health. The patient is confined to his/her home and needs intermittent skilled nursing, physical therapy, speech-language  pathology, or the continued need for occupational therapy. A plan of care has been established by a physician and is periodically reviewed by a physician.    I certify that this patient is under my care and that I, or a nurse practitioner or physician's assistant working with me, had a face-to-face encounter that meets the physician face-to-face encounter requirements with this patient.   Date of Face-to-Face Encounter: June 26, 2017    I certify that, based on my findings, the following services are medically necessary home health services: Wheelchair as the patient is not safe to ambulate and not capable of ambulating independently secondary to her morbid obesity, hypertension, sleep apnea, hypoxia, severe deconditioning.  Her CPAP machine as necessary as she will become hypoxic without it.  Additionally she will need motivation to try and continue with therapy so she has chance to lose some weight.  She will need to be followed very closely with laboratory as we do not want her creatinine and BUN to rise.          45 minutes total time of which 65% was in face to face communication with patient about above plan of care.    Electronically signed by: Osiel Nesbitt MD

## 2021-06-25 NOTE — PROGRESS NOTES
[facility] Coordination (CCC) Care Guide (CG), has reviewed patient chart upon reception of order for Ambulatory Referral to Care Management.     Order Placed: 03/20/19     First outreach: 03/20/19     Outcome: Spoke with in person        Last OV with PCP: 03/20/19 today  Was order discussed with patient during a recent PCP communication: Yes  Order comments: Housing concerns; patient is homeless and living in hotel. Patient has additional concerns and multiple medical problems    Agreeable with referral: Yes, asked for  Open to scheduling: yes    RN intake assessment: 03/25/19 11am phone    Care Guide comments: [facility] (CCC) Care Guide (CG), received in-basket correspondence from inpatient population health RN Tena Padgett referring patient to [facility]. Patient has expressed that she lost her section 8 housing and is homeless, living in a hotel with family and has asked for CCC help. RN also reports multiple medical conditions. Per chart review CG notes patient was enrolled in [facility] [id number]. From 08/04/19 note, CG notes that past CG provided section 8 housing informaiton and that patient eventually got housing. CG met with Sun and spouse in clinic prior to OV and offered Sun Day per CCC SW and explained that further [facility] SW follow up would be available after RN assessment.     Previously enrolled in [facility]: Yes  Reason for re-enrollment: Experiencing homelessness, multiple medical conditions  Past goals completed: Yes, continuous lifestyle goals; housing  Addressing past goals: Yes    Next outreach: 03/27/19    Plan:  -Goal setting

## 2021-06-25 NOTE — PROGRESS NOTES
Hospital discharge follow up call to pt, message on VM greeting is not pt's name and name isn't on consent form. No message left.  RN will attempt call back at another time.

## 2021-06-25 NOTE — ED NOTES
Placed orders for EKG.  Date of exam was on 06/05/2021 at 10:29  Jimena Layne RN 6/7/2021 7:55 AM

## 2021-06-25 NOTE — ED TRIAGE NOTES
Patient states she was visiting her daughter and last night she couldn't lie flat because she couldn't breath, recent shortness of breath.  Uses 2L of 02 in her facility as needed.  She is on 4L NC applied by EMS who brought her in. She has a cough and BLE swelling.  BS is 136.History of CHF, she states her daughter did not give her her pills this morning and she is not sure what she takes.  Her family is not coming.

## 2021-06-25 NOTE — TELEPHONE ENCOUNTER
Dear Dr Doran,   Ms Sun Mireles was contacted by [facility] home care to set up physical therapy at home following her discharge from [facility]. However, patient has stated that she does need home care at this time, I instructed her to contact your office should she decide she wants home care in the near future and a new referral could be initiated on her behalf.      Kind Regards,  Zeenat Cerda    [facility] Liaison- [facility]  [phone]

## 2021-06-25 NOTE — PROGRESS NOTES
Progress Notes by Osiel Nesbitt MD at 6/19/2017  3:21 PM     Author: Osiel Nesbitt MD Service: -- Author Type: Physician    Filed: 6/19/2017  3:37 PM Encounter Date: 6/19/2017 Status: Signed    : Osiel Nesbitt MD (Physician)       Code Status:  FULL CODE  Visit Type: Problem Visit (edema,renal f,chf)     Facility:  [facility] [[id number]]        Facility Type: SNF (Skilled Nursing Facility, TCU)    History of Present Illness: Snu Mireles is a 61 y.o. female who I am seeing today follow-up on numerous parts of her chronic medical conditions.  We were very excited to see that she was participating more in therapy and making efforts although her sustainability and duration is short secondary to her deconditioning.  She reports that her legs are more swollen and she has since we increased metolazone had a little bit more of an itch.  It is tolerable at this point and she does take infrequent Benadryl for it.  Denies any shortness of breath and states that her oxygen does desaturate but she does not feel it.    Review of Systems     Physical Exam   Constitutional:   The interval weight loss from 347 to current 334.  More motivated and upbeat.   Cardiovascular: Normal rate.    Pulmonary/Chest: Effort normal and breath sounds normal.   Abdominal: Soft. Bowel sounds are normal.   Musculoskeletal:   She has massive adiposity both in the buttock area and the lower extremities this is a high fatty distribution type not classical pitting edema type.   Vitals reviewed.      Labs:  All labs reviewed in the nursing home record.Metabolic Panel   Order: [id number]     Status:  Final result   Visible to patient:  Yes (MyChart) Next appt:  07/03/2017 at 01:30 PM in Cardiology (Jena Mcneill, CNP) Dx:  Heart failure         Ref Range & Units 6/19/17  6:55 AM   6/14/17  5:55 AM   6/11/17  6:51 AM   6/10/17  6:27 AM   6/9/17  7:17 PM      Sodium 136 - 145 mmol/L 139 144 144 144 142    Potassium 3.5 -  5.0 mmol/L 3.6 3.4 (L) 3.6 3.9 4.0    Chloride 98 - 107 mmol/L 99 105 107 110 (H) 108 (H)    CO2 22 - 31 mmol/L 32 (H) 30 25 25 24    Anion Gap, Calculation 5 - 18 mmol/L 8 9 12 9 10    Glucose 70 - 125 mg/dL 77 93 92 91 145 (H)    Calcium 8.5 - 10.5 mg/dL 8.9 8.6 9.1 9.3 9.3    BUN 8 - 22 mg/dL 47 (H) 53 (H) 69 (H) 77 (H) 78 (H)    Creatinine 0.60 - 1.10 mg/dL 2.02 (H) 1.90 (H) 1.80 (H) 1.99 (H) 2.31 (H)    GFR MDRD Af Amer >60 mL/min/1.73m2 30 (L) 32 (L) 34 (L) 31 (L) 26 (L)    GFR MDRD Non Af Amer >60 mL/min/1.73m2 25 (L) 27 (L) 28 (L) 25 (L) 21 (L)   Resulting Agency  [facility]              Recent Results (from the past 240 hour(s))   POCT Glucose   Result Value Ref Range    Glucose,  mg/dL   Basic Metabolic Panel   Result Value Ref Range    Sodium 142 136 - 145 mmol/L    Potassium 4.0 3.5 - 5.0 mmol/L    Chloride 108 (H) 98 - 107 mmol/L    CO2 24 22 - 31 mmol/L    Anion Gap, Calculation 10 5 - 18 mmol/L    Glucose 145 (H) 70 - 125 mg/dL    Calcium 9.3 8.5 - 10.5 mg/dL    BUN 78 (H) 8 - 22 mg/dL    Creatinine 2.31 (H) 0.60 - 1.10 mg/dL    GFR MDRD Af Amer 26 (L) >60 mL/min/1.73m2    GFR MDRD Non Af Amer 21 (L) >60 mL/min/1.73m2   POCT Glucose   Result Value Ref Range    Glucose,  mg/dL   Basic Metabolic Panel   Result Value Ref Range    Sodium 144 136 - 145 mmol/L    Potassium 3.9 3.5 - 5.0 mmol/L    Chloride 110 (H) 98 - 107 mmol/L    CO2 25 22 - 31 mmol/L    Anion Gap, Calculation 9 5 - 18 mmol/L    Glucose 91 70 - 125 mg/dL    Calcium 9.3 8.5 - 10.5 mg/dL    BUN 77 (H) 8 - 22 mg/dL    Creatinine 1.99 (H) 0.60 - 1.10 mg/dL    GFR MDRD Af Amer 31 (L) >60 mL/min/1.73m2    GFR MDRD Non Af Amer 25 (L) >60 mL/min/1.73m2   POCT Glucose   Result Value Ref Range    Glucose, POC 92 mg/dL   POCT Glucose   Result Value Ref Range    Glucose,  mg/dL   POCT Glucose   Result Value Ref Range    Glucose,  mg/dL   POCT Glucose   Result Value Ref Range    Glucose,  mg/dL   Basic  Metabolic Panel   Result Value Ref Range    Sodium 144 136 - 145 mmol/L    Potassium 3.6 3.5 - 5.0 mmol/L    Chloride 107 98 - 107 mmol/L    CO2 25 22 - 31 mmol/L    Anion Gap, Calculation 12 5 - 18 mmol/L    Glucose 92 70 - 125 mg/dL    Calcium 9.1 8.5 - 10.5 mg/dL    BUN 69 (H) 8 - 22 mg/dL    Creatinine 1.80 (H) 0.60 - 1.10 mg/dL    GFR MDRD Af Amer 34 (L) >60 mL/min/1.73m2    GFR MDRD Non Af Amer 28 (L) >60 mL/min/1.73m2   Platelet Count   Result Value Ref Range    Platelets 253 140 - 440 thou/uL   POCT Glucose   Result Value Ref Range    Glucose, POC 73 mg/dL   POCT Glucose   Result Value Ref Range    Glucose,  mg/dL   Basic Metabolic Panel   Result Value Ref Range    Sodium 144 136 - 145 mmol/L    Potassium 3.4 (L) 3.5 - 5.0 mmol/L    Chloride 105 98 - 107 mmol/L    CO2 30 22 - 31 mmol/L    Anion Gap, Calculation 9 5 - 18 mmol/L    Glucose 93 70 - 125 mg/dL    Calcium 8.6 8.5 - 10.5 mg/dL    BUN 53 (H) 8 - 22 mg/dL    Creatinine 1.90 (H) 0.60 - 1.10 mg/dL    GFR MDRD Af Amer 32 (L) >60 mL/min/1.73m2    GFR MDRD Non Af Amer 27 (L) >60 mL/min/1.73m2   Basic Metabolic Panel   Result Value Ref Range    Sodium 139 136 - 145 mmol/L    Potassium 3.6 3.5 - 5.0 mmol/L    Chloride 99 98 - 107 mmol/L    CO2 32 (H) 22 - 31 mmol/L    Anion Gap, Calculation 8 5 - 18 mmol/L    Glucose 77 70 - 125 mg/dL    Calcium 8.9 8.5 - 10.5 mg/dL    BUN 47 (H) 8 - 22 mg/dL    Creatinine 2.02 (H) 0.60 - 1.10 mg/dL    GFR MDRD Af Amer 30 (L) >60 mL/min/1.73m2    GFR MDRD Non Af Amer 25 (L) >60 mL/min/1.73m2   BNP(B-type Natriuretic Peptide)   Result Value Ref Range     (H) 0 - 101 pg/mL   HM1 (CBC with Diff)   Result Value Ref Range    WBC 5.8 4.0 - 11.0 thou/uL    RBC 3.77 (L) 3.80 - 5.40 mill/uL    Hemoglobin 8.9 (L) 12.0 - 16.0 g/dL    Hematocrit 29.8 (L) 35.0 - 47.0 %    MCV 79 (L) 80 - 100 fL    MCH 23.6 (L) 27.0 - 34.0 pg    MCHC 29.9 (L) 32.0 - 36.0 g/dL    RDW 20.6 (H) 11.0 - 14.5 %    Platelets 265 140 - 440 thou/uL     MPV 10.9 8.5 - 12.5 fL    Neutrophils % 53 50 - 70 %    Lymphocytes % 28 20 - 40 %    Monocytes % 15 (H) 2 - 10 %    Eosinophils % 3 0 - 6 %    Basophils % 1 0 - 2 %    Neutrophils Absolute 3.1 2.0 - 7.7 thou/uL    Lymphocytes Absolute 1.6 0.8 - 4.4 thou/uL    Monocytes Absolute 0.9 0.0 - 0.9 thou/uL    Eosinophils Absolute 0.2 0.0 - 0.4 thou/uL    Basophils Absolute 0.0 0.0 - 0.2 thou/uL   Manual Differential   Result Value Ref Range    Platelet Estimate Normal Normal    Ovalocytes 1+ (!) Negative    Tear Drop Cells 1+ (!) Negative         Assessment:  1. Morbid obesity with alveolar hypoventilation     2. Chronic diastolic CHF (congestive heart failure)     3. Acute kidney injury superimposed on CKD     4. Cor Pulmonale     5. Obstructive Sleep Apnea     6. Long-term insulin use in type 2 diabetes         Plan: Was delighted to see that the brain natruretic peptide significantly improved from 1322 532.  Unfortunately the creatinine did worsen from 1.9-2.02.  We are going to institute AMANUEL hose or if not possible even Ace wraps and elevate the legs while she is in a chair.  Continue activity and now that she is participating in physical therapy make this much more prominent.  We will DC the s it is not covered and we have not been able to get it.  We are going to briefly realizing that this may affect the kidney function increase the metolazone to 2.5 mg twice daily from daily.  In addition to this we will repeat a BMP magnesium and BN P on Monday as well as increase her potassium to 20 mEq daily.  Of course we are continuing her on her max dose metoprolol succinate 200 mg daily which will help with heart failure.  Also with this high blood pressure.  Dralzine 100 3 times daily will be continued.  Clonidine 0.2 twice daily as well for the blood pressure.  I met with both the patient and the patient's family and answered all their questions.      35 minutes spent of which greater than 65% was face to face  communication with the patient about above plan of care    Electronically signed by: Osiel Nesbitt MD

## 2021-06-25 NOTE — TELEPHONE ENCOUNTER
Not sure what type of restriction she is  referring to. Please get more information from the nursing staff.  Thank you

## 2021-06-26 NOTE — ED PROVIDER NOTES
EMERGENCY DEPARTMENT ENCOUNTER      NAME: Sun Mireles  AGE: 65 y.o. female  YOB: 1955  MRN: [id number]  EVALUATION DATE & TIME: 6/5/2021 10:27 AM    PCP: Collin Doran MD    ED PROVIDER: Marge Ovalle M.D.      Chief Complaint   Patient presents with     Cough     Shortness of Breath         FINAL IMPRESSION:  1. Chronic obstructive pulmonary disease, unspecified COPD type (H)    2. ESRD (end stage renal disease) on dialysis (H)    3. Orthopnea          ED COURSE & MEDICAL DECISION MAKING:    Pertinent Labs & Imaging studies reviewed. (See chart for details)  65 y.o. female presents to the Emergency Department for evaluation of shortness of breath progressive.    10:40 AM Met with patient to gather history and perform exam. ED course and treatment plan discussed. PPE: surgical mask, hair cap and goggles.  12:38 PM I spoke to Dr. Matthews, nephrologst about patient's lab results.   12:57 PM I spoke to Dr. Matthews, nephrologist and discussed information.  See below for their recommendation.  1:02 PM I spoke to RN for patient's follow up treatment plan for discharge.       She struggles with chronic shortness of breath and orthopnea.  Chart review shows history of missing dialysis repeatedly andthen becoming fluid overloaded, though the patient tells me she did go to her dialysis appointments this week and states that they do not tell her when she is at her dry weight or not.  I have encouraged her to ask them this question at the end of dialysis because that is an important question for her.  Otherwise, she does have a cough but notes the cough typically comes when she gets fluid overloaded and has no associated fever, and nothing is coming up with the cough.  So, although cannot rule out overlying infection, this does sound like her chronic issues with fluid overload.  She does also have some issues with sleep apnea and COPD and is on chronic inhalers for that.  Currently she is not wheezing  at all.  She is maintaining oxygenation on a few liters of nasal cannula.  She does have as needed nasal cannula available at home but states in the last few days she has needed it more.  No chest pain in the last few days to suggest that there was a recent cardiac ischemic event, and her heart rate is regular today so it does not appear to be decompensated atrial fibrillation.  She does not have any increase in her normal bowel movements but does struggle with chronic anemia and apparently has had GI bleeding in the past which is why she is not on chronic anticoagulation for history of paroxysmal atrial fibrillation.  She does not make urine and so is not on diuretics.  She stayed overnight at her daughter's house last night and so did not get her morning medications, but we are not able to get a hold of [place] [employer] whose phone seems to be broken this morning so we are going to give her what should be her normal morning blood pressure medications based on her last discharge note from the end of May.  At that time she was admitted for fluid overload related to missed dialysis appointments, hyperkalemia, and although there was a component of COPD they did not feel it was an acute attack and as she did not tolerate the prednisone because of her blood sugars they discontinued steroids and antibiotics.  Her covid test was negative and without new fever, no need for a new test unless admitted.    ED Course as of Jun 05 1444   Sat Jun 05, 2021   1150 Hemoglobin slightly lower than last check.  May be dilutional but no frequent bm here.  Has been 8.8 as recent as April.  Will need close follow up.  Awaiting bnp and cxr and then will discuss with renal medicine if bnp still elevated to see if they would like to keepfor dialysis or else continue outpatient schedule on Monday.  She is on oxygen here but doing well withnasal cannula oxygen on.    [NB]   1153 Cxr appears similar to 5/26 just before that discharge.  No  effusion seen and heart size enlarged but similar.  Final reading pending    [NB]   1219 Patient doing well, almost flat in bed but without hypoxia or resp distress.  Bnp is elevated but not as bad as previous.      [NB]   1257 Weight today is lower than her dry weight, so they will try to push her goal weight a bit lower.  Renal medicine notes that there is also a copd component that improved with scheduled nebs which she likely did not get at her daughters so we will discharge home to continue outpatient plan, and have [facility] make sure she gets her 4 times daily nebs and prn nebs today and give her morning inhalers.    [NB]      ED Course User Index  [NB] Marge Ovalle MD     Patient was asked about the possibility of going to her daughters who does have some of her medications but just for the next course of her meds and really does not have a full day supply also unclear though she has the nebulizer treatments if she has the inhalers.  We also have an air advisory warning out this weekend and with any transport needing to go back and forth because super important that she gets her medications at her care facility, I think that would be putting her body under too much stress and she would just end up worsening.  She is comfortable with that advice and is agreeable to go back to [facility] so transport was arranged for her to return to her care facility today.    She will follow-up with her primary care physician next week and nephrology has made recommendations about decreasing her goal weight to draw off more fluid.  She also complains of her chronic neuropathy and leg swelling.  I do think she may benefit from a pulmonologist if that is able to be set up through her primary care as she seems to be on max of her inhalers and still struggles with what seems like COPD breathing issues.  I advised her to speak with her pmd about that who otherwise has been managing her breathing to this  point.    Because of air quality, DuoNeb ordered just prior to transport.  Tylenol given for her chronic leg pain.    At the conclusion of the encounter I discussed the results of all of the tests and the disposition. The questions were answered. The patient or family acknowledged understanding and was agreeable with the care plan.     0 minutes of critical care time not including time for procedures which is documented separately under procedures heading.    MEDICATIONS GIVEN IN THE EMERGENCY:  Medications   sodium chloride flush 10 mL (NS) (has no administration in time range)   acetaminophen tablet 650 mg (TYLENOL) (has no administration in time range)   ipratropium-albuteroL 0.5-2.5 mg/3 mL nebulizer solution 3 mL (DUO-NEB) (has no administration in time range)   albuterol inhaler 2 puff (PROAIR HFA;PROVENTIL HFA;VENTOLIN HFA) (2 puffs Inhalation Given 6/5/21 1129)   traMADoL tablet 100 mg (ULTRAM) (100 mg Oral Given 6/5/21 1100)   losartan tablet 50 mg (COZAAR) (50 mg Oral Given 6/5/21 1129)   NIFEdipine 24 hr tablet 30 mg (PROCARDIA XL) (30 mg Oral Given 6/5/21 1129)       NEW PRESCRIPTIONS STARTED AT TODAY'S ER VISIT  Current Discharge Medication List      CONTINUE these medications which have NOT CHANGED    Details   ACCU-CHEK RACHEL CONTROL SOLN Soln       acetaminophen (TYLENOL) 325 MG tablet Take 2 tablets (650 mg total) by mouth 4 (four) times a day.  Refills: 0    Associated Diagnoses: Pain      albuterol (PROAIR HFA;PROVENTIL HFA;VENTOLIN HFA) 90 mcg/actuation inhaler Inhale 2 puffs every 6 (six) hours as needed for wheezing.    Comments: May substitute the equivalent medication per insurance preference.      allopurinol (ZYLOPRIM) 100 MG tablet Take 1 tablet (100 mg total) by mouth daily.  Refills: 0    Associated Diagnoses: Gout, unspecified cause, unspecified chronicity, unspecified site      ascorbic acid, vitamin C, (ASCORBIC ACID WITH MILKA HIPS) 500 MG tablet Take 500 mg by mouth 2 (two) times a  day.      aspirin 81 MG EC tablet Take 1 tablet (81 mg total) by mouth daily.  Refills: 0    Associated Diagnoses: [facility]      benzonatate (TESSALON) 100 MG capsule Take 2 capsules (200 mg total) by mouth 3 (three) times a day as needed for cough.  Qty: 21 capsule, Refills: 0    Associated Diagnoses: Cough      cholecalciferol, vitamin D3, 5,000 unit Tab Take 5,000 Units by mouth daily.       coenzyme Q10 (COQ-10) 100 mg capsule Take 2 capsules (200 mg total) by mouth daily.  Refills: 0    Associated Diagnoses: History of statin therapy      COLCRYS 0.6 mg tablet Take 0.3 mg by mouth once a week. Saturdays      DIAPER,BRIEF,ADULT, DISPOSABLE (DEPEND PANTS MISC) Use As Directed. Large      dibucaine (NUPERCAINAL) 1 % ointment Apply 1 application topically 4 (four) times a day. Apply to external rectum topically four times a day for rectum pain related to unspecified hemorrhoids x 7 days, then four times a day as needed.      diclofenac sodium (VOLTAREN) 1 % Gel Apply 1 g topically 3 (three) times a day as needed (bilateral toes).      DIMETHICONE-ZINC OXIDE TOP Apply 1 application topically see administration instructions. Apply to vulva with each diaper topically every shift for pain.  Apply with each diaper change and after voiding;  5-5% cream      diphenhydrAMINE (BENADRYL) 25 mg capsule Take 1 capsule (25 mg total) by mouth 2 (two) times a day as needed for itching.  Qty: 30 capsule, Refills: 0    Associated Diagnoses: Pruritus      docusate sodium (COLACE) 100 MG capsule Take 100 mg by mouth daily.      EASY TOUCH LANCING DEVICE Misc Use to test blood sugar twice a day.  Generic: Easy Touch Lancing Device  Qty: 1 each, Refills: 2    Associated Diagnoses: Long-term insulin use in type 2 diabetes (H)      !! EASY TOUCH TWIST LANCETS 28 gauge Misc USE TO CHECK BLOOD SUGAR FOUR TIMES A DAY  Qty: 100 each, Refills: 1    Comments: NEED REFILLS, FAX [phone] OR CALL [phone] .  "THAN  Associated Diagnoses: DM (diabetes mellitus) (H)      gabapentin (NEURONTIN) 100 MG capsule Take 200 mg by mouth 3 (three) times a week. Monday, Wednesday, Friday at bedtime on non-HD days      !! generic lancets (ACCU-CHEK SOFTCLIX LANCETS) CHECK 4 TIMES DAILY  Qty: 100 each, Refills: 2    Comments: Refill [id number]  Associated Diagnoses: DM (diabetes mellitus) (H)      hydrocortisone acetate 15 mg Supp Insert 25 mg into the rectum daily.      ipratropium-albuteroL (DUO-NEB) 0.5-2.5 mg/3 mL nebulizer Take 3 mL by nebulization 4 (four) times a day.  Refills: 0    Associated Diagnoses: Chronic obstructive pulmonary disease, unspecified COPD type (H)      lidocaine 4 % patch Place 1 patch on the skin daily. Remove and discard patch with 12 hours or as directed by MD.      losartan (COZAAR) 50 MG tablet Take 1 tablet (50 mg total) by mouth daily.  Qty: 30 tablet, Refills: 0    Associated Diagnoses: Essential hypertension, benign      montelukast (SINGULAIR) 10 mg tablet Take 10 mg by mouth at bedtime.      NIFEdipine (PROCARDIA XL) 30 MG 24 hr tablet Take 1 tablet (30 mg total) by mouth daily.  Qty: 30 tablet, Refills: 0    Associated Diagnoses: Essential hypertension, benign      omeprazole (PRILOSEC) 20 MG capsule Take 1 capsule (20 mg total) by mouth daily before breakfast.  Refills: 0    Associated Diagnoses: Preventative health care      oxybutynin (DITROPAN XL) 5 MG ER tablet Take 5 mg by mouth every evening.       pen needle, diabetic (BD ULTRA-FINE CHARITO PEN NEEDLE) 32 gauge x 5/32\" Ndle Use 1 needle daily with Levemir flexpen as directed  Qty: 100 each, Refills: 3    Associated Diagnoses: Type 2 diabetes mellitus with complication, with long-term current use of insulin (H)      pen needle, diabetic, safety (NOVOFINE AUTOCOVER) 30 gauge x 1/3\" Ndle USE FIVE TIMES DAILY AS DIRECTED  Qty: 500 each, Refills: 3    Associated Diagnoses: Diabetes (H)      polyethylene glycol (MIRALAX) 17 gram packet Take 17 g by " mouth daily as needed.      rosuvastatin (CRESTOR) 10 MG tablet Take 10 mg by mouth daily.       senna-docusate (SENNOSIDES-DOCUSATE SODIUM) 8.6-50 mg tablet Take 1 tablet by mouth daily as needed for constipation.  Refills: 0    Associated Diagnoses: Chronic pain syndrome      sevelamer carbonate (RENVELA) 800 mg tablet Take 1 tablet (800 mg total) by mouth 3 (three) times a day with meals.  Qty: 90 tablet, Refills: 0    Associated Diagnoses: ESRD (end stage renal disease) on dialysis (H)      timoloL maleate (TIMOPTIC) 0.5 % ophthalmic solution Administer 1 drop to both eyes 2 (two) times a day.      traMADoL 100 mg Tab Take 100 mg by mouth every 12 (twelve) hours as needed for pain.  Qty: 8 tablet, Refills: 0    Associated Diagnoses: Diabetic polyneuropathy associated with type 2 diabetes mellitus (H)      wheelchair [employer] mobility device  Face-to-face exam  October 6, 2016  impaired mobility  Length of need : 99  Qty: 1 each, Refills: 0       !! - Potential duplicate medications found. Please discuss with provider.             =================================================================    HPI    Patient information was obtained from: patient and RN.    Use of : N/A       Sun Mireles is a 65 y.o. female with a pertinent history of atrial fibrillation, hypertension, obesity, CHF, DM2 with usage of insulin, and ESRD who presents to this ED via EMS for evaluation of cough and shortness of breath.     Per RN, patient lives independently in a senior living home. She was visiting her daughter. Where she was transported from EMS to ED. She was given 4L NC from medics. Patient's blood sugar is 136. Patient uses 2L of O2 at her facility. Patient has had previous COVID-19 test that resulted in negative.     Patient reports she has been experiencing shortness of breath and discomfort while laying on her back for a while now. She says that it has never improved throughout the time and had been  "worsening since 2 weeks. But states that since last night while she was visiting her daughter it had worsened even more. She has not taken any medications prior to arrival. She states that she \"can't breathe\" when laying on her back. She says that laying on her side is more comfortable. She says that the symptoms waxes and wanes. There would be periods of time where things would be normal and then the symptoms recur again. At present, patient has a cough, she says that it is usually common for her. She states that 3 weeks ago, her legs were normal but has noticed her legs and feet swelling up. She states that she has also been experiencing chills for a while now. Denies fever. No other complaints at this time.     She states that she does take oxygen at home and for a couple of weeks has been using it more frequently. Patient does need the assistance of a wheelchair and walker to move around. She takes dialysis every Monday, Wednesday, and Friday.       REVIEW OF SYSTEMS   Review of Systems   Constitutional: Positive for chills. Negative for fever.   Respiratory: Positive for cough and shortness of breath.    Cardiovascular: Positive for leg swelling.   All other systems reviewed and are negative.     Otherwise, outside of that noted in the HPI, all other systems negative.    PAST MEDICAL HISTORY:  Past Medical History:   Diagnosis Date     Acute diastolic heart failure (H)     Created by Conversion      Acute on chronic respiratory failure with hypoxia and hypercapnia (H) 06/08/2017     Aneurysm of vertebral artery (H)     Right     Asthma      Benign Essential Hypertension     Created by Conversion      Bilateral edema of lower extremity 06/28/2016     Chronic diastolic CHF (congestive heart failure) (H)      Chronic pain 06/15/2014     COPD (chronic obstructive pulmonary disease) (H)      Cor Pulmonale     Created by Conversion  Replacement Utility updated for latest IMO load     Depression with anxiety      " Diabetic polyneuropathy associated with type 2 diabetes mellitus (H) 06/28/2016    Previously on gabapentin which helped, but stopped in the hospital due to potential drug interaction.  Lyrica made her sleepy.     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    Non-ketotic; diabetes type 2; >500     Localized Osteoarthritis Of The Knee     Created by Conversion  Replacement Utility updated for latest IMO load     Long-term insulin use in type 2 diabetes (H)     Created by Conversion      Microcytic anemia     Created by Conversion      Morbid obesity (H)      Morbid obesity with alveolar hypoventilation (H)     Created by Conversion      Neuromuscular disorder (H)      Obstructive sleep apnea      Obstructive Sleep Apnea     Created by Conversion      Perianal abscess 11/01/2019    S/P incision, drainage and debridement on 11/9/19 Dr CORREIA INCISION AND DRAINAGE, ABSCESS, RECTAL OR PERIRECTAL;       Postmenopausal bleeding     Created by Conversion      Pulmonary HTN (H) 06/08/2017     Pyelonephritis 12/26/2018     Shortness of breath     Created by Conversion      Subclinical hyperthyroidism 10/11/2020     Tobacco use 07/30/2015     Urinary incontinence      Urinary Incontinence     Created by Conversion        PAST SURGICAL HISTORY:  Past Surgical History:   Procedure Laterality Date     CYSTOSCOPY W/ URETERAL STENT PLACEMENT Bilateral 1/22/2021    Procedure: CYSTOSCOPY, WITH RETROGRADE PYELOGRAM AND URETERAL STENT PLACEMENT BILATERAL, CLOT EVACUATION CLOT EVACUATION OF BLADDER;  Surgeon: Timoteo Webster MD;  Location: [facility];  Service: Urology     CYSTOSCOPY W/ URETERAL STENT PLACEMENT Bilateral 2/3/2021    Procedure: CYSTOSCOPY, WITH RETROGRADE PYELOGRAM AND FULGERATIONAND BILATERAL URETERAL STENT REPLACEMENT,;  Surgeon: Darin  Timotoe BAEZA MD;  Location: [facility] OR;  Service: Urology     CYSTOSCOPY W/ URETERAL STENT PLACEMENT Bilateral 2/10/2021    Procedure: CYSTOSCOPY, WITH BILATERAL URETERAL STENT REMOVAL, BILATERAL URETERAL RETROGRADES, STANTON PLACEMENT;  Surgeon: Gomez Cristina MD;  Location: [facility] OR;  Service: Urology     EP PACEMAKER INSERT N/A 10/23/2020    Procedure: EP Pacemaker Insertion;  Surgeon: Wilbert Bolaños MD;  Location: [facility];  Service: Cardiology     EYE SURGERY       INCISION AND DRAINAGE PERIRECTAL ABSCESS Left 2019    Procedure: INCISION AND DRAINAGE, ABSCESS, RECTAL OR PERIRECTAL;  Surgeon: Yumiko Hancock DO;  Location: [facility] OR;  Service: General     IR NON TUNNELED CATHETER >5 YEARS  2019     IR TUNNELED CATHETER INSERT  11/15/2019     PICC INSERTION - TRIPLE LUMEN  10/20/2020          CT  DELIVERY ONLY      Description:  Section;  Recorded: 10/20/2011;     CT CYSTOURETHROSCOPY,BIOPSY N/A 2/3/2021    Procedure: BLADDER BIOPSY;  Surgeon: Timoteo Webster MD;  Location: [facility] OR;  Service: Urology     CT LIGATE FALLOPIAN TUBE      Description: Tubal Ligation;  Recorded: 10/20/2011;     CT REMOVAL GALLBLADDER      Description: Cholecystectomy;  Recorded: 10/20/2011;           CURRENT MEDICATIONS:    No current facility-administered medications on file prior to encounter.      Current Outpatient Medications on File Prior to Encounter   Medication Sig     ACCU-CHEK RACHEL CONTROL SOLN Soln      acetaminophen (TYLENOL) 325 MG tablet Take 2 tablets (650 mg total) by mouth 4 (four) times a day. (Patient taking differently: Take 650 mg by mouth every 6 (six) hours as needed. )     albuterol (PROAIR HFA;PROVENTIL HFA;VENTOLIN HFA) 90 mcg/actuation inhaler Inhale 2 puffs every 6 (six) hours as needed for wheezing.     allopurinol (ZYLOPRIM) 100 MG tablet Take 1 tablet (100 mg total) by mouth daily.     ascorbic acid, vitamin C, (ASCORBIC  ACID WITH MILKA HIPS) 500 MG tablet Take 500 mg by mouth 2 (two) times a day.     aspirin 81 MG EC tablet Take 1 tablet (81 mg total) by mouth daily.     benzonatate (TESSALON) 100 MG capsule Take 2 capsules (200 mg total) by mouth 3 (three) times a day as needed for cough.     cholecalciferol, vitamin D3, 5,000 unit Tab Take 5,000 Units by mouth daily.      coenzyme Q10 (COQ-10) 100 mg capsule Take 2 capsules (200 mg total) by mouth daily.     COLCRYS 0.6 mg tablet Take 0.3 mg by mouth once a week. Saturdays     DIAPER,BRIEF,ADULT, DISPOSABLE (DEPEND PANTS MISC) Use As Directed. Large     dibucaine (NUPERCAINAL) 1 % ointment Apply 1 application topically 4 (four) times a day. Apply to external rectum topically four times a day for rectum pain related to unspecified hemorrhoids x 7 days, then four times a day as needed.     diclofenac sodium (VOLTAREN) 1 % Gel Apply 1 g topically 3 (three) times a day as needed (bilateral toes).     DIMETHICONE-ZINC OXIDE TOP Apply 1 application topically see administration instructions. Apply to vulva with each diaper topically every shift for pain.  Apply with each diaper change and after voiding;  5-5% cream     diphenhydrAMINE (BENADRYL) 25 mg capsule Take 1 capsule (25 mg total) by mouth 2 (two) times a day as needed for itching.     docusate sodium (COLACE) 100 MG capsule Take 100 mg by mouth daily.     EASY TOUCH LANCING DEVICE Misc Use to test blood sugar twice a day.  Generic: Easy Touch Lancing Device     EASY TOUCH TWIST LANCETS 28 gauge Misc USE TO CHECK BLOOD SUGAR FOUR TIMES A DAY     gabapentin (NEURONTIN) 100 MG capsule Take 200 mg by mouth 3 (three) times a week. Monday, Wednesday, Friday at bedtime on non-HD days     generic lancets (ACCU-CHEK SOFTCLIX LANCETS) CHECK 4 TIMES DAILY     hydrocortisone acetate 15 mg Supp Insert 25 mg into the rectum daily.     ipratropium-albuteroL (DUO-NEB) 0.5-2.5 mg/3 mL nebulizer Take 3 mL by nebulization 4 (four) times a day.      "lidocaine 4 % patch Place 1 patch on the skin daily. Remove and discard patch with 12 hours or as directed by MD.     losartan (COZAAR) 50 MG tablet Take 1 tablet (50 mg total) by mouth daily.     montelukast (SINGULAIR) 10 mg tablet Take 10 mg by mouth at bedtime.     NIFEdipine (PROCARDIA XL) 30 MG 24 hr tablet Take 1 tablet (30 mg total) by mouth daily.     omeprazole (PRILOSEC) 20 MG capsule Take 1 capsule (20 mg total) by mouth daily before breakfast.     oxybutynin (DITROPAN XL) 5 MG ER tablet Take 5 mg by mouth every evening.      pen needle, diabetic (BD ULTRA-FINE CHARITO PEN NEEDLE) 32 gauge x 5/32\" Ndle Use 1 needle daily with Levemir flexpen as directed     pen needle, diabetic, safety (NOVOFINE AUTOCOVER) 30 gauge x 1/3\" Ndle USE FIVE TIMES DAILY AS DIRECTED     polyethylene glycol (MIRALAX) 17 gram packet Take 17 g by mouth daily as needed.     rosuvastatin (CRESTOR) 10 MG tablet Take 10 mg by mouth daily.      senna-docusate (SENNOSIDES-DOCUSATE SODIUM) 8.6-50 mg tablet Take 1 tablet by mouth daily as needed for constipation.     sevelamer carbonate (RENVELA) 800 mg tablet Take 1 tablet (800 mg total) by mouth 3 (three) times a day with meals.     timoloL maleate (TIMOPTIC) 0.5 % ophthalmic solution Administer 1 drop to both eyes 2 (two) times a day.     traMADoL 100 mg Tab Take 100 mg by mouth every 12 (twelve) hours as needed for pain.     wheelchair [employer] mobility device  Face-to-face exam  October 6, 2016  impaired mobility  Length of need : 99     [DISCONTINUED] fluticasone propionate (FLONASE) 50 mcg/actuation nasal spray Apply 2 sprays into each nostril daily.     [DISCONTINUED] miconazole (MICOTIN) 2 % powder Apply topically 2 (two) times a day for 7 days.     [DISCONTINUED] NOVOLOG FLEXPEN U-100 INSULIN 100 unit/mL (3 mL) injection pen Check blood sugar four (4) times daily.  11.65 Type 2 with hyperglycemia  BD Ultra-fine Charito Pen Needles - NDC [address]-94 - dispense 1 case,  refill PRN " for 1 year       ALLERGIES:  Allergies   Allergen Reactions     Beta-Blockers (Beta-Adrenergic Blocking Agts) Other (See Comments)     Heart block     Metoprolol Other (See Comments)     Heart block       FAMILY HISTORY:  Family History   Problem Relation Age of Onset     Hypertension Mother      COPD Mother      Diabetes Mother      Hypertension Father      COPD Father      Diabetes Father      Heart attack Maternal Grandmother      Hypertension Maternal Grandmother      Alcohol abuse Sister      Drug abuse Sister      COPD Sister      Diabetes Sister      Hypertension Sister      Heart disease Brother      Hypertension Brother      Hypertension Daughter      Hypertension Son      Hypertension Maternal Grandfather      Heart attack Maternal Grandfather      Hypertension Paternal Grandmother      Heart attack Paternal Grandmother      Hypertension Paternal Grandfather        SOCIAL HISTORY:   Social History     Socioeconomic History     Marital status: Single     Spouse name: None     Number of children: None     Years of education: None     Highest education level: None   Occupational History     None   Social Needs     Financial resource strain: None     Food insecurity     Worry: None     Inability: None     Transportation needs     Medical: None     Non-medical: None   Tobacco Use     Smoking status: Former Smoker     Quit date: 2014     Years since quittin.3     Smokeless tobacco: Never Used   Substance and Sexual Activity     Alcohol use: No     Drug use: No     Sexual activity: None   Lifestyle     Physical activity     Days per week: None     Minutes per session: None     Stress: None   Relationships     Social connections     Talks on phone: None     Gets together: None     Attends [employer] service: None     Active member of club or organization: None     Attends meetings of clubs or organizations: None     Relationship status: None     Intimate partner violence     Fear of current or ex partner:  "None     Emotionally abused: None     Physically abused: None     Forced sexual activity: None   Other Topics Concern     None   Social History Narrative    Reports on disability. She is not working and lives with her children and friend.  She reports she turns to her doctor in times of crisis.  She reports she needs assistance with ADLs and has a PCA 11 hours per day, 7 days per week.  Does not have a home nurse.         VITALS:  Patient Vitals for the past 24 hrs:   BP Temp Temp src Pulse Resp SpO2 Height Weight   06/05/21 1300 158/67 -- -- 78 16 92 % -- --   06/05/21 1230 170/77 98.8  F (37.1  C) Oral 77 15 92 % -- --   06/05/21 1200 (!) 168/117 -- -- 78 19 93 % -- --   06/05/21 1130 151/70 -- -- 76 22 96 % -- --   06/05/21 1100 167/63 -- -- 74 13 100 % -- --   06/05/21 1030 (!) 147/97 -- -- 79 18 96 % -- --   06/05/21 1029 (!) 139/98 98  F (36.7  C) Oral 79 22 96 % 5' 4\" (1.626 m) (!) 225 lb (102.1 kg)       PHYSICAL EXAM    Constitutional:  Well nourished, NAD.  HENT:  Normocephalic, posterior pharynx wnl, mucous membranes moist and light pink, wearing mask   Eyes:  PERRL, EOMI, Conjunctiva normal, No discharge, no scleral icterus.  Respiratory:  Breathing easily, cta  Cardiovascular:  rrr nl s1s2 0 murmurs, rubs, or gallops.  Peripheral pulses dp, pt, and radial are wnl.  Edma lower extremity wrinkled and not tight appearing.  GI:  Bowel sounds normal, Soft, No tenderness, No flank tenderness, nondistended.  :No CVA tenderness.   Musculoskeletal:  Moves all extremities.  No erythematous or swollen major joints.   Integument:  Warm, Dry, No erythema, No rash. Skin-dry skin, dialysis catheter right chest wall surrounding skin healthy with no redness. Dressing-clean, dry and intact  Lymphatic:  No cervical lymphadenopathy  Neurologic:  Alert & oriented x 3, Normal motor function, Normal sensory function, No focal deficits noted. Normal speech.  Psychiatric:  Affect normal, Judgment normal, Mood normal.    "     LAB:  All pertinent labs reviewed and interpreted.  Results for orders placed or performed during the hospital encounter of 06/05/21   Basic Metabolic Panel   Result Value Ref Range    Sodium 139 136 - 145 mmol/L    Potassium 4.1 3.5 - 5.0 mmol/L    Chloride 101 98 - 107 mmol/L    CO2 25 22 - 31 mmol/L    Anion Gap, Calculation 13 5 - 18 mmol/L    Glucose 127 (H) 70 - 125 mg/dL    Calcium 8.5 8.5 - 10.5 mg/dL    BUN 50 (H) 8 - 22 mg/dL    Creatinine 6.39 (HH) 0.60 - 1.10 mg/dL    GFR MDRD Af Amer 8 (L) >60 mL/min/1.73m2    GFR MDRD Non Af Amer 7 (L) >60 mL/min/1.73m2   BNP(B-type Natriuretic Peptide)   Result Value Ref Range    BNP 1,758 (H) 0 - 112 pg/mL   HM2(CBC w/o Differential)   Result Value Ref Range    WBC 8.3 4.0 - 11.0 thou/uL    RBC 3.08 (L) 3.80 - 5.40 mill/uL    Hemoglobin 8.8 (L) 12.0 - 16.0 g/dL    Hematocrit 28.2 (L) 35.0 - 47.0 %    MCV 92 80 - 100 fL    MCH 28.6 27.0 - 34.0 pg    MCHC 31.2 (L) 32.0 - 36.0 g/dL    RDW 15.9 (H) 11.0 - 14.5 %    Platelets 213 140 - 440 thou/uL    MPV 10.7 8.5 - 12.5 fL   Blood Gases, Venous   Result Value Ref Range    pH, Venous 7.39 7.35 - 7.45    pCO2, Venous 46 35 - 50 mm Hg    pO2, Jack 35 25 - 47 mm Hg    Base Excess, Venous 2.4 mmol/L    HCO3, Venous 25.7 24.0 - 30.0 mmol/L    Oxyhemoglobin 60.8 (L) 70.0 - 75.0 %    O2 Sat, Venous 62.3 (L) 70.0 - 75.0 %   Troponin I   Result Value Ref Range    Troponin I 0.04 0.00 - 0.29 ng/mL   POCT Glucose    Specimen: Blood   Result Value Ref Range    Glucose 138 70 - 139 mg/dL       RADIOLOGY:  Reviewed all pertinent imaging. Please see official radiology report.  Xr Chest 1 View Portable    Result Date: 6/5/2021  EXAM: XR CHEST 1 VIEW PORTABLE LOCATION: [facility] DATE/TIME: 6/5/2021 11:50 AM INDICATION: orthopnea progressive, cough COMPARISON: 5/26/2021     No new consolidation. Mild decrease in pulmonary venous congestion. No large effusions or pneumothorax. Unchanged cardiac size. Left-sided  cardiac rhythm maintenance device. Right internal jugular vascular catheter tip projects over the cavoatrial junction.      EKG:    Performed at: 1029    Impression: nsr with pac's, rate of 79.  Nonspecific st findings in I, avl with flipped t waves, and in v5-6 isoelectric st segment with flipped t waves.   q wave in avl suggests lateral infarct pattern ag eindeterminate.  Pr 190ms, qrs 80ms, qtc 463ms, prt axes 60 56 178.    Compared to 5/26/21previously paced rhythm is not seen today as now in nsr.  Amplitude increased in lateral limb leads now revelaling the age indeterminate lateral infarct pattern, and t waves now isoelectric/flipped in v5-6.       I have independently reviewed and interpreted the EKG(s) documented above.    PROCEDURES:   0      I, Kiara Link, am serving as a scribe to document services personally performed by Dr. Yamile MD, based on my observation and the provider's statements to me. I, Marge Ovalle MD attest that Kiara Link is acting in a scribe capacity, has observed my performance of the services and has documented them in accordance with my direction.    Marge Ovalle M.D.  Emergency Medicine  [facility] EMERGENCY ROOM  [address] [address]  Dept: [phone]  Loc: [phone]     Marge Ovalle MD  06/05/21 1304       Marge Ovalle MD  06/05/21 1441       Marge Ovalle MD  06/05/21 1442

## 2021-06-26 NOTE — PROGRESS NOTES
Clinic Care Coordination Contact    Situation: Patient chart reviewed by care coordinator.    Background: SW completed chart review    Assessment: Per recent notes, patient is still at [facility]     Plan/Recommendations: SW will chart review in one month

## 2021-06-29 NOTE — PROGRESS NOTES
Progress Notes by Sarah Strickland CNP at 9/25/2020  1:30 PM     Author: Sarah Strickland CNP Service: -- Author Type: Nurse Practitioner    Filed: 9/25/2020  3:19 PM Encounter Date: 9/25/2020 Status: Signed    : Sarah Strickland CNP (Nurse Practitioner)             Assessment/Recommendations   Assessment:    1.  Heart failure with preserved ejection fraction, ejection fraction 55%: She has no symptoms of acute fluid retention.  Echocardiogram later today.     2.  Paroxysmal atrial fibrillation and atrial flutter: She is not on anticoagulation due to a history of GI bleed.    3.  Hypertension: Blood pressure controlled today at 114/54    4.  Chronic kidney disease on dialysis    Plan:  1.  Continue current medications  2.  Echo done today and results pending  3.  Low-sodium diet    Sun Mireles will follow up with Dr. Banerjee in 1-2 months.       History of Present Illness/Subjective    Ms. Sun Mireles is a 64 y.o. female seen at [facility] today for follow-up.  She has a history of paroxysmal atrial fibrillation, atrial flutter, heart failure with preserved ejection fraction, COPD, diabetes, obstructive sleep apnea, and chronic kidney disease on dialysis.  He currently lives at [facility] was referred from nursing [place] to cardiology.  Echocardiogram from November 2019 showed ejection fraction of 55%.    She denies any shortness of breath today.  She is on oxygen.  Her main concern is stomach pain.  Her primary care provider prescribed a new GERD medication this week.  She denies lightheadedness, orthopnea, chest pain and lower extremity edema.      She has dialysis on Tuesdays, Thursdays, and Saturdays.  She denies any problems or limitations at dialysis.       ECHO:   Results for orders placed during the hospital encounter of 11/01/19   Echo Complete [ECH10] 11/09/2019    Narrative   1.Left ventricle ejection fraction is normal.  The calculated left   ventricular ejection fraction is 55%.    2.TAPSE is abnormal, which is consistent with abnormal right ventricular   systolic function. Right ventricular hypertrophy present.    3.Moderately dilated pulmonary artery.    4.Estimated central venous pressure equal to 15 mmHg as evidenced by   dilated inferior vena cava.    5.No hemodynamically significant valvular heart abnormalities other than   the minimal regurgitant lesions mentioned below.    6.Moderate to severe pulmonary hypertension suggested.    7.When compared to the previous study dated 3/3/2019, right ventricular   hypertrophy and dysfunction now seen, pulmonary artery is now dilated,   significant pulmonary hypertension is present.           Physical Examination Review of Systems   Vitals:    09/25/20 1345   BP: 114/54   Pulse: 67   Resp: 14   SpO2: 93%     Body mass index is 33.13 kg/m .  Wt Readings from Last 3 Encounters:   09/25/20 193 lb (87.5 kg)   09/18/20 200 lb (90.7 kg)   09/03/20 208 lb (94.3 kg)       General Appearance:     Alert, cooperative and in no acute distress.   ENT/Mouth: membranes moist, no oral lesions or bleeding gums.      EYES:  no scleral icterus, normal conjunctivae   Chest/Lungs:   lungs are clear to auscultation, no rales or wheezing, respirations unlabored   Cardiovascular:   Regular. Normal first and second heart sounds, no edema bilateral lower extremities    Abdomen:  Soft, nontender, nondistended, bowel sounds present   Extremities: no cyanosis or clubbing   Skin: warm, dry.    Neurologic: mood and affect are appropriate, alert and oriented x3                                                Medical History  Surgical History Family History Social History   Past Medical History:   Diagnosis Date   ? Acute arthritis 6/15/2014   ? Acute diastolic heart failure (H)     Created by Conversion    ? Acute kidney injury superimposed on CKD (H)     Created by Conversion    ? Acute on chronic respiratory failure  with hypoxia and hypercapnia (H) 6/8/2017   ? Aneurysm Of The Right Vertebral Artery     Created by Conversion    ? Aneurysm of vertebral artery (H)     Right   ? Asthma    ? Back pain 6/15/2014   ? Benign Essential Hypertension     Created by Conversion    ? Bilateral edema of lower extremity 6/28/2016   ? Candidiasis, intertrigo 6/28/2016   ? CHF (congestive heart failure) (H)    ? Chronic diastolic CHF (congestive heart failure) (H)    ? Chronic kidney disease (CKD), stage IV (severe) (H)    ? Chronic pain 6/15/2014   ? COPD (chronic obstructive pulmonary disease) (H)    ? Cor Pulmonale     Created by Conversion  Replacement Utility updated for latest IMO load   ? Diabetic polyneuropathy associated with type 2 diabetes mellitus (H) 6/28/2016    Previously on gabapentin which helped, but stopped in the hospital due to potential drug interaction.  Lyrica made her sleepy.   ? Eczema 4/9/2018   ? GERD (gastroesophageal reflux disease)    ? Glaucoma    ? Glaucoma     Created by Conversion    ? Gout     Created by Conversion    ? Hypercholesteremia    ? Joint pain, localized in the right shoulder 1/8/2015   ? Lethargy 7/8/2014   ? Localized Osteoarthritis Of The Knee     Created by Conversion  Replacement Utility updated for latest IMO load   ? Long-term insulin use in type 2 diabetes (H)     Created by Conversion    ? Lower Back Pain     Created by Conversion    ? Microcytic anemia     Created by Conversion    ? Morbid obesity (H)    ? Morbid obesity with alveolar hypoventilation (H)     Created by Conversion    ? Neuromuscular disorder (H)    ? Obstructive sleep apnea    ? Obstructive Sleep Apnea     Created by Conversion    ? Osteoarthritis of knee    ? Postmenopausal bleeding     Created by Conversion    ? Pulmonary HTN (H) 6/8/2017   ? Reactive depression    ? Shortness of breath     Created by Conversion    ? Tobacco use 7/30/2015   ? Urinary incontinence    ? Urinary Incontinence     Created by Conversion      Past Surgical History:   Procedure Laterality Date   ? EYE SURGERY     ? INCISION AND DRAINAGE PERIRECTAL ABSCESS Left 2019    Procedure: INCISION AND DRAINAGE, ABSCESS, RECTAL OR PERIRECTAL;  Surgeon: Yumiko Hancock DO;  Location: [facility] OR;  Service: General   ? IR NON TUNNELED CATHETER >5 YEARS  2019   ? IR TUNNELED CATHETER INSERT  11/15/2019   ? NC  DELIVERY ONLY      Description:  Section;  Recorded: 10/20/2011;   ? NC LIGATE FALLOPIAN TUBE      Description: Tubal Ligation;  Recorded: 10/20/2011;   ? NC REMOVAL GALLBLADDER      Description: Cholecystectomy;  Recorded: 10/20/2011;    Family History   Problem Relation Age of Onset   ? Hypertension Mother    ? COPD Mother    ? Diabetes Mother    ? Hypertension Father    ? COPD Father    ? Diabetes Father    ? Heart attack Maternal Grandmother    ? Hypertension Maternal Grandmother    ? Alcohol abuse Sister    ? Drug abuse Sister    ? COPD Sister    ? Diabetes Sister    ? Hypertension Sister    ? Heart disease Brother    ? Hypertension Brother    ? Hypertension Daughter    ? Hypertension Son    ? Hypertension Maternal Grandfather    ? Heart attack Maternal Grandfather    ? Hypertension Paternal Grandmother    ? Heart attack Paternal Grandmother    ? Hypertension Paternal Grandfather     Social History     Socioeconomic History   ? Marital status: Single     Spouse name: Not on file   ? Number of children: Not on file   ? Years of education: Not on file   ? Highest education level: Not on file   Occupational History   ? Not on file   Social Needs   ? Financial resource strain: Not on file   ? Food insecurity     Worry: Not on file     Inability: Not on file   ? Transportation needs     Medical: Not on file     Non-medical: Not on file   Tobacco Use   ? Smoking status: Former Smoker     Last attempt to quit: 2014     Years since quittin.6   ? Smokeless tobacco: Never Used   Substance and Sexual Activity   ? Alcohol use:  No   ? Drug use: No   ? Sexual activity: Not on file   Lifestyle   ? Physical activity     Days per week: Not on file     Minutes per session: Not on file   ? Stress: Not on file   Relationships   ? Social connections     Talks on phone: Not on file     Gets together: Not on file     Attends [employer] service: Not on file     Active member of club or organization: Not on file     Attends meetings of clubs or organizations: Not on file     Relationship status: Not on file   ? Intimate partner violence     Fear of current or ex partner: Not on file     Emotionally abused: Not on file     Physically abused: Not on file     Forced sexual activity: Not on file   Other Topics Concern   ? Not on file   Social History Narrative    Reports on disability. She is not working and lives with her children and friend.  She reports she turns to her doctor in times of crisis.  She reports she needs assistance with ADLs and has a PCA 11 hours per day, 7 days per week.  Does not have a home nurse.          Medications  Allergies   Current Outpatient Medications   Medication Sig Dispense Refill   ? acetaminophen (TYLENOL) 325 MG tablet Take 2 tablets (650 mg total) by mouth 4 (four) times a day.  0   ? allopurinol (ZYLOPRIM) 100 MG tablet Take 1 tablet (100 mg total) by mouth daily.  0   ? amiodarone (PACERONE) 200 MG tablet Take 1 tablet (200 mg total) by mouth 2 (two) times a day.  0   ? amLODIPine (NORVASC) 10 MG tablet      ? aspirin 81 MG EC tablet Take 1 tablet (81 mg total) by mouth daily.  0   ? cetirizine (ZYRTEC) 10 MG tablet Take 10 mg by mouth daily.     ? cloNIDine (CATAPRES-TTS) 0.2 mg/24 hr      ? COLCRYS 0.6 mg tablet      ? docusate sodium (COLACE) 100 MG capsule Take 1 capsule (100 mg total) by mouth daily.  0   ? epoetin padmini (EPOGEN,PROCRIT) 20,000 unit/mL injection Inject 2 mL (40,000 Units total) under the skin once a week in the evening.  0   ? fluconazole (DIFLUCAN) 100 MG tablet Take 200 mg (two tabs) PO x 1  day after dialysis. Than take 100 mg (1 tab) PO Qday after dialysis for an additional 13 days. 15 tablet 0   ? fluconazole (DIFLUCAN) 150 MG tablet      ? furosemide (LASIX) 20 MG tablet Take 60 mg by mouth 2 (two) times a day at 9am and 6pm.     ? gabapentin (NEURONTIN) 100 MG capsule Take 200 mg by mouth at bedtime.  0   ? lansoprazole (PREVACID) 30 MG capsule Take 1 capsule (30 mg total) by mouth daily. 30 capsule 3   ? lidocaine 4 % patch Place 1 patch on the skin daily. Remove and discard patch with 12 hours or as directed by MD.  0   ? magnesium oxide (MAG-OX) 400 mg (241.3 mg magnesium) tablet      ? metoprolol succinate (TOPROL-XL) 100 MG 24 hr tablet Take 1 tablet (100 mg total) by mouth daily.  0   ? montelukast (SINGULAIR) 10 mg tablet Take 10 mg by mouth at bedtime.     ? omeprazole (PRILOSEC) 20 MG capsule Take 1 capsule (20 mg total) by mouth daily before breakfast.  0   ? oxybutynin (DITROPAN XL) 5 MG ER tablet      ? phenazopyridine (PYRIDIUM) 100 MG tablet Take 1 tablet (100 mg total) by mouth 3 (three) times a day. 9 tablet 0   ? potassium chloride (K-DUR,KLOR-CON) 20 MEQ tablet Take 20 mEq by mouth daily.     ? prochlorperazine (COMPAZINE) 10 MG tablet Take 1 tablet (10 mg total) by mouth every 6 (six) hours as needed for nausea. 20 tablet 1   ? rosuvastatin (CRESTOR) 10 MG tablet      ? sevelamer carbonate (RENVELA) 800 mg tablet Take 1 tablet (800 mg total) by mouth 3 (three) times a day with meals.  0   ? timolol maleate (TIMOPTIC) 0.5 % ophthalmic solution 1 gtt both eyes bid 10 mL 12   ? venlafaxine (EFFEXOR-XR) 75 MG 24 hr capsule      ? ACCU-CHEK RACHEL CONTROL SOLN Soln      ? DIAPER,BRIEF,ADULT, DISPOSABLE (DEPEND PANTS MISC) Use As Directed. Large     ? EASY TOUCH LANCING DEVICE Misc Use to test blood sugar twice a day.  Generic: Easy Touch Lancing Device 1 each 2   ? EASY TOUCH TWIST LANCETS 28 gauge Misc USE TO CHECK BLOOD SUGAR FOUR TIMES A  each 1   ? generic lancets (ACCU-CHEK  "SOFTCLIX LANCETS) CHECK 4 TIMES DAILY 100 each 2   ? heparin sodium,porcine (HEPARIN, PORCINE,) 5,000 unit/mL injection      ? heparin sodium,porcine/PF (HEPARIN, PF,) 5,000 unit/0.5 mL Syrg Inject 0.5 mL (5,000 Units total) under the skin every 8 (eight) hours.  0   ? insulin aspart U-100 (NOVOLOG FLEXPEN U-100 INSULIN) 100 unit/mL (3 mL) injection pen Check blood sugar two (2) times daily.  11.9 Type 2 without complications 1 Pre-filled Pen Syringe 0   ? ipratropium-albuterol (DUO-NEB) 0.5-2.5 mg/3 mL nebulizer Take 3 mL by nebulization every 6 (six) hours as needed.  0   ? LEVEMIR FLEXTOUCH U-100 INSULN 100 unit/mL (3 mL) pen Inject 32 units under the skin daily.  Generic: Insulin Detemir  Generic: Insulin Detemir 5 Box 10   ? nystatin (MYCOSTATIN) 100,000 unit/mL suspension      ? pen needle, diabetic (BD ULTRA-FINE CHARITO PEN NEEDLE) 32 gauge x 5/32\" Ndle Use 1 needle daily with Levemir flexpen as directed 100 each 3   ? pen needle, diabetic, safety (NOVOFINE AUTOCOVER) 30 gauge x 1/3\" Ndle USE FIVE TIMES DAILY AS DIRECTED 500 each 3   ? wheelchair [employer] mobility device  Face-to-face exam  October 6, 2016  impaired mobility  Length of need : 99 1 each 0     No current facility-administered medications for this visit.     No Known Allergies      Lab Results    Chemistry/lipid CBC Cardiac Enzymes/BNP/TSH/INR   Lab Results   Component Value Date    CHOL 178 09/17/2019    HDL 35 (L) 09/17/2019    LDLCALC 117 09/17/2019    TRIG 130 09/17/2019    CREATININE 3.19 (H) 09/18/2020    BUN 19 09/18/2020    K 3.3 (L) 09/25/2020     09/18/2020     09/18/2020    CO2 26 09/18/2020    Lab Results   Component Value Date    WBC 6.6 09/18/2020    HGB 11.1 (L) 09/18/2020    HCT 37.5 09/18/2020    MCV 88 09/18/2020     09/18/2020    Lab Results   Component Value Date    CKTOTAL 119 06/07/2017    CKMB 1 02/28/2014    TROPONINI 0.39 (HH) 11/09/2019    BNP 1,177 (H) 09/18/2020    TSH 0.88 05/20/2020    INR 1.21 " (H) 11/20/2019

## 2021-07-03 NOTE — ADDENDUM NOTE
Addendum Note by Collin Doran MD at 12/11/2020 12:09 PM     Author: Collin Doran MD Service: -- Author Type: Physician    Filed: 12/11/2020 12:09 PM Encounter Date: 12/10/2020 Status: Signed    : Collin Doran MD (Physician)    Addended by: COLLIN DORAN on: 12/11/2020 12:09 PM        Modules accepted: Orders

## 2021-07-03 NOTE — ADDENDUM NOTE
Addendum Note by Shaquille Reynolds DO at 12/7/2020  8:28 PM     Author: Shaquille Reynolds DO Service: -- Author Type: Physician    Filed: 12/7/2020  8:28 PM Encounter Date: 12/7/2020 Status: Signed    : Shaquille Reynolds DO (Physician)    Addended by: SHAQUILLE REYNOLDS on: 12/7/2020 08:28 PM        Modules accepted: Orders

## 2021-07-03 NOTE — ADDENDUM NOTE
Addendum Note by Jessenia Jacobs MD at 1/22/2021  5:42 PM     Author: Jessenia Jacobs MD Service: -- Author Type: Anesthesiologist    Filed: 1/22/2021  5:42 PM Date of Service: 1/22/2021  5:42 PM Status: Signed    : Jessenia Jacobs MD (Anesthesiologist)       Addendum  created 01/22/21 1742 by Jessenia Jacobs MD    Clinical Note Signed

## 2021-07-03 NOTE — ADDENDUM NOTE
Addendum Note by Collin Doran MD at 12/16/2020  4:00 PM     Author: Collin Doran MD Service: -- Author Type: Physician    Filed: 12/18/2020  8:28 AM Encounter Date: 12/16/2020 Status: Signed    : Collin Doran MD (Physician)    Addended by: COLLIN DORAN on: 12/18/2020 08:28 AM        Modules accepted: Level of Service

## 2021-07-03 NOTE — ADDENDUM NOTE
Addendum Note by Collin Doran MD at 4/2/2019  5:00 PM     Author: Collin Doran MD Service: -- Author Type: Physician    Filed: 4/2/2019  5:00 PM Encounter Date: 4/2/2019 Status: Signed    : Collin Doran MD (Physician)    Addended by: COLLIN DORAN on: 4/2/2019 05:00 PM        Modules accepted: Orders

## 2021-07-03 NOTE — ADDENDUM NOTE
Addendum Note by Collin Doran MD at 12/4/2020  1:35 PM     Author: Collin Doran MD Service: -- Author Type: Physician    Filed: 12/4/2020  1:35 PM Encounter Date: 12/2/2020 Status: Signed    : Collin Doran MD (Physician)    Addended by: COLLIN DORAN on: 12/4/2020 01:35 PM        Modules accepted: Orders

## 2021-07-04 NOTE — ADDENDUM NOTE
Addendum Note by Collin Doran MD at 5/6/2021 10:02 AM     Author: Collin Doran MD Service: -- Author Type: Physician    Filed: 5/6/2021 10:02 AM Encounter Date: 5/5/2021 Status: Signed    : Collin Doran MD (Physician)    Addended by: COLLIN DORAN on: 5/6/2021 10:02 AM        Modules accepted: Orders

## 2021-07-07 NOTE — TELEPHONE ENCOUNTER
Telephone Encounter by Joyce Miller RN at 7/7/2021  3:46 PM     Author: Joyce Miller RN Service: -- Author Type: Registered Nurse    Filed: 7/7/2021  4:17 PM Encounter Date: 7/7/2021 Status: Signed    : Joyce Miller RN (Registered Nurse)       Triage note    Caller name: Sun  Relation to patient: self      Called patient regarding shortness of breath concerns. shortness of breath with coughing last couple of days. No fever. No mucus. No headache.    Patient also mentioned urinary burning and urgency. Patient states it started 3 days. Urine is not cloudy, and denies any changes to color and order.          Was in middle of call but then patient stated she needed to get off of phone and asked for a call back in 10 minutes.  Will call patient back.    Joyce Miller RN   07/07/21 3:58 PM  [facility] Nurse Advisor  Reason for Disposition  ? Cough    Additional Information  ? Negative: Severe difficulty breathing (e.g., struggling for each breath, speaks in single words)  ? Negative: Bluish (or gray) lips or face now  ? Negative: [1] Rapid onset of cough AND [2] has hives  ? Negative: Coughing started suddenly after medicine, an allergic food or bee sting  ? Negative: [1] Difficulty breathing AND [2] exposure to flames, smoke, or fumes  ? Negative: [1] Stridor AND [2] difficulty breathing  ? Negative: Sounds like a life-threatening emergency to the triager  ? Negative: Chest pain  (Exception: MILD central chest pain, present only when coughing)  ? Negative: Difficulty breathing  ? Negative: Patient sounds very sick or weak to the triager  ? Negative: [1] Coughed up blood AND [2] > 1 tablespoon (15 ml) (Exception: blood-tinged sputum)  ? Negative: Wheezing is present  ? Negative: [1] Fever > 100.0 F (37.8 C) AND [2] diabetes mellitus or weak immune system (e.g., HIV positive, cancer chemo, splenectomy, organ transplant, chronic steroids)  ? Negative: [1] Fever > 100.0 F (37.8 C) AND [2]  bedridden (e.g., nursing home patient, CVA, chronic illness, recovering from surgery)  ? Negative: [1] Fever > 101 F (38.3 C) AND [2] age > 60  ? Negative: Fever > 103 F (39.4 C)  ? Negative: [1] Ankle swelling AND [2] swelling is increasing  ? Negative: SEVERE coughing spells (e.g., whooping sound after coughing, vomiting after coughing)  ? Negative: [1] Continuous (nonstop) coughing interferes with work or school AND [2] no improvement using cough treatment per protocol  ? Negative: Fever present > 3 days (72 hours)  ? Negative: [1] Fever returns after gone for over 24 hours AND [2] symptoms worse or not improved  ? Negative: [1] Using nasal washes and pain medicine > 24 hours AND [2] sinus pain (around cheekbone or eye) persists  ? Negative: Earache is present  ? Negative: Cough has been present for > 3 weeks  ? Negative: [1] Nasal discharge AND [2] present > 10 days  ? Negative: [1] Coughed up blood-tinged sputum AND [2] more than once  ? Negative: [1] Patient also has allergy symptoms (e.g., itchy eyes, clear nasal discharge, postnasal drip) AND [2] they are acting up  ? Negative: Taking an ACE Inhibitor medication  (e.g., benazepril/LOTENSIN, captopril/CAPOTEN, enalapril/VASOTEC, lisinopril/ZESTRIL)  ? Negative: Exposure to TB (Tuberculosis)    Protocols used: COUGH - ACUTE NON-PRODUCTIVE-A-AH

## 2021-07-07 NOTE — TELEPHONE ENCOUNTER
Telephone Encounter by Joyce Miller RN at 7/7/2021  4:13 PM     Author: Joyce Miller RN Service: -- Author Type: Registered Nurse    Filed: 7/7/2021 11:37 PM Encounter Date: 7/7/2021 Status: Addendum    : Joyce Miller RN (Registered Nurse)    Related Notes: Original Note by Joyce Miller RN (Registered Nurse) filed at 7/7/2021  4:17 PM       Regarding shortness of breath/coughing concerns, patient should be able to treat at home with home care advise provided per protocol. Patient states she would like to set-up an appointment with Dr. Doran directly to discuss breathing/coughing and also urination concerns. Patient states she is wheelchair bound and unable to travel without lots of planning so she's prefer a telephone visit but may be able to do an in-person.    Patient living at [facility].   Transferred patient to scheduling to set up an appt, per patient's request.    Jyoce Miller RN   07/07/21 4:15 PM  [facility] Nurse Advisor

## 2021-07-07 NOTE — TELEPHONE ENCOUNTER
Telephone Encounter by Ioana Cancino at 7/7/2021  2:59 PM     Author: Ioana Cancino Service: -- Author Type: Patient Access    Filed: 7/7/2021  3:01 PM Encounter Date: 7/7/2021 Status: Signed    : Ioana Cancino (Patient Access)       Reason for Call:  Other call back      Detailed comments: wants a call from  to talk about her breathing shes in a nursing home and doesn't have a doctor here and just wants to talk to      Phone Number Patient can be reached at: Home number on file [phone] (home)    Best Time: anytime  Can we leave a detailed message on this number?: Yes    Call taken on 7/7/2021 at 2:59 PM by Ioana Cancino

## 2021-07-07 NOTE — TELEPHONE ENCOUNTER
Telephone Encounter by June Coombs MA at 7/7/2021  3:06 PM     Author: June Coombs MA Service: -- Author Type: Medical Assistant    Filed: 7/7/2021  3:06 PM Encounter Date: 7/7/2021 Status: Signed    : June Coombs MA (Medical Assistant)       Are you willing to call pt or would you like a virtual visit?

## 2021-07-07 NOTE — TELEPHONE ENCOUNTER
Telephone Encounter by Collin Doran MD at 7/7/2021  3:31 PM     Author: Collin Doran MD Service: -- Author Type: Physician    Filed: 7/7/2021  3:33 PM Encounter Date: 7/7/2021 Status: Signed    : Collin Doran MD (Physician)       Please have the triage nurse  Call  Her to  Determine if she need to seen acutely in the ER or schedule a virtual visit.

## 2021-07-14 PROBLEM — J44.9 COPD (CHRONIC OBSTRUCTIVE PULMONARY DISEASE) (H): Status: RESOLVED | Noted: 2020-09-24 | Resolved: 2021-01-01

## 2021-07-14 PROBLEM — R57.0 CARDIOGENIC SHOCK (H): Status: RESOLVED | Noted: 2020-01-01 | Resolved: 2020-01-01

## 2021-07-14 PROBLEM — J96.90 RESPIRATORY FAILURE (H): Status: RESOLVED | Noted: 2019-03-03 | Resolved: 2019-03-03

## 2021-07-25 NOTE — TELEPHONE ENCOUNTER
Pain and burning in vagina.  Pain with urination for four days. She says the nursing home RNs aren't doing anything about it. She said she gave a urine sample. I advised she talk with the RNs about possibly getting her to urgent care today or to talk with the doctors about what is going on. She said she wants to go to the ER for treatment today.  She also wanted Dr Doran to know about what is going on. I told her I'd send him a message for Monday.  Poonam Ba RN  [facility]      Reason for Disposition    [1] MILD-MODERATE pain AND [2] present > 24 hours    Additional Information    Negative: Sounds like a life-threatening emergency to the triager    Negative: Followed a genital area injury    Negative: Foreign body in vagina (e.g., tampon)    Negative: Vaginal bleeding is main symptom    Negative: Vaginal discharge is main symptom    Negative: Pain or burning with passing urine (urination) is main symptom    Negative: Menstrual cramps is main symptom    Negative: Abdomen pain is main symptom    Negative: Pubic lice suspected    Negative: Itching or rash of external female genital area (vulva)    Negative: Labor suspected    Negative: Patient sounds very sick or weak to the triager    Negative: [1] SEVERE pain AND [2] not improved 2 hours after pain medicine     Pain at 7    Negative: [1] Genital area looks infected (e.g., draining sore, spreading redness) AND [2] fever    Negative: [1] Something is hanging out of the vagina AND [2] can't easily be pushed back inside    Negative: MODERATE-SEVERE itching (i.e., interferes with school, work, or sleep)    Protocols used: VAGINAL SYMPTOMS-A-AH

## 2021-07-27 NOTE — TELEPHONE ENCOUNTER
Left message x 1. Please review message thread below and advise the patient as indicated. Please schedule if necessary or indicated in message thread.

## 2021-07-27 NOTE — TELEPHONE ENCOUNTER
Reason for Call:  Other returning call    Detailed comments: please call pt she is returning your email you sent her     Phone Number Patient can be reached at: Home number on file [phone] (home)    Best Time: anytime    Can we leave a detailed message on this number? YES    Call taken on 7/27/2021 at 12:34 PM by Ioana Cancino

## 2021-07-28 NOTE — TELEPHONE ENCOUNTER
"Called and left  #1 on   [phone] option [phone] to call clinic back for more information.   \"ok to obtain information upon return call\"  "

## 2021-07-28 NOTE — TELEPHONE ENCOUNTER
Please find out from the patient what are the symptoms she is still having.    Please find out from the nurses where she is living. Did they do a UA, did they do any other tests, do they have the results from the urine culture. What do the nurses think the patient has, what is their assessment.

## 2021-08-02 NOTE — TELEPHONE ENCOUNTER
Called and spoke with nursing supervisor.  Left message with her she says that the nurse caring for her will call us back with this information

## 2021-08-03 PROBLEM — M72.6 NECROTIZING FASCIITIS (H): Status: RESOLVED | Noted: 2020-01-01 | Resolved: 2021-01-01

## 2021-08-03 PROBLEM — E43 UNSPECIFIED SEVERE PROTEIN-CALORIE MALNUTRITION (H): Status: RESOLVED | Noted: 2020-09-24 | Resolved: 2021-01-01

## 2021-08-09 NOTE — PROGRESS NOTES
Clinic Care Coordination Contact    Situation: Patient chart reviewed by care coordinator.    Background: SW completed chart review    Assessment: Patient appears to continue to be at the [facility]    Plan/Recommendations: SW will chart review in 4 weeks

## 2021-09-09 NOTE — TELEPHONE ENCOUNTER
Reason for Call:  Home Health Care    [facility] (reason for call[facility]     Is Dr Doran is willing to follow patient while in home care for phone and fax?    Pt Provider: Dr Doran    Phone Number Homecare Nurse can be reached at: [phone]    Can we leave a detailed message on this number? YES    Phone number patient can be reached at: Home number on file [phone] (home)    Best Time: any    Call taken on 9/9/2021 at 10:05 AM by Pam J. Behr

## 2021-09-12 NOTE — TELEPHONE ENCOUNTER
Caller: MOOSE George from [employer], [phone]    INR 2.5 today.  Need warfarin dosing instructions.    Recently in hospital   D/C'd 9/8/21.      Called [phone] to reach the on call provider, Dr Torres.  Left a vm message asking provider to call me at [phone].      5mg po daily   Recheck INR Tuesday per Dr Torres.  Dosing and inr check info called to [facility] nurse.    Velma KIRK RN [facility]

## 2021-09-12 NOTE — PROGRESS NOTES
Spoke with care team triage nurse, reviewed discharged summary from regions  8/27/2021    No coumadin prescribed    Restart coumadin 5 mg daily recheck INR 9/14/2021    Has clinic visit scheduled 9/21/2021

## 2021-09-16 NOTE — PROGRESS NOTES
Clinic Care Coordination Contact    Situation: Patient chart reviewed by care coordinator.    Background: MAR completed chart review    Assessment: Patient was inpatient and was discharged home. Homecare orders were requested. Patient is not inpatient again at [facility]    Plan/Recommendations: MAR will chart review in two weeks

## 2021-09-25 NOTE — ED TRIAGE NOTES
"Pt arrives via [facility] from home with c/o blood in urine since yesterday, UTI sx, and back pain. Pt is on dialysis, has had many UTIs. Pt has necrotizing fascitis \"somewhere on her body\" per EMS.   Pt lives with her daughter who is the primary caregiver and POA. Daughter is coming to be with pt.    "

## 2021-09-25 NOTE — ED NOTES
Bed: JNED-05  Expected date: 9/25/21  Expected time: 4:20 PM  Means of arrival: Ambulance  Comments:  Jamey yeboah

## 2021-09-25 NOTE — ED PROVIDER NOTES
EMERGENCY DEPARTMENT ENCOUNTER       ED Course & Medical Decision Making     I did see and examine the patient.  IV was established and she was placed on the monitor.  Diagnostics were ordered.  She is placed on supplemental oxygen as on room air she is in the upper 80s for an SPO2 sat.    I did independently interpret EKG done today at 1646.  Atrially paced rhythm with a rate of 64 bpm.  After every pacer spike there is a P QRS complex followed by T wave which appears normal with normal intervals.  Axis is normal.  Narrow complex.  No significant ST elevation or depression.  No pathological Q waves.  Compared to EKG from June 2021 atrially paced rhythm replaces sinus rhythm      Metabolic panel is pending.  Chest x-ray shows no significant lung pathology to explain her low oxygen saturations.  Covid test is pending.  Urinalysis is pending as well.  CT abdomen pelvis is pending.  Her daughter is on her way.  I do suspect that she will require admission because of her hypoxia, increased oxygen demand and her confusion.    With UA and CT imaging pending she is signed out to Dr. Briones at shift change      5:00 PM I met with the patient for the initial interview and physical examination. Discussed plan for treatment and workup in the ED.   5:15 PM I updated the patient.    Prior to making a final disposition on this patient the results of patient's tests and other diagnostic studies were discussed with the patient. All questions were answered. Patient expressed understanding of the plan and was amenable to it.    Medications - No data to display    PPE: Provider wore gloves, N95 mask, eye protection, surgical cap, and paper mask.  Final Impression     1. Acute respiratory failure with hypoxia (H)    2. Acute encephalopathy            Chief Complaint     Chief Complaint   Patient presents with     UTI     Back Pain     Hematuria       Pt arrives via [facility] from home with c/o blood in urine since yesterday, UTI  "sx, and back pain. Pt is on dialysis, has had many UTIs. Pt has necrotizing fascitis \"somewhere on her body\" per EMS.   Pt lives with her daughter who is the primary caregiver and POA. Daughter is coming to be with pt.        HPI     Sun Mireles is a 65 year old female with pertinent medical history of acute diastolic heart failure, atrial fibrillation, hypertension, hydronephrosis, CHF, NYHA class I, cor pulmonale, complete heart block, degeneration of lumbar or lumbosacral intervertebral disc, depression, diabetes mellitus type 2,GERD, hyperkalemia, lymphedema, obesity, hyperlipidemia, occipital stroke, pacemaker, UTI, chronic obstructive lung disease, chronic pulmonary edema, ESRD, NSTEMI, acute respiratory failure who presents to the ED via EMS for evaluation of a UTI, back pain, and hematuria.    Per chart review (9/14/2021), the patient visited [facility] for weakness, confusion, vaginal  disconfort, and concerns of a UTI. Upon arrival, her oxygen sat was 80s, so she was  put on a nasal cannula. Her daughter shared of failure to thrive in the patient. She was found to have pyuria, and bcteriuria and was given ceftiaxone and a Phan catheter. They discussed palliative care and finding a PCP.    For the past 3-4 days, the patient has been experiencing leg pain, and slight hematuria. She states that it does not feel like a bladder infection. The patient reported not sleeping well last night, and endorses feeling extra tired today (9/25). Currently, she is on a nasal cannula upon arrival to the ED. She is normally not on oxygen. Her daughter called EMS to present her to the ED. The patient reports not being able to move, when she normally is able to ambulate with her walker. Additionally, she has a cough. She typically goes to dialysis, and her last full run was yesterday. Upon questioning, the patient states that she believes it is 1991.    Of note, the patient is not vaccinated against Covid.  Of " note, the patient states that she lives at home with her daughter.    I, Shaunna Joseph am serving as a scribe to document services personally performed by Po Connell M.D. based on my observation and the provider's statements to me. I, Po Connell M.D attest that Shaunna Goolivia is acting in a scribe capacity, has observed my performance of the services and has documented them in accordance with my direction.    Past Medical History     Past Medical History:   Diagnosis Date     Acute diastolic heart failure (H)      Acute on chronic respiratory failure with hypoxia and hypercapnia (H) 06/08/2017     Aneurysm of vertebral artery (H)      Aneurysm of vertebral artery (H)      Asthma      Asthma      Asthma      Bilateral edema of lower extremity 06/28/2016     CHF (congestive heart failure) (H)      CHF (congestive heart failure) (H)      Chronic diastolic CHF (congestive heart failure) (H)      Chronic kidney disease      Chronic kidney disease, stage 4, severely decreased GFR (H)      Chronic pain 06/15/2014     Chronic pulmonary heart disease (H)      Chronic pulmonary heart disease, unspecified      COPD (chronic obstructive pulmonary disease) (H)      Depression with anxiety      Diabetic polyneuropathy associated with type 2 diabetes mellitus (H) 06/28/2016     Eczema 04/09/2018     Essential hypertension, benign      GERD (gastroesophageal reflux disease)      Glaucoma      Gout, unspecified      Gout, unspecified      Hypercholesteremia      Hypercholesterolemia      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     Hypertension      Localized osteoarthrosis not specified whether primary or secondary, lower leg      Localized osteoarthrosis, lower leg      Long-term insulin use in type 2 diabetes (H)      Microcytic anemia      Morbid obesity (H)      Morbid obesity (H)      Morbid obesity with alveolar hypoventilation (H)      Neuromuscular disorder (H)      Obstructive sleep apnea      Obstructive sleep  apnea (adult) (pediatric)      Obstructive sleep apnea (adult) (pediatric)      Perianal abscess 2019     Postmenopausal bleeding      Pulmonary HTN (H) 2017     Pyelonephritis 2018     Shortness of breath      Subclinical hyperthyroidism 10/11/2020     Tobacco use 2015     Type II or unspecified type diabetes mellitus with unspecified complication, not stated as uncontrolled      Unspecified glaucoma(365.9)      Unspecified glaucoma(365.9)      Unspecified urinary incontinence      Unspecified urinary incontinence      Urinary incontinence      Past Surgical History:   Procedure Laterality Date     C  DELIVERY ONLY      Description:  Section;  Recorded: 10/20/2011;     C LIGATE FALLOPIAN TUBE      Description: Tubal Ligation;  Recorded: 10/20/2011;     C/SECTION, LOW TRANSVERSE  89     COMBINED CYSTOSCOPY, INSERT STENT URETER(S) Bilateral 2021    Procedure: CYSTOSCOPY, WITH RETROGRADE PYELOGRAM AND URETERAL STENT PLACEMENT BILATERAL, CLOT EVACUATION CLOT EVACUATION OF BLADDER;  Surgeon: Timoteo Webster MD;  Location: [facility];  Service: Urology     COMBINED CYSTOSCOPY, INSERT STENT URETER(S) Bilateral 2/3/2021    Procedure: CYSTOSCOPY, WITH RETROGRADE PYELOGRAM AND FULGERATIONAND BILATERAL URETERAL STENT REPLACEMENT,;  Surgeon: Timoteo Webster MD;  Location: [facility] OR;  Service: Urology     COMBINED CYSTOSCOPY, INSERT STENT URETER(S) Bilateral 2/10/2021    Procedure: CYSTOSCOPY, WITH BILATERAL URETERAL STENT REMOVAL, BILATERAL URETERAL RETROGRADES, STANTON PLACEMENT;  Surgeon: Gomez Cristina MD;  Location: [facility] OR;  Service: Urology     EP PACEMAKER INSERT N/A 10/23/2020    Procedure: EP Pacemaker Insertion;  Surgeon: Wilbert Bolaños MD;  Location: [facility] Cath Lab;  Service: Cardiology     EYE SURGERY       HC REMOVAL GALLBLADDER      Description: Cholecystectomy;  Recorded: 10/20/2011;     INCISION AND DRAINAGE PERIRECTAL  ABSCESS Left 2019    Procedure: INCISION AND DRAINAGE, ABSCESS, RECTAL OR PERIRECTAL;  Surgeon: Yumiko Hancock DO;  Location: [facility] Main OR;  Service: General     IR CVC NON TUNNEL PLACEMENT  2019     IR CVC TUNNEL PLACEMENT > 5 YRS OF AGE  11/15/2019     IR MISCELLANEOUS PROCEDURE  2013     IR NON TUNNELED CATHETER >5 YEARS  2019     IR TUNNELED CATHETER INSERT  11/15/2019     PICC INSERTION - TRIPLE LUMEN  10/20/2020          WA CYSTOURETHROSCOPY,BIOPSY N/A 2/3/2021    Procedure: BLADDER BIOPSY;  Surgeon: Timoteo Webster MD;  Location: [facility] Main OR;  Service: Urology     TUBAL LIGATION  80, 89     Family History   Problem Relation Age of Onset     Hypertension Other      Diabetes Other      Cancer Maternal Grandfather         stomach cancer     Hypertension Mother      Chronic Obstructive Pulmonary Disease Mother      Diabetes Mother      Hypertension Father      Chronic Obstructive Pulmonary Disease Father      Diabetes Father      Coronary Artery Disease Maternal Grandmother      Hypertension Maternal Grandmother      Alcoholism Sister      Substance Abuse Sister      Chronic Obstructive Pulmonary Disease Sister      Diabetes Sister      Hypertension Sister      Heart Disease Brother      Hypertension Brother      Hypertension Daughter      Hypertension Son      Hypertension Maternal Grandfather      Coronary Artery Disease Maternal Grandfather      Hypertension Paternal Grandmother      Coronary Artery Disease Paternal Grandmother      Hypertension Paternal Grandfather       Social History     Tobacco Use     Smoking status: Former Smoker     Packs/day: 1.00     Years: 45.00     Pack years: 45.00     Quit date: 2014     Years since quittin.6     Smokeless tobacco: Never Used   Substance Use Topics     Alcohol use: No     Drug use: No       Relevant past medical, surgical, family and social history as documented above, has been reviewed and discussed with patient. No  "changes or additions, unless otherwise noted in the HPI.    Current Medications     acetaminophen (TYLENOL) 325 MG tablet  albuterol (PROAIR HFA/PROVENTIL HFA/VENTOLIN HFA) 108 (90 Base) MCG/ACT inhaler  allopurinol (ZYLOPRIM) 100 MG tablet  Ascorbic Acid (VITAMIN C-MILKA HIPS) 500 MG CHEW  aspirin 81 MG EC tablet  benzonatate (TESSALON) 200 MG capsule  coenzyme Q-10 200 MG CAPS  colchicine (COLCYRS) 0.6 MG tablet  diclofenac (VOLTAREN) 1 % topical gel  Dimethicone-Zinc Oxide 5-5 % CREA  diphenhydrAMINE (BENADRYL) 25 MG tablet  docusate sodium (COLACE) 100 MG tablet  Epoetin Johnnie (EPOGEN IJ)  fluticasone (FLONASE) 50 MCG/ACT nasal spray  furosemide (LASIX) 40 MG tablet  gabapentin (NEURONTIN) 100 MG capsule  ipratropium - albuterol 0.5 mg/2.5 mg/3 mL (DUONEB) 0.5-2.5 (3) MG/3ML neb solution  Iron Sucrose (VENOFER IV)  Lidocaine (LIDOCARE) 4 % Patch  losartan (COZAAR) 50 MG tablet  magnesium oxide (MAG-OX) 400 (240 Mg) MG tablet  montelukast (SINGULAIR) 10 MG tablet  NIFEdipine ER (ADALAT CC) 30 MG 24 hr tablet  omeprazole (PRILOSEC) 20 MG DR capsule  oxybutynin ER (DITROPAN-XL) 5 MG 24 hr tablet  polyethylene glycol (MIRALAX) 17 g packet  rosuvastatin (CRESTOR) 10 MG tablet  senna-docusate (SENOKOT-S/PERICOLACE) 8.6-50 MG tablet  sevelamer carbonate (RENVELA) 800 MG tablet  timolol maleate (TIMOPTIC) 0.5 % ophthalmic solution  Vitamin D3 (CHOLECALCIFEROL) 125 MCG (5000 UT) tablet        Allergies     Allergies   Allergen Reactions     Beta-Blockers (Beta-Adrenergic Blocking Agts) [Beta Adrenergic Blockers] Other (See Comments)     Heart block     Metoprolol Other (See Comments)     Heart block       Review of Systems     Review of Systems   Constitutional: Positive for activity change (\"not able to move\") and fatigue.   Respiratory: Positive for cough.    Genitourinary: Positive for hematuria.   Musculoskeletal:        Positive for leg pain.   Psychiatric/Behavioral: Positive for sleep disturbance (Positive for not " "sleeping last night).   All other systems reviewed and are negative.       Remainder of systems reviewed, unless noted in HPI all others negative.    Physical Exam     BP 99/60   Pulse 61   Temp 97.8  F (36.6  C) (Oral)   Resp 18   Ht 1.626 m (5' 4\")   Wt 102.1 kg (225 lb)   SpO2 92%   BMI 38.62 kg/m      Physical Exam  Constitutional:       General: She is not in acute distress.     Appearance: She is ill-appearing. She is not diaphoretic.   HENT:      Head: Atraumatic.      Mouth/Throat:      Pharynx: No oropharyngeal exudate.   Eyes:      General: No scleral icterus.     Pupils: Pupils are equal, round, and reactive to light.   Cardiovascular:      Heart sounds: Normal heart sounds.   Pulmonary:      Effort: No respiratory distress.      Breath sounds: Normal breath sounds.      Comments: Dialysis catheter in right anterior chest wall  Abdominal:      General: Bowel sounds are normal.      Palpations: Abdomen is soft.      Tenderness: There is no abdominal tenderness.   Musculoskeletal:         General: No tenderness.   Skin:     General: Skin is warm.      Findings: No rash.   Neurological:      Comments: She does sleep when not stimulated.  She will wake up to verbal stimulation.  She answers questions but is confused.  No dysarthria.  She moves all 4 extremities symmetrically and with purpose.  She is oriented to self and states that it is 1991             Labs & Imaging         Labs Ordered and Resulted from Time of ED Arrival Up to the Time of Departure from the ED - No data to display                    Po Connell MD  09/25/21 1912    "

## 2021-09-25 NOTE — ED NOTES
"Spoke with pt's daughter. Pt goes to dialysis at [facility] on Mon/Wed/Fri. Missed Wednesday, did full run on Friday. Daughter states that pt has not been wanting to \"do anything for herself lately\". Daughter states that she is on her way here at this time, she said she should be here in about 30 minutes.   "

## 2021-09-26 PROBLEM — E66.01 CLASS 3 SEVERE OBESITY DUE TO EXCESS CALORIES WITH SERIOUS COMORBIDITY AND BODY MASS INDEX (BMI) OF 45.0 TO 49.9 IN ADULT (H): Status: ACTIVE | Noted: 2021-01-01

## 2021-09-26 PROBLEM — F41.8 DEPRESSION WITH ANXIETY: Status: ACTIVE | Noted: 2021-01-01

## 2021-09-26 PROBLEM — I27.9 CHRONIC PULMONARY HEART DISEASE (H): Status: ACTIVE | Noted: 2021-01-01

## 2021-09-26 PROBLEM — Z95.0 PRESENCE OF CARDIAC PACEMAKER: Status: ACTIVE | Noted: 2020-01-01

## 2021-09-26 PROBLEM — F43.23 ADJUSTMENT DISORDER WITH MIXED ANXIETY AND DEPRESSED MOOD: Status: ACTIVE | Noted: 2021-01-01

## 2021-09-26 PROBLEM — G47.33 OBSTRUCTIVE SLEEP APNEA SYNDROME: Status: ACTIVE | Noted: 2021-01-01

## 2021-09-26 PROBLEM — N10 PYELONEPHRITIS, ACUTE: Status: ACTIVE | Noted: 2021-01-01

## 2021-09-26 PROBLEM — E78.00 HYPERCHOLESTEREMIA: Status: ACTIVE | Noted: 2021-01-01

## 2021-09-26 PROBLEM — E66.813 CLASS 3 SEVERE OBESITY DUE TO EXCESS CALORIES WITH SERIOUS COMORBIDITY AND BODY MASS INDEX (BMI) OF 45.0 TO 49.9 IN ADULT (H): Status: ACTIVE | Noted: 2021-01-01

## 2021-09-26 PROBLEM — I50.32 CHRONIC HEART FAILURE WITH PRESERVED EJECTION FRACTION (H): Status: ACTIVE | Noted: 2021-01-01

## 2021-09-26 PROBLEM — N85.8 UTERINE MASS: Status: ACTIVE | Noted: 2021-01-01

## 2021-09-26 PROBLEM — I50.31 ACUTE DIASTOLIC HEART FAILURE (H): Status: ACTIVE | Noted: 2021-01-01

## 2021-09-26 PROBLEM — N13.30 BILATERAL HYDRONEPHROSIS: Status: ACTIVE | Noted: 2021-01-01

## 2021-09-26 PROBLEM — F32.0 MILD MAJOR DEPRESSION (H): Status: ACTIVE | Noted: 2021-01-01

## 2021-09-26 PROBLEM — I44.2 COMPLETE HEART BLOCK (H): Status: ACTIVE | Noted: 2020-01-01

## 2021-09-26 PROBLEM — I10 BENIGN ESSENTIAL HYPERTENSION: Status: ACTIVE | Noted: 2021-01-01

## 2021-09-26 PROBLEM — J96.01 ACUTE RESPIRATORY FAILURE WITH HYPOXIA (H): Status: ACTIVE | Noted: 2021-01-01

## 2021-09-26 PROBLEM — I27.20 PULMONARY HTN (H): Status: ACTIVE | Noted: 2017-06-08

## 2021-09-26 PROBLEM — K62.3 RECTAL PROLAPSE: Status: ACTIVE | Noted: 2021-01-01

## 2021-09-26 PROBLEM — G93.40 ACUTE ENCEPHALOPATHY: Status: ACTIVE | Noted: 2021-01-01

## 2021-09-26 PROBLEM — I48.0 PAROXYSMAL ATRIAL FIBRILLATION (H): Status: ACTIVE | Noted: 2020-09-25

## 2021-09-26 PROBLEM — R39.81 FUNCTIONAL INCONTINENCE: Status: ACTIVE | Noted: 2021-01-01

## 2021-09-26 PROBLEM — Z86.73 HISTORY OF CEREBROVASCULAR ACCIDENT: Status: ACTIVE | Noted: 2021-01-01

## 2021-09-26 PROBLEM — B37.49 CANDIDURIA: Status: ACTIVE | Noted: 2021-01-01

## 2021-09-26 PROBLEM — E66.2 OBESITY HYPOVENTILATION SYNDROME (H): Status: ACTIVE | Noted: 2021-01-01

## 2021-09-26 NOTE — PROVIDER NOTIFICATION
RCAT assessment.    Pt on Duo neb QID per home regimen, see score below.     09/26/21 1428   RCAT Assessment   Reason for Assessment COPD   Pulmonary Status 3   Surgical Status 0   Chest X-ray 0   Respiratory Pattern 0   Mental Status 1   Breath Sounds 2   Cough Effectiveness 2   Level of Activity 1   O2 Required for SpO2>=92% 1   Acuity Level (points) 10   Acuity Level  4   Re-eval Interval Guideline   (pt on home regmin)

## 2021-09-26 NOTE — H&P
[facility]    History and Physical - Hospitalist Service       Date of Admission:  9/25/2021    Assessment & Plan      Sun Mireles is a 65 year old female admitted on 9/25/2021. She was brought to the emergency department for evaluation of hematuria.    1. Acute cystitis with hematuria  Urine culture from September 14, 2021 showed Candida and multiple organism probable contaminants, she was treated with fluconazole x7 days  UA showed pyuria, hematuria and few bacteria  Afebrile but CRP 11.4 and lactic acid 3 although she does not appear septic  Was evaluated by ID and urology at [facility] hospitalization  Continue antibiotics and follow-up urine culture    2. Chronic respiratory failure with hypoxia  Secondary to obesity hypoventilatory syndrome, KHUSHBOO, COPD, pulmonary hypertension  Patient is supposed to be on 2 L oxygen and CPAP however she states has none at home  Continue oxygen to keep saturation above 88%  Home oxygen evaluation    3. ESRD  On hemodialysis Monday, Wednesday, Friday  Missed dialysis on September 22 but had full run on September 24, 2021  Does not appear volume overloaded or in need for emergent hemodialysis  Nephrology consult as needed if in-house on Monday    4. Cognitive impairment  Intermittent agitation during dialysis and previous hospitalizations  Recent hospitalizations with encephalopathy related to hypercapnia, polypharmacy and uremia  She can answer some questions appropriately when redirected and calm    5. Heart failure with preserved ejection fraction  No signs of acute exacerbation  Chest x-ray showed lungs are grossly clear    6. Large pelvic mass  No changes since last study August 11, 2021  Perhaps contributing to recurrent urinary symptoms  Evaluated at [facility] GYN oncology, per discharge summary note potentially contributing to rectal prolapse and not surgical or chemotherapy candidate    7. Type 2 diabetes mellitus with renal  complications without long-term insulin  Monitor with insulin sliding scale    8. Paroxysmal atrial flutter/fibrillation  Left occipital punctate CVA in August 2021, started warfarin and Plavix discontinued  On Coreg for rate control     Diet:  Renal, diabetic  DVT Prophylaxis: Warfarin  Phan Catheter: Not present  Central Lines: None  Code Status:  Full code    Clinically Significant Risk Factors Present on Admission              # Platelet Defect: home medication list includes an antiplatelet medication      Disposition Plan   Expected discharge:  2 days recommended to transitional care unit once safe disposition plan/ TCU bed available.     The patient's care was discussed with the Patient.    Herbetr Watson MD  [facility]  Securely message with the Vocera Web Console (learn more here)  Text page via [employer] Paging/Directory      ______________________________________________________________________    Chief Complaint   Hematuria    History is obtained from the patient, electronic health record and emergency department physician    History of Present Illness   Sun Mireles is a 65 year old female who was brought to the emergency department for evaluation of hematuria.  Complex medical history reviewed on the electronic record.  Recent hospitalization at [facility] August 10, 2021 due to altered mental status and shortness of breath diagnosed with punctate CVA.  Plavix discontinued and started warfarin with history of paroxysmal atrial flutter/fibrillation.  Hospitalization at [facility] August 27, 2021 due to encephalopathy related to hypercapnia.  Hospitalization at [facility] September 14, 2021 due to hematuria, altered mental status; urine culture grew Candida and multiple organism probable contaminants treated with fluconazole x7 days.  She was evaluated by ID and urology at that time.  She has a complex medical history including morbid obesity, COPD, KHUSHBOO,  obesity hypoventilatory syndrome, pulmonary hypertension.  Per previous notes she is supposed to be on 2 L oxygen and use a CPAP but the patient states she has no oxygen or CPAP machine at home.  She also has ESRD on hemodialysis Monday Wednesday Friday and occasionally misses hemodialysis.  She missed dialysis on September 22 and had a full run on September 24, 2021.  She also has history of heart failure with preserved ejection fraction, essential hypertension, complete heart block status post pacemaker, type 2 diabetes, depression, GERD, lymphedema, rectal prolapse and pelvic mass.  She lives with her daughter who helps to care for her.  However, patient states she is not sure where the blood on the toilet paper came from after she was wiped.  She denies fevers or chills but complains of dysuria.  In the ED, she was noted with oxygen saturation 87% while on room air requiring supplemental oxygen.    Review of Systems    The 10 point Review of Systems is negative other than noted in the HPI or here.     Past Medical History    I have reviewed this patient's medical history and updated it with pertinent information if needed.   Past Medical History:   Diagnosis Date     Acute diastolic heart failure (H)     Created by Conversion      Acute on chronic respiratory failure with hypoxia and hypercapnia (H) 06/08/2017     Aneurysm of vertebral artery (H)      Aneurysm of vertebral artery (H)     Right     Asthma      Asthma      Asthma      Bilateral edema of lower extremity 06/28/2016     CHF (congestive heart failure) (H)      CHF (congestive heart failure) (H)     diastolic      Chronic diastolic CHF (congestive heart failure) (H)      Chronic kidney disease      Chronic kidney disease, stage 4, severely decreased GFR (H)      Chronic pain 06/15/2014     Chronic pulmonary heart disease (H)     Created by Conversion  Replacement Utility updated for latest IMO load     Chronic pulmonary heart disease, unspecified       COPD (chronic obstructive pulmonary disease) (H)      Depression with anxiety      Diabetic polyneuropathy associated with type 2 diabetes mellitus (H) 06/28/2016    Previously on gabapentin which helped, but stopped in the hospital due to potential drug interaction.  Lyrica made her sleepy.     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    Non-ketotic; diabetes type 2; >500     Hypertension      Localized osteoarthrosis not specified whether primary or secondary, lower leg     Knee     Localized osteoarthrosis, lower leg     Created by Conversion  Replacement Utility updated for latest IMO load     Long-term insulin use in type 2 diabetes (H)     Created by Conversion      Microcytic anemia     Created by Conversion      Morbid obesity (H)     BMI>60     Morbid obesity (H)      Morbid obesity with alveolar hypoventilation (H)     Created by Conversion      Neuromuscular disorder (H)      Obstructive sleep apnea      Obstructive sleep apnea (adult) (pediatric)      Obstructive sleep apnea (adult) (pediatric)     Created by Conversion      Perianal abscess 11/01/2019    S/P incision, drainage and debridement on 11/9/19 Dr CORREIA INCISION AND DRAINAGE, ABSCESS, RECTAL OR PERIRECTAL;       Postmenopausal bleeding     Created by Conversion      Pulmonary HTN (H) 06/08/2017     Pyelonephritis 12/26/2018     Shortness of breath     Created by Conversion      Subclinical hyperthyroidism 10/11/2020     Tobacco use 07/30/2015     Type II or unspecified type diabetes mellitus with unspecified complication, not stated as uncontrolled      Unspecified glaucoma(365.9)      Unspecified glaucoma(365.9)     Created by Conversion      Unspecified urinary incontinence      Unspecified  urinary incontinence     Created by Conversion      Urinary incontinence        Past Surgical History   I have reviewed this patient's surgical history and updated it with pertinent information if needed.  Past Surgical History:   Procedure Laterality Date     C  DELIVERY ONLY      Description:  Section;  Recorded: 10/20/2011;     C LIGATE FALLOPIAN TUBE      Description: Tubal Ligation;  Recorded: 10/20/2011;     C/SECTION, LOW TRANSVERSE  89     COMBINED CYSTOSCOPY, INSERT STENT URETER(S) Bilateral 2021    Procedure: CYSTOSCOPY, WITH RETROGRADE PYELOGRAM AND URETERAL STENT PLACEMENT BILATERAL, CLOT EVACUATION CLOT EVACUATION OF BLADDER;  Surgeon: Timoteo Webster MD;  Location: [facility] OR;  Service: Urology     COMBINED CYSTOSCOPY, INSERT STENT URETER(S) Bilateral 2/3/2021    Procedure: CYSTOSCOPY, WITH RETROGRADE PYELOGRAM AND FULGERATIONAND BILATERAL URETERAL STENT REPLACEMENT,;  Surgeon: Timoteo Webster MD;  Location: [facility] OR;  Service: Urology     COMBINED CYSTOSCOPY, INSERT STENT URETER(S) Bilateral 2/10/2021    Procedure: CYSTOSCOPY, WITH BILATERAL URETERAL STENT REMOVAL, BILATERAL URETERAL RETROGRADES, STANTON PLACEMENT;  Surgeon: Gomez Cristina MD;  Location: [facility] OR;  Service: Urology     EP PACEMAKER INSERT N/A 10/23/2020    Procedure: EP Pacemaker Insertion;  Surgeon: Wilbert Bolaños MD;  Location: [facility] Cath Lab;  Service: Cardiology     EYE SURGERY       HC REMOVAL GALLBLADDER      Description: Cholecystectomy;  Recorded: 10/20/2011;     INCISION AND DRAINAGE PERIRECTAL ABSCESS Left 2019    Procedure: INCISION AND DRAINAGE, ABSCESS, RECTAL OR PERIRECTAL;  Surgeon: Yumiko Hancock DO;  Location: [facility] OR;  Service: General     IR CVC NON TUNNEL PLACEMENT  2019     IR CVC TUNNEL PLACEMENT > 5 YRS OF AGE  11/15/2019     IR MISCELLANEOUS PROCEDURE  2013     IR NON TUNNELED CATHETER >5 YEARS  2019     IR  TUNNELED CATHETER INSERT  11/15/2019     PICC INSERTION - TRIPLE LUMEN  10/20/2020          TN CYSTOURETHROSCOPY,BIOPSY N/A 2/3/2021    Procedure: BLADDER BIOPSY;  Surgeon: Timoteo Webster MD;  Location: [facility];  Service: Urology     TUBAL LIGATION  80, 89       Social History   I have reviewed this patient's social history and updated it with pertinent information if needed.  Social History     Tobacco Use     Smoking status: Former Smoker     Packs/day: 1.00     Years: 45.00     Pack years: 45.00     Quit date: 2014     Years since quittin.6     Smokeless tobacco: Never Used   Substance Use Topics     Alcohol use: No     Drug use: No       Family History   I have reviewed this patient's family history and updated it with pertinent information if needed.  Family History   Problem Relation Age of Onset     Hypertension Other      Diabetes Other      Cancer Maternal Grandfather         stomach cancer     Hypertension Mother      Chronic Obstructive Pulmonary Disease Mother      Diabetes Mother      Hypertension Father      Chronic Obstructive Pulmonary Disease Father      Diabetes Father      Coronary Artery Disease Maternal Grandmother      Hypertension Maternal Grandmother      Alcoholism Sister      Substance Abuse Sister      Chronic Obstructive Pulmonary Disease Sister      Diabetes Sister      Hypertension Sister      Heart Disease Brother      Hypertension Brother      Hypertension Daughter      Hypertension Son      Hypertension Maternal Grandfather      Coronary Artery Disease Maternal Grandfather      Hypertension Paternal Grandmother      Coronary Artery Disease Paternal Grandmother      Hypertension Paternal Grandfather        Prior to Admission Medications   Prior to Admission Medications   Prescriptions Last Dose Informant Patient Reported? Taking?   Ascorbic Acid (VITAMIN C-MILKA HIPS) 500 MG CHEW  Nursing Home Yes No   Sig: Take 500 mg by mouth 2 times daily   Dimethicone-Zinc  Oxide 5-5 % CREA  Nursing Home Yes No   Sig: Apply topically to vulva each shift with each diaper change for pain   Epoetin Johnnie (EPOGEN IJ)   Yes No   Sig: Inject 3,400 Units as directed three times a week With dialysis Tues, Thur, Sat   Iron Sucrose (VENOFER IV)   Yes No   Sig: Inject 50 mg into the vein once a week On Tuesday at dialysis.   [facility]   Lidocaine (LIDOCARE) 4 % Patch  Nursing Home Yes No   Sig: Place 1 patch onto the skin every 24 hours To prevent lidocaine toxicity, patient should be patch free for 12 hrs daily.   NIFEdipine ER (ADALAT CC) 30 MG 24 hr tablet   Yes No   Sig: Take 30 mg by mouth daily For heart disease   Vitamin D3 (CHOLECALCIFEROL) 125 MCG (5000 UT) tablet  Nursing Home Yes No   Sig: Take 1 tablet by mouth daily For osteoporosis   acetaminophen (TYLENOL) 325 MG tablet  Nursing Home Yes No   Sig: Take 650 mg by mouth 4 times daily For pain   albuterol (PROAIR HFA/PROVENTIL HFA/VENTOLIN HFA) 108 (90 Base) MCG/ACT inhaler  Nursing Home Yes No   Sig: Inhale 2 puffs into the lungs every 6 hours as needed for shortness of breath / dyspnea or wheezing   allopurinol (ZYLOPRIM) 100 MG tablet  Nursing Home Yes No   Sig: Take 100 mg by mouth daily For gout   aspirin 81 MG EC tablet  Nursing Home Yes No   Sig: Take 81 mg by mouth daily For prevention of heart attack   benzonatate (TESSALON) 200 MG capsule   Yes No   Sig: Take 200 mg by mouth 3 times daily For cough   coenzyme Q-10 200 MG CAPS  Nursing Home Yes No   Sig: Take 200 mg by mouth daily For heart disease   colchicine (COLCYRS) 0.6 MG tablet  Nursing Home Yes No   Sig: Take 0.3 mg by mouth once a week Every Saturday evening for gout   diclofenac (VOLTAREN) 1 % topical gel  Nursing Home Yes No   Sig: Apply 1 g topically 3 times daily as needed for moderate pain (to bilateral toes)   diphenhydrAMINE (BENADRYL) 25 MG tablet  Nursing Home Yes No   Sig: Take 25 mg by mouth 2 times daily as needed for itching   docusate sodium  (COLACE) 100 MG tablet  Nursing Home Yes No   Sig: Take 100 mg by mouth daily For constipation   fluticasone (FLONASE) 50 MCG/ACT nasal spray  Nursing Home Yes No   Sig: Spray 2 sprays into both nostrils daily For allergies   furosemide (LASIX) 40 MG tablet   No No   Sig: Take 3 tablets (120 mg) by mouth daily   gabapentin (NEURONTIN) 100 MG capsule  Nursing Home Yes No   Sig: Take 200 mg by mouth three times a week Every Mon, Wed, Friday at bedtime on non-dialysis days for neuropathic pain   ipratropium - albuterol 0.5 mg/2.5 mg/3 mL (DUONEB) 0.5-2.5 (3) MG/3ML neb solution   No No   Sig: Take 1 vial (3 mLs) by nebulization 4 times daily For shortness of breath. Take 4 times a day, everyday!   losartan (COZAAR) 50 MG tablet   Yes No   Sig: Take 50 mg by mouth daily For hypertension   magnesium oxide (MAG-OX) 400 (240 Mg) MG tablet  Nursing Home Yes No   Sig: Take 400 mg by mouth 2 times daily For hypomagnesium   montelukast (SINGULAIR) 10 MG tablet  Nursing Home Yes No   Sig: Take 10 mg by mouth At Bedtime For allergies   omeprazole (PRILOSEC) 20 MG DR capsule  Nursing Home Yes No   Sig: Take 20 mg by mouth daily For GERD   oxybutynin ER (DITROPAN-XL) 5 MG 24 hr tablet  Nursing Home Yes No   Sig: Take 5 mg by mouth every evening For overactive bladder   polyethylene glycol (MIRALAX) 17 g packet  residential Yes No   Sig: Take 1 packet by mouth daily as needed for constipation   rosuvastatin (CRESTOR) 10 MG tablet  Nursing Home Yes No   Sig: Take 10 mg by mouth daily At bedtime for hyperlipidemia   senna-docusate (SENOKOT-S/PERICOLACE) 8.6-50 MG tablet  Nursing Home Yes No   Sig: Take 1 tablet by mouth daily as needed for constipation   sevelamer carbonate (RENVELA) 800 MG tablet   Yes No   Sig: Take 800 mg by mouth With meals for phosphorus supplement   timolol maleate (TIMOPTIC) 0.5 % ophthalmic solution  Nursing Home Yes No   Sig: Place 1 drop into both eyes 2 times daily      Facility-Administered Medications:  None     Allergies   Allergies   Allergen Reactions     Beta-Blockers (Beta-Adrenergic Blocking Agts) [Beta Adrenergic Blockers] Other (See Comments)     Heart block     Metoprolol Other (See Comments)     Heart block       Physical Exam   Vital Signs: Temp: 97.8  F (36.6  C) Temp src: Oral BP: 95/56 Pulse: 62   Resp: 25 SpO2: 94 % O2 Device: Nasal cannula Oxygen Delivery: 2 LPM  Weight: 225 lbs 0 oz    Constitutional: awake, alert, uncooperative and no apparent distress  Eyes: extra-ocular muscles intact  Hematologic / Lymphatic: no cervical lymphadenopathy  Respiratory: no increased work of breathing, mild air exchange, no retractions and diminished breath sounds right base and left base  Cardiovascular: regular rate and rhythm and normal S1 and S2  GI: normal bowel sounds, soft, non-distended and non-tender  Skin: Dry and no bruising or bleeding  Musculoskeletal: Chronic edema,  there is no redness, warmth, or swelling of the joints  Neurologic: Mental Status Exam:  Level of Alertness:   awake  Orientation:   person, place  Motor Exam:  moves all extremities well and symmetrically  Neuropsychiatric: General: restless and normal eye contact  Level of consciousness: alert / normal    Data   Data reviewed today: I reviewed all medications, new labs and imaging results over the last 24 hours. I personally reviewed the EKG tracing showing Atrial paced rhythm at 64 bpm.    Recent Labs   Lab 09/25/21  1759      POTASSIUM 4.6   CHLORIDE 99   CO2 23   BUN 40*   CR 8.34*   ANIONGAP 15   JOANNA 8.3*      ALBUMIN 2.5*   PROTTOTAL 7.2   BILITOTAL 0.6   ALKPHOS 272*   ALT 16   AST 17     Recent Results (from the past 24 hour(s))   XR Chest Port 1 View    Narrative    EXAM: XR CHEST PORT 1 VIEW  LOCATION: [facility]  DATE/TIME: 9/25/2021 5:27 PM    INDICATION: Cough.  COMPARISON: Chest x-rays dated 08/11/2021.      Impression    IMPRESSION:   1. Lungs are grossly clear.  2. Cardiac silhouette  remains enlarged which may be partially due to technique.  3. Right internal jugular dual-lumen tunneled dialysis catheter, and left chest wall battery pack/generator/pacemaker and dual leads are in stable and satisfactory positions.  4. No pneumothorax, significant pleural fluid collection, definite infiltrate, or osseous fracture.   Abd/pelvis CT no contrast - Stone Protocol    Narrative    EXAM: CT ABDOMEN PELVIS W/O CONTRAST  LOCATION: [facility]  DATE/TIME: 9/25/2021 6:48 PM    INDICATION: Flank pain.  COMPARISON: 8/11/2021  TECHNIQUE: CT scan of the abdomen and pelvis was performed without IV contrast. Multiplanar reformats were obtained. Dose reduction techniques were used.  CONTRAST: None.    FINDINGS:   LOWER CHEST: Pacemaker present. Lung bases clear.    HEPATOBILIARY: Prior cholecystectomy. Trace volume of ascitic fluid surrounds liver.    PANCREAS: Normal.    SPLEEN: Normal.    ADRENAL GLANDS: Normal.    KIDNEYS/BLADDER: Renal cysts unchanged. No stone or hydronephrosis evident. Exam somewhat compromised by patient size and motion.    BOWEL: No obstruction or inflammatory finding.    LYMPH NODES: No lymphadenopathy evident.    VASCULATURE: Atherosclerotic changes diffusely. Dilated IVC may relate to elevated right heart pressures.    PELVIC ORGANS: Very large lobulated pelvic mass measuring approximately 15 x 11 cm unchanged and most consistent with fibroid.    MUSCULOSKELETAL: Diffuse degenerative changes and sclerosis of spine.      Impression    IMPRESSION:   1.  Minimal overall change. No urinary tract stone or hydronephrosis evident.  2.  Cardiomegaly, trace ascites, dilated IVC and anasarca.  3.  No change in large pelvic mass most suggestive of uterine fibroid.     CT Head w/o Contrast    Narrative    EXAM: CT HEAD W/O CONTRAST  LOCATION: [facility]  DATE/TIME: 9/25/2021 6:47 PM    INDICATION: Cough, dizziness, congestion, headache, head  pain.  COMPARISON: None.  TECHNIQUE: Routine CT Head without IV contrast. Multiplanar reformats. Dose reduction techniques were used.    FINDINGS:  INTRACRANIAL CONTENTS: No intracranial hemorrhage, extraaxial collection, or mass effect. No CT evidence of acute infarct. Mild presumed chronic small vessel ischemic changes. Mild generalized volume loss. No hydrocephalus. Position of the cerebellar   tonsils is satisfactory. Sella shows no acute abnormality. Chronic area of ischemic change in the right cerebellar hemisphere with mild encephalomalacia and volume loss. Satisfactory position of the cerebellar tonsils. Sella shows no acute abnormality.   Physiologic mineralization left basal ganglia.     VISUALIZED ORBITS/SINUSES/MASTOIDS: Prior bilateral cataract surgery. Visualized portions of the orbits are otherwise unremarkable. Mucosal thickening primarily involving the ethmoid air cells. No air-fluid levels. No middle ear or mastoid effusion.    BONES/SOFT TISSUES: No acute fracture of the calvarium or skull base. No significant swelling of the facial or scalp tissues is apparent.      Impression    IMPRESSION:  1.  No CT evidence for acute intracranial process.  2.  Brain atrophy and presumed chronic microvascular ischemic changes as above.

## 2021-09-26 NOTE — PHARMACY-VANCOMYCIN DOSING SERVICE
Pharmacy Vancomycin Initial Note  Date of Service 2021  Patient's  1955  65 year old, female    Indication: Skin and Soft Tissue Infection    Current estimated CrCl = Estimated Creatinine Clearance: 7.6 mL/min (A) (based on SCr of 8.34 mg/dL (HH)).    Creatinine for last 3 days  2021:  5:59 PM Creatinine 8.34 mg/dL    Recent Vancomycin Level(s) for last 3 days  No results found for requested labs within last 72 hours.      Vancomycin IV Administrations (past 72 hours)      No vancomycin orders with administrations in past 72 hours.                Nephrotoxins and other renal medications (From now, onward)    Start     Dose/Rate Route Frequency Ordered Stop    21 0900  torsemide (DEMADEX) tablet 20 mg      20 mg Oral 2 TIMES DAILY 21 1649      21 0800  [Held by provider]  furosemide (LASIX) tablet 120 mg     (Held by provider since Sun 2021 at 1234 by Ronald Gillette MD.Hold Reason: Other.)    120 mg Oral DAILY 21 0158            Contrast Orders - past 72 hours (72h ago, onward)    None              Plan:  1. Start vancomycin  1500 mg IV once.   a. Will plan to dose vancomycin and  check a vancomycin level after dialysis per policy.  2. Vancomycin monitoring method: Renal Replacement Therapy  3. Vancomycin therapeutic monitoring goal: 10-15 mg/L  4. Pharmacy will check vancomycin levels as appropriate in 1-3 Days.    5. Serum creatinine levels will be ordered per MD/nephrology/with dialysis.      Shannan Marrufo [facility]

## 2021-09-26 NOTE — PLAN OF CARE
Problem: Breathing Pattern Ineffective  Goal: Effective Breathing Pattern  Outcome: No Change

## 2021-09-26 NOTE — PHARMACY-ANTICOAGULATION SERVICE
Clinical Pharmacy - Warfarin Dosing Consult     Pharmacy has been consulted to manage this patient s warfarin therapy.  Indication: Atrial Fibrillation  Therapy Goal: INR 2-3  Provider/Team: Dr Gillette  Warfarin Prior to Admission: Yes  Warfarin PTA Regimen: 5 mg PO daily  Significant drug interactions: started on ceftiraxone and vancomycin, resumed home meds allopurinol, rosuvastatin, sertraline, torsemide  Dose Comments: INR elevated, hold warfarin tonight.    INR   Date Value Ref Range Status   09/26/2021 6.39 (HH) 0.90 - 1.15 Final     Comment:     Effective 7/11/2021, the reference range for this assay has changed.   08/25/2021 4.06 (H) 0.85 - 1.15 Final     Comment:     Effective 7/11/2021, the reference range for this assay has changed.       Recommend warfarin 0 mg today.  Pharmacy will monitor Sun Mireles daily and order warfarin doses to achieve specified goal.      Please contact pharmacy as soon as possible if the warfarin needs to be held for a procedure or if the warfarin goals change.

## 2021-09-26 NOTE — CONSULTS
Care Management Initial Consult    General Information  Assessment completed with: Patient, Children,  (patient and daughter)  Type of CM/SW Visit: Initial Assessment    Primary Care Provider verified and updated as needed: Yes   Readmission within the last 30 days: previous discharge plan unsuccessful   Return Category: Exacerbation of disease  Reason for Consult: discharge planning  Advance Care Planning:            Communication Assessment  Patient's communication style: spoken language (English or Bilingual)             Cognitive  Cognitive/Neuro/Behavioral: WDL                      Living Environment:   People in home: child(mya), adult, grandchild(mya)     Current living Arrangements: house      Able to return to prior arrangements: other (see comments)  Living Arrangement Comments:  (may need TCU?)    Family/Social Support:  Care provided by: child(mya)  Provides care for: no one, unable/limited ability to care for self                Description of Support System:           Current Resources:   Patient receiving home care services: No     Community Resources: None  Equipment currently used at home: commode chair, grab bar, tub/shower, hospital bed, walker, rolling, wheelchair, manual  Supplies currently used at home: Incontinence Supplies, Oxygen Tubing/Supplies, Nebulizer tubing    Employment/Financial:  Employment Status:          Financial Concerns:             Lifestyle & Psychosocial Needs:  Social Determinants of Health     Tobacco Use: Medium Risk     Smoking Tobacco Use: Former Smoker     Smokeless Tobacco Use: Never Used   Alcohol Use:      Frequency of Alcohol Consumption:      Average Number of Drinks:      Frequency of Binge Drinking:    Financial Resource Strain:      Difficulty of Paying Living Expenses:    Food Insecurity:      Worried About Running Out of Food in the Last Year:      Ran Out of Food in the Last Year:    Transportation Needs:      Lack of Transportation (Medical):      Lack of  Transportation (Non-Medical):    Physical Activity:      Days of Exercise per Week:      Minutes of Exercise per Session:    Stress:      Feeling of Stress :    Social Connections:      Frequency of Communication with Friends and Family:      Frequency of Social Gatherings with Friends and Family:      Attends Orthodox Services:      Active Member of Clubs or Organizations:      Attends Club or Organization Meetings:      Marital Status:    Intimate Partner Violence:      Fear of Current or Ex-Partner:      Emotionally Abused:      Physically Abused:      Sexually Abused:    Depression: At risk     PHQ-2 Score: 4   Housing Stability:      Unable to Pay for Housing in the Last Year:      Number of Places Lived in the Last Year:      Unstable Housing in the Last Year:        Functional Status:  Prior to admission patient needed assistance:   Dependent ADLs:: Ambulation-walker, Bathing, Dressing, Incontinence  Dependent IADLs:: Cleaning, Cooking, Laundry, Shopping, Meal Preparation, Medication Management, Transportation  Assesssment of Functional Status: Not at baseline with ADL Functioning    Mental Health Status:          Chemical Dependency Status:                Values/Beliefs:  Spiritual, Cultural Beliefs, Orthodox Practices, Values that affect care:                 Additional Information:  AIDET completed. Writer met with Pt and Pts daughter Winston Mireles: [phone]. Pt lives with daughter and grandchildren in a private residence. She requires a lot of care which her daughter provides. Granddaughter is training to become patient's PCA. Pt needs help with dressing, bathing, has commode, uses a walker and sometimes wheel chair. Assist with meds, meals, cleaning, laundry and transportation. Patient was recently at [facility] 9/14- 9/18/2021 with UTI. Pt goes to [facility] M,W,F. She is on 02 at 3 L and has a nebulizer. Daughter said they had talked about hospice but she feels patient not ready yet.       Discharge to TCU vs HC depending on progression of care. Family will transport. Informed CM to follow.     Catalina Frausto RN, CM, FIONA

## 2021-09-26 NOTE — PHARMACY-ADMISSION MEDICATION HISTORY
Pharmacy Note - Admission Medication History    Pertinent Provider Information:   -Patient recently admitted to [facility] 9/14/21. List updated from that discharge and from speaking with daughter.  -Daughter has been giving patient coreg 3.125 mg bid, per discharge summary, this should be stopped. Please clarify with daughter. Did not add to list.   -Warfarin dose increased to 5 mg daily per daughter  -Daughter was told to check pts BGs, however, does not currently have a glucometer. Needs one.   -Pt discharged on Phos-Lo, daughter does not have a prescription for this, requests one so she can give it to patient.  -Duo-nebs listed as scheduled on discharge summary from [facility], however, daughter states just using them bid when needed for difficulty breathing. Please assess if this should be scheduled bid.   -Patient may benefit from MTM visit and education.  ______________________________________________________________________    Prior To Admission (PTA) med list completed and updated in EMR.       PTA Med List   Medication Sig Note Last Dose     acetaminophen (TYLENOL) 500 MG tablet Take 1,000 mg by mouth every 6 hours as needed for mild pain       albuterol (PROAIR HFA/PROVENTIL HFA/VENTOLIN HFA) 108 (90 Base) MCG/ACT inhaler Inhale 2 puffs into the lungs every 6 hours as needed for shortness of breath / dyspnea or wheezing       allopurinol (ZYLOPRIM) 100 MG tablet Take 50 mg by mouth every evening For gout  9/25/2021     calcium acetate (PHOSLO) 667 MG CAPS capsule Take 667 mg by mouth 3 times daily (with meals) 9/26/2021: Started in hospital, needs prescription Past Month     diclofenac (VOLTAREN) 1 % topical gel Apply 2 g topically 4 times daily legs  9/25/2021     hydrocortisone, Perianal, (ANUSOL-HC) 2.5 % cream Place 1 applicator rectally 2 times daily as needed for hemorrhoids       ipratropium - albuterol 0.5 mg/2.5 mg/3 mL (DUONEB) 0.5-2.5 (3) MG/3ML neb solution Take 1 vial (3 mLs) by nebulization 4 times  daily For shortness of breath. Take 4 times a day, everyday! (Patient taking differently: Take 1 vial by nebulization 2 times daily as needed For shortness of breath. Take 4 times a day, everyday!) 9/26/2021: Daughter states she does not think this is supposed to be scheduled every day, just when pt is having a hard time breathing. Please assess if this should be scheduled bid      Lidocaine (LIDOCARE) 4 % Patch Place 1 patch onto the skin daily as needed To knees  To prevent lidocaine toxicity, patient should be patch free for 12 hrs daily.       montelukast (SINGULAIR) 10 MG tablet Take 10 mg by mouth At Bedtime For allergies  9/25/2021     omeprazole (PRILOSEC) 20 MG DR capsule Take 20 mg by mouth daily For GERD  9/25/2021     polyethylene glycol (MIRALAX) 17 g packet Take 1 packet by mouth daily as needed for constipation       rosuvastatin (CRESTOR) 10 MG tablet Take 10 mg by mouth daily At bedtime for hyperlipidemia  9/25/2021     senna-docusate (SENOKOT-S/PERICOLACE) 8.6-50 MG tablet Take 2 tablets by mouth 2 times daily as needed for constipation        sertraline (ZOLOFT) 25 MG tablet Take 25 mg by mouth daily  9/25/2021     timolol maleate (TIMOPTIC) 0.5 % ophthalmic solution Place 1 drop into both eyes 2 times daily  9/25/2021     torsemide (DEMADEX) 20 MG tablet Take 20 mg by mouth 2 times daily  9/25/2021     warfarin ANTICOAGULANT (COUMADIN) 5 MG tablet Take 5 mg by mouth daily  9/25/2021       Information source(s): Caregiver, Hospital records and [facility]  Method of interview communication: phone    Summary of Changes to PTA Med List  New: torsemide, sertraline, phoslo, warfarin (added, not new), hydrocortisone cream  Discontinued: vit C, asa, coq10, colchcine, epogen, flonase, lasix, gabapentin, venofer, losartan, magox, nifedipine, ditropan, vitD, coreg  Changed: allopurinol    Patient was asked about OTC/herbal products specifically.  PTA med list reflects this.    In the past  week, patient estimated taking medication this percent of the time:  greater than 90%.    Patient did not bring any medications to the hospital and can't retrieve from home. No multi-dose medications are available for use during hospital stay.     The information provided in this note is only as accurate as the sources available at the time of the update(s).    Thank you for the opportunity to participate in the care of this patient.    Jessenia Thompson, PharmD, BCPS 09/26/21 10:40 AM

## 2021-09-26 NOTE — PROGRESS NOTES
Addendum:  k 5.6, dialysis tomorrow.  15mg kayexelate x1  INR supratherapeutic.  Hold coumadin.  No bleeding.  Ronald Gillette MD  Internal Medicine Hospitalist  9/26/2021  6:58 PM  [facility] Hospitalist Progress Note  [facility]  Summary:    65F with multiple medical problems as noted below (including ESRD, anxiety, COPD, pulm HTn, chronic hypoxia, large pelvic mass, PAF) who was brought to the emergency department for evaluation of hematuria and L thigh pain.    Assessment/Plan    #L thigh pain - no clear infection and not systemically ill but does have elevated CRP and is tender.  Reported hx of L buttock/thigh nec fasc.  I do not see any evidence for this at this time.  -CTX and vanco for now  -CT thigh with contast  -?to nephrology if c/w calciphylaxis.  Check Phos    #ESRD - nephrology consult.  Phoslo, Dialysis MWF.    #+UA, possible - CTX and monitor culture    #anxiety, agitation, dementia - sertraline, low dose seroquel PRN,     #COPD, pulmonary HTN, chronic hypoxic respiratory failure, obesity hypoventilation syndrome, KHUSHBOO  -will need home O2 evaluation. Patient is supposed to be on 2 L oxygen and CPAP however she states has none at home  -CPAP    #elevated LA - hemodynamically stable and no other signs of severe infection.  rpeat today.    #Cognitive impairment  Intermittent agitation during dialysis and previous hospitalizations  Recent hospitalizations with encephalopathy related to hypercapnia, polypharmacy and uremia  She can answer some questions appropriately when redirected and calm     # Heart failure with preserved ejection fraction - compensated. 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  Resume BB as needed    Checklist:  Code Status: Full code confirmed.  Palliative care consult.  Daughter ok with it, but reports numerous family meetings in past and doesn't want another  Diet: Combination Diet Dialysis Diet; Consistent Carb 75 Grams CHO per Meal Diet; Low Saturated Fat Na <2400mg Diet    Torres Catheter: Not present  Central Lines: None  DVT px:  On coumadin      Overnight Events/Subjective/Notable results:    Upset about being in the hospital and wants to go home.    Patient denies any physical symptoms or pain.  Daughter reports Ms. Mireles has been having increased L buttock/thihg pain this week.  She has history of Nec fasc and concerned she has it again.  Unable to ambulate on her own.    4 point ROS otherwise negative    Objective    Vital signs in last 24 hours  Temp:  [97  F (36.1  C)-98  F (36.7  C)] 98  F (36.7  C)  Pulse:  [60-66] 66  Resp:  [11-33] 25  BP: ()/(50-89) 98/50  FiO2 (%):  [3 %] 3 %  SpO2:  [89 %-100 %] 92 % O2 Device: Nasal cannula    Weight:   225 lbs 0 oz    Intake/Output last 3 shifts  No intake/output data recorded.  Body mass index is 38.62 kg/m .    Physical Exam  General:  Alert, cooperative, no distress,  Appears stated age, non toxic  Neurologic:  oriented, facial symmetry preserved, fluent speech.   Psych: anxious, upset, tearful  HEENT:  Anicteric, MMM, unremarkable dentition  CV: RRR no MRG, normal S1 and S2, no edema  Lungs: CTAB.  Easy respirations  Abd: soft, obese, NT, normoactive BS  Skin: L thigh/buttock with some induration and mild TTP  Central Lines and Tubes: None (no torres, CVC, feeding tubes)      I have reviewed all labs, medications, imaging studies in the last 24 hours.  Pertinent  findings&changes discussed above.    Data     Discussed with: pt's daughter Winston     Ronald Gillette MD  Internal Medicine Hospitalist  16:51

## 2021-09-26 NOTE — ED NOTES
"Pt pulling her O2 off, BP cuff off, and yelling out continuously. Pt demanding to get out of the cart and go home. Pt reminded that her daughter went home after being told that she was getting admitted to the hospital. Pt then argues that she is \"not staying and the ambulance can come get me and take me back home\". Pt informed that the ambulance would likely not do that.   When pt takes her O2 off, sats drop to 87%. Pt reminded to keep O2 on. Pt sats return to 94-96% with O2 on.   Pt informed that she cannot yell and scream in the room as there are many other sick patients here and yelling was not appropriate. Pt stated \"I don't care, I'm gonna yell until you give my what I want and I want to go home\". Pt then demanding to speak with the doctor. Will update provider.   "

## 2021-09-26 NOTE — ED PROVIDER NOTES
eMERGENCY dEPARTMENT PROGRESS NOTE        ED COURSE AND MEDICAL DECISION MAKING  Patient was signed out to me by Dr. Po Connell at change of shift (7:12 PM)    10:21 PM I paged the hospitalist.  10:41 PM I spoke with the hospitalist Dr. Watson.      Sun Mireles is a 65 year old female with a pertinent medical history of acute diastolic heart failure, atrial fibrillation, hypertension, hydronephrosis, CHF, NYHA class I, cor pulmonale, complete heart block, diabetes mellitus type 2, obesity, hyperlipidemia, occipital stroke, pacemaker, chronic obstructive lung disease, chronic pulmonary edema, ESRD on dialysis, NSTEMI, acute respiratory failure who presents for the evaluation of confusion, hematuria, flank pain.   LAB  Pertinent labs results reviewed   Results for orders placed or performed during the hospital encounter of 09/25/21   XR Chest Port 1 View    Impression    IMPRESSION:   1. Lungs are grossly clear.  2. Cardiac silhouette remains enlarged which may be partially due to technique.  3. Right internal jugular dual-lumen tunneled dialysis catheter, and left chest wall battery pack/generator/pacemaker and dual leads are in stable and satisfactory positions.  4. No pneumothorax, significant pleural fluid collection, definite infiltrate, or osseous fracture.   Abd/pelvis CT no contrast - Stone Protocol    Impression    IMPRESSION:   1.  Minimal overall change. No urinary tract stone or hydronephrosis evident.  2.  Cardiomegaly, trace ascites, dilated IVC and anasarca.  3.  No change in large pelvic mass most suggestive of uterine fibroid.     Result Value Ref Range    Ammonia 28 11 - 35 umol/L   Comprehensive metabolic panel   Result Value Ref Range    Sodium 137 136 - 145 mmol/L    Potassium 4.6 3.5 - 5.0 mmol/L    Chloride 99 98 - 107 mmol/L    Carbon Dioxide (CO2) 23 22 - 31 mmol/L    Anion Gap 15 5 - 18 mmol/L    Urea Nitrogen 40 (H) 8 - 22 mg/dL    Creatinine 8.34 (HH) 0.60 - 1.10 mg/dL    Calcium  8.3 (L) 8.5 - 10.5 mg/dL    Glucose 119 70 - 125 mg/dL    Alkaline Phosphatase 272 (H) 45 - 120 U/L    AST 17 0 - 40 U/L    ALT 16 0 - 45 U/L    Protein Total 7.2 6.0 - 8.0 g/dL    Albumin 2.5 (L) 3.5 - 5.0 g/dL    Bilirubin Total 0.6 0.0 - 1.0 mg/dL    GFR Estimate 4 (L) >60 mL/min/1.73m2   Result Value Ref Range    Magnesium 3.2 (H) 1.8 - 2.6 mg/dL   TSH with free T4 reflex   Result Value Ref Range    TSH 1.67 0.30 - 5.00 uIU/mL   CRP inflammation   Result Value Ref Range    CRP 11.4 (H) 0.0-<0.8 mg/dL   Lactic acid whole blood   Result Value Ref Range    Lactic Acid 3.0 (H) 0.7 - 2.0 mmol/L   Result Value Ref Range    Troponin I 0.04 0.00 - 0.29 ng/mL         RADIOLOGY    Pertinent imaging reviewed   Please see official radiology report.  Abd/pelvis CT no contrast - Stone Protocol   Final Result   IMPRESSION:    1.  Minimal overall change. No urinary tract stone or hydronephrosis evident.   2.  Cardiomegaly, trace ascites, dilated IVC and anasarca.   3.  No change in large pelvic mass most suggestive of uterine fibroid.         XR Chest Port 1 View   Preliminary Result   IMPRESSION:    1. Lungs are grossly clear.   2. Cardiac silhouette remains enlarged which may be partially due to technique.   3. Right internal jugular dual-lumen tunneled dialysis catheter, and left chest wall battery pack/generator/pacemaker and dual leads are in stable and satisfactory positions.   4. No pneumothorax, significant pleural fluid collection, definite infiltrate, or osseous fracture.      CT Head w/o Contrast    (Results Pending)       FINAL IMPRESSION    1.  Urinary tract infection  2.  Hypoxemia      I, Edvin Chris, am serving as a scribe to document services personally performed by Rusty Briones D.O., based on my observations and the provider's statements to me.  I, Rusty Briones D.O., attest that Edvin Chris is acting in a scribe capacity, has observed my performance of the services and has  documented them in accordance with my direction.        Rusty Briones,   09/27/21 0415

## 2021-09-27 NOTE — CONSULTS
NEPHROLOGY CONSULTATION    CC: Hematuria.    REASON FOR CONSULTATION: We are asked to see pt by  stage renal disease and comorbidities management.    HISTORY OF PRESENT ILLNESS:65 year old female with past medical history significant for morbid obesity, cognitive impairment, congestive heart failure with preserved ejection fraction, paroxysmal atrial fibrillation/flutter, complete heart block status post pacemaker placement, type 2 diabetes mellitus, end-stage renal disease on hemodialysis Monday Wednesday Friday at [facility] dialysis unit under care of Dr. Bailey, chronic respiratory failure with hypoxia oxygen 24/7, large pelvic mass, rectal prolapse who was admitted to [facility] on September 25 for evaluation of hematuria.  She was diagnosed with acute cystitis.  Started on broad-spectrum IV antibiotics.  Nephrology was consulted for end-stage renal disease and comorbidities management.  Patient stated that she had a full uncomplicated from on September 24 as per her usual schedule.  Patient states that she sometimes skips dialysis and cuts dialysis runs short due to pain.      REVIEW OF SYSTEMS:  ROS was completely reviewed and otherwise negative and non-contributory    Past Medical History:   Diagnosis Date     Acute diastolic heart failure (H)     Created by Conversion      Acute on chronic respiratory failure with hypoxia and hypercapnia (H) 06/08/2017     Aneurysm of vertebral artery (H)      Aneurysm of vertebral artery (H)     Right     Asthma      Asthma      Asthma      Bilateral edema of lower extremity 06/28/2016     CHF (congestive heart failure) (H)      CHF (congestive heart failure) (H)     diastolic      Chronic diastolic CHF (congestive heart failure) (H)      Chronic kidney disease      Chronic kidney disease, stage 4, severely decreased GFR (H)      Chronic pain 06/15/2014     Chronic pulmonary heart disease (H)     Created by Conversion  Replacement Utility updated for  latest IMO load     Chronic pulmonary heart disease, unspecified      COPD (chronic obstructive pulmonary disease) (H)      Depression with anxiety      Diabetic polyneuropathy associated with type 2 diabetes mellitus (H) 06/28/2016    Previously on gabapentin which helped, but stopped in the hospital due to potential drug interaction.  Lyrica made her sleepy.     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    Non-ketotic; diabetes type 2; >500     Hypertension      Localized osteoarthrosis not specified whether primary or secondary, lower leg     Knee     Localized osteoarthrosis, lower leg     Created by Conversion  Replacement Utility updated for latest IMO load     Long-term insulin use in type 2 diabetes (H)     Created by Conversion      Microcytic anemia     Created by Conversion      Morbid obesity (H)     BMI>60     Morbid obesity (H)      Morbid obesity with alveolar hypoventilation (H)     Created by Conversion      Neuromuscular disorder (H)      Obstructive sleep apnea      Obstructive sleep apnea (adult) (pediatric)      Obstructive sleep apnea (adult) (pediatric)     Created by Conversion      Perianal abscess 11/01/2019    S/P incision, drainage and debridement on 11/9/19 Dr CORREIA INCISION AND DRAINAGE, ABSCESS, RECTAL OR PERIRECTAL;       Postmenopausal bleeding     Created by Conversion      Pulmonary HTN (H) 06/08/2017     Pyelonephritis 12/26/2018     Shortness of breath     Created by Conversion      Subclinical hyperthyroidism 10/11/2020     Tobacco use 07/30/2015     Type II or unspecified type diabetes mellitus with unspecified complication, not stated as uncontrolled      Unspecified glaucoma(365.9)      Unspecified glaucoma(365.9)      Created by Conversion      Unspecified urinary incontinence      Unspecified urinary incontinence     Created by Conversion      Urinary incontinence        Social History     Socioeconomic History     Marital status: Single     Spouse name: Not on file     Number of children: Not on file     Years of education: Not on file     Highest education level: Not on file   Occupational History     Not on file   Tobacco Use     Smoking status: Former Smoker     Packs/day: 1.00     Years: 45.00     Pack years: 45.00     Quit date: 2014     Years since quittin.6     Smokeless tobacco: Never Used   Substance and Sexual Activity     Alcohol use: No     Drug use: No     Sexual activity: Not on file   Other Topics Concern     Not on file   Social History Narrative     Not on file     Social Determinants of Health     Financial Resource Strain:      Difficulty of Paying Living Expenses:    Food Insecurity:      Worried About Running Out of Food in the Last Year:      Ran Out of Food in the Last Year:    Transportation Needs:      Lack of Transportation (Medical):      Lack of Transportation (Non-Medical):    Physical Activity:      Days of Exercise per Week:      Minutes of Exercise per Session:    Stress:      Feeling of Stress :    Social Connections:      Frequency of Communication with Friends and Family:      Frequency of Social Gatherings with Friends and Family:      Attends Muslim Services:      Active Member of Clubs or Organizations:      Attends Club or Organization Meetings:      Marital Status:    Intimate Partner Violence:      Fear of Current or Ex-Partner:      Emotionally Abused:      Physically Abused:      Sexually Abused:        Family History   Problem Relation Age of Onset     Hypertension Other      Diabetes Other      Cancer Maternal Grandfather         stomach cancer     Hypertension Mother      Chronic Obstructive Pulmonary Disease Mother      Diabetes Mother      Hypertension Father       Chronic Obstructive Pulmonary Disease Father      Diabetes Father      Coronary Artery Disease Maternal Grandmother      Hypertension Maternal Grandmother      Alcoholism Sister      Substance Abuse Sister      Chronic Obstructive Pulmonary Disease Sister      Diabetes Sister      Hypertension Sister      Heart Disease Brother      Hypertension Brother      Hypertension Daughter      Hypertension Son      Hypertension Maternal Grandfather      Coronary Artery Disease Maternal Grandfather      Hypertension Paternal Grandmother      Coronary Artery Disease Paternal Grandmother      Hypertension Paternal Grandfather        Allergies   Allergen Reactions     Beta-Blockers (Beta-Adrenergic Blocking Agts) [Beta Adrenergic Blockers] Other (See Comments)     Heart block     Metoprolol Other (See Comments)     Heart block       MEDICATIONS:    acetaminophen  650 mg Oral 4x Daily     allopurinol  50 mg Oral QPM     calcium acetate  667 mg Oral TID w/meals     cefTRIAXone  1 g Intravenous Q24H     diclofenac  2 g Topical 4x Daily     [Held by provider] furosemide  120 mg Oral Daily     insulin aspart  1-7 Units Subcutaneous TID AC     insulin aspart  1-5 Units Subcutaneous At Bedtime     ipratropium - albuterol 0.5 mg/2.5 mg/3 mL  1 vial Nebulization 4x Daily     lidocaine   Transdermal Q8H     [Held by provider] losartan  50 mg Oral Daily     montelukast  10 mg Oral At Bedtime     [Held by provider] NIFEdipine ER  30 mg Oral Daily     [Held by provider] oxybutynin ER  5 mg Oral QPM     pantoprazole  40 mg Oral Daily     rosuvastatin  10 mg Oral Daily     sertraline  25 mg Oral Daily     sodium chloride (PF)  3 mL Intracatheter Q8H     timolol maleate  1 drop Both Eyes BID     [Held by provider] torsemide  20 mg Oral BID     vancomycin place yarbrough - receiving intermittent dosing  1 each Intravenous See Admin Instructions         PHYSICAL EXAM    /55 (BP Location: Left arm)   Pulse 62   Temp 98.1  F (36.7  C) (Oral)    "Resp 18   Ht 1.626 m (5' 4\")   Wt 102.1 kg (225 lb)   SpO2 93%   BMI 38.62 kg/m        Intake/Output Summary (Last 24 hours) at 9/27/2021 1238  Last data filed at 9/26/2021 1944  Gross per 24 hour   Intake 3 ml   Output --   Net 3 ml       Alert, awake , confused and NAD, super morbidly obese  HEENT NC/AT; perrla; OP clear without lesions; mmm  Neck supple without LAD, TM  CV; RRR without rub or murmur  Lung: clear and equal; no extra sounds  Ab: large abdominal pannus, soft and NT; not distended; normal bs  Ext:obese, non pitting edema of both legs,  well perfused  Skin; no rash  Neuro; grossly intact    LABORATORIES    Recent Labs   Lab 09/25/21  1759   WBC 4.8   HGB 8.1*   HCT 27.0*        Recent Labs   Lab 09/26/21  1704 09/25/21  1759    137   CO2 20* 23   BUN 45* 40*   ALKPHOS  --  272*   ALT  --  16   AST  --  17     Recent Labs   Lab 09/26/21  1704   INR 6.39*     Invalid input(s): FERRITIN  No results for input(s): IRON in the last 168 hours.    Invalid input(s): TIBC    I reviewed all labs    ASSESSMENT/PLAN:  This is a 65 year old female with past medical history significant for morbid obesity, cognitive impairment, congestive heart failure with preserved ejection fraction, paroxysmal atrial fibrillation/flutter, complete heart block status post pacemaker placement, type 2 diabetes mellitus, end-stage renal disease on hemodialysis Monday Wednesday Friday at [place] dialysis unit under care of Dr. Bailey, chronic respiratory failure with hypoxia oxygen 24/7, large pelvic mass, rectal prolapse who was admitted to [facility] on September 25 for evaluation of hematuria.       ESRD on HD TTS-Patient dialyzes at [facility] under care of Dr Katie Bailey with internal jugular catheter. Will perform HD today per her regular schedule.       Hyperkalemia-mild. Serum potassium was 5.6 on 09/26. S/p kayexalate. Run with K2 bath. Renal diet    Metabolic acidosis-Mild. HCO3 was 20 on " 09/26. Should improve with HD.    HTN/Hypervolemia-Currently blood pressures are at goal. Not on antihypertensive agents. Mildly hypervolemic with trace LE edema.   -UF with HD as tolerated  -daily weight  -32 ox fluid restriction      Anemia - Hgb is 8.1 g/dl. On EPO with HD as outpatient.    Secondary hyperparathyroidism -on binders. Renal diet.    H/o CHF-no evidence of exacerbation.  -UF with HD as tolerated.      Recurrent UTI's-Urine cx is pending. On IV Rocephin and Vancomycin.     Chronic respiratory failure with hypoxia-Secondary to obesity hypoventilatory syndrome, KHUSHBOO, COPD, pulmonary hypertension. Patient is supposed to be on 2 L oxygen 24/7    Type 2 DM -diet controlled at home. On ISS here.      H/o gout-on allopurinol 50 mg daily.    Large pelvic mass-No changes since last study August 11, 2021  Perhaps contributing to recurrent urinary symptoms  Evaluated at [facility] oncology, and not surgical or chemotherapy candidate.       Kaylie Porter MD  [facility]  [address] [place] [address], [address]  [place] [address]  Phone# [phone]  Fax# [phone]

## 2021-09-27 NOTE — PLAN OF CARE
Problem: Adult Inpatient Plan of Care  Goal: Plan of Care Review  Outcome: No Change     Problem: Pain Acute  Goal: Acceptable Pain Control and Functional Ability  Outcome: No Change  Intervention: Develop Pain Management Plan  Recent Flowsheet Documentation  Taken 9/27/2021 9442 by Addis Cummings, RN  Pain Management Interventions: medication (see MAR)    Pt unable to stand and transfer to [facility] today.  Used thierno lift with assist of 2.   Up in chair for 2 hours.  Pt voided x 1 and had one med soft BM incontinently.  INR high, MD informed. Gave vit K per MD orders.  Gave oxycodone for c/o bilat leg pain.

## 2021-09-27 NOTE — PLAN OF CARE
Problem: Pain Acute  Goal: Acceptable Pain Control and Functional Ability  Outcome: No Change   Pt c/o pain, was given scheduled tylenol and prn oxy which was effective at some point. BG was 118 and 151. Pt was crying off and on  to make her needs known.

## 2021-09-27 NOTE — PROGRESS NOTES
Met with patient to discuss CPAP overnight. Patient is refusing hospital unit at this time. Patient plans to ask family to bring in her home unit for tomorrow.     Kimmie Decker, RT

## 2021-09-27 NOTE — CONSULTS
PALLIATIVE CARE CONSULT NOTE     Patient Name: Sun Mireles  Date of Admission: 9/25/2021   Requesting Clinician / Team: Dr. Gillette  Reason for consult: goals of care     Impressions & Recommendations:  Goals of care per patient and daughter    Restorative: continue current therapies/treatments    Discharge back home with daughter when medically stable    Continue outpatient dialysis    Code Status: Full Code - discussed, but warrants ongoing/further discussion, hopefully with daughter present next time    Symptom management  1. Pain: Hx opioid sensitivity.  Bilateral legs, hurts with any movement.   - Voltaren gel prn(held d/t high INR)  - Scheduled tylenol   - Oxycodone 5mg q4h prn - patient unaware she can have this but received some last evening     2. Anxiety - stable  - Sertraline  - low dose seroquel PRN    2. Cognitive impairment: recent hospitalizations with encephalopathy(multifactorial), recent CVA.  Oriented self, place, and situation for me.  Thought it was 1981.  Mentation seems to be waxing/waning upon note review.  Possible component of vascular dementia.       - Recommend SLUMS if she doesn't discharge tomorrow     Psychosocial/spiritual support  - Has seven kids. Per report, Winston is HCA but we don't have AD on file here. She has been living at SNF for last year. One of her daughters expressed concern about care being given there, and now recently moved in with Winston.      Advanced Care Planning    Patient has a completed Health Care Directive: not on file    Surrogate decision maker: joo Montero    DNR/I during recent hospitalization at     ----------------------------------------------------------------------------------------------------------------  Admitting Diagnosis: hematuria      History of Present Illness:  64yo F with PMH of ESRD on HD, diastolic heart failure, DMII, Afib/flutter, KHUSHBOO/COPD with pulmonary hypertension on chronic supplemental oxygen, HFpEF, morbid obesity  "with OHS, recent CVA(8/13/21) admitted on 9/25 for evaluation of hematuria.  Does have a known large pelvic mass with no changes since 8/11, previously evaluated by  GYN oncology and possibly contributing to rectal prolapse and not a surgical or chemotherapy candidate.  Also reports of increased L buttock/thigh pain; CT with no abscess, extensive edema in left high with cellulitis in differential, and mild wall thickening of bladder suggestive of cystitis.  On empiric Rocephin.     9/27: INR trending up; to get vit K.  Urine/blood cx pending    Recent hospitalizations:  9/14-9/18: AMS and concerns for UTI, vaginal discomfort   8/27-9/8: encephalopathy  8/10-8/16: dyspnea and AMS - found to have acute vs subacute L occipital CVA  7/9-7/13: acute resp failure d/t pulmonary edema.  Volume overload d/t CHF or dialysis-related  3/26-4/1: volume overload   ----------------------------------------------------------------------------------------------------------------  Summary of Palliative overview  8/29: Code status changed to DNR/I.  Hospice recommended.    9/1: Care conference with daughters Rupal in person, Winston and Yassine by phone.  Patient didn't participate d/t confusion.  Reviewed how patient not tolerating outpatient dialysis d/t anxiety, agitation, runs ending short.  Outpatient nephrologist had recommended hospice in the past.  Family need to be present during every run so she will complete.  Family opted to continue dialysis and be present during sessions.      Today I spoke with daughter Winston via telephone.  Sun lives with Winston who cares for her.  Winston currently has her own health issues and her son is her PCA.  Winston made statements such as: \"I want what she wants,  I want her to be DNR/I, but that's not what she wants, she's been lying in bed for 3 years.\"  We talked about their previous interactions with palliative medicine.  To her understanding, the overall goals of care haven't " "changed and they still plan to continue with dialysis.  I asked if she thought her mom had complex decision making capacity and she thinks that she does.  She would like me to address code status again with Sun and interested to see what she tells me.     I visited Sun at bedside.  She occasionally grimaced in pain with any movement in bed noting both legs are very sore.  She has no other complaints at this time.  Urinary symptoms improving.  Sun did note she's living with [place], but hoping to get her own place again.  I asked if she thinks that's possible as it sounds like she's been essentially bedbound per her daughter.  She replied, \"I can walk when I want to.\"  Oriented to self, [facility], states she came to the hospital for worsening weakness, and worsening pain in her legs.  She wants to continue dialysis saying, \"if I stop that than I'll die.\"  She was quite tired during our visit and wanted to rest.  I briefly asked her about code status.  Explained what code status was as she needed a reminder.  She clearly stated she wants to be full code, but we did not address the implications in re: quality of life in any detail.  She would like to continue the discussion tomorrow hopefully when her daughter is present.  She also seemed open to follow up with outpatient palliative medicine.      Palliative Symptom Review (0=no symptom/no concern, 1=mild, 2=moderate, 3=severe):  Pain: 2-3  Dyspnea: 0  Fatigue: 2  Nausea: 0  Constipation: 0  Diarrhea: 0  Depressive symptoms: 0  Anxiety: 1  Drowsiness: 1  Poor Appetite: 1  Insomnia: 0      Patient's decision making preferences: with family    I have concerns about the patient/family's health literacy today: daughter(no), patient(undetermined)    Prognosis, Goals, and/or Advance Care Planning were addressed today: yes    Mood, coping, and/or meaning in the context of serious illness were addressed today: yes    Functional Status:  Palliative " Performance Score:     40%- 1. Mainly in bed; 2. Unable to do most activity, extensive disease; 3. Mainly assistance; 4. Normal or reduced; 5. Full or drowsy +/- confusion    Capacity evaluation:    Patient does have simple decision making capacity based on the following:     Ability to understand relevant information about condition? yes    Ability to demonstrate understanding of illness and care needs? intermittently    Ability to communicate options for treatment? yes    Social:   Living situation: lives with Tymonia   Baseline function: mainly bedbound per daughter   Marital status: single   Number of children: 7 kids.    Smoking history: quit about 7-8 years ago   Alcohol use: none  Recreational drug use: none   Hobbies: watches TV or plays cards([employer])     Spiritual:  Are you a spiritual person: yes  Irene: Hinduism   Would you like to see ? Forgot to ask      ROS  A full 14 point review of systems was otherwise completed and is negative aside from that mentioned above    -----------------------------------------------------------------------------------------------------------------  I have reviewed and supplemented the documentation in this patient's medical record listed below regarding past medical history, social history, active medical problems, allergies and medications.     Current Problem List:   Active Problems:    Cystitis    ESRD (end stage renal disease) on dialysis (H)    Type 2 diabetes mellitus with diabetic nephropathy, with long-term current use of insulin (H)    Pyelonephritis, acute    Acute respiratory failure with hypoxia (H)    Acute encephalopathy      Medical/Surgical History :  Past Medical History:   Diagnosis Date     Acute diastolic heart failure (H)     Created by Conversion      Acute on chronic respiratory failure with hypoxia and hypercapnia (H) 06/08/2017     Aneurysm of vertebral artery (H)      Aneurysm of vertebral artery (H)     Right     Asthma      Asthma       Asthma      Bilateral edema of lower extremity 06/28/2016     CHF (congestive heart failure) (H)      CHF (congestive heart failure) (H)     diastolic      Chronic diastolic CHF (congestive heart failure) (H)      Chronic kidney disease      Chronic kidney disease, stage 4, severely decreased GFR (H)      Chronic pain 06/15/2014     Chronic pulmonary heart disease (H)     Created by Conversion  Replacement Utility updated for latest IMO load     Chronic pulmonary heart disease, unspecified      COPD (chronic obstructive pulmonary disease) (H)      Depression with anxiety      Diabetic polyneuropathy associated with type 2 diabetes mellitus (H) 06/28/2016    Previously on gabapentin which helped, but stopped in the hospital due to potential drug interaction.  Lyrica made her sleepy.     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    Non-ketotic; diabetes type 2; >500     Hypertension      Localized osteoarthrosis not specified whether primary or secondary, lower leg     Knee     Localized osteoarthrosis, lower leg     Created by Conversion  Replacement Utility updated for latest IMO load     Long-term insulin use in type 2 diabetes (H)     Created by Conversion      Microcytic anemia     Created by Conversion      Morbid obesity (H)     BMI>60     Morbid obesity (H)      Morbid obesity with alveolar hypoventilation (H)     Created by Conversion      Neuromuscular disorder (H)      Obstructive sleep apnea      Obstructive sleep apnea (adult) (pediatric)      Obstructive sleep apnea (adult) (pediatric)     Created by Conversion      Perianal abscess 11/01/2019    S/P incision, drainage and debridement on 11/9/19 Dr CORREIA INCISION AND DRAINAGE, ABSCESS, RECTAL OR  PERIRECTAL;       Postmenopausal bleeding     Created by Conversion      Pulmonary HTN (H) 2017     Pyelonephritis 2018     Shortness of breath     Created by Conversion      Subclinical hyperthyroidism 10/11/2020     Tobacco use 2015     Type II or unspecified type diabetes mellitus with unspecified complication, not stated as uncontrolled      Unspecified glaucoma(365.9)      Unspecified glaucoma(365.9)     Created by Conversion      Unspecified urinary incontinence      Unspecified urinary incontinence     Created by Conversion      Urinary incontinence      Past Surgical History:   Procedure Laterality Date     C  DELIVERY ONLY      Description:  Section;  Recorded: 10/20/2011;     C LIGATE FALLOPIAN TUBE      Description: Tubal Ligation;  Recorded: 10/20/2011;     C/SECTION, LOW TRANSVERSE  89     COMBINED CYSTOSCOPY, INSERT STENT URETER(S) Bilateral 2021    Procedure: CYSTOSCOPY, WITH RETROGRADE PYELOGRAM AND URETERAL STENT PLACEMENT BILATERAL, CLOT EVACUATION CLOT EVACUATION OF BLADDER;  Surgeon: Timoteo Webster MD;  Location: [facility];  Service: Urology     COMBINED CYSTOSCOPY, INSERT STENT URETER(S) Bilateral 2/3/2021    Procedure: CYSTOSCOPY, WITH RETROGRADE PYELOGRAM AND FULGERATIONAND BILATERAL URETERAL STENT REPLACEMENT,;  Surgeon: Tiomteo Webster MD;  Location: [facility] OR;  Service: Urology     COMBINED CYSTOSCOPY, INSERT STENT URETER(S) Bilateral 2/10/2021    Procedure: CYSTOSCOPY, WITH BILATERAL URETERAL STENT REMOVAL, BILATERAL URETERAL RETROGRADES, STANTON PLACEMENT;  Surgeon: Gomez Cristina MD;  Location: [facility] OR;  Service: Urology     EP PACEMAKER INSERT N/A 10/23/2020    Procedure: EP Pacemaker Insertion;  Surgeon: Wilbert Bolaños MD;  Location: [facility];  Service: Cardiology     EYE SURGERY       HC REMOVAL GALLBLADDER      Description: Cholecystectomy;  Recorded: 10/20/2011;     INCISION AND DRAINAGE  PERIRECTAL ABSCESS Left 2019    Procedure: INCISION AND DRAINAGE, ABSCESS, RECTAL OR PERIRECTAL;  Surgeon: Yumiko Hancock DO;  Location: [facility] Main OR;  Service: General     IR CVC NON TUNNEL PLACEMENT  2019     IR CVC TUNNEL PLACEMENT > 5 YRS OF AGE  11/15/2019     IR MISCELLANEOUS PROCEDURE  2013     IR NON TUNNELED CATHETER >5 YEARS  2019     IR TUNNELED CATHETER INSERT  11/15/2019     PICC INSERTION - TRIPLE LUMEN  10/20/2020          MD CYSTOURETHROSCOPY,BIOPSY N/A 2/3/2021    Procedure: BLADDER BIOPSY;  Surgeon: Timoteo Webster MD;  Location: [facility] Main OR;  Service: Urology     TUBAL LIGATION  80, 89     Relevant Family History  Family History   Problem Relation Age of Onset     Hypertension Other      Diabetes Other      Cancer Maternal Grandfather         stomach cancer     Hypertension Mother      Chronic Obstructive Pulmonary Disease Mother      Diabetes Mother      Hypertension Father      Chronic Obstructive Pulmonary Disease Father      Diabetes Father      Coronary Artery Disease Maternal Grandmother      Hypertension Maternal Grandmother      Alcoholism Sister      Substance Abuse Sister      Chronic Obstructive Pulmonary Disease Sister      Diabetes Sister      Hypertension Sister      Heart Disease Brother      Hypertension Brother      Hypertension Daughter      Hypertension Son      Hypertension Maternal Grandfather      Coronary Artery Disease Maternal Grandfather      Hypertension Paternal Grandmother      Coronary Artery Disease Paternal Grandmother      Hypertension Paternal Grandfather      Family Status   Relation Name Status     OTHER  (Not Specified)     OTHER  (Not Specified)     MGFa       Mo       Fa       MGMo       Sis       Bro       Arash  (Not Specified)     Son  (Not Specified)     PGMo  (Not Specified)     PGFa  (Not Specified)     Social History:    she  reports that she quit smoking about 7 years  ago. She has a 45.00 pack-year smoking history. She has never used smokeless tobacco. She reports that she does not drink alcohol and does not use drugs.  Medication History:  Medications Prior to Admission   Medication Sig Dispense Refill Last Dose     acetaminophen (TYLENOL) 500 MG tablet Take 1,000 mg by mouth every 6 hours as needed for mild pain        albuterol (PROAIR HFA/PROVENTIL HFA/VENTOLIN HFA) 108 (90 Base) MCG/ACT inhaler Inhale 2 puffs into the lungs every 6 hours as needed for shortness of breath / dyspnea or wheezing        allopurinol (ZYLOPRIM) 100 MG tablet Take 50 mg by mouth every evening For gout   9/25/2021     calcium acetate (PHOSLO) 667 MG CAPS capsule Take 667 mg by mouth 3 times daily (with meals)   Past Month     diclofenac (VOLTAREN) 1 % topical gel Apply 2 g topically 4 times daily legs   9/25/2021     hydrocortisone, Perianal, (ANUSOL-HC) 2.5 % cream Place 1 applicator rectally 2 times daily as needed for hemorrhoids        ipratropium - albuterol 0.5 mg/2.5 mg/3 mL (DUONEB) 0.5-2.5 (3) MG/3ML neb solution Take 1 vial (3 mLs) by nebulization 4 times daily For shortness of breath. Take 4 times a day, everyday! (Patient taking differently: Take 1 vial by nebulization 2 times daily as needed For shortness of breath. Take 4 times a day, everyday!) 300 mL 3      Lidocaine (LIDOCARE) 4 % Patch Place 1 patch onto the skin daily as needed To knees  To prevent lidocaine toxicity, patient should be patch free for 12 hrs daily.        montelukast (SINGULAIR) 10 MG tablet Take 10 mg by mouth At Bedtime For allergies   9/25/2021     omeprazole (PRILOSEC) 20 MG DR capsule Take 20 mg by mouth daily For GERD   9/25/2021     polyethylene glycol (MIRALAX) 17 g packet Take 1 packet by mouth daily as needed for constipation        rosuvastatin (CRESTOR) 10 MG tablet Take 10 mg by mouth daily At bedtime for hyperlipidemia   9/25/2021     senna-docusate (SENOKOT-S/PERICOLACE) 8.6-50 MG tablet Take 2  "tablets by mouth 2 times daily as needed for constipation         sertraline (ZOLOFT) 25 MG tablet Take 25 mg by mouth daily   9/25/2021     timolol maleate (TIMOPTIC) 0.5 % ophthalmic solution Place 1 drop into both eyes 2 times daily   9/25/2021     torsemide (DEMADEX) 20 MG tablet Take 20 mg by mouth 2 times daily   9/25/2021     warfarin ANTICOAGULANT (COUMADIN) 5 MG tablet Take 5 mg by mouth daily   9/25/2021     Allergies:  Allergies   Allergen Reactions     Beta-Blockers (Beta-Adrenergic Blocking Agts) [Beta Adrenergic Blockers] Other (See Comments)     Heart block     Metoprolol Other (See Comments)     Heart block     Emergency Contact Info (Pulled from chart)  Extended Emergency Contact Information  Primary Emergency Contact: Janet Mirelesmirta  Home Phone: [phone]  Relation: Son  Secondary Emergency Contact: Liudmila Mireles   [facility]  Home Phone: [phone]  Relation: Daughter    PERTINENT PHYSICAL EXAMINATION:  Vital Signs: Blood pressure 105/55, pulse 62, temperature 98.1  F (36.7  C), temperature source Oral, resp. rate 18, height 1.626 m (5' 4\"), weight 102.1 kg (225 lb), SpO2 93 %, not currently breastfeeding.   GENERAL: lying in bed in mild distress with any movement    SKIN: Warm and dry   HEENT: Normocephalic, anicteric sclera, moist mucous membranes  LUNGS: Diminished to auscultation anterolaterally; non-labored   CARDIAC: RRR, normal s1/s2, w/o m/r/g   ABDOMINAL: BS (+), large, soft, non distended, non tender  EXTREMITIES: mild edema, pulses 2+ and symmetrical  NEUROLOGIC: alert, follows commands  PSYCH: calm, pleasant     All labs/imaging reviewed in Epic     ====================================================  TT: I have personally spent a total of 110 minutes on the unit in review of medical record, consultation with the medical providers and assessment of patient today, with more than 50% of this time spent in counseling, coordination of care, and discussion with daughter and " patient re: diagnostic results, prognosis, symptom management, risks and benefits of management options, and development of plan of care as noted above.  ====================================================    HAN Kraus [facility]  Palliative Medicine  Office: [phone]

## 2021-09-27 NOTE — PROGRESS NOTES
Progress Note    Assessment/Plan  65F with multiple medical problems as noted below (including ESRD, anxiety, COPD, pulm HTn, chronic hypoxia, large pelvic mass, PAF) who was brought to the emergency department for evaluation of hematuria and L thigh pain.     Assessment/Plan     #L thigh pain - no clear infection and not systemically ill but does have elevated CRP and is tender.  Reported hx of L buttock/thigh nec fasc.  I do not see any evidence for this at this time.  CT thigh shows edema and no evidence of cellulitis.  DC vancomycin.  --Hold topical Voltaren due to high INR.  --Continue scheduled Tylenol  --Could not elicit tenderness on palpation of the left thigh.    #ESRD - nephrology consult.  [facility], Dialysis MWF.     Hyperkalemia on 9/26 treated with 1 dose of Kayexalate  --We will repeat BMP next a.m.    #Acute cystitis without hematuria, possible -urine culture is pending   --Continue empirical Rocephin     #anxiety, agitation, dementia - sertraline, low dose seroquel PRN,      #COPD, pulmonary HTN, chronic hypoxic respiratory failure, obesity hypoventilation syndrome, KHUSHBOO  -will need home O2 evaluation. Patient is supposed to be on 2 L oxygen and CPAP however she states has none at home  --BP is now on lower side, therefore holding torsemide, Nifedipine,   -CPAP     #elevated LA -trending down     #Cognitive impairment  Intermittent agitation during dialysis and previous hospitalizations  Recent hospitalizations with encephalopathy related to hypercapnia, polypharmacy and uremia  She can answer some questions appropriately when redirected and calm     # Heart failure with preserved ejection fraction - compensated.  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  Resume BB as needed    Coagulopathy with supratherapeutic INR  --INR of 6 on admission and increased to 8 today  --Continue to hold Coumadin and ordered vitamin K 2.5 mg once daily    Anemia of renal disease  --Hemoglobin is stable    Hyperkalemia  --No labs today  --Recheck BMP next a.m.    Barriers to discharge: IV antibiotics, UA.     Anticipated discharge date: 9/28        Subjective  Patient new to me.  Chart reviewed.  Patient appears confused.  Cannot give reliable information.  INR is elevated at 8.  Ordered vitamin K.  Reviewed CT of the left thigh which shows extensive edema but no evidence of cellulitis clinically. Denies left thigh pain.  Blood pressure is on the lower side and therefore will hold torsemide.  Patient wishes to go home.  Order PT OT evaluation  Complete review systems cannot be obtained.      Objective    BP 97/51 (BP Location: Left arm)   Pulse 64   Temp 98.1  F (36.7  C) (Oral)   Resp 18   Ht 1.626 m (5' 4\")   Wt 102.1 kg (225 lb)   SpO2 93%   BMI 38.62 kg/m    Weight:   Wt Readings from Last 5 Encounters:   09/25/21 102.1 kg (225 lb)   05/14/21 102.2 kg (225 lb 3.2 oz)   04/11/21 100.1 kg (220 lb 11.2 oz)   03/20/21 97 kg (213 lb 14.4 oz)   02/11/21 78.2 kg (172 lb 6.4 oz)       I/O last 3 completed shifts:  In: 243 [P.O.:240; I.V.:3]  Out: -   No intake/output data recorded.          Physical Exam  Confused, looks much older than stated age.   chronically ill lookingBody mass index is 38.62 kg/m .   On chronic oxygen therapy.   left thigh edema with no evidence of Tenderness, induration.  No " sinus tenderness  Moist membranes  Neck supple  CVS: S1 S2-N, no murmurs, gallops, rubs  Resp: B/L vesicular breath sounds, no wheezing, crackles  Abd: soft, No t/g/r  Neuro: no involuntary movements such as tremors       Pertinent Labs  ----------------------  Recent Labs   Lab 09/27/21  1138 09/27/21  0823 09/26/21  2238 09/26/21  1741 09/26/21  1704 09/26/21  0853 09/25/21  1759   NA  --   --   --   --  137  --  137   POTASSIUM  --   --   --   --  5.6*  --  4.6   CO2  --   --   --   --  20*  --  23   BUN  --   --   --   --  45*  --  40*   CR  --   --   --   --  9.73*  --  8.34*   MAG  --   --   --   --   --   --  3.2*   GLC 87 105* 151*   < > 122   < > 119   ALBUMIN  --   --   --   --   --   --  2.5*   BILITOTAL  --   --   --   --   --   --  0.6   ALKPHOS  --   --   --   --   --   --  272*   ALT  --   --   --   --   --   --  16   AST  --   --   --   --   --   --  17    < > = values in this interval not displayed.     Recent Labs   Lab 09/25/21  1759   WBC 4.8   HGB 8.1*   HCT 27.0*        Recent Labs   Lab 09/27/21  1228 09/26/21  1704   INR 8.52* 6.39*     Glucose Values Latest Ref Rng & Units 9/25/2021 9/26/2021   Bedside Glucose (mg/dl )  - -- --   GLUCOSE 70 - 125 mg/dL 119 122   Some recent data might be hidden         Pertinent Radiology   Radiology Results: Personally reviewed impression/s  CT Femur Thigh Left with Contrast    Result Date: 9/27/2021  EXAM: CT FEMUR THIGH LEFT WITH CONTRAST LOCATION: [facility] DATE/TIME: 9/27/2021 3:19 AM INDICATION: Pain, swelling, erythema of the left thigh concerning for infection. COMPARISON: CT abdomen and pelvis 09/25/2021. TECHNIQUE: IV contrast. Axial, sagittal and coronal thin-section reconstruction. Dose reduction techniques were used. CONTRAST: ISOVUE 370 100ML FINDINGS: Extensive edema throughout the subcutaneous fat of the left thigh and the imaged portion of the right thigh. No focal fluid collection or abscess. No soft  tissue gas. Bones are demineralized. No acute fracture, dislocation or focal bone destruction. Advanced tricompartmental degenerative osteoarthritis of the left knee. Small left knee joint effusion. Extensive peripheral arterial calcification. Large uterine mass, presumably a fibroid, incompletely imaged. Mild wall thickening of the urinary bladder suggestive of cystitis.     IMPRESSION: 1.  Extensive edema throughout the subcutaneous fat of the left thigh and the imaged portion of the right thigh. Findings are nonspecific. Cellulitis is in the differential diagnosis. 2.  No focal abscess. 3.  Advanced degenerative osteoarthritis of the left knee and small left knee joint effusion. 4.  Large uterine mass, presumably a fibroid, incompletely imaged. 5.  Mild wall thickening of the urinary bladder suggestive of cystitis.     XR Chest Port 1 View    Result Date: 9/25/2021  EXAM: XR CHEST PORT 1 VIEW LOCATION: [facility] DATE/TIME: 9/25/2021 5:27 PM INDICATION: Cough. COMPARISON: Chest x-rays dated 08/11/2021.     IMPRESSION: 1. Lungs are grossly clear. 2. Cardiac silhouette remains enlarged which may be partially due to technique. 3. Right internal jugular dual-lumen tunneled dialysis catheter, and left chest wall battery pack/generator/pacemaker and dual leads are in stable and satisfactory positions. 4. No pneumothorax, significant pleural fluid collection, definite infiltrate, or osseous fracture.    Abd/pelvis CT no contrast - Stone Protocol    Result Date: 9/25/2021  EXAM: CT ABDOMEN PELVIS W/O CONTRAST LOCATION: [facility] DATE/TIME: 9/25/2021 6:48 PM INDICATION: Flank pain. COMPARISON: 8/11/2021 TECHNIQUE: CT scan of the abdomen and pelvis was performed without IV contrast. Multiplanar reformats were obtained. Dose reduction techniques were used. CONTRAST: None. FINDINGS: LOWER CHEST: Pacemaker present. Lung bases clear. HEPATOBILIARY: Prior cholecystectomy. Trace  volume of ascitic fluid surrounds liver. PANCREAS: Normal. SPLEEN: Normal. ADRENAL GLANDS: Normal. KIDNEYS/BLADDER: Renal cysts unchanged. No stone or hydronephrosis evident. Exam somewhat compromised by patient size and motion. BOWEL: No obstruction or inflammatory finding. LYMPH NODES: No lymphadenopathy evident. VASCULATURE: Atherosclerotic changes diffusely. Dilated IVC may relate to elevated right heart pressures. PELVIC ORGANS: Very large lobulated pelvic mass measuring approximately 15 x 11 cm unchanged and most consistent with fibroid. MUSCULOSKELETAL: Diffuse degenerative changes and sclerosis of spine.     IMPRESSION: 1.  Minimal overall change. No urinary tract stone or hydronephrosis evident. 2.  Cardiomegaly, trace ascites, dilated IVC and anasarca. 3.  No change in large pelvic mass most suggestive of uterine fibroid.     CT Head w/o Contrast    Result Date: 9/25/2021  EXAM: CT HEAD W/O CONTRAST LOCATION: [facility] DATE/TIME: 9/25/2021 6:47 PM INDICATION: Cough, dizziness, congestion, headache, head pain. COMPARISON: None. TECHNIQUE: Routine CT Head without IV contrast. Multiplanar reformats. Dose reduction techniques were used. FINDINGS: INTRACRANIAL CONTENTS: No intracranial hemorrhage, extraaxial collection, or mass effect. No CT evidence of acute infarct. Mild presumed chronic small vessel ischemic changes. Mild generalized volume loss. No hydrocephalus. Position of the cerebellar tonsils is satisfactory. Sella shows no acute abnormality. Chronic area of ischemic change in the right cerebellar hemisphere with mild encephalomalacia and volume loss. Satisfactory position of the cerebellar tonsils. Sella shows no acute abnormality. Physiologic mineralization left basal ganglia. VISUALIZED ORBITS/SINUSES/MASTOIDS: Prior bilateral cataract surgery. Visualized portions of the orbits are otherwise unremarkable. Mucosal thickening primarily involving the ethmoid air cells. No  air-fluid levels. No middle ear or mastoid effusion. BONES/SOFT TISSUES: No acute fracture of the calvarium or skull base. No significant swelling of the facial or scalp tissues is apparent.     IMPRESSION: 1.  No CT evidence for acute intracranial process. 2.  Brain atrophy and presumed chronic microvascular ischemic changes as above.    EKG Results: not reviewed.

## 2021-09-27 NOTE — PROVIDER NOTIFICATION
Dr. Watson was notified about the patient's low blood pressure of 81/45, 85/46. No new order at this time. Continue to monitor.

## 2021-09-27 NOTE — PLAN OF CARE
Problem: Adult Inpatient Plan of Care  Goal: Plan of Care Review  Outcome: Improving     Patient alert and oriented x 4. Systolic blood pressure in 80's. CT of the left thigh/leg completed. Nasal canula at 2L. Dialysis patient. Continue to monitor.

## 2021-09-27 NOTE — PROGRESS NOTES
Care Management Follow Up    Length of Stay (days): 1    Expected Discharge Date: 09/28/2021     Concerns to be Addressed:       Patient plan of care discussed at interdisciplinary rounds: Yes    Anticipated Discharge Disposition:  home     Anticipated Discharge Services:  Possible home care  Anticipated Discharge DME:  none    Patient/family educated on Medicare website which has current facility and service quality ratings:    Education Provided on the Discharge Plan:    Patient/Family in Agreement with the Plan:      Referrals Placed by CM/SW:    Private pay costs discussed: not at this time    Additional Information:  Met with pt to discuss discharge planning. She states she lives with her daughter, who is her caregiver. She plans to return home with daughter.  She states she might want home care services for PT, OT and HHA.  At time of discharge she states she cannot get into daughter's truck as it is too high.  She states she can take a cab home.    PT/OT eval orders requested.    Nimco Kelsey RN

## 2021-09-28 NOTE — PLAN OF CARE
Co bilateral leg pain medicated with Oxycodone, Blood pressure   Wnl, INR 6.31 Dr Fernandez notified medicated with Vitamin K   CR 10.90 no dialysis today, patient anxious likes things just so.  Heavy assist need thierno lift for transfer from bed to chair  Blood sugars 79, 101   Pallative care to see today.

## 2021-09-28 NOTE — PLAN OF CARE
Pt slept on and off most of the night. Did complain of bilateral leg/feet pain. PRN Oxycodone given. No nausea noted. Soft Bps noted. House officer notified. Bolus ordered x1. Blood pressure did improve. Internal jugular site CDI. Dialysis M/W/F. 2L O2. Lung sounds diminished. Afebrile. IV Rocephin given. Diabetic. Nephrology following. Pt is alert and orientated. Bedrest this shift. Incontinent of bowel and bladder. Will continue to monitor.

## 2021-09-28 NOTE — PROGRESS NOTES
Progress Note    Assessment/Plan  65F with multiple medical problems as noted below (including ESRD, anxiety, COPD, pulm HTn, chronic hypoxia, large pelvic mass, PAF) who was brought to the emergency department for evaluation of hematuria and L thigh pain.     Assessment/Plan     #L thigh pain -likely due to edema and peripheral neuropathy   CT thigh shows edema and no evidence of cellulitis.  No signs and symptoms of necrotizing fasciitis  DC vancomycin.  --Hold topical Voltaren due to high INR.  --Continue scheduled Tylenol  --Could not elicit tenderness on palpation of the left thigh.    #ESRD - nephrology consult.  [facility], Dialysis MWF.  --Creatinine and potassium has worsened since yesterday.  --Nephrology consult ordered hemodialysis on 9/27 however it was not done.  Nephrology consult will try again today.  --However given persistent hypotension, I wonder how long we can continue hemodialysis.  --Discussed with the patient and her daughter.     Hyperkalemia on 9/26 treated with 1 dose of Kayexalate  --Continues to be an issue.  Defer to nephrology regarding dialysis    #Acute cystitis without hematuria, possible -urine culture positive for Enterococcus.  Suspect VRE given previous history.  Awaiting urine culture sensitivities, consider ID consult once sensitivities are available.  --Change Rocephin to IV Zyvox on 9/28  --Guarded prognosis if patient does not respond to the same    Hypotension on 9/27  --Received 500 normal saline bolus  --Blood pressure is trending down  --I am afraid patient may go into pulmonary edema if he continues to bolus her with normal saline.  --Discussed with patient's daughter and CODE STATUS changed to DNR/DNI  --May consider comfort care if patient does not respond.  --Order midodrine 5 mg 3 times daily     #anxiety, agitation, dementia -hold sertraline due to Zyvox     #COPD, pulmonary HTN, chronic hypoxic respiratory failure, obesity hypoventilation syndrome, KHUSHBOO  -. Patient  is supposed to be on 2 L oxygen and CPAP however she states has none at home  --Given history of pulmonary hypertension, systemic hypotension portends poor prognosis  -Discussed with patient's daughter     #elevated LA -trending down     #Metabolic encephalopathy multifactorial  Intermittent agitation during dialysis and previous hospitalizations  Recent hospitalizations with encephalopathy related to hypercapnia, polypharmacy and uremia  Patient had agitation overnight.  She is unable to evaluate pros and cons of medical treatment and therefore I think she is unable to make medical decisions for herself.  Patient's daughter hiwot Montero change her status to DNR/DNI on 9/28     # Heart failure with preserved ejection fraction - compensated.  Holding home medications due to hypotension  --Data received normal saline bolus on 9/27 due to hypotension.  I am afraid patient may go into pulmonary edema if normal saline boluses are given.    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     # Paroxysmal atrial flutter/fibrillation  -Left occipital punctate CVA in August 2021, Coumadin on hold due to supratherapeutic INR  -Not on rate control agent at this time (coreg was stopped at last admission).  Holding beta-blockers due to hypotension      Coagulopathy with supratherapeutic INR--continues to be an issue  --INR of 6 on admission and increased to 8 on 9/27 and back to 6.33 today.  She did receive 2.5 mg of vitamin K on 9/27  -- order 5 mg of oral vitamin K today    Anemia of renal disease  --Hemoglobin is  stable        Failure to thrive at home  --Overall patient has very poor quality of life at home and requires assistance with all activities of daily living.  Patient is bedbound at home.  --Given multiple medical problems and persistent hypotension, recommended comfort care if patient fails to respond to current medical treatment.  Daughter agrees with no heroic measures including intubation and CPR.  CODE STATUS changed to DNR today    Barriers to discharge: IV antibiotics, UA.     Anticipated discharge date: Multiple days        Subjective  Patient refused nebulizer and overnight CPAP.  She also developed  hypotension overnight and was given 500 mL normal saline bolus.  She continues to have blood pressure less than 90.  Urine culture is positive for Enterococcus and suspect VRE and therefore changed to Zyvox.  We will hold sertraline given interaction with Zyvox.  INR remains elevated at 6.3 despite giving vitamin K and holding Coumadin.  T  Had a family conference with patient's daughter, the patient and palliative care nurse practitioner.  Discussed about poor quality of life prior to admission and inability to participate in physical therapy.  Given hypotension and infection with VRE, I suspect continuing dialysis would be difficult.  More ever giving IV fluids to maintain blood pressure would result in pulmonary edema.  Discussed heroic measures and inability to to wean off ventilator due to underlying COPD and pulmonary hypertension.  Patient's daughter agreed that patient should be DNR/DNI.  Patient is confused and and cannot teach back what was told to her.  She does not seem to have ability to inform medical decisions.  Changed status to DNR/DNI after speaking to patient's daughter Winston.   If patient continues to deteriorate then broached the topic of comfort care.  Patient is quite tearful of the situation.  --Hemodialysis as ordered on 9/27 but it was not done.  Complete review systems cannot be  "obtained.      Objective    BP (!) 87/44   Pulse 71   Temp 98.6  F (37  C) (Oral)   Resp 20   Ht 1.626 m (5' 4\")   Wt 146.2 kg (322 lb 4.8 oz)   SpO2 98%   BMI 55.32 kg/m    Weight:   Wt Readings from Last 5 Encounters:   09/28/21 146.2 kg (322 lb 4.8 oz)   05/14/21 102.2 kg (225 lb 3.2 oz)   04/11/21 100.1 kg (220 lb 11.2 oz)   03/20/21 97 kg (213 lb 14.4 oz)   02/11/21 78.2 kg (172 lb 6.4 oz)       I/O last 3 completed shifts:  In: 600 [P.O.:600]  Out: -   No intake/output data recorded.          Physical Exam  Confused, looks much older than stated age.  She is unable to teach back what was said to her about her condition.   chronically ill lookingBody mass index is 55.32 kg/m .   On chronic oxygen therapy.   left thigh edema with no evidence of Tenderness, induration.  No sinus tenderness  Moist membranes  Neck supple  CVS: S1 S2-N, no murmurs, gallops, rubs  Resp: B/L vesicular breath sounds, no wheezing, crackles  Abd: soft, No t/g/r  Neuro: no involuntary movements such as tremors       Pertinent Labs  ----------------------  Recent Labs   Lab 09/28/21  1226 09/28/21  0903 09/28/21  0834 09/26/21  1741 09/26/21  1704 09/26/21  0853 09/25/21  1759   NA  --  138  --   --  137  --  137   POTASSIUM  --  5.3*  --   --  5.6*  --  4.6   CO2  --  23  --   --  20*  --  23   BUN  --  56*  --   --  45*  --  40*   CR  --  10.90*  --   --  9.73*  --  8.34*   MAG  --   --   --   --   --   --  3.2*   * 77 79   < > 122   < > 119   ALBUMIN  --   --   --   --   --   --  2.5*   BILITOTAL  --   --   --   --   --   --  0.6   ALKPHOS  --   --   --   --   --   --  272*   ALT  --   --   --   --   --   --  16   AST  --   --   --   --   --   --  17    < > = values in this interval not displayed.     Recent Labs   Lab 09/28/21 0903 09/25/21  1759   WBC 5.9 4.8   HGB 8.5* 8.1*   HCT 28.6* 27.0*    212     Recent Labs   Lab 09/28/21 0903 09/27/21  1228 09/26/21  1704   INR 6.31* 8.52* 6.39*     Glucose Values " Latest Ref Rng & Units 9/25/2021 9/26/2021 9/28/2021   Bedside Glucose (mg/dl )  - -- -- --   GLUCOSE 70 - 125 mg/dL 119 122 77   Some recent data might be hidden         Pertinent Radiology   Radiology Results: Personally reviewed impression/s  CT Femur Thigh Left with Contrast    Result Date: 9/27/2021  EXAM: CT FEMUR THIGH LEFT WITH CONTRAST LOCATION: [facility] DATE/TIME: 9/27/2021 3:19 AM INDICATION: Pain, swelling, erythema of the left thigh concerning for infection. COMPARISON: CT abdomen and pelvis 09/25/2021. TECHNIQUE: IV contrast. Axial, sagittal and coronal thin-section reconstruction. Dose reduction techniques were used. CONTRAST: ISOVUE 370 100ML FINDINGS: Extensive edema throughout the subcutaneous fat of the left thigh and the imaged portion of the right thigh. No focal fluid collection or abscess. No soft tissue gas. Bones are demineralized. No acute fracture, dislocation or focal bone destruction. Advanced tricompartmental degenerative osteoarthritis of the left knee. Small left knee joint effusion. Extensive peripheral arterial calcification. Large uterine mass, presumably a fibroid, incompletely imaged. Mild wall thickening of the urinary bladder suggestive of cystitis.     IMPRESSION: 1.  Extensive edema throughout the subcutaneous fat of the left thigh and the imaged portion of the right thigh. Findings are nonspecific. Cellulitis is in the differential diagnosis. 2.  No focal abscess. 3.  Advanced degenerative osteoarthritis of the left knee and small left knee joint effusion. 4.  Large uterine mass, presumably a fibroid, incompletely imaged. 5.  Mild wall thickening of the urinary bladder suggestive of cystitis.     XR Chest Port 1 View    Result Date: 9/25/2021  EXAM: XR CHEST PORT 1 VIEW LOCATION: [facility] DATE/TIME: 9/25/2021 5:27 PM INDICATION: Cough. COMPARISON: Chest x-rays dated 08/11/2021.     IMPRESSION: 1. Lungs are grossly  clear. 2. Cardiac silhouette remains enlarged which may be partially due to technique. 3. Right internal jugular dual-lumen tunneled dialysis catheter, and left chest wall battery pack/generator/pacemaker and dual leads are in stable and satisfactory positions. 4. No pneumothorax, significant pleural fluid collection, definite infiltrate, or osseous fracture.    Abd/pelvis CT no contrast - Stone Protocol    Result Date: 9/25/2021  EXAM: CT ABDOMEN PELVIS W/O CONTRAST LOCATION: [facility] DATE/TIME: 9/25/2021 6:48 PM INDICATION: Flank pain. COMPARISON: 8/11/2021 TECHNIQUE: CT scan of the abdomen and pelvis was performed without IV contrast. Multiplanar reformats were obtained. Dose reduction techniques were used. CONTRAST: None. FINDINGS: LOWER CHEST: Pacemaker present. Lung bases clear. HEPATOBILIARY: Prior cholecystectomy. Trace volume of ascitic fluid surrounds liver. PANCREAS: Normal. SPLEEN: Normal. ADRENAL GLANDS: Normal. KIDNEYS/BLADDER: Renal cysts unchanged. No stone or hydronephrosis evident. Exam somewhat compromised by patient size and motion. BOWEL: No obstruction or inflammatory finding. LYMPH NODES: No lymphadenopathy evident. VASCULATURE: Atherosclerotic changes diffusely. Dilated IVC may relate to elevated right heart pressures. PELVIC ORGANS: Very large lobulated pelvic mass measuring approximately 15 x 11 cm unchanged and most consistent with fibroid. MUSCULOSKELETAL: Diffuse degenerative changes and sclerosis of spine.     IMPRESSION: 1.  Minimal overall change. No urinary tract stone or hydronephrosis evident. 2.  Cardiomegaly, trace ascites, dilated IVC and anasarca. 3.  No change in large pelvic mass most suggestive of uterine fibroid.     CT Head w/o Contrast    Result Date: 9/25/2021  EXAM: CT HEAD W/O CONTRAST LOCATION: [facility] DATE/TIME: 9/25/2021 6:47 PM INDICATION: Cough, dizziness, congestion, headache, head pain. COMPARISON: None.  TECHNIQUE: Routine CT Head without IV contrast. Multiplanar reformats. Dose reduction techniques were used. FINDINGS: INTRACRANIAL CONTENTS: No intracranial hemorrhage, extraaxial collection, or mass effect. No CT evidence of acute infarct. Mild presumed chronic small vessel ischemic changes. Mild generalized volume loss. No hydrocephalus. Position of the cerebellar tonsils is satisfactory. Sella shows no acute abnormality. Chronic area of ischemic change in the right cerebellar hemisphere with mild encephalomalacia and volume loss. Satisfactory position of the cerebellar tonsils. Sella shows no acute abnormality. Physiologic mineralization left basal ganglia. VISUALIZED ORBITS/SINUSES/MASTOIDS: Prior bilateral cataract surgery. Visualized portions of the orbits are otherwise unremarkable. Mucosal thickening primarily involving the ethmoid air cells. No air-fluid levels. No middle ear or mastoid effusion. BONES/SOFT TISSUES: No acute fracture of the calvarium or skull base. No significant swelling of the facial or scalp tissues is apparent.     IMPRESSION: 1.  No CT evidence for acute intracranial process. 2.  Brain atrophy and presumed chronic microvascular ischemic changes as above.    EKG Results: not reviewed.

## 2021-09-28 NOTE — PLAN OF CARE
Problem: Pain Acute  Goal: Acceptable Pain Control and Functional Ability  Outcome: Improving   Patient rates pain 9/10.  Patient given prn tylenol and prn Seroquel for agitation.  Patietn reports relief from medication intervention.

## 2021-09-28 NOTE — PROGRESS NOTES
Palliative Care Progress Note    Today, the patient was seen for:  Goals of care discussion        Impressions and Recommendations     Goals of Care per daughter Tymonia and patient  - Continue restorative measures for now.  Explained to patient and daughter that situation is tenuous and if patient does not tolerate dialysis, we will need to have more conversations around hospice given her multiple medical issues.  Press forward for now.   - Change code status to DNR/I from full code today     Symptom Management:  1. Pain in b/l legs d/t anasarca in setting of ESRD: Hx opioid sensitivity.  Used 2600mg Tylenol and 15mg oxycodone past 24hr.    - Voltaren gel prn(held d/t high INR)  - Scheduled tylenol   - Oxycodone 5mg q4h prn  - constipation ppx: no documented BM this admission         - Add PRN bisacodyl supp and Senokot tabs    2. Anxiety - stable.  Tends to get quite anxious during dialysis runs and often requires family member to be present  - Sertraline - on hold since 9/26.  Consider restarting   - low dose seroquel PRN     2. Cognitive impairment: recent hospitalizations with encephalopathy(multifactorial), recent CVA; possible component of vascular dementia.  Remains confused today.   - SLUMS: OT unable to perform today d/t lack of participation, attempt again tomorrow    Advanced Care Planning:  - Patient has a completed Health Care Directive: not on file  - Surrogate decision maker per patient: daughter Winston    Psychosocial/Spiritual Support:  - Has seven kids. Per report, Winston is HCA but we don't have AD on file here. She has been living at SNF for last year. One of her daughters expressed concern about care being given there, and now recently moved in with Winston.        HPI          66yo F with PMH of ESRD on HD, diastolic heart failure, DMII, Afib/flutter, KHUSHBOO/COPD with pulmonary hypertension on chronic supplemental oxygen, HFpEF, morbid obesity with OHS, recent CVA(8/13/21) admitted on 9/25 for  evaluation of hematuria.  Does have a known large pelvic mass with no changes since 8/11, previously evaluated by  GYN oncology and possibly contributing to rectal prolapse and not a surgical or chemotherapy candidate.  Also reports of increased L buttock/thigh pain; CT with no abscess, extensive edema in left high with cellulitis in differential, and mild wall thickening of bladder suggestive of cystitis.  On empiric Rocephin.  Diuretics have been on hold.      9/27: INR trending up; to get vit K.  Urine/blood cx pending.  Low BP late in evening; 500cc NS bolus given.   9/28: INR remains elevated but still high.  Poor participation with therapy requires thierno lift to chair.  UC shows enterococcus              Palliative encounter:     Per patient or family/caregivers today:  Discussion with writer, patient, daughter Winston on speaker phone, and Dr. Fernandez.  Provided a clinical update: liver dysfunction with elevated INR despite being off coumadin, encephalopathy in setting of ESRD, anasarca causing worsening leg pain thus causing decreased mobility, lack of participation in therapy, recent hypotension requiring fluids which may result in worsening dyspnea.  Provided a picture of multi-organ dysfunction in which patient may not continue to tolerate dialysis which will lead to different discussions about overall direction of care.  We talked about code status at length.  Educated regarding the pros and cons of attempting cardiac resuscitation, intubation, and mechanical ventilation.  Discussed probability of survival as well as quantity of life implications.  Recommended DNR/I by myself and Dr. Fernandez.  Based on this discussion, patient and daughter agreed with changing code status to DNR/I.  Patient did not grasp the overall concept of code status and implications, but her daughter Winston(surrogate decision maker) demonstrated clear understanding.  Patient noted on multiple occasions, she trusts the doctors and will  "follow their guidance.      Winston has a good overall understanding of her mom's multiple health problems.  She understands that her body is slowly and progressively declining.  Patient notes, \"I want to do what I can to be around for my grand babies.\"  Patient demonstrates ability to understand basic conceptd and able to make simple decisions, but continues to not have the ability to have complex decision making capacity.  Explained we will change code status, and will see how tomorrow goes with dialysis and take one day at a time.      Prognosis, Goals, or Advance Care Planning was addressed today with: Yes.  Mood, coping, and/or meaning in the context of serious illness were addressed today: Yes.    Palliative Symptom Review (0=no symptom/no concern, 1=mild, 2=moderate, 3=severe):  Pain: 2  Dyspnea: 1  Fatigue: 3  Nausea: 0  Constipation: 0  Diarrhea: 0  Depressive symptoms: 1  Anxiety: 1  Drowsiness: 2  Poor Appetite: 0  Insomnia: 0    Overall guarded prognosis     Patient is on opioids: assessed and bowels ok/no needed changes to plan of care today.           Review of Systems:     A full 14 point review of systems was otherwise completed and is negative aside from that mentioned above          Medications:     I have reviewed this patient's medication profile and medications during this hospitalization.      acetaminophen  650 mg Oral 4x Daily     allopurinol  50 mg Oral QPM     calcium acetate  667 mg Oral TID w/meals     [Held by provider] diclofenac  2 g Topical 4x Daily     epoetin padmini-epbx (RETACRIT) inj ESRD  3,000 Units Subcutaneous Once per day on Mon Wed Fri     [Held by provider] furosemide  120 mg Oral Daily     insulin aspart  1-7 Units Subcutaneous TID AC     insulin aspart  1-5 Units Subcutaneous At Bedtime     ipratropium - albuterol 0.5 mg/2.5 mg/3 mL  1 vial Nebulization 4x Daily     lidocaine   Transdermal Q8H     linezolid  600 mg Intravenous Q12H     [Held by provider] losartan  50 mg " "Oral Daily     montelukast  10 mg Oral At Bedtime     [Held by provider] NIFEdipine ER  30 mg Oral Daily     [Held by provider] oxybutynin ER  5 mg Oral QPM     pantoprazole  40 mg Oral Daily     rosuvastatin  10 mg Oral Daily     [Held by provider] sertraline  25 mg Oral Daily     sodium chloride (PF)  3 mL Intracatheter Q8H     timolol maleate  1 drop Both Eyes BID     [Held by provider] torsemide  20 mg Oral BID     albuterol, glucose **OR** dextrose **OR** glucagon, HOLD MEDICATION, Lidocaine, lidocaine 4%, lidocaine (buffered or not buffered), melatonin, naloxone **OR** naloxone **OR** naloxone **OR** naloxone, ondansetron **OR** ondansetron, oxyCODONE, polyethylene glycol, QUEtiapine, sodium chloride (PF), Warfarin Therapy Reminder           Physical Exam:   Blood pressure 115/60, pulse 67, temperature 97.6  F (36.4  C), temperature source Oral, resp. rate 19, height 1.626 m (5' 4\"), weight 102.1 kg (225 lb), SpO2 98 %, not currently breastfeeding.  GENERAL: lying in bed in NAD.  Dozing off during conversation occasionally.  Grimaces in pain with any movement in bed with moderate anasarca   SKIN: Warm and dry   HEENT: Normocephalic, anicteric sclera, moist mucous membranes  LUNGS: Diminishd to auscultation anterolaterally; non-labored   CARDIAC: RRR  ABDOMINAL: BS+, soft, non distended, non tender  : torres in place  EXTREMITIES: moderate edema   NEUROLOGIC: lethargic, alert and oriented x2  PSYCH: calm              Data Reviewed:     All labs/imaging reviewed in Epic     ====================================================  TT: I have personally spent a total of 50 minutes on the unit in review of medical record, consultation with the medical providers and assessment of patient today, with more than 50% of this time spent in counseling, coordination of care, and discussion with patient and daughter re: diagnostic results, prognosis, symptom management, risks and benefits of management options, and " development of plan of care as noted above.  ====================================================    Ismael Ewing CNP  [facility]  Palliative Medicine  Office: [phone]

## 2021-09-28 NOTE — PROGRESS NOTES
RENAL PROGRESS NOTE    CC:  Hematuria    ASSESSMENT & PLAN:   This is a 65 year old female with past medical history significant for morbid obesity, cognitive impairment, congestive heart failure with preserved ejection fraction, paroxysmal atrial fibrillation/flutter, complete heart block status post pacemaker placement, type 2 diabetes mellitus, end-stage renal disease on hemodialysis Monday Wednesday Friday at [facility] under care of Dr. Bailey, chronic respiratory failure with hypoxia oxygen 24/7, large pelvic mass, rectal prolapse who was admitted to [facility] on September 25 for evaluation of hematuria.         ESRD on HD TTS-Patient dialyzes at [facility] under care of Dr Katie Bailey with internal jugular catheter. Last HD 09/24. HD was ordered yesterday but was not done.  Will perform HD today, then tomorrow as per usual MWF schedule.  Discussed with dialysis charge nurse        Hyperkalemia-mild. Serum potassium is 5.3 this am. Run with K2 bath. Renal diet     Metabolic acidosis-Resolved. HCO3 is 23 this am.     HTN/Volume status-BP is acceptable. Not on antihypertensive agents currently. Hypervolemic with 2+ LE edema b/l.   -UF as tolerated       Anemia - Hgb is 8.5 g/dl. On EPO with HD as outpatient.  Continue Epo here     Secondary hyperparathyroidism -on binders. Renal diet.     H/o CHF-no evidence of exacerbation.  -UF with HD as tolerated.   -daily weight  -32 oz fluid restrictions     Recurrent UTI's-Urine cx grew 50-100K of Enterococcus species and  Candida albicans . On IV Zyvox. Management as per primary service      Chronic respiratory failure with hypoxia-Secondary to obesity hypoventilatory syndrome, KHUSHBOO, COPD, pulmonary hypertension. Patient is supposed to be on 2 L oxygen 24/7     Type 2 DM -diet controlled at home. On ISS here.       H/o gout-on allopurinol 50 mg daily.     Large pelvic mass-No changes since last study August 11, 2021  Perhaps contributing to  recurrent urinary symptoms  Evaluated at [facility] GYN oncology, and not surgical or chemotherapy candidate.    Goals of care-palliative care is following.       SUBJECTIVE:   No acute issues. Patient states that she is feeling better, reports resolved hematuria. Patient admits increased lower extremity edema attributing to missed dialysis.      OBJECTIVE:  Physical Exam   Temp: 97.6  F (36.4  C) Temp src: Oral BP: 115/60 Pulse: 67   Resp: 19 SpO2: 98 % O2 Device: Nasal cannula Oxygen Delivery: 2 LPM  Vitals:    09/25/21 1638   Weight: 102.1 kg (225 lb)     Vital Signs with Ranges  Temp:  [97.6  F (36.4  C)-98.5  F (36.9  C)] 97.6  F (36.4  C)  Pulse:  [63-70] 67  Resp:  [18-20] 19  BP: ()/(37-60) 115/60  FiO2 (%):  [28 %] 28 %  SpO2:  [90 %-98 %] 98 %  I/O last 3 completed shifts:  In: 240 [P.O.:240]  Out: -     @TMAXR(24)@    Patient Vitals for the past 72 hrs:   Weight   09/25/21 1638 102.1 kg (225 lb)   [unfilled]    PHYSICAL EXAM:  General - Alert and oriented x3, appears comfortable, NAD, morbidly obese  Cardiovascular - Regular rate and rhythm, no rub  Respiratory - Clear to auscultation bilaterally, no crackles or wheezes  Abd: BS present, no guarding or pain with palpation, no ascites  Extremities - obese, 2+ pitting lower extremity edema bilaterally  Skin: dry, intact, no rash, good turgor  Neuro:  Grossly intact, no focal deficits  MSK:  Grossly intact  Psych:  Normal affect  Access: [facility] TD site is intact    LABORATORY STUDIES:     Recent Labs   Lab 09/28/21  0903 09/25/21  1759   WBC 5.9 4.8   RBC 3.01* 2.87*   HGB 8.5* 8.1*   HCT 28.6* 27.0*    212       Basic Metabolic Panel:  Recent Labs   Lab 09/28/21  1226 09/28/21  0903 09/28/21  0834 09/27/21  2139 09/27/21  1745 09/27/21  1138 09/26/21  1741 09/26/21  1704 09/26/21  0853 09/25/21  1759   NA  --  138  --   --   --   --   --  137  --  137   POTASSIUM  --  5.3*  --   --   --   --   --  5.6*  --  4.6   CHLORIDE  --  100  --   --    --   --   --  100  --  99   CO2  --  23  --   --   --   --   --  20*  --  23   BUN  --  56*  --   --   --   --   --  45*  --  40*   CR  --  10.90*  --   --   --   --   --  9.73*  --  8.34*   * 77 79 146* 112* 87   < > 122   < > 119   JOANNA  --  8.2*  --   --   --   --   --  8.2*  --  8.3*    < > = values in this interval not displayed.       INR  Recent Labs   Lab 09/28/21  0903 09/27/21  1228 09/26/21  1704   INR 6.31* 8.52* 6.39*        Recent Labs   Lab Test 09/28/21  0903 09/27/21  1228 09/26/21  1704 09/25/21  1759   INR 6.31* 8.52*   < >  --    WBC 5.9  --   --  4.8   HGB 8.5*  --   --  8.1*     --   --  212    < > = values in this interval not displayed.       Personally reviewed current labs     ~ 35 minutes spent in exam, POC, education regarding renal disease and management.  >90% time spent in education and counseling and/or discussion with patient's care team    Kaylie Porter MD  [facility]  [address], [address]  [place] [address]  Phone# [phone]  Fax# [phone]

## 2021-09-28 NOTE — PLAN OF CARE
Writer took over care at 1900. Pt stated that she had generalized pain but mainly pain in her bilateral lower extremities. PRN Oxycodone given. No nausea noted. Internal jugular intact. Central line bath complete this shift. Dialysis M/W/F. Afebrile. IV Rocephin given. Diabetic. HS sugar was 146. No coverage needed. Nephrology following. Pt is alert and orientated. Bedrest this shift. [place] if up to chair. Incontinent of bowel and bladder. Purewick in place. Will continue to monitor.

## 2021-09-28 NOTE — PROVIDER NOTIFICATION
Dr. Farris notified of pts blood pressure. 63/37 via the monitor and 78/40 via manual. Order placed for 500ml bolus. Will continue to monitor.

## 2021-09-28 NOTE — PROGRESS NOTES
Patient refusing nebulizer treatment and overnight CPAP. BS diminished bilaterally, RR 20, sats 91% on 2 Lpm NC.     Kimmie Decker, RT

## 2021-09-28 NOTE — PROGRESS NOTES
Occupational Therapy       09/28/21 0939   Quick Adds   Type of Visit Initial Occupational Therapy Evaluation   Living Environment   People in home child(mya), adult;grandchild(mya)   Current Living Arrangements apartment   Home Accessibility stairs to enter home;stairs within home   Number of Stairs, Main Entrance 2   Stair Railings, Main Entrance railings safe and in good condition   Number of Stairs, Within Home, Primary 2   Stair Railings, Within Home, Primary railings safe and in good condition   Transportation Anticipated car, drives self   Self-Care   Usual Activity Tolerance good   Current Activity Tolerance fair   Equipment Currently Used at Home walker, rolling   Instrumental Activities of Daily Living (IADL)   Previous Responsibilities meal prep;finances   Disability/Function   Hearing Difficulty or Deaf no   Wear Glasses or Blind yes   Concentrating, Remembering or Making Decisions Difficulty yes   Difficulty Communicating no   Difficulty Eating/Swallowing no   Walking or Climbing Stairs Difficulty no   Dressing/Bathing Difficulty no   Toileting issues no   Doing Errands Independently Difficulty (such as shopping) yes   Errands Management fall does all errands   Fall history within last six months no   General Information   Onset of Illness/Injury or Date of Surgery 09/25/21   Referring Physician Dr Fernandez   Patient/Family Therapy Goal Statement (OT) pt wants to go home   Additional Occupational Profile Info/Pertinent History of Current Problem mod complexity   Existing Precautions/Restrictions no known precautions/restrictions   Limitations/Impairments other (see comments)  (none)   Left Upper Extremity (Weight-bearing Status) full weight-bearing (FWB)   Right Upper Extremity (Weight-bearing Status) full weight-bearing (FWB)   Left Lower Extremity (Weight-bearing Status) full weight-bearing (FWB)   Right Lower Extremity (Weight-bearing Status) full weight-bearing (FWB)   Cognitive Status Examination    Orientation Status orientation to person, place and time   Affect/Mental Status (Cognitive) confused   Follows Commands follows multi-step commands;50-74% accuracy   Safety Deficit minimal deficit   Memory Deficit minimal deficit   Visual Perception   Visual Impairment/Limitations corrective lenses for reading   Sensory   Sensory Quick Adds No deficits were identified   Pain Assessment   Patient Currently in Pain Yes, see Vital Sign flowsheet   Posture   Posture forward head position   Range of Motion Comprehensive   General Range of Motion no range of motion deficits identified   Strength Comprehensive (MMT)   General Manual Muscle Testing (MMT) Assessment no strength deficits identified   Coordination   Upper Extremity Coordination No deficits were identified   Bed Mobility   Bed Mobility supine-sit;sit-supine   Supine-Sit [place] (Bed Mobility) maximum assist (25% patient effort);2 person assist   Sit-Supine [place] (Bed Mobility) maximum assist (25% patient effort);2 person assist   Assistive Device (Bed Mobility) bed rails   Comment (Bed Mobility) painful legs    Transfers   Transfer Comments thierno lift at this time   Balance   Balance Assessment standing balance: static   Standing Balance: Static poor balance   Balance Comments pain in legs   Activities of Daily Living   BADL Assessment lower body dressing   Lower Body Dressing Assessment   [place] Level (Lower Body Dressing) dependent (less than 25% patient effort)   Assistive Devices (Lower Body Dressing) other (see comments)   Position (Lower Body Dressing) sitting up in bed   Comment (Lower Body Dressing) pt unable to tolerate sitting EOB   Clinical Impression   Criteria for Skilled Therapeutic Interventions Met (OT) skilled treatment is necessary   OT Diagnosis weakness, pain, fatigue   OT Problem List-Impairments impacting ADL balance;pain;strength   Assessment of Occupational Performance 3-5 Performance Deficits   Identified Performance  Deficits weakness, pain, fatigue   Planned Therapy Interventions (OT) ADL retraining;strengthening   Clinical Decision Making Complexity (OT) moderate complexity   Therapy Frequency (OT) Daily   Predicted Duration of Therapy 7 days   Anticipated Equipment Needs Upon Discharge (OT) shower chair;raised toilet seat   Risk & Benefits of therapy have been explained care plan/treatment goals reviewed   OT Discharge Planning    OT Discharge Recommendation (DC Rec) Transitional Care Facility;Home with assist  (Home w/thierno lift, assist w/all ADLs)   Total Evaluation Time (Minutes)   Total Evaluation Time (Minutes) 15

## 2021-09-28 NOTE — SIGNIFICANT EVENT
Significant Event Note    Time of event: 11:52 PM September 27, 2021    Description of event:  Nurse called regarding systolic BP 79. Patient asymptomatic.   Patient is a dialysis patient.   Does have hx of HF but had torsemide held today given softer pressures. Has not been on fluid. Nurse denies obvious swelling.  Per nephro note- had HD today.    Plan:  -500cc NS  -Recheck BP  -Call if BP not resolving or if symptomatic    Discussed with: bedside nurse    Gilmer Farris MD

## 2021-09-29 PROBLEM — J96.90 RESPIRATORY FAILURE (H): Status: ACTIVE | Noted: 2019-03-03

## 2021-09-29 NOTE — PROGRESS NOTES
RESPIRATORY CARE NOTE     Patient Name: Sun Mireles  Today's Date: 9/29/2021       Pt continues to receive QID Duo. BS are clear. Pt is on 2 lpm of oxygen via NC, SpO2 is 96%.  RT encouraged deep breathing and coughing techniques .  RT will continue to monitor and assess.

## 2021-09-29 NOTE — PROGRESS NOTES
[facility]    Medicine Progress Note - Hospitalist Service       Date of Admission:  9/25/2021    Assessment & Plan           65 year old female with history of ESRD, COPD, pulmonary hypertension, chronic hypoxia, large pelvic mass and PAF who was brought to the emergency department for evaluation of hematuria and L thigh pain.      Assessment/Plan     Left thigh pain: likely due to edema and peripheral neuropathy. CT thigh shows edema and no evidence of cellulitis.  No signs and symptoms of necrotizing fasciitis  Received vancomycin earlier and now has discontinued.  - Hold topical Voltaren due to high INR.  - Continue scheduled Tylenol    Acute cystitis: Urine culture positive for VRE on 9/25.    - Continue Zyvox (since 9/28) per sensitivity     Hypotension: Discontinue PTA lasix 120 mg daily. Hold PTA losartan and nifedipine. Currently on midodrine 5 mg 3 times daily since 9/28    ESRD: Continue hemodialysis MWF per nephrology. Continue  Phoslo     Anxiety, agitation, dementia: hold sertraline due to Zyvox     COPD, pulmonary HTN, chronic hypoxic respiratory failure, obesity hypoventilation syndrome, KHUSHBOO: Patient is supposed to be on 2 L oxygen and CPAP however she states has none at home  - Continue supplemental oxygen.     Metabolic encephalopathy: multifactorial. Has intermittent agitation during dialysis and previous hospitalizations  Recent hospitalizations with encephalopathy related to hypercapnia, polypharmacy and uremia  - Patient is unable to evaluate pros and cons of medical treatment. She is unable to make medical decisions for herself.  Patient's daughter hiwot Montero change her status to DNR/DNI on 9/28     Chronic congestive heart failure with preserved ejection fraction: compensated.  Holding home lasix due to hypotension    Large pelvic mass: No changes since last study 8/11/21. Evaluated at [facility] GYN. Prior imaging showing a large notable fibroid uterus since  2019. Per discharge summary note potentially contributing to rectal prolapse and not surgical or chemotherapy candidate.      Type 2 diabetes mellitus with renal complications without long-term insulin: Blood sugars controlled with sliding scale insulin. Patient does not want to take diabetic diet.     Paroxysmal atrial flutter/fibrillation: Left occipital punctate CVA in August 2021. Takes coumadin at home.   -Not on rate control agent at this time (coreg was stopped at last admission).  Holding beta-blockers due to hypotension  - Coagulopathy: Coumadin on hold due to supratherapeutic INR. Did receive 2.5 mg of vitamin K on 9/27 and 9/28. Continue to monitor INR.     Anemia of renal disease:Hemoglobin is stable     Adult failure to thrive: Overall patient has very poor quality of life at home and requires assistance with all activities of daily living.  Patient is bedbound at home.  - Palliative care evaluated patient today. No escalation of care if acutely decompensates, no ICU admissions. Continue current therapies including dialysis, IV abx for now. Daughter is open to potential hospice approach if patient continues to have no significant functional improvement.    - Will see how patient does for the next few days.        Diet: Combination Diet Dialysis Diet    DVT Prophylaxis: On coumadin  Phan Catheter: Not present  Central Lines: None  Code Status: No CPR- Do NOT Intubate      Disposition Plan   Expected discharge: 10/01/2021       The patient's care was discussed with the Bedside Nurse and Care Coordinator/.    Amalia Gold MD  Hospitalist Service  [facility]  Securely message with the [employer] Web Console (learn more here)  Text page via [email or web address] Paging/Directory      ______________________________________________________________________    Interval History   Patient is new to me. Previous lab test and imaging results, as well as progress notes were reviewed.  Patient was  seen and examined during hemodialysis. She was not very interactive. She did not answer questions. Per dialysis nurse by the bedside, patient complained about pain and asked for pain medication earlier. Per nursing staff, patient's blood pressure has been running low.       Data reviewed today: I reviewed all medications, new labs and imaging results over the last 24 hours.     Physical Exam   Vital Signs: Temp: 98  F (36.7  C) Temp src: Oral BP: 125/61 Pulse: 73   Resp: 18 SpO2: 90 % O2 Device: Nasal cannula Oxygen Delivery: 2 LPM  Weight: 312 lbs 6.27 oz  General appearance: not in acute distress  HEENT: PERRL, EOMI  Lungs: Clear breath sounds in bilateral lung fields  Cardiovascular: Regular rate and rhythm, normal S1-S2  Abdomen: Soft, non tender, no distension  Musculoskeletal: No joint swelling  Skin: No rash and no edema  Neurology: Lethargy.  Cranial nerves II - XII normal.  Normal muscle strength in all four extremities.    Data   Recent Labs   Lab 09/29/21  1200 09/29/21  0844 09/29/21  0721 09/28/21  1226 09/28/21  0903 09/27/21  1745 09/27/21  1228 09/26/21  1741 09/26/21  1704 09/26/21  0853 09/25/21  1759   WBC  --   --  5.5  --  5.9  --   --   --   --   --  4.8   HGB  --   --  8.4*  --  8.5*  --   --   --   --   --  8.1*   MCV  --   --  95  --  95  --   --   --   --   --  94   PLT  --   --  191  --  203  --   --   --   --   --  212   INR  --   --  3.46*  --  6.31*  --  8.52*   < > 6.39*  --   --    NA  --   --  135*  --  138  --   --   --  137  --  137   POTASSIUM  --   --  4.8  --  5.3*  --   --   --  5.6*  --  4.6   CHLORIDE  --   --  98  --  100  --   --   --  100  --  99   CO2  --   --  20*  --  23  --   --   --  20*  --  23   BUN  --   --  34*  --  56*  --   --   --  45*  --  40*   CR  --   --  7.59*  --  10.90*  --   --   --  9.73*  --  8.34*   ANIONGAP  --   --  17  --  15  --   --   --  17  --  15   JOANNA  --   --  8.9  --  8.2*  --   --   --  8.2*  --  8.3*   GLC 84 83 79   < > 77   < >  --     < > 122   < > 119   ALBUMIN  --   --   --   --   --   --   --   --   --   --  2.5*   PROTTOTAL  --   --   --   --   --   --   --   --   --   --  7.2   BILITOTAL  --   --   --   --   --   --   --   --   --   --  0.6   ALKPHOS  --   --   --   --   --   --   --   --   --   --  272*   ALT  --   --   --   --   --   --   --   --   --   --  16   AST  --   --   --   --   --   --   --   --   --   --  17    < > = values in this interval not displayed.

## 2021-09-29 NOTE — PROGRESS NOTES
Pt identifies as Roman Catholic. She was soundly sleeping and did not arouse to verbal stimuli.   offered prayer aloud at bedside.  Spiritual Care is available as needed or requested.    SHERRON Sellers.

## 2021-09-29 NOTE — PROGRESS NOTES
Hemodialysis Treatment Note     Pre-dialysis weight: 143.8kg   Post -dialysis weight: 140.8kg large Wt discrepancy from Wt on 9/25/21      Approximate weight removed: 3000 ml     Vascular access: Right PCAD.     Dialyzer: Optiflux 160     Total blood volume processed: 75.4 L     Total dialysis treatment time: 3.5 hrs     Vascular access status:  Heparin locked, capped and wrapped.  Dressing changed: 9/28/21  Condition of dressing pre-dialysis:CDI  Condition of dressing post-dialysis:CDI  Lines reversed: No  BFR:400     Run summary:  K2  per protocol.  Heparin bolus:0  Patient ran the full 3.5 hour treatment.  No problems during the treatment.  Critline used for fluid management / monitoring.  Critline profile:B  Reported to bedside MOOSE Calderon  Please see flow sheet for further details.     Interventions:  VS continually monitored. Goal adjusted per Critline and hemodynamics.     Plan:  Per renal team.     Hermann Campbell RN  [facility] Dialysis

## 2021-09-29 NOTE — PROCEDURES
HemodialysisTreatment    Pre wt.141.7kg  Post wt 138.5 kg     Pre tx access assessment: right CVC patent, clean, dry and intact without s/sx of infection,     Post tx access assessment: CVC was locked with heparin capped and labeled per policy    BVP: 77    Treatment Summary: Pt completed 3.25 hours of the 3.5 hour HD tx due to severe cramping at the end of tx, rinsed back and resolved, K2 bath, removed 3L, Primary RN was updated with report     Interventions:  VS q15min, crit-line    Sarita Brandt RN on 9/29/2021 at 12:36 PM

## 2021-09-29 NOTE — PROGRESS NOTES
RENAL PROGRESS NOTE    CC:  Hematuria    ASSESSMENT & PLAN:   This is a 65 year old female with past medical history significant for morbid obesity, cognitive impairment, congestive heart failure with preserved ejection fraction, paroxysmal atrial fibrillation/flutter, complete heart block status post pacemaker placement, type 2 diabetes mellitus, end-stage renal disease on hemodialysis Monday Wednesday Friday at [place] dialysis unit under care of Dr. Bailey, chronic respiratory failure with hypoxia oxygen 24/7, large pelvic mass, rectal prolapse who was admitted to [facility] on September 25 for evaluation of hematuria.        ESRD on HD TTS-Patient dialyzes at [facility] under care of Dr Katie Bailey with internal jugular catheter. Missed HD on 09/27. Last HD 09/28.   Receiving HD today as per usual [facility] schedule.      Hyperkalemia-mild. Resolved. Serum potassium is 4.8 this am. Run with K2 bath. Renal diet     Metabolic acidosis HCO3 is 20 this am.Should improve with HD.     HTN/Volume status-BP is acceptable. Not on antihypertensive agents currently. Hypervolemic with 2+ LE edema b/l.   -UF as tolerated       Anemia - Hgb is 8.4g/dl. On EPO with HD as outpatient.  Continue Epo here     Secondary hyperparathyroidism -on binders. Renal diet.     H/o CHF-no evidence of exacerbation.  -UF with HD as tolerated.   -daily weight  -32 oz fluid restrictions     Recurrent UTI's-Urine cx grew 50-100K of Enterococcus species and  Candida albicans . On IV Zyvox. Management as per primary service      Chronic respiratory failure with hypoxia-Secondary to obesity hypoventilatory syndrome, KHUSHBOO, COPD, pulmonary hypertension. Patient is supposed to be on 2 L oxygen 24/7     Type 2 DM -diet controlled at home. On ISS here.       H/o gout-on allopurinol 50 mg daily.     Large pelvic mass-No changes since last study August 11, 2021  Perhaps contributing to recurrent urinary symptoms  Evaluated at [facility]  GYN oncology, and not surgical or chemotherapy candidate.    Goals of care-palliative care is following.       SUBJECTIVE:   No acute issues.   I reviewed dialysis run from yesterday. Tolerated w/o issues, 3L removed.  Patient was seen and examined during dialysis. Tolerating w/o issues. Patient c/o pain in the low back, just received Tylenol.    OBJECTIVE:  Physical Exam   Temp: 98.2  F (36.8  C) Temp src: Oral BP: 117/49 Pulse: 68   Resp: 18 SpO2: 96 % O2 Device: Nasal cannula Oxygen Delivery: 2 LPM  Vitals:    09/28/21 1500 09/29/21 0725 09/29/21 0900   Weight: 146.2 kg (322 lb 4.8 oz) 141.7 kg (312 lb 8 oz) 141.7 kg (312 lb 6.3 oz)     Vital Signs with Ranges  Temp:  [97.6  F (36.4  C)-99.1  F (37.3  C)] 98.2  F (36.8  C)  Pulse:  [65-81] 68  Resp:  [16-22] 18  BP: ()/(44-72) 117/49  FiO2 (%):  [28 %] 28 %  SpO2:  [89 %-98 %] 96 %  I/O last 3 completed shifts:  In: 850 [P.O.:850]  Out: 3000 [Other:3000]    @TMAXR(24)@    Patient Vitals for the past 72 hrs:   Weight   09/29/21 0900 141.7 kg (312 lb 6.3 oz)   09/29/21 0725 141.7 kg (312 lb 8 oz)   09/28/21 1500 146.2 kg (322 lb 4.8 oz)   [unfilled]    PHYSICAL EXAM:  General - Alert and oriented x3, appears comfortable, NAD, morbidly obese  Cardiovascular - Regular rate and rhythm, no rub  Respiratory - Clear to auscultation bilaterally, no crackles or wheezes  Abd: BS present, no guarding or pain with palpation, no ascites  Extremities - obese, 2+ pitting lower extremity edema bilaterally  Skin: dry, intact, no rash, good turgor  Neuro:  Grossly intact, no focal deficits  MSK:  Grossly intact  Psych:  Normal affect  Access: RIJ TDC site is intact    LABORATORY STUDIES:     Recent Labs   Lab 09/29/21  0721 09/28/21  0903 09/25/21  1759   WBC 5.5 5.9 4.8   RBC 2.90* 3.01* 2.87*   HGB 8.4* 8.5* 8.1*   HCT 27.5* 28.6* 27.0*    203 212       Basic Metabolic Panel:  Recent Labs   Lab 09/29/21  0844 09/29/21  0721 09/28/21  2144 09/28/21  1801  09/28/21  1226 09/28/21  0903 09/26/21  1741 09/26/21  1704 09/26/21  0853 09/25/21  1759   NA  --  135*  --   --   --  138  --  137  --  137   POTASSIUM  --  4.8  --   --   --  5.3*  --  5.6*  --  4.6   CHLORIDE  --  98  --   --   --  100  --  100  --  99   CO2  --  20*  --   --   --  23  --  20*  --  23   BUN  --  34*  --   --   --  56*  --  45*  --  40*   CR  --  7.59*  --   --   --  10.90*  --  9.73*  --  8.34*   GLC 83 79 94 84 101* 77   < > 122   < > 119   JOANNA  --  8.9  --   --   --  8.2*  --  8.2*  --  8.3*    < > = values in this interval not displayed.       INR  Recent Labs   Lab 09/29/21  0721 09/28/21  0903 09/27/21  1228 09/26/21  1704   INR 3.46* 6.31* 8.52* 6.39*        Recent Labs   Lab Test 09/29/21  0721 09/28/21  0903   INR 3.46* 6.31*   WBC 5.5 5.9   HGB 8.4* 8.5*    203       Personally reviewed current labs     ~ 35 minutes spent in exam, POC, education regarding renal disease and management.  >90% time spent in education and counseling and/or discussion with patient's care team    Kaylie Porter MD  [facility]  [address], [address]  [place] [address]  Phone# [phone]  Fax# [phone]

## 2021-09-29 NOTE — PLAN OF CARE
Problem: Pain Acute  Goal: Acceptable Pain Control and Functional Ability  Outcome: No Change     Problem: Adult Inpatient Plan of Care  Goal: Optimal Comfort and Wellbeing  Outcome: No Change     Pt slept comfortable overnight. Saline locked. Softer Bp's- systolic . No PRN pain meds given as pt has not endorsed pain or been moaning out as she has been.

## 2021-09-29 NOTE — PROGRESS NOTES
Palliative Care Progress Note    Today, the patient was seen for:  Goals of care, symptom mgmt        Impressions and Recommendations     Goals of Care per daughter   - Continue current therapies including dialysis, IV abx for now.  Patient still not participating much at all with any cares.  Doing dialysis in bed.  Explained to family outpatient dialysis not feasible in her current condition.  Daughter becoming more open to a potential hospice approach if she continues to not have any functional improvement.  Will see how she does over the next couple days.     - No escalation of care if acutely decompensates, no ICU admissions.   - DNR/I code status  - Patient/family want her home upon any discharge plan - Therapy recommending TCU or a home thierno life and needs assist with all ADLs    Symptom Management:  1. Pain in b/l legs d/t anasarca in setting of ESRD: Hx opioid sensitivity.  Used 10mg oxycodone past 24hr.  Noted to be more lethargic after doses per nursing    - Voltaren gel prn(held d/t high INR)  - Scheduled tylenol - increase to 1g TID  - change Oxycodone to 2.5-5mg q6h prn from 5mg q4h prn  - constipation ppx: no documented BM this admission         -  Schedule Senekot 1 tab BID and daily Miralax          -  PRN bisacodyl supp    2. Anxiety - stable.  Tends to get quite anxious during dialysis runs and often requires family member to be present; somnolent during run today  - Sertraline - on hold since 9/26   - low dose seroquel PRN     3. Cognitive impairment: recent hospitalizations with encephalopathy(multifactorial), recent CVA; possible component of vascular dementia.  Somnolent today.   - SLUMS: OT unable to perform today d/t lack of participation    Advanced Care Planning:  - Patient has a completed Health Care Directive: not on file  - Surrogate decision maker per patient: daughter Winston    Psychosocial/Spiritual Support:  - Has seven kids. Per report, Winston is HCA but we don't have AD on file  here. She has been living at SNF for last year. One of her daughters expressed concern about care being given there, and now recently moved in with Winston.        HPI          64yo F with PMH of ESRD on HD, diastolic heart failure, DMII, Afib/flutter, KHUSHBOO/COPD with pulmonary hypertension on chronic supplemental oxygen, HFpEF, morbid obesity with OHS, recent CVA(8/13/21) admitted on 9/25 for evaluation of hematuria.  Does have a known large pelvic mass with no changes since 8/11, previously evaluated by  GYN oncology and possibly contributing to rectal prolapse and not a surgical or chemotherapy candidate.  Also reports of increased L buttock/thigh pain; CT with no abscess, extensive edema in left high with cellulitis in differential, and mild wall thickening of bladder suggestive of cystitis.  On empiric Rocephin.  Diuretics have been on hold.      9/27: INR trending up; to get vit K.  Urine/blood cx pending.  Low BP late in evening; 500cc NS bolus given.  Missed dialysis run.   9/28: INR remains elevated but still high.  Poor participation with therapy requires thierno lift to chair.  UC shows enterococcus.  Dialyzed; tolerated.  9/29: INR trending down: 3.46.  UC confirmed VRE.  Somnolent on dialysis run.                Palliative encounters:     9/28: Discussion with patient, daughter Winston on speaker phone, and Dr. Fernandez.  Discussed overall condition, current poor quality of life.  Code status changed to DNR/I.      Per patient or family/caregivers today:  I visited patient at bedside.  Discussed with RN.  Not participating in cares today.  Slight eye opening to voice, <10 sec.      I talked with daughter Winston and her fiancee via telephone.  Provided a clinical update.  Explained the patient would be unable to do outpatient dialysis in her current state as she requires a thierno lift to get into the chair and currently doing dialysis in bed.  Winston very understanding of situation and we discussed  comfort/hospice approach.  Winston wants to follow her moms wishes, but I explained she has been encephalopathic and unable to make complex decisions and more somnolent today, she expressed understanding that she may need to make some decisions on her mom's behalf.  She would be on board with hospice if she continues to show no significant improvement, but would like to see how the next couple days go.  We also talked about not escalating cares in the event of acute clinical decompensation, and she agrees with not escalating care, and would make her comfortable at that time.  She is hoping her mom can come home, knowing that it may be home with hospice if things continue on their current trajectory.      Prognosis, Goals, or Advance Care Planning was addressed today with: Yes.  Mood, coping, and/or meaning in the context of serious illness were addressed today: Yes.    Palliative Symptom Review (0=no symptom/no concern, 1=mild, 2=moderate, 3=severe): unable to do d/t patient's somnlence.  Appears comfortable in no distress  Pain:    Overall guarded prognosis     Patient is on opioids: assessed and bowels ok/no needed changes to plan of care today.           Review of Systems:     MARILEE d/t somnolence           Medications:     I have reviewed this patient's medication profile and medications during this hospitalization.      sodium chloride 0.9%  250 mL Intravenous Once in dialysis/CRRT     sodium chloride 0.9%  250 mL Intravenous Once in dialysis/CRRT     sodium chloride 0.9%  300 mL Hemodialysis Machine Once     acetaminophen  650 mg Oral 4x Daily     allopurinol  50 mg Oral QPM     calcium acetate  667 mg Oral TID w/meals     [Held by provider] diclofenac  2 g Topical 4x Daily     epoetin padmini-epbx (RETACRIT) inj ESRD  3,000 Units Subcutaneous Once per day on Mon Wed Fri     [Held by provider] furosemide  120 mg Oral Daily     sodium chloride (PF) 0.9%  1.3-2.6 mL Intracatheter Once in dialysis/CRRT    Followed by  "    heparin  3 mL Intracatheter Once in dialysis/CRRT     sodium chloride (PF) 0.9%  1.3-2.6 mL Intracatheter Once in dialysis/CRRT    Followed by     heparin  3 mL Intracatheter Once in dialysis/CRRT     insulin aspart  1-7 Units Subcutaneous TID AC     insulin aspart  1-5 Units Subcutaneous At Bedtime     ipratropium - albuterol 0.5 mg/2.5 mg/3 mL  1 vial Nebulization 4x Daily     lidocaine   Transdermal Q8H     linezolid  600 mg Intravenous Q12H     [Held by provider] losartan  50 mg Oral Daily     midodrine  5 mg Oral TID w/meals     montelukast  10 mg Oral At Bedtime     [Held by provider] NIFEdipine ER  30 mg Oral Daily     - MEDICATION INSTRUCTIONS -   Does not apply Once     [Held by provider] oxybutynin ER  5 mg Oral QPM     pantoprazole  40 mg Oral Daily     rosuvastatin  10 mg Oral Daily     [Held by provider] sertraline  25 mg Oral Daily     sodium chloride (PF)  3 mL Intracatheter Q8H     timolol maleate  1 drop Both Eyes BID     [Held by provider] torsemide  20 mg Oral BID     sodium chloride 0.9%, sodium chloride 0.9%, albumin human, albuterol, bisacodyl, glucose **OR** dextrose **OR** glucagon, HOLD MEDICATION, Lidocaine, lidocaine 4%, lidocaine (buffered or not buffered), melatonin, naloxone **OR** naloxone **OR** naloxone **OR** naloxone, ondansetron **OR** ondansetron, oxyCODONE, polyethylene glycol, QUEtiapine, sennosides, sodium chloride (PF), Warfarin Therapy Reminder           Physical Exam:   Blood pressure 115/57, pulse 72, temperature 98.2  F (36.8  C), temperature source Oral, resp. rate 18, height 1.626 m (5' 4\"), weight 141.7 kg (312 lb 6.3 oz), SpO2 96 %, not currently breastfeeding.  GENERAL: lying in bed in NAD on dialysis run   SKIN: Warm and dry   HEENT: Normocephalic, moist mucous membranes  LUNGS: Diminishd to auscultation anterolaterally; non-labored   CARDIAC: RRR  ABDOMINAL: BS+, soft, non distended, non tender  : torres in place  EXTREMITIES: 2+ CHETAN edema   NEUROLOGIC: " somnolent, slight eye opening <10sec to voice, does not follow commands              Data Reviewed:     All labs/imaging reviewed in Epic     ====================================================  TT: I have personally spent a total of 50 minutes on the unit in review of medical record, consultation with the medical providers and assessment of patient today, with more than 50% of this time spent in counseling, coordination of care, and discussion with daughter re: diagnostic results, prognosis, symptom management, risks and benefits of management options, comfort/hospice, and development of plan of care as noted above.  ====================================================    Ismael Ewing CNP  [facility]  Palliative Medicine  Office: [phone]

## 2021-09-29 NOTE — PLAN OF CARE
"  Problem: Adult Inpatient Plan of Care  Goal: Plan of Care Review  Outcome: No Change  Flowsheets (Taken 9/28/2021 8055)  Plan of Care Reviewed With:   patient   daughter  Progress: no change   Sun was dialyzed this evening. Tolerated well and her BP remained stable.     Her biggest complain is bilateral leg pain. PRN oxycodone administered. She however seems more confused and lethargic after taking this. Discussed with with daughters/sister (several of them were on the phone); they want her comfortable.    Continues on O2 however she keeps removing it. Refused HS vitals signs.    /55   Pulse 78   Temp 98.5  F (36.9  C) (Oral)   Resp 16   Ht 1.626 m (5' 4\")   Wt 146.2 kg (322 lb 4.8 oz)   SpO2 98%   BMI 55.32 kg/m      "

## 2021-09-29 NOTE — PLAN OF CARE
"  Problem: Adult Inpatient Plan of Care  Goal: Readiness for Transition of Care  Outcome: No Change     Patient somnolent following dialysis, increased sleepiness throughout day, only opens eyes to voice or touch stimulation. Minimal verbalizations, moans. Does state \"my back hurts\", \"oh lordy\". 3L of fluid taken off during dialysis. Unable to pass afternoon medications due to decreased LOC. Food refused. BG stable. Hygiene cares completed via assist of 2. Turn assist of 3. Remains on 2L O2. SBP 89-90 pre-dialysis, received Midodrine, /61 in 1500 hour. PRN oxycodone given by RN for pain, patient did not report pain following administration. IV antibiotics given. Will continue to monitor.   "

## 2021-09-30 NOTE — PROGRESS NOTES
Clinic Care Coordination Contact    Situation: Patient chart reviewed by care coordinator.    Background: SW completed chart review    Assessment: Patient is inpatient at [facility]    Plan/Recommendations: SW will chart review in 2 weeks

## 2021-09-30 NOTE — PLAN OF CARE
Patient VSS. Patient denies pain, repositioning as patient would allow was done today. Patient refuses meds, cares repositioning, to eat and drink many times today, despite multiple attempts from staff members, provider Dr Asif ireland(see her notes and orders for details).   Problem: Adult Inpatient Plan of Care  Goal: Plan of Care Review  Outcome: No Change

## 2021-09-30 NOTE — PROGRESS NOTES
RESPIRATORY CARE NOTE   Patient is on 2 lpm N/C, BS decreased, gave Duoneb treatment x1, BS post treatment No change, patient perceives no improvement, patient tolerated well.     Candelario Reynolds, RT

## 2021-09-30 NOTE — PROGRESS NOTES
Care Management Follow Up    Length of Stay (days): 4    Expected Discharge Date: 10/01/2021     Concerns to be Addressed:        Patient plan of care discussed at interdisciplinary rounds:Yes    Anticipated Discharge Disposition:  Home with home care     Anticipated Discharge Services: Home care     Anticipated Discharge DME:  N/A    Patient/family educated on Medicare website which has current facility and service quality ratings:      Education Provided on the Discharge Plan:      Patient/Family in Agreement with the Plan:  Yes    Referrals Placed by CM/SW:     Private pay costs discussed: Not applicable         Additional Information:      MAR ESCALONA met with Pt and her son in her room and discuss her discharge plan. Pt refuses to go TCU . Pt and Pt son requested to MAR ESCALONA call her daughter Winston Chi she is her POA  and caregiver. MAR ESCALONA called Pt daughter Winston  and [phone] and she stated Pt lives with her apartment  and she is her caregiver & she has [facility] home care RN/OT/PT and Pt grand daughter and grand son is her PCA per day 13 to 14 hours. Pt daughter stated patient  discharge goal is to return to home with home care. Pt daughter Winston is her  POA is declined to go discharge TCU. PT recommendation is TCU. Pt and Pt daughter both are declined to go discharge TCU. Pt family to transport if able other wise [facility] wheel chair to transport her  at discharge. CM to continue following as needed.      330 pm:  Pt discharge goal is change now  home with home care vs home with hospice. MAR ESCALONA sent hospice referral Pt  family to requested for. MAR ESCALONA sent [facility] CM to continue following     BLADIMIR Lee LGSW

## 2021-09-30 NOTE — PROGRESS NOTES
RENAL PROGRESS NOTE    CC:  Hematuria    ASSESSMENT & PLAN:   This is a 65 year old female with past medical history significant for morbid obesity, cognitive impairment, congestive heart failure with preserved ejection fraction, paroxysmal atrial fibrillation/flutter, complete heart block status post pacemaker placement, type 2 diabetes mellitus, end-stage renal disease on hemodialysis Monday Wednesday Friday at [place] dialysis unit under care of Dr. Bailey, chronic respiratory failure with hypoxia oxygen 24/7, large pelvic mass, rectal prolapse who was admitted to [facility] on September 25 for evaluation of hematuria.        ESRD on HD TTS-Patient dialyzes at [facility] under care of Dr Katie Bailey with internal jugular catheter. Missed HD on 09/27. Last HD 09/29  No indication for hemodialysis today.  We will plan is hemodialysis tomorrow  as per usual [facility] schedule.      Hyperkalemia-mild. Resolved.  Most recent serum potassium was 4.8.. Run with K2 bath. Renal diet     Metabolic acidosis HCO3 was 20 on September 20.Should improve with HD.     HTN-BP is acceptable. Not on antihypertensive agents currently.          Anemia - Hgb was  8.4g/dl 09/29. On EPO with HD as outpatient.  Continue Epo here     Secondary hyperparathyroidism -on binders. Renal diet.     H/o CHF-no evidence of exacerbation.Hypervolemic with 2+ LE edema b/l.   -UF with HD as tolerated.   -daily weight  -32 oz fluid restrictions     Recurrent UTI's-Urine cx grew 50-100K of Enterococcus species and  Candida albicans . On IV Zyvox. Management as per primary service      Chronic respiratory failure with hypoxia-Secondary to obesity hypoventilatory syndrome, KHUSHBOO, COPD, pulmonary hypertension. Patient is supposed to be on 2 L oxygen 24/7     Type 2 DM -diet controlled at home. On ISS here.       H/o gout-on allopurinol 50 mg daily.     Large pelvic mass-No changes since last study August 11, 2021  Perhaps contributing to  recurrent urinary symptoms  Evaluated at [facility] GYN oncology, and not surgical or chemotherapy candidate.    Goals of care-palliative care is following.       SUBJECTIVE:   No acute issues.   I reviewed dialysis run from yesterday. Tolerated w/o issues, 3L removed.  Patient stated she is feeling tired today.  No other complaints.  Patient applying by sound at the bedside.  All questions answered.    OBJECTIVE:  Physical Exam   Temp: 98  F (36.7  C) Temp src: Axillary BP: 96/46 Pulse: 69   Resp: 20 SpO2: 93 % O2 Device: None (Room air) Oxygen Delivery: 2 LPM  Vitals:    09/28/21 1500 09/29/21 0725 09/29/21 0900   Weight: 146.2 kg (322 lb 4.8 oz) 141.7 kg (312 lb 8 oz) 141.7 kg (312 lb 6.3 oz)     Vital Signs with Ranges  Temp:  [97.5  F (36.4  C)-98  F (36.7  C)] 98  F (36.7  C)  Pulse:  [64-73] 69  Resp:  [18-20] 20  BP: ()/(37-61) 96/46  SpO2:  [90 %-93 %] 93 %  I/O last 3 completed shifts:  In: 420 [P.O.:120; I.V.:300]  Out: 3000 [Other:3000]    @TMAXR(24)@    Patient Vitals for the past 72 hrs:   Weight   09/29/21 0900 141.7 kg (312 lb 6.3 oz)   09/29/21 0725 141.7 kg (312 lb 8 oz)   09/28/21 1500 146.2 kg (322 lb 4.8 oz)   [unfilled]    PHYSICAL EXAM:  General - Alert and oriented x3, appears comfortable, NAD, morbidly obese  Cardiovascular - Regular rate and rhythm, no rub  Respiratory - Clear to auscultation bilaterally, no crackles or wheezes  Abd: BS present, no guarding or pain with palpation, no ascites  Extremities - obese, improved pitting lower extremity edema bilaterally  Skin: dry, intact, no rash, good turgor  Neuro:  Grossly intact, no focal deficits  MSK:  Grossly intact  Psych:  Normal affect  Access: RIJ TDC site is intact    LABORATORY STUDIES:     Recent Labs   Lab 09/29/21  0721 09/28/21  0903 09/25/21  1759   WBC 5.5 5.9 4.8   RBC 2.90* 3.01* 2.87*   HGB 8.4* 8.5* 8.1*   HCT 27.5* 28.6* 27.0*    203 212       Basic Metabolic Panel:  Recent Labs   Lab 09/30/21  0867  09/30/21  0539 09/30/21  0253 09/29/21  2136 09/29/21  1812 09/29/21  1736 09/29/21  0844 09/29/21  0721 09/28/21  1226 09/28/21  0903 09/26/21  1741 09/26/21  1704 09/26/21  0853 09/25/21  1759   NA  --   --   --   --   --   --   --  135*  --  138  --  137  --  137   POTASSIUM  --   --   --   --   --   --   --  4.8  --  5.3*  --  5.6*  --  4.6   CHLORIDE  --   --   --   --   --   --   --  98  --  100  --  100  --  99   CO2  --   --   --   --   --   --   --  20*  --  23  --  20*  --  23   BUN  --   --   --   --   --   --   --  34*  --  56*  --  45*  --  40*   CR  --   --   --   --   --   --   --  7.59*  --  10.90*  --  9.73*  --  8.34*   GLC 78 76 79 77 89 79   < > 79   < > 77   < > 122   < > 119   JOANNA  --   --   --   --   --   --   --  8.9  --  8.2*  --  8.2*  --  8.3*    < > = values in this interval not displayed.       INR  Recent Labs   Lab 09/30/21  0709 09/29/21  0721 09/28/21  0903 09/27/21  1228   INR 2.49* 3.46* 6.31* 8.52*        Recent Labs   Lab Test 09/30/21  0709 09/29/21  0721 09/28/21  0903 09/28/21  0903   INR 2.49* 3.46*   < > 6.31*   WBC  --  5.5  --  5.9   HGB  --  8.4*  --  8.5*   PLT  --  191  --  203    < > = values in this interval not displayed.       Personally reviewed current labs     ~ 35 minutes spent in exam, POC, education regarding renal disease and management.  >90% time spent in education and counseling and/or discussion with patient's care team    Kaylie Porter MD  [facility]  [address] [place] [address], [address]  [place] [address]  Phone# [phone]  Fax# [phone]

## 2021-09-30 NOTE — PROGRESS NOTES
Palliative Care Progress Note    Today, the patient was seen for:  Goals of care, symptom mgmt        Impressions and Recommendations     Goals of Care per daughter/family   - Transition to comfort care  - DNR/I  - Hospice consulted.  Family hoping to get patient home ASAP  - Family plan on visiting tonight.  Compassionate exception made for end-of-life care for 6 visitors    Comfort care     Aggressively manage symptoms (dyspnea, anxiety, pain, delirium,secretions)    Goal of keep respiratory rate <30, and eliminate signs of distress (grimacing, fear expression, agitation, heavy secretions, use of accessory muscles, nasal flaring, grunting at end-expiration)    Stop BP checks, discontinue oximetry    Tunneled dialysis cath - INR currently 2.49 which would likely lead to oozing which may be more uncomfortable than leaving in.  Also this cath has been in for some time as she had it in upon admission and may cause unwarranted pain to remove.  Discussed with IR and Nephrology, and will plan to not remove.    Symptom Management:  1. Pain  - SL Oxycodone intensol 5mg q6h and 5-15mg q2h prn for breakthrough  - IV dilaudid 0.3-0.5mg q15min prn for breakthrough (2nd choice)    2. Anxiety  - SL/IV ativan 1-2mg q2h prn    3. Agitation  - IV haldol prn    4. Secretions  - SL atropine prn    5 Dyspnea  - Opioids as above  - Ok to use 2L NC for comfort but not required    6. Nausea  - Zofran prn    7. Constipation  - bisacodyl supp PRN    Advanced Care Planning:  - Patient has a completed Health Care Directive: not on file  - Surrogate decision maker per patient: daughter Winston    Psychosocial/Spiritual Support:  - Has seven kids. Per report, Winston is H[facility] but we don't have AD on file here. She has been living at SNF for last year. One of her daughters expressed concern about care being given there, and now recently moved in with Winston.        HPI          66yo F with PMH of ESRD on HD, diastolic heart failure, DMII,  Afib/flutter, KHUSHBOO/COPD with pulmonary hypertension on chronic supplemental oxygen, HFpEF, morbid obesity with OHS, recent CVA(8/13/21) admitted on 9/25 for evaluation of hematuria.  Does have a known large pelvic mass with no changes since 8/11, previously evaluated by  GYN oncology and possibly contributing to rectal prolapse and not a surgical or chemotherapy candidate.  Also reports of increased L buttock/thigh pain; CT with no abscess, extensive edema in left high with cellulitis in differential, and mild wall thickening of bladder suggestive of cystitis.  On empiric Rocephin.  Diuretics have been on hold.      9/27: INR trending up; to get vit K.  Urine/blood cx pending.  Low BP late in evening; 500cc NS bolus given.  Missed dialysis run.   9/28: INR remains elevated but still high.  Poor participation with therapy requires thierno lift to chair.  UC shows enterococcus.  Dialyzed; tolerated.  9/29: INR trending down: 3.46.  UC confirmed VRE.  Somnolent on dialysis run.                Palliative encounters:     9/28: Discussion with patient, daughter Winston on speaker phone, and Dr. Fernandez.  Discussed overall condition, current poor quality of life.  Code status changed to DNR/I.      9/29: Discussed with daughter and plan for no escalation of care if clinical decline.  Discussed hospice and family becoming more open to the idea.       Per patient or family/caregivers today:  I visited patient at bedside.  She was moaning out occasionally.  Did open eyes to voice but not answering any questions.  She was pocketing food, and I helped her expel what looked like a muffin.      Discussed with RN and patient often refusing cares, meds, food, and spitting things out    Discussed case with Dr. Gold who is in agreement with pursuing comfort care.     I spoke with Winston and expressed that patient is failing current medical treatment and recommend comfort care approach and stopping dialysis.  She needs to discuss with her  family first and have me call back.    I touched back with Tymonia/family and expressed my concerns again that Sun is ultimately failing despite current efforts and we do not see any path where she would improve to the point of getting to discharge and continue outpatient dialysis.  Family expressed understanding and they all opt to focus on comfort.  They are hoping she can get home and pass there.  Agreed to start comfort care the evening, stop dialysis, and place hospice consult with hopes of potential discharge tomorrow.       Family asked about prognosis and I would estimate days to weeks.      Prognosis, Goals, or Advance Care Planning was addressed today with: Yes.  Mood, coping, and/or meaning in the context of serious illness were addressed today: Yes.    Palliative Symptom Review (0=no symptom/no concern, 1=mild, 2=moderate, 3=severe): unable to do d/t patient's confusion.  She is moaning out occasionally.     Patient is on opioids: assessed and bowels ok/no needed changes to plan of care today. No documented BM since admission            Review of Systems:     MARILEE d/t confusion          Medications:     I have reviewed this patient's medication profile and medications during this hospitalization.      sodium chloride 0.9%  250 mL Intravenous Once in dialysis/CRRT     sodium chloride 0.9%  300 mL Hemodialysis Machine Once     acetaminophen  975 mg Oral TID     allopurinol  50 mg Oral QPM     calcium acetate  667 mg Oral TID w/meals     [Held by provider] diclofenac  2 g Topical 4x Daily     epoetin padmini-epbx (RETACRIT) inj ESRD  3,000 Units Subcutaneous Once per day on Mon Wed Fri     insulin aspart  1-7 Units Subcutaneous TID AC     insulin aspart  1-5 Units Subcutaneous At Bedtime     ipratropium - albuterol 0.5 mg/2.5 mg/3 mL  1 vial Nebulization 4x Daily     lidocaine   Transdermal Q8H     linezolid  600 mg Intravenous Q12H     [Held by provider] losartan  50 mg Oral Daily     midodrine  5 mg Oral  "TID w/meals     montelukast  10 mg Oral At Bedtime     [Held by provider] NIFEdipine ER  30 mg Oral Daily     - MEDICATION INSTRUCTIONS -   Does not apply Once     [Held by provider] oxybutynin ER  5 mg Oral QPM     pantoprazole  40 mg Oral Daily     polyethylene glycol  17 g Oral Daily     rosuvastatin  10 mg Oral Daily     sennosides  1 tablet Oral BID     [Held by provider] sertraline  25 mg Oral Daily     sodium chloride (PF)  3 mL Intracatheter Q8H     timolol maleate  1 drop Both Eyes BID     [Held by provider] torsemide  20 mg Oral BID     warfarin-No DOSE today  1 each Does not apply no dose today (warfarin)     sodium chloride 0.9%, sodium chloride 0.9%, acetaminophen, albumin human, albuterol, bisacodyl, glucose **OR** dextrose **OR** glucagon, HOLD MEDICATION, Lidocaine, lidocaine 4%, lidocaine (buffered or not buffered), melatonin, naloxone **OR** naloxone **OR** naloxone **OR** naloxone, ondansetron **OR** ondansetron, oxyCODONE, QUEtiapine, sodium chloride (PF), Warfarin Therapy Reminder           Physical Exam:   Blood pressure 96/46, pulse 69, temperature 98  F (36.7  C), temperature source Axillary, resp. rate 20, height 1.626 m (5' 4\"), weight 141.7 kg (312 lb 6.3 oz), SpO2 93 %, not currently breastfeeding.  GENERAL: lying in bed, moaning out  SKIN: Warm and dry   HEENT: Normocephalic, pocketing food   LUNGS: Diminishd to auscultation anterolaterally; non-labored   CARDIAC: RRR  ABDOMINAL: BS+, soft, non distended, non tender  EXTREMITIES: 2+ CHETAN edema   NEUROLOGIC: alert to voice, confused, not following commands              Data Reviewed:     All labs/imaging reviewed in Epic     ====================================================  TT: I have personally spent a total of 90 minutes on the unit in review of medical record, consultation with the medical providers and assessment of patient today, with more than 50% of this time spent in counseling, coordination of care, and discussion with " daughter/family re: prognosis, symptom management, risks and benefits of management options, comfort/hospice, and development of plan of care as noted above.  ====================================================    Ismael Ewing CNP  [facility]  Palliative Medicine  Office: [phone]

## 2021-09-30 NOTE — DISCHARGE INSTRUCTIONS
Home care services have been arranged for you.  Agency: [facility]  Services: resumption of RN,OT/PT  Phone: [phone]  Instructions: Home Care will contact you within 24 hours to arrange the first visit.

## 2021-09-30 NOTE — PROGRESS NOTES
09/30/21 0845   Quick Adds   Type of Visit Initial PT Evaluation       Present no   Living Environment   People in home other (see comments)  (per chart lives with daughter )   Living Environment Comments please see OT eval    Self-Care   Equipment Currently Used at Home walker, rolling   Activity/Exercise/Self-Care Comment please see OT eval    General Information   Onset of Illness/Injury or Date of Surgery 09/25/21   Referring Physician Dr Fernandez   Patient/Family Therapy Goals Statement (PT) none stated    Pertinent History of Current Problem (include personal factors and/or comorbidities that impact the POC) pyelonephritis    General Observations pt in bed sleeping    Cognition   Orientation Status (Cognition) unable/difficult to assess   Pain Assessment   Patient Currently in Pain   (pt moans when moved)   Range of Motion (ROM)   ROM Comment today pt trying AP and knee flexion, no moaning with AP, but pt moans when trying knee flexion   Strength   Strength Comments unable to test    Bed Mobility   Bed Mobility Limitations decreased ability to use arms for pushing/pulling;decreased ability to use legs for bridging/pushing;cognitive deficits   Impairments Contributing to Impaired Bed Mobility pain;decreased strength   Assistive Device (Bed Mobility) other (see comments)  (using bed to sit pt up in bed, RN need to try to feed pt)   Comment (Bed Mobility)  pt does not open eyes, RN declined trying sitting EOB bed today   PT Discharge Planning    PT Discharge Recommendation (DC Rec) Long term care facility  (if home 24 cares, thierno of mechanical lift for OOB )   Total Evaluation Time   Total Evaluation Time (Minutes) 15

## 2021-09-30 NOTE — PLAN OF CARE
Problem: Adult Inpatient Plan of Care  Goal: Plan of Care Review  Outcome: Improving     Problem: Adult Inpatient Plan of Care  Goal: Optimal Comfort and Wellbeing  Outcome: Improving     VSS, BP remains soft which is what it has been trending. Pt refusing of a lot of cares tonight, even though offered multiple times. Monitoring sugars as they have been low all shift

## 2021-09-30 NOTE — CONSULTS
HOSPICE - writer spoke with pt daughter, Winston.Winston stated family friend works for [facility] and that is the hospice agency they would like to use. Writer told Winston she can speak with [facility] directly but the care managers from [facility] need to contact [facility] as they will need the physician order for hospice consult sent to them.  Winston would like to get pt home tomorrow. Writer update Missy MANUEL for pt.   Thank you for the referral.   Kim KIRK  [facility]  [phone]

## 2021-09-30 NOTE — PROGRESS NOTES
[facility]    Medicine Progress Note - Hospitalist Service       Date of Admission:  9/25/2021    Assessment & Plan           65 year old female with history of ESRD, COPD, pulmonary hypertension, chronic hypoxia, large pelvic mass and PAF who was brought to the emergency department for evaluation of hematuria and L thigh pain. After admission, patient was treated for VRE UTI with Zyvox. She continued on hemodialysis on MWF. Overall patient has very poor quality of life at home and requires assistance with all activities of daily living. She is bed bound. Goal of care was discussed with patient's family. Family decided on hospice care on 9/30.       Assessment/Plan     Hospice care: Palliative care team and hospice team talked to family today. Per family request, start hospice care today.  - Start hospice care measure  - Plan to discharge home with hospice tomorrow  - IR to remove hemodialysis catheter   - Supplemental oxygen for comfort       Diet: Regular Diet Adult    DVT Prophylaxis: On coumadin  Phan Catheter: Not present  Central Lines: None  Code Status: No CPR- Do NOT Intubate      Disposition Plan   Expected discharge: 10/01/2021       The patient's care was discussed with the Bedside Nurse and Care Coordinator/.    Amalia Gold MD  Hospitalist Service  [facility]  Securely message with the [employer] Web Console (learn more here)  Text page via [employer] Paging/Directory      ______________________________________________________________________    Interval History   Informed by nursing staff this morning that patient did not eat or drink anything. When patient was seen and examined later the morning, her son was by the bedside. Patient appeared comfortable and in good spirit with her son. She did not answer any questions. Her son encouraged her to eat or drink something, but she refused. Patient's daughter Tymmirta and granddaughter came this  afternoon. Per daughter, family has decided on hospice care and would like patient to go home with hospice ASAP.     Data reviewed today: I reviewed all medications, new labs and imaging results over the last 24 hours.     Physical Exam   Vital Signs: Temp: 98.4  F (36.9  C) Temp src: Oral BP: 110/57 Pulse: 66   Resp: 20 SpO2: 96 % O2 Device: Nasal cannula Oxygen Delivery: 2 LPM  Weight: 312 lbs 6.27 oz  General appearance: not in acute distress  HEENT: PERRL, EOMI  Lungs: Clear breath sounds in bilateral lung fields  Cardiovascular: Regular rate and rhythm, normal S1-S2  Abdomen: Soft, non tender, no distension  Musculoskeletal: No joint swelling  Skin: No rash and no edema  Neurology: Lethargy.  Cranial nerves II - XII normal.  Normal muscle strength in all four extremities.    Data   Recent Labs   Lab 09/30/21  1325 09/30/21  0842 09/30/21  0709 09/30/21  0539 09/29/21  0844 09/29/21  0721 09/28/21  1226 09/28/21  0903 09/26/21  1741 09/26/21  1704 09/26/21  0853 09/25/21  1759   WBC  --   --   --   --   --  5.5  --  5.9  --   --   --  4.8   HGB  --   --   --   --   --  8.4*  --  8.5*  --   --   --  8.1*   MCV  --   --   --   --   --  95  --  95  --   --   --  94   PLT  --   --   --   --   --  191  --  203  --   --   --  212   INR  --   --  2.49*  --   --  3.46*  --  6.31*   < > 6.39*  --   --    NA  --   --   --   --   --  135*  --  138  --  137  --  137   POTASSIUM  --   --   --   --   --  4.8  --  5.3*  --  5.6*  --  4.6   CHLORIDE  --   --   --   --   --  98  --  100  --  100  --  99   CO2  --   --   --   --   --  20*  --  23  --  20*  --  23   BUN  --   --   --   --   --  34*  --  56*  --  45*  --  40*   CR  --   --   --   --   --  7.59*  --  10.90*  --  9.73*  --  8.34*   ANIONGAP  --   --   --   --   --  17  --  15  --  17  --  15   JOANNA  --   --   --   --   --  8.9  --  8.2*  --  8.2*  --  8.3*   GLC 86 78  --  76   < > 79   < > 77   < > 122   < > 119   ALBUMIN  --   --   --   --   --   --   --   --   --    --   --  2.5*   PROTTOTAL  --   --   --   --   --   --   --   --   --   --   --  7.2   BILITOTAL  --   --   --   --   --   --   --   --   --   --   --  0.6   ALKPHOS  --   --   --   --   --   --   --   --   --   --   --  272*   ALT  --   --   --   --   --   --   --   --   --   --   --  16   AST  --   --   --   --   --   --   --   --   --   --   --  17    < > = values in this interval not displayed.       Time spend on this encounter:    I spent 40 minutes on this encounter with more than 50% of the time spend on face-to-face encounter with patient, family on counselling, discussion of plan of care.

## 2021-09-30 NOTE — PLAN OF CARE
Pt lethargic, arouses to voice. Does not answer orientation questions when asked. Groans in pain, generalized pain. PRN Tylenol given. Refused some medications. Did not eat dinner. Incontinent of urine. Still on 2L O2. Blood sugars in the 70s this evening, pt refused drinking/eating. Sips of juice taken. Palliative consulted.

## 2021-10-01 PROBLEM — J96.11 CHRONIC RESPIRATORY FAILURE WITH HYPOXIA (H): Status: ACTIVE | Noted: 2021-01-01

## 2021-10-01 NOTE — PROGRESS NOTES
Patient was seen and examined in the morning. She was awake and alert, but did not answer questions. Per nursing staff, patient was lethargy earlier and woke up after being turned.     Per palliative care, will leave the hemodialysis catheter in place after discussion with nephrology and IR.     Disscused with social work about discharge home with hospice. Also called her daughter Winston in the afternoon. She had no questions.      [facility] medicine service  Amalia Gold MD

## 2021-10-01 NOTE — PLAN OF CARE
Problem: Adult Inpatient Plan of Care  Goal: Absence of Hospital-Acquired Illness or Injury  Outcome: No Change  Intervention: Prevent Skin Injury  Recent Flowsheet Documentation  Taken 10/1/2021 1400 by Keerthi Mccormick RN  Body Position:   weight shifting   left  Taken 10/1/2021 1200 by Keerthi Mccormick RN  Body Position: (family ) refuses positioning  Taken 10/1/2021 1000 by Keerthi Mccormick RN  Body Position:   right   weight shifting     Turn and reposition every 2 hours. Lotion applied. Pillow support. Pure wick in place.       Problem: Adult Inpatient Plan of Care  Goal: Readiness for Transition of Care  Outcome: No Change     Discharge home with hospice at 1845. Family spoke with care manager and aware of plan. Packet put together by LORY.

## 2021-10-01 NOTE — PROGRESS NOTES
Care Management Discharge Note    Discharge Date:  10/01/2021  Expected Time of Departure: 1845    Discharge Disposition: Hospice (Home of the patient's Daughter, Winston, with Hospice)    Discharge Services: Other (see comment) (Hospice)    Discharge DME: Oxygen, Bed, Wheelchair, Walker, Commode, Shower Chair    Discharge Transportation: agency ([facility])    Private pay costs discussed: Not applicable    PAS Confirmation Code: (Not Applicable)  Patient/family educated on Medicare website which has current facility and service quality ratings: no (Not Applicable)    Education Provided on the Discharge Plan:  Yes:  Patient's daughter Winston; Patient's Daughter, Liudmila; Patient's Daughter, Wes  Persons Notified of Discharge Plans:  Patient's daughter Winston; Patient's Daughter, Liudmila; Patient's Daughter, Wes; Prema Knowles  Patient/Family in Agreement with the Plan: yes    Handoff Referral Completed:  Not Applicable    Additional Information:  Discharge Plan is Home with Hospice.    Care Management previously sent a Referral to [facility].    This writer spoke with Prema Knowles, , Phone [phone], who accepted the patient for today.  Prema will contact the patient's daughter, Winston, regarding timing of start of home Hospice services and delivery of medications and necessary Durable Medical Equipment.     [email or web address] transportation scheduled for today at first available ride time of 1845.    This writer informed Prema Knowles, of the above Discharge Plan, with which she agrees.    This writer informed the patient's daughters, Winston, Liudmila, and Wes, of the above Discharge Plan, with which they agree.      Klarissa Henning CM

## 2021-10-01 NOTE — PLAN OF CARE
Peripheral IV removed, okay to keep cvc internal jugular catheter in place for discharge. Gave scheduled oxycodone for pain. Pt moaning/groaning. Discharge packet ready, paper script included. Pt left facility with [facility] 1945.

## 2021-10-01 NOTE — DISCHARGE SUMMARY
[facility]  Hospitalist Discharge Summary      Date of Admission:  9/25/2021  Date of Discharge:  10/1/2021  Discharging Provider: Amalia Gold MD      Discharge Diagnoses     Principal Problem:    Cystitis  Active Problems:    ESRD (end stage renal disease) on dialysis (H)    Type 2 diabetes mellitus with diabetic nephropathy, with long-term current use of insulin (H)    Chronic respiratory failure with hypoxia (H)    Acute encephalopathy    Obstructive sleep apnea syndrome    Paroxysmal atrial fibrillation (H)    Chronic pulmonary heart disease (H)    Obesity hypoventilation syndrome (H)    Pulmonary HTN (H)    Uterine mass      Follow-ups Needed After Discharge   Follow-up Appointments     Follow-up and recommended labs and tests       [facility] service           Discharge Disposition   Discharged to home in hospice care  Condition at discharge: Terminal      Hospital Course     Patient is a 65 year old female with history of ESRD on hemodialysis, COPD, pulmonary hypertension, chronic hypoxia on home oxygen, large pelvic mass and paroxysmal atrial fibrillation who was brought to the emergency department for evaluation of hematuria and left thigh pain. CT scan of the thigh shows edema and no evidence of cellulitis. CT scan of the abdomen reveals that her pelvic mass is most consistent with uterine fibroid. Urinalysis shows UTI. Urine culture positive for VRE on 9/25/21. Patient was treated for VRE UTI with Zyvox. Patient also had persistent hypotension. She was started midodrine. Patient continued on hemodialysis on MWF. Patient has been having metabolic encephalopathy due to infection and her multiple chronic medical conditions. Palliative care was consulted. Patient's family deiced on hospice care given that patient has poor quality of life at home, requiring assistance with all activities of daily living and being bed bound. Hemodialysis was discontinued. Patient was discharged  home with home hospice in a terminal condition on 10/1/21.      Consultations This Hospital Stay   SOCIAL WORK IP CONSULT  CARE MANAGEMENT / SOCIAL WORK IP CONSULT  NEPHROLOGY IP CONSULT  PHARMACY TO DOSE VANCO  PHARMACY TO DOSE WARFARIN  PALLIATIVE CARE ADULT IP CONSULT  PHYSICAL THERAPY ADULT IP CONSULT  OCCUPATIONAL THERAPY ADULT IP CONSULT  OCCUPATIONAL THERAPY ADULT IP CONSULT  PALLIATIVE CARE ADULT IP CONSULT  HOSPICE IP CONSULT    Code Status   No CPR- Do NOT Intubate    Time Spent on this Encounter   I, Amalia Gold MD, personally saw the patient today and spent greater than 30 minutes discharging this patient.       Amalia Gold MD  [address] [address]  Phone: [phone]  Fax: [phone]  ______________________________________________________________________    Physical Exam   Vital Signs: Temp: 97.7  F (36.5  C) Temp src: Axillary BP: 93/59 Pulse: 73   Resp: 18 SpO2: 93 % O2 Device: Nasal cannula Oxygen Delivery: 1 LPM  Weight: 312 lbs 6.27 oz    General appearance: not in acute distress  HEENT: PERRL, EOMI  Lungs: Clear breath sounds in bilateral lung fields  Cardiovascular: Regular rate and rhythm, normal S1-S2  Abdomen: Soft, non tender, no distension  Musculoskeletal: No joint swelling  Skin: No rash and no edema  Neurology: Lethargy.  Cranial nerves II - XII normal.  Normal muscle strength in all four extremities.       Primary Care Physician   Collin Doran    Discharge Orders      Hospice Referral      Reason for your hospital stay    Treated for VRE UTI with Zyvox. Due to multiple chronic medical conditions with poor prognosis, family has decided on hospice care.     Follow-up and recommended labs and tests     [facility] service     Activity    Your activity upon discharge: activity as tolerated     Diet    Follow this diet upon discharge: Orders Placed This Encounter      Regular Diet Adult       Significant Results and  Procedures   Most Recent 3 CBC's:Recent Labs   Lab Test 09/29/21  0721 09/28/21  0903 09/25/21  1759   WBC 5.5 5.9 4.8   HGB 8.4* 8.5* 8.1*   MCV 95 95 94    203 212     Most Recent 3 BMP's:Recent Labs   Lab Test 09/30/21  1325 09/30/21  0842 09/30/21  0539 09/29/21  0844 09/29/21  0721 09/28/21  1226 09/28/21  0903 09/26/21  1741 09/26/21  1704   NA  --   --   --   --  135*  --  138  --  137   POTASSIUM  --   --   --   --  4.8  --  5.3*  --  5.6*   CHLORIDE  --   --   --   --  98  --  100  --  100   CO2  --   --   --   --  20*  --  23  --  20*   BUN  --   --   --   --  34*  --  56*  --  45*   CR  --   --   --   --  7.59*  --  10.90*  --  9.73*   ANIONGAP  --   --   --   --  17  --  15  --  17   JOANNA  --   --   --   --  8.9  --  8.2*  --  8.2*   GLC 86 78 76   < > 79   < > 77   < > 122    < > = values in this interval not displayed.       CT abdomen and pelvis: 9/25/21    INDICATION: Flank pain.  COMPARISON: 8/11/2021  TECHNIQUE: CT scan of the abdomen and pelvis was performed without IV contrast. Multiplanar reformats were obtained. Dose reduction techniques were used.  CONTRAST: None.     FINDINGS:   LOWER CHEST: Pacemaker present. Lung bases clear.     HEPATOBILIARY: Prior cholecystectomy. Trace volume of ascitic fluid surrounds liver.     PANCREAS: Normal.     SPLEEN: Normal.     ADRENAL GLANDS: Normal.     KIDNEYS/BLADDER: Renal cysts unchanged. No stone or hydronephrosis evident. Exam somewhat compromised by patient size and motion.     BOWEL: No obstruction or inflammatory finding.     LYMPH NODES: No lymphadenopathy evident.     VASCULATURE: Atherosclerotic changes diffusely. Dilated IVC may relate to elevated right heart pressures.     PELVIC ORGANS: Very large lobulated pelvic mass measuring approximately 15 x 11 cm unchanged and most consistent with fibroid.     MUSCULOSKELETAL: Diffuse degenerative changes and sclerosis of spine.                                                                     IMPRESSION:   1.  Minimal overall change. No urinary tract stone or hydronephrosis evident.  2.  Cardiomegaly, trace ascites, dilated IVC and anasarca.  3.  No change in large pelvic mass most suggestive of uterine fibroid.    CT head: 9/25/21    FINDINGS:  INTRACRANIAL CONTENTS: No intracranial hemorrhage, extraaxial collection, or mass effect. No CT evidence of acute infarct. Mild presumed chronic small vessel ischemic changes. Mild generalized volume loss. No hydrocephalus. Position of the cerebellar   tonsils is satisfactory. Sella shows no acute abnormality. Chronic area of ischemic change in the right cerebellar hemisphere with mild encephalomalacia and volume loss. Satisfactory position of the cerebellar tonsils. Sella shows no acute abnormality.   Physiologic mineralization left basal ganglia.      VISUALIZED ORBITS/SINUSES/MASTOIDS: Prior bilateral cataract surgery. Visualized portions of the orbits are otherwise unremarkable. Mucosal thickening primarily involving the ethmoid air cells. No air-fluid levels. No middle ear or mastoid effusion.     BONES/SOFT TISSUES: No acute fracture of the calvarium or skull base. No significant swelling of the facial or scalp tissues is apparent.                                                                    IMPRESSION:  1.  No CT evidence for acute intracranial process.  2.  Brain atrophy and presumed chronic microvascular ischemic changes as above.    CT femur thigh left: 9/25/21     Extensive edema throughout the subcutaneous fat of the left thigh and the imaged portion of the right thigh. No focal fluid collection or abscess. No soft tissue gas.     Bones are demineralized. No acute fracture, dislocation or focal bone destruction. Advanced tricompartmental degenerative osteoarthritis of the left knee. Small left knee joint effusion. Extensive peripheral arterial calcification.     Large uterine mass, presumably a fibroid, incompletely imaged. Mild wall  thickening of the urinary bladder suggestive of cystitis.                                                                    IMPRESSION:  1.  Extensive edema throughout the subcutaneous fat of the left thigh and the imaged portion of the right thigh. Findings are nonspecific. Cellulitis is in the differential diagnosis.  2.  No focal abscess.  3.  Advanced degenerative osteoarthritis of the left knee and small left knee joint effusion.  4.  Large uterine mass, presumably a fibroid, incompletely imaged.  5.  Mild wall thickening of the urinary bladder suggestive of cystitis.      Discharge Medications   Current Discharge Medication List      START taking these medications    Details   acetaminophen (TYLENOL) 650 MG suppository Place 1 suppository (650 mg) rectally every 4 hours as needed for fever  Qty: 4 suppository, Refills: 0    Associated Diagnoses: Hospice care patient      atropine 1 % ophthalmic solution Take 1 drop by mouth, place under tongue or place inside cheek every 4 hours as needed for secretions  Qty: 5 mL, Refills: 0    Associated Diagnoses: Hospice care patient      bisacodyl (DULCOLAX) 10 MG suppository Place 1 suppository (10 mg) rectally daily as needed for constipation  Qty: 5 suppository, Refills: 0    Associated Diagnoses: Hospice care patient      haloperidol (HALDOL) 2 MG/ML (HIGH CONC) solution Take 0.5 mLs (1 mg) by mouth every 6 hours as needed for agitation or other (nausea)  Qty: 15 mL, Refills: 0    Associated Diagnoses: Hospice care patient      LORazepam (LORAZEPAM INTENSOL) 2 MG/ML (HIGH CONC) solution Take 0.25 mLs (0.5 mg) by mouth or place under tongue every 4 hours as needed for anxiety (restlessness)  Qty: 30 mL, Refills: 0    Associated Diagnoses: Hospice care patient      morphine sulfate, high concentrate, (ROXANOL-CONCENTRATED) 20 MG/ML concentrated solution Take 0.25 mLs (5 mg) by mouth or place under tongue every 2 hours as needed for shortness of breath / dyspnea or  pain  Qty: 30 mL, Refills: 0    Comments: Hospice patient. Dispense in quantities of 30 mL.  Associated Diagnoses: Hospice care patient         STOP taking these medications       acetaminophen (TYLENOL) 500 MG tablet Comments:   Reason for Stopping:         albuterol (PROAIR HFA/PROVENTIL HFA/VENTOLIN HFA) 108 (90 Base) MCG/ACT inhaler Comments:   Reason for Stopping:         allopurinol (ZYLOPRIM) 100 MG tablet Comments:   Reason for Stopping:         calcium acetate (PHOSLO) 667 MG CAPS capsule Comments:   Reason for Stopping:         diclofenac (VOLTAREN) 1 % topical gel Comments:   Reason for Stopping:         hydrocortisone, Perianal, (ANUSOL-HC) 2.5 % cream Comments:   Reason for Stopping:         ipratropium - albuterol 0.5 mg/2.5 mg/3 mL (DUONEB) 0.5-2.5 (3) MG/3ML neb solution Comments:   Reason for Stopping:         Lidocaine (LIDOCARE) 4 % Patch Comments:   Reason for Stopping:         montelukast (SINGULAIR) 10 MG tablet Comments:   Reason for Stopping:         omeprazole (PRILOSEC) 20 MG DR capsule Comments:   Reason for Stopping:         polyethylene glycol (MIRALAX) 17 g packet Comments:   Reason for Stopping:         rosuvastatin (CRESTOR) 10 MG tablet Comments:   Reason for Stopping:         senna-docusate (SENOKOT-S/PERICOLACE) 8.6-50 MG tablet Comments:   Reason for Stopping:         sertraline (ZOLOFT) 25 MG tablet Comments:   Reason for Stopping:         timolol maleate (TIMOPTIC) 0.5 % ophthalmic solution Comments:   Reason for Stopping:         torsemide (DEMADEX) 20 MG tablet Comments:   Reason for Stopping:         warfarin ANTICOAGULANT (COUMADIN) 5 MG tablet Comments:   Reason for Stopping:             Allergies   Allergies   Allergen Reactions     Beta-Blockers (Beta-Adrenergic Blocking Agts) [Beta Adrenergic Blockers] Other (See Comments)     Heart block     Metoprolol Other (See Comments)     Heart block

## 2021-10-01 NOTE — PLAN OF CARE
Physical Therapy Discharge Summary    Reason for therapy discharge:    Discharged to home with hospice services.    Progress towards therapy goal(s). See goals on Care Plan in Epic electronic health record for goal details.  Goals partially met.  Barriers to achieving goals:   discharge from facility.    Therapy recommendation(s):    No further therapy is recommended.    Shannan Rodriguez, PT  10/1/2021

## 2021-10-01 NOTE — PROVIDER NOTIFICATION
Has CVC internal jugular still in place, peripheral IV removed because pt is discharging.  Note says IR to remove but pt is discharging. Messaged hospitalist regarding if need to leave it in for discharge.

## 2021-10-01 NOTE — PLAN OF CARE
Problem: Pain Acute  Goal: Acceptable Pain Control and Functional Ability  Outcome: No Change   Pt was groaning in pain at the start of shift. Schedule oxy given and was effective. Pt's family were at bedside visiting most of the shift. Pt daughter tried feeding the pt her dinner. Pt ate only about 25% of her dinner. Pt was turned and repositioned as needed, with some pillow support.

## 2021-10-01 NOTE — PLAN OF CARE
Problem: Breathing Pattern Ineffective  Goal: Effective Breathing Pattern  Outcome: No Change     Problem: Pain Acute  Goal: Acceptable Pain Control and Functional Ability  Outcome: No Change         Pt minimally responsive. Would arouse with cares/refuse turns.   LSC but diminished. BS active.     No prn meds needed for pain or agitation.

## 2021-10-01 NOTE — PROGRESS NOTES
I reviewed patient's chart.   Patient transitioned to hospice.  Nephrology service will signs off. Please do not hesitate to call with questions.    Kaylie Porter MD  [facility]  [address] [place] [address], [address]  [place] [address]  Phone# [phone]  Fax# [phone]

## 2021-10-01 NOTE — PLAN OF CARE
Occupational Therapy Discharge Summary    Reason for therapy discharge:    Discharged to home with hospice care    Progress towards therapy goal(s). See goals on Care Plan in [facility] electronic health record for goal details.  Goals not met.  Barriers to achieving goals:   limited tolerance for therapy and discharge from facility.    Therapy recommendation(s):    No further therapy is recommended.

## 2021-10-04 ENCOUNTER — PATIENT OUTREACH (OUTPATIENT)
Dept: CARE COORDINATION | Facility: CLINIC | Age: 66
End: 2021-10-04

## 2021-10-04 ENCOUNTER — MEDICAL CORRESPONDENCE (OUTPATIENT)
Dept: HEALTH INFORMATION MANAGEMENT | Facility: CLINIC | Age: 66
End: 2021-10-04
Payer: MEDICARE

## 2021-10-04 NOTE — PROGRESS NOTES
Clinic Care Coordination Contact    Situation: Patient chart reviewed by care coordinator.    Background: MAR completed chart review    Assessment: Patient has been discharged to home with hospice care due to terminal status     Plan/Recommendations: CCC team will no longer outreach due to involvement with hospice care

## 2021-10-13 ENCOUNTER — TELEPHONE (OUTPATIENT)
Dept: FAMILY MEDICINE | Facility: CLINIC | Age: 66
End: 2021-10-13

## 2021-10-13 NOTE — TELEPHONE ENCOUNTER
Reason for Call:  Other     Detailed comments: Calling to see if  will sign death certificate,family  would like to have services this weekend. I spoke with Espinoza Tobias he will sign the certificate. If any questions he may call Doris .  Phone Number Patient can be reached at: Other phone number:  Doris [phone]    Best Time: no call back needed   Can we leave a detailed message on this number?     Call taken on 10/13/2021 at 2:50 PM by Ioana Cancino

## 2021-10-19 PROBLEM — F32.9 MAJOR DEPRESSION: Status: ACTIVE | Noted: 2021-01-01

## 2021-10-24 ENCOUNTER — HEALTH MAINTENANCE LETTER (OUTPATIENT)
Age: 66
End: 2021-10-24

## 2022-04-18 NOTE — TELEPHONE ENCOUNTER
Patient calling reporting having severe pain with urination. Patient states she was recently seen for burning sensation with urination and was prescribed antibiotic. States she has finished her antibiotic. Continues to have severe pain with urination. Patient rates pain at 10/10. Per guideline, advised patient to be seen within 4 hours. Patient states she will go to the emergency department.     Lux Villarreal RN  [facility]     COVID 19 Nurse Triage Plan/Patient Instructions    Please be aware that novel coronavirus (COVID-19) may be circulating in the community. If you develop symptoms such as fever, cough, or SOB or if you have concerns about the presence of another infection including coronavirus (COVID-19), please contact your health care provider or visit [email or web address].     Disposition/Instructions        Thank you for taking steps to prevent the spread of this virus.  o Limit your contact with others.  o Wear a simple mask to cover your cough.  o Wash your hands well and often.    Resources    [facility]: About COVID-19: [email or web address]    CDC: What to Do If You're Sick: www.cdc.gov/coronavirus/2019-ncov/about/steps-when-sick.html    CDC: Ending Home Isolation: www.cdc.gov/coronavirus/2019-ncov/hcp/disposition-in-home-patients.html     CDC: Caring for Someone: www.cdc.gov/coronavirus/2019-ncov/if-you-are-sick/care-for-someone.html     [facility]: Interim Guidance for Hospital Discharge to Home: www.health.[facility].[place]/diseases/coronavirus/hcp/hospdischarge.pdf    [facility] clinical trials (COVID-19 research studies): clinicalaffairs.[facility]/umn-clinical-trials     Below are the COVID-19 hotlines at the [place] Department of Health ([facility]). Interpreters are available.   o For health questions: Call 469-092-9045 or 1-463.457.4387 (7 a.m. to 7 p.m.)  o For questions about schools and childcare: Call 454-344-0701 or 1-585.445.3009 (7 a.m. to 7 p.m.)  normal, alert, in no acute distress, well developed, well nourished, ambulating without difficulty, normal communication ability         Additional Information    Negative: Shock suspected (e.g., cold/pale/clammy skin, too weak to stand, low BP, rapid pulse)    Negative: Sounds like a life-threatening emergency to the triager    Negative: [1] Unable to urinate (or only a few drops) > 4 hours AND [2] bladder feels very full (e.g., palpable bladder or strong urge to urinate)    Negative: Vomiting    Negative: Patient sounds very sick or weak to the triager    [1] SEVERE pain with urination  (e.g., excruciating) AND [2] not improved after 2 hours of pain medicine and Sitz bath    Protocols used: URINATION PAIN - FEMALE-A-

## 2022-07-22 NOTE — TELEPHONE ENCOUNTER
[facility] Consult Note    # Demographics  Consult Type: General Neurology    Patient Location: Emergency Room    First Name: Parth    Last Name: James    YOB: 1940    Age: 81    Gender: Male    Facility: [facility]    Time of Initial Page (Central Time): 07/22/2022, 09:43    Time of Return Call (Central Time): 07/22/2022, 09:43      # HPI  History: 82yo M presents after being found to be confused at 0900. concern for an unwitnessed seizure. was complaining of numbness in his bilateral hands that is old, not new    had recent admission for seizure and had his meds adjusted      # Scores  Time of exam and NIHSS (Central Time): 07/22/2022, 09:45    Level of Consciousness 1a: [0] = Alert; keenly responsive    LOC Questions 1b: [2] = Answers neither correctly    LOC Commands 1c: [0] = Performs both tasks correctly    Best Gaze 2: [0] = Normal    Visual 3: [0] = No visual loss    Facial Palsy 4: [0] = Normal symmetrical movements    Motor Arm Left 5a: [0] = No drift    Motor Arm Right 5b: [0] = No drift    Motor Leg Left 6a: [0] = No drift    Motor Leg Right 6b: [0] = No drift    Limb Ataxia 7: [0] = Absent    Sensory 8: [0] = Normal    Best Language 9: [0] = No aphasia    Dysarthria 10: [1] = Mild-to-moderate dysarthria    Extinction and Inattention 11: [0] = No abnormality    NIHSS Total: 3      # PMH-FH-SH  Past Medical History:  seizure    Social History:  skilled nursing facility      # Assessment  Impression:  Altered Mental Status      # Plan  Thrombolytic/Intervention: NOT IV Thrombolysis or IA Intervention candidate    Thrombolytic Exclusion: > 4.5 hours    Thrombolytic/Intraarterial Exclusion:  IV thrombolytic and IA intervention considered but not recommended as this patient's symptoms are not clinically consistent with an assumed diagnosis of stroke    Other:  consult on-site neurology service for full work-up and evaluation recommendations      #  rx sent   Logistics  Telemedicine: Interactive 2 way audio and visual telecommunication technology was utilized during this visit

## 2022-12-29 NOTE — PROGRESS NOTES
12/27/16 - Care Guide called patient for Clinic Care Coordination outreach follow up. No answer.  01/10/17 - Care Guide called patient for Clinic Care Coordination outreach follow up. Left message for patient to call back.  01/25/17 -  Care Guide called patient for Clinic Care Coordination outreach follow up. Left message for patient to call back.    I have called Sun 3 times over the past two weeks and have been unsuccessful in reaching this patient for 1 month now.  This patient has also not returned any of my messages.   I will continue attempting to reach out to this patient in one month. I will also check this patient s chart for upcoming appointments, ER reports that may contain a new phone number, or any other recent activity.  I have informed the primary care provider by tasking regarding the lack of successful follow up.    03/01/17 Care Guide called patient for Clinic Care Coordination outreach follow up. Left message for patient to call back.    I have called Sun and have been unsuccessful in reaching this patient for 2 months now.  This patient has also not returned any of my messages.  I will continue attempting to reach out to this patient in one month.  I will also check this patient's chart for upcoming appointments, ER reports that may contain a new phone number, or any other recent activity.  I have informed the primary care provider by tasking regarding the lack of successful follow up.    Planned Outreach Frequency: Every 2 months      Notes: Patient's weight went up by 2 pounds in the last 3 months.          Health History  1. Morbid obesity        Outreach Preferences: telephone      Family:  n/a      SCOTTIE's on File: gave info to patient      Plans for at Next Outreach: will discuss with patient other exercises options she can do. Ask if she seen a nutrition counselor in the past, if no will ask if she is interested. Patient was recently in the ER on 12/31, ask to schedule ER follow up  appointment with Dr. Vanessa. Ask patient if she was able to schedule pool therapy at [facility].   Oriented - self; Oriented - place; Oriented - time

## 2023-07-09 NOTE — PROGRESS NOTES
Rupal CM, Clinic Care Coordination (CCC) Care Guide (CG), spoke with Sun for follow up call to reschedule missed PCAM. Patient declined reschedule, explaining that she was too busy for CCC. CG educated on accessing CCC in the future if needs change. Referral has been closed and no further outreach will be done.      Care Guide Delegation from RN (DONE: 04/03/19)  Due Date: Within 2 weeks from today's date: 3/25/19  Spoke to patient and she did not want to take the time to talk today.  Was awaiting a call from the clinic regarding a triage call.  Would like to schedule another appointment for CCC.  Delegation: No Show for RN Appointment. Please Follow unsuccessful outreach, no show standard work.     
john (surg) to admit pt, requests zosyn

## 2023-08-11 NOTE — CONSULTS
Care Management Initial Consult    General Information:  Patient's communication limitations: none  Level of Orientation: A & O x 3     Advance Care Planning: Patient does not have advance directive   No    Living Environment:   People in home/Living arrangements: Alone  Current residence:  Nursing home [facility]    Family/Social Support:  Care provided by/ Primary Caregiver: Facility/residential staff at extended care facility  Provides care for someone: No  Description of Support System: Children     Lifestyle & Psychosocial Needs:        Socioeconomic History     Marital status: Single     Spouse name: Not on file     Number of children: Not on file     Years of education: Not on file     Highest education level: Not on file     Tobacco Use     Smoking status: Former Smoker     Quit date: 2014     Years since quittin.3     Smokeless tobacco: Never Used   Substance and Sexual Activity     Alcohol use: No     Drug use: No       Functional Status:  Prior to admission ADL limits: Yes bathing, dressing, bed mobility, grooming, housekeeping, laundry, meal preparation, med assist, shopping, stairs, toileting, transferring    Current Resources:   Skilled Home Care Services: Skilled Nursing  Community Resources: [facility]  Equipment currently used at home:    Supplies currently used at home:      Employment:  Employment Status: RetiredNo No    Financial/Environmental Concerns/Barriers to Discharge:        Values/Beliefs:  Spiritual, Cultural Beliefs, Mormon Practices, Values that affect care: no              Additional Information:  FIONA assessed. Pt resides at [facility] in long term care, however she was staying with her daughter overnight when she presented to the ED. Pt is dependent with ADLs and IADLs at baseline. She has scheduled dialysis at [facility], and uses 2L NC overnight and during the day as needed. She has a history of skipping dialysis runs and then presenting in  the ED with shortness of breath and fluid overload. Her most recent hospitalizations were 5/10-5/14 at [facility] and 5/26-5/29 at [facility]. Plan to return to LTC at discharge.       MAR Lyons     Minoxidil Pregnancy And Lactation Text: This medication has not been assigned a Pregnancy Risk Category but animal studies failed to show danger with the topical medication. It is unknown if the medication is excreted in breast milk.

## 2025-01-22 NOTE — TELEPHONE ENCOUNTER
"Facility nurse will contact physician who is covering pt in facility. Pt agrees to plan.     I assume \"physician who is covering pt in facility\" means someone other than us?  If so, that's what should be done.    " CARDIOLOGY NEW PATIENT    Referring Physician: Dr. Burgos  Reason for Consult: palpitations    HPI:   Yovana Mendoza is a 49 year old female with a history of HTN, MATEO, asthma, and obesity here for initial cardiology evaluation.  She reports experiencing intermittent episodes of palpitations, characterized by a sudden increase in heart rate followed by an abrupt cessation. These episodes occur randomly and last for a few seconds without associated breathlessness or pain. The onset of these symptoms predates her initiation of phentermine therapy approximately 2 months ago, which she reports has not exacerbated the condition. She was referred to our clinic by Dr. Sy, a pulmonologist. She admits to inadequate water intake but is making efforts to improve this. She has a history of consuming energy drinks during her working years but has since discontinued this habit. Her alcohol consumption is minimal and infrequent. She has not undergone an echocardiogram in the past.    She has been diagnosed with sleep apnea and possesses a CPAP machine, which she is currently not using due to insurance issues. She has had a few consultations with Dr. Sy. She was advised to use the CPAP machine for at least 4 hours daily but encountered difficulties with the mask and is in the process of obtaining a suitable replacement.    She has been struggling with weight loss despite her efforts. She has been prescribed Ozempic but is awaiting its availability at the pharmacy. She initiated phentermine therapy approximately 2 months ago, resulting in a weight loss of 3 to 4 pounds within the first month, but no further weight reduction since then. She maintains a regular exercise regimen, attending gym classes on Mondays, Wednesdays, and Thursdays, and engaging in cardio exercises such as treadmill and bike workouts for at least 150 minutes per week. She reports experiencing leg cramps but no recent episodes of leg swelling. She has been  informed of a low magnesium level but has not started any supplementation. No chest pain or SOB.    She reports fluctuations in her blood pressure, which she attributes to stress. However, her blood pressure has been stable recently following a change in her medication regimen. She was previously on a higher dose of triamterene plus hydrochlorothiazide but has since been switched to a lower dose of hydrochlorothiazide 12.5 mg alone.    She does not drink a lot of caffeine. She used to drink a lot of energy drinks when she was working but no longer does. She consumes alcohol occasionally but not excessively.     Her PCP recently stopped triamterene/hydrochlorothiazide and started her on hydrochlorothiazide 12.5mg daily. Bp is at goal today but fluctuates at home. She is not sure if her cramps have improved with this change.    ROS:  A full 14 point review of systems has been completed and is negative except for as listed above in the HPI.    PMH:  Patient Active Problem List   Diagnosis    Right shoulder pain    Cervicalgia    Strain of right shoulder    Right shoulder tendinitis    Decreased range of motion of right shoulder    Inconclusive mammography due to dense breasts    Fibroadenoma of breast    Chronic right shoulder pain    Muscle weakness    Obstructive sleep apnea (adult) (pediatric)    GAUTAM (stress urinary incontinence, female)    Hypertension, essential    Recurrent iridocyclitis of right eye     MEDS:  Current Outpatient Medications   Medication Sig Dispense Refill    hydroCHLOROthiazide 12.5 MG tablet Take 1 tablet by mouth daily. 90 tablet 0    Cholecalciferol (vitamin D3) 125 mcg (5,000 units) capsule Take 1 capsule by mouth 3 days a week. 90 capsule 1    semaglutide-Weight Management (WEGOVY) 0.5 MG/0.5ML injection Inject 0.5 mg into the skin every 7 days. Indications: OBESITY Start after finishing 0.25 mg prescription. (Patient not taking: Reported on 11/12/2024) 2 mL 3    albuterol 108 (90 Base)  MCG/ACT inhaler Inhale 2 puffs into the lungs every 4 hours as needed for Shortness of Breath or Wheezing. 1 each 1    beclomethasone diprop HFA (QVAR REDIHALER) 80 MCG/ACT inhaler Inhale 1 puff into the lungs in the morning and 1 puff in the evening. 1 each 3    Vitamin D, Ergocalciferol, 1.25 mg (50,000 units) capsule TAKE 1 CAPSULE BY MOUTH 1 TIME A WEEK (Patient not taking: Reported on 1/22/2025) 12 capsule 1     No current facility-administered medications for this visit.     SOCIAL HISTORY:  Social History     Tobacco Use    Smoking status: Never    Smokeless tobacco: Never   Vaping Use    Vaping status: never used   Substance Use Topics    Alcohol use: Yes     Comment: occ    Drug use: No     FAMILY HISTORY:  Family History   Problem Relation Age of Onset    Hypertension Mother     Hypertension Father     Patient is unaware of any medical problems Sister     Diabetes Brother     Patient is unaware of any medical problems Brother       ALLERGIES:  ALLERGIES:  No Known Allergies  O:  Vitals:    01/22/25 1251   BP: 126/78   Pulse: 85   Resp: 16     PHYSICAL EXAM:  GEN: well groomed, NAD, conversant  SKIN: no rashes noted, normal temperature  NECK: neck supple, trachea midline, no thyromegaly  EYES: sclerae anicteric, moist conjunctiva, PERRLA  ENT: oropharynx clear, MMM   CV: RR, no murmurs/rubs/gallops, no extra heart sounds, carotid volumes full, carotid upstroke normal, LV impulse non displaced, JVP 6cm  LUNGS: clear to auscultation bilaterally, no wheezes/rales/rhonchi, normal respiratory effort, no usage of accessory muscles  ABD: soft, non-tender, non-distended, normal active bowel sounds  EXT: no peripheral edema noted, normal peripheral pulses  PSYCH: A&Ox3, appropriate affect  MUSC: no clubbing or cyanosis noted    I extensively reviewed clinical history, medical records, and various cardiovascular studies with patient. Pertinent testing is listed below.      LABS:  Lab Services on 01/07/2025    Component Date Value Ref Range Status    Fasting Status 01/07/2025    Final    Sodium 01/07/2025 139  135 - 145 mmol/L Final    Potassium 01/07/2025 4.1  3.4 - 5.1 mmol/L Final    Chloride 01/07/2025 99  97 - 110 mmol/L Final    Carbon Dioxide 01/07/2025 30  21 - 32 mmol/L Final    Anion Gap 01/07/2025 14  7 - 19 mmol/L Final    Glucose 01/07/2025 120 (H)  70 - 99 mg/dL Final    BUN 01/07/2025 12  6 - 20 mg/dL Final    Creatinine 01/07/2025 0.91  0.51 - 0.95 mg/dL Final    Glomerular Filtration Rate 01/07/2025 76  >=60 Final    BUN/Cr 01/07/2025 13  7 - 25 Final    Calcium 01/07/2025 9.5  8.4 - 10.2 mg/dL Final    Bilirubin, Total 01/07/2025 0.4  0.2 - 1.0 mg/dL Final    GOT/AST 01/07/2025 13  <=37 Units/L Final    GPT/ALT 01/07/2025 17  <64 Units/L Final    Alkaline Phosphatase 01/07/2025 91  45 - 117 Units/L Final    Albumin 01/07/2025 3.6  3.4 - 5.0 g/dL Final    Protein, Total 01/07/2025 7.3  6.4 - 8.2 g/dL Final    Globulin 01/07/2025 3.7  2.0 - 4.0 g/dL Final    A/G Ratio 01/07/2025 1.0  1.0 - 2.4 Final    Magnesium 01/07/2025 1.7  1.7 - 2.4 mg/dL Final   Office Visit on 01/02/2025   Component Date Value Ref Range Status    POCT Color 01/02/2025 Yellow  Geraldine, Brown, Orange, Pink, Red, Straw, Yellow, Green, Blue, Colorless, Other Final    POCT Appearance 01/02/2025 Clear  Clear, Cloudy, Hazy, Turbid Final    POCT Glucose Urine 01/02/2025 Negative  Negative mg/dL Final    POCT Bilirubin 01/02/2025 Negative  Negative Final    POCT Ketones 01/02/2025 Negative  Negative mg/dL Final    POCT Specific Gravity 01/02/2025 1.015  1.000, 1.005, 1.010, 1.015, 1.020, 1.025, 1.030, <= 1.005 Final    POCT Occult Blood 01/02/2025 Negative  Negative Final    POCT pH 01/02/2025 6.0  5.0, 5.5, 6.0, 6.5, 7.0, 7.5, 8.0, 8.5 Final    POCT Protein 01/02/2025 Negative  Negative mg/dL Final    POCT Urobilinogen 01/02/2025 0.2  0.2, 1.0 mg/dL Final    Urine Nitrite 01/02/2025 Negative  Negative Final    WBC (Leukocyte) Esterase POC  01/02/2025 Small (A)  Negative Final    Internal Procedural Controls Accep* 01/02/2025 Yes  Yes Final    TEST LOT EXPIRATION DATE 01/02/2025 05-   Final    TEST LOT NUMBER 01/02/2025 312,017   Final    Urine, Bacterial Culture 01/02/2025 <10,000 CFU/mL Mixed bacterial priti with no predominating type   Final    Bacterial Vaginosis 01/02/2025 Not Detected  Not Detected Final    Procedural Notes 01/02/2025    Final                    Value:This result was obtained by real time transcription-mediated amplification (TMA) for detection and analysis by fluorescent probes designed to detect bacterial vaginosis (BV).  The following bacterial targets: Atopobium vaginae, Gardnerella vaginalis, Lactobacillus spp: crispatus, gasseri, and jensenii.      This test was performed by [facility] using the FDA cleared assays Aptima BV-IVD, from [employer]      Michelle Glabrata 01/02/2025 Positive (A)  Not Detected Final    Candida spp 01/02/2025 Positive (A)  Not Detected Final    Trichomonas vaginalis 01/02/2025 Not Detected  Not Detected Final    Procedural Notes 01/02/2025    Final                    Value:This result was obtained by real time transcription-mediated amplification (TMA) method and analysis by fluorescent probes designed to detect microorganisms associated with vulvovaginal candidiasis and trichomoniasis.  The following Yeast targets: Candida spp; (C. albicans, C. dubliniensis, C. tropicalis, C. parapsilosis), Candida glabrata, and Trichomonas vaginalis.     This test was performed by [facility] using the FDA cleared assays Aptima CV-IVD from [employer]     Lab Services on 11/04/2024   Component Date Value Ref Range Status    Angiotensin Converting Enzyme 11/04/2024 39  16 - 85 U/L Final    Double Stranded DNA Ab, IgG 11/04/2024 <1  <=9 IUnits/mL Final    SSA Ab, IgG 11/04/2024 <0.2  <1.0 AI Final    Chromatin Ab, IgG 11/04/2024 <0.2  <1.0 AI Final    RPP Ab, IgG 11/04/2024 <0.2  <1.0 AI  Final    SSB  Ab, IgG 11/04/2024 <0.2  <1.0 AI Final    SM Ab, IgG 11/04/2024 <0.2  <1.0 AI Final    RNP Ab, IgG 11/04/2024 0.5  <1.0 AI Final    TIMOTHY 1 Ab, IgG  11/04/2024 <0.2  <1.0 AI Final    SCL70 Ab, IgG  11/04/2024 <0.2  <1.0 AI Final    Centromere Ab, IgG 11/04/2024 <0.2  <1.0 AI Final    SM/RNP Ab, IgG 11/04/2024 <0.2  <1.0 AI Final    ANCA IFA TITER 11/04/2024 <1:20  <1:20 Final    ANCA IFA PATTERN 11/04/2024 None Detected  None Detected Final    HLA-B27 11/04/2024 NEGATIVE   Final    Interpretive Comment 11/04/2024    Final                    Value:HLA B27 NEGATIVE  Approximately 90% of American Caucasians and Asians with ankylosing spondylitis (AS) are positive for HLA B27. Thus, the absence of B27 does not favor the diagnosis of AS in individuals of these racial backgrounds.  The predictive value of a negative B27 test in American Blacks is poor. Only 50% to 55% of American Blacks with AS are positive for HLA B27.  Negative results have been observed for very rare alleles of B27, but are estimated to occur in <1 in 1,000,000.    Date Tested 11/04/2024 11/05/2024   Final    Quantiferon Plus Interpretation 11/04/2024 Negative  Negative Final    PNIL 11/04/2024 0.15  IU/mL Final    P TB Antigen1-NIL 11/04/2024 0.00  IU/mL Final    P TB Antigen2-NIL 11/04/2024 0.00  IU/mL Final    P Mitogen-NIL 11/04/2024 7.06  IU/mL Final    Rheumatoid Factor 11/04/2024 <10  <=14 Units/mL Final    RPR 11/04/2024 Nonreactive  Nonreactive Final    Lyme Disease Antibody Screen, IgG * 11/04/2024 Negative  Negative Final   Walk In on 09/11/2024   Component Date Value Ref Range Status    POCT SARS-COV-2 ANTIGEN 09/11/2024 Not Detected  Not Detected Final    Rapid Influenza A Ag 09/11/2024 Negative  Negative Final    Rapid Influenza B Ag 09/11/2024 Negative  Negative Final    TEST LOT NUMBER 09/11/2024 [id number]   Final    TEST LOT EXPIRATION DATE 09/11/2024 10/18/2025   Final   Lab Services on 08/09/2024   Component Date Value Ref  Range Status    Fasting Status 08/09/2024 12  0 - 999 Hours Final    Sodium 08/09/2024 138  135 - 145 mmol/L Final    Potassium 08/09/2024 4.6  3.4 - 5.1 mmol/L Final    Chloride 08/09/2024 99  97 - 110 mmol/L Final    Carbon Dioxide 08/09/2024 29  21 - 32 mmol/L Final    Anion Gap 08/09/2024 15  7 - 19 mmol/L Final    Glucose 08/09/2024 96  70 - 99 mg/dL Final    BUN 08/09/2024 13  6 - 20 mg/dL Final    Creatinine 08/09/2024 0.82  0.51 - 0.95 mg/dL Final    Glomerular Filtration Rate 08/09/2024 87  >=60 Final    BUN/Cr 08/09/2024 16  7 - 25 Final    Calcium 08/09/2024 9.4  8.4 - 10.2 mg/dL Final    Bilirubin, Total 08/09/2024 0.4  0.2 - 1.0 mg/dL Final    GOT/AST 08/09/2024 12  <=37 Units/L Final    GPT/ALT 08/09/2024 12  <64 Units/L Final    Alkaline Phosphatase 08/09/2024 100  45 - 117 Units/L Final    Albumin 08/09/2024 3.6  3.6 - 5.1 g/dL Final    Protein, Total 08/09/2024 7.8  6.4 - 8.2 g/dL Final    Globulin 08/09/2024 4.2 (H)  2.0 - 4.0 g/dL Final    A/G Ratio 08/09/2024 0.9 (L)  1.0 - 2.4 Final    TSH 08/09/2024 1.566  0.350 - 5.000 mcUnits/mL Final    Vitamin D, 25-Hydroxy 08/09/2024 43.8  30.0 - 100.0 ng/mL Final    Cholesterol 08/09/2024 248 (H)  <=199 mg/dL Final    Triglycerides 08/09/2024 62  <=149 mg/dL Final    HDL 08/09/2024 118  >=50 mg/dL Final    LDL 08/09/2024 118  <=129 mg/dL Final    Non-HDL Cholesterol 08/09/2024 130  mg/dL Final    Cholesterol/ HDL Ratio 08/09/2024 2.1  <=4.4 Final    Hemoglobin A1C 08/09/2024 5.1  4.5 - 5.6 % Final    WBC 08/09/2024 7.7  4.2 - 11.0 K/mcL Final    RBC 08/09/2024 4.35  4.00 - 5.20 mil/mcL Final    HGB 08/09/2024 13.0  12.0 - 15.5 g/dL Final    HCT 08/09/2024 40.3  36.0 - 46.5 % Final    MCV 08/09/2024 92.6  78.0 - 100.0 fl Final    MCH 08/09/2024 29.9  26.0 - 34.0 pg Final    MCHC 08/09/2024 32.3  32.0 - 36.5 g/dL Final    RDW-CV 08/09/2024 12.6  11.0 - 15.0 % Final    RDW-SD 08/09/2024 43.0  39.0 - 50.0 fL Final    PLT 08/09/2024 212  140 - 450 K/mcL Final     NRBC 08/09/2024 0  <=0 /100 WBC Final    Neutrophil, Percent 08/09/2024 70  % Final    Lymphocytes, Percent 08/09/2024 18  % Final    Mono, Percent 08/09/2024 9  % Final    Eosinophils, Percent 08/09/2024 2  % Final    Basophils, Percent 08/09/2024 1  % Final    Immature Granulocytes 08/09/2024 0  % Final    Absolute Neutrophils 08/09/2024 5.5  1.8 - 7.7 K/mcL Final    Absolute Lymphocytes 08/09/2024 1.4  1.0 - 4.0 K/mcL Final    Absolute Monocytes 08/09/2024 0.7  0.3 - 0.9 K/mcL Final    Absolute Eosinophils  08/09/2024 0.1  0.0 - 0.5 K/mcL Final    Absolute Basophils 08/09/2024 0.1  0.0 - 0.3 K/mcL Final    Absolute Immature Granulocytes 08/09/2024 0.0  0.0 - 0.2 K/mcL Final     STUDIES:   ECG 1/2025: (Study independently reviewed)  NSR, no ischemic changes    ASSESSMENT/PLAN:    Palpitations  -unclear etiology  -will get TTE and event monitor  -low magnesium, normal potassium   -start magnesium oxide 400mg daily  -Discussed staying hydrated and avoiding caffeine    Hypomagnesemia   -start magnesium oxide 400mg daily; addition of magnesium supplements may help with leg cramping as well as palpitations    Asthma  -Managed per pulmonary, Dr. Nino  -Rescue inhaler    Obesity  -Ozempic was ordered for the patient to help with weight loss, has not been approved by insurance yet  -Has been on phentermine for last 2 months without significant weight loss; has not had any increase in her palpitations with this    Hyperlipidemia  -Borderline elevated lipids, CV risk less than 7.5%  -Monitor closely with weight loss    MATEO  -has CPAP, needs new mask  -working with Dr. Cha    HTN  -continue hydrochlorothiazide 12.5mg daily  -low magnesium, normal potassium  -Monitor blood pressures regularly at home  -May need to add second agent, discussed I would not recommend titrating hydrochlorothiazide given low magnesium as well as muscle cramps as above    Preventive Cardiology:   - Diet: instructed pt to follow a low salt,  low fat   - Exercise: instructed pt to perform moderate intensity exercise 30 min a day for at least 5 days/week (preferentially 7days/week)  - Tobacco Use: not currently a smoker, encouraged patient to continue to abstain    RTC in 6 months    Thank you for allowing me to participate in the care of this patient.  Please do not hesitate to call me for any further questions.      Electronically signed by: Reinier Rodriguez MD  1/22/2025